# Patient Record
Sex: MALE | Race: WHITE | NOT HISPANIC OR LATINO
[De-identification: names, ages, dates, MRNs, and addresses within clinical notes are randomized per-mention and may not be internally consistent; named-entity substitution may affect disease eponyms.]

---

## 2017-03-15 ENCOUNTER — APPOINTMENT (OUTPATIENT)
Dept: OTOLARYNGOLOGY | Facility: CLINIC | Age: 52
End: 2017-03-15

## 2017-03-15 ENCOUNTER — EMERGENCY (EMERGENCY)
Facility: HOSPITAL | Age: 52
LOS: 1 days | Discharge: HOME | End: 2017-03-15
Admitting: EMERGENCY MEDICINE
Payer: COMMERCIAL

## 2017-03-15 VITALS
HEART RATE: 72 BPM | HEIGHT: 70 IN | DIASTOLIC BLOOD PRESSURE: 89 MMHG | WEIGHT: 188 LBS | RESPIRATION RATE: 18 BRPM | SYSTOLIC BLOOD PRESSURE: 138 MMHG | BODY MASS INDEX: 26.92 KG/M2

## 2017-03-15 VITALS
OXYGEN SATURATION: 97 % | SYSTOLIC BLOOD PRESSURE: 162 MMHG | DIASTOLIC BLOOD PRESSURE: 91 MMHG | HEART RATE: 79 BPM | TEMPERATURE: 98 F | RESPIRATION RATE: 20 BRPM

## 2017-03-15 DIAGNOSIS — H91.21 SUDDEN IDIOPATHIC HEARING LOSS, RIGHT EAR: ICD-10-CM

## 2017-03-15 DIAGNOSIS — H93.8X1 OTHER SPECIFIED DISORDERS OF RIGHT EAR: ICD-10-CM

## 2017-03-15 DIAGNOSIS — Z78.9 OTHER SPECIFIED HEALTH STATUS: ICD-10-CM

## 2017-03-15 DIAGNOSIS — H60.501 UNSPECIFIED ACUTE NONINFECTIVE OTITIS EXTERNA, RIGHT EAR: ICD-10-CM

## 2017-03-15 DIAGNOSIS — H91.91 UNSPECIFIED HEARING LOSS, RIGHT EAR: ICD-10-CM

## 2017-03-15 PROCEDURE — 99283 EMERGENCY DEPT VISIT LOW MDM: CPT | Mod: 25

## 2017-03-15 NOTE — ED PROVIDER NOTE - MEDICAL DECISION MAKING DETAILS
Pt is a 50 y/o M smoker no PMHx p/w muffled hearing (R > L) x 2 days. -- muffled hearing, possibly presbycusis, no e/o ottis media, no e/o otitis externa, no e/o TM perforation, no e/o cerumen impaction -- ENT follow up

## 2017-03-15 NOTE — ED PROVIDER NOTE - CHPI ED SYMPTOMS NEG
no chills/no nausea/no loss of consciousness/no weakness/no fever/no blurred vision/no change in level of consciousness/no syncope/no numbness/no vomiting

## 2017-03-15 NOTE — ED PROVIDER NOTE - PLAN OF CARE
Rest, drink plenty of fluids.  Advance activity as tolerated.  Continue all previously prescribed medications as directed.  Follow up with your primary care physician and ENT (referral list provided)  in 48-72 hours- bring copies of your results.  Return to the ER for worsening or persistent symptoms, including but not limited to hearing loss, ear pain, fevers, numbness, weakness, and/or ANY NEW OR CONCERNING SYMPTOMS. If you have issues obtaining follow up, please call: [phone] to obtain a doctor or specialist who takes your insurance in your area.

## 2017-03-22 ENCOUNTER — APPOINTMENT (OUTPATIENT)
Dept: OTOLARYNGOLOGY | Facility: CLINIC | Age: 52
End: 2017-03-22

## 2017-04-01 ENCOUNTER — APPOINTMENT (OUTPATIENT)
Dept: MRI IMAGING | Facility: IMAGING CENTER | Age: 52
End: 2017-04-01

## 2017-04-20 NOTE — ED ADULT TRIAGE NOTE - ESI TRIAGE ACUITY LEVEL, MLM
MRN:[id number]                      After Visit Summary   4/20/2017    Geovani Bella    MRN: [id number]           Thank you!     Thank you for choosing [facility] for your care. Our goal is always to provide you with excellent care. Hearing back from our patients is one way we can continue to improve our services. Please take a few minutes to complete the written survey that you may receive in the mail after you visit with us. Thank you!        Patient Information     Date Of Birth          1977        About your hospital stay     You were admitted on:  April 20, 2017 You last received care in the:  [facility]    You were discharged on:  April 20, 2017       Who to Call     For medical emergencies, please call 911.  For non-urgent questions about your medical care, please call your primary care provider or clinic, [phone]  For questions related to your surgery, please call your surgery clinic        Attending Provider     Provider Reinaldo Vila MD Orthopedics       Primary Care Provider Office Phone # Fax #    Keo Phillips PA-C [phone]       [place] [address]        After Care Instructions      Diet as Tolerated       Return to diet before surgery, unless instructed otherwise.            Discharge Instructions       Review outpatient procedure discharge instructions with patient as directed by Provider            Dressing Change       Change dressing on third day after surgery.            Ice to affected area       Ice pack to surgical site every 15 minutes per hour for 24 hours            No Alcohol       For 24 hours post procedure            No driving or operating machinery        until the day after procedure            Notify Provider       For signs and symptoms of infection: Fever greater than 101, redness, swelling, heat at site, drainage, pus.             4 Return to clinic       Return to clinic in 2 weeks            Shower        Ok to remove dressing and shower on postoperative day #3. Leave steri strips in place. Dab dry. Cover with gauze. No soaking baths, hot tubs, whirlpools, swimming pools.            Weight bearing - As tolerated           Wound care       Do not immerse wound in water until sutures removed                  Your next 10 appointments already scheduled     May 04, 2017  1:15 PM CDT   Return Visit with Reinaldo Mclaughlin MD   [facility] [place] ([facility] [place])    [address] [facility] [address]  [place] [address]   [phone]            May 24, 2017 10:30 AM CDT   New Visit with Fco Jackson [facility]    [address] [place] [address]   [phone]            May 31, 2017  9:30 AM CDT   Return Visit with BANDAR Thapa   [facility] ([address] [address]   [phone]              Further instructions from your care team       [facility]  Same-Day Surgery   Adult Discharge Orders & Instructions   For 24 hours after surgery  1. Get plenty of rest.  A responsible adult must stay with you for at least 24 hours after you leave the hospital.   2. Do not drive or use heavy equipment.  If you have weakness or tingling, don't drive or use heavy equipment until this feeling goes away.  3. Do not drink alcohol.  4. Avoid strenuous or risky activities.  Ask for help when climbing stairs.   5. You may feel lightheaded.  IF so, sit for a few minutes before standing.  Have someone help you get up.   6. If you have nausea (feel sick to your stomach): Drink only clear liquids such as apple juice, ginger ale, broth or 7-Up.  Rest may also help.  Be sure to drink enough fluids.  Move to a regular diet as you feel  able.  7. You may have a slight fever. Call the doctor if your fever is over 100 F (37.7 C) (taken under the tongue) or lasts longer than 24 hours.  8. You may have a dry mouth, a sore throat, muscle aches or trouble sleeping.  These should go away after 24 hours.  9. Do not make important or legal decisions.   Call your doctor for any of the followin.  Signs of infection (fever, growing tenderness at the surgery site, a large amount of drainage or bleeding, severe pain, foul-smelling drainage, redness, swelling).    2. It has been over 8 to 10 hours since surgery and you are still not able to urinate (pass water).    3.  Headache for over 24 hours.    4.  Numbness, tingling or weakness the day after surgery (if you had spinal anesthesia).  To contact a doctor call:  [phone]  Name: Geovani Bella MRN #: [id number]  1. Date: 2017  Procedure: left knee arthroscopy, medial meniscus and chondral debridement.  2. Discharge to home when stable, tolerating clear liquids, and patient has urinated  3. Call for follow-up appointment, [phone], with Dr. Mclaughlin in:  2 weeks.   WOUND CARE    The bandage may be slightly bloody. This is normal.  4. Ice:  Keep an ice bag on your knee for 20 minutes at a time.  5. Keep incisions clean and dry following surgery for:  72 hours   6. Change all bandages in:  72 hours       7. If bandages are changed before follow-up, cover all incisions with fresh bandages or bandaids.  8. O.K. to shower (may get incision wet) in:  72 hours  9. No tub baths, swimming pools, hot tubs, etc. for a minimum of 2 weeks following surgery  ACTIVITY  10. Keep leg elevated on a pillow placed under ankle. Do not keep pillow under your knee.  11. Weight-bearing ([place]):  Weight-bear as tolerated       May discontinue crutches in 2-3 days if able to walk without a limp.  12. Bracing: no brace needed.  13. Range of motion limits: no limit. Work on regaining full range of  motion.  14. Exercises:  Perform exercises 3 times a day for a minimum of 25 reps each time (start today or tomorrow):             Quadriceps sets  Calf Pumps Straight leg raises  Heels Slides   15. Start Physical Therapy: upon return to clinic.  16. OK to drive:  Not for 48 hours    When going back to driving, be sure to test braking/acceleration maneuvers in an empty parking lot before entry into any traffic areas.      ABSOLUTELY NO DRIVING WHILE TAKING NARCOTICS!    DISCHARGE MEDICATIONS:   Aspirin 325 mg, 1 tablet, take twice a day for 14 days then stop (to prevent blood clots) (over the counter)  Norco (5/325), 1 to 2 tablets, take every 6 hours as needed for pain, do not exceed 12 tabs/day  Other: stool softeners.    Strong pain medication has been prescribed. Use as directed. Do not combine with alcohol. Be careful as you walk or climb stairs.   DIET:  If no nausea, clear liquids should be taken initially.  Then progress to solid foods when clear liquids are tolerated.   RESPONSE TO SURGERY: It is normal to have pain and swelling in your knee after surgery. It may take 4 weeks or longer for the swelling to go away. It is also common to notice some bruising around the knee, thigh, and calf as the swelling resolves.  EMERGENCY: Call or return for any fevers (temperature greater than 101.5   or sustained fevers greater than 100.5   that haven t resolved within 3 to 4 days following surgery) or chills, increasing pain, swelling, redness, calf pain, drainage (especially if yellow, green, or foul smelling), excessive bleeding), chest pain, shortness of breath:  Phone #: [phone]; If emergency, go to local ER or dial 911.    Reinaldo Mclaughlin M.D., M.S.  Dept. of Orthopaedic Surgery  [facility]    4/20/2017        KNEE SURGERY - HOME EXERCISE PROGRAM    All exercises to be performed at least 3 times per day.     Quad Sets    Sit with leg extended    Tighten quad muscles in front of leg, trying  to  push back of knee downward    Hold exercise for 10 seconds    Rest 10 seconds between reps    Perform 1 set of 20 reps, 3 times a day     Heel Slides     Lie on back with legs straight    Slide heel to buttocks     Return to start position    Repeat with other leg    Perform 1 rep every 4 seconds    Perform 3 sets of 20 reps, 3 times a day    Rest 1 minute between sets     Ankle Pumps     Lie on back with foot elevated on pillow    Move foot up and down, pumping ankle    Perform 3 sets of 20 reps, 3 times a day    Perform 1 rep every 4 seconds    Rest 1 minute between sets     Straight Leg Raise    Lie on back with uninvolved knee bent    Raise straight leg to thigh level of bend leg    Return to starting position    Perform 3 sets of 20 reps, 3 times a day    Perform 1 rep every 4 seconds    Rest 1 minute between sets                  [facility]  Same-Day Surgery   Adult Discharge Orders & Instructions   For 24 hours after surgery  10. Get plenty of rest.  A responsible adult must stay with you for at least 24 hours after you leave the hospital.   11. Do not drive or use heavy equipment.  If you have weakness or tingling, don't drive or use heavy equipment until this feeling goes away.  12. Do not drink alcohol.  13. Avoid strenuous or risky activities.  Ask for help when climbing stairs.   14. You may feel lightheaded.  IF so, sit for a few minutes before standing.  Have someone help you get up.   15. If you have nausea (feel sick to your stomach): Drink only clear liquids such as apple juice, ginger ale, broth or 7-Up.  Rest may also help.  Be sure to drink enough fluids.  Move to a regular diet as you feel able.  16. You may have a slight fever. Call the doctor if your fever is over 100 F (37.7 C) (taken under the tongue) or lasts longer than 24 hours.  17. You may have a dry mouth, a sore throat, muscle aches or trouble sleeping.  These should go away after 24 hours.  18. Do not  make important or legal decisions.   Call your doctor for any of the followin.  Signs of infection (fever, growing tenderness at the surgery site, a large amount of drainage or bleeding, severe pain, foul-smelling drainage, redness, swelling).    2. It has been over 8 to 10 hours since surgery and you are still not able to urinate (pass water).    3.  Headache for over 24 hours.      To contact a doctor call:  [phone]      No Ibuprofen before 4:00 pm.          Pending Results     No orders found from 2017 to 2017.            Admission Information     Date & Time Provider Department Dept. Phone    2017 Reinaldo Mclaughlin MD [facility] [phone]      Your Vitals Were     Blood Pressure Temperature Respirations Pulse Oximetry          139/66 96.8  F (36  C) (Temporal) 20 96%        MyChart Information     [employer] gives you secure access to your electronic health record. If you see a primary care provider, you can also send messages to your care team and make appointments. If you have questions, please call your primary care clinic.  If you do not have a primary care provider, please call [phone] and they will assist you.        Care EveryWhere ID     This is your Care EveryWhere ID. This could be used by other organizations to access your [facility] medical records  [id number]           Review of your medicines      START taking        Dose / Directions    aspirin  MG EC tablet   Used for:  Complex tear of medial meniscus of left knee as current injury, subsequent encounter        Dose:  325 mg   Take 1 tablet (325 mg) by mouth 2 times daily (with meals) for 14 days   Quantity:  28 tablet   Refills:  0       HYDROcodone-acetaminophen 5-325 MG per tablet   Commonly known as:  NORCO   Used for:  Complex tear of medial meniscus of left knee as current injury, subsequent encounter        Dose:  1-2 tablet   Take 1-2 tablets by mouth every 6 hours as needed for other  (Moderate to Severe Pain)   Quantity:  30 tablet   Refills:  0       senna-docusate 8.6-50 MG per tablet   Commonly known as:  SENOKOT-S;PERICOLACE   Used for:  Complex tear of medial meniscus of left knee as current injury, subsequent encounter        Dose:  1-2 tablet   Take 1-2 tablets by mouth 2 times daily Take while on oral narcotics to prevent or treat constipation.   Quantity:  30 tablet   Refills:  0         CONTINUE these medicines which have NOT CHANGED        Dose / Directions    albuterol 108 (90 BASE) MCG/ACT Inhaler   Commonly known as:  PROAIR HFA/PROVENTIL HFA/VENTOLIN HFA   Used for:  Bronchitis with bronchospasm        Dose:  2 puff   Inhale 2 puffs into the lungs every 6 hours as needed for shortness of breath / dyspnea   Quantity:  1 Inhaler   Refills:  0       busPIRone 15 MG tablet   Commonly known as:  BUSPAR        Dose:  15 mg   Take 1 tablet (15 mg) by mouth 2 times daily   Quantity:  180 tablet   Refills:  3       CLONAZEPAM PO        Take  by mouth 2 times daily.   Refills:  0       methylphenidate ER 27 MG CR tablet   Commonly known as:  CONCERTA        Dose:  27 mg   27 mg   Refills:  0       VIAGRA 100 MG cap/tab   Generic drug:  sildenafil        Dose:  100 mg   Take 100 mg by mouth daily as needed.   Refills:  0            Where to get your medicines      These medications were sent to [facility] [place], [place] - [address]  [address], [facility] [address]     Phone:  [phone]     aspirin  MG EC tablet    senna-docusate 8.6-50 MG per tablet         Some of these will need a paper prescription and others can be bought over the counter. Ask your nurse if you have questions.     Bring a paper prescription for each of these medications     HYDROcodone-acetaminophen 5-325 MG per tablet                Protect others around you: Learn how to safely use, store and throw away your medicines at www.disposemymeds.org.              Medication List: This is a list of all your medications and when to take them. Check marks below indicate your daily home schedule. Keep this list as a reference.      Medications           Morning Afternoon Evening Bedtime As Needed    albuterol 108 (90 BASE) MCG/ACT Inhaler   Commonly known as:  PROAIR HFA/PROVENTIL HFA/VENTOLIN HFA   Inhale 2 puffs into the lungs every 6 hours as needed for shortness of breath / dyspnea                                aspirin  MG EC tablet   Take 1 tablet (325 mg) by mouth 2 times daily (with meals) for 14 days                                busPIRone 15 MG tablet   Commonly known as:  BUSPAR   Take 1 tablet (15 mg) by mouth 2 times daily                                CLONAZEPAM PO   Take  by mouth 2 times daily.                                HYDROcodone-acetaminophen 5-325 MG per tablet   Commonly known as:  NORCO   Take 1-2 tablets by mouth every 6 hours as needed for other (Moderate to Severe Pain)                                methylphenidate ER 27 MG CR tablet   Commonly known as:  CONCERTA   27 mg                                senna-docusate 8.6-50 MG per tablet   Commonly known as:  SENOKOT-S;PERICOLACE   Take 1-2 tablets by mouth 2 times daily Take while on oral narcotics to prevent or treat constipation.                                VIAGRA 100 MG cap/tab   Take 100 mg by mouth daily as needed.   Generic drug:  sildenafil                                          More Information        Discharge Instructions: Using Crutches (Weight-Bearing)  Your doctor has prescribed crutches for you. A healthy leg can support your body weight, but when you have an injured leg or foot, you need to keep weight off it. Once you are told that you can put some weight on your leg, use a  weight-bearing  method of walking as the leg heals. Depending on your arm strength and balance, you can either  step to  or  step through.  Practice will help you learn to step through so  that you can cover more ground with each step.      Before You Use Crutches    Remove throw rugs, electrical cords, and anything else that may cause you to fall.    Arrange your household to keep the items you need handy. Keep everything else out of the way.    Find a backpack, angel pack, or apron, or use pockets to carry things. This will help you keep your hands free.  Standing with Crutches  Use the balanced standing (tripod) position when you start or end a movement. Also use it whenever you're standing for a length of time.    Move your crutches in front of you about 12 inches.    Find your balance.    Be sure not to rest your armpits on the pads.  Walking with Crutches    Start in a balanced standing (tripod) position.    Step forward with your affected foot.    Land lightly between your crutches.    Squeeze the pads against the sides of your chest.    Support your weight with your hands and your affected leg.    Press down on the handgrips.  Step to    Lift your unaffected foot and step to the crutches.    Land on your unaffected foot, between your crutches.    Keep the knee slightly bent.    Reach forward and out with the crutches to begin the next step.  Step through    Lift the unaffected foot.    Step forward through the crutches.    Land on the unaffected foot, with the heel slightly in front of the toe of the other foot.    Keep the knee slightly bent.    Reach forward and out with the crutches to begin the next step.  Follow-Up  Make a follow-up appointment as directed by our staff.     When to Call Your Health Care Provider  Call your health care provider right away if you have any of the following:    Sudden or increased shortness of breath    Sudden chest pain or localized chest pain with coughing    Fever above 100.4 F (38.0 C)    Increasing redness, tenderness, or swelling at theincision site or in the injured limb    Drainage from the incision or injured limb    Opening of the incision or  injury    Increasing pain, with or without activity     6341-0891 [employer]. [address] [place] [address], [place] [address]. All rights reserved. This information is not intended as a substitute for professional medical care. Always follow your healthcare professional's instructions.

## 2020-04-25 ENCOUNTER — MESSAGE (OUTPATIENT)
Age: 55
End: 2020-04-25

## 2021-03-02 ENCOUNTER — EMERGENCY (EMERGENCY)
Facility: HOSPITAL | Age: 56
LOS: 1 days | Discharge: HOME | End: 2021-03-02
Admitting: EMERGENCY MEDICINE
Payer: COMMERCIAL

## 2021-03-02 VITALS
SYSTOLIC BLOOD PRESSURE: 150 MMHG | RESPIRATION RATE: 18 BRPM | OXYGEN SATURATION: 98 % | TEMPERATURE: 98 F | HEART RATE: 85 BPM | DIASTOLIC BLOOD PRESSURE: 97 MMHG

## 2021-03-02 PROCEDURE — 99284 EMERGENCY DEPT VISIT MOD MDM: CPT | Mod: 25

## 2021-03-02 PROCEDURE — 29125 APPL SHORT ARM SPLINT STATIC: CPT

## 2021-03-02 PROCEDURE — 73130 X-RAY EXAM OF HAND: CPT | Mod: 26,RT

## 2021-03-02 RX ORDER — IBUPROFEN 200 MG
600 TABLET ORAL ONCE
Refills: 0 | Status: COMPLETED | OUTPATIENT
Start: 2021-03-02 | End: 2021-03-02

## 2021-03-02 RX ADMIN — Medication 600 MILLIGRAM(S): at 10:53

## 2021-03-02 NOTE — ED PROVIDER NOTE - OBJECTIVE STATEMENT
56 y/o M no PMHx here c/o R hand pain x 3 days sp punching a wall. States he got upset and wanted to take his anger out. Initially felt minimal pain, however pain has been worsening over the last few days with associated swelling. +tingling to 4th and 5th digits in the R hand. Has been able to use his R hand with pain. Took Tylenol w/o relief. R hand dominant. Works in engineering.

## 2021-03-02 NOTE — ED PROVIDER NOTE - CLINICAL SUMMARY MEDICAL DECISION MAKING FREE TEXT BOX
no
56 y/o M here w/ hand pain sp punching a wall. Concern for boxers fx. Plan xray, nsaids for pain control, splint.

## 2021-03-02 NOTE — ED PROVIDER NOTE - CARE PROVIDER_API CALL
Catrachito Vicente  PLASTIC SURGERY  [address] [place] [address]  Phone: [phone]  Fax: [phone]  Follow Up Time: 1-3 Days

## 2021-03-02 NOTE — ED PROVIDER NOTE - PROGRESS NOTE DETAILS
RYAN Murry: Xray consistent w/ 5th metacarpal fx, minimally displaced. FX is already 3 days old. Dr. Vicente on call for hand today. Spoke w/ domenico Dickerson with us applying an ulnar gutter splint and following up in the office in 2 days.

## 2021-03-02 NOTE — ED PROVIDER NOTE - PHYSICAL EXAMINATION
R hand: swelling noted to ulnar aspect of hand, +tenderness on palpation of distal 5th phalanx, full ROM of all digits, muscle strength of digits 5/5, radial pulse 2+, sensation intact, NVI

## 2021-03-02 NOTE — ED PROVIDER NOTE - NSFOLLOWUPINSTRUCTIONS_ED_ALL_ED_FT
See your primary care doctor within 24-48 hours. Follow up with Dr. Vicente on Thursday for Friday of this week for further evaluation, bring copies of all reports with you. Keep the splint on at all times, cover with a plastic bag when showering. Rest and elevate the right hand to decrease swelling. Take Motrin 600mg every 8hrs with food for pain. Return to the ER for worsening symptoms, numbness/tingling, or any other concerns.    You can call [phone] to get a copy of your xray read, faxed to your doctor.

## 2021-03-02 NOTE — ED PROVIDER NOTE - PATIENT PORTAL LINK FT
You can access the FollowMyHealth Patient Portal offered by [facility] by registering at the following website: [email or web address]. By joining [facility]’s FollowMyHealth portal, you will also be able to view your health information using other applications (apps) compatible with our system.

## 2021-03-03 ENCOUNTER — APPOINTMENT (OUTPATIENT)
Dept: PLASTIC SURGERY | Facility: CLINIC | Age: 56
End: 2021-03-03
Payer: COMMERCIAL

## 2021-03-03 ENCOUNTER — RESULT REVIEW (OUTPATIENT)
Age: 56
End: 2021-03-03

## 2021-03-03 PROCEDURE — 99072 ADDL SUPL MATRL&STAF TM PHE: CPT

## 2021-03-03 PROCEDURE — 99203 OFFICE O/P NEW LOW 30 MIN: CPT | Mod: 25

## 2021-03-03 PROCEDURE — 26605 TREAT METACARPAL FRACTURE: CPT

## 2021-03-03 NOTE — ED POST DISCHARGE NOTE - REASON FOR FOLLOW-UP
Other Dr Cm Henriquez's office called asking that we fax over copy of Xray. Faxed to  phone # [phone]

## 2021-03-04 ENCOUNTER — OUTPATIENT (OUTPATIENT)
Dept: OUTPATIENT SERVICES | Facility: HOSPITAL | Age: 56
LOS: 1 days | End: 2021-03-04
Payer: COMMERCIAL

## 2021-03-04 ENCOUNTER — APPOINTMENT (OUTPATIENT)
Dept: RADIOLOGY | Facility: HOSPITAL | Age: 56
End: 2021-03-04

## 2021-03-04 DIAGNOSIS — S62.326A DISPLACED FRACTURE OF SHAFT OF FIFTH METACARPAL BONE, RIGHT HAND, INITIAL ENCOUNTER FOR CLOSED FRACTURE: ICD-10-CM

## 2021-03-04 DIAGNOSIS — M79.641 PAIN IN RIGHT HAND: ICD-10-CM

## 2021-03-04 PROCEDURE — 73120 X-RAY EXAM OF HAND: CPT | Mod: 26,RT

## 2021-03-07 NOTE — HISTORY OF PRESENT ILLNESS
[FreeTextEntry1] : 54 y/o RHD man presents with right small finger metacarpal fracture after punching a wall 3 days prior to presentation. He went to the [facility] ER and was splinted. He c/o pain at the site. He has left the splint in place. He denies numbness in the digits or any open wound.\par \par I reviewed the X-ray from 3/2, which demonstrates and apex dorsal small finger metacarpal neck fracture with angulation of 25-30 degrees.\par \par He works as an  at [employer]. He lives with his wife, who is here with him today. He is a 1 ppd smoker. He drinks ~2-3 drinks/day and denies recreational drug use.\par

## 2021-03-07 NOTE — PROCEDURE
[Nl] : None [FreeTextEntry1] : right SF MC fx [FreeTextEntry2] : closed reduction and splinting of right SF MC fx [FreeTextEntry3] : lidocaine 1% [FreeTextEntry6] : R/B/A of closed reduction were discussed with the patient. Specific risks of bleeding, pain, nonunion, malunion, and potential need for additional procedures, among other risks, were discussed and all questions answered.\par \par An ulnar nerve block and hematoma block were performed on the right hand after cleansing the areas with alcohol.. After anesthesia was confirmed, A Jahss maneuver was performed to reduce the fracture, and a ulnar gutter splint was placed. The patient tolerated the procedure well.

## 2021-03-07 NOTE — PHYSICAL EXAM
[de-identified] : right hand with swelling and tenderness over SF metacarpal neck. No gross deformity. Moderate ecchymoses.

## 2021-03-07 NOTE — ASSESSMENT
[FreeTextEntry1] : Reduction and splinting performed. Obtain post-reduction x-ray and f/u in 1 week.

## 2021-03-07 NOTE — REVIEW OF SYSTEMS
[Heartburn] : heartburn [Negative] : Heme/Lymph [Abdominal Pain] : no abdominal pain [Vomiting] : no vomiting [Constipation] : no constipation [Diarrhea] : no diarrhea [Melena] : no melena [As Noted in HPI] : as noted in HPI

## 2021-03-07 NOTE — ADDENDUM
[FreeTextEntry1] : 3/7/2021 - review of post reduction film demonstrates improved angulation with slightly greater impaction. Will need to replace splint with one that provides better MCPJ flexion at next visit.

## 2021-03-07 NOTE — REASON FOR VISIT
[Consultation] : a consultation visit [Spouse] : spouse [FreeTextEntry1] : Patient presents to the office from the ER with a Boxer Fracture, closed, Pinky knuckle on right hand, Patient broke his hand when punching a wall.

## 2021-03-10 ENCOUNTER — APPOINTMENT (OUTPATIENT)
Dept: PLASTIC SURGERY | Facility: CLINIC | Age: 56
End: 2021-03-10
Payer: COMMERCIAL

## 2021-03-10 PROCEDURE — 99072 ADDL SUPL MATRL&STAF TM PHE: CPT

## 2021-03-10 PROCEDURE — 29075 APPL CST ELBW FNGR SHORT ARM: CPT | Mod: 78

## 2021-03-13 NOTE — REASON FOR VISIT
[Post Op: _________] : a [unfilled] post op visit [FreeTextEntry1] : DOP : 3/3/21 s/p closed reduction and splinting of right SF MC FX

## 2021-03-13 NOTE — ASSESSMENT
[FreeTextEntry1] : Pt expresses preference for cast placement. Ulnar gutter cast placed over webril padding. f/u in 2 weeks w/ pre-visit X-rays.

## 2021-03-13 NOTE — HISTORY OF PRESENT ILLNESS
[FreeTextEntry1] : Pt c/o discomfort from splint, which he has left in place\par \par Xray 3/4 shows improvement of apex dorsal angulation with insufficient flexion of MCPJs.

## 2021-03-25 ENCOUNTER — APPOINTMENT (OUTPATIENT)
Dept: RADIOLOGY | Facility: HOSPITAL | Age: 56
End: 2021-03-25

## 2021-03-25 ENCOUNTER — OUTPATIENT (OUTPATIENT)
Dept: OUTPATIENT SERVICES | Facility: HOSPITAL | Age: 56
LOS: 1 days | End: 2021-03-25
Payer: COMMERCIAL

## 2021-03-25 DIAGNOSIS — S62.326A DISPLACED FRACTURE OF SHAFT OF FIFTH METACARPAL BONE, RIGHT HAND, INITIAL ENCOUNTER FOR CLOSED FRACTURE: ICD-10-CM

## 2021-03-25 DIAGNOSIS — M79.641 PAIN IN RIGHT HAND: ICD-10-CM

## 2021-03-25 PROCEDURE — 73130 X-RAY EXAM OF HAND: CPT | Mod: 26,RT

## 2021-03-31 ENCOUNTER — APPOINTMENT (OUTPATIENT)
Dept: PLASTIC SURGERY | Facility: CLINIC | Age: 56
End: 2021-03-31
Payer: COMMERCIAL

## 2021-03-31 PROCEDURE — 99024 POSTOP FOLLOW-UP VISIT: CPT

## 2021-04-03 NOTE — HISTORY OF PRESENT ILLNESS
[FreeTextEntry1] : The patient endorses greater comfort in this cast compared to the splint.  The patient wishes to remain in the cast for 2 additional weeks of treatment compared to getting converted to a splint with joan taping.  He expresses understanding that this may result in greater stiffness of his metacarpophalangeal joints.\par \par X-ray imaging from 3/25/2021 reviewed.  No change in fracture alignment.

## 2021-04-03 NOTE — REASON FOR VISIT
[Post Op: _________] : a [unfilled] post op visit [FreeTextEntry1] : DOP : 3/3/21 s/p closed reduction and splinting of right SF MC FX.

## 2021-04-03 NOTE — ASSESSMENT
[FreeTextEntry1] : Stable fracture confirmation in cast.  Continue cast treatment for 2 more weeks.  Obtain x-ray imaging prior to cast removal.  Will refer to hand therapy following cast removal.

## 2021-04-03 NOTE — PHYSICAL EXAM
[de-identified] : Normal sensation in R/M/U distribution on the right.  No skin ulceration at cast contact points.  Cast in good repair.

## 2021-04-12 ENCOUNTER — OUTPATIENT (OUTPATIENT)
Dept: OUTPATIENT SERVICES | Facility: HOSPITAL | Age: 56
LOS: 1 days | End: 2021-04-12
Payer: COMMERCIAL

## 2021-04-12 ENCOUNTER — APPOINTMENT (OUTPATIENT)
Dept: RADIOLOGY | Facility: HOSPITAL | Age: 56
End: 2021-04-12

## 2021-04-12 DIAGNOSIS — M79.641 PAIN IN RIGHT HAND: ICD-10-CM

## 2021-04-12 DIAGNOSIS — S62.326A DISPLACED FRACTURE OF SHAFT OF FIFTH METACARPAL BONE, RIGHT HAND, INITIAL ENCOUNTER FOR CLOSED FRACTURE: ICD-10-CM

## 2021-04-12 PROCEDURE — 73130 X-RAY EXAM OF HAND: CPT | Mod: 26,RT

## 2021-04-14 ENCOUNTER — APPOINTMENT (OUTPATIENT)
Dept: PLASTIC SURGERY | Facility: CLINIC | Age: 56
End: 2021-04-14
Payer: COMMERCIAL

## 2021-04-14 DIAGNOSIS — S62.326A DISPLACED FRACTURE OF SHAFT OF FIFTH METACARPAL BONE, RIGHT HAND, INITIAL ENCOUNTER FOR CLOSED FRACTURE: ICD-10-CM

## 2021-04-14 PROCEDURE — 99024 POSTOP FOLLOW-UP VISIT: CPT

## 2021-04-15 PROBLEM — S62.326A CLOSED DISPLACED FRACTURE OF SHAFT OF FIFTH METACARPAL BONE OF RIGHT HAND, INITIAL ENCOUNTER: Status: ACTIVE | Noted: 2021-03-03

## 2021-04-15 NOTE — PHYSICAL EXAM
[de-identified] : Right ulnar gutter cast removed.  No tenderness at fracture site.  No visible skin lesions.  Some stiffness at MCP joints of the small and ring fingers.

## 2021-04-15 NOTE — HISTORY OF PRESENT ILLNESS
[FreeTextEntry1] : Patient is now 6 weeks following closed reduction and casting of a right small finger metacarpal neck fracture.  He denies pain at the fracture site.  He is eager to go back to work.\par \par X-ray images from 4/12/2021 reviewed.  Stable fracture reduction with evidence of callus formation.

## 2021-04-15 NOTE — ASSESSMENT
[FreeTextEntry1] : Fracture appears to be healing normally.  We will refer to hand therapy for fabrication of a Orthoplast splint and initiation of range of motion exercises.  Would avoid strengthening for 2 more weeks.  Return to office in 2 weeks.

## 2021-12-13 ENCOUNTER — INPATIENT (INPATIENT)
Facility: HOSPITAL | Age: 56
LOS: 2 days | Discharge: HOME | End: 2021-12-16
Attending: INTERNAL MEDICINE | Admitting: INTERNAL MEDICINE
Payer: COMMERCIAL

## 2021-12-13 VITALS
DIASTOLIC BLOOD PRESSURE: 86 MMHG | OXYGEN SATURATION: 100 % | TEMPERATURE: 97 F | RESPIRATION RATE: 18 BRPM | SYSTOLIC BLOOD PRESSURE: 167 MMHG | HEART RATE: 81 BPM

## 2021-12-13 DIAGNOSIS — E11.10 TYPE 2 DIABETES MELLITUS WITH KETOACIDOSIS WITHOUT COMA: ICD-10-CM

## 2021-12-13 LAB
ALBUMIN SERPL ELPH-MCNC: 4.4 G/DL — SIGNIFICANT CHANGE UP (ref 3.3–5)
ALP SERPL-CCNC: 99 U/L — SIGNIFICANT CHANGE UP (ref 40–120)
ALT FLD-CCNC: 29 U/L — SIGNIFICANT CHANGE UP (ref 4–41)
ANION GAP SERPL CALC-SCNC: 24 MMOL/L — HIGH (ref 7–14)
ANION GAP SERPL CALC-SCNC: 26 MMOL/L — HIGH (ref 7–14)
AST SERPL-CCNC: 28 U/L — SIGNIFICANT CHANGE UP (ref 4–40)
B PERT DNA SPEC QL NAA+PROBE: SIGNIFICANT CHANGE UP
B PERT+PARAPERT DNA PNL SPEC NAA+PROBE: SIGNIFICANT CHANGE UP
B-OH-BUTYR SERPL-SCNC: 8.8 MMOL/L — HIGH (ref 0–0.4)
BASOPHILS # BLD AUTO: 0.05 K/UL — SIGNIFICANT CHANGE UP (ref 0–0.2)
BASOPHILS NFR BLD AUTO: 0.7 % — SIGNIFICANT CHANGE UP (ref 0–2)
BILIRUB SERPL-MCNC: 0.5 MG/DL — SIGNIFICANT CHANGE UP (ref 0.2–1.2)
BLOOD GAS VENOUS COMPREHENSIVE RESULT: SIGNIFICANT CHANGE UP
BORDETELLA PARAPERTUSSIS (RAPRVP): SIGNIFICANT CHANGE UP
BUN SERPL-MCNC: 10 MG/DL — SIGNIFICANT CHANGE UP (ref 7–23)
BUN SERPL-MCNC: 9 MG/DL — SIGNIFICANT CHANGE UP (ref 7–23)
C PNEUM DNA SPEC QL NAA+PROBE: SIGNIFICANT CHANGE UP
CALCIUM SERPL-MCNC: 9.3 MG/DL — SIGNIFICANT CHANGE UP (ref 8.4–10.5)
CALCIUM SERPL-MCNC: 9.6 MG/DL — SIGNIFICANT CHANGE UP (ref 8.4–10.5)
CHLORIDE SERPL-SCNC: 89 MMOL/L — LOW (ref 98–107)
CHLORIDE SERPL-SCNC: 94 MMOL/L — LOW (ref 98–107)
CO2 SERPL-SCNC: 13 MMOL/L — LOW (ref 22–31)
CO2 SERPL-SCNC: 14 MMOL/L — LOW (ref 22–31)
CREAT SERPL-MCNC: 0.72 MG/DL — SIGNIFICANT CHANGE UP (ref 0.5–1.3)
CREAT SERPL-MCNC: 0.77 MG/DL — SIGNIFICANT CHANGE UP (ref 0.5–1.3)
EOSINOPHIL # BLD AUTO: 0.12 K/UL — SIGNIFICANT CHANGE UP (ref 0–0.5)
EOSINOPHIL NFR BLD AUTO: 1.7 % — SIGNIFICANT CHANGE UP (ref 0–6)
FLUAV SUBTYP SPEC NAA+PROBE: SIGNIFICANT CHANGE UP
FLUBV RNA SPEC QL NAA+PROBE: SIGNIFICANT CHANGE UP
GLUCOSE BLDC GLUCOMTR-MCNC: 258 MG/DL — HIGH (ref 70–99)
GLUCOSE BLDC GLUCOMTR-MCNC: 336 MG/DL — HIGH (ref 70–99)
GLUCOSE SERPL-MCNC: 488 MG/DL — CRITICAL HIGH (ref 70–99)
GLUCOSE SERPL-MCNC: 656 MG/DL — CRITICAL HIGH (ref 70–99)
HADV DNA SPEC QL NAA+PROBE: SIGNIFICANT CHANGE UP
HCOV 229E RNA SPEC QL NAA+PROBE: SIGNIFICANT CHANGE UP
HCOV HKU1 RNA SPEC QL NAA+PROBE: SIGNIFICANT CHANGE UP
HCOV NL63 RNA SPEC QL NAA+PROBE: SIGNIFICANT CHANGE UP
HCOV OC43 RNA SPEC QL NAA+PROBE: SIGNIFICANT CHANGE UP
HCT VFR BLD CALC: 41 % — SIGNIFICANT CHANGE UP (ref 39–50)
HGB BLD-MCNC: 14.4 G/DL — SIGNIFICANT CHANGE UP (ref 13–17)
HMPV RNA SPEC QL NAA+PROBE: SIGNIFICANT CHANGE UP
HPIV1 RNA SPEC QL NAA+PROBE: SIGNIFICANT CHANGE UP
HPIV2 RNA SPEC QL NAA+PROBE: SIGNIFICANT CHANGE UP
HPIV3 RNA SPEC QL NAA+PROBE: SIGNIFICANT CHANGE UP
HPIV4 RNA SPEC QL NAA+PROBE: SIGNIFICANT CHANGE UP
IANC: 4.28 K/UL — SIGNIFICANT CHANGE UP (ref 1.5–8.5)
IMM GRANULOCYTES NFR BLD AUTO: 0.6 % — SIGNIFICANT CHANGE UP (ref 0–1.5)
LYMPHOCYTES # BLD AUTO: 1.81 K/UL — SIGNIFICANT CHANGE UP (ref 1–3.3)
LYMPHOCYTES # BLD AUTO: 26.3 % — SIGNIFICANT CHANGE UP (ref 13–44)
M PNEUMO DNA SPEC QL NAA+PROBE: SIGNIFICANT CHANGE UP
MAGNESIUM SERPL-MCNC: 1.8 MG/DL — SIGNIFICANT CHANGE UP (ref 1.6–2.6)
MAGNESIUM SERPL-MCNC: 2.1 MG/DL — SIGNIFICANT CHANGE UP (ref 1.6–2.6)
MCHC RBC-ENTMCNC: 32.4 PG — SIGNIFICANT CHANGE UP (ref 27–34)
MCHC RBC-ENTMCNC: 35.1 GM/DL — SIGNIFICANT CHANGE UP (ref 32–36)
MCV RBC AUTO: 92.3 FL — SIGNIFICANT CHANGE UP (ref 80–100)
MONOCYTES # BLD AUTO: 0.59 K/UL — SIGNIFICANT CHANGE UP (ref 0–0.9)
MONOCYTES NFR BLD AUTO: 8.6 % — SIGNIFICANT CHANGE UP (ref 2–14)
NEUTROPHILS # BLD AUTO: 4.28 K/UL — SIGNIFICANT CHANGE UP (ref 1.8–7.4)
NEUTROPHILS NFR BLD AUTO: 62.1 % — SIGNIFICANT CHANGE UP (ref 43–77)
NRBC # BLD: 0 /100 WBCS — SIGNIFICANT CHANGE UP
NRBC # FLD: 0 K/UL — SIGNIFICANT CHANGE UP
NT-PROBNP SERPL-SCNC: 74 PG/ML — SIGNIFICANT CHANGE UP
PHOSPHATE SERPL-MCNC: 3.5 MG/DL — SIGNIFICANT CHANGE UP (ref 2.5–4.5)
PHOSPHATE SERPL-MCNC: 4.4 MG/DL — SIGNIFICANT CHANGE UP (ref 2.5–4.5)
PLATELET # BLD AUTO: 157 K/UL — SIGNIFICANT CHANGE UP (ref 150–400)
POTASSIUM SERPL-MCNC: 4.6 MMOL/L — SIGNIFICANT CHANGE UP (ref 3.5–5.3)
POTASSIUM SERPL-MCNC: 5.3 MMOL/L — SIGNIFICANT CHANGE UP (ref 3.5–5.3)
POTASSIUM SERPL-SCNC: 4.6 MMOL/L — SIGNIFICANT CHANGE UP (ref 3.5–5.3)
POTASSIUM SERPL-SCNC: 5.3 MMOL/L — SIGNIFICANT CHANGE UP (ref 3.5–5.3)
PROT SERPL-MCNC: 7.4 G/DL — SIGNIFICANT CHANGE UP (ref 6–8.3)
RAPID RVP RESULT: SIGNIFICANT CHANGE UP
RBC # BLD: 4.44 M/UL — SIGNIFICANT CHANGE UP (ref 4.2–5.8)
RBC # FLD: 12.3 % — SIGNIFICANT CHANGE UP (ref 10.3–14.5)
RSV RNA SPEC QL NAA+PROBE: SIGNIFICANT CHANGE UP
RV+EV RNA SPEC QL NAA+PROBE: SIGNIFICANT CHANGE UP
SARS-COV-2 RNA SPEC QL NAA+PROBE: SIGNIFICANT CHANGE UP
SODIUM SERPL-SCNC: 128 MMOL/L — LOW (ref 135–145)
SODIUM SERPL-SCNC: 132 MMOL/L — LOW (ref 135–145)
TROPONIN T, HIGH SENSITIVITY RESULT: <6 NG/L — SIGNIFICANT CHANGE UP
WBC # BLD: 6.89 K/UL — SIGNIFICANT CHANGE UP (ref 3.8–10.5)
WBC # FLD AUTO: 6.89 K/UL — SIGNIFICANT CHANGE UP (ref 3.8–10.5)

## 2021-12-13 PROCEDURE — 99291 CRITICAL CARE FIRST HOUR: CPT

## 2021-12-13 RX ORDER — CHLORHEXIDINE GLUCONATE 213 G/1000ML
1 SOLUTION TOPICAL
Refills: 0 | Status: DISCONTINUED | OUTPATIENT
Start: 2021-12-13 | End: 2021-12-14

## 2021-12-13 RX ORDER — SODIUM CHLORIDE 9 MG/ML
1000 INJECTION, SOLUTION INTRAVENOUS ONCE
Refills: 0 | Status: COMPLETED | OUTPATIENT
Start: 2021-12-13 | End: 2021-12-13

## 2021-12-13 RX ORDER — SODIUM CHLORIDE 9 MG/ML
1000 INJECTION, SOLUTION INTRAVENOUS
Refills: 0 | Status: DISCONTINUED | OUTPATIENT
Start: 2021-12-13 | End: 2021-12-14

## 2021-12-13 RX ORDER — ENOXAPARIN SODIUM 100 MG/ML
40 INJECTION SUBCUTANEOUS EVERY 24 HOURS
Refills: 0 | Status: DISCONTINUED | OUTPATIENT
Start: 2021-12-13 | End: 2021-12-16

## 2021-12-13 RX ORDER — PANTOPRAZOLE SODIUM 20 MG/1
40 TABLET, DELAYED RELEASE ORAL
Refills: 0 | Status: DISCONTINUED | OUTPATIENT
Start: 2021-12-13 | End: 2021-12-16

## 2021-12-13 RX ORDER — INSULIN HUMAN 100 [IU]/ML
6 INJECTION, SOLUTION SUBCUTANEOUS
Qty: 100 | Refills: 0 | Status: DISCONTINUED | OUTPATIENT
Start: 2021-12-13 | End: 2021-12-14

## 2021-12-13 RX ADMIN — SODIUM CHLORIDE 150 MILLILITER(S): 9 INJECTION, SOLUTION INTRAVENOUS at 23:43

## 2021-12-13 RX ADMIN — SODIUM CHLORIDE 1000 MILLILITER(S): 9 INJECTION, SOLUTION INTRAVENOUS at 18:10

## 2021-12-13 RX ADMIN — Medication 100 MILLIGRAM(S): at 19:52

## 2021-12-13 RX ADMIN — SODIUM CHLORIDE 100 MILLILITER(S): 9 INJECTION, SOLUTION INTRAVENOUS at 22:22

## 2021-12-13 RX ADMIN — INSULIN HUMAN 6 UNIT(S)/HR: 100 INJECTION, SOLUTION SUBCUTANEOUS at 20:50

## 2021-12-13 RX ADMIN — SODIUM CHLORIDE 1000 MILLILITER(S): 9 INJECTION, SOLUTION INTRAVENOUS at 17:46

## 2021-12-13 NOTE — ED PROVIDER NOTE - PROGRESS NOTE DETAILS
Ric PGY3 - Repeat BMP w/ K of 4.6 non-hemolyzed.  Will initiate insulin gtt, fluids w/ potassium.  Pending MICU eval. Ric PGY3 - Pt. evaluated and accepted by ICU.

## 2021-12-13 NOTE — RAPID RESPONSE TEAM SUMMARY - NSSITUATIONBACKGROUNDRRT_GEN_ALL_CORE
56M no PMH, is an employee here, presents for near syncope. Per wife, pt was feeling lightheaded since earlier today and so brought to ED however saw the line to get in so decided to enter via basement as pt is an employee here. RRT called for pt feeling faint. On encounter pt is alert and oriented, being wheeled to ED triage.

## 2021-12-13 NOTE — H&P ADULT - HISTORY OF PRESENT ILLNESS
56M with GERD, alcohol use disorder presents with presyncopal/syncopal event in the hospital.  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 Per wife, patient stopped walking, leaned against the wall and slowly slid down.  and herself caught him and called RRT. Patient thinks he may have lost consciousness for 5 seconds. No head trauma or other injury. States he hasn't eaten much for the past few weeks, endorses decreased appetite, denies nausea/vomiting, had episode of abdominal pain yesterday that has since resolved. Endorses increased thirst and urinary frequency.  He has lost 20 lbs in the last 2 months.  States he does not have DM, but has also not seen MD in 30 years.  Denies f/c, chest pain pressure palpitations, SOB, AMS. 56M with GERD, alcohol use disorder presents with presyncopal/syncopal event in the hospital.  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 Per wife, patient stopped walking, leaned against the wall and slowly slid down.  and herself caught him and called RRT. Patient thinks he may have lost consciousness for 5 seconds. No head trauma or other injury. States he hasn't eaten much for the past few weeks, endorses decreased appetite, denies nausea/vomiting, had episode of abdominal pain yesterday that has since resolved. Endorses increased thirst and urinary frequency.  He has lost 20 lbs in the last 2 months.  States he does not have DM, but has also not seen MD in 30 years.  Denies f/c, chest pain pressure palpitations, SOB, AMS.    In ED:  56M with GERD, alcohol use disorder presents with presyncopal/syncopal event in the hospital.  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 Per wife, patient stopped walking, leaned against the wall and slowly slid down.  and herself caught him and called RRT. Patient thinks he may have lost consciousness for 5 seconds. No head trauma or other injury. States he hasn't eaten much for the past few weeks, endorses decreased appetite, denies nausea/vomiting, had episode of abdominal pain yesterday that has since resolved. Endorses increased thirst and urinary frequency.  He has lost 20 lbs in the last 2 months.  States he does not have DM, but has also not seen MD in 30 years.  Denies f/c, chest pain pressure palpitations, SOB, AMS.    In ED: VBG pH 7.27, AG 26, BMP HCO3 13, BHB 8.8, BMP glc 656. S/p 2L IVF, on  56M with GERD, alcohol use disorder presents with presyncopal/syncopal event in the hospital.  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 Per wife, patient stopped walking, leaned against the wall and slowly slid down.  and herself caught him and called RRT. Patient thinks he may have lost consciousness for 5 seconds. No head trauma or other injury. States he hasn't eaten much for the past few weeks, endorses decreased appetite, denies nausea/vomiting, had episode of abdominal pain yesterday that has since resolved. Endorses increased thirst and urinary frequency.  He has lost 20 lbs in the last 2 months.  States he does not have DM, but has also not seen MD in 30 years.  Denies f/c, chest pain pressure palpitations, SOB, AMS.  Reports no hx of alcohol withdrawal. Last drink 12/10, drinks 12 beers/day everyday.    In ED: VBG pH 7.27, AG 26, BMP HCO3 13, BHB 8.8, BMP glc 656. S/p 2L IVF. VSS.

## 2021-12-13 NOTE — ED ADULT NURSE NOTE - OBJECTIVE STATEMENT
pt received spot 13. pt A+Ox3 c/o feeling dizzy today, had a near syncopal episode and rapid response team called. FS read HI. no known PMH of DM, but has not been to a doctor for many years. denies pain and discomfort. respirations even and unlabored. pt endorses generalized weakness and polyuria. labs sent. IVSL in place. LR infusing as ordered. will monitor.

## 2021-12-13 NOTE — ED ADULT NURSE REASSESSMENT NOTE - NS ED NURSE REASSESS COMMENT FT1
report given to KINA Spring. pt FS is 258. pt denies any acute complaints. in NAD. Insulin running at 6ml/hr, LR running at 150ml/hr.

## 2021-12-13 NOTE — H&P ADULT - NSHPLABSRESULTS_GEN_ALL_CORE
LABS: Personally reviewed labs, imaging, and ECG                          14.4   6.89  )-----------( 157      ( 13 Dec 2021 17:54 )             41.0       12-13    132<L>  |  94<L>  |  9   ----------------------------<  488<HH>  4.6   |  14<L>  |  0.72    Ca    9.3      13 Dec 2021 19:30  Phos  3.5     12-13  Mg     1.80     12-13    TPro  7.4  /  Alb  4.4  /  TBili  0.5  /  DBili  x   /  AST  28  /  ALT  29  /  AlkPhos  99  12-13       LIVER FUNCTIONS - ( 13 Dec 2021 17:54 )  Alb: 4.4 g/dL / Pro: 7.4 g/dL / ALK PHOS: 99 U/L / ALT: 29 U/L / AST: 28 U/L / GGT: x                            Lactate Trend            CAPILLARY BLOOD GLUCOSE      POCT Blood Glucose.: 442 mg/dL (13 Dec 2021 20:48)            RADIOLOGY & ADDITIONAL TESTS: LABS: Personally reviewed labs, imaging, and ECG                          14.4   6.89  )-----------( 157      ( 13 Dec 2021 17:54 )             41.0       12-13    132<L>  |  94<L>  |  9   ----------------------------<  488<HH>  4.6   |  14<L>  |  0.72    Ca    9.3      13 Dec 2021 19:30  Phos  3.5     12-13  Mg     1.80     12-13    TPro  7.4  /  Alb  4.4  /  TBili  0.5  /  DBili  x   /  AST  28  /  ALT  29  /  AlkPhos  99  12-13       LIVER FUNCTIONS - ( 13 Dec 2021 17:54 )  Alb: 4.4 g/dL / Pro: 7.4 g/dL / ALK PHOS: 99 U/L / ALT: 29 U/L / AST: 28 U/L / GGT: x                            Lactate Trend            CAPILLARY BLOOD GLUCOSE      POCT Blood Glucose.: 442 mg/dL (13 Dec 2021 20:48)            RADIOLOGY & ADDITIONAL TESTS:    EKG: NSR with peaked T waves

## 2021-12-13 NOTE — H&P ADULT - NSHPREVIEWOFSYSTEMS_GEN_ALL_CORE
General: No fevers, chills, fatigue, weight loss  HEENT: No headaches, acute visual changes, rhinorrhea, sore throat, dysphagia  CVS: No chest pain, palpitations  Resp: No cough, dyspnea, wheezing  GI: No abdominal pain, nausea, vomiting, constipation, diarrhea, hematochezia, melena  : No dysuria, frequency, hematuria, or discharge  MSK: No joint pain or swelling  Ext: No edema, no claudication  Skin: No rashes or itching  Heme: No easy bruising or petechiae  Neuro: No confusion, numbness, focal weakness, or loss of consciousness  Endocrine: No excessive heat or cold symptoms, polyuria, polydipsia General: + weight loss.  No fevers, chills, fatigue.   HEENT: No headaches, acute visual changes, rhinorrhea, sore throat, dysphagia  CVS: No chest pain, palpitations  Resp: No cough, dyspnea, wheezing  GI: No abdominal pain, nausea, vomiting, constipation, diarrhea, hematochezia, melena  : No dysuria, frequency, hematuria, or discharge  MSK: No joint pain or swelling  Ext: No edema, no claudication  Skin: No rashes or itching  Heme: No easy bruising or petechiae  Neuro: No confusion, numbness, focal weakness, or loss of consciousness  Endocrine: + polyuria, polydipsia.  No excessive heat or cold symptoms

## 2021-12-13 NOTE — ED PROVIDER NOTE - ATTENDING CONTRIBUTION TO CARE
DR. NJ, ATTENDING MD-  I performed a face to face bedside interview with the patient regarding history of present illness, review of symptoms and past medical history. I completed an independent physical exam.  I have discussed the patient's plan of care with the resident.   Documentation as above in the note.    57 y/o male no pmh or pcp eval for decades, everyday smoker and drinker (approx 12 cans of beer daily), here with near syncope today, fatigue, thirst, and inc urinary freq x1 month.  FS >600  here.  Concern for new dm with lyte abn, dka.  Obtain cbc cmp bhb vbg ua ucx covid swab give ivf bolus benzo to prevent etoh w/d, will need admission for further care and evaluation.

## 2021-12-13 NOTE — ED PROVIDER NOTE - OBJECTIVE STATEMENT
57 y/o M w/ no known pmh (has not seen doctor in 30 years) presents with near syncopal episode earlier this evening while walking, grabbed on wall and slid down, no head trauma or other msk injury. States he hasn't eaten for the past few weeks, endorses dec po intake/appetite, denies nausea/vomiting, had episode of abdominal pain yesterday that has since resolved, denies chest pain pressure palpitations, sob, AMS. Endorses increased thirst and urinary frequency. States he does not have DM, but has also not seen MD in 30 years (FSG > 600).    social: smokes 3/4th of pack of cigarettes every day, drinks 12 beers per day

## 2021-12-13 NOTE — H&P ADULT - ASSESSMENT
NEURO    RESPIRATORY    CARDIOVASCULAR    GI/NUTRITION    RENAL/    HEMATOLOGIC    INFECTIOUS DISEASE    ENDOCRINE   56M with GERD, alcohol use disorder presents with presyncopal/syncopal event, likely secondary to hypovolemia from DKA with new diagnosis of DM.    NEURO  #Presyncope/Syncope  Likely secondary to hypovolemia iso DKA.   - Neuro checks per ICU protocol  - Monitor on telemetry while in ICU  - Can consider TTE to rule out structural causes but less likely  #Alcohol misuse  12 beers/day, last drink on Friday 12/10. No hx of withdrawal  - s/p librium 100mg PO  - Will monitor CIWA  - will give phenobarbitol PRN for withdrawal symptoms.    RESPIRATORY  No acute issues    CARDIOVASCULAR  No acute issues    GI/NUTRITION  #Diet  - NPO for now until off insulin gtt    RENAL/  #Polyuria secondary to DM  - Monitor UOP    HEMATOLOGIC  #DVT ppx  - Lovenox daily    INFECTIOUS DISEASE  - No concern for infectious etiologies for DKA  - Afebrile, no leukocytosis    ENDOCRINE  #Metabolic acidosis 2/2 DKA, new dx of DM  VBG pH 7.27, AG 26, BMP HCO3 13, BHB 8.8, BMP glc 656  - s/p 2L LR. On maintenance LR + KCL 100cc/hr. Will give additional IVF as per DKA protocol   - On insulin gtt  - POC blood glc q1h  - BMP q4h, replete electrolytes PRN  - Check Ab's: LEONARD, Islet cell  - Check TG and amylase   56M with GERD, alcohol use disorder presents with presyncopal/syncopal event, likely secondary to hypovolemia from DKA with new diagnosis of DM.    NEURO  #Presyncope/Syncope  Likely secondary to hypovolemia iso DKA.   - Neuro checks per ICU protocol  - Monitor on telemetry while in ICU  - Can consider TTE to rule out structural causes but less likely  #Alcohol misuse  12 beers/day, last drink on Friday 12/10. No hx of withdrawal  - s/p librium 100mg PO  - Will monitor CIWA  - will give phenobarbitol PRN for withdrawal symptoms.    RESPIRATORY  No acute issues    CARDIOVASCULAR  No acute issues    GI/NUTRITION  #Diet  - NPO for now until off insulin gtt    RENAL/  #Polyuria secondary to DM  - Monitor UOP    HEMATOLOGIC  #DVT ppx  - Lovenox daily    INFECTIOUS DISEASE  - No concern for infectious etiologies for DKA  - Afebrile, no leukocytosis    ENDOCRINE  #Metabolic acidosis 2/2 DKA, new dx of DM  VBG pH 7.27, AG 26, BMP HCO3 13, BHB 8.8, BMP glc 656  - s/p 2L LR. On maintenance LR + KCL 150cc/hr   - On insulin gtt  - POC blood glc q1h  - BMP q4h, replete electrolytes PRN  - Check Ab's: LEONARD, Islet cell  - Check TG and amylase   56M with GERD, alcohol use disorder presents with presyncopal/syncopal event, likely secondary to hypovolemia from DKA with new diagnosis of DM.    NEURO  #Presyncope/Syncope  Likely secondary to hypovolemia iso DKA.   - Neuro checks per ICU protocol  - Monitor on telemetry while in ICU  - Can consider TTE to rule out structural causes but less likely  #Alcohol misuse  12 beers/day, last drink on Friday 12/10. No hx of withdrawal  - s/p librium 100mg PO  - Will monitor CIWA  - will give phenobarbitol PRN for withdrawal symptoms.    RESPIRATORY  No acute issues    CARDIOVASCULAR  No acute issues    GI/NUTRITION  #Diet  - NPO for now until off insulin gtt  #GERD  - continue pantoprazole    RENAL/  #Polyuria secondary to DM  - Monitor UOP    HEMATOLOGIC  #DVT ppx  - Lovenox daily    INFECTIOUS DISEASE  - No concern for infectious etiologies for DKA  - Afebrile, no leukocytosis    ENDOCRINE  #Metabolic acidosis 2/2 DKA, new dx of DM  VBG pH 7.27, AG 26, BMP HCO3 13, BHB 8.8, BMP glc 656  - s/p 2L LR. On maintenance LR + KCL 150cc/hr   - On insulin gtt  - POC blood glc q1h  - BMP q4h, replete electrolytes PRN  - Check Ab's: LEONARD, Islet cell  - Check TG and amylase   56M with GERD, alcohol use disorder presents with presyncopal/syncopal event, likely secondary to hypovolemia from DKA with new diagnosis of DM.    NEURO  #Presyncope/Syncope  Likely secondary to hypovolemia iso DKA.   - Neuro checks per ICU protocol  - Monitor on telemetry while in ICU  - Can consider TTE to rule out structural causes but less likely  #Alcohol misuse  12 beers/day, last drink on Friday 12/10. No hx of withdrawal  - s/p librium 100mg PO  - Will monitor CIWA  - will give phenobarbitol PRN for withdrawal symptoms.    RESPIRATORY  No acute issues    CARDIOVASCULAR  No acute issues    GI/NUTRITION  #Diet  - NPO for now until off insulin gtt  #GERD  - continue pantoprazole    RENAL/  #Polyuria secondary to DM  - Monitor UOP    HEMATOLOGIC  #DVT ppx  - Lovenox daily    INFECTIOUS DISEASE  - No concern for infectious etiologies for DKA  - Afebrile, no leukocytosis    ENDOCRINE  #Metabolic acidosis 2/2 DKA, new dx of DM  VBG pH 7.27, AG 26, BMP HCO3 13, BHB 8.8, BMP glc 656  - s/p 2L LR. On maintenance LR + KCL 150cc/hr   - On insulin gtt  - POC blood glc q1h  - BMP q4h, replete electrolytes PRN  - Check Ab's: LEONARD, Islet cell ab, IA-2, ZnT8  given new DM  - Check TG and amylase   56M with GERD, alcohol use disorder presents with presyncopal/syncopal event, likely secondary to hypovolemia from DKA with new diagnosis of DM.    NEURO  #Presyncope/Syncope  Likely secondary to hypovolemia iso DKA.   - Neuro checks per ICU protocol  - Monitor on telemetry while in ICU  - Can consider TTE to rule out structural causes but less likely  #Alcohol misuse  12 beers/day, last drink on Friday 12/10. No hx of withdrawal  - s/p librium 100mg PO  - Will monitor CIWA  - will give phenobarbitol PRN for withdrawal symptoms.    RESPIRATORY  No acute issues    CARDIOVASCULAR  No acute issues    GI/NUTRITION  #Diet  - NPO for now until off insulin gtt  #GERD  - continue pantoprazole    RENAL/  #Polyuria secondary to DM  - Monitor UOP    HEMATOLOGIC  #DVT ppx  - Lovenox daily    INFECTIOUS DISEASE  - No concern for infectious etiologies for DKA  - Afebrile, no leukocytosis    ENDOCRINE  #High anion gap metabolic acidosis 2/2 DKA, new dx of DM  VBG pH 7.27, AG 26, BMP HCO3 13, BHB 8.8, BMP glc 656  - s/p 2L LR. On maintenance LR + KCL 150cc/hr   - On insulin gtt  - POC blood glc q1h  - BMP q4h, replete electrolytes PRN  - Check Ab's: LEONARD, Islet cell ab, IA-2, ZnT8  given new DM  - Check TG and amylase

## 2021-12-13 NOTE — ED PROVIDER NOTE - CLINICAL SUMMARY MEDICAL DECISION MAKING FREE TEXT BOX
57 y/o M w/ no known pmh (has not seen MD in 30 years) presents with weakness, pre-syncopal episode, dec po intake, FSG > 600. R/o cardiac etiology for syncope, r/o dehydration, electrolyte abnormality, r/o DKA

## 2021-12-13 NOTE — ED ADULT NURSE NOTE - NSIMPLEMENTINTERV_GEN_ALL_ED
Assessment and Plan





Pt somnolent but arousable  (sleep apnea,  hypoxemia?  recent sedation?).  

Pulled out ng and had refused re-insertion.  states "feeling better"





Abd - slightly decreased distention.  non tender





Abd series - slight improvement





stressed to pt the importance of ng tube reinsertion.  Pt morbidly obese and 

very high risks of morbidity if re exploration required.





needs ng suction Rx to try and prevent surgical exploration.





Pt now states will accept reinsertion of ng.  R.N. present during our discussion





begin mineral oil thru ng





repeat abd series in am








                                Selected Entries











  07/26/19





  05:01


 


Temperature 98.0 F


 


Pulse Rate 79


 


Respiratory 20





Rate 


 


Blood Pressure 148/91








                                Laboratory Tests











  07/26/19 07/26/19





  07:23 07:23


 


WBC  9.8 


 


Hgb  13.5 


 


Hct  40.6 


 


Potassium   3.7























Objective


                               Vital Signs - 12hr











  07/25/19 07/26/19 07/26/19





  23:30 00:02 05:01


 


Temperature 98.9 F  98.0 F


 


Pulse Rate 85 85 79


 


Respiratory 20  20





Rate   


 


Blood Pressure 180/106 180/106 148/91


 


O2 Sat by Pulse 94  94





Oximetry   














- Labs





                                 07/26/19 07:23





                                 07/26/19 07:23


                                 Diabetes panel











  07/26/19 Range/Units





  07:23 


 


Sodium  143  (137-145)  mmol/L


 


Potassium  3.7  (3.6-5.0)  mmol/L


 


Chloride  103.3  ()  mmol/L


 


Carbon Dioxide  30  (22-30)  mmol/L


 


BUN  11  (9-20)  mg/dL


 


Creatinine  1.0  (0.8-1.5)  mg/dL


 


Glucose  106 H  ()  mg/dL


 


Calcium  9.1  (8.4-10.2)  mg/dL


 


AST  15  (5-40)  units/L


 


ALT  19  (7-56)  units/L


 


Alkaline Phosphatase  74  ()  units/L


 


Total Protein  7.8  (6.3-8.2)  g/dL


 


Albumin  4.0  (3.9-5)  g/dL








                                  Calcium panel











  07/26/19 Range/Units





  07:23 


 


Calcium  9.1  (8.4-10.2)  mg/dL


 


Albumin  4.0  (3.9-5)  g/dL








                                 Pituitary panel











  07/26/19 Range/Units





  07:23 


 


Sodium  143  (137-145)  mmol/L


 


Potassium  3.7  (3.6-5.0)  mmol/L


 


Chloride  103.3  ()  mmol/L


 


Carbon Dioxide  30  (22-30)  mmol/L


 


BUN  11  (9-20)  mg/dL


 


Creatinine  1.0  (0.8-1.5)  mg/dL


 


Glucose  106 H  ()  mg/dL


 


Calcium  9.1  (8.4-10.2)  mg/dL








                                  Adrenal panel











  07/26/19 Range/Units





  07:23 


 


Sodium  143  (137-145)  mmol/L


 


Potassium  3.7  (3.6-5.0)  mmol/L


 


Chloride  103.3  ()  mmol/L


 


Carbon Dioxide  30  (22-30)  mmol/L


 


BUN  11  (9-20)  mg/dL


 


Creatinine  1.0  (0.8-1.5)  mg/dL


 


Glucose  106 H  ()  mg/dL


 


Calcium  9.1  (8.4-10.2)  mg/dL


 


Total Bilirubin  0.60  (0.1-1.2)  mg/dL


 


AST  15  (5-40)  units/L


 


ALT  19  (7-56)  units/L


 


Alkaline Phosphatase  74  ()  units/L


 


Total Protein  7.8  (6.3-8.2)  g/dL


 


Albumin  4.0  (3.9-5)  g/dL Implemented All Universal Safety Interventions:  [place] to call system. Call bell, personal items and telephone within reach. Instruct patient to call for assistance. Room bathroom lighting operational. Non-slip footwear when patient is off stretcher. Physically safe environment: no spills, clutter or unnecessary equipment. Stretcher in lowest position, wheels locked, appropriate side rails in place.

## 2021-12-13 NOTE — ED PROVIDER NOTE - PHYSICAL EXAMINATION
[Const] pt appears, frail, tired, weak  [HEENT] PERRL, EOMI, dry mucus membranes  [Neck] Supple, trachea midline  [CV] +S1/S2, no m/r/g appreciated  [Lungs] Clear to auscultations bilaterally, no adventitious lung sounds  [Abd] suprapubic ttp without rebound/guarding, no rlq or RUQ ttp  [MSK] moving all extreimties  [Skin] warm, dry, well-perfused  [Neuro] A&Ox3

## 2021-12-13 NOTE — H&P ADULT - NSICDXPASTMEDICALHX_GEN_ALL_CORE_FT
PAST MEDICAL HISTORY:  GERD (gastroesophageal reflux disease)     No pertinent past medical history

## 2021-12-13 NOTE — H&P ADULT - NSHPSOCIALHISTORY_GEN_ALL_CORE
Alcohol: 12 beers/day  Smoker    Works as  [employer] Alcohol: 12 beers/day everyday  Smoker    Works as  [employer]

## 2021-12-13 NOTE — PATIENT PROFILE ADULT - FALL HARM RISK - RISK INTERVENTIONS

## 2021-12-13 NOTE — ED ADULT NURSE NOTE - NS ED NOTE ABUSE RESPONSE YN
She has been followed by multiple other providers in our clinic for presumed IBS-D.  She has been doing generaly well on zenpep and her diarrhea was well controlled.  She recently has been having a flare with increasing diarrhea and crampy pain.  She has noted intermittent bleeding.  She has been compliant with her pancreatic enzymes.  She denies excess NSAID use.  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   Patient seen today via telehealth by agreement and consent of patient in light of current COVID-19 pandemic. I used video conferencing during the visit. The patient encounter is appropriate and reasonable under the circumstances given the patient's particular presentation at this time. The patient has been advised of the followin) the potential risks and limitations of this mode of treatment (including but not limited to the absence of in-person examination); 2) the right to refuse telehealth services at any point without affecting the right to future care; 3) the right to receive in-person services, included immediately after this consultation if an urgent need arises; 4) information, including identifiable images or information from this telehealth consult, will only be shared in accordance with HIPAA regulations. Any and all of the patient's and/or patient's family member's questions on this issue have been answered. The patient has verbally consented to be treated via telehealth services. The patient has also been advised to contact this office for worsening conditions or problems, and seek emergency medical treatment and/or call 911 if the patient deems either necessary. Yes

## 2021-12-13 NOTE — H&P ADULT - NSHPPHYSICALEXAM_GEN_ALL_CORE
ICU Vital Signs Last 24 Hrs  T(C): 36.9 (13 Dec 2021 21:18), Max: 36.9 (13 Dec 2021 21:18)  T(F): 98.4 (13 Dec 2021 21:18), Max: 98.4 (13 Dec 2021 21:18)  HR: 74 (13 Dec 2021 21:18) (74 - 81)  BP: 151/82 (13 Dec 2021 21:18) (148/83 - 167/86)  BP(mean): --  ABP: --  ABP(mean): --  RR: 18 (13 Dec 2021 21:18) (18 - 20)  SpO2: 100% (13 Dec 2021 21:18) (100% - 100%)    Physical Exam:  Gen: Alert, well-developed, NAD  HEENT: NCAT, PERRL, EOMI, clear conjunctiva, no scleral icterus, no erythema or exudates in the oropharynx, mmm  Neck: Supple, no JVD, no LAD  CV: RRR, S1S2, no m/r/g  Resp: CTAB, normal respiratory effort  Abd: Soft, NT, ND, normal bowel sounds  Ext: no edema, no clubbing or cyanosis  Neuro: AOx3, CN2-12 grossly intact, ONTIVEROS  Skin: warm, perfused ICU Vital Signs Last 24 Hrs  T(C): 36.9 (13 Dec 2021 21:18), Max: 36.9 (13 Dec 2021 21:18)  T(F): 98.4 (13 Dec 2021 21:18), Max: 98.4 (13 Dec 2021 21:18)  HR: 74 (13 Dec 2021 21:18) (74 - 81)  BP: 151/82 (13 Dec 2021 21:18) (148/83 - 167/86)  BP(mean): --  ABP: --  ABP(mean): --  RR: 18 (13 Dec 2021 21:18) (18 - 20)  SpO2: 100% (13 Dec 2021 21:18) (100% - 100%)    Physical Exam:  Gen: Alert, thin male, NAD  HEENT: NCAT, PERRL, EOMI, clear conjunctiva, no scleral icterus, no erythema or exudates in the oropharynx, dry mm  Neck: Supple, no JVD, no LAD  CV: RRR, S1S2, no m/r/g  Resp: CTAB, normal respiratory effort  Abd: Soft, NT, ND, normal bowel sounds  Ext: no edema, no clubbing or cyanosis  Neuro: AOx3, CN2-12 grossly intact, ONTIVEROS  Skin: warm, perfused

## 2021-12-14 DIAGNOSIS — F10.10 ALCOHOL ABUSE, UNCOMPLICATED: ICD-10-CM

## 2021-12-14 DIAGNOSIS — E11.10 TYPE 2 DIABETES MELLITUS WITH KETOACIDOSIS WITHOUT COMA: ICD-10-CM

## 2021-12-14 DIAGNOSIS — E11.9 TYPE 2 DIABETES MELLITUS WITHOUT COMPLICATIONS: ICD-10-CM

## 2021-12-14 DIAGNOSIS — E78.49 OTHER HYPERLIPIDEMIA: ICD-10-CM

## 2021-12-14 DIAGNOSIS — I10 ESSENTIAL (PRIMARY) HYPERTENSION: ICD-10-CM

## 2021-12-14 LAB
A1C WITH ESTIMATED AVERAGE GLUCOSE RESULT: 15.3 % — HIGH (ref 4–5.6)
AMYLASE P1 CFR SERPL: 94 U/L — SIGNIFICANT CHANGE UP (ref 25–125)
ANION GAP SERPL CALC-SCNC: 13 MMOL/L — SIGNIFICANT CHANGE UP (ref 7–14)
ANION GAP SERPL CALC-SCNC: 8 MMOL/L — SIGNIFICANT CHANGE UP (ref 7–14)
APPEARANCE UR: CLEAR — SIGNIFICANT CHANGE UP
BACTERIA # UR AUTO: NEGATIVE — SIGNIFICANT CHANGE UP
BASE EXCESS BLDV CALC-SCNC: 0.8 MMOL/L — SIGNIFICANT CHANGE UP (ref -2–3)
BILIRUB UR-MCNC: NEGATIVE — SIGNIFICANT CHANGE UP
BLOOD GAS VENOUS COMPREHENSIVE RESULT: SIGNIFICANT CHANGE UP
BUN SERPL-MCNC: 8 MG/DL — SIGNIFICANT CHANGE UP (ref 7–23)
BUN SERPL-MCNC: 9 MG/DL — SIGNIFICANT CHANGE UP (ref 7–23)
CALCIUM SERPL-MCNC: 9.7 MG/DL — SIGNIFICANT CHANGE UP (ref 8.4–10.5)
CALCIUM SERPL-MCNC: 9.8 MG/DL — SIGNIFICANT CHANGE UP (ref 8.4–10.5)
CHLORIDE BLDV-SCNC: 104 MMOL/L — SIGNIFICANT CHANGE UP (ref 96–108)
CHLORIDE SERPL-SCNC: 102 MMOL/L — SIGNIFICANT CHANGE UP (ref 98–107)
CHLORIDE SERPL-SCNC: 103 MMOL/L — SIGNIFICANT CHANGE UP (ref 98–107)
CO2 BLDV-SCNC: 28 MMOL/L — HIGH (ref 22–26)
CO2 SERPL-SCNC: 23 MMOL/L — SIGNIFICANT CHANGE UP (ref 22–31)
CO2 SERPL-SCNC: 25 MMOL/L — SIGNIFICANT CHANGE UP (ref 22–31)
COLOR SPEC: SIGNIFICANT CHANGE UP
CREAT SERPL-MCNC: 0.53 MG/DL — SIGNIFICANT CHANGE UP (ref 0.5–1.3)
CREAT SERPL-MCNC: 0.54 MG/DL — SIGNIFICANT CHANGE UP (ref 0.5–1.3)
DIFF PNL FLD: NEGATIVE — SIGNIFICANT CHANGE UP
EPI CELLS # UR: 0 /HPF — SIGNIFICANT CHANGE UP (ref 0–5)
ESTIMATED AVERAGE GLUCOSE: 392 — SIGNIFICANT CHANGE UP
GAS PNL BLDV: 136 MMOL/L — SIGNIFICANT CHANGE UP (ref 136–145)
GLUCOSE BLDC GLUCOMTR-MCNC: 149 MG/DL — HIGH (ref 70–99)
GLUCOSE BLDC GLUCOMTR-MCNC: 149 MG/DL — HIGH (ref 70–99)
GLUCOSE BLDC GLUCOMTR-MCNC: 154 MG/DL — HIGH (ref 70–99)
GLUCOSE BLDC GLUCOMTR-MCNC: 165 MG/DL — HIGH (ref 70–99)
GLUCOSE BLDC GLUCOMTR-MCNC: 171 MG/DL — HIGH (ref 70–99)
GLUCOSE BLDC GLUCOMTR-MCNC: 176 MG/DL — HIGH (ref 70–99)
GLUCOSE BLDC GLUCOMTR-MCNC: 204 MG/DL — HIGH (ref 70–99)
GLUCOSE BLDC GLUCOMTR-MCNC: 209 MG/DL — HIGH (ref 70–99)
GLUCOSE BLDC GLUCOMTR-MCNC: 209 MG/DL — HIGH (ref 70–99)
GLUCOSE BLDC GLUCOMTR-MCNC: 240 MG/DL — HIGH (ref 70–99)
GLUCOSE BLDV-MCNC: 210 MG/DL — HIGH (ref 70–99)
GLUCOSE SERPL-MCNC: 156 MG/DL — HIGH (ref 70–99)
GLUCOSE SERPL-MCNC: 211 MG/DL — HIGH (ref 70–99)
GLUCOSE UR QL: ABNORMAL
HCO3 BLDV-SCNC: 27 MMOL/L — SIGNIFICANT CHANGE UP (ref 22–29)
HCT VFR BLD CALC: 38.1 % — LOW (ref 39–50)
HCT VFR BLD CALC: 38.2 % — LOW (ref 39–50)
HCT VFR BLDA CALC: 39 % — SIGNIFICANT CHANGE UP (ref 39–51)
HGB BLD CALC-MCNC: 13 G/DL — SIGNIFICANT CHANGE UP (ref 13–17)
HGB BLD-MCNC: 12.9 G/DL — LOW (ref 13–17)
HGB BLD-MCNC: 13 G/DL — SIGNIFICANT CHANGE UP (ref 13–17)
HYALINE CASTS # UR AUTO: 0 /LPF — SIGNIFICANT CHANGE UP (ref 0–7)
KETONES UR-MCNC: ABNORMAL
LACTATE BLDV-MCNC: 1 MMOL/L — SIGNIFICANT CHANGE UP (ref 0.5–2)
LEUKOCYTE ESTERASE UR-ACNC: NEGATIVE — SIGNIFICANT CHANGE UP
MAGNESIUM SERPL-MCNC: 1.7 MG/DL — SIGNIFICANT CHANGE UP (ref 1.6–2.6)
MAGNESIUM SERPL-MCNC: 1.8 MG/DL — SIGNIFICANT CHANGE UP (ref 1.6–2.6)
MCHC RBC-ENTMCNC: 31.2 PG — SIGNIFICANT CHANGE UP (ref 27–34)
MCHC RBC-ENTMCNC: 31.7 PG — SIGNIFICANT CHANGE UP (ref 27–34)
MCHC RBC-ENTMCNC: 33.8 GM/DL — SIGNIFICANT CHANGE UP (ref 32–36)
MCHC RBC-ENTMCNC: 34.1 GM/DL — SIGNIFICANT CHANGE UP (ref 32–36)
MCV RBC AUTO: 92.5 FL — SIGNIFICANT CHANGE UP (ref 80–100)
MCV RBC AUTO: 92.9 FL — SIGNIFICANT CHANGE UP (ref 80–100)
NITRITE UR-MCNC: NEGATIVE — SIGNIFICANT CHANGE UP
NRBC # BLD: 0 /100 WBCS — SIGNIFICANT CHANGE UP
NRBC # BLD: 0 /100 WBCS — SIGNIFICANT CHANGE UP
NRBC # FLD: 0 K/UL — SIGNIFICANT CHANGE UP
NRBC # FLD: 0 K/UL — SIGNIFICANT CHANGE UP
PCO2 BLDV: 46 MMHG — SIGNIFICANT CHANGE UP (ref 42–55)
PH BLDV: 7.37 — SIGNIFICANT CHANGE UP (ref 7.32–7.43)
PH UR: 5.5 — SIGNIFICANT CHANGE UP (ref 5–8)
PHOSPHATE SERPL-MCNC: 2 MG/DL — LOW (ref 2.5–4.5)
PHOSPHATE SERPL-MCNC: 2.6 MG/DL — SIGNIFICANT CHANGE UP (ref 2.5–4.5)
PLATELET # BLD AUTO: 142 K/UL — LOW (ref 150–400)
PLATELET # BLD AUTO: 157 K/UL — SIGNIFICANT CHANGE UP (ref 150–400)
PO2 BLDV: 79 MMHG — SIGNIFICANT CHANGE UP
POTASSIUM BLDV-SCNC: 3.6 MMOL/L — SIGNIFICANT CHANGE UP (ref 3.5–5.1)
POTASSIUM SERPL-MCNC: 3.4 MMOL/L — LOW (ref 3.5–5.3)
POTASSIUM SERPL-MCNC: 3.7 MMOL/L — SIGNIFICANT CHANGE UP (ref 3.5–5.3)
POTASSIUM SERPL-SCNC: 3.4 MMOL/L — LOW (ref 3.5–5.3)
POTASSIUM SERPL-SCNC: 3.7 MMOL/L — SIGNIFICANT CHANGE UP (ref 3.5–5.3)
PROT UR-MCNC: ABNORMAL
RBC # BLD: 4.1 M/UL — LOW (ref 4.2–5.8)
RBC # BLD: 4.13 M/UL — LOW (ref 4.2–5.8)
RBC # FLD: 12.4 % — SIGNIFICANT CHANGE UP (ref 10.3–14.5)
RBC # FLD: 12.4 % — SIGNIFICANT CHANGE UP (ref 10.3–14.5)
RBC CASTS # UR COMP ASSIST: 0 /HPF — SIGNIFICANT CHANGE UP (ref 0–4)
SAO2 % BLDV: 98.2 % — SIGNIFICANT CHANGE UP
SODIUM SERPL-SCNC: 136 MMOL/L — SIGNIFICANT CHANGE UP (ref 135–145)
SODIUM SERPL-SCNC: 138 MMOL/L — SIGNIFICANT CHANGE UP (ref 135–145)
SP GR SPEC: 1.03 — SIGNIFICANT CHANGE UP (ref 1–1.05)
TRIGL SERPL-MCNC: 191 MG/DL — HIGH
UROBILINOGEN FLD QL: SIGNIFICANT CHANGE UP
WBC # BLD: 6.33 K/UL — SIGNIFICANT CHANGE UP (ref 3.8–10.5)
WBC # BLD: 6.95 K/UL — SIGNIFICANT CHANGE UP (ref 3.8–10.5)
WBC # FLD AUTO: 6.33 K/UL — SIGNIFICANT CHANGE UP (ref 3.8–10.5)
WBC # FLD AUTO: 6.95 K/UL — SIGNIFICANT CHANGE UP (ref 3.8–10.5)
WBC UR QL: 0 /HPF — SIGNIFICANT CHANGE UP (ref 0–5)

## 2021-12-14 PROCEDURE — 99255 IP/OBS CONSLTJ NEW/EST HI 80: CPT

## 2021-12-14 RX ORDER — DEXTROSE 50 % IN WATER 50 %
25 SYRINGE (ML) INTRAVENOUS ONCE
Refills: 0 | Status: DISCONTINUED | OUTPATIENT
Start: 2021-12-14 | End: 2021-12-16

## 2021-12-14 RX ORDER — DEXTROSE 50 % IN WATER 50 %
15 SYRINGE (ML) INTRAVENOUS ONCE
Refills: 0 | Status: DISCONTINUED | OUTPATIENT
Start: 2021-12-14 | End: 2021-12-16

## 2021-12-14 RX ORDER — INSULIN LISPRO 100/ML
VIAL (ML) SUBCUTANEOUS AT BEDTIME
Refills: 0 | Status: DISCONTINUED | OUTPATIENT
Start: 2021-12-14 | End: 2021-12-16

## 2021-12-14 RX ORDER — POTASSIUM CHLORIDE 20 MEQ
10 PACKET (EA) ORAL
Refills: 0 | Status: COMPLETED | OUTPATIENT
Start: 2021-12-14 | End: 2021-12-14

## 2021-12-14 RX ORDER — INSULIN GLARGINE 100 [IU]/ML
30 INJECTION, SOLUTION SUBCUTANEOUS ONCE
Refills: 0 | Status: COMPLETED | OUTPATIENT
Start: 2021-12-14 | End: 2021-12-14

## 2021-12-14 RX ORDER — INSULIN GLARGINE 100 [IU]/ML
30 INJECTION, SOLUTION SUBCUTANEOUS ONCE
Refills: 0 | Status: DISCONTINUED | OUTPATIENT
Start: 2021-12-14 | End: 2021-12-14

## 2021-12-14 RX ORDER — DEXTROSE 50 % IN WATER 50 %
12.5 SYRINGE (ML) INTRAVENOUS ONCE
Refills: 0 | Status: DISCONTINUED | OUTPATIENT
Start: 2021-12-14 | End: 2021-12-16

## 2021-12-14 RX ORDER — ONDANSETRON 8 MG/1
4 TABLET, FILM COATED ORAL EVERY 8 HOURS
Refills: 0 | Status: DISCONTINUED | OUTPATIENT
Start: 2021-12-14 | End: 2021-12-16

## 2021-12-14 RX ORDER — INSULIN LISPRO 100/ML
VIAL (ML) SUBCUTANEOUS
Refills: 0 | Status: DISCONTINUED | OUTPATIENT
Start: 2021-12-14 | End: 2021-12-16

## 2021-12-14 RX ORDER — INSULIN GLARGINE 100 [IU]/ML
30 INJECTION, SOLUTION SUBCUTANEOUS ONCE
Refills: 0 | Status: COMPLETED | OUTPATIENT
Start: 2021-12-15 | End: 2021-12-15

## 2021-12-14 RX ORDER — INSULIN GLARGINE 100 [IU]/ML
30 INJECTION, SOLUTION SUBCUTANEOUS ONCE
Refills: 0 | Status: DISCONTINUED | OUTPATIENT
Start: 2021-12-15 | End: 2021-12-14

## 2021-12-14 RX ORDER — LANOLIN ALCOHOL/MO/W.PET/CERES
3 CREAM (GRAM) TOPICAL AT BEDTIME
Refills: 0 | Status: DISCONTINUED | OUTPATIENT
Start: 2021-12-14 | End: 2021-12-16

## 2021-12-14 RX ORDER — GLUCAGON INJECTION, SOLUTION 0.5 MG/.1ML
1 INJECTION, SOLUTION SUBCUTANEOUS ONCE
Refills: 0 | Status: DISCONTINUED | OUTPATIENT
Start: 2021-12-14 | End: 2021-12-16

## 2021-12-14 RX ORDER — INFLUENZA VIRUS VACCINE 15; 15; 15; 15 UG/.5ML; UG/.5ML; UG/.5ML; UG/.5ML
0.5 SUSPENSION INTRAMUSCULAR ONCE
Refills: 0 | Status: DISCONTINUED | OUTPATIENT
Start: 2021-12-14 | End: 2021-12-16

## 2021-12-14 RX ORDER — SODIUM CHLORIDE 9 MG/ML
1000 INJECTION, SOLUTION INTRAVENOUS
Refills: 0 | Status: DISCONTINUED | OUTPATIENT
Start: 2021-12-14 | End: 2021-12-16

## 2021-12-14 RX ORDER — POTASSIUM CHLORIDE 20 MEQ
40 PACKET (EA) ORAL ONCE
Refills: 0 | Status: COMPLETED | OUTPATIENT
Start: 2021-12-14 | End: 2021-12-14

## 2021-12-14 RX ORDER — INSULIN LISPRO 100/ML
10 VIAL (ML) SUBCUTANEOUS
Refills: 0 | Status: DISCONTINUED | OUTPATIENT
Start: 2021-12-14 | End: 2021-12-16

## 2021-12-14 RX ORDER — ACETAMINOPHEN 500 MG
650 TABLET ORAL EVERY 6 HOURS
Refills: 0 | Status: DISCONTINUED | OUTPATIENT
Start: 2021-12-14 | End: 2021-12-16

## 2021-12-14 RX ORDER — DEXTROSE MONOHYDRATE, SODIUM CHLORIDE, AND POTASSIUM CHLORIDE 50; .745; 4.5 G/1000ML; G/1000ML; G/1000ML
1000 INJECTION, SOLUTION INTRAVENOUS
Refills: 0 | Status: DISCONTINUED | OUTPATIENT
Start: 2021-12-14 | End: 2021-12-14

## 2021-12-14 RX ORDER — INSULIN HUMAN 100 [IU]/ML
3 INJECTION, SOLUTION SUBCUTANEOUS
Qty: 100 | Refills: 0 | Status: DISCONTINUED | OUTPATIENT
Start: 2021-12-14 | End: 2021-12-14

## 2021-12-14 RX ORDER — MAGNESIUM SULFATE 500 MG/ML
2 VIAL (ML) INJECTION ONCE
Refills: 0 | Status: COMPLETED | OUTPATIENT
Start: 2021-12-14 | End: 2021-12-14

## 2021-12-14 RX ADMIN — INSULIN GLARGINE 30 UNIT(S): 100 INJECTION, SOLUTION SUBCUTANEOUS at 04:40

## 2021-12-14 RX ADMIN — Medication 1: at 08:22

## 2021-12-14 RX ADMIN — Medication 25 GRAM(S): at 09:07

## 2021-12-14 RX ADMIN — Medication 40 MILLIEQUIVALENT(S): at 05:34

## 2021-12-14 RX ADMIN — Medication 10 UNIT(S): at 08:22

## 2021-12-14 RX ADMIN — CHLORHEXIDINE GLUCONATE 1 APPLICATION(S): 213 SOLUTION TOPICAL at 06:21

## 2021-12-14 RX ADMIN — ENOXAPARIN SODIUM 40 MILLIGRAM(S): 100 INJECTION SUBCUTANEOUS at 05:35

## 2021-12-14 RX ADMIN — Medication 10 UNIT(S): at 17:36

## 2021-12-14 RX ADMIN — Medication 10 UNIT(S): at 11:34

## 2021-12-14 RX ADMIN — Medication 100 MILLIEQUIVALENT(S): at 03:30

## 2021-12-14 RX ADMIN — PANTOPRAZOLE SODIUM 40 MILLIGRAM(S): 20 TABLET, DELAYED RELEASE ORAL at 05:34

## 2021-12-14 RX ADMIN — Medication 100 MILLIEQUIVALENT(S): at 04:42

## 2021-12-14 RX ADMIN — INSULIN HUMAN 3 UNIT(S)/HR: 100 INJECTION, SOLUTION SUBCUTANEOUS at 03:33

## 2021-12-14 RX ADMIN — Medication 100 MILLIEQUIVALENT(S): at 06:44

## 2021-12-14 RX ADMIN — Medication 1: at 17:36

## 2021-12-14 NOTE — CHART NOTE - NSCHARTNOTEFT_GEN_A_CORE
MICU Transfer Note    Transfer from: MICU    Transfer to: (  ) Medicine    (  ) Telemetry     (   ) RCU        (    ) Palliative         (   ) Stroke Unit          (   ) __________________    Accepting Physician:    HPI:  56M with GERD, alcohol use disorder presents with presyncopal/syncopal event in the hospital.  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 Per wife, patient stopped walking, leaned against the wall and slowly slid down.  and herself caught him and called RRT. Patient thinks he may have lost consciousness for 5 seconds. No head trauma or other injury. States he hasn't eaten much for the past few weeks, endorses decreased appetite, denies nausea/vomiting, had episode of abdominal pain yesterday that has since resolved. Endorses increased thirst and urinary frequency.  He has lost 20 lbs in the last 2 months.  States he does not have DM, but has also not seen MD in 30 years.  Denies f/c, chest pain pressure palpitations, SOB, AMS.  Reports no hx of alcohol withdrawal. Last drink 12/10, drinks 12 beers/day everyday.    In ED: VBG pH 7.27, AG 26, BMP HCO3 13, BHB 8.8, BMP glc 656. S/p 2L IVF. VSS.     MICU COURSE:  AG closed to 13 on BMP 01:53.  Based on insulin gtt rate, TDD of insulin 116 units, lantus rounded down to 30 units. Lantus 30 units given at 04:40.  Insulin gtt d/c'd at ***.        ASSESSMENT & PLAN:   56M with GERD, alcohol use disorder presents with presyncopal/syncopal event, likely secondary to hypovolemia from DKA with new diagnosis of DM.    NEURO  #Presyncope/Syncope  Likely secondary to hypovolemia iso DKA.   - Neuro checks per ICU protocol  - Monitor on telemetry while in ICU  - Can consider TTE to rule out structural causes but less likely  #Alcohol misuse  12 beers/day, last drink on Friday 12/10. No hx of withdrawal  - s/p librium 100mg PO  - Will monitor CIWA  - will give phenobarbitol PRN for withdrawal symptoms.    RESPIRATORY  No acute issues    CARDIOVASCULAR  No acute issues    GI/NUTRITION  #Diet  - NPO for now until off insulin gtt  #GERD  - continue pantoprazole    RENAL/  #Polyuria secondary to DM  - Monitor UOP    HEMATOLOGIC  #DVT ppx  - Lovenox daily    INFECTIOUS DISEASE  - No concern for infectious etiologies for DKA  - Afebrile, no leukocytosis    ENDOCRINE  #High anion gap metabolic acidosis 2/2 DKA, new dx of DM  VBG pH 7.27, AG 26, BMP HCO3 13, BHB 8.8, BMP glc 656  - s/p 2L LR. On maintenance LR + KCL 150cc/hr   - On insulin gtt  - POC blood glc q1h  - BMP q4h, replete electrolytes PRN  - Check Ab's: LEONARD, Islet cell ab, IA-2, ZnT8  given new DM  - Check TG and amylase      FOR FOLLOW UP:  [ ] Endocrine consult  [ ] DM teaching MICU Transfer Note    Transfer from: MICU    Transfer to: (  ) Medicine    (  ) Telemetry     (   ) RCU        (    ) Palliative         (   ) Stroke Unit          (   ) __________________    Accepting Physician:    HPI:  56M with GERD, alcohol use disorder presents with presyncopal/syncopal event in the hospital.  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 Per wife, patient stopped walking, leaned against the wall and slowly slid down.  and herself caught him and called RRT. Patient thinks he may have lost consciousness for 5 seconds. No head trauma or other injury. States he hasn't eaten much for the past few weeks, endorses decreased appetite, denies nausea/vomiting, had episode of abdominal pain yesterday that has since resolved. Endorses increased thirst and urinary frequency.  He has lost 20 lbs in the last 2 months.  States he does not have DM, but has also not seen MD in 30 years.  Denies f/c, chest pain pressure palpitations, SOB, AMS.  Reports no hx of alcohol withdrawal. Last drink 12/10, drinks 12 beers/day everyday.    In ED: VBG pH 7.27, AG 26, BMP HCO3 13, BHB 8.8, BMP glc 656. S/p 2L IVF. VSS.     MICU COURSE:  AG closed to 13 on BMP 01:53.  Based on insulin gtt rate, TDD of insulin 116 units, lantus rounded down to 30 units. Lantus 30 units given at 04:40.  Insulin gtt d/c'd at ***.        ASSESSMENT & PLAN:   56M with GERD, alcohol use disorder presents with presyncopal/syncopal event, likely secondary to hypovolemia from DKA with new diagnosis of DM.    NEURO  #Presyncope/Syncope  Likely secondary to hypovolemia iso DKA.   - Neuro checks per ICU protocol  - Monitor on telemetry while in ICU  - Can consider TTE to rule out structural causes but less likely  #Alcohol misuse  12 beers/day, last drink on Friday 12/10. No hx of withdrawal  - s/p librium 100mg PO  - Will monitor CIWA  - will give phenobarbitol PRN for withdrawal symptoms.    RESPIRATORY  No acute issues    CARDIOVASCULAR  No acute issues    GI/NUTRITION  #Diet  - NPO for now until off insulin gtt  #GERD  - continue pantoprazole    RENAL/  #Polyuria secondary to DM  - Monitor UOP    HEMATOLOGIC  #DVT ppx  - Lovenox daily    INFECTIOUS DISEASE  - No concern for infectious etiologies for DKA  - Afebrile, no leukocytosis    ENDOCRINE  #High anion gap metabolic acidosis 2/2 DKA, new dx of DM  VBG pH 7.27, AG 26, BMP HCO3 13, BHB 8.8, BMP glc 656  - s/p 2L LR. On maintenance LR + KCL 150cc/hr   - On insulin gtt  - POC blood glc q1h  - BMP q4h, replete electrolytes PRN  - Check Ab's: LEONARD, Islet cell ab, IA-2, ZnT8  given new DM  - Check TG and amylase      FOR FOLLOW UP:  [ ] Endocrine consult  [ ] F/u labs: DM antibodies, TG, amylase  [ ] Monitor FS  [ ] DM teaching  [ ] Monitor CIWA MICU Transfer Note    Transfer from: MICU    Transfer to: (  ) Medicine    (  ) Telemetry     (   ) RCU        (    ) Palliative         (   ) Stroke Unit          (   ) __________________    Accepting Physician:    HPI:  56M with GERD, alcohol use disorder presents with presyncopal/syncopal event in the hospital.  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 Per wife, patient stopped walking, leaned against the wall and slowly slid down.  and herself caught him and called RRT. Patient thinks he may have lost consciousness for 5 seconds. No head trauma or other injury. States he hasn't eaten much for the past few weeks, endorses decreased appetite, denies nausea/vomiting, had episode of abdominal pain yesterday that has since resolved. Endorses increased thirst and urinary frequency.  He has lost 20 lbs in the last 2 months.  States he does not have DM, but has also not seen MD in 30 years.  Denies f/c, chest pain pressure palpitations, SOB, AMS.  Reports no hx of alcohol withdrawal. Last drink 12/10, drinks 12 beers/day everyday.    In ED: VBG pH 7.27, AG 26, BMP HCO3 13, BHB 8.8, BMP glc 656. S/p 2L IVF. VSS.     MICU COURSE:  AG closed to 13 on BMP 01:53.  Based on insulin gtt rate, TDD of insulin 116 units, lantus rounded down to 30 units. Lantus 30 units given at 04:40.  Insulin gtt d/c'd.  VSS.  Ready for transfer to medicine floors.       ASSESSMENT & PLAN:   56M with GERD, alcohol use disorder presents with presyncopal/syncopal event, likely secondary to hypovolemia from DKA with new diagnosis of DM.    NEURO  #Presyncope/Syncope  Likely secondary to hypovolemia iso DKA.   - No arrhythmia so far on telemetry  - Can consider TTE to rule out structural causes but less likely  #Alcohol misuse  12 beers/day, last drink on Friday 12/10. No hx of withdrawal  - s/p librium 100mg PO  - now >72 hours from last drink, no signs symptoms of withdrawal    RESPIRATORY  No acute issues    CARDIOVASCULAR  No acute issues    GI/NUTRITION  #Diet  - Carb controlled  #GERD  - continue pantoprazole    RENAL/  #Polyuria secondary to DM  - Monitor UOP    HEMATOLOGIC  #DVT ppx  - Lovenox daily    INFECTIOUS DISEASE  - No concern for infectious etiologies for DKA  - Afebrile, no leukocytosis    ENDOCRINE  #High anion gap metabolic acidosis 2/2 DKA, new dx of DM  VBG pH 7.27, AG 26, BMP HCO3 13, BHB 8.8, BMP glc 656  - Anion gap closed, off insulin gtt  - s/p lantus 30 units at 4:40AM  - c/w lispro 10 units TID AC if able to eat >50% of tray  - f/u LEONARD, Islet cell ab, IA-2, ZnT8  given new DM      FOR FOLLOW UP:  [ ] Endocrine consult  [ ] F/u labs: DM antibodies, TG, amylase  [ ] Monitor FS  [ ] DM teaching MICU Transfer Note    Transfer from: MICU    Transfer to: (  ) Medicine    (  ) Telemetry     (   ) RCU        (    ) Palliative         (   ) Stroke Unit          (   ) __________________    Accepting Physician:    HPI:  56M with GERD, alcohol use disorder presents with presyncopal/syncopal event in the hospital.  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 Per wife, patient stopped walking, leaned against the wall and slowly slid down.  and herself caught him and called RRT. Patient thinks he may have lost consciousness for 5 seconds. No head trauma or other injury. States he hasn't eaten much for the past few weeks, endorses decreased appetite, denies nausea/vomiting, had episode of abdominal pain yesterday that has since resolved. Endorses increased thirst and urinary frequency.  He has lost 20 lbs in the last 2 months.  States he does not have DM, but has also not seen MD in 30 years.  Denies f/c, chest pain pressure palpitations, SOB, AMS.  Reports no hx of alcohol withdrawal. Last drink 12/10, drinks 12 beers/day everyday.    In ED: VBG pH 7.27, AG 26, BMP HCO3 13, BHB 8.8, BMP glc 656. S/p 2L IVF. VSS.     MICU COURSE:  A1c 15.3%.  AG closed to 13 on BMP 01:53.  Based on insulin gtt rate, TDD of insulin 116 units, lantus rounded down to 30 units. Lantus 30 units given at 04:40.  Insulin gtt d/c'd.  VSS.  Ready for transfer to medicine floors.       ASSESSMENT & PLAN:   56M with GERD, alcohol use disorder presents with presyncopal/syncopal event, likely secondary to hypovolemia from DKA with new diagnosis of DM.    NEURO  #Presyncope/Syncope  Likely secondary to hypovolemia iso DKA.   - No arrhythmia so far on telemetry  - Can consider TTE to rule out structural causes but less likely  #Alcohol misuse  12 beers/day, last drink on Friday 12/10. No hx of withdrawal  - s/p librium 100mg PO  - now >72 hours from last drink, no signs symptoms of withdrawal    RESPIRATORY  No acute issues    CARDIOVASCULAR  No acute issues    GI/NUTRITION  #Diet  - Carb controlled  #GERD  - continue pantoprazole    RENAL/  #Polyuria secondary to DM  - Monitor UOP    HEMATOLOGIC  #DVT ppx  - Lovenox daily    INFECTIOUS DISEASE  - No concern for infectious etiologies for DKA  - Afebrile, no leukocytosis    ENDOCRINE  #High anion gap metabolic acidosis 2/2 DKA, new dx of DM  VBG pH 7.27, AG 26, BMP HCO3 13, BHB 8.8, BMP glc 656.  A1c 15.3%  - Anion gap closed, off insulin gtt  - s/p lantus 30 units at 4:40AM  - c/w lispro 10 units TID AC if able to eat >50% of tray  - f/u LEONARD, Islet cell ab, IA-2, ZnT8  given new DM      FOR FOLLOW UP:  [ ] Endocrine consult  [ ] F/u labs: DM antibodies  [ ] Monitor FS  [ ] DM teaching MICU Transfer Note    Transfer from: MICU    Transfer to: (  ) Medicine    (  ) Telemetry     (   ) RCU        (    ) Palliative         (   ) Stroke Unit          (   ) __________________    Accepting Physician:    HPI:  56M with GERD, alcohol use disorder presents with presyncopal/syncopal event in the hospital.  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 Per wife, patient stopped walking, leaned against the wall and slowly slid down.  and herself caught him and called RRT. Patient thinks he may have lost consciousness for 5 seconds. No head trauma or other injury. States he hasn't eaten much for the past few weeks, endorses decreased appetite, denies nausea/vomiting, had episode of abdominal pain yesterday that has since resolved. Endorses increased thirst and urinary frequency.  He has lost 20 lbs in the last 2 months.  States he does not have DM, but has also not seen MD in 30 years.  Denies f/c, chest pain pressure palpitations, SOB, AMS.  Reports no hx of alcohol withdrawal. Last drink 12/10, drinks 12 beers/day everyday.    In ED: VBG pH 7.27, AG 26, BMP HCO3 13, BHB 8.8, BMP glc 656. S/p 2L IVF. VSS.     MICU COURSE:  A1c 15.3%.  AG closed to 13 on BMP 01:53.  Based on insulin gtt rate, TDD of insulin 116 units, lantus rounded down to 30 units. Lantus 30 units given at 04:40.  Insulin gtt d/c'd.  VSS.  Ready for transfer to medicine floors.       ASSESSMENT & PLAN:   56M with GERD, alcohol use disorder presents with presyncopal/syncopal event, likely secondary to hypovolemia from DKA with new diagnosis of DM.    NEURO  #Presyncope/Syncope  Likely secondary to hypovolemia iso DKA.   - No arrhythmia so far on telemetry  - Can consider TTE to rule out structural causes but less likely  #Alcohol misuse  12 beers/day, last drink on Friday 12/10. No hx of withdrawal  - s/p librium 100mg PO  - now >72 hours from last drink, no signs symptoms of withdrawal    RESPIRATORY  No acute issues    CARDIOVASCULAR  No acute issues    GI/NUTRITION  #Diet  - Carb controlled  #GERD  - continue pantoprazole    RENAL/  #Polyuria secondary to DM  - Monitor UOP    HEMATOLOGIC  #DVT ppx  - Lovenox daily    INFECTIOUS DISEASE  - No concern for infectious etiologies for DKA  - Afebrile, no leukocytosis  -F/u chest x ray     ENDOCRINE  #High anion gap metabolic acidosis 2/2 DKA, new dx of DM  VBG pH 7.27, AG 26, BMP HCO3 13, BHB 8.8, BMP glc 656.  A1c 15.3%  - Anion gap closed, off insulin gtt  - s/p lantus 30 units at 4:40AM  - c/w lispro 10 units TID AC if able to eat >50% of tray  - f/u LEONARD, Islet cell ab, IA-2, ZnT8  given new DM  -F/u endocrine consult    FOR FOLLOW UP:  [ ] F/u Endocrine consult  [ ] F/u labs: DM antibodies  [ ] F/u chest x ray  [ ] Monitor FS  [ ] DM teaching MICU Transfer Note    Transfer from: MICU    Transfer to: ( x  ) Medicine    (  ) Telemetry     (   ) RCU        (    ) Palliative         (   ) Stroke Unit          (   ) __________________    Accepting Physician: Dr. Lebron    HPI:  56M with GERD, alcohol use disorder presents with presyncopal/syncopal event in the hospital.  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 Per wife, patient stopped walking, leaned against the wall and slowly slid down.  and herself caught him and called RRT. Patient thinks he may have lost consciousness for 5 seconds. No head trauma or other injury. States he hasn't eaten much for the past few weeks, endorses decreased appetite, denies nausea/vomiting, had episode of abdominal pain yesterday that has since resolved. Endorses increased thirst and urinary frequency.  He has lost 20 lbs in the last 2 months.  States he does not have DM, but has also not seen MD in 30 years.  Denies f/c, chest pain pressure palpitations, SOB, AMS.  Reports no hx of alcohol withdrawal. Last drink 12/10, drinks 12 beers/day everyday.    In ED: VBG pH 7.27, AG 26, BMP HCO3 13, BHB 8.8, BMP glc 656. S/p 2L IVF. VSS.     MICU COURSE:  A1c 15.3%.  AG closed to 13 on BMP 01:53.  Based on insulin gtt rate, TDD of insulin 116 units, lantus rounded down to 30 units. Lantus 30 units given at 04:40.  Insulin gtt d/c'd.  VSS.  Ready for transfer to medicine floors.       ASSESSMENT & PLAN:   56M with GERD, alcohol use disorder presents with presyncopal/syncopal event, likely secondary to hypovolemia from DKA with new diagnosis of DM.    NEURO  #Presyncope/Syncope  Likely secondary to hypovolemia iso DKA.   - No arrhythmia so far on telemetry  - Can consider TTE to rule out structural causes but less likely  #Alcohol misuse  12 beers/day, last drink on Friday 12/10. No hx of withdrawal  - s/p librium 100mg PO  - now >72 hours from last drink, no signs symptoms of withdrawal    RESPIRATORY  No acute issues    CARDIOVASCULAR  No acute issues    GI/NUTRITION  #Diet  - Carb controlled  #GERD  - continue pantoprazole    RENAL/  #Polyuria secondary to DM  - Monitor UOP    HEMATOLOGIC  #DVT ppx  - Lovenox daily    INFECTIOUS DISEASE  - No concern for infectious etiologies for DKA  - Afebrile, no leukocytosis  -F/u chest x ray     ENDOCRINE  #High anion gap metabolic acidosis 2/2 DKA, new dx of DM  VBG pH 7.27, AG 26, BMP HCO3 13, BHB 8.8, BMP glc 656.  A1c 15.3%  - Anion gap closed, off insulin gtt  - s/p lantus 30 units at 4:40AM  - c/w lispro 10 units TID AC if able to eat >50% of tray  - f/u LEONARD, Islet cell ab, IA-2, ZnT8  given new DM  -F/u endocrine consult    FOR FOLLOW UP:  [ ] F/u Endocrine consult  [ ] F/u labs: DM antibodies  [ ] F/u chest x ray  [ ] Monitor FS  [ ] DM teaching

## 2021-12-14 NOTE — CONSULT NOTE ADULT - SUBJECTIVE AND OBJECTIVE BOX
Reason For Consult:     HPI:  56M with GERD, alcohol use disorder presents with presyncopal/syncopal event in the hospital.  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 Per wife, patient stopped walking, leaned against the wall and slowly slid down.  and herself caught him and called RRT. Patient thinks he may have lost consciousness for 5 seconds. No head trauma or other injury. States he hasn't eaten much for the past few weeks, endorses decreased appetite, denies nausea/vomiting, had episode of abdominal pain yesterday that has since resolved. Endorses increased thirst and urinary frequency.  He has lost 20 lbs in the last 2 months.  States he does not have DM, but has also not seen MD in 30 years.  Denies f/c, chest pain pressure palpitations, SOB, AMS.  Reports no hx of alcohol withdrawal. Last drink 12/10, drinks 12 beers/day everyday.    In ED: VBG pH 7.27, AG 26, BMP HCO3 13, BHB 8.8, BMP glc 656. S/p 2L IVF. VSS.  (13 Dec 2021 21:09)    Endocrine Hx:  55 yo male with reported GERD and alcohol use presents with presyncope event in hospital, brought to ED, and found to have DKA with a1c 15.3%.  Has never been diagnosed with diabetes or pre diabetes. Patient reports not seeing a doctor in 30 years.  Reports having reflux and self treats with "the purple pill" over the counter. Patient endorses approximately 50 pound weight loss in past 4-5 months.  Reports having "cotton mouth" and quenching thirst with gatorade, sunkist, water or beer. Reports polyuria, denies polyphagia. Patient wears glasses, however has not seen an optometrist, denies worsening vision/blurry vision.  Patient drinks 6-12 cans of beer per day.  Eats donuts, sandwiches, and fast food.  Does not usually snack in between.  Denies numbness and tingling in extremities. Admitted to MICU for DKA treatment/insulin infusion.       PAST MEDICAL & SURGICAL HISTORY:  No pertinent past medical history    GERD (gastroesophageal reflux disease)    No significant past surgical history        FAMILY HISTORY:  FH: type 2 diabetes (Mother)  Father dies at age 45- asbestos        Social History:  Etoh abuse 6-12 cans of beer daily    Outpatient Medications: None    MEDICATIONS  (STANDING):  dextrose 40% Gel 15 Gram(s) Oral once  dextrose 5%. 1000 milliLiter(s) (50 mL/Hr) IV Continuous <Continuous>  dextrose 5%. 1000 milliLiter(s) (100 mL/Hr) IV Continuous <Continuous>  dextrose 50% Injectable 25 Gram(s) IV Push once  dextrose 50% Injectable 12.5 Gram(s) IV Push once  dextrose 50% Injectable 25 Gram(s) IV Push once  enoxaparin Injectable 40 milliGRAM(s) SubCutaneous every 24 hours  glucagon  Injectable 1 milliGRAM(s) IntraMuscular once  influenza   Vaccine 0.5 milliLiter(s) IntraMuscular once  insulin glargine Injectable (LANTUS) 30 Unit(s) SubCutaneous once  insulin lispro (ADMELOG) corrective regimen sliding scale   SubCutaneous three times a day before meals  insulin lispro (ADMELOG) corrective regimen sliding scale   SubCutaneous at bedtime  insulin lispro Injectable (ADMELOG) 10 Unit(s) SubCutaneous three times a day before meals  pantoprazole    Tablet 40 milliGRAM(s) Oral before breakfast    MEDICATIONS  (PRN):  acetaminophen     Tablet .. 650 milliGRAM(s) Oral every 6 hours PRN Temp greater or equal to 38C (100.4F), Mild Pain (1 - 3)  aluminum hydroxide/magnesium hydroxide/simethicone Suspension 30 milliLiter(s) Oral every 4 hours PRN Dyspepsia  melatonin 3 milliGRAM(s) Oral at bedtime PRN Insomnia  ondansetron Injectable 4 milliGRAM(s) IV Push every 8 hours PRN Nausea and/or Vomiting      Allergies    No Known Allergies    Intolerances      Review of Systems:  Constitutional: No fever. lost 50 pounds in 4-5 months  Eyes: No blurry vision, wears glasses  Neuro: No tremors  HEENT: No pain  Cardiovascular: No chest pain, palpitations  Respiratory: No SOB, no cough  GI: No nausea, vomiting, abdominal pain  : No dysuria  Skin: no rash  Psych: no depression  Endocrine: + polyuria, polydipsia  Hem/lymph: no swelling  Osteoporosis: no fractures- reprots had a right hand Fx in february    ALL OTHER SYSTEMS REVIEWED AND NEGATIVE        PHYSICAL EXAM:  VITALS: T(C): 36.7 (12-14-21 @ 16:16)  T(F): 98 (12-14-21 @ 16:16), Max: 98.4 (12-13-21 @ 21:18)  HR: 68 (12-14-21 @ 16:16) (68 - 78)  BP: 131/76 (12-14-21 @ 16:16) (112/86 - 151/82)  RR:  (14 - 20)  SpO2:  (98% - 100%)  Wt(kg): --  GENERAL: NAD, well-developed  EYES: No proptosis, no lid lag, anicteric  HEENT:  Atraumatic, Normocephalic, moist mucous membranes  THYROID: Normal size, no palpable nodules  RESPIRATORY: Clear to auscultation bilaterally; No rales, rhonchi, wheezing, or rubs  CARDIOVASCULAR: Regular rate and rhythm; No murmurs; no peripheral edema  GI: Soft, nontender, non distended, normal bowel sounds  SKIN: Dry, intact, No rashes or lesions- LE severely dry, cracked  MUSCULOSKELETAL: Full range of motion, normal strength  NEURO: sensation intact, extraocular movements intact, no tremor, normal reflexes  PSYCH: Alert and oriented x 3, normal affect, normal mood    CAPILLARY BLOOD GLUCOSE  POCT Blood Glucose.: 149 mg/dL (14 Dec 2021 11:30)  POCT Blood Glucose.: 154 mg/dL (14 Dec 2021 08:04)  POCT Blood Glucose.: 209 mg/dL (14 Dec 2021 05:10)  POCT Blood Glucose.: 204 mg/dL (14 Dec 2021 04:15)  POCT Blood Glucose.: 176 mg/dL (14 Dec 2021 03:06)  POCT Blood Glucose.: 149 mg/dL (14 Dec 2021 02:11)  POCT Blood Glucose.: 171 mg/dL (14 Dec 2021 01:27)  POCT Blood Glucose.: 209 mg/dL (13 Dec 2021 23:59)  POCT Blood Glucose.: 258 mg/dL (13 Dec 2021 23:28)  POCT Blood Glucose.: 336 mg/dL (13 Dec 2021 22:06)  POCT Blood Glucose.: 442 mg/dL (13 Dec 2021 20:48)  POCT Blood Glucose.: 415 mg/dL (13 Dec 2021 19:43)  POCT Blood Glucose.: 149 mg/dL (12-14-21 @ 11:30)  POCT Blood Glucose.: 154 mg/dL (12-14-21 @ 08:04)  POCT Blood Glucose.: 209 mg/dL (12-14-21 @ 05:10)  POCT Blood Glucose.: 204 mg/dL (12-14-21 @ 04:15)  POCT Blood Glucose.: 176 mg/dL (12-14-21 @ 03:06)  POCT Blood Glucose.: 149 mg/dL (12-14-21 @ 02:11)  POCT Blood Glucose.: 171 mg/dL (12-14-21 @ 01:27)  POCT Blood Glucose.: 209 mg/dL (12-13-21 @ 23:59)  POCT Blood Glucose.: 258 mg/dL (12-13-21 @ 23:28)  POCT Blood Glucose.: 336 mg/dL (12-13-21 @ 22:06)  POCT Blood Glucose.: 442 mg/dL (12-13-21 @ 20:48)  POCT Blood Glucose.: 415 mg/dL (12-13-21 @ 19:43)  POCT Blood Glucose.: >600 mg/dL (12-13-21 @ 15:56)                            13.0   6.33  )-----------( 142      ( 14 Dec 2021 05:27 )             38.1       12-14    136  |  103  |  9   ----------------------------<  211<H>  3.7   |  25  |  0.53    EGFR if : 139  EGFR if non : 120    Ca    9.7      12-14  Mg     1.70     12-14  Phos  2.6     12-14    TPro  7.4  /  Alb  4.4  /  TBili  0.5  /  DBili  x   /  AST  28  /  ALT  29  /  AlkPhos  99  12-13 12-14 Chol -- Direct LDL -- LDL calculated -- HDL -- Trig 191<H>      A1C with Estimated Average Glucose (12.14.21 @ 01:53)    A1C with Estimated Average Glucose Result: 15.3 %    Estimated Average Glucose: 392

## 2021-12-14 NOTE — PROGRESS NOTE ADULT - SUBJECTIVE AND OBJECTIVE BOX
INTERVAL HPI/OVERNIGHT EVENTS: AG closed, insulin 30 units given 4:40am, insulin gtt stopped 2 hours after    SUBJECTIVE: Patient seen and examined at bedside. Feeling better, denied needing nicotine patches. No SOB/CP      VITAL SIGNS:  ICU Vital Signs Last 24 Hrs  T(C): 36.1 (14 Dec 2021 08:00), Max: 36.9 (13 Dec 2021 21:18)  T(F): 97 (14 Dec 2021 08:00), Max: 98.4 (13 Dec 2021 21:18)  HR: 74 (14 Dec 2021 10:00) (71 - 81)  BP: 134/82 (14 Dec 2021 10:00) (125/78 - 167/86)  BP(mean): 98 (14 Dec 2021 10:00) (81 - 100)  ABP: --  ABP(mean): --  RR: 14 (14 Dec 2021 10:00) (14 - 20)  SpO2: 99% (14 Dec 2021 10:00) (98% - 100%)      Plateau pressure:   P/F ratio:      @ 07:01  -  14 @ 07:00  --------------------------------------------------------  IN: 923 mL / OUT: 0 mL / NET: 923 mL     @ 07:01  -  14 @ 10:28  --------------------------------------------------------  IN: 390 mL / OUT: 0 mL / NET: 390 mL      CAPILLARY BLOOD GLUCOSE      POCT Blood Glucose.: 154 mg/dL (14 Dec 2021 08:04)    ECG:    PHYSICAL EXAM:    Gen: Alert, thin male, NAD  HEENT: NCAT, PERRL, EOMI, clear conjunctiva, no scleral icterus, no erythema or exudates in the oropharynx  Neck: Supple, no JVD, no LAD  CV: RRR, S1S2, no m/r/g  Resp: CTAB, normal respiratory effort  Abd: Soft, NT, ND, normal bowel sounds  Ext: no edema, no clubbing or cyanosis  Neuro: AOx3, CN2-12 grossly intact, ONTIVEROS  Skin: warm, perfused    MEDICATIONS:  MEDICATIONS  (STANDING):  chlorhexidine 4% Liquid 1 Application(s) Topical <User Schedule>  dextrose 40% Gel 15 Gram(s) Oral once  dextrose 5%. 1000 milliLiter(s) (50 mL/Hr) IV Continuous <Continuous>  dextrose 5%. 1000 milliLiter(s) (100 mL/Hr) IV Continuous <Continuous>  dextrose 50% Injectable 25 Gram(s) IV Push once  dextrose 50% Injectable 12.5 Gram(s) IV Push once  dextrose 50% Injectable 25 Gram(s) IV Push once  enoxaparin Injectable 40 milliGRAM(s) SubCutaneous every 24 hours  glucagon  Injectable 1 milliGRAM(s) IntraMuscular once  influenza   Vaccine 0.5 milliLiter(s) IntraMuscular once  insulin glargine Injectable (LANTUS) 30 Unit(s) SubCutaneous once  insulin lispro (ADMELOG) corrective regimen sliding scale   SubCutaneous three times a day before meals  insulin lispro (ADMELOG) corrective regimen sliding scale   SubCutaneous at bedtime  insulin lispro Injectable (ADMELOG) 10 Unit(s) SubCutaneous three times a day before meals  pantoprazole    Tablet 40 milliGRAM(s) Oral before breakfast    MEDICATIONS  (PRN):      ALLERGIES:  Allergies    No Known Allergies    Intolerances        LABS:                        13.0   6.33  )-----------( 142      ( 14 Dec 2021 05:27 )             38.1     12-14    136  |  103  |  9   ----------------------------<  211<H>  3.7   |  25  |  0.53    Ca    9.7      14 Dec 2021 05:27  Phos  2.6     12-14  Mg     1.70     12-14    TPro  7.4  /  Alb  4.4  /  TBili  0.5  /  DBili  x   /  AST  28  /  ALT  29  /  AlkPhos  99  12-13      Urinalysis Basic - ( 14 Dec 2021 00:28 )    Color: Light Yellow / Appearance: Clear / S.034 / pH: x  Gluc: x / Ketone: Large  / Bili: Negative / Urobili: <2 mg/dL   Blood: x / Protein: Trace / Nitrite: Negative   Leuk Esterase: Negative / RBC: 0 /HPF / WBC 0 /HPF   Sq Epi: x / Non Sq Epi: 0 /HPF / Bacteria: Negative        RADIOLOGY & ADDITIONAL TESTS: Reviewed.

## 2021-12-14 NOTE — CONSULT NOTE ADULT - ATTENDING COMMENTS
New onset DM presenting in DKA, clarify СЕРГЕЙ vs ketosis prone DM2, send antibodies. HbA1c 15.3%  Will need extensive DM education, dc on basal bolus pens, RD consult, glucometer and supplies and endocrine follow up outpatient.  Endocrine team consulted for uncontrolled diabetes. Patient is high risk with high level decision making due to uncontrolled diabetes which places patient at high risk for cardiovascular and cerebrovascular events. Patient with lability of glucose requiring close monitoring and insulin adjustments.    Ashish Lagos MD  Division of Endocrinology  Pager: [phone]    If after 6PM or before 9AM, or on weekends/holidays, please call endocrine answering service for assistance ([phone]).  For nonurgent matters email [email or web address] for assistance.

## 2021-12-14 NOTE — CONSULT NOTE ADULT - ASSESSMENT
55 yo male with no significant past medical history, arrives with DKA, found to have a1c 15.3%, new onset DM.  Managed in MICU with insulin infusion.  DKA now resolved    Endocrine team consulted for uncontrolled diabetes. Patient is high risk with high level decision making due to uncontrolled diabetes which places patient at high risk for cardiovascular and cerebrovascular events. Patient with lability of glucose requiring close monitoring and insulin adjustments.      1) DKA 2/2 new onset DM  Likely type 2 DM given 50 pound weight loss, will send antibodies to r/o СЕРГЕЙ/DM1  Send LEONARD, zinc transporter, islet cell antibody  Target a1c 6-7 %  Target BG inpatient: 100-180 mg/dl  DKA now resolved, patient transferred out of MICU today  Patient transitioned off of insulin infusion with Lantus 30 units sq - given at 0400 hours today  Lantus 30 units ordered tonight for 12/15 at 0100 hours  Tomorrow will change Lantus to bedtime dosing.  Dosing TBD with more data  Continue Admelog 10 units sq tid ac- hold for NPO/skips meals  Continue Admelog low dose correction scales for ac and for hs  Ordered RD consult  Counseled patient on risk of micro/macrovascular complications related to uncontrolled dm2  Discussed starting insulin which patient is amenable to  BEDSIDE RN DM EDU:  Ensure RN teaches patient how to use insulin pens, check fingersticks with glucometer, hypoglycemia s/s and management, lifestyle changes    DC planning: Patient will need complete DM education prior to discharge  D/c on basal bolus insulin  Please send Lantus solostar pen and humalog kwikpen as test script to check insurance coverage.  Ok to send with current doses and update prior to d/c  If Lantus not covered- can try alternating with one of following   tresiba/basaglar/toujeo/Levemir/Semglee  If Humalog not covered- can try alternating with one of following  novolog/apidra/admelog/fiasp  Send glucometer per insurance, test strips, lancets, alcohol pads, and BD oren pen needles 4 x daily.   Please also prescribe glucose tabs, Baqsimi nasal spray or glucagon emergency kit for hypoglycemia risk   Follow up: Patient can follow up with [facility] endocrinology for further DM education and establish care with an endocrinologist.        2) HTN  target BP < 130/80  Close to goal  Encourage lifestyle changes  Care per primary team    3) HLD  target LDl < 70  Please obtain lipid panel  Patient may benefit from statin    4) ETOH abuse  Encourage alcohol cessation  Will discuss risk of hypoglycemia with patient as patient will be new to insulin        Alison Newsome  Nurse Practitioner  Division of Endocrinology & Diabetes  Pager # [phone]      If after 6PM or before 9AM, or on weekends/holidays, please call endocrine answering service for assistance ([phone]).  For nonurgent matters email [email or web address] for assistance.      55 yo male with no significant past medical history, arrives with DKA, found to have a1c 15.3%, new onset DM.  Managed in MICU with insulin infusion.  DKA now resolved    Endocrine team consulted for uncontrolled diabetes. Patient is high risk with high level decision making due to uncontrolled diabetes which places patient at high risk for cardiovascular and cerebrovascular events. Patient with lability of glucose requiring close monitoring and insulin adjustments.      1) DKA 2/2 new onset DM  Likely type 2 DM given 50 pound weight loss, will send antibodies to r/o СЕРГЕЙ/DM1  Send LEONARD, zinc transporter, islet cell antibody  Target a1c 6-7 %  Target BG inpatient: 100-180 mg/dl  DKA now resolved, patient transferred out of MICU today  Patient transitioned off of insulin infusion with Lantus 30 units sq - given at 0400 hours today  Lantus 30 units ordered tonight for 12/15 at 0100 hours  Tomorrow will change Lantus to bedtime dosing.  Dosing TBD with more data  Continue Admelog 10 units sq tid ac- hold for NPO/skips meals  Continue Admelog low dose correction scales for ac and for hs  Ordered RD consult  Counseled patient on risk of micro/macrovascular complications related to uncontrolled dm2  Discussed starting insulin which patient is amenable to  BEDSIDE RN DM EDU:  Ensure RN teaches patient how to use insulin pens, check fingersticks with glucometer, hypoglycemia s/s and management, lifestyle changes    DC planning: Patient will need complete DM education prior to discharge  D/c on basal bolus insulin  Please send Lantus solostar pen and humalog kwikpen as test script to check insurance coverage.  Ok to send with current doses and update prior to d/c  If Lantus not covered- can try alternating with one of following   tresiba/basaglar/toujeo/Levemir/Semglee  If Humalog not covered- can try alternating with one of following  novolog/apidra/admelog/fiasp  Send glucometer per insurance, test strips, lancets, alcohol pads, and BD oren pen needles 4 x daily.   Please also prescribe glucose tabs, Baqsimi nasal spray or glucagon emergency kit for hypoglycemia risk   Follow up: Patient can follow up with [facility] endocrinology for further DM education and establish care with an endocrinologist.        2) HTN  target BP < 130/80  Close to goal  Encourage lifestyle changes  Care per primary team    3) HLD  target LDl < 70  Please obtain lipid panel  Patient may benefit from statin    4) ETOH abuse  Encourage alcohol cessation  Will discuss risk of hypoglycemia with patient as patient will be new to insulin        Alison Newsome  Nurse Practitioner  Division of Endocrinology & Diabetes  Pager # [phone]      If after 6PM or before 9AM, or on weekends/holidays, please call endocrine answering service for assistance ([phone]).  For nonurgent matters email [email or web address] for assistance.

## 2021-12-14 NOTE — CHART NOTE - NSCHARTNOTEFT_GEN_A_CORE
MAR Accept Note  Transfer to: Medicine   Accepting Attending Physician:  Dr. Lebron   Assigned Room:      Patient seen and examined.   Labs and data reviewed.   No findings precluding transfer of service.       HPI/MICU COURSE:   Please refer to MICU transfer note for full details. Briefly, this is a 56M with GERD, alcohol use disorder presents with presyncopal/syncopal event in the hospital.  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 Per wife, patient stopped walking, leaned against the wall and slowly slid down.  and herself caught him and called RRT. Patient thinks he may have lost consciousness for 5 seconds. No head trauma or other injury. States he hasn't eaten much for the past few weeks, endorses decreased appetite, denies nausea/vomiting, had episode of abdominal pain yesterday that has since resolved. Endorses increased thirst and urinary frequency.  He has lost 20 lbs in the last 2 months.  States he does not have DM, but has also not seen MD in 30 years.  Denies f/c, chest pain pressure palpitations, SOB, AMS.  Reports no hx of alcohol withdrawal. Last drink 12/10, drinks 12 beers/day everyday.    In ED: VBG pH 7.27, AG 26, BMP HCO3 13, BHB 8.8, BMP glc 656. S/p 2L IVF. VSS.     MICU COURSE:  A1c 15.3%.  AG closed to 13 on BMP 01:53.  Based on insulin gtt rate, TDD of insulin 116 units, lantus rounded down to 30 units. Lantus 30 units given at 04:40.  Insulin gtt d/c'd.  VSS.  Ready for transfer to medicine floors.       FOR FOLLOW-UP:  FOR FOLLOW UP:  [ ] F/u Endocrine consult  [ ] F/u labs: DM antibodies  [ ] F/u chest x ray  [ ] Monitor FS  [ ] DM teaching.    MAR [id number]

## 2021-12-15 DIAGNOSIS — Z29.9 ENCOUNTER FOR PROPHYLACTIC MEASURES, UNSPECIFIED: ICD-10-CM

## 2021-12-15 LAB
ANION GAP SERPL CALC-SCNC: 9 MMOL/L — SIGNIFICANT CHANGE UP (ref 7–14)
BUN SERPL-MCNC: 8 MG/DL — SIGNIFICANT CHANGE UP (ref 7–23)
CALCIUM SERPL-MCNC: 9.3 MG/DL — SIGNIFICANT CHANGE UP (ref 8.4–10.5)
CHLORIDE SERPL-SCNC: 101 MMOL/L — SIGNIFICANT CHANGE UP (ref 98–107)
CO2 SERPL-SCNC: 25 MMOL/L — SIGNIFICANT CHANGE UP (ref 22–31)
CREAT SERPL-MCNC: 0.55 MG/DL — SIGNIFICANT CHANGE UP (ref 0.5–1.3)
GAS PNL BLDV: SIGNIFICANT CHANGE UP
GLUCOSE BLDC GLUCOMTR-MCNC: 132 MG/DL — HIGH (ref 70–99)
GLUCOSE BLDC GLUCOMTR-MCNC: 219 MG/DL — HIGH (ref 70–99)
GLUCOSE BLDC GLUCOMTR-MCNC: 256 MG/DL — HIGH (ref 70–99)
GLUCOSE BLDC GLUCOMTR-MCNC: 81 MG/DL — SIGNIFICANT CHANGE UP (ref 70–99)
GLUCOSE BLDC GLUCOMTR-MCNC: 91 MG/DL — SIGNIFICANT CHANGE UP (ref 70–99)
GLUCOSE SERPL-MCNC: 221 MG/DL — HIGH (ref 70–99)
HCT VFR BLD CALC: 39.6 % — SIGNIFICANT CHANGE UP (ref 39–50)
HGB BLD-MCNC: 13.5 G/DL — SIGNIFICANT CHANGE UP (ref 13–17)
MAGNESIUM SERPL-MCNC: 1.8 MG/DL — SIGNIFICANT CHANGE UP (ref 1.6–2.6)
MCHC RBC-ENTMCNC: 31.2 PG — SIGNIFICANT CHANGE UP (ref 27–34)
MCHC RBC-ENTMCNC: 34.1 GM/DL — SIGNIFICANT CHANGE UP (ref 32–36)
MCV RBC AUTO: 91.5 FL — SIGNIFICANT CHANGE UP (ref 80–100)
NRBC # BLD: 0 /100 WBCS — SIGNIFICANT CHANGE UP
NRBC # FLD: 0 K/UL — SIGNIFICANT CHANGE UP
PHOSPHATE SERPL-MCNC: 2.9 MG/DL — SIGNIFICANT CHANGE UP (ref 2.5–4.5)
PLATELET # BLD AUTO: 145 K/UL — LOW (ref 150–400)
POTASSIUM SERPL-MCNC: 3.4 MMOL/L — LOW (ref 3.5–5.3)
POTASSIUM SERPL-SCNC: 3.4 MMOL/L — LOW (ref 3.5–5.3)
RBC # BLD: 4.33 M/UL — SIGNIFICANT CHANGE UP (ref 4.2–5.8)
RBC # FLD: 12.2 % — SIGNIFICANT CHANGE UP (ref 10.3–14.5)
SODIUM SERPL-SCNC: 135 MMOL/L — SIGNIFICANT CHANGE UP (ref 135–145)
WBC # BLD: 5.22 K/UL — SIGNIFICANT CHANGE UP (ref 3.8–10.5)
WBC # FLD AUTO: 5.22 K/UL — SIGNIFICANT CHANGE UP (ref 3.8–10.5)

## 2021-12-15 PROCEDURE — 99233 SBSQ HOSP IP/OBS HIGH 50: CPT

## 2021-12-15 PROCEDURE — 93010 ELECTROCARDIOGRAM REPORT: CPT

## 2021-12-15 PROCEDURE — 71045 X-RAY EXAM CHEST 1 VIEW: CPT | Mod: 26

## 2021-12-15 PROCEDURE — 99232 SBSQ HOSP IP/OBS MODERATE 35: CPT | Mod: GC

## 2021-12-15 RX ORDER — POLYETHYLENE GLYCOL 3350 17 G/17G
17 POWDER, FOR SOLUTION ORAL ONCE
Refills: 0 | Status: COMPLETED | OUTPATIENT
Start: 2021-12-15 | End: 2021-12-15

## 2021-12-15 RX ORDER — POTASSIUM CHLORIDE 20 MEQ
40 PACKET (EA) ORAL ONCE
Refills: 0 | Status: COMPLETED | OUTPATIENT
Start: 2021-12-15 | End: 2021-12-15

## 2021-12-15 RX ORDER — INSULIN LISPRO 100/ML
10 VIAL (ML) SUBCUTANEOUS
Qty: 5 | Refills: 0
Start: 2021-12-15 | End: 2022-01-13

## 2021-12-15 RX ORDER — INSULIN GLARGINE 100 [IU]/ML
30 INJECTION, SOLUTION SUBCUTANEOUS AT BEDTIME
Refills: 0 | Status: DISCONTINUED | OUTPATIENT
Start: 2021-12-15 | End: 2021-12-16

## 2021-12-15 RX ORDER — ISOPROPYL ALCOHOL, BENZOCAINE .7; .06 ML/ML; ML/ML
1 SWAB TOPICAL
Qty: 100 | Refills: 1
Start: 2021-12-15 | End: 2022-02-02

## 2021-12-15 RX ORDER — INSULIN GLARGINE 100 [IU]/ML
30 INJECTION, SOLUTION SUBCUTANEOUS
Qty: 5 | Refills: 0
Start: 2021-12-15 | End: 2022-01-13

## 2021-12-15 RX ORDER — SENNA PLUS 8.6 MG/1
1 TABLET ORAL ONCE
Refills: 0 | Status: COMPLETED | OUTPATIENT
Start: 2021-12-15 | End: 2021-12-15

## 2021-12-15 RX ADMIN — ENOXAPARIN SODIUM 40 MILLIGRAM(S): 100 INJECTION SUBCUTANEOUS at 05:09

## 2021-12-15 RX ADMIN — SENNA PLUS 1 TABLET(S): 8.6 TABLET ORAL at 15:44

## 2021-12-15 RX ADMIN — Medication 10 UNIT(S): at 07:45

## 2021-12-15 RX ADMIN — Medication 5 MILLIGRAM(S): at 12:53

## 2021-12-15 RX ADMIN — POLYETHYLENE GLYCOL 3350 17 GRAM(S): 17 POWDER, FOR SOLUTION ORAL at 12:53

## 2021-12-15 RX ADMIN — Medication 40 MILLIEQUIVALENT(S): at 11:19

## 2021-12-15 RX ADMIN — INSULIN GLARGINE 30 UNIT(S): 100 INJECTION, SOLUTION SUBCUTANEOUS at 00:49

## 2021-12-15 RX ADMIN — PANTOPRAZOLE SODIUM 40 MILLIGRAM(S): 20 TABLET, DELAYED RELEASE ORAL at 05:09

## 2021-12-15 RX ADMIN — INSULIN GLARGINE 30 UNIT(S): 100 INJECTION, SOLUTION SUBCUTANEOUS at 21:53

## 2021-12-15 NOTE — PROGRESS NOTE ADULT - SUBJECTIVE AND OBJECTIVE BOX
Chief Complaint: Newly diagnosed DM, arrives with DKA    History:  patient reports he did not eat breakfast, ate about 40 % of lunch today.  Does not like hospital food.  Denies n/v, denies s/s of hypoglycemia.  Wife will  glucometer and all supplies to bring to hospital for RN to teach to patient and wife    MEDICATIONS  (STANDING):  dextrose 40% Gel 15 Gram(s) Oral once  dextrose 5%. 1000 milliLiter(s) (50 mL/Hr) IV Continuous <Continuous>  dextrose 5%. 1000 milliLiter(s) (100 mL/Hr) IV Continuous <Continuous>  dextrose 50% Injectable 25 Gram(s) IV Push once  dextrose 50% Injectable 12.5 Gram(s) IV Push once  dextrose 50% Injectable 25 Gram(s) IV Push once  enoxaparin Injectable 40 milliGRAM(s) SubCutaneous every 24 hours  glucagon  Injectable 1 milliGRAM(s) IntraMuscular once  influenza   Vaccine 0.5 milliLiter(s) IntraMuscular once  insulin glargine Injectable (LANTUS) 30 Unit(s) SubCutaneous at bedtime  insulin lispro (ADMELOG) corrective regimen sliding scale   SubCutaneous three times a day before meals  insulin lispro (ADMELOG) corrective regimen sliding scale   SubCutaneous at bedtime  insulin lispro Injectable (ADMELOG) 10 Unit(s) SubCutaneous three times a day before meals  pantoprazole    Tablet 40 milliGRAM(s) Oral before breakfast    MEDICATIONS  (PRN):  acetaminophen     Tablet .. 650 milliGRAM(s) Oral every 6 hours PRN Temp greater or equal to 38C (100.4F), Mild Pain (1 - 3)  aluminum hydroxide/magnesium hydroxide/simethicone Suspension 30 milliLiter(s) Oral every 4 hours PRN Dyspepsia  melatonin 3 milliGRAM(s) Oral at bedtime PRN Insomnia  ondansetron Injectable 4 milliGRAM(s) IV Push every 8 hours PRN Nausea and/or Vomiting      Allergies    No Known Allergies    Intolerances      Review of Systems:  Constitutional: No fever  ALL OTHER SYSTEMS REVIEWED AND NEGATIVE        PHYSICAL EXAM:  VITALS: T(C): 36.4 (12-15-21 @ 12:21)  T(F): 97.5 (12-15-21 @ 12:21), Max: 97.7 (12-14-21 @ 21:53)  HR: 80 (12-15-21 @ 12:21) (67 - 80)  BP: 142/83 (12-15-21 @ 12:21) (139/89 - 157/80)  RR:  (17 - 18)  SpO2:  (99% - 100%)  Wt(kg): --  GENERAL: NAD, well-developed  GI: Soft, nontender, non distended  SKIN: Dry, intact, No rashes or lesions  PSYCH: Alert and oriented x 3, normal affect, normal mood      CAPILLARY BLOOD GLUCOSE    POCT Blood Glucose.: 81 mg/dL (15 Dec 2021 12:07)  POCT Blood Glucose.: 132 mg/dL (15 Dec 2021 07:40)  POCT Blood Glucose.: 256 mg/dL (15 Dec 2021 00:46)  POCT Blood Glucose.: 240 mg/dL (14 Dec 2021 21:32)  POCT Blood Glucose.: 165 mg/dL (14 Dec 2021 17:29)      12-15    135  |  101  |  8   ----------------------------<  221<H>  3.4<L>   |  25  |  0.55    EGFR if : 137  EGFR if non : 118    Ca    9.3      12-15  Mg     1.80     12-15  Phos  2.9     12-15    TPro  7.4  /  Alb  4.4  /  TBili  0.5  /  DBili  x   /  AST  28  /  ALT  29  /  AlkPhos  99  12-13      A1C with Estimated Average Glucose (12.14.21 @ 01:53)    A1C with Estimated Average Glucose Result: 15.3 %    Estimated Average Glucose: 392

## 2021-12-15 NOTE — PROGRESS NOTE ADULT - PROBLEM SELECTOR PLAN 2
Has not seen doctor in 30 years     -Now on lispo 10/10/10 w sliding scale   -last dose lantus 30 units at 1 AM, f/u endo recs on next dose of long acting insulin     -labs LEONARD, zinc transporter, islet cell antibody  -dietician consult Has not seen doctor in 30 years. HbA1c 15.     -Now on lispo 10/10/10 w sliding scale   -last dose lantus 30 units at 1 AM, f/u endo recs on next dose of long acting insulin   -labs LEONARD, zinc transporter, islet cell antibody  -dietician consult    Concerned as extensive discussion about insulin and medication by endocrine team, but afterwards pt appeared to have forgotten that he will need insulin when he goes home.   Confusion possibly related to ETOH and DKA   Will monitor mental status and re-educate.   Will also discuss with wife if she is willing to learn and administer insulin for pt.

## 2021-12-15 NOTE — PROGRESS NOTE ADULT - PROBLEM SELECTOR PLAN 4
-social work consult  -last drink 12/10 (12 beers a day) -social work consult  -last drink 12/10 (12 beers a day)   -BESS.

## 2021-12-15 NOTE — PROGRESS NOTE ADULT - ATTENDING COMMENTS
Patient was seen at bedside with resident team.  F/w with DKA, DM  now stable , f/u endocrinology recommendations  CIWA X1  DC planning

## 2021-12-15 NOTE — PROGRESS NOTE ADULT - PROBLEM SELECTOR PLAN 1
AG closed to 13 on BMP 01:53.  Based on insulin gtt rate, TDD of insulin 116 units, lantus rounded down to 30 units. Lantus 30 units given at 04:40.  Insulin gtt d/c'd.     -Now on lispo 10/10/10 w sliding scale   -last dose lantus 30 units at 1 AM, f/u endo recs on next dose of long acting insulin, AG closed to 13 on BMP 01:53.  Based on insulin gtt rate, TDD of insulin 116 units, lantus rounded down to 30 units. Lantus 30 units given at 04:40.  Insulin gtt d/c'd.     -Now on lispo 10/10/10 w sliding scale   -last dose lantus 30 units at 1 AM, f/u endo recs on next dose of long acting insulin.

## 2021-12-15 NOTE — PROGRESS NOTE ADULT - SUBJECTIVE AND OBJECTIVE BOX
56M with GERD, alcohol use disorder, has not seen MD in 30 years, presents with presyncopal/syncopal event in the hospital.     States he hasn't eaten much for the past few weeks, endorses decreased appetite, denies nausea/vomiting, had episode of abdominal pain yesterday that has since resolved. Endorses increased thirst and urinary frequency. He has lost 20 lbs in the last 2 months.     Denies f/c, chest pain pressure palpitations, SOB, AMS.  Reports no hx of alcohol withdrawal. Last drink 12/10, drinks 12 beers/day everyday.    INTERVAL HPI/OVERNIGHT EVENTS:      VITALS:   T(C): 36.4 (12-15-21 @ 04:47), Max: 36.7 (21 @ 16:16)  HR: 67 (12-15-21 @ 04:47) (67 - 74)  BP: 157/80 (12-15-21 @ 04:47) (112/86 - 157/80)  RR: 17 (12-15-21 @ 04:47) (14 - 18)  SpO2: 100% (12-15-21 @ 04:47) (99% - 100%)    Physical Exam:  Gen: Alert, thin male, NAD  HEENT: NCAT, PERRL, EOMI, clear conjunctiva, no scleral icterus, no erythema or exudates in the oropharynx, dry mm  Neck: Supple, no JVD, no LAD  CV: RRR, S1S2, no m/r/g  Resp: CTAB, normal respiratory effort  Abd: Soft, NT, ND, normal bowel sounds  Ext: no edema, no clubbing or cyanosis  Neuro: AOx3, CN2-12 grossly intact, ONTIVEROS  Skin: warm, perfused    Consultant(s) Notes Reviewed:  [x ] YES  [ ] NO  Care Discussed with Consultants/Other Providers [ x] YES  [ ] NO    LABS:                        13.5   5.22  )-----------( 145      ( 15 Dec 2021 03:18 )             39.6     12-15    135  |  101  |  8   ----------------------------<  221<H>  3.4<L>   |  25  |  0.55    Ca    9.3      15 Dec 2021 03:18  Phos  2.9     12-15  Mg     1.80     12-15    TPro  7.4  /  Alb  4.4  /  TBili  0.5  /  DBili  x   /  AST  28  /  ALT  29  /  AlkPhos  99  12-13    LIVER FUNCTIONS - ( 13 Dec 2021 17:54 )  Alb: 4.4 g/dL / Pro: 7.4 g/dL / ALK PHOS: 99 U/L / ALT: 29 U/L / AST: 28 U/L / GGT: x           Urinalysis Basic - ( 14 Dec 2021 00:28 )    Color: Light Yellow / Appearance: Clear / S.034 / pH: x  Gluc: x / Ketone: Large  / Bili: Negative / Urobili: <2 mg/dL   Blood: x / Protein: Trace / Nitrite: Negative   Leuk Esterase: Negative / RBC: 0 /HPF / WBC 0 /HPF   Sq Epi: x / Non Sq Epi: 0 /HPF / Bacteria: Negative      acetaminophen     Tablet .. 650 milliGRAM(s) Oral every 6 hours PRN  aluminum hydroxide/magnesium hydroxide/simethicone Suspension 30 milliLiter(s) Oral every 4 hours PRN  dextrose 40% Gel 15 Gram(s) Oral once  dextrose 5%. 1000 milliLiter(s) IV Continuous <Continuous>  dextrose 5%. 1000 milliLiter(s) IV Continuous <Continuous>  dextrose 50% Injectable 25 Gram(s) IV Push once  dextrose 50% Injectable 12.5 Gram(s) IV Push once  dextrose 50% Injectable 25 Gram(s) IV Push once  enoxaparin Injectable 40 milliGRAM(s) SubCutaneous every 24 hours  glucagon  Injectable 1 milliGRAM(s) IntraMuscular once  influenza   Vaccine 0.5 milliLiter(s) IntraMuscular once  insulin lispro (ADMELOG) corrective regimen sliding scale   SubCutaneous three times a day before meals  insulin lispro (ADMELOG) corrective regimen sliding scale   SubCutaneous at bedtime  insulin lispro Injectable (ADMELOG) 10 Unit(s) SubCutaneous three times a day before meals  melatonin 3 milliGRAM(s) Oral at bedtime PRN  ondansetron Injectable 4 milliGRAM(s) IV Push every 8 hours PRN  pantoprazole    Tablet 40 milliGRAM(s) Oral before breakfast      HEALTH ISSUES - PROBLEM Dx:  DKA (diabetic ketoacidosis)  Newly diagnosed diabetes  Hypertension  Other hyperlipidemia  ETOH abuse 56M with GERD, alcohol use disorder, has not seen MD in 30 years, presents with presyncopal/syncopal event in the hospital.     States he hasn't eaten much for the past few weeks, endorses decreased appetite, denies nausea/vomiting, had episode of abdominal pain yesterday that has since resolved. Endorses increased thirst and urinary frequency. He has lost 20 lbs in the last 2 months.     Denies f/c, chest pain pressure palpitations, SOB, AMS.  Reports no hx of alcohol withdrawal. Last drink 12/10, drinks 12 beers/day everyday.    INTERVAL HPI/OVERNIGHT EVENTS:  No acute events. Feels very constipated. No other new complaints. No chest pain, nausea, vomiting, no sensitivity to light or sound, no itchiness.     VITALS:   T(C): 36.4 (12-15-21 @ 04:47), Max: 36.7 (21 @ 16:16)  HR: 67 (12-15-21 @ 04:47) (67 - 74)  BP: 157/80 (12-15-21 @ 04:47) (112/86 - 157/80)  RR: 17 (12-15-21 @ 04:47) (14 - 18)  SpO2: 100% (12-15-21 @ 04:47) (99% - 100%)    Physical Exam:  Gen: Alert, thin male, NAD  HEENT: NCAT, PERRL, EOMI, clear conjunctiva, no scleral icterus, no erythema or exudates in the oropharynx, dry mm  Neck: Supple, no JVD, no LAD  CV: RRR, S1S2, no m/r/g  Resp: CTAB, normal respiratory effort  Abd: Soft, NT, ND, normal bowel sounds  Ext: no edema, no clubbing or cyanosis  Neuro: AOx3, CN2-12 grossly intact, ONTIVEROS  Skin: warm, perfused    Consultant(s) Notes Reviewed:  [x ] YES  [ ] NO  Care Discussed with Consultants/Other Providers [ x] YES  [ ] NO    LABS:                        13.5   5.22  )-----------( 145      ( 15 Dec 2021 03:18 )             39.6     -15    135  |  101  |  8   ----------------------------<  221<H>  3.4<L>   |  25  |  0.55    Ca    9.3      15 Dec 2021 03:18  Phos  2.9     12-15  Mg     1.80     12-15    TPro  7.4  /  Alb  4.4  /  TBili  0.5  /  DBili  x   /  AST  28  /  ALT  29  /  AlkPhos  99  12-13    LIVER FUNCTIONS - ( 13 Dec 2021 17:54 )  Alb: 4.4 g/dL / Pro: 7.4 g/dL / ALK PHOS: 99 U/L / ALT: 29 U/L / AST: 28 U/L / GGT: x           Urinalysis Basic - ( 14 Dec 2021 00:28 )    Color: Light Yellow / Appearance: Clear / S.034 / pH: x  Gluc: x / Ketone: Large  / Bili: Negative / Urobili: <2 mg/dL   Blood: x / Protein: Trace / Nitrite: Negative   Leuk Esterase: Negative / RBC: 0 /HPF / WBC 0 /HPF   Sq Epi: x / Non Sq Epi: 0 /HPF / Bacteria: Negative      acetaminophen     Tablet .. 650 milliGRAM(s) Oral every 6 hours PRN  aluminum hydroxide/magnesium hydroxide/simethicone Suspension 30 milliLiter(s) Oral every 4 hours PRN  dextrose 40% Gel 15 Gram(s) Oral once  dextrose 5%. 1000 milliLiter(s) IV Continuous <Continuous>  dextrose 5%. 1000 milliLiter(s) IV Continuous <Continuous>  dextrose 50% Injectable 25 Gram(s) IV Push once  dextrose 50% Injectable 12.5 Gram(s) IV Push once  dextrose 50% Injectable 25 Gram(s) IV Push once  enoxaparin Injectable 40 milliGRAM(s) SubCutaneous every 24 hours  glucagon  Injectable 1 milliGRAM(s) IntraMuscular once  influenza   Vaccine 0.5 milliLiter(s) IntraMuscular once  insulin lispro (ADMELOG) corrective regimen sliding scale   SubCutaneous three times a day before meals  insulin lispro (ADMELOG) corrective regimen sliding scale   SubCutaneous at bedtime  insulin lispro Injectable (ADMELOG) 10 Unit(s) SubCutaneous three times a day before meals  melatonin 3 milliGRAM(s) Oral at bedtime PRN  ondansetron Injectable 4 milliGRAM(s) IV Push every 8 hours PRN  pantoprazole    Tablet 40 milliGRAM(s) Oral before breakfast      HEALTH ISSUES - PROBLEM Dx:  DKA (diabetic ketoacidosis)  Newly diagnosed diabetes  Hypertension  Other hyperlipidemia  ETOH abuse

## 2021-12-16 ENCOUNTER — TRANSCRIPTION ENCOUNTER (OUTPATIENT)
Age: 56
End: 2021-12-16

## 2021-12-16 VITALS — WEIGHT: 132.06 LBS

## 2021-12-16 PROBLEM — K21.9 GASTRO-ESOPHAGEAL REFLUX DISEASE WITHOUT ESOPHAGITIS: Chronic | Status: ACTIVE | Noted: 2021-12-13

## 2021-12-16 LAB
ANION GAP SERPL CALC-SCNC: 14 MMOL/L — SIGNIFICANT CHANGE UP (ref 7–14)
BUN SERPL-MCNC: 9 MG/DL — SIGNIFICANT CHANGE UP (ref 7–23)
CALCIUM SERPL-MCNC: 9.7 MG/DL — SIGNIFICANT CHANGE UP (ref 8.4–10.5)
CHLORIDE SERPL-SCNC: 106 MMOL/L — SIGNIFICANT CHANGE UP (ref 98–107)
CO2 SERPL-SCNC: 21 MMOL/L — LOW (ref 22–31)
CREAT SERPL-MCNC: 0.6 MG/DL — SIGNIFICANT CHANGE UP (ref 0.5–1.3)
GLUCOSE BLDC GLUCOMTR-MCNC: 130 MG/DL — HIGH (ref 70–99)
GLUCOSE BLDC GLUCOMTR-MCNC: 79 MG/DL — SIGNIFICANT CHANGE UP (ref 70–99)
GLUCOSE SERPL-MCNC: 53 MG/DL — CRITICAL LOW (ref 70–99)
HCT VFR BLD CALC: 42.1 % — SIGNIFICANT CHANGE UP (ref 39–50)
HGB BLD-MCNC: 14 G/DL — SIGNIFICANT CHANGE UP (ref 13–17)
ISLET CELL512 AB SER-ACNC: SIGNIFICANT CHANGE UP
MAGNESIUM SERPL-MCNC: 1.8 MG/DL — SIGNIFICANT CHANGE UP (ref 1.6–2.6)
MCHC RBC-ENTMCNC: 31 PG — SIGNIFICANT CHANGE UP (ref 27–34)
MCHC RBC-ENTMCNC: 33.3 GM/DL — SIGNIFICANT CHANGE UP (ref 32–36)
MCV RBC AUTO: 93.1 FL — SIGNIFICANT CHANGE UP (ref 80–100)
NRBC # BLD: 0 /100 WBCS — SIGNIFICANT CHANGE UP
NRBC # FLD: 0 K/UL — SIGNIFICANT CHANGE UP
PHOSPHATE SERPL-MCNC: 3.9 MG/DL — SIGNIFICANT CHANGE UP (ref 2.5–4.5)
PLATELET # BLD AUTO: 166 K/UL — SIGNIFICANT CHANGE UP (ref 150–400)
POTASSIUM SERPL-MCNC: 3.5 MMOL/L — SIGNIFICANT CHANGE UP (ref 3.5–5.3)
POTASSIUM SERPL-SCNC: 3.5 MMOL/L — SIGNIFICANT CHANGE UP (ref 3.5–5.3)
RBC # BLD: 4.52 M/UL — SIGNIFICANT CHANGE UP (ref 4.2–5.8)
RBC # FLD: 12.6 % — SIGNIFICANT CHANGE UP (ref 10.3–14.5)
SODIUM SERPL-SCNC: 141 MMOL/L — SIGNIFICANT CHANGE UP (ref 135–145)
WBC # BLD: 5.2 K/UL — SIGNIFICANT CHANGE UP (ref 3.8–10.5)
WBC # FLD AUTO: 5.2 K/UL — SIGNIFICANT CHANGE UP (ref 3.8–10.5)

## 2021-12-16 PROCEDURE — 99233 SBSQ HOSP IP/OBS HIGH 50: CPT

## 2021-12-16 PROCEDURE — 99497 ADVNCD CARE PLAN 30 MIN: CPT | Mod: GC

## 2021-12-16 RX ORDER — INSULIN LISPRO 100/ML
8 VIAL (ML) SUBCUTANEOUS
Refills: 0 | Status: DISCONTINUED | OUTPATIENT
Start: 2021-12-16 | End: 2021-12-16

## 2021-12-16 RX ORDER — INSULIN GLARGINE 100 [IU]/ML
30 INJECTION, SOLUTION SUBCUTANEOUS
Qty: 5 | Refills: 2
Start: 2021-12-16 | End: 2022-03-15

## 2021-12-16 RX ORDER — ISOPROPYL ALCOHOL, BENZOCAINE .7; .06 ML/ML; ML/ML
1 SWAB TOPICAL
Qty: 100 | Refills: 1
Start: 2021-12-16 | End: 2022-02-03

## 2021-12-16 RX ORDER — INSULIN LISPRO 100/ML
10 VIAL (ML) SUBCUTANEOUS
Qty: 5 | Refills: 2
Start: 2021-12-16 | End: 2022-03-15

## 2021-12-16 RX ORDER — INSULIN GLARGINE 100 [IU]/ML
24 INJECTION, SOLUTION SUBCUTANEOUS
Qty: 5 | Refills: 2
Start: 2021-12-16 | End: 2022-03-15

## 2021-12-16 RX ORDER — INSULIN GLARGINE 100 [IU]/ML
24 INJECTION, SOLUTION SUBCUTANEOUS AT BEDTIME
Refills: 0 | Status: DISCONTINUED | OUTPATIENT
Start: 2021-12-16 | End: 2021-12-16

## 2021-12-16 RX ORDER — INSULIN LISPRO 100/ML
8 VIAL (ML) SUBCUTANEOUS
Qty: 5 | Refills: 2
Start: 2021-12-16 | End: 2022-03-15

## 2021-12-16 RX ADMIN — ENOXAPARIN SODIUM 40 MILLIGRAM(S): 100 INJECTION SUBCUTANEOUS at 05:08

## 2021-12-16 RX ADMIN — PANTOPRAZOLE SODIUM 40 MILLIGRAM(S): 20 TABLET, DELAYED RELEASE ORAL at 06:13

## 2021-12-16 RX ADMIN — Medication 8 UNIT(S): at 12:38

## 2021-12-16 NOTE — DIETITIAN INITIAL EVALUATION ADULT. - PERTINENT MEDS FT
LANTUS 24 Unit(s) qHS, ADMELOG corrective regimen sliding scale, ADMELOG 8 Unit(s) TID, pantoprazole Tablet, aluminum hydroxide/magnesium hydroxide/simethicone Suspension PRN, ondansetron Injectable PRN

## 2021-12-16 NOTE — DISCHARGE NOTE PROVIDER - NSFOLLOWUPCLINICS_GEN_ALL_ED_FT
[facility] at [place]  Internal Medicine  [address] [place] [address]  Phone: [phone]  Fax: [phone]     [facility] Specialties at [place]  Internal Medicine  [address] [place] [address]  Phone: [phone]  Fax: [phone]    [facility] Dept of Substance Abuse  Psychiatry Substance Abuse  [address] [place] [address]  Phone: [phone]  Fax:

## 2021-12-16 NOTE — PROVIDER CONTACT NOTE (OTHER) - RECOMMENDATIONS
Notified MD to hold pre meal insulin dose due to low blood sugar level and not eating breakfast. educated patient about the importance of eating balanced meal to maintain normal sugar levels.

## 2021-12-16 NOTE — DISCHARGE NOTE PROVIDER - NSDCFUADDAPPT_GEN_ALL_CORE_FT
Please make an appointment within 2 weeks with a new primary care physician by [phone]   Internal Medicine   [address] [place] [address]     Your endocrine appointments:   Diabetes educator: 2022 at 3 pm at [address] [place], [place]   Endocrinologist: Dr. Nettles 3/22/2022 at 3 pm at [address] [place], [place]   Phone: [phone]      Please follow up with substance abuse clinic in 2 to 3 weeks:   [address] [place] [address] [phone]

## 2021-12-16 NOTE — DIETITIAN NUTRITION RISK NOTIFICATION - ADDITIONAL COMMENTS/DIETITIAN RECOMMENDATIONS
Please see Dietitian Initial Assessment for complete recommendations.  Siobhan Casey, DESMONDN, CDN #[id number]

## 2021-12-16 NOTE — DIETITIAN INITIAL EVALUATION ADULT. - ADD RECOMMEND
2) Provided pt with type 2 DM nutrition education, RDN remains available to review PRN.                                                                                     3) Encouraged PO intake as tolerated. Discussed methods of increased protein/calorie intake.                                                                                   4) Monitor PO intake, weight trends, nutrition related lab values, BMs/GI distress, hydration status.

## 2021-12-16 NOTE — PROGRESS NOTE ADULT - SUBJECTIVE AND OBJECTIVE BOX
MARIA R UMANA  56y  Male    Patient is a 56y old  Male who presents with a chief complaint of Syncope/Presyncope (15 Dec 2021 16:20)    INTERVAL HPI/OVERNIGHT EVENTS:  No acute events. Pending insulin teaching (with wife)     VITALS:   T(C): 36.4 (12-16-21 @ 05:07), Max: 36.6 (12-15-21 @ 21:35)  HR: 74 (12-16-21 @ 05:07) (68 - 80)  BP: 132/89 (12-16-21 @ 05:07) (132/89 - 142/83)  RR: 19 (12-16-21 @ 05:07) (17 - 19)  SpO2: 99% (12-16-21 @ 05:07) (99% - 100%)    Gen: Alert, thin male, NAD  HEENT: NCAT, PERRL, EOMI, clear conjunctiva, no scleral icterus, no erythema or exudates in the oropharynx, dry mm  Neck: Supple, no JVD, no LAD  CV: RRR, S1S2, no m/r/g  Resp: CTAB, normal respiratory effort  Abd: Soft, NT, ND, normal bowel sounds  Ext: no edema, no clubbing or cyanosis  Neuro: AOx3, CN2-12 grossly intact, ONTIVEROS  Skin: warm, perfused    Consultant(s) Notes Reviewed:  [x ] YES  [ ] NO  Care Discussed with Consultants/Other Providers [ x] YES  [ ] NO    LABS:        acetaminophen     Tablet .. 650 milliGRAM(s) Oral every 6 hours PRN  aluminum hydroxide/magnesium hydroxide/simethicone Suspension 30 milliLiter(s) Oral every 4 hours PRN  dextrose 40% Gel 15 Gram(s) Oral once  dextrose 5%. 1000 milliLiter(s) IV Continuous <Continuous>  dextrose 5%. 1000 milliLiter(s) IV Continuous <Continuous>  dextrose 50% Injectable 25 Gram(s) IV Push once  dextrose 50% Injectable 12.5 Gram(s) IV Push once  dextrose 50% Injectable 25 Gram(s) IV Push once  enoxaparin Injectable 40 milliGRAM(s) SubCutaneous every 24 hours  glucagon  Injectable 1 milliGRAM(s) IntraMuscular once  influenza   Vaccine 0.5 milliLiter(s) IntraMuscular once  insulin glargine Injectable (LANTUS) 30 Unit(s) SubCutaneous at bedtime  insulin lispro (ADMELOG) corrective regimen sliding scale   SubCutaneous three times a day before meals  insulin lispro (ADMELOG) corrective regimen sliding scale   SubCutaneous at bedtime  insulin lispro Injectable (ADMELOG) 10 Unit(s) SubCutaneous three times a day before meals  melatonin 3 milliGRAM(s) Oral at bedtime PRN  ondansetron Injectable 4 milliGRAM(s) IV Push every 8 hours PRN  pantoprazole    Tablet 40 milliGRAM(s) Oral before breakfast      HEALTH ISSUES - PROBLEM Dx:  DKA (diabetic ketoacidosis)    Newly diagnosed diabetes    Hypertension    Other hyperlipidemia    ETOH abuse    Prophylactic measure

## 2021-12-16 NOTE — DISCHARGE NOTE PROVIDER - DETAILS OF MALNUTRITION DIAGNOSIS/DIAGNOSES
This patient has been assessed with a concern for Malnutrition and was treated during this hospitalization for the following Nutrition diagnosis/diagnoses:     -  12/16/2021: Severe protein-calorie malnutrition   -  12/16/2021: Underweight (BMI < 19)

## 2021-12-16 NOTE — SBIRT NOTE ADULT - NSSBIRTBRIEFINTDET_GEN_A_CORE
RONALDO completed SBIRT with pt. Pt shared he was drinking 12 beers per day and has decreased to 4 per day. Pt denied having blackouts or ever hurting himself while drinking. Pt declined resources and information offered by RONALDO.

## 2021-12-16 NOTE — PROGRESS NOTE ADULT - ASSESSMENT
56M with GERD, alcohol use disorder presents with presyncopal/syncopal event, likely secondary to hypovolemia from DKA with new diagnosis of DM.    NEURO  #Presyncope/Syncope  Likely secondary to hypovolemia iso DKA.   - No arrhythmia so far on telemetry  - Can consider TTE to rule out structural causes but less likely  #Alcohol misuse  12 beers/day, last drink on Friday 12/10. No hx of withdrawal  - s/p librium 100mg PO  - now >72 hours from last drink, no signs symptoms of withdrawal    RESPIRATORY  No acute issues    CARDIOVASCULAR  No acute issues    GI/NUTRITION  #Diet  - Carb controlled  #GERD  - continue pantoprazole    RENAL/  #Polyuria secondary to DM  - Monitor UOP    HEMATOLOGIC  #DVT ppx  - Lovenox daily    INFECTIOUS DISEASE  - No concern for infectious etiologies for DKA  - Afebrile, no leukocytosis  -F/u chest x ray    ENDOCRINE  #High anion gap metabolic acidosis 2/2 DKA, new dx of DM  VBG pH 7.27, AG 26, BMP HCO3 13, BHB 8.8, BMP glc 656.  A1c 15.3%  - Anion gap closed, off insulin gtt  - s/p lantus 30 units at 4:40AM  - c/w lispro 10 units TID AC if able to eat >50% of tray  - f/u LEONARD, Islet cell ab, IA-2, ZnT8  given new DM  - Endocrine consulted    
56M with GERD, alcohol use disorder presents with presyncopal/syncopal event, likely secondary to hypovolemia from DKA with new diagnosis of DM.
55 yo male with no significant past medical history, arrives with DKA, found to have a1c 15.3%, new onset DM.  Managed in MICU with insulin infusion.  DKA now resolved    Endocrine team consulted for uncontrolled diabetes. Patient is high risk with high level decision making due to uncontrolled diabetes which places patient at high risk for cardiovascular and cerebrovascular events. Patient with lability of glucose requiring close monitoring and insulin adjustments.      1) DKA 2/2 new onset DM  Likely type 2 DM given 50 pound weight loss, will send antibodies to r/o СЕРГЕЙ/DM1  Send LEONARD, zinc transporter, islet cell antibody  Target a1c 6-7 %  Target BG inpatient: 100-180 mg/dl  Patient with FS 81 at lunch time, likely related to patient receiving admelog premeal at breakfast and patient did not eat breakfast  DKA resolved  Lantus 30 units given at 1245 AM this morning.  Continue lantus 30 untis sq, change to HS dosing  Continue Admelog 10 units sq tid ac- hold for NPO/skips meals  Continue Admelog low dose correction scales for ac and for hs  Ordered RD consult - follow up recs  Counseled patient on risk of micro/macrovascular complications related to uncontrolled dm2  Discussed starting insulin which patient is amenable to  BEDSIDE RN DM EDU:  Ensure RN teaches patient/WIFE how to use insulin pens, check fingersticks with glucometer, hypoglycemia s/s and management, lifestyle changes  Called patient's wife: discussed Basal bolus insulin principle, A1c goal 6-7%, BG goal  mg/dl, Checking FS Am fasting and before meals or if symptoms of hypoglycemia.  Discussed/educated on hypoglycemia s/s and management    DC planning: Patient will need complete DM education prior to discharge  D/c on basal bolus insulin  Please send Lantus solostar pen and humalog kwikpen as test script to check insurance coverage.  Ok to send with current doses and update prior to d/c  If Lantus not covered- can try alternating with one of following   tresiba/basaglar/toujeo/Levemir/Semglee  If Humalog not covered- can try alternating with one of following  novolog/apidra/admelog/fiasp  Send glucometer per insurance, test strips, lancets, alcohol pads, and BD oren pen needles 4 x daily.  Prescriptions sent to [facility] in [place] on [address]   Please also prescribe glucose tabs, Baqsimi nasal spray or glucagon emergency kit for hypoglycemia risk   Follow up: Patient can follow up with [facility] endocrinology for further DM education and establish care with an endocrinologist.  Office will call patient to schedule follow up      2) HTN  target BP < 130/80  Close to goal  Encourage lifestyle changes  Care per primary team    3) HLD  target LDl < 70  Please obtain lipid panel  Patient may benefit from statin    4) ETOH abuse  Encourage alcohol cessation  Will discuss risk of hypoglycemia with patient as patient will be new to insulin        Alison Newsome  Nurse Practitioner  Division of Endocrinology & Diabetes  Pager # [phone]      If after 6PM or before 9AM, or on weekends/holidays, please call endocrine answering service for assistance ([phone]).  For nonurgent matters email [email or web address] for assistance.         TIME SPENT 35 minutes: > 50% of time spent educating patient and wife and coordinating care  
55 yo male with no significant past medical history, arrives with DKA, found to have a1c 15.3%, new onset DM.  Managed in MICU with insulin infusion.  DKA now resolved    Endocrine team consulted for uncontrolled diabetes. Patient is high risk with high level decision making due to uncontrolled diabetes which places patient at high risk for cardiovascular and cerebrovascular events. Patient with lability of glucose requiring close monitoring and insulin adjustments.      1) DKA 2/2 new onset DM  Likely type 2 DM given 50 pound weight loss, will send antibodies to r/o СЕРГЕЙ/DM1  Send LEONARD, zinc transporter, islet cell antibody  Target a1c 6-7 %  Target BG inpatient: 100-180 mg/dl  Decrease Lantus to 24 units SQ QHS  Decrease Admelog to 8 units sq tid ac- hold for NPO/skips meals  Continue Admelog low dose correction scales for ac and for hs  Ordered RD consult - At bedside at time of visit  Endo team previously counseled patient on risk of micro/macrovascular complications related to uncontrolled dm2    BEDSIDE RN DM EDU:  Ensure RN teaches patient/WIFE how to use insulin pens, check fingersticks with glucometer, hypoglycemia s/s and management, lifestyle changes  Called patient's wife: discussed Basal bolus insulin principle, A1c goal 6-7%, BG goal  mg/dl, Checking FS Am fasting and before meals or if symptoms of hypoglycemia.  Discussed/educated on hypoglycemia s/s and management    DC planning: Patient will need complete DM education prior to discharge  D/c on basal bolus insulin  Basaglar 24 units sq qhs  Admelog 8 units sq tid ac  Send glucometer per insurance, test strips, lancets, alcohol pads, and BD oren pen needles 4 x daily.  [facility] as cost was significantly lower than [facility] preferred pharmacy.  Patient and wife aware and approved.  Please also prescribe glucose tabs, Baqsimi nasal spray or glucagon emergency kit for hypoglycemia risk   APPOINTMENTS SCHEDULED:   Diabetes Educator: 1/19/2022 at 3pm at [address] [place], [place]  Dr. Nettles: 3/22/2022 at 3pm at [address], [place]  [phone]    2) HTN  target BP < 130/80  Close to goal  Encourage lifestyle changes  Care per primary team    3) HLD  target LDl < 70  Please obtain lipid panel  Patient may benefit from statin    4) ETOH abuse  Encourage alcohol cessation  Will discuss risk of hypoglycemia with patient as patient will be new to insulin        Alison Newsome  Nurse Practitioner  Division of Endocrinology & Diabetes  Pager # [phone]      If after 6PM or before 9AM, or on weekends/holidays, please call endocrine answering service for assistance ([phone]).  For nonurgent matters email [email or web address] for assistance.         TIME SPENT 35 minutes: > 50% of time spent educating patient and wife and coordinating care  
56M with GERD, alcohol use disorder presents with presyncopal/syncopal event, likely secondary to hypovolemia from DKA with new diagnosis of DM.

## 2021-12-16 NOTE — DISCHARGE NOTE NURSING/CASE MANAGEMENT/SOCIAL WORK - NSDCPEEMAIL_GEN_ALL_CORE
[facility] for Tobacco Control email [email or web address]

## 2021-12-16 NOTE — DISCHARGE NOTE NURSING/CASE MANAGEMENT/SOCIAL WORK - PATIENT PORTAL LINK FT
You can access the FollowMyHealth Patient Portal offered by [facility] by registering at the following website: [email or web address]. By joining [facility]’s FollowMyHealth portal, you will also be able to view your health information using other applications (apps) compatible with our system.

## 2021-12-16 NOTE — DISCHARGE NOTE PROVIDER - CARE PROVIDER_API CALL
Tho,   [address], [place]  Phone: (   )    -  Fax: (   )    -  Scheduled Appointment: 03/22/2022 03:00 PM

## 2021-12-16 NOTE — DIETITIAN INITIAL EVALUATION ADULT. - PERTINENT LABORATORY DATA
(12/16) Na 141, BUN 9, Cr 0.60, BG 53<LL>, K+ 3.5, Phos 3.9, Mg 1.80.  (12/14) HbA1c 15.3%<H>.  POCT: (12/16) , (12/15)

## 2021-12-16 NOTE — PROGRESS NOTE ADULT - PROBLEM SELECTOR PLAN 1
AG closed to 13 on BMP 01:53.  Based on insulin gtt rate, TDD of insulin 116 units, lantus rounded down to 30 units. Lantus 30 units given at 04:40.  Insulin gtt d/c'd.     -Now on lispo 10/10/10 w sliding scale, lantus 30, per endo recs, appreciated

## 2021-12-16 NOTE — DIETITIAN INITIAL EVALUATION ADULT. - REASON FOR ADMISSION
Per chart, pt is 56 year old male PMHx GERD, alcohol use disorder presenting with presyncopal/syncopal event, likely secondary to hypovolemia from DKA with new diagnosis of DM.

## 2021-12-16 NOTE — DISCHARGE NOTE PROVIDER - NSDCMRMEDTOKEN_GEN_ALL_CORE_FT
Admelog SoloStar 100 units/mL injectable solution: 8 unit(s) subcutaneous 3 times a day (with meals)   alcohol swabs : Apply topically to affected area 4 times a day   Basaglar KwikPen 100 units/mL subcutaneous solution: 24 unit(s) subcutaneous once a day   glucometer (per patient&#x27;s insurance): Test blood sugars four times a day. Dispense #1 glucometer.  glucose tablets: Follow instructions on bottle when sugar is low.  Insulin Pen Needles, 4mm: 1 application subcutaneously 4 times a day. ** Use with insulin pen **   lancets: 1 application subcutaneously 4 times a day   NexIUM 24HR 20 mg oral delayed release capsule: 1 cap(s) orally once a day, As Needed  test strips (per patient&#x27;s insurance): 1 application subcutaneously 4 times a day. ** Compatible with patient&#x27;s glucometer **

## 2021-12-16 NOTE — PROGRESS NOTE ADULT - PROBLEM SELECTOR PLAN 2
Has not seen doctor in 30 years. HbA1c 15.     -Now on lispo 10/10/10 w sliding scale, lantus 30, per endo recs, appreciated  -labs LEONARD, zinc transporter, islet cell antibody  -dietician consult    Concerned as extensive discussion about insulin and medication by endocrine team, but afterwards pt appeared to have forgotten that he will need insulin when he goes home.   Confusion possibly related to ETOH and DKA   Will monitor mental status and re-educate.   Wife will come and learn about insulin administration, get diabetes education.

## 2021-12-16 NOTE — PROVIDER CONTACT NOTE (CRITICAL VALUE NOTIFICATION) - SITUATION
patient is alert and oriented X 4 . Patient s blood sugar results came back to be 59 mg/dl from the morning blood draws.

## 2021-12-16 NOTE — PROVIDER CONTACT NOTE (OTHER) - ASSESSMENT
patient alert and oriented. Patient remains in no acute distress. Patient states" he is not eating his breakfast completely.

## 2021-12-16 NOTE — DIETITIAN INITIAL EVALUATION ADULT. - OTHER INFO
Spoke with pt and pt's wife at bedside.     Pt confirms NKFA, denies swallowing difficulties, endorses chewing difficulty given poor dentition.    Pt with EtOH use, 12 beers/day per chart.     Pt lives at home with wife, who assists with meal preparation. Pt reports decreased appetite/PO intake x3 weeks PTA; was consuming 1 meal daily inclusive of fast food, donuts, Gatorade. Pt reports usual body weight 185-190 lbs with subjective 50 lb weight loss x5 months. Dosing weight (12/13) 132 lbs. Apparent clinically significant 53 lb (29%) weight loss, based on report.    +New DM. Endocrinology on board, plan to discharge on basal/bolus insulin regimen.   Pt and pt's wife provided with in-depth written and verbal nutrition education on type 2 DM diet. Topics include: MyPlate healthy eating guidelines, avoidance of sugar sweetened beverages and recommended alternatives, label reading, sources of carbohydrates, carbohydrate counting, inclusion of whole grains/ fiber, mixed meals (pairing protein with carbohydrate for optimal blood glucose response), hypoglycemia prevention/management and timing of meals/snacks. Discussed importance of self monitoring blood glucose, current/goal HbA1c and encouraged outpatient endocrinology follow up. Pt verbalized understanding of nutrition education. Addressed all concerns as able. RDN contact information left at bedside.    No noted GI distress, last BM 12/15 per flowsheets, no bowel regimen ordered at this time.    Labs notable for low K+, repleted.

## 2021-12-16 NOTE — DISCHARGE NOTE PROVIDER - ATTENDING DISCHARGE PHYSICAL EXAMINATION:
.  VITAL SIGNS:  T(C): 36.3 (12-16-21 @ 12:54), Max: 36.6 (12-15-21 @ 21:35)  T(F): 97.3 (12-16-21 @ 12:54), Max: 97.9 (12-15-21 @ 21:35)  HR: 70 (12-16-21 @ 12:54) (68 - 74)  BP: 133/88 (12-16-21 @ 12:54) (132/89 - 135/76)  BP(mean): --  RR: 18 (12-16-21 @ 12:54) (17 - 19)  SpO2: 100% (12-16-21 @ 12:54) (99% - 100%)  Wt(kg): --    PHYSICAL EXAM:    Constitutional: WDWN resting comfortably in bed; NAD  Head: NC/AT  Eyes: PERRL, EOMI, clear conjunctiva  Neck: supple; no JVD or thyromegaly  Respiratory: CTA B/L; no W/R/R, no retractions  Cardiac: +S1/S2; RRR; no M/R/G; PMI non-displaced  Gastrointestinal: soft, NT/ND; no rebound or guarding; +BSx4  Genitourinary: normal external genitalia  Back: spine midline, no bony tenderness or step-offs; no CVAT B/L  Extremities: WWP, no clubbing or cyanosis; no peripheral edema  Musculoskeletal: NROM x4; no joint swelling, tenderness or erythema  Dermatologic: skin warm, dry and intact; no rashes, wounds.  Neurologic: AAOx3; CNII-XII grossly intact; no focal deficits  Psychiatric: affect and characteristics of appearance, verbalizations, behaviors are appropriate

## 2021-12-16 NOTE — DISCHARGE NOTE NURSING/CASE MANAGEMENT/SOCIAL WORK - NSDCPEFALRISK_GEN_ALL_CORE
For information on Fall & Injury Prevention, visit: [email or web address] OR  [email or web address] OR  https://www.cdc.gov/steadi/patient.html

## 2021-12-16 NOTE — DIETITIAN INITIAL EVALUATION ADULT. - ETIOLOGY
inadequate protein-energy intake, inability to meet estimated nutrition needs, altered metabolism with EtOH misuse no prior exposure to nutrition related recommendations

## 2021-12-16 NOTE — DISCHARGE NOTE PROVIDER - PROVIDER TOKENS
FREE:[LAST:[Nettles],PHONE:[(   )    -],FAX:[(   )    -],ADDRESS:[[address] [place] [address]],SCHEDULEDAPPT:[03/22/2022],SCHEDULEDAPPTTIME:[03:00 PM]]

## 2021-12-16 NOTE — DISCHARGE NOTE PROVIDER - HOSPITAL COURSE
56M with GERD, alcohol use disorder presents with presyncopal/syncopal event in the hospital.  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 Per wife, patient stopped walking, leaned against the wall and slowly slid down.  and herself caught him and called RRT. Patient thinks he may have lost consciousness for 5 seconds. No head trauma or other injury. States he hasn't eaten much for the past few weeks, endorses decreased appetite, denies nausea/vomiting, had episode of abdominal pain yesterday that has since resolved. Endorses increased thirst and urinary frequency.  He has lost 20 lbs in the last 2 months.  States he does not have DM, but has also not seen MD in 30 years.  Denies f/c, chest pain pressure palpitations, SOB, AMS.  Reports no hx of alcohol withdrawal. Last drink 12/10, drinks 12 beers/day everyday.    In ED: VBG pH 7.27, AG 26, BMP HCO3 13, BHB 8.8, BMP glc 656. S/p 2L IVF. VSS.      Hospital course:   Pt admitted for DKA and alcohol withdrawal. Initially transferred to MICU for insulin drip. Electrolytes repleted as needed. CIWA to monitor for alcohol withdrawal. DKA resolved, and pt transferred to medicine floors. On medicine floors, pt's blood sugars further stabilized on long acting nightly insulin as well as pre-meal lispro, with endocrine following and adjusting as needed.    56M with GERD, alcohol use disorder presents with presyncopal/syncopal event in the hospital.  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 Per wife, patient stopped walking, leaned against the wall and slowly slid down.  and herself caught him and called RRT. Patient thinks he may have lost consciousness for 5 seconds. No head trauma or other injury. States he hasn't eaten much for the past few weeks, endorses decreased appetite, denies nausea/vomiting, had episode of abdominal pain yesterday that has since resolved. Endorses increased thirst and urinary frequency.  He has lost 20 lbs in the last 2 months.  States he does not have DM, but has also not seen MD in 30 years.  Denies f/c, chest pain pressure palpitations, SOB, AMS.  Reports no hx of alcohol withdrawal. Last drink 12/10, drinks 12 beers/day everyday.    In ED: VBG pH 7.27, AG 26, BMP HCO3 13, BHB 8.8, BMP glc 656. S/p 2L IVF. VSS.      Hospital course:   Pt admitted for DKA and alcohol withdrawal. Initially transferred to MICU for insulin drip. Electrolytes repleted as needed. CIWA to monitor for alcohol withdrawal. DKA resolved, and pt transferred to medicine floors. On medicine floors, pt's blood sugars further stabilized on long acting nightly insulin as well as pre-meal lispro, with endocrine following and adjusting as needed.    56M with GERD, alcohol use disorder presents with presyncopal/syncopal event in the hospital.  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 Per wife, patient stopped walking, leaned against the wall and slowly slid down.  and herself caught him and called RRT. Patient thinks he may have lost consciousness for 5 seconds. No head trauma or other injury. States he hasn't eaten much for the past few weeks, endorses decreased appetite, denies nausea/vomiting, had episode of abdominal pain yesterday that has since resolved. Endorses increased thirst and urinary frequency.  He has lost 20 lbs in the last 2 months.  States he does not have DM, but has also not seen MD in 30 years.  Denies f/c, chest pain pressure palpitations, SOB, AMS.  Reports no hx of alcohol withdrawal. Last drink 12/10, drinks 12 beers/day everyday.    In ED: VBG pH 7.27, AG 26, BMP HCO3 13, BHB 8.8, BMP glc 656. S/p 2L IVF. VSS.      Hospital course:   Pt admitted for DKA and alcohol withdrawal. Initially transferred to MICU for insulin drip. Electrolytes repleted as needed. CIWA to monitor for alcohol withdrawal. DKA resolved, and pt transferred to medicine floors. On medicine floors, pt's blood sugars further stabilized on long acting nightly insulin as well as pre-meal lispro, with endocrine following and adjusting as needed.    CIWA discontinued once pt no longer in withdrawal, with counselling provided for alcohol abuse and resources offered.   Diabetes teaching provided for both pt and wife.  Pt to be discharged on Basaglar 24 units QHS, Admelog 8 units TID.   At time of discharge, blood sugars stable, no longer in alcohol abuse, pt medically optimized for discharge home.     Follow up PCP. Appointments made for follow up with jenise (with diabetes educator and with Dr. Nettles). 56M with GERD, alcohol use disorder presents with presyncopal/syncopal event in the hospital.  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 Per wife, patient stopped walking, leaned against the wall and slowly slid down.  and herself caught him and called RRT. Patient thinks he may have lost consciousness for 5 seconds. No head trauma or other injury. States he hasn't eaten much for the past few weeks, endorses decreased appetite, denies nausea/vomiting, had episode of abdominal pain yesterday that has since resolved. Endorses increased thirst and urinary frequency.  He has lost 20 lbs in the last 2 months.  States he does not have DM, but has also not seen MD in 30 years.  Denies f/c, chest pain pressure palpitations, SOB, AMS.  Reports no hx of alcohol withdrawal. Last drink 12/10, drinks 12 beers/day everyday.    In ED: VBG pH 7.27, AG 26, BMP HCO3 13, BHB 8.8, BMP glc 656. S/p 2L IVF. VSS.      Hospital course:   Pt admitted for DKA and alcohol withdrawal. Initially transferred to MICU for insulin drip. Electrolytes repleted as needed. CIWA to monitor for alcohol withdrawal. DKA resolved, and pt transferred to medicine floors. On medicine floors, pt's blood sugars further stabilized on long acting nightly insulin as well as pre-meal lispro, with endocrine following and adjusting as needed.    CIWA discontinued once pt no longer in withdrawal, with counselling provided for alcohol abuse and resources offered.   Diabetes teaching provided for both pt and wife.  Pt to be discharged on Basaglar 24 units QHS, Admelog 8 units TID.   At time of discharge, blood sugars stable, no longer in alcohol abuse, pt medically optimized for discharge home.   Blood pressure noted to be mildly elevated, counselled on lifestyle changes - pt to follow up with PCP for monitoring, and BP meds if no improvement after lifestyle changes.   Follow up PCP. Appointments made for follow up with jenise (with diabetes educator and with Dr. Nettles). 56M with GERD, alcohol use disorder presents with presyncopal/syncopal event in the hospital.  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 Per wife, patient stopped walking, leaned against the wall and slowly slid down.  and herself caught him and called RRT. Patient thinks he may have lost consciousness for 5 seconds. No head trauma or other injury. States he hasn't eaten much for the past few weeks, endorses decreased appetite, denies nausea/vomiting, had episode of abdominal pain yesterday that has since resolved. Endorses increased thirst and urinary frequency.  He has lost 20 lbs in the last 2 months.  States he does not have DM, but has also not seen MD in 30 years.  Denies f/c, chest pain pressure palpitations, SOB, AMS.  Reports no hx of alcohol withdrawal. Last drink 12/10, drinks 12 beers/day everyday.    In ED: VBG pH 7.27, AG 26, BMP HCO3 13, BHB 8.8, BMP glc 656. S/p 2L IVF. VSS.      Hospital course:   Pt admitted for DKA and alcohol withdrawal. Initially transferred to MICU for insulin drip. Electrolytes repleted as needed. CIWA to monitor for alcohol withdrawal. DKA resolved, and pt transferred to medicine floors. On medicine floors, pt's blood sugars further stabilized on long acting nightly insulin as well as pre-meal lispro, with endocrine following and adjusting as needed.    CIWA discontinued once pt no longer in withdrawal, with counselling provided for alcohol withdrawal and resources offered.   Diabetes teaching provided for both pt and wife.  Pt to be discharged on Basaglar 24 units QHS, Admelog 8 units TID.   At time of discharge, blood sugars stable, no longer in alcohol abuse, pt medically optimized for discharge home.   Blood pressure noted to be mildly elevated, counselled on lifestyle changes - pt to follow up with PCP for monitoring, and BP meds if no improvement after lifestyle changes.   Follow up PCP. Appointments made for follow up with jenise (with diabetes educator and with Dr. Nettles). 56M with GERD, alcohol use disorder presents with presyncopal/syncopal event in the hospital.  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 Per wife, patient stopped walking, leaned against the wall and slowly slid down.  and herself caught him and called RRT. Patient thinks he may have lost consciousness for 5 seconds. No head trauma or other injury. States he hasn't eaten much for the past few weeks, endorses decreased appetite, denies nausea/vomiting, had episode of abdominal pain yesterday that has since resolved. Endorses increased thirst and urinary frequency.  He has lost 20 lbs in the last 2 months.  States he does not have DM, but has also not seen MD in 30 years.  Denies f/c, chest pain pressure palpitations, SOB, AMS.  Reports no hx of alcohol withdrawal. Last drink 12/10, drinks 12 beers/day everyday.    In ED: VBG pH 7.27, AG 26, BMP HCO3 13, BHB 8.8, BMP glc 656. S/p 2L IVF. VSS.      Hospital course:   Pt admitted for DKA and alcohol withdrawal. Initially transferred to MICU for insulin drip. Electrolytes repleted as needed. CIWA to monitor for alcohol withdrawal. DKA resolved, and pt transferred to medicine floors. On medicine floors, pt's blood sugars further stabilized on long acting nightly insulin as well as pre-meal lispro, with endocrine following and adjusting as needed.    CIWA discontinued once pt no longer in withdrawal, with counselling provided for alcohol withdrawal and resources offered.   Diabetes teaching provided for both pt and wife.  Pt to be discharged on Basaglar 24 units QHS, Admelog 8 units TID.   At time of discharge, blood sugars stable, no longer in alcohol abuse, pt medically optimized for discharge home.   Blood pressure noted to be mildly elevated, counselled on lifestyle changes - pt to follow up with PCP for monitoring, and BP meds if no improvement after lifestyle changes.   Follow up PCP. Appointments made for follow up with endocrine (with diabetes educator and with Dr. Nettles).

## 2021-12-16 NOTE — PROGRESS NOTE ADULT - PROBLEM SELECTOR PLAN 3
Encourage life style change and follow up PCP, will monitor and add BP meds if needed
Encourage life style change and follow up PCP, will monitor and add BP meds if needed

## 2021-12-16 NOTE — DISCHARGE NOTE NURSING/CASE MANAGEMENT/SOCIAL WORK - NSDCPEWEB_GEN_ALL_CORE
[facility] for Tobacco Control website --- [email or web address] website --- [email or web address]

## 2021-12-16 NOTE — DIETITIAN INITIAL EVALUATION ADULT. - PHYSCIAL ASSESSMENT
No pressure injuries noted at this time.  Appearance consistent with BMI.   No height available at this time, 5'10" per pt report.

## 2021-12-16 NOTE — DISCHARGE NOTE PROVIDER - NSDCCPCAREPLAN_GEN_ALL_CORE_FT
PRINCIPAL DISCHARGE DIAGNOSIS  Diagnosis: DKA (diabetic ketoacidosis)  Assessment and Plan of Treatment: You were admitted to the hospital with diabetic ketoacidosis. This happened because your blood sugars were extremely uncontrolled. During this admission we gave you insulin and repleted your electrolytes. Your DKA resolved during this admission.   You were seen by endocrinology, and a diabetic medication regimen was prescribed. Please take your Basaglar nightly, and your meal time Ademelog three times a day. You and your wife were educated on how to administer your diabetes medication. Please be sure to follow up with your primary care physician and endocrinologist.      SECONDARY DISCHARGE DIAGNOSES  Diagnosis: Type 2 diabetes mellitus  Assessment and Plan of Treatment: You were diagnosed with type 2 diabetes mellitus. Your sugars were extremely elevated. Insulin was started during this admission. Endocrinology prescribed an insulin regimen for you. Diabetes education was given for you and your wife. Please be sure to follow up with your primary care physician and with endocrinology.    Diagnosis: Alcohol abuse  Assessment and Plan of Treatment: We were concerned for alcohol withdrawal    Diagnosis: Hypertension  Assessment and Plan of Treatment:      PRINCIPAL DISCHARGE DIAGNOSIS  Diagnosis: DKA (diabetic ketoacidosis)  Assessment and Plan of Treatment: You were admitted to the hospital with diabetic ketoacidosis. This happened because your blood sugars were extremely uncontrolled. During this admission we gave you insulin and repleted your electrolytes. Your DKA resolved during this admission.   You were seen by endocrinology, and a diabetic medication regimen was prescribed. Please take your Basaglar nightly, and your meal time Ademelog three times a day. You and your wife were educated on how to administer your diabetes medication. Please be sure to follow up with your primary care physician and endocrinologist.      SECONDARY DISCHARGE DIAGNOSES  Diagnosis: Type 2 diabetes mellitus  Assessment and Plan of Treatment: You were diagnosed with type 2 diabetes mellitus. Your sugars were extremely elevated. Insulin was started during this admission. Endocrinology prescribed an insulin regimen for you. Diabetes education was given for you and your wife. Please be sure to follow up with your primary care physician and with endocrinology.    Diagnosis: Alcohol abuse  Assessment and Plan of Treatment: We were concerned for alcohol withdrawal when you presented to the hospital. You were counselled on alcohol abuse. The contact information has been provided in the follow up section of this discharge paper for you to follow up at the substance abuse clinic.    Diagnosis: Hypertension  Assessment and Plan of Treatment: You were noted to have elevated blood pressure, and was counselled on lifestyle changes. Please be sure to follow up with a primary care physician to monitor your blood pressure, with medications to be added if needed.     PRINCIPAL DISCHARGE DIAGNOSIS  Diagnosis: DKA (diabetic ketoacidosis)  Assessment and Plan of Treatment: You were admitted to the hospital with diabetic ketoacidosis. This happened because your blood sugars were extremely uncontrolled, thus your body produced acidic substances called ketones. During this admission we gave you insulin and repleted your electrolytes. Your DKA resolved during this admission.   You were seen by endocrinology, and a diabetic medication regimen was prescribed. Please take your Basaglar nightly, and your meal time Ademelog three times a day. You and your wife were educated on how to administer your diabetes medication. Please be sure to follow up with your primary care physician and endocrinologist.   Please return to the hospital for shortness of breath, dizziness, frequent urination, excessive thirst.      SECONDARY DISCHARGE DIAGNOSES  Diagnosis: Type 2 diabetes mellitus  Assessment and Plan of Treatment: You were diagnosed with type 2 diabetes mellitus. Your sugars were extremely elevated. Insulin was started during this admission. Endocrinology prescribed an insulin regimen for you. Diabetes education was given for you and your wife. Please be sure to follow up with your primary care physician and with endocrinology.    Diagnosis: Alcohol abuse  Assessment and Plan of Treatment: We were concerned for alcohol withdrawal when you presented to the hospital. You were counselled on alcohol abuse. We recommend that you completely abstain from alcohol moving forward. The contact information has been provided in the follow up section of this discharge paper for you to follow up at the substance abuse clinic.    Diagnosis: Hypertension  Assessment and Plan of Treatment: You were noted to have elevated blood pressure, and was counselled on lifestyle changes. Please be sure to follow up with a primary care physician to monitor your blood pressure, with medications to be added if needed.     PRINCIPAL DISCHARGE DIAGNOSIS  Diagnosis: DKA (diabetic ketoacidosis)  Assessment and Plan of Treatment: You were admitted to the hospital with diabetic ketoacidosis. This happened because your blood sugars were extremely uncontrolled, thus your body produced acidic substances called ketones. During this admission we gave you insulin and repleted your electrolytes. Your DKA resolved during this admission.   You were seen by endocrinology, and a diabetic medication regimen was prescribed. Please take your Basaglar nightly, and your meal time Ademelog three times a day. You and your wife were educated on how to administer your diabetes medication. Please be sure to follow up with your primary care physician and endocrinologist.   Please return to the hospital for shortness of breath, dizziness, frequent urination, excessive thirst, abdominal pain, confusion, or any other alarming symptoms.      SECONDARY DISCHARGE DIAGNOSES  Diagnosis: Hypertension  Assessment and Plan of Treatment: You were noted to have elevated blood pressure, and was counselled on lifestyle changes. Please be sure to follow up with a primary care physician to monitor your blood pressure, with medications to be added if needed.    Diagnosis: Type 2 diabetes mellitus  Assessment and Plan of Treatment: You were diagnosed with type 2 diabetes mellitus. Your sugars were extremely elevated. Insulin was started during this admission. Endocrinology prescribed an insulin regimen for you. Diabetes education was given for you and your wife. Please be sure to follow up with your primary care physician and with endocrinology.    Diagnosis: Alcohol abuse  Assessment and Plan of Treatment: We were concerned for alcohol withdrawal when you presented to the hospital. You were counselled on alcohol abuse. We recommend that you completely abstain from alcohol moving forward. The contact information has been provided in the follow up section of this discharge paper for you to follow up at the substance abuse clinic.  Please return to the hospital if you experience confusion, dizziness, numbness, tingling, weakness, seizure-like activity or any other alarming symptoms.

## 2021-12-17 LAB
GAD65 AB SER-MCNC: 0 NMOL/L — SIGNIFICANT CHANGE UP
ISLET CELL512 AB SER-SCNC: 0 NMOL/L — SIGNIFICANT CHANGE UP

## 2021-12-18 LAB
GAD65 AB SER-MCNC: 0 NMOL/L — SIGNIFICANT CHANGE UP
ISLET CELL512 AB SER-ACNC: SIGNIFICANT CHANGE UP

## 2021-12-21 ENCOUNTER — NON-APPOINTMENT (OUTPATIENT)
Age: 56
End: 2021-12-21

## 2021-12-21 LAB — ZINC TRANSPORTER 8 AB, RESULT: 23 U/ML — HIGH

## 2021-12-23 ENCOUNTER — APPOINTMENT (OUTPATIENT)
Dept: INTERNAL MEDICINE | Facility: CLINIC | Age: 56
End: 2021-12-23
Payer: COMMERCIAL

## 2021-12-23 ENCOUNTER — NON-APPOINTMENT (OUTPATIENT)
Age: 56
End: 2021-12-23

## 2021-12-23 VITALS
SYSTOLIC BLOOD PRESSURE: 150 MMHG | DIASTOLIC BLOOD PRESSURE: 78 MMHG | BODY MASS INDEX: 22.62 KG/M2 | WEIGHT: 158 LBS | OXYGEN SATURATION: 100 % | HEIGHT: 70 IN | HEART RATE: 71 BPM

## 2021-12-23 DIAGNOSIS — F17.210 NICOTINE DEPENDENCE, CIGARETTES, UNCOMPLICATED: ICD-10-CM

## 2021-12-23 DIAGNOSIS — Z78.9 OTHER SPECIFIED HEALTH STATUS: ICD-10-CM

## 2021-12-23 LAB
GLUCOSE BLDC GLUCOMTR-MCNC: 156
ZINC TRANSPORTER 8 AB, RESULT: 25 U/ML — HIGH

## 2021-12-23 PROCEDURE — 93000 ELECTROCARDIOGRAM COMPLETE: CPT | Mod: 59

## 2021-12-23 PROCEDURE — 36415 COLL VENOUS BLD VENIPUNCTURE: CPT

## 2021-12-23 PROCEDURE — 36416 COLLJ CAPILLARY BLOOD SPEC: CPT

## 2021-12-23 PROCEDURE — 82962 GLUCOSE BLOOD TEST: CPT

## 2021-12-23 PROCEDURE — 99213 OFFICE O/P EST LOW 20 MIN: CPT | Mod: 25

## 2021-12-23 RX ORDER — CIPROFLOXACIN AND DEXAMETHASONE 3; 1 MG/ML; MG/ML
0.3-0.1 SUSPENSION/ DROPS AURICULAR (OTIC) TWICE DAILY
Qty: 1 | Refills: 4 | Status: DISCONTINUED | COMMUNITY
Start: 2017-03-15 | End: 2021-12-23

## 2021-12-23 RX ORDER — ACYCLOVIR 400 MG/1
400 TABLET ORAL TWICE DAILY
Qty: 20 | Refills: 1 | Status: DISCONTINUED | COMMUNITY
Start: 2017-03-15 | End: 2021-12-23

## 2021-12-23 RX ORDER — PREDNISONE 20 MG/1
20 TABLET ORAL DAILY
Qty: 60 | Refills: 1 | Status: DISCONTINUED | COMMUNITY
Start: 2017-03-15 | End: 2021-12-23

## 2021-12-23 RX ORDER — ACETYLCYSTEINE 600 MG
600 CAPSULE ORAL
Qty: 42 | Refills: 0 | Status: DISCONTINUED | COMMUNITY
Start: 2017-03-15 | End: 2021-12-23

## 2021-12-23 NOTE — HEALTH RISK ASSESSMENT
[Good] : ~his/her~  mood as  good [Current] : Current [20 or more] : 20 or more [Yes] : Yes [No falls in past year] : Patient reported no falls in the past year [0] : 2) Feeling down, depressed, or hopeless: Not at all (0) [HIF1Vyyyp] : 0

## 2021-12-23 NOTE — HISTORY OF PRESENT ILLNESS
[de-identified] : 56 year old male who was in the hospital last week here today to establish care. \par He had passed out. He felt weak. \par he was found to be in DKA. \par He is a current everyday smoker. \par He has no SOB. \par \par He has had a colonoscopy. \par \par He currently drinks about 50-60 beers a week. \par He recognizes this as an issue. \par \par He works as an . \par \par \par

## 2021-12-27 DIAGNOSIS — R60.0 LOCALIZED EDEMA: ICD-10-CM

## 2021-12-27 LAB
25(OH)D3 SERPL-MCNC: 12.9 NG/ML
ALBUMIN SERPL ELPH-MCNC: 3.7 G/DL
ALP BLD-CCNC: 90 U/L
ALT SERPL-CCNC: 56 U/L
ANION GAP SERPL CALC-SCNC: 10 MMOL/L
APPEARANCE: CLEAR
AST SERPL-CCNC: 71 U/L
BACTERIA: NEGATIVE
BASOPHILS # BLD AUTO: 0.04 K/UL
BASOPHILS NFR BLD AUTO: 0.7 %
BILIRUB SERPL-MCNC: 0.2 MG/DL
BILIRUBIN URINE: NEGATIVE
BLOOD URINE: NEGATIVE
BUN SERPL-MCNC: 5 MG/DL
CALCIUM SERPL-MCNC: 8.8 MG/DL
CHLORIDE SERPL-SCNC: 104 MMOL/L
CHOLEST SERPL-MCNC: 140 MG/DL
CO2 SERPL-SCNC: 25 MMOL/L
COLOR: NORMAL
CREAT SERPL-MCNC: 0.74 MG/DL
EOSINOPHIL # BLD AUTO: 0.17 K/UL
EOSINOPHIL NFR BLD AUTO: 2.8 %
ESTIMATED AVERAGE GLUCOSE: >398 MG/DL
GLUCOSE QUALITATIVE U: ABNORMAL
GLUCOSE SERPL-MCNC: 172 MG/DL
HBA1C MFR BLD HPLC: >15.5 %
HCT VFR BLD CALC: 36.7 %
HDLC SERPL-MCNC: 57 MG/DL
HGB BLD-MCNC: 11.3 G/DL
HYALINE CASTS: 1 /LPF
IMM GRANULOCYTES NFR BLD AUTO: 0.3 %
KETONES URINE: NEGATIVE
LDLC SERPL CALC-MCNC: 67 MG/DL
LEUKOCYTE ESTERASE URINE: NEGATIVE
LYMPHOCYTES # BLD AUTO: 2.32 K/UL
LYMPHOCYTES NFR BLD AUTO: 38.2 %
MAN DIFF?: NORMAL
MCHC RBC-ENTMCNC: 30.8 GM/DL
MCHC RBC-ENTMCNC: 31.1 PG
MCV RBC AUTO: 101.1 FL
MICROSCOPIC-UA: NORMAL
MONOCYTES # BLD AUTO: 0.68 K/UL
MONOCYTES NFR BLD AUTO: 11.2 %
NEUTROPHILS # BLD AUTO: 2.85 K/UL
NEUTROPHILS NFR BLD AUTO: 46.8 %
NITRITE URINE: NEGATIVE
NONHDLC SERPL-MCNC: 83 MG/DL
PH URINE: 6
PLATELET # BLD AUTO: 181 K/UL
POTASSIUM SERPL-SCNC: 4.1 MMOL/L
PROT SERPL-MCNC: 5.6 G/DL
PROTEIN URINE: NEGATIVE
RBC # BLD: 3.63 M/UL
RBC # FLD: 13.3 %
RED BLOOD CELLS URINE: 2 /HPF
SODIUM SERPL-SCNC: 139 MMOL/L
SPECIFIC GRAVITY URINE: 1.01
SQUAMOUS EPITHELIAL CELLS: 0 /HPF
T4 SERPL-MCNC: 6.4 UG/DL
TRIGL SERPL-MCNC: 79 MG/DL
TSH SERPL-ACNC: 1.58 UIU/ML
URATE SERPL-MCNC: 3.4 MG/DL
UROBILINOGEN URINE: NORMAL
WBC # FLD AUTO: 6.08 K/UL
WHITE BLOOD CELLS URINE: 1 /HPF

## 2021-12-29 ENCOUNTER — NON-APPOINTMENT (OUTPATIENT)
Age: 56
End: 2021-12-29

## 2021-12-30 ENCOUNTER — APPOINTMENT (OUTPATIENT)
Dept: ENDOCRINOLOGY | Facility: CLINIC | Age: 56
End: 2021-12-30
Payer: COMMERCIAL

## 2021-12-30 VITALS
OXYGEN SATURATION: 98 % | BODY MASS INDEX: 23.51 KG/M2 | SYSTOLIC BLOOD PRESSURE: 130 MMHG | DIASTOLIC BLOOD PRESSURE: 78 MMHG | HEART RATE: 81 BPM | WEIGHT: 164.25 LBS | HEIGHT: 70 IN

## 2021-12-30 DIAGNOSIS — M25.50 PAIN IN UNSPECIFIED JOINT: ICD-10-CM

## 2021-12-30 DIAGNOSIS — Z83.3 FAMILY HISTORY OF DIABETES MELLITUS: ICD-10-CM

## 2021-12-30 LAB — GLUCOSE BLDC GLUCOMTR-MCNC: 210

## 2021-12-30 PROCEDURE — 82962 GLUCOSE BLOOD TEST: CPT

## 2021-12-30 PROCEDURE — 99204 OFFICE O/P NEW MOD 45 MIN: CPT | Mod: 25

## 2021-12-30 RX ORDER — FLASH GLUCOSE SCANNING READER
EACH MISCELLANEOUS
Qty: 1 | Refills: 3 | Status: ACTIVE | COMMUNITY
Start: 2021-12-30 | End: 1900-01-01

## 2022-01-01 PROBLEM — M25.50 JOINT PAIN: Status: ACTIVE | Noted: 2021-12-30

## 2022-01-01 PROBLEM — Z83.3 FAMILY HISTORY OF DIABETES MELLITUS: Status: ACTIVE | Noted: 2021-12-30

## 2022-01-03 NOTE — ASSESSMENT
[Diabetes Foot Care] : diabetes foot care [Long Term Vascular Complications] : long term vascular complications of diabetes [Carbohydrate Consistent Diet] : carbohydrate consistent diet [Importance of Diet and Exercise] : importance of diet and exercise to improve glycemic control, achieve weight loss and improve cardiovascular health [Exercise/Effect on Glucose] : exercise/effect on glucose [Action and use of Insulin] : action and use of short and long-acting insulin [Self Monitoring of Blood Glucose] : self monitoring of blood glucose [Insulin Self-Administration] : insulin self-administration [Injection Technique, Storage, Sharps Disposal] : injection technique, storage, and sharps disposal [Retinopathy Screening] : Patient was referred to ophthalmology for retinopathy screening [Diabetic Medications] : Risks and benefits of diabetic medications were discussed [FreeTextEntry1] : 1. Type 2 DM, uncontrolled\par HgbA1c 15.3%, not at goal. Target A1c for this patient is <7%\par No known complications from diabetes thus far, recently diagnosed this month s/p hospitalization for DKA from\par Decreased Basaglar to 20U at bedtime in order to avoid his occasional morning hypoglycemic episodes \par Continue lispro 8U before breakfast, 10U before lunch and 10U before dinner\par Extensive discussion with patient and wife today regarding proper insulin administration techniques, discussed target blood glucose goals for the day (fasting, premeal/postprandial and bedtime goals), to check regularly fingerstick glucose at least 3-4x/day, discussed importance of increased physical activity and moderate-intensity 30min/day, as well as importance of diabetic diet (explained healthy plating/portion control, to reduce sugary beverages such as soda, juice, cut down on carbs and to increase vegetable intake)\par \par Answered all questions today; patient and his wife verbalized understanding of all the above.\par RTC in 1-2 months

## 2022-01-03 NOTE — HISTORY OF PRESENT ILLNESS
[FreeTextEntry1] : MARIA R UMANA is a 55 yo male with no past medical history who presents to clinic today as referred by for management of diabetes\par \par Patient was recently hospitalized for DKA from 12/13-12/16/2021; he initially presented to ED with pre-syncopal episode; he notes he previously did not see primary physician/doctor office in about 30 years, no prior known glucose issues. He used to drink sunkist and gaterade because he was so thirsty over past 6 months, but since stopped drinking sugary beverages after hospital discharge. he also notes and wife confirms he stopped drinking alcohol altogether (previously drank 60 beers/week). He has lost about 40lbs since past 6 months unintentionally. During hospitalization, Hgb A1c was noted 15.3%, VBG noted pH of 7.27, HCO3 of 13, BHB+8.8, and glucose on BMP of 656mg/dl. He was discharged on Basaglar 24U qhs, and admelog 8U TID ac. \par It was also noted LEONARD, islet cell Ab were negative. Typical diet includes breakfast: 3 stack of pancakes, eggs with cheese\par lunch(12pm): turkey sandwich with wilks, water \par dinner (5-6pm): eggs/ chicken, pancakes\par Admits to snacking on sugar free cookies and sugar free jello.\par \par Review of glucose monitoring log noted AM fasting glucose in , noted occasional symptomatic hypoglycemic episodes when taking 24U basaglar at bedtime. Noted erratic glucose readings throughout day, however trend of postprandial hyperglycemia, particularly post dinner glucose in 200s-300s.\par \par PMH: as noted above \par PSH: none\par Family Hx: h/o DM - mom no  or thyroid disease\par Social Hx: drinks 50-60 beers/week, smokes, works as  in [employer]\par ALL: NKDA\par Home Meds: Basaglar 24U qhs, admelog 8U TID ac , exomeprazole 20mg maxi

## 2022-01-03 NOTE — PHYSICAL EXAM
[Alert] : alert [Well Nourished] : well nourished [No Acute Distress] : no acute distress [Normal Sclera/Conjunctiva] : normal sclera/conjunctiva [Well Developed] : well developed [EOMI] : extra ocular movement intact [No Proptosis] : no proptosis [No Lid Lag] : no lid lag [Normal Hearing] : hearing was normal [No LAD] : no lymphadenopathy [Supple] : the neck was supple [Thyroid Not Enlarged] : the thyroid was not enlarged [No Thyroid Nodules] : no palpable thyroid nodules [No Respiratory Distress] : no respiratory distress [No Accessory Muscle Use] : no accessory muscle use [Normal Rate and Effort] : normal respiratory rate and effort [Clear to Auscultation] : lungs were clear to auscultation bilaterally [Normal S1, S2] : normal S1 and S2 [No Murmurs] : no murmurs [Normal Rate] : heart rate was normal [Regular Rhythm] : with a regular rhythm [Normal Bowel Sounds] : normal bowel sounds [Not Tender] : non-tender [Not Distended] : not distended [Soft] : abdomen soft [No Stigmata of Cushings Syndrome] : no stigmata of Cushings Syndrome [Normal Gait] : normal gait [No Clubbing, Cyanosis] : no clubbing  or cyanosis of the fingernails [No Involuntary Movements] : no involuntary movements were seen [No Rash] : no rash [Right foot was examined, including] : right foot ~C was examined, including visual inspection with sensory and pulse exams [Left foot was examined, including] : left foot ~C was examined, including visual inspection with sensory and pulse exams [Normal] : normal [2+] : 2+ in the dorsalis pedis [Normal Reflexes] : deep tendon reflexes were 2+ and symmetric [No Tremors] : no tremors [Oriented x3] : oriented to person, place, and time [Abdominal Striae] : no abdominal striae [Acanthosis Nigricans] : no acanthosis nigricans [Foot Ulcers] : no foot ulcers [Acne] : no acne [Vibration Dec.] : normal vibratory sensation at the level of the toes [Position Sense Dec.] : normal position sense at the level of the toes [Diminished Throughout Both Feet] : normal tactile sensation with monofilament testing throughout both feet

## 2022-01-03 NOTE — REVIEW OF SYSTEMS
[Recent Weight Loss (___ Lbs)] : recent weight loss: [unfilled] lbs [As Noted in HPI] : as noted in HPI [Fatigue] : no fatigue [Decreased Appetite] : appetite not decreased [Recent Weight Gain (___ Lbs)] : no recent weight gain [Fever] : no fever [Chills] : no chills [Visual Field Defect] : no visual field defect [Blurred Vision] : no blurred vision [Dysphagia] : no dysphagia [Neck Pain] : no neck pain [Hearing Loss] : no hearing loss  [Dysphonia] : no dysphonia [Nasal Congestion] : no nasal congestion [Chest Pain] : no chest pain [Slow Heart Rate] : heart rate is not slow [Leg Claudication] : no leg claudication [Palpitations] : no palpitations [Fast Heart Rate] : heart rate is not fast [Lower Ext Edema] : no lower extremity edema [Shortness Of Breath] : no shortness of breath [Cough] : no cough [Nausea] : no nausea [Constipation] : no constipation [Abdominal Pain] : no abdominal pain [Vomiting] : no vomiting [Diarrhea] : no diarrhea [Polyuria] : no polyuria [Dysuria] : no dysuria [Nocturia] : no nocturia [Hesistancy] : no hesitancy [Incontinence] : no incontinence [Decreased Libido] : no decreased libido [Joint Pain] : no joint pain [Muscle Weakness] : no muscle weakness [Headaches] : no headaches [Dizziness] : no dizziness [Confusion] : no confusion [Tremors] : no tremors [Pain/Numbness of Digits] : no pain/numbness of digits

## 2022-01-04 ENCOUNTER — NON-APPOINTMENT (OUTPATIENT)
Age: 57
End: 2022-01-04

## 2022-01-04 ENCOUNTER — APPOINTMENT (OUTPATIENT)
Dept: CARDIOLOGY | Facility: CLINIC | Age: 57
End: 2022-01-04
Payer: COMMERCIAL

## 2022-01-04 VITALS
BODY MASS INDEX: 22.96 KG/M2 | HEART RATE: 80 BPM | OXYGEN SATURATION: 100 % | SYSTOLIC BLOOD PRESSURE: 122 MMHG | WEIGHT: 160 LBS | DIASTOLIC BLOOD PRESSURE: 76 MMHG

## 2022-01-04 VITALS — DIASTOLIC BLOOD PRESSURE: 70 MMHG | SYSTOLIC BLOOD PRESSURE: 120 MMHG

## 2022-01-04 PROCEDURE — 93000 ELECTROCARDIOGRAM COMPLETE: CPT

## 2022-01-04 PROCEDURE — 99244 OFF/OP CNSLTJ NEW/EST MOD 40: CPT

## 2022-01-04 NOTE — HISTORY OF PRESENT ILLNESS
[FreeTextEntry1] : Dear Nelda,\par Thank you for referring him for cardiovascular evaluation.  He is a 56-year-old longtime smoker who was recently diagnosed with uncontrolled diabetes mellitus and was admitted to the hospital for diabetic ketoacidosis.  He was recently started on insulin and reports improvement in his sugars.  Over the past few days he noted some bilateral ankle swelling that seems to resolve when he puts his legs up.\par Prior to hospitalization he reports polydipsia and polyuria.  He denies any exertional chest pains or shortness of breath and notes that he walks 13 miles a day at his job as an  at [employer].\par He has no history of coronary artery disease, hypertension, hypercholesterolemia or renal failure.\par No cardiac abnormalities found in the hospital during evaluation.\par He is accompanied by his wife.\par

## 2022-01-04 NOTE — DISCUSSION/SUMMARY
[FreeTextEntry1] : Is a 56-year-old with a history of diabetes mellitus and smoking who presents for cardiovascular valuation of his ankle edema.  His legs are not currently swollen and are likely related to increased stasis from his recent hospitalization.  I encouraged him to restart his routine walking.\par An echocardiogram will exclude any structural heart disease, though this is unlikely.\par We discussed the need for aggressive medical therapies and interventions to protect his endovascular system.  He will control his diabetes and stop smoking.  I referred him to the tobacco cessation clinic.\par He is scheduled for a CT scan of his chest and I will screen this for coronary calcium which would make me more aggressive with his lipid profile, though at the time his LDL is in a perfect range.\par Further recommendations if any new symptoms arise.

## 2022-01-10 ENCOUNTER — APPOINTMENT (OUTPATIENT)
Dept: INTERNAL MEDICINE | Facility: CLINIC | Age: 57
End: 2022-01-10
Payer: COMMERCIAL

## 2022-01-10 VITALS
HEART RATE: 94 BPM | BODY MASS INDEX: 24.89 KG/M2 | SYSTOLIC BLOOD PRESSURE: 148 MMHG | OXYGEN SATURATION: 98 % | HEIGHT: 68.11 IN | WEIGHT: 164.2 LBS | DIASTOLIC BLOOD PRESSURE: 76 MMHG

## 2022-01-10 DIAGNOSIS — I10 ESSENTIAL (PRIMARY) HYPERTENSION: ICD-10-CM

## 2022-01-10 LAB — GLUCOSE BLDC GLUCOMTR-MCNC: 66

## 2022-01-10 PROCEDURE — 36415 COLL VENOUS BLD VENIPUNCTURE: CPT

## 2022-01-10 PROCEDURE — 82962 GLUCOSE BLOOD TEST: CPT

## 2022-01-10 PROCEDURE — 99214 OFFICE O/P EST MOD 30 MIN: CPT | Mod: 25

## 2022-01-10 NOTE — ASSESSMENT
[FreeTextEntry1] : DM: rechecked labs even though its been a few weeks \par pt to start freestyle chay\par will fu with endo \par \par fu in 3-4 months with me

## 2022-01-10 NOTE — HISTORY OF PRESENT ILLNESS
[de-identified] : Mr. MARIA R UMANA is a 56 year old male here today for a follow up of their chronic medical conditions.\par \par He is checking sugars. \par He saw Endo.  \par He is meeting with the dietitian next week. \par \par He has not drank since being home from the hospital. \par \par he has cut back smoking too. \par

## 2022-01-11 LAB
ALBUMIN SERPL ELPH-MCNC: 4.5 G/DL
ALP BLD-CCNC: 88 U/L
ALT SERPL-CCNC: 21 U/L
ANION GAP SERPL CALC-SCNC: 13 MMOL/L
AST SERPL-CCNC: 25 U/L
BASOPHILS # BLD AUTO: 0.03 K/UL
BASOPHILS NFR BLD AUTO: 0.4 %
BILIRUB SERPL-MCNC: 0.3 MG/DL
BUN SERPL-MCNC: 13 MG/DL
CALCIUM SERPL-MCNC: 9.7 MG/DL
CHLORIDE SERPL-SCNC: 104 MMOL/L
CO2 SERPL-SCNC: 28 MMOL/L
CREAT SERPL-MCNC: 0.92 MG/DL
EOSINOPHIL # BLD AUTO: 0.19 K/UL
EOSINOPHIL NFR BLD AUTO: 2.8 %
ESTIMATED AVERAGE GLUCOSE: 280 MG/DL
GLUCOSE SERPL-MCNC: 59 MG/DL
HBA1C MFR BLD HPLC: 11.4 %
HCT VFR BLD CALC: 39.3 %
HGB BLD-MCNC: 12.4 G/DL
IMM GRANULOCYTES NFR BLD AUTO: 0.4 %
LYMPHOCYTES # BLD AUTO: 2.8 K/UL
LYMPHOCYTES NFR BLD AUTO: 41.4 %
MAN DIFF?: NORMAL
MCHC RBC-ENTMCNC: 31.6 GM/DL
MCHC RBC-ENTMCNC: 31.7 PG
MCV RBC AUTO: 100.5 FL
MONOCYTES # BLD AUTO: 0.7 K/UL
MONOCYTES NFR BLD AUTO: 10.3 %
NEUTROPHILS # BLD AUTO: 3.02 K/UL
NEUTROPHILS NFR BLD AUTO: 44.7 %
PLATELET # BLD AUTO: 286 K/UL
POTASSIUM SERPL-SCNC: 4 MMOL/L
PROT SERPL-MCNC: 6.7 G/DL
RBC # BLD: 3.91 M/UL
RBC # FLD: 13.5 %
SODIUM SERPL-SCNC: 144 MMOL/L
WBC # FLD AUTO: 6.77 K/UL

## 2022-01-19 ENCOUNTER — APPOINTMENT (OUTPATIENT)
Dept: ENDOCRINOLOGY | Facility: CLINIC | Age: 57
End: 2022-01-19

## 2022-01-26 ENCOUNTER — APPOINTMENT (OUTPATIENT)
Dept: CARDIOLOGY | Facility: CLINIC | Age: 57
End: 2022-01-26
Payer: COMMERCIAL

## 2022-01-26 PROCEDURE — 93306 TTE W/DOPPLER COMPLETE: CPT

## 2022-01-28 ENCOUNTER — NON-APPOINTMENT (OUTPATIENT)
Age: 57
End: 2022-01-28

## 2022-02-24 ENCOUNTER — APPOINTMENT (OUTPATIENT)
Dept: ENDOCRINOLOGY | Facility: CLINIC | Age: 57
End: 2022-02-24

## 2022-03-02 ENCOUNTER — APPOINTMENT (OUTPATIENT)
Dept: ENDOCRINOLOGY | Facility: CLINIC | Age: 57
End: 2022-03-02
Payer: COMMERCIAL

## 2022-03-02 VITALS
HEIGHT: 68 IN | SYSTOLIC BLOOD PRESSURE: 161 MMHG | DIASTOLIC BLOOD PRESSURE: 84 MMHG | BODY MASS INDEX: 25.46 KG/M2 | WEIGHT: 168 LBS | OXYGEN SATURATION: 99 % | HEART RATE: 77 BPM

## 2022-03-02 LAB — GLUCOSE BLDC GLUCOMTR-MCNC: 135

## 2022-03-02 PROCEDURE — 99213 OFFICE O/P EST LOW 20 MIN: CPT | Mod: 25

## 2022-03-02 PROCEDURE — 82962 GLUCOSE BLOOD TEST: CPT

## 2022-03-06 NOTE — HISTORY OF PRESENT ILLNESS
[Geronimo] : Geronimo [FreeTextEntry1] : This is a 55 yo male with past medical history of type 2 DM who presents for diabetes follow up\par \par He was diagnosed with diabetes recently in Dec 2021 at which time A1c was 15% and he was hospitalized for DKA. At last endocrine visit, decreased Basaglar to 20U qhs to avoid morning hypoglycemia. He was advised to continue lispro/humalog premeal insulin 8-10-10. Since that visit, patient reported further hypoglycemic episodes after meals and he was advised to cut down further on lispro insulin. \par Today he notes marked improvement in glucose readings, using freestyle chay and is interested in seeing CDE/RN.He notes he was "doing and experiment and sometimes skipped lispro insulin at mealtime and noted his sugars were "normal". Patient self discontinued humalog mealtime insulin due to hypoglycemic episodes in afternoon in past; his glucose at mealtimes are well controlled without mealtime insulin as per freestyle download. He is still taking Basaglar 20U qhs last night. He also admits he has stopped drinking alcohol.\par \par AM fasting glucose ranges 87, 80-low 100s. Noted postprandial spike around 12pm, which he notes is his lunch time. He says he is very active at work, running around and sometimes glucose becomes low after such activity. [Finger Stick] : Finger Stick: No [FreeTextEntry2] : 66 [Hypoglycemia] : Patient is not hypoglycemic. [FreeTextEntry3] : 28+5 [FreeTextEntry4] : 1+0 [de-identified] : 7.2 [FreeTextEntry5] : 162 [FreeTextEntry6] : 30.8%

## 2022-03-06 NOTE — PHYSICAL EXAM
[Alert] : alert [Well Nourished] : well nourished [No Acute Distress] : no acute distress [Well Developed] : well developed [Normal Sclera/Conjunctiva] : normal sclera/conjunctiva [EOMI] : extra ocular movement intact [No Proptosis] : no proptosis [No Lid Lag] : no lid lag [Normal Hearing] : hearing was normal [Supple] : the neck was supple [No LAD] : no lymphadenopathy [Thyroid Not Enlarged] : the thyroid was not enlarged [No Thyroid Nodules] : no palpable thyroid nodules [No Respiratory Distress] : no respiratory distress [No Accessory Muscle Use] : no accessory muscle use [Normal Rate and Effort] : normal respiratory rate and effort [Clear to Auscultation] : lungs were clear to auscultation bilaterally [Normal S1, S2] : normal S1 and S2 [No Murmurs] : no murmurs [Normal Rate] : heart rate was normal [Regular Rhythm] : with a regular rhythm [Normal Bowel Sounds] : normal bowel sounds [Not Tender] : non-tender [Not Distended] : not distended [Soft] : abdomen soft [No Stigmata of Cushings Syndrome] : no stigmata of Cushings Syndrome [Normal Gait] : normal gait [No Clubbing, Cyanosis] : no clubbing  or cyanosis of the fingernails [No Involuntary Movements] : no involuntary movements were seen [No Rash] : no rash [Left foot was examined, including] : left foot ~C was examined, including visual inspection with sensory and pulse exams [Right foot was examined, including] : right foot ~C was examined, including visual inspection with sensory and pulse exams [Normal] : normal [2+] : 2+ in the dorsalis pedis [Normal Reflexes] : deep tendon reflexes were 2+ and symmetric [No Tremors] : no tremors [Oriented x3] : oriented to person, place, and time [Abdominal Striae] : no abdominal striae [Acanthosis Nigricans] : no acanthosis nigricans [Foot Ulcers] : no foot ulcers [Acne] : no acne [Position Sense Dec.] : normal position sense at the level of the toes [Vibration Dec.] : normal vibratory sensation at the level of the toes [Diminished Throughout Both Feet] : normal tactile sensation with monofilament testing throughout both feet

## 2022-03-06 NOTE — ASSESSMENT
[Diabetes Foot Care] : diabetes foot care [Long Term Vascular Complications] : long term vascular complications of diabetes [Carbohydrate Consistent Diet] : carbohydrate consistent diet [Importance of Diet and Exercise] : importance of diet and exercise to improve glycemic control, achieve weight loss and improve cardiovascular health [Hypoglycemia Management] : hypoglycemia management [Self Monitoring of Blood Glucose] : self monitoring of blood glucose [Action and use of Insulin] : action and use of short and long-acting insulin [Injection Technique, Storage, Sharps Disposal] : injection technique, storage, and sharps disposal [Retinopathy Screening] : Patient was referred to ophthalmology for retinopathy screening [Weight Loss] : weight loss [FreeTextEntry1] : 1. Type 2 DM\par HgbA1c from Jan 2022 is 11.4%, uncontrolled\par No known complications from diabetes thus far.\par POC glucose today is 134mg/dl\par Reviewed 14 day CGM report from [facility]: noted TIR is 66%, with 1% lows and 28% highs and 5% very high. Avg daily glucose is 162mg/dl and GMI is notably improved at 7.2%. \par Noted slight postprandial spike at lunch/noontime which is his biggest meal of day, however glucose is otherwise in target range. \par Continue on Basaglar 18U qhs; advised patient and his spouse if morning fasting glucose is persistently low, he may decrease Basaglar further by 2U.\par Patient self discontinued humalog mealtime insulin due to hypoglycemic episodes in afternoon in past; his glucose at mealtimes are well controlled without mealtime insulin, in view of this will discontinue humalog insulin for now \par Patient requests to see CDE/RN, placed referral.\par \par Answered all questions today; patient verbalized understanding of all the above.\par RTC in 3 months.

## 2022-03-21 ENCOUNTER — APPOINTMENT (OUTPATIENT)
Dept: ENDOCRINOLOGY | Facility: CLINIC | Age: 57
End: 2022-03-21
Payer: COMMERCIAL

## 2022-03-21 VITALS
HEIGHT: 68 IN | WEIGHT: 159.38 LBS | BODY MASS INDEX: 24.15 KG/M2 | HEART RATE: 71 BPM | SYSTOLIC BLOOD PRESSURE: 177 MMHG | OXYGEN SATURATION: 96 % | DIASTOLIC BLOOD PRESSURE: 88 MMHG

## 2022-03-21 LAB — GLUCOSE BLDC GLUCOMTR-MCNC: 119

## 2022-03-21 PROCEDURE — 82962 GLUCOSE BLOOD TEST: CPT

## 2022-03-21 PROCEDURE — G0108 DIAB MANAGE TRN  PER INDIV: CPT

## 2022-03-22 ENCOUNTER — APPOINTMENT (OUTPATIENT)
Dept: ENDOCRINOLOGY | Facility: CLINIC | Age: 57
End: 2022-03-22

## 2022-04-04 ENCOUNTER — APPOINTMENT (OUTPATIENT)
Dept: ENDOCRINOLOGY | Facility: CLINIC | Age: 57
End: 2022-04-04
Payer: COMMERCIAL

## 2022-04-04 VITALS
BODY MASS INDEX: 24.87 KG/M2 | SYSTOLIC BLOOD PRESSURE: 157 MMHG | WEIGHT: 166 LBS | OXYGEN SATURATION: 96 % | DIASTOLIC BLOOD PRESSURE: 88 MMHG | HEIGHT: 68.5 IN | HEART RATE: 86 BPM

## 2022-04-04 LAB
GLUCOSE BLDC GLUCOMTR-MCNC: 132
HBA1C MFR BLD HPLC: 6

## 2022-04-04 PROCEDURE — 83036 HEMOGLOBIN GLYCOSYLATED A1C: CPT | Mod: QW

## 2022-04-04 PROCEDURE — G0108 DIAB MANAGE TRN  PER INDIV: CPT

## 2022-04-04 PROCEDURE — 95251 CONT GLUC MNTR ANALYSIS I&R: CPT

## 2022-04-04 PROCEDURE — 82962 GLUCOSE BLOOD TEST: CPT

## 2022-05-03 ENCOUNTER — APPOINTMENT (OUTPATIENT)
Dept: ENDOCRINOLOGY | Facility: CLINIC | Age: 57
End: 2022-05-03
Payer: COMMERCIAL

## 2022-05-03 VITALS
HEIGHT: 68.5 IN | SYSTOLIC BLOOD PRESSURE: 160 MMHG | BODY MASS INDEX: 24.72 KG/M2 | OXYGEN SATURATION: 98 % | WEIGHT: 165 LBS | HEART RATE: 79 BPM | DIASTOLIC BLOOD PRESSURE: 89 MMHG

## 2022-05-03 LAB — GLUCOSE BLDC GLUCOMTR-MCNC: 167

## 2022-05-03 PROCEDURE — 82962 GLUCOSE BLOOD TEST: CPT

## 2022-05-03 PROCEDURE — 95251 CONT GLUC MNTR ANALYSIS I&R: CPT

## 2022-05-03 PROCEDURE — G0108 DIAB MANAGE TRN  PER INDIV: CPT

## 2022-06-13 ENCOUNTER — APPOINTMENT (OUTPATIENT)
Dept: ENDOCRINOLOGY | Facility: CLINIC | Age: 57
End: 2022-06-13
Payer: COMMERCIAL

## 2022-06-13 VITALS
HEIGHT: 68 IN | OXYGEN SATURATION: 98 % | HEART RATE: 82 BPM | BODY MASS INDEX: 24.71 KG/M2 | DIASTOLIC BLOOD PRESSURE: 82 MMHG | SYSTOLIC BLOOD PRESSURE: 142 MMHG | WEIGHT: 163 LBS

## 2022-06-13 LAB
GLUCOSE BLDC GLUCOMTR-MCNC: 137
HBA1C MFR BLD HPLC: 6.4

## 2022-06-13 PROCEDURE — 99213 OFFICE O/P EST LOW 20 MIN: CPT | Mod: 25

## 2022-06-13 PROCEDURE — 83036 HEMOGLOBIN GLYCOSYLATED A1C: CPT | Mod: QW

## 2022-06-13 PROCEDURE — 82962 GLUCOSE BLOOD TEST: CPT

## 2022-06-14 NOTE — PHYSICAL EXAM
[Alert] : alert [Well Nourished] : well nourished [No Acute Distress] : no acute distress [Well Developed] : well developed [Normal Sclera/Conjunctiva] : normal sclera/conjunctiva [EOMI] : extra ocular movement intact [No Proptosis] : no proptosis [No Lid Lag] : no lid lag [Normal Hearing] : hearing was normal [No LAD] : no lymphadenopathy [Supple] : the neck was supple [Thyroid Not Enlarged] : the thyroid was not enlarged [No Thyroid Nodules] : no palpable thyroid nodules [No Respiratory Distress] : no respiratory distress [No Accessory Muscle Use] : no accessory muscle use [Normal Rate and Effort] : normal respiratory rate and effort [Clear to Auscultation] : lungs were clear to auscultation bilaterally [Normal S1, S2] : normal S1 and S2 [No Murmurs] : no murmurs [Normal Rate] : heart rate was normal [Regular Rhythm] : with a regular rhythm [Normal Bowel Sounds] : normal bowel sounds [Not Tender] : non-tender [Not Distended] : not distended [Soft] : abdomen soft [No Stigmata of Cushings Syndrome] : no stigmata of Cushings Syndrome [Normal Gait] : normal gait [No Clubbing, Cyanosis] : no clubbing  or cyanosis of the fingernails [No Involuntary Movements] : no involuntary movements were seen [No Rash] : no rash [Abdominal Striae] : no abdominal striae [Acanthosis Nigricans] : no acanthosis nigricans [Foot Ulcers] : no foot ulcers [Acne] : no acne [Normal] : normal [2+] : 2+ in the dorsalis pedis [Vibration Dec.] : normal vibratory sensation at the level of the toes [Position Sense Dec.] : normal position sense at the level of the toes [Diminished Throughout Both Feet] : normal tactile sensation with monofilament testing throughout both feet [Normal Reflexes] : deep tendon reflexes were 2+ and symmetric [No Tremors] : no tremors [Oriented x3] : oriented to person, place, and time

## 2022-06-14 NOTE — HISTORY OF PRESENT ILLNESS
[Continuous Glucose Monitoring] : Continuous Glucose Monitoring: Yes [Geronimo] : Geronimo [FreeTextEntry1] : This is a 57 yo male who presents for diabetes follow up\par \par He was diagnosed with DM in Dec 2021 at which time he was in DKA. Since then, he has had regular follow up in endocrine offices and met with CDE RN as welll. At last CDE visit in May 2022, it was noted that patient has been off all insulin and oral antihyperglycemic medication. He is off Basaglar, Ademlog, and Repaglinice 0.5mg pO TID, (he was advised that he may need to cover with Repaglinide for high carb meals.)\par Patient notes he has not since taken Repaglinide. Denies hypoglycemia. He notes st lunchtime when at work he has a midday spike in sugars but otherwise He is watching his diet and very active at work. [Finger Stick] : Finger Stick: No [Hypoglycemia] : Patient is not hypoglycemic. [FreeTextEntry2] : 90 [FreeTextEntry3] : 8+2 [FreeTextEntry4] : 0 [de-identified] : 6.3 [FreeTextEntry5] : 126

## 2022-06-14 NOTE — ASSESSMENT
[Diabetes Foot Care] : diabetes foot care [Long Term Vascular Complications] : long term vascular complications of diabetes [Carbohydrate Consistent Diet] : carbohydrate consistent diet [Importance of Diet and Exercise] : importance of diet and exercise to improve glycemic control, achieve weight loss and improve cardiovascular health [Retinopathy Screening] : Patient was referred to ophthalmology for retinopathy screening [FreeTextEntry1] : Patient with h/o Type 2 DM, and previous DKA in Dec 2021 who presents for \par POC HgbA1c today is 6.4%, now in prediabetic range\par POC glucose 137mg/dl.\par Diet controlled, patient has self discontinued use of insulin and oral antihyperglycemic (Prandin) in past 2 months\par Doing well, denies hypoglycemia\par Reviewed chay download, TIR noted 90%, with 8% high and 2% very high. Noted lunchtime postprandial spikes. 0% hypoglycemia. \par Will continue off diabetic medication at this time\par Patient wishes to stop using freestyle chay sensors which is acceptable. Advised patient to do fs if symptomatic for either high or low glucose. He understood. \par \par Answered all questions today; patient and his wife verbalized understanding of the above\par RTC in 3 months\par \par

## 2022-10-06 NOTE — DIETITIAN INITIAL EVALUATION ADULT. - DIET TYPE
1) Recommend consistent carbohydrate diet with evening snack. PAST SURGICAL HISTORY:  Arthroplasty, Knee h/o left    H/O coronary angioplasty 1 stent 2009    History of foot surgery right

## 2022-10-11 ENCOUNTER — INPATIENT (INPATIENT)
Facility: HOSPITAL | Age: 57
LOS: 2 days | Discharge: LEFT BEFORE DISCHARGE | End: 2022-10-14
Attending: INTERNAL MEDICINE | Admitting: INTERNAL MEDICINE

## 2022-10-11 VITALS
OXYGEN SATURATION: 100 % | SYSTOLIC BLOOD PRESSURE: 162 MMHG | HEART RATE: 70 BPM | RESPIRATION RATE: 18 BRPM | DIASTOLIC BLOOD PRESSURE: 70 MMHG | TEMPERATURE: 98 F

## 2022-10-11 DIAGNOSIS — K85.90 ACUTE PANCREATITIS WITHOUT NECROSIS OR INFECTION, UNSPECIFIED: ICD-10-CM

## 2022-10-11 LAB
ALBUMIN SERPL ELPH-MCNC: 4.5 G/DL — SIGNIFICANT CHANGE UP (ref 3.3–5)
ALP SERPL-CCNC: 80 U/L — SIGNIFICANT CHANGE UP (ref 40–120)
ALT FLD-CCNC: 54 U/L — HIGH (ref 4–41)
ANION GAP SERPL CALC-SCNC: 17 MMOL/L — HIGH (ref 7–14)
ANION GAP SERPL CALC-SCNC: 22 MMOL/L — HIGH (ref 7–14)
APPEARANCE UR: CLEAR — SIGNIFICANT CHANGE UP
AST SERPL-CCNC: 70 U/L — HIGH (ref 4–40)
B-OH-BUTYR SERPL-SCNC: 1.5 MMOL/L — HIGH (ref 0–0.4)
BASE EXCESS BLDV CALC-SCNC: -0.5 MMOL/L — SIGNIFICANT CHANGE UP (ref -2–3)
BASE EXCESS BLDV CALC-SCNC: -2.1 MMOL/L — LOW (ref -2–3)
BASOPHILS # BLD AUTO: 0.02 K/UL — SIGNIFICANT CHANGE UP (ref 0–0.2)
BASOPHILS NFR BLD AUTO: 0.3 % — SIGNIFICANT CHANGE UP (ref 0–2)
BILIRUB SERPL-MCNC: 0.7 MG/DL — SIGNIFICANT CHANGE UP (ref 0.2–1.2)
BILIRUB UR-MCNC: NEGATIVE — SIGNIFICANT CHANGE UP
BLOOD GAS VENOUS COMPREHENSIVE RESULT: SIGNIFICANT CHANGE UP
BLOOD GAS VENOUS COMPREHENSIVE RESULT: SIGNIFICANT CHANGE UP
BUN SERPL-MCNC: 6 MG/DL — LOW (ref 7–23)
BUN SERPL-MCNC: 6 MG/DL — LOW (ref 7–23)
CALCIUM SERPL-MCNC: 8.7 MG/DL — SIGNIFICANT CHANGE UP (ref 8.4–10.5)
CALCIUM SERPL-MCNC: 9.4 MG/DL — SIGNIFICANT CHANGE UP (ref 8.4–10.5)
CHLORIDE BLDV-SCNC: 98 MMOL/L — SIGNIFICANT CHANGE UP (ref 96–108)
CHLORIDE BLDV-SCNC: 99 MMOL/L — SIGNIFICANT CHANGE UP (ref 96–108)
CHLORIDE SERPL-SCNC: 97 MMOL/L — LOW (ref 98–107)
CHLORIDE SERPL-SCNC: 99 MMOL/L — SIGNIFICANT CHANGE UP (ref 98–107)
CO2 BLDV-SCNC: 22.2 MMOL/L — SIGNIFICANT CHANGE UP (ref 22–26)
CO2 BLDV-SCNC: 24.1 MMOL/L — SIGNIFICANT CHANGE UP (ref 22–26)
CO2 SERPL-SCNC: 18 MMOL/L — LOW (ref 22–31)
CO2 SERPL-SCNC: 21 MMOL/L — LOW (ref 22–31)
COLOR SPEC: SIGNIFICANT CHANGE UP
CREAT SERPL-MCNC: 0.63 MG/DL — SIGNIFICANT CHANGE UP (ref 0.5–1.3)
CREAT SERPL-MCNC: 0.65 MG/DL — SIGNIFICANT CHANGE UP (ref 0.5–1.3)
DIFF PNL FLD: NEGATIVE — SIGNIFICANT CHANGE UP
EGFR: 112 ML/MIN/1.73M2 — SIGNIFICANT CHANGE UP
EGFR: 113 ML/MIN/1.73M2 — SIGNIFICANT CHANGE UP
EOSINOPHIL # BLD AUTO: 0 K/UL — SIGNIFICANT CHANGE UP (ref 0–0.5)
EOSINOPHIL NFR BLD AUTO: 0 % — SIGNIFICANT CHANGE UP (ref 0–6)
FLUAV AG NPH QL: SIGNIFICANT CHANGE UP
FLUBV AG NPH QL: SIGNIFICANT CHANGE UP
GAS PNL BLDV: 130 MMOL/L — LOW (ref 136–145)
GAS PNL BLDV: 132 MMOL/L — LOW (ref 136–145)
GAS PNL BLDV: SIGNIFICANT CHANGE UP
GLUCOSE BLDV-MCNC: 147 MG/DL — HIGH (ref 70–99)
GLUCOSE BLDV-MCNC: 165 MG/DL — HIGH (ref 70–99)
GLUCOSE SERPL-MCNC: 163 MG/DL — HIGH (ref 70–99)
GLUCOSE SERPL-MCNC: 183 MG/DL — HIGH (ref 70–99)
GLUCOSE UR QL: ABNORMAL
HCO3 BLDV-SCNC: 21 MMOL/L — LOW (ref 22–29)
HCO3 BLDV-SCNC: 23 MMOL/L — SIGNIFICANT CHANGE UP (ref 22–29)
HCT VFR BLD CALC: 44.6 % — SIGNIFICANT CHANGE UP (ref 39–50)
HCT VFR BLDA CALC: 44 % — SIGNIFICANT CHANGE UP (ref 39–51)
HCT VFR BLDA CALC: 47 % — SIGNIFICANT CHANGE UP (ref 39–51)
HGB BLD CALC-MCNC: 14.8 G/DL — SIGNIFICANT CHANGE UP (ref 13–17)
HGB BLD CALC-MCNC: 15.5 G/DL — SIGNIFICANT CHANGE UP (ref 13–17)
HGB BLD-MCNC: 15.4 G/DL — SIGNIFICANT CHANGE UP (ref 13–17)
IANC: 6.32 K/UL — SIGNIFICANT CHANGE UP (ref 1.8–7.4)
IMM GRANULOCYTES NFR BLD AUTO: 0.4 % — SIGNIFICANT CHANGE UP (ref 0–0.9)
KETONES UR-MCNC: ABNORMAL
LACTATE BLDV-MCNC: 2.8 MMOL/L — HIGH (ref 0.5–2)
LACTATE BLDV-MCNC: 5.2 MMOL/L — CRITICAL HIGH (ref 0.5–2)
LEUKOCYTE ESTERASE UR-ACNC: NEGATIVE — SIGNIFICANT CHANGE UP
LIDOCAIN IGE QN: 1292 U/L — HIGH (ref 7–60)
LYMPHOCYTES # BLD AUTO: 1.06 K/UL — SIGNIFICANT CHANGE UP (ref 1–3.3)
LYMPHOCYTES # BLD AUTO: 13.3 % — SIGNIFICANT CHANGE UP (ref 13–44)
MCHC RBC-ENTMCNC: 31.2 PG — SIGNIFICANT CHANGE UP (ref 27–34)
MCHC RBC-ENTMCNC: 34.5 GM/DL — SIGNIFICANT CHANGE UP (ref 32–36)
MCV RBC AUTO: 90.5 FL — SIGNIFICANT CHANGE UP (ref 80–100)
MONOCYTES # BLD AUTO: 0.56 K/UL — SIGNIFICANT CHANGE UP (ref 0–0.9)
MONOCYTES NFR BLD AUTO: 7 % — SIGNIFICANT CHANGE UP (ref 2–14)
NEUTROPHILS # BLD AUTO: 6.32 K/UL — SIGNIFICANT CHANGE UP (ref 1.8–7.4)
NEUTROPHILS NFR BLD AUTO: 79 % — HIGH (ref 43–77)
NITRITE UR-MCNC: NEGATIVE — SIGNIFICANT CHANGE UP
NRBC # BLD: 0 /100 WBCS — SIGNIFICANT CHANGE UP (ref 0–0)
NRBC # FLD: 0 K/UL — SIGNIFICANT CHANGE UP (ref 0–0)
PCO2 BLDV: 32 MMHG — LOW (ref 42–55)
PCO2 BLDV: 34 MMHG — LOW (ref 42–55)
PH BLDV: 7.43 — SIGNIFICANT CHANGE UP (ref 7.32–7.43)
PH BLDV: 7.44 — HIGH (ref 7.32–7.43)
PH UR: 6 — SIGNIFICANT CHANGE UP (ref 5–8)
PLATELET # BLD AUTO: 136 K/UL — LOW (ref 150–400)
PO2 BLDV: 72 MMHG — SIGNIFICANT CHANGE UP
PO2 BLDV: 78 MMHG — SIGNIFICANT CHANGE UP
POTASSIUM BLDV-SCNC: 4.4 MMOL/L — SIGNIFICANT CHANGE UP (ref 3.5–5.1)
POTASSIUM BLDV-SCNC: 4.4 MMOL/L — SIGNIFICANT CHANGE UP (ref 3.5–5.1)
POTASSIUM SERPL-MCNC: 4.3 MMOL/L — SIGNIFICANT CHANGE UP (ref 3.5–5.3)
POTASSIUM SERPL-MCNC: 4.5 MMOL/L — SIGNIFICANT CHANGE UP (ref 3.5–5.3)
POTASSIUM SERPL-SCNC: 4.3 MMOL/L — SIGNIFICANT CHANGE UP (ref 3.5–5.3)
POTASSIUM SERPL-SCNC: 4.5 MMOL/L — SIGNIFICANT CHANGE UP (ref 3.5–5.3)
PROT SERPL-MCNC: 7.1 G/DL — SIGNIFICANT CHANGE UP (ref 6–8.3)
PROT UR-MCNC: ABNORMAL
RBC # BLD: 4.93 M/UL — SIGNIFICANT CHANGE UP (ref 4.2–5.8)
RBC # FLD: 12.5 % — SIGNIFICANT CHANGE UP (ref 10.3–14.5)
RSV RNA NPH QL NAA+NON-PROBE: SIGNIFICANT CHANGE UP
SAO2 % BLDV: 97.3 % — SIGNIFICANT CHANGE UP
SAO2 % BLDV: 98.1 % — SIGNIFICANT CHANGE UP
SARS-COV-2 RNA SPEC QL NAA+PROBE: SIGNIFICANT CHANGE UP
SODIUM SERPL-SCNC: 137 MMOL/L — SIGNIFICANT CHANGE UP (ref 135–145)
SODIUM SERPL-SCNC: 137 MMOL/L — SIGNIFICANT CHANGE UP (ref 135–145)
SP GR SPEC: 1.03 — SIGNIFICANT CHANGE UP (ref 1.01–1.05)
TRIGL SERPL-MCNC: 53 MG/DL — SIGNIFICANT CHANGE UP
UROBILINOGEN FLD QL: SIGNIFICANT CHANGE UP
WBC # BLD: 7.99 K/UL — SIGNIFICANT CHANGE UP (ref 3.8–10.5)
WBC # FLD AUTO: 7.99 K/UL — SIGNIFICANT CHANGE UP (ref 3.8–10.5)

## 2022-10-11 PROCEDURE — 74177 CT ABD & PELVIS W/CONTRAST: CPT | Mod: 26,MA

## 2022-10-11 PROCEDURE — 99285 EMERGENCY DEPT VISIT HI MDM: CPT

## 2022-10-11 PROCEDURE — 99223 1ST HOSP IP/OBS HIGH 75: CPT

## 2022-10-11 RX ORDER — SODIUM CHLORIDE 9 MG/ML
1000 INJECTION, SOLUTION INTRAVENOUS ONCE
Refills: 0 | Status: COMPLETED | OUTPATIENT
Start: 2022-10-11 | End: 2022-10-11

## 2022-10-11 RX ORDER — INSULIN LISPRO 100/ML
VIAL (ML) SUBCUTANEOUS
Refills: 0 | Status: DISCONTINUED | OUTPATIENT
Start: 2022-10-11 | End: 2022-10-13

## 2022-10-11 RX ORDER — INSULIN LISPRO 100/ML
VIAL (ML) SUBCUTANEOUS AT BEDTIME
Refills: 0 | Status: DISCONTINUED | OUTPATIENT
Start: 2022-10-11 | End: 2022-10-13

## 2022-10-11 RX ORDER — ONDANSETRON 8 MG/1
4 TABLET, FILM COATED ORAL EVERY 8 HOURS
Refills: 0 | Status: DISCONTINUED | OUTPATIENT
Start: 2022-10-11 | End: 2022-10-14

## 2022-10-11 RX ORDER — ONDANSETRON 8 MG/1
4 TABLET, FILM COATED ORAL ONCE
Refills: 0 | Status: COMPLETED | OUTPATIENT
Start: 2022-10-11 | End: 2022-10-11

## 2022-10-11 RX ORDER — ACETAMINOPHEN 500 MG
650 TABLET ORAL EVERY 6 HOURS
Refills: 0 | Status: DISCONTINUED | OUTPATIENT
Start: 2022-10-11 | End: 2022-10-14

## 2022-10-11 RX ORDER — FOLIC ACID 0.8 MG
1 TABLET ORAL DAILY
Refills: 0 | Status: DISCONTINUED | OUTPATIENT
Start: 2022-10-11 | End: 2022-10-14

## 2022-10-11 RX ORDER — DIAZEPAM 5 MG
5 TABLET ORAL ONCE
Refills: 0 | Status: DISCONTINUED | OUTPATIENT
Start: 2022-10-11 | End: 2022-10-11

## 2022-10-11 RX ORDER — MORPHINE SULFATE 50 MG/1
2 CAPSULE, EXTENDED RELEASE ORAL EVERY 4 HOURS
Refills: 0 | Status: DISCONTINUED | OUTPATIENT
Start: 2022-10-11 | End: 2022-10-14

## 2022-10-11 RX ORDER — MORPHINE SULFATE 50 MG/1
4 CAPSULE, EXTENDED RELEASE ORAL EVERY 4 HOURS
Refills: 0 | Status: DISCONTINUED | OUTPATIENT
Start: 2022-10-11 | End: 2022-10-14

## 2022-10-11 RX ORDER — THIAMINE MONONITRATE (VIT B1) 100 MG
500 TABLET ORAL DAILY
Refills: 0 | Status: DISCONTINUED | OUTPATIENT
Start: 2022-10-11 | End: 2022-10-14

## 2022-10-11 RX ORDER — SODIUM CHLORIDE 9 MG/ML
1000 INJECTION, SOLUTION INTRAVENOUS
Refills: 0 | Status: DISCONTINUED | OUTPATIENT
Start: 2022-10-11 | End: 2022-10-11

## 2022-10-11 RX ORDER — SODIUM CHLORIDE 9 MG/ML
1000 INJECTION, SOLUTION INTRAVENOUS
Refills: 0 | Status: DISCONTINUED | OUTPATIENT
Start: 2022-10-11 | End: 2022-10-14

## 2022-10-11 RX ORDER — THIAMINE MONONITRATE (VIT B1) 100 MG
100 TABLET ORAL ONCE
Refills: 0 | Status: COMPLETED | OUTPATIENT
Start: 2022-10-11 | End: 2022-10-11

## 2022-10-11 RX ORDER — DEXTROSE 50 % IN WATER 50 %
25 SYRINGE (ML) INTRAVENOUS ONCE
Refills: 0 | Status: DISCONTINUED | OUTPATIENT
Start: 2022-10-11 | End: 2022-10-14

## 2022-10-11 RX ORDER — DIAZEPAM 5 MG
10 TABLET ORAL ONCE
Refills: 0 | Status: DISCONTINUED | OUTPATIENT
Start: 2022-10-11 | End: 2022-10-11

## 2022-10-11 RX ORDER — SODIUM CHLORIDE 9 MG/ML
1000 INJECTION, SOLUTION INTRAVENOUS
Refills: 0 | Status: DISCONTINUED | OUTPATIENT
Start: 2022-10-11 | End: 2022-10-13

## 2022-10-11 RX ORDER — HYDROMORPHONE HYDROCHLORIDE 2 MG/ML
0.5 INJECTION INTRAMUSCULAR; INTRAVENOUS; SUBCUTANEOUS ONCE
Refills: 0 | Status: DISCONTINUED | OUTPATIENT
Start: 2022-10-11 | End: 2022-10-11

## 2022-10-11 RX ORDER — GLUCAGON INJECTION, SOLUTION 0.5 MG/.1ML
1 INJECTION, SOLUTION SUBCUTANEOUS ONCE
Refills: 0 | Status: DISCONTINUED | OUTPATIENT
Start: 2022-10-11 | End: 2022-10-14

## 2022-10-11 RX ORDER — ACETAMINOPHEN 500 MG
1000 TABLET ORAL ONCE
Refills: 0 | Status: COMPLETED | OUTPATIENT
Start: 2022-10-11 | End: 2022-10-11

## 2022-10-11 RX ORDER — MORPHINE SULFATE 50 MG/1
4 CAPSULE, EXTENDED RELEASE ORAL ONCE
Refills: 0 | Status: DISCONTINUED | OUTPATIENT
Start: 2022-10-11 | End: 2022-10-11

## 2022-10-11 RX ORDER — ENOXAPARIN SODIUM 100 MG/ML
40 INJECTION SUBCUTANEOUS EVERY 24 HOURS
Refills: 0 | Status: DISCONTINUED | OUTPATIENT
Start: 2022-10-11 | End: 2022-10-14

## 2022-10-11 RX ORDER — DEXTROSE 50 % IN WATER 50 %
15 SYRINGE (ML) INTRAVENOUS ONCE
Refills: 0 | Status: DISCONTINUED | OUTPATIENT
Start: 2022-10-11 | End: 2022-10-14

## 2022-10-11 RX ORDER — DEXTROSE 50 % IN WATER 50 %
12.5 SYRINGE (ML) INTRAVENOUS ONCE
Refills: 0 | Status: DISCONTINUED | OUTPATIENT
Start: 2022-10-11 | End: 2022-10-14

## 2022-10-11 RX ORDER — LANOLIN ALCOHOL/MO/W.PET/CERES
3 CREAM (GRAM) TOPICAL AT BEDTIME
Refills: 0 | Status: DISCONTINUED | OUTPATIENT
Start: 2022-10-11 | End: 2022-10-14

## 2022-10-11 RX ADMIN — ONDANSETRON 4 MILLIGRAM(S): 8 TABLET, FILM COATED ORAL at 18:11

## 2022-10-11 RX ADMIN — Medication 5 MILLIGRAM(S): at 18:07

## 2022-10-11 RX ADMIN — SODIUM CHLORIDE 110 MILLILITER(S): 9 INJECTION, SOLUTION INTRAVENOUS at 20:46

## 2022-10-11 RX ADMIN — Medication 1 MILLIGRAM(S): at 19:39

## 2022-10-11 RX ADMIN — SODIUM CHLORIDE 1000 MILLILITER(S): 9 INJECTION, SOLUTION INTRAVENOUS at 18:23

## 2022-10-11 RX ADMIN — Medication 1 TABLET(S): at 19:39

## 2022-10-11 RX ADMIN — Medication 400 MILLIGRAM(S): at 18:11

## 2022-10-11 RX ADMIN — SODIUM CHLORIDE 1000 MILLILITER(S): 9 INJECTION, SOLUTION INTRAVENOUS at 19:31

## 2022-10-11 RX ADMIN — Medication 100 MILLIGRAM(S): at 19:39

## 2022-10-11 RX ADMIN — HYDROMORPHONE HYDROCHLORIDE 0.5 MILLIGRAM(S): 2 INJECTION INTRAMUSCULAR; INTRAVENOUS; SUBCUTANEOUS at 22:22

## 2022-10-11 RX ADMIN — HYDROMORPHONE HYDROCHLORIDE 0.5 MILLIGRAM(S): 2 INJECTION INTRAMUSCULAR; INTRAVENOUS; SUBCUTANEOUS at 22:07

## 2022-10-11 NOTE — H&P ADULT - HISTORY OF PRESENT ILLNESS
56-year-old male with past medical history of alcohol use disorder (without ETOH withdrawals per patient), Hx of IDDM with past admission for DKA but now off of insulin as his glucose is well controlled per patient now presents to the ED complaining acute onset constant sharp lower abdominal pain at 11am. Denies nausea, vomiting, chest pain , dyspnea, fever, or shills.  Patient denies current medications.  Patient states he drinks a sixpack a day, last drink was last night, for the past 36 years. Patient has not passing gas or having bowel movements since this morning. Never had pancreatitis in the past.  In the ED, Hypertensive 162/70, anion gap acidosis 22 gap, Lipase 1292. Elevated lactic acid downtrending with IVF. CT shows pancreatitis. Give Dilaudid and IVF.

## 2022-10-11 NOTE — H&P ADULT - PROBLEM SELECTOR PLAN 1
Hx of ETOH with mild transaminase likely the cause of acute pancreatitis. No signs of gallstone on CT  -Pain control with morphine PRN  -LR 100cc/hr  -If pain does not improve, will consider US for cholecystitis

## 2022-10-11 NOTE — H&P ADULT - ASSESSMENT
56-year-old male with past medical history of alcohol use disorder (without ETOH withdrawals per patient), Hx of IDDM with past admission for DKA but now off of insulin as his glucose is well controlled per patient now presents to the ED complaining acute onset constant sharp lower abdominal pain admitted for acute pancreatitis management.

## 2022-10-11 NOTE — H&P ADULT - PROBLEM/PLAN-5
[FreeTextEntry1] : ADD\par - on adderall\par - doing well at current dose\par - refill given today DISPLAY PLAN FREE TEXT

## 2022-10-11 NOTE — H&P ADULT - NSHPREVIEWOFSYSTEMS_GEN_ALL_CORE
REVIEW OF SYSTEMS:    CONSTITUTIONAL: No weakness, fevers or chills  EYES/ENT: No visual changes;  No dysphagia  NECK: No pain or stiffness  RESPIRATORY: No cough, wheezing, hemoptysis; No shortness of breath  CARDIOVASCULAR: No chest pain or palpitations; No lower extremity edema  GASTROINTESTINAL: +++abdominal/epigastric pain. No nausea, vomiting, or hematemesis; No diarrhea or constipation. No melena or hematochezia.  GENITOURINARY: No dysuria, frequency or hematuria  NEUROLOGICAL: No numbness or weakness  SKIN: No itching, burning, rashes, or lesions   PSYCH: No auditory or visual hallucinations  All other review of systems is negative unless indicated above.

## 2022-10-11 NOTE — ED ADULT TRIAGE NOTE - CHIEF COMPLAINT QUOTE
Pt c/o lower abdominal pain since this morning. Also endorsing nausea and vomiting, unable to tolerate PO. Denies urinary complaints, fevers/chills. Last BM yesterday. PMHx GERD

## 2022-10-11 NOTE — ED ADULT NURSE NOTE - OBJECTIVE STATEMENT
Pt awake and alert x 4 with hand tremor co abdominal pain with mild nausea no vomiting. Pt  medicated for symptoms. Pt last drank 6 beers yesterday is a daily drinker. pt denies chest pain. iv place medicated. Pts wife at  bedside.  awaiting ct scan. fluids infusing .

## 2022-10-11 NOTE — ED PROVIDER NOTE - PHYSICAL EXAMINATION
GEN: Patient awake alert, uncomfortable, tremulus    HEENT: normocephalic, atraumatic, no scleral icterus, moist MM  CARDIAC: RRR, S1, S2, no murmur.   PULM: CTA B/L no wheeze, rhonchi, rales.   ABD: lower ttp with voluntary guarding, no CVA tenderness.   MSK: Moving all extremities, no edema.   NEURO: A&Ox3, no focal neurological deficits,   SKIN: warm, dry, no rash.  : normal circumcised, non tender penis, no hernias, non tender testicles without edema, masses, skin changes.

## 2022-10-11 NOTE — H&P ADULT - NSHPLABSRESULTS_GEN_ALL_CORE
10-11    137  |  99  |  6<L>  ----------------------------<  183<H>  4.5   |  21<L>  |  0.63    Ca    8.7      11 Oct 2022 21:50    TPro  7.1  /  Alb  4.5  /  TBili  0.7  /  DBili  x   /  AST  70<H>  /  ALT  54<H>  /  AlkPhos  80  10-11                        15.4   7.99  )-----------( 136      ( 11 Oct 2022 18:49 )             44.6     LIVER FUNCTIONS - ( 11 Oct 2022 18:49 )  Alb: 4.5 g/dL / Pro: 7.1 g/dL / ALK PHOS: 80 U/L / ALT: 54 U/L / AST: 70 U/L / GGT: x           Urinalysis Basic - ( 11 Oct 2022 20:32 )    Color: Light Yellow / Appearance: Clear / S.034 / pH: x  Gluc: x / Ketone: Moderate  / Bili: Negative / Urobili: <2 mg/dL   Blood: x / Protein: Trace / Nitrite: Negative   Leuk Esterase: Negative / RBC: x / WBC x   Sq Epi: x / Non Sq Epi: x / Bacteria: x    EKG: N/A    Image: IMPRESSION:  1.  Acute interstitial pancreatitis. No peripancreatic fluid collections.  2.   Hepatic steatosis and hepatomegaly

## 2022-10-11 NOTE — ED ADULT NURSE NOTE - SUICIDE SCREENING DEPRESSION
CHIEF COMPLAINT  Chief Complaint   Patient presents with   • Shortness of Breath     this morning. Retractions and tacypnea noted in triage.    • Cough     x2 days.       HPI  Gregorio Gregorio is a 5 y.o. male who presents with cough and shortness of breath.  The patient on Monday started having a significant cough which seemed to improve yesterday but then today the patient has been short of breath.  No definite fever.  No history of asthma in either the patient or family members.  No definite Covid contacts.  No vomiting or diarrhea.  No sore throat.  No definite abdominal pain.  Patient has no medical history.  No definite exposure to any allergen.  No rash.    REVIEW OF SYSTEMS  All other systems are negative.     PAST MEDICAL HISTORY  History reviewed. No pertinent past medical history.    FAMILY HISTORY  No family history on file.    SOCIAL HISTORY  Social History     Other Topics Concern   • Not on file   Social History Narrative   • Not on file     Social Determinants of Health     Physical Activity:    • Days of Exercise per Week:    • Minutes of Exercise per Session:    Stress:    • Feeling of Stress :    Social Connections:    • Frequency of Communication with Friends and Family:    • Frequency of Social Gatherings with Friends and Family:    • Attends [employer] Services:    • Active Member of Clubs or Organizations:    • Attends Club or Organization Meetings:    • Marital Status:    Intimate Partner Violence:    • Fear of Current or Ex-Partner:    • Emotionally Abused:    • Physically Abused:    • Sexually Abused:        SURGICAL HISTORY  History reviewed. No pertinent surgical history.    CURRENT MEDICATIONS  Home Medications     Reviewed by Shruti Villegas R.N. (Registered Nurse) on 09/29/21 at 0745  Med List Status: Complete   Medication Last Dose Status        Patient Tien Taking any Medications                       ALLERGIES  No Known Allergies    PHYSICAL EXAM  VITAL SIGNS: BP (!) 116/67   " Pulse (!) 143   Temp 36.7 °C (98 °F) (Temporal)   Resp (!) 54   Ht 1.041 m (3' 5\")   Wt 16.2 kg (35 lb 11.4 oz)   SpO2 93%   BMI 14.94 kg/m²      Constitutional: Well developed, Well nourished, No acute distress, Non-toxic appearance.   HENT: Normocephalic, Atraumatic, TMs normal, mucous membranes moist, no erythema, exudates, swelling, or masses, nares patent  Eyes: nonicteric  Neck: Supple, no meningismus  Lymphatic: No lymphadenopathy noted.   Cardiovascular: Tachycardic with regular rhythm, no gallops rubs or murmurs  Lungs: The patient is tachypneic, there is expiratory wheezing noted bilaterally with diminished air movement, intercostal retractions as well as abdominal breathing is noted  Abdomen: Soft and nontender throughout  Skin: Warm, Dry, no rash  Genitalia: Deferred  Rectal: Deferred  Extremities: No edema  Neurologic: Alert, appropriate, follows commands, moving all extremities, normal speech   Psychiatric: Affect normal    RADIOLOGY/PROCEDURES  9:38 AM-repeat scoring places the patient's bronchiolitis score at 5 down from about a 6-7.  Patient is still hypoxic at 86% room air.  He is currently on 1 L.    10 AM-Covid and RSV came back negative    COURSE & MEDICAL DECISION MAKING  Pertinent Labs & Imaging studies reviewed. (See chart for details)  This is a 5-year-old male who appears to have bronchiolitis.  He has wheezing and retractions with an increased respiratory rate and low oxygen at 86% room air.  Covid and RSV are pending.  Chest x-ray is clear.  Patient is otherwise afebrile with no other focus of infection.  Albuterol is considered given his slightly older age although there is no family history of asthma.  I will give albuterol to see if there is any response but I feel the patient likely has bronchiolitis that will need to be treated supportively with oxygen and hydration.  Note that the patient was orally hydrated here.    FINAL IMPRESSION  1.  Bronchiolitis  2.   3.   "       Electronically signed by: Marcio Frye M.D., 9/29/2021 8:07 AM       Negative

## 2022-10-11 NOTE — H&P ADULT - PROBLEM SELECTOR PLAN 4
FENP: No IVF, Lytes PRN, DM diet, Lovenox ppx Currently not on insulin at home  -Will get A1C, will observe for signs of DKA

## 2022-10-11 NOTE — ED PROVIDER NOTE - NS ED ROS FT
GENERAL: No fever, chills  EYES: no vision changes, no discharge.   ENT: no difficulty swallowing or speaking   CARDIAC: no chest pain/pressure, SOB, lower extremity swelling  PULMONARY: no cough, SOB  GI: + abdominal pain, n/v, no diarrhea  : no dysuria, no hematuria  SKIN: no rashes, no ecchymosis  NEURO: no headache, lightheadedness  MSK: No joint pain, myalgia, weakness.

## 2022-10-11 NOTE — H&P ADULT - NSICDXPASTMEDICALHX_GEN_ALL_CORE_FT
PAST MEDICAL HISTORY:  GERD (gastroesophageal reflux disease)     Insulin dependent type 2 diabetes mellitus Does not currently take insulin

## 2022-10-11 NOTE — ED PROVIDER NOTE - OBJECTIVE STATEMENT
56-year-old male with past medical history of alcohol use disorder or past admission to the MICU for DKA presents to the ED complaining of 6 hours of constant sharp lower abdominal pain associated with fever, nausea and vomiting, nonbloody nonbilious. Patient denies current medications.  Patient states he drinks a sixpack a day, last drink was last night. Denies chest pain, shortness of breath, diarrhea, dysuria, hematuria, abdominal surgeries.  Patient is not passing gas or having bowel movements.

## 2022-10-11 NOTE — ED PROVIDER NOTE - ATTENDING CONTRIBUTION TO CARE
57 yo M hx ETOH use disorder, DKA (pt states he is no longer on meds for diabetes), pw c/o sudden onset lower abdominal pain with associated nb/nb emesis that started this morning. Last drink yesterday. Typically drinks a 6 pack per day. Denies history of ETOH withdrawal/seizures. On exam, pt with hand tremors and tongue fasciculations- CIWA 4. Abdomen with guarding, ttp over lower abdomen worse in LLQ. Normal testicular exam (chaperoned Dr. Everett exam). Plan for CIWA protocol tx Valium, CT abd with labs r/o diverticulitis vs other intra abdominal pathology. Called charged to transfer patient from intake to main for cardiac monitoring for ETOH withdrawal.

## 2022-10-11 NOTE — ED PROVIDER NOTE - PROGRESS NOTE DETAILS
Marcello Everett, DO PGY-2: Distended bladder seen on CT imaging, patient endorsing inability to urinate since this morning.  Will place Ashby for urinary retention.

## 2022-10-11 NOTE — H&P ADULT - NSHPPHYSICALEXAM_GEN_ALL_CORE
PHYSICAL EXAM:  GENERAL: NAD, abdominal pain, uncomfortable.  HEAD:  Atraumatic, Normocephalic  EYES: EOMI, PERRL, conjunctiva and sclera clear  NECK: Supple, No JVD  CHEST/LUNG: Clear to auscultation bilaterally; No wheezes, rales or rhonchi  HEART: Regular rate and rhythm; No murmurs, rubs, or gallops, (+)S1, S2  ABDOMEN: Soft, tender to light palpation, Nondistended; decreased Bowel sounds   EXTREMITIES:  2+ Peripheral Pulses, No clubbing, cyanosis, or edema  PSYCH: normal mood and affect  NEUROLOGY: AAOx3, non-focal  SKIN: No rashes or lesions

## 2022-10-11 NOTE — H&P ADULT - PROBLEM SELECTOR PLAN 2
Currently without withdrawals or hx of withdrawals.  S/p 1 dose of diazepam  CIWA monitor for now  Thiamine IV for now

## 2022-10-11 NOTE — H&P ADULT - PROBLEM SELECTOR PLAN 3
Currently not on insulin at home  -Will get A1C, will observe for signs of DKA Likely from ETOH  -Outpatient follow up

## 2022-10-11 NOTE — ED PROVIDER NOTE - CLINICAL SUMMARY MEDICAL DECISION MAKING FREE TEXT BOX
Marcello Everett DO PGY-2: 56-year-old male with past medical history of alcohol use disorder presenting with sudden onset lower abdominal pain with nausea vomiting, fever.  Patient with significant lower abdominal tenderness with voluntary guarding.  Patient is tremulous with tongue fasciculations.  Normal  exam.  Differential includes but not limited to diverticulitis, UTI, appendicitis, perforated viscus, Colitis, alcohol withdrawal. Will obtain labs, CT.  Will treat for alcohol withdrawal, give fluids, pain medication, antiemetics.  Reassess

## 2022-10-11 NOTE — ED ADULT NURSE REASSESSMENT NOTE - NS ED NURSE REASSESS COMMENT FT1
Ashby 18f  placed, urine 550 cc noted after insertion, urine collected. pt intermittently sleeping. no nausea or vomiting noted.

## 2022-10-12 DIAGNOSIS — F10.239 ALCOHOL DEPENDENCE WITH WITHDRAWAL, UNSPECIFIED: ICD-10-CM

## 2022-10-12 DIAGNOSIS — K76.0 FATTY (CHANGE OF) LIVER, NOT ELSEWHERE CLASSIFIED: ICD-10-CM

## 2022-10-12 DIAGNOSIS — E11.9 TYPE 2 DIABETES MELLITUS WITHOUT COMPLICATIONS: ICD-10-CM

## 2022-10-12 DIAGNOSIS — K85.90 ACUTE PANCREATITIS WITHOUT NECROSIS OR INFECTION, UNSPECIFIED: ICD-10-CM

## 2022-10-12 DIAGNOSIS — E78.5 HYPERLIPIDEMIA, UNSPECIFIED: ICD-10-CM

## 2022-10-12 DIAGNOSIS — Z29.9 ENCOUNTER FOR PROPHYLACTIC MEASURES, UNSPECIFIED: ICD-10-CM

## 2022-10-12 DIAGNOSIS — I10 ESSENTIAL (PRIMARY) HYPERTENSION: ICD-10-CM

## 2022-10-12 DIAGNOSIS — E87.29 OTHER ACIDOSIS: ICD-10-CM

## 2022-10-12 LAB
ALBUMIN SERPL ELPH-MCNC: 4.5 G/DL — SIGNIFICANT CHANGE UP (ref 3.3–5)
ALP SERPL-CCNC: 85 U/L — SIGNIFICANT CHANGE UP (ref 40–120)
ALT FLD-CCNC: 49 U/L — HIGH (ref 4–41)
ANION GAP SERPL CALC-SCNC: 19 MMOL/L — HIGH (ref 7–14)
AST SERPL-CCNC: 57 U/L — HIGH (ref 4–40)
BILIRUB SERPL-MCNC: 0.9 MG/DL — SIGNIFICANT CHANGE UP (ref 0.2–1.2)
BLOOD GAS VENOUS COMPREHENSIVE RESULT: SIGNIFICANT CHANGE UP
BUN SERPL-MCNC: 6 MG/DL — LOW (ref 7–23)
CALCIUM SERPL-MCNC: 9.3 MG/DL — SIGNIFICANT CHANGE UP (ref 8.4–10.5)
CHLORIDE SERPL-SCNC: 94 MMOL/L — LOW (ref 98–107)
CO2 SERPL-SCNC: 23 MMOL/L — SIGNIFICANT CHANGE UP (ref 22–31)
CREAT SERPL-MCNC: 0.6 MG/DL — SIGNIFICANT CHANGE UP (ref 0.5–1.3)
CULTURE RESULTS: NO GROWTH — SIGNIFICANT CHANGE UP
EGFR: 115 ML/MIN/1.73M2 — SIGNIFICANT CHANGE UP
GLUCOSE BLDC GLUCOMTR-MCNC: 172 MG/DL — HIGH (ref 70–99)
GLUCOSE BLDC GLUCOMTR-MCNC: 174 MG/DL — HIGH (ref 70–99)
GLUCOSE BLDC GLUCOMTR-MCNC: 187 MG/DL — HIGH (ref 70–99)
GLUCOSE SERPL-MCNC: 176 MG/DL — HIGH (ref 70–99)
HCT VFR BLD CALC: 48 % — SIGNIFICANT CHANGE UP (ref 39–50)
HGB BLD-MCNC: 16.2 G/DL — SIGNIFICANT CHANGE UP (ref 13–17)
INR BLD: 1.07 RATIO — SIGNIFICANT CHANGE UP (ref 0.88–1.16)
MAGNESIUM SERPL-MCNC: 1.5 MG/DL — LOW (ref 1.6–2.6)
MCHC RBC-ENTMCNC: 31 PG — SIGNIFICANT CHANGE UP (ref 27–34)
MCHC RBC-ENTMCNC: 33.8 GM/DL — SIGNIFICANT CHANGE UP (ref 32–36)
MCV RBC AUTO: 91.8 FL — SIGNIFICANT CHANGE UP (ref 80–100)
NRBC # BLD: 0 /100 WBCS — SIGNIFICANT CHANGE UP (ref 0–0)
NRBC # FLD: 0 K/UL — SIGNIFICANT CHANGE UP (ref 0–0)
PHOSPHATE SERPL-MCNC: 3.2 MG/DL — SIGNIFICANT CHANGE UP (ref 2.5–4.5)
PLATELET # BLD AUTO: 133 K/UL — LOW (ref 150–400)
POTASSIUM SERPL-MCNC: 3.9 MMOL/L — SIGNIFICANT CHANGE UP (ref 3.5–5.3)
POTASSIUM SERPL-SCNC: 3.9 MMOL/L — SIGNIFICANT CHANGE UP (ref 3.5–5.3)
PROT SERPL-MCNC: 7.3 G/DL — SIGNIFICANT CHANGE UP (ref 6–8.3)
PROTHROM AB SERPL-ACNC: 12.4 SEC — SIGNIFICANT CHANGE UP (ref 10.5–13.4)
RBC # BLD: 5.23 M/UL — SIGNIFICANT CHANGE UP (ref 4.2–5.8)
RBC # FLD: 12.5 % — SIGNIFICANT CHANGE UP (ref 10.3–14.5)
SODIUM SERPL-SCNC: 136 MMOL/L — SIGNIFICANT CHANGE UP (ref 135–145)
SPECIMEN SOURCE: SIGNIFICANT CHANGE UP
WBC # BLD: 9.01 K/UL — SIGNIFICANT CHANGE UP (ref 3.8–10.5)
WBC # FLD AUTO: 9.01 K/UL — SIGNIFICANT CHANGE UP (ref 3.8–10.5)

## 2022-10-12 PROCEDURE — 99233 SBSQ HOSP IP/OBS HIGH 50: CPT

## 2022-10-12 PROCEDURE — 99284 EMERGENCY DEPT VISIT MOD MDM: CPT

## 2022-10-12 RX ORDER — HYDRALAZINE HCL 50 MG
5 TABLET ORAL ONCE
Refills: 0 | Status: COMPLETED | OUTPATIENT
Start: 2022-10-12 | End: 2022-10-12

## 2022-10-12 RX ORDER — METOPROLOL TARTRATE 50 MG
5 TABLET ORAL ONCE
Refills: 0 | Status: COMPLETED | OUTPATIENT
Start: 2022-10-12 | End: 2022-10-12

## 2022-10-12 RX ORDER — MAGNESIUM OXIDE 400 MG ORAL TABLET 241.3 MG
400 TABLET ORAL
Refills: 0 | Status: COMPLETED | OUTPATIENT
Start: 2022-10-12 | End: 2022-10-13

## 2022-10-12 RX ORDER — INSULIN GLARGINE 100 [IU]/ML
8 INJECTION, SOLUTION SUBCUTANEOUS AT BEDTIME
Refills: 0 | Status: DISCONTINUED | OUTPATIENT
Start: 2022-10-12 | End: 2022-10-13

## 2022-10-12 RX ORDER — ESOMEPRAZOLE MAGNESIUM 40 MG/1
1 CAPSULE, DELAYED RELEASE ORAL
Qty: 0 | Refills: 0 | DISCHARGE

## 2022-10-12 RX ADMIN — Medication 1: at 17:26

## 2022-10-12 RX ADMIN — MORPHINE SULFATE 4 MILLIGRAM(S): 50 CAPSULE, EXTENDED RELEASE ORAL at 04:21

## 2022-10-12 RX ADMIN — MORPHINE SULFATE 2 MILLIGRAM(S): 50 CAPSULE, EXTENDED RELEASE ORAL at 06:43

## 2022-10-12 RX ADMIN — SODIUM CHLORIDE 100 MILLILITER(S): 9 INJECTION, SOLUTION INTRAVENOUS at 00:12

## 2022-10-12 RX ADMIN — Medication 105 MILLIGRAM(S): at 09:50

## 2022-10-12 RX ADMIN — Medication 5 MILLIGRAM(S): at 06:43

## 2022-10-12 RX ADMIN — INSULIN GLARGINE 8 UNIT(S): 100 INJECTION, SOLUTION SUBCUTANEOUS at 22:30

## 2022-10-12 RX ADMIN — Medication 1 TABLET(S): at 09:18

## 2022-10-12 RX ADMIN — Medication 650 MILLIGRAM(S): at 09:50

## 2022-10-12 RX ADMIN — MORPHINE SULFATE 2 MILLIGRAM(S): 50 CAPSULE, EXTENDED RELEASE ORAL at 11:09

## 2022-10-12 RX ADMIN — Medication 5 MILLIGRAM(S): at 01:31

## 2022-10-12 RX ADMIN — Medication 1 MILLIGRAM(S): at 09:18

## 2022-10-12 RX ADMIN — Medication 650 MILLIGRAM(S): at 11:00

## 2022-10-12 RX ADMIN — Medication 1: at 13:18

## 2022-10-12 RX ADMIN — Medication 1: at 09:18

## 2022-10-12 RX ADMIN — MORPHINE SULFATE 4 MILLIGRAM(S): 50 CAPSULE, EXTENDED RELEASE ORAL at 00:12

## 2022-10-12 RX ADMIN — Medication 5 MILLIGRAM(S): at 03:59

## 2022-10-12 NOTE — PROVIDER CONTACT NOTE (OTHER) - BACKGROUND
Pt admitted for pancreatitis
Pt admitted for pancreatitis and alcohol withdrawal

## 2022-10-12 NOTE — PROVIDER CONTACT NOTE (OTHER) - SITUATION
Pt BP is 215/96
Pt BP initially 205/86, manually 189/99
Pt /111
Pt /99, and HR is 73, complaining of abdominal pain

## 2022-10-12 NOTE — CONSULT NOTE ADULT - SUBJECTIVE AND OBJECTIVE BOX
HPI:  56-year-old male with past medical history of alcohol use disorder (without ETOH withdrawals per patient), Hx of IDDM with past admission for DKA but now off of insulin as his glucose is well controlled per patient now presents to the ED complaining acute onset constant sharp lower abdominal pain at 11am. Denies nausea, vomiting, chest pain , dyspnea, fever, or shills.  Patient denies current medications.  Patient states he drinks a sixpack a day, last drink was last night, for the past 36 years. Patient has not passing gas or having bowel movements since this morning. Never had pancreatitis in the past.  In the ED, Hypertensive 162/70, anion gap acidosis 22 gap, Lipase 1292. Elevated lactic acid downtrending with IVF. CT shows pancreatitis. Give Dilaudid and IVF. (11 Oct 2022 23:33)      Reason for consult: DM management   HPI:    Patient presented to the hospital in December 2021 and at that time patient was found to be in DKA with a new diagnosis of diabetes. At the time, patient was discharged on basal bolus regimen. Patient has been following outpatient closely with endocrinology since discharge. His last HgbA1c was 6.4% in 6/2022 on no medications at which point patient was told to hold off on any anti-diabetic agents. He has not been monitoring his blood sugars since then.   Patient now presenting to the hospital for abdominal pain and found to have pancreatitis. Patient states he hasn't eat anything for the past 48 hours.     Diabetes Mellitus (СЕРГЕЙ)  +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  Diagnosis: 12/2021  Symptoms: Abdominal pain,   Outpatient endocrinologist: Dr. Toscano  Last HgbA1c: 9.4% in 10/2022. Previously 6/2022  Outpatient regimen: None  Blood sugars at home: Not checking   Inpatient regimen: none   FH: T2DM in mother   Tobacco, etoh, drug use: Yes, patient drinks 10-12 cans of beer daily  Diet: Poor. Patient states that he hasn't been following a diabetic diet. Was recently on a cruise two weeks ago eating more than usual    Review of Systems:  Constitutional: No fever, good appetite/po intake  Eyes: No blurry vision, diplopia  Neuro: No tremors  HEENT: No pain  Cardiovascular: No chest pain, palpitations  Respiratory: No SOB, no cough  GI: +abdominal pain   : No dysuria, hematuria  Skin: no rash  Psych: no depression  Endocrine: no polyuria, polydipsia  Hem/lymph: no swelling  Osteoporosis: no fractures    ALL OTHER SYSTEMS REVIEWED AND NEGATIVE    PHYSICAL EXAM:  VITALS: T(C): 36.7 (10-12-22 @ 10:40)  T(F): 98.1 (10-12-22 @ 10:40), Max: 98.9 (10-12-22 @ 04:02)  HR: 81 (10-12-22 @ 14:50) (74 - 89)  BP: 183/97 (10-12-22 @ 14:50) (157/67 - 215/96)  RR:  (16 - 19)  SpO2:  (96% - 100%)  Wt(kg): --  GENERAL: NAD, well-groomed, well-developed  EYES: No proptosis, extraocular movements intact,  no lid lag, anicteric  HEENT:  Atraumatic, Normocephalic, moist mucous membranes  THYROID: Normal size, no palpable nodules, no thyromegaly  RESPIRATORY: Clear to auscultation bilaterally; No rales, rhonchi, wheezing, or rubs  CARDIOVASCULAR: Regular rate and rhythm; No murmurs; no peripheral edema  GI: Soft, nontender, non distended, normal bowel sounds  SKIN: Dry, intact, No rashes or lesions  EXTREMITIES: No foot ulcers, distal pedal pulses intact bilaterally  NEURO: sensation intact, no tremors  PSYCH: reactive affect, euthymic mood  CUSHING'S SIGNS: no striae or visible bruising                              16.2   9.01  )-----------( 133      ( 12 Oct 2022 03:00 )             48.0       10-12    136  |  94<L>  |  6<L>  ----------------------------<  176<H>  3.9   |  23  |  0.60    eGFR: 115    Ca    9.3      10-12  Mg     1.50     10-12  Phos  3.2     10-12    TPro  7.3  /  Alb  4.5  /  TBili  0.9  /  DBili  x   /  AST  57<H>  /  ALT  49<H>  /  AlkPhos  85  10-12      Thyroid Function Tests:      10-11 Chol -- Direct LDL -- LDL calculated -- HDL -- Trig 53    Radiology:

## 2022-10-12 NOTE — CONSULT NOTE ADULT - ASSESSMENT
A/P: 56 year old man with history of СЕРГЕЙ presenting to the hospital with pancreatitis.     #СЕРГЕЙ   -patient diagnosed last visit in December with new onset diabetes when he presented in the setting of DKA. he was discharged on basal bolus regimen. He saw outpatient endo and his A1c improved to 6.4% most recently in 6/2022 and was told to stop all medications. His ZnT8 antibody had resulted positive in December 2021 with negative LEONARD and islet cell antibody.   -Suspect patient still have pancreatic reserve as he did not go into overt DKA without any insulin last 6 months.  -egfr: 172  - Now presenting in the setting of pancreatitis.   -BG mostly at goal 160-190 at this time while he remains NPO    Plan:   - Recommend to start patient on Lantus 8 units daily   - Recommend low Admelog correction scale TIDQAC and QHS while patient is NPO  - Please obtain a c-peptide   - Please check FSG before meals and QHS, or q6h while NPO  - Inpatient glucose goal 140-180   - Please keep patient on a diabetic, carb controlled diet once he start eating     - RD consult  - hypoglycemia orderset prn  - extensively discussed importance of glycemic control to prevent micro- and macrovascular complications  - discussed importance of weight loss, exercise, and changing diet   - reviewed symptoms and management of hypoglycemia  - reviewed basics of insulin administration and pharmacokinetics, glucometer monitoring, as well as glycemic goals for outpatient setting  - Discharge planning: Patient likely get discharged on insulin- final recs closer to discharge.     #elevated BHB   -Likely secondary to starvation ketosis (patient has not had food in atleast 48 hours) however may have element of mild DKA since patient was without insulin last few months (bicarb 18, AG 22 and BHB 4.0, moderate ketones in urine). Lactic acid also noted to be elevated on admission to 5.2 (now improved)  -Continue with aggressive IVF for pancreatitis.   - Will start patient on low dose basal insulin  - Patient presenting to the hospital with bicarb 18 (now improved to 23), AG 22 (now improved to 19), BHB 4.0, moderate ketones in urine      #Hypertension  - BP goal <130/80  - Management as per primary team    #Hyperlipidemia  - check fasting lipid panel  Plan:   - May benefit from statin     Sophie Lowe DO  Attending Physician   Department of Endocrinology, Diabetes and Metabolism     If before 9AM or after 5PM, or on weekends/holidays, please call the Endocrine answering service for assistance ([phone]).  For nonurgent matters, please email [email or web address] for assistance.   Please note that a different provider may be following this patient each day.

## 2022-10-12 NOTE — PROGRESS NOTE ADULT - SUBJECTIVE AND OBJECTIVE BOX
[facility] Division of Hospital Medicine  Mya Martin MD  Pager (M-F, 8A-5P): [phone]  Other Times:      Patient is a 56y old  Male who presents with a chief complaint of Abdominal pain (11 Oct 2022 23:33)      SUBJECTIVE / OVERNIGHT EVENTS:      MEDICATIONS  (STANDING):  dextrose 5%. 1000 milliLiter(s) (50 mL/Hr) IV Continuous <Continuous>  dextrose 5%. 1000 milliLiter(s) (100 mL/Hr) IV Continuous <Continuous>  dextrose 50% Injectable 25 Gram(s) IV Push once  dextrose 50% Injectable 12.5 Gram(s) IV Push once  dextrose 50% Injectable 25 Gram(s) IV Push once  enoxaparin Injectable 40 milliGRAM(s) SubCutaneous every 24 hours  folic acid 1 milliGRAM(s) Oral daily  glucagon  Injectable 1 milliGRAM(s) IntraMuscular once  insulin lispro (ADMELOG) corrective regimen sliding scale   SubCutaneous three times a day before meals  insulin lispro (ADMELOG) corrective regimen sliding scale   SubCutaneous at bedtime  lactated ringers. 1000 milliLiter(s) (100 mL/Hr) IV Continuous <Continuous>  magnesium oxide 400 milliGRAM(s) Oral three times a day with meals  multivitamin 1 Tablet(s) Oral daily  thiamine IVPB 500 milliGRAM(s) IV Intermittent daily    MEDICATIONS  (PRN):  acetaminophen     Tablet .. 650 milliGRAM(s) Oral every 6 hours PRN Temp greater or equal to 38C (100.4F), Mild Pain (1 - 3)  aluminum hydroxide/magnesium hydroxide/simethicone Suspension 30 milliLiter(s) Oral every 4 hours PRN Dyspepsia  dextrose Oral Gel 15 Gram(s) Oral once PRN Blood Glucose LESS THAN 70 milliGRAM(s)/deciliter  melatonin 3 milliGRAM(s) Oral at bedtime PRN Insomnia  morphine  - Injectable 2 milliGRAM(s) IV Push every 4 hours PRN Moderate Pain (4 - 6)  morphine  - Injectable 4 milliGRAM(s) IV Push every 4 hours PRN Severe Pain (7 - 10)  ondansetron Injectable 4 milliGRAM(s) IV Push every 8 hours PRN Nausea and/or Vomiting      CAPILLARY BLOOD GLUCOSE      POCT Blood Glucose.: 159 mg/dL (12 Oct 2022 09:10)  POCT Blood Glucose.: 199 mg/dL (11 Oct 2022 23:53)  POCT Blood Glucose.: 169 mg/dL (11 Oct 2022 17:18)    I&O's Summary    11 Oct 2022 07:01  -  12 Oct 2022 07:00  --------------------------------------------------------  IN: 0 mL / OUT: 1250 mL / NET: -1250 mL        PHYSICAL EXAM:  Vital Signs Last 24 Hrs  T(C): 37 (12 Oct 2022 09:15), Max: 37.2 (12 Oct 2022 04:02)  T(F): 98.6 (12 Oct 2022 09:15), Max: 98.9 (12 Oct 2022 04:02)  HR: 76 (12 Oct 2022 09:15) (70 - 81)  BP: 202/93 (12 Oct 2022 09:15) (157/67 - 215/96)  BP(mean): --  RR: 18 (12 Oct 2022 09:15) (16 - 19)  SpO2: 98% (12 Oct 2022 09:15) (96% - 100%)    Parameters below as of 12 Oct 2022 09:15  Patient On (Oxygen Delivery Method): room air        CONSTITUTIONAL: NAD, well-developed, well-groomed  EYES: EOMI; conjunctiva and sclera clear  ENMT: Moist oral mucosa  RESPIRATORY: Normal respiratory effort; lungs are clear to auscultation bilaterally  CARDIOVASCULAR: Regular rate and rhythm, normal S1 and S2, no murmur; No lower extremity edema  ABDOMEN: Nontender to palpation, normoactive bowel sounds, no rebound/guarding  MUSCULOSKELETAL:   no clubbing or cyanosis of digits; no joint swelling or tenderness to palpation  PSYCH: A+O to person, place, and time; affect appropriate  NEUROLOGY: CN 2-12 are intact and symmetric; no gross sensory deficits   SKIN: No rashes; no palpable lesions    LABS:                        16.2   9.01  )-----------( 133      ( 12 Oct 2022 03:00 )             48.0     10-12    136  |  94<L>  |  6<L>  ----------------------------<  176<H>  3.9   |  23  |  0.60    Ca    9.3      12 Oct 2022 03:00  Phos  3.2     10-12  Mg     1.50     10-12    TPro  7.3  /  Alb  4.5  /  TBili  0.9  /  DBili  x   /  AST  57<H>  /  ALT  49<H>  /  AlkPhos  85  10-12    PT/INR - ( 12 Oct 2022 03:00 )   PT: 12.4 sec;   INR: 1.07 ratio               Urinalysis Basic - ( 11 Oct 2022 20:32 )    Color: Light Yellow / Appearance: Clear / S.034 / pH: x  Gluc: x / Ketone: Moderate  / Bili: Negative / Urobili: <2 mg/dL   Blood: x / Protein: Trace / Nitrite: Negative   Leuk Esterase: Negative / RBC: x / WBC x   Sq Epi: x / Non Sq Epi: x / Bacteria: x        RADIOLOGY & ADDITIONAL TESTS:  Results Reviewed:   Imaging Personally Reviewed:  Electrocardiogram Personally Reviewed:    COORDINATION OF CARE:  Care Discussed with Consultants/Other Providers [Y/N]: Y  Prior or Outpatient Records Reviewed [Y/N]: Y   [facility] of [facility] Medicine  Mya Martin MD  Pager (M-F, 8A-5P): [phone]  Other Times:      Patient is a 56y old  Male who presents with a chief complaint of Abdominal pain (11 Oct 2022 23:33)    SUBJECTIVE / OVERNIGHT EVENTS:  pt complaining of abd pain - denies n/v at this time.      MEDICATIONS  (STANDING):  dextrose 5%. 1000 milliLiter(s) (50 mL/Hr) IV Continuous <Continuous>  dextrose 5%. 1000 milliLiter(s) (100 mL/Hr) IV Continuous <Continuous>  dextrose 50% Injectable 25 Gram(s) IV Push once  dextrose 50% Injectable 12.5 Gram(s) IV Push once  dextrose 50% Injectable 25 Gram(s) IV Push once  enoxaparin Injectable 40 milliGRAM(s) SubCutaneous every 24 hours  folic acid 1 milliGRAM(s) Oral daily  glucagon  Injectable 1 milliGRAM(s) IntraMuscular once  insulin lispro (ADMELOG) corrective regimen sliding scale   SubCutaneous three times a day before meals  insulin lispro (ADMELOG) corrective regimen sliding scale   SubCutaneous at bedtime  lactated ringers. 1000 milliLiter(s) (100 mL/Hr) IV Continuous <Continuous>  magnesium oxide 400 milliGRAM(s) Oral three times a day with meals  multivitamin 1 Tablet(s) Oral daily  thiamine IVPB 500 milliGRAM(s) IV Intermittent daily    MEDICATIONS  (PRN):  acetaminophen     Tablet .. 650 milliGRAM(s) Oral every 6 hours PRN Temp greater or equal to 38C (100.4F), Mild Pain (1 - 3)  aluminum hydroxide/magnesium hydroxide/simethicone Suspension 30 milliLiter(s) Oral every 4 hours PRN Dyspepsia  dextrose Oral Gel 15 Gram(s) Oral once PRN Blood Glucose LESS THAN 70 milliGRAM(s)/deciliter  melatonin 3 milliGRAM(s) Oral at bedtime PRN Insomnia  morphine  - Injectable 2 milliGRAM(s) IV Push every 4 hours PRN Moderate Pain (4 - 6)  morphine  - Injectable 4 milliGRAM(s) IV Push every 4 hours PRN Severe Pain (7 - 10)  ondansetron Injectable 4 milliGRAM(s) IV Push every 8 hours PRN Nausea and/or Vomiting      CAPILLARY BLOOD GLUCOSE      POCT Blood Glucose.: 159 mg/dL (12 Oct 2022 09:10)  POCT Blood Glucose.: 199 mg/dL (11 Oct 2022 23:53)  POCT Blood Glucose.: 169 mg/dL (11 Oct 2022 17:18)    I&O's Summary    11 Oct 2022 07:01  -  12 Oct 2022 07:00  --------------------------------------------------------  IN: 0 mL / OUT: 1250 mL / NET: -1250 mL        PHYSICAL EXAM:  Vital Signs Last 24 Hrs  T(C): 37 (12 Oct 2022 09:15), Max: 37.2 (12 Oct 2022 04:02)  T(F): 98.6 (12 Oct 2022 09:15), Max: 98.9 (12 Oct 2022 04:02)  HR: 76 (12 Oct 2022 09:15) (70 - 81)  BP: 202/93 (12 Oct 2022 09:15) (157/67 - 215/96)  BP(mean): --  RR: 18 (12 Oct 2022 09:15) (16 - 19)  SpO2: 98% (12 Oct 2022 09:15) (96% - 100%)    Parameters below as of 12 Oct 2022 09:15  Patient On (Oxygen Delivery Method): room air        CONSTITUTIONAL: NAD, well-developed, well-groomed  EYES: EOMI; conjunctiva and sclera clear  ENMT: Moist oral mucosa  RESPIRATORY: Normal respiratory effort; lungs are clear to auscultation bilaterally  CARDIOVASCULAR: Regular rate and rhythm, normal S1 and S2, no murmur; No lower extremity edema  ABDOMEN: tenderness to palpation in midepigastric region, normoactive bowel sounds, no rebound/guarding  MUSCULOSKELETAL:   no clubbing or cyanosis of digits; no joint swelling or tenderness to palpation  PSYCH: A+O to person, place, and time; affect appropriate  NEUROLOGY: CN 2-12 are intact and symmetric; no gross sensory deficits   SKIN: No rashes; no palpable lesions    LABS:                        16.2   9.01  )-----------( 133      ( 12 Oct 2022 03:00 )             48.0     10-12    136  |  94<L>  |  6<L>  ----------------------------<  176<H>  3.9   |  23  |  0.60    Ca    9.3      12 Oct 2022 03:00  Phos  3.2     1012  Mg     1.50     10-12    TPro  7.3  /  Alb  4.5  /  TBili  0.9  /  DBili  x   /  AST  57<H>  /  ALT  49<H>  /  AlkPhos  85  1012    PT/INR - ( 12 Oct 2022 03:00 )   PT: 12.4 sec;   INR: 1.07 ratio               Urinalysis Basic - ( 11 Oct 2022 20:32 )    Color: Light Yellow / Appearance: Clear / S.034 / pH: x  Gluc: x / Ketone: Moderate  / Bili: Negative / Urobili: <2 mg/dL   Blood: x / Protein: Trace / Nitrite: Negative   Leuk Esterase: Negative / RBC: x / WBC x   Sq Epi: x / Non Sq Epi: x / Bacteria: x        RADIOLOGY & ADDITIONAL TESTS:  Results Reviewed:   Imaging Personally Reviewed:  Electrocardiogram Personally Reviewed:    COORDINATION OF CARE:  Care Discussed with Consultants/Other Providers [Y/N]: Y  Prior or Outpatient Records Reviewed [Y/N]: Y

## 2022-10-13 DIAGNOSIS — Z86.39 PERSONAL HISTORY OF OTHER ENDOCRINE, NUTRITIONAL AND METABOLIC DISEASE: ICD-10-CM

## 2022-10-13 DIAGNOSIS — E13.9 OTHER SPECIFIED DIABETES MELLITUS WITHOUT COMPLICATIONS: ICD-10-CM

## 2022-10-13 LAB
ANION GAP SERPL CALC-SCNC: 14 MMOL/L — SIGNIFICANT CHANGE UP (ref 7–14)
ANION GAP SERPL CALC-SCNC: 18 MMOL/L — HIGH (ref 7–14)
B-OH-BUTYR SERPL-SCNC: 2.6 MMOL/L — HIGH (ref 0–0.4)
B-OH-BUTYR SERPL-SCNC: 3.2 MMOL/L — HIGH (ref 0–0.4)
BILIRUB SERPL-MCNC: 0.9 MG/DL — SIGNIFICANT CHANGE UP (ref 0.2–1.2)
BUN SERPL-MCNC: 10 MG/DL — SIGNIFICANT CHANGE UP (ref 7–23)
BUN SERPL-MCNC: 14 MG/DL — SIGNIFICANT CHANGE UP (ref 7–23)
C PEPTIDE SERPL-MCNC: 0.7 NG/ML — LOW (ref 1.1–4.4)
CALCIUM SERPL-MCNC: 9.3 MG/DL — SIGNIFICANT CHANGE UP (ref 8.4–10.5)
CALCIUM SERPL-MCNC: 9.4 MG/DL — SIGNIFICANT CHANGE UP (ref 8.4–10.5)
CHLORIDE SERPL-SCNC: 95 MMOL/L — LOW (ref 98–107)
CHLORIDE SERPL-SCNC: 97 MMOL/L — LOW (ref 98–107)
CO2 SERPL-SCNC: 20 MMOL/L — LOW (ref 22–31)
CO2 SERPL-SCNC: 25 MMOL/L — SIGNIFICANT CHANGE UP (ref 22–31)
CREAT SERPL-MCNC: 0.69 MG/DL — SIGNIFICANT CHANGE UP (ref 0.5–1.3)
CREAT SERPL-MCNC: 0.8 MG/DL — SIGNIFICANT CHANGE UP (ref 0.5–1.3)
EGFR: 105 ML/MIN/1.73M2 — SIGNIFICANT CHANGE UP
EGFR: 110 ML/MIN/1.73M2 — SIGNIFICANT CHANGE UP
GLUCOSE BLDC GLUCOMTR-MCNC: 111 MG/DL — HIGH (ref 70–99)
GLUCOSE BLDC GLUCOMTR-MCNC: 137 MG/DL — HIGH (ref 70–99)
GLUCOSE BLDC GLUCOMTR-MCNC: 141 MG/DL — HIGH (ref 70–99)
GLUCOSE BLDC GLUCOMTR-MCNC: 146 MG/DL — HIGH (ref 70–99)
GLUCOSE BLDC GLUCOMTR-MCNC: 158 MG/DL — HIGH (ref 70–99)
GLUCOSE BLDC GLUCOMTR-MCNC: 199 MG/DL — HIGH (ref 70–99)
GLUCOSE SERPL-MCNC: 117 MG/DL — HIGH (ref 70–99)
GLUCOSE SERPL-MCNC: 155 MG/DL — HIGH (ref 70–99)
HCT VFR BLD CALC: 48.8 % — SIGNIFICANT CHANGE UP (ref 39–50)
HGB BLD-MCNC: 16.9 G/DL — SIGNIFICANT CHANGE UP (ref 13–17)
INR BLD: 1.15 RATIO — SIGNIFICANT CHANGE UP (ref 0.88–1.16)
MACROCYTES BLD QL: SIGNIFICANT CHANGE UP
MAGNESIUM SERPL-MCNC: 1.6 MG/DL — SIGNIFICANT CHANGE UP (ref 1.6–2.6)
MAGNESIUM SERPL-MCNC: 1.6 MG/DL — SIGNIFICANT CHANGE UP (ref 1.6–2.6)
MANUAL SMEAR VERIFICATION: SIGNIFICANT CHANGE UP
MCHC RBC-ENTMCNC: 31.4 PG — SIGNIFICANT CHANGE UP (ref 27–34)
MCHC RBC-ENTMCNC: 34.6 GM/DL — SIGNIFICANT CHANGE UP (ref 32–36)
MCV RBC AUTO: 90.5 FL — SIGNIFICANT CHANGE UP (ref 80–100)
MELD SCORE WITH DIALYSIS: 22 POINTS — SIGNIFICANT CHANGE UP
MELD SCORE WITHOUT DIALYSIS: 8 POINTS — SIGNIFICANT CHANGE UP
MICROCYTES BLD QL: SLIGHT — SIGNIFICANT CHANGE UP
NEUTS BAND # BLD: 2.6 % — SIGNIFICANT CHANGE UP (ref 0–6)
OVALOCYTES BLD QL SMEAR: SLIGHT — SIGNIFICANT CHANGE UP
PHOSPHATE SERPL-MCNC: 1.6 MG/DL — LOW (ref 2.5–4.5)
PHOSPHATE SERPL-MCNC: 1.7 MG/DL — LOW (ref 2.5–4.5)
PLAT MORPH BLD: NORMAL — SIGNIFICANT CHANGE UP
PLATELET # BLD AUTO: 94 K/UL — LOW (ref 150–400)
PLATELET COUNT - ESTIMATE: ABNORMAL
POTASSIUM SERPL-MCNC: 3.5 MMOL/L — SIGNIFICANT CHANGE UP (ref 3.5–5.3)
POTASSIUM SERPL-MCNC: 3.9 MMOL/L — SIGNIFICANT CHANGE UP (ref 3.5–5.3)
POTASSIUM SERPL-SCNC: 3.5 MMOL/L — SIGNIFICANT CHANGE UP (ref 3.5–5.3)
POTASSIUM SERPL-SCNC: 3.9 MMOL/L — SIGNIFICANT CHANGE UP (ref 3.5–5.3)
PROTHROM AB SERPL-ACNC: 13.4 SEC — SIGNIFICANT CHANGE UP (ref 10.5–13.4)
RBC # BLD: 5.39 M/UL — SIGNIFICANT CHANGE UP (ref 4.2–5.8)
RBC # FLD: 12.9 % — SIGNIFICANT CHANGE UP (ref 10.3–14.5)
RBC BLD AUTO: NORMAL — SIGNIFICANT CHANGE UP
SMUDGE CELLS # BLD: PRESENT — SIGNIFICANT CHANGE UP
SODIUM SERPL-SCNC: 134 MMOL/L — LOW (ref 135–145)
SODIUM SERPL-SCNC: 135 MMOL/L — SIGNIFICANT CHANGE UP (ref 135–145)
VARIANT LYMPHS # BLD: 8 % — HIGH (ref 0–6)
WBC # BLD: 8.7 K/UL — SIGNIFICANT CHANGE UP (ref 3.8–10.5)
WBC # FLD AUTO: 8.7 K/UL — SIGNIFICANT CHANGE UP (ref 3.8–10.5)

## 2022-10-13 PROCEDURE — 99233 SBSQ HOSP IP/OBS HIGH 50: CPT

## 2022-10-13 PROCEDURE — 99232 SBSQ HOSP IP/OBS MODERATE 35: CPT

## 2022-10-13 RX ORDER — INSULIN LISPRO 100/ML
VIAL (ML) SUBCUTANEOUS EVERY 4 HOURS
Refills: 0 | Status: DISCONTINUED | OUTPATIENT
Start: 2022-10-13 | End: 2022-10-14

## 2022-10-13 RX ORDER — SODIUM CHLORIDE 9 MG/ML
1000 INJECTION, SOLUTION INTRAVENOUS
Refills: 0 | Status: DISCONTINUED | OUTPATIENT
Start: 2022-10-13 | End: 2022-10-14

## 2022-10-13 RX ORDER — SODIUM CHLORIDE 9 MG/ML
1000 INJECTION, SOLUTION INTRAVENOUS
Refills: 0 | Status: DISCONTINUED | OUTPATIENT
Start: 2022-10-13 | End: 2022-10-13

## 2022-10-13 RX ORDER — INSULIN GLARGINE 100 [IU]/ML
5 INJECTION, SOLUTION SUBCUTANEOUS ONCE
Refills: 0 | Status: COMPLETED | OUTPATIENT
Start: 2022-10-13 | End: 2022-10-13

## 2022-10-13 RX ORDER — SODIUM,POTASSIUM PHOSPHATES 278-250MG
1 POWDER IN PACKET (EA) ORAL ONCE
Refills: 0 | Status: COMPLETED | OUTPATIENT
Start: 2022-10-13 | End: 2022-10-13

## 2022-10-13 RX ORDER — INSULIN GLARGINE 100 [IU]/ML
10 INJECTION, SOLUTION SUBCUTANEOUS AT BEDTIME
Refills: 0 | Status: DISCONTINUED | OUTPATIENT
Start: 2022-10-13 | End: 2022-10-14

## 2022-10-13 RX ADMIN — Medication 1 MILLIGRAM(S): at 10:32

## 2022-10-13 RX ADMIN — Medication 1: at 10:32

## 2022-10-13 RX ADMIN — Medication 3 MILLIGRAM(S): at 02:17

## 2022-10-13 RX ADMIN — Medication 1: at 19:42

## 2022-10-13 RX ADMIN — SODIUM CHLORIDE 100 MILLILITER(S): 9 INJECTION, SOLUTION INTRAVENOUS at 14:55

## 2022-10-13 RX ADMIN — ENOXAPARIN SODIUM 40 MILLIGRAM(S): 100 INJECTION SUBCUTANEOUS at 11:13

## 2022-10-13 RX ADMIN — Medication 1 TABLET(S): at 10:31

## 2022-10-13 RX ADMIN — MAGNESIUM OXIDE 400 MG ORAL TABLET 400 MILLIGRAM(S): 241.3 TABLET ORAL at 10:32

## 2022-10-13 RX ADMIN — INSULIN GLARGINE 5 UNIT(S): 100 INJECTION, SOLUTION SUBCUTANEOUS at 10:33

## 2022-10-13 RX ADMIN — Medication 1 TABLET(S): at 10:56

## 2022-10-13 RX ADMIN — SODIUM CHLORIDE 100 MILLILITER(S): 9 INJECTION, SOLUTION INTRAVENOUS at 18:42

## 2022-10-13 RX ADMIN — INSULIN GLARGINE 10 UNIT(S): 100 INJECTION, SOLUTION SUBCUTANEOUS at 21:21

## 2022-10-13 RX ADMIN — Medication 105 MILLIGRAM(S): at 10:36

## 2022-10-13 NOTE — PROGRESS NOTE ADULT - SUBJECTIVE AND OBJECTIVE BOX
[facility] Division of Hospital Medicine  Mya Martin MD  Pager (M-F, 8A-5P): [phone]  Other Times:      Patient is a 56y old  Male who presents with a chief complaint of Abdominal pain (12 Oct 2022 16:04)      SUBJECTIVE / OVERNIGHT EVENTS:      MEDICATIONS  (STANDING):  dextrose 5% + lactated ringers. 1000 milliLiter(s) (100 mL/Hr) IV Continuous <Continuous>  dextrose 5%. 1000 milliLiter(s) (100 mL/Hr) IV Continuous <Continuous>  dextrose 5%. 1000 milliLiter(s) (50 mL/Hr) IV Continuous <Continuous>  dextrose 50% Injectable 25 Gram(s) IV Push once  dextrose 50% Injectable 12.5 Gram(s) IV Push once  dextrose 50% Injectable 25 Gram(s) IV Push once  enoxaparin Injectable 40 milliGRAM(s) SubCutaneous every 24 hours  folic acid 1 milliGRAM(s) Oral daily  glucagon  Injectable 1 milliGRAM(s) IntraMuscular once  insulin glargine Injectable (LANTUS) 5 Unit(s) SubCutaneous once  insulin glargine Injectable (LANTUS) 8 Unit(s) SubCutaneous at bedtime  insulin lispro (ADMELOG) corrective regimen sliding scale   SubCutaneous three times a day before meals  insulin lispro (ADMELOG) corrective regimen sliding scale   SubCutaneous at bedtime  magnesium oxide 400 milliGRAM(s) Oral three times a day with meals  multivitamin 1 Tablet(s) Oral daily  potassium phosphate / sodium phosphate Tablet (K-PHOS No. 2) 1 Tablet(s) Oral once  thiamine IVPB 500 milliGRAM(s) IV Intermittent daily    MEDICATIONS  (PRN):  acetaminophen     Tablet .. 650 milliGRAM(s) Oral every 6 hours PRN Temp greater or equal to 38C (100.4F), Mild Pain (1 - 3)  aluminum hydroxide/magnesium hydroxide/simethicone Suspension 30 milliLiter(s) Oral every 4 hours PRN Dyspepsia  dextrose Oral Gel 15 Gram(s) Oral once PRN Blood Glucose LESS THAN 70 milliGRAM(s)/deciliter  melatonin 3 milliGRAM(s) Oral at bedtime PRN Insomnia  morphine  - Injectable 2 milliGRAM(s) IV Push every 4 hours PRN Moderate Pain (4 - 6)  morphine  - Injectable 4 milliGRAM(s) IV Push every 4 hours PRN Severe Pain (7 - 10)  ondansetron Injectable 4 milliGRAM(s) IV Push every 8 hours PRN Nausea and/or Vomiting      CAPILLARY BLOOD GLUCOSE      POCT Blood Glucose.: 158 mg/dL (13 Oct 2022 09:42)  POCT Blood Glucose.: 146 mg/dL (13 Oct 2022 04:51)  POCT Blood Glucose.: 187 mg/dL (12 Oct 2022 21:45)  POCT Blood Glucose.: 174 mg/dL (12 Oct 2022 17:15)  POCT Blood Glucose.: 172 mg/dL (12 Oct 2022 13:11)    I&O's Summary    12 Oct 2022 07:01  -  13 Oct 2022 07:00  --------------------------------------------------------  IN: 0 mL / OUT: 400 mL / NET: -400 mL        PHYSICAL EXAM:  Vital Signs Last 24 Hrs  T(C): 36.8 (13 Oct 2022 06:50), Max: 36.8 (12 Oct 2022 18:24)  T(F): 98.3 (13 Oct 2022 06:50), Max: 98.3 (12 Oct 2022 18:24)  HR: 76 (13 Oct 2022 06:50) (76 - 89)  BP: 151/83 (13 Oct 2022 06:50) (151/83 - 184/98)  BP(mean): --  RR: 16 (13 Oct 2022 06:50) (16 - 19)  SpO2: 98% (13 Oct 2022 06:50) (98% - 98%)    Parameters below as of 13 Oct 2022 06:50  Patient On (Oxygen Delivery Method): room air        CONSTITUTIONAL: NAD, well-developed, well-groomed  EYES: EOMI; conjunctiva and sclera clear  ENMT: Moist oral mucosa  RESPIRATORY: Normal respiratory effort; lungs are clear to auscultation bilaterally  CARDIOVASCULAR: Regular rate and rhythm, normal S1 and S2, no murmur; No lower extremity edema  ABDOMEN: Nontender to palpation, normoactive bowel sounds, no rebound/guarding  MUSCULOSKELETAL:   no clubbing or cyanosis of digits; no joint swelling or tenderness to palpation  PSYCH: A+O to person, place, and time; affect appropriate  NEUROLOGY: CN 2-12 are intact and symmetric; no gross sensory deficits   SKIN: No rashes; no palpable lesions    LABS:                        16.9   8.70  )-----------( 94       ( 13 Oct 2022 05:45 )             48.8     10-13    135  |  97<L>  |  10  ----------------------------<  155<H>  3.9   |  20<L>  |  0.69    Ca    9.4      13 Oct 2022 05:45  Phos  1.7     10-13  Mg     1.60     10-13    TPro  x   /  Alb  x   /  TBili  0.9  /  DBili  x   /  AST  x   /  ALT  x   /  AlkPhos  x   10-13    PT/INR - ( 13 Oct 2022 05:45 )   PT: 13.4 sec;   INR: 1.15 ratio               Urinalysis Basic - ( 11 Oct 2022 20:32 )    Color: Light Yellow / Appearance: Clear / S.034 / pH: x  Gluc: x / Ketone: Moderate  / Bili: Negative / Urobili: <2 mg/dL   Blood: x / Protein: Trace / Nitrite: Negative   Leuk Esterase: Negative / RBC: x / WBC x   Sq Epi: x / Non Sq Epi: x / Bacteria: x        Culture - Urine (collected 11 Oct 2022 20:30)  Source: Catheterized Catheterized  Final Report (12 Oct 2022 23:30):    No growth      RADIOLOGY & ADDITIONAL TESTS:  Results Reviewed:   Imaging Personally Reviewed:  Electrocardiogram Personally Reviewed:    COORDINATION OF CARE:  Care Discussed with Consultants/Other Providers [Y/N]: Y  Prior or Outpatient Records Reviewed [Y/N]: Y   [facility] Medicine  Mya Martin MD  Pager (M-F, 8A-5P): [phone]  Other Times:      Patient is a 56y old  Male who presents with a chief complaint of Abdominal pain (12 Oct 2022 16:04)    SUBJECTIVE / OVERNIGHT EVENTS:  Pt's abd pain improved - denies n/v.      MEDICATIONS  (STANDING):  dextrose 5% + lactated ringers. 1000 milliLiter(s) (100 mL/Hr) IV Continuous <Continuous>  dextrose 5%. 1000 milliLiter(s) (100 mL/Hr) IV Continuous <Continuous>  dextrose 5%. 1000 milliLiter(s) (50 mL/Hr) IV Continuous <Continuous>  dextrose 50% Injectable 25 Gram(s) IV Push once  dextrose 50% Injectable 12.5 Gram(s) IV Push once  dextrose 50% Injectable 25 Gram(s) IV Push once  enoxaparin Injectable 40 milliGRAM(s) SubCutaneous every 24 hours  folic acid 1 milliGRAM(s) Oral daily  glucagon  Injectable 1 milliGRAM(s) IntraMuscular once  insulin glargine Injectable (LANTUS) 5 Unit(s) SubCutaneous once  insulin glargine Injectable (LANTUS) 8 Unit(s) SubCutaneous at bedtime  insulin lispro (ADMELOG) corrective regimen sliding scale   SubCutaneous three times a day before meals  insulin lispro (ADMELOG) corrective regimen sliding scale   SubCutaneous at bedtime  magnesium oxide 400 milliGRAM(s) Oral three times a day with meals  multivitamin 1 Tablet(s) Oral daily  potassium phosphate / sodium phosphate Tablet (K-PHOS No. 2) 1 Tablet(s) Oral once  thiamine IVPB 500 milliGRAM(s) IV Intermittent daily    MEDICATIONS  (PRN):  acetaminophen     Tablet .. 650 milliGRAM(s) Oral every 6 hours PRN Temp greater or equal to 38C (100.4F), Mild Pain (1 - 3)  aluminum hydroxide/magnesium hydroxide/simethicone Suspension 30 milliLiter(s) Oral every 4 hours PRN Dyspepsia  dextrose Oral Gel 15 Gram(s) Oral once PRN Blood Glucose LESS THAN 70 milliGRAM(s)/deciliter  melatonin 3 milliGRAM(s) Oral at bedtime PRN Insomnia  morphine  - Injectable 2 milliGRAM(s) IV Push every 4 hours PRN Moderate Pain (4 - 6)  morphine  - Injectable 4 milliGRAM(s) IV Push every 4 hours PRN Severe Pain (7 - 10)  ondansetron Injectable 4 milliGRAM(s) IV Push every 8 hours PRN Nausea and/or Vomiting      CAPILLARY BLOOD GLUCOSE      POCT Blood Glucose.: 158 mg/dL (13 Oct 2022 09:42)  POCT Blood Glucose.: 146 mg/dL (13 Oct 2022 04:51)  POCT Blood Glucose.: 187 mg/dL (12 Oct 2022 21:45)  POCT Blood Glucose.: 174 mg/dL (12 Oct 2022 17:15)  POCT Blood Glucose.: 172 mg/dL (12 Oct 2022 13:11)    I&O's Summary    12 Oct 2022 07:01  -  13 Oct 2022 07:00  --------------------------------------------------------  IN: 0 mL / OUT: 400 mL / NET: -400 mL        PHYSICAL EXAM:  Vital Signs Last 24 Hrs  T(C): 36.8 (13 Oct 2022 06:50), Max: 36.8 (12 Oct 2022 18:24)  T(F): 98.3 (13 Oct 2022 06:50), Max: 98.3 (12 Oct 2022 18:24)  HR: 76 (13 Oct 2022 06:50) (76 - 89)  BP: 151/83 (13 Oct 2022 06:50) (151/83 - 184/98)  BP(mean): --  RR: 16 (13 Oct 2022 06:50) (16 - 19)  SpO2: 98% (13 Oct 2022 06:50) (98% - 98%)    Parameters below as of 13 Oct 2022 06:50  Patient On (Oxygen Delivery Method): room air    CONSTITUTIONAL: NAD, well-developed, well-groomed  EYES: EOMI; conjunctiva and sclera clear  ENMT: Moist oral mucosa  RESPIRATORY: Normal respiratory effort; lungs are clear to auscultation bilaterally  CARDIOVASCULAR: Regular rate and rhythm, normal S1 and S2, no murmur; No lower extremity edema  ABDOMEN: Nontender to palpation, normoactive bowel sounds, no rebound/guarding  MUSCULOSKELETAL:   no clubbing or cyanosis of digits; no joint swelling or tenderness to palpation  PSYCH: A+O to person, place, and time; affect appropriate  NEUROLOGY: CN 2-12 are intact and symmetric; no gross sensory deficits   SKIN: No rashes; no palpable lesions    LABS:                        16.9   8.70  )-----------( 94       ( 13 Oct 2022 05:45 )             48.8     10-13    135  |  97<L>  |  10  ----------------------------<  155<H>  3.9   |  20<L>  |  0.69    Ca    9.4      13 Oct 2022 05:45  Phos  1.7     10-13  Mg     1.60     10-13    TPro  x   /  Alb  x   /  TBili  0.9  /  DBili  x   /  AST  x   /  ALT  x   /  AlkPhos  x   10-13    PT/INR - ( 13 Oct 2022 05:45 )   PT: 13.4 sec;   INR: 1.15 ratio               Urinalysis Basic - ( 11 Oct 2022 20:32 )    Color: Light Yellow / Appearance: Clear / S.034 / pH: x  Gluc: x / Ketone: Moderate  / Bili: Negative / Urobili: <2 mg/dL   Blood: x / Protein: Trace / Nitrite: Negative   Leuk Esterase: Negative / RBC: x / WBC x   Sq Epi: x / Non Sq Epi: x / Bacteria: x        Culture - Urine (collected 11 Oct 2022 20:30)  Source: Catheterized Catheterized  Final Report (12 Oct 2022 23:30):    No growth      RADIOLOGY & ADDITIONAL TESTS:  Results Reviewed:   Imaging Personally Reviewed:  Electrocardiogram Personally Reviewed:    COORDINATION OF CARE:  Care Discussed with Consultants/Other Providers [Y/N]: Y  Prior or Outpatient Records Reviewed [Y/N]: Y

## 2022-10-13 NOTE — PROGRESS NOTE ADULT - PROBLEM SELECTOR PLAN 2
Currently without withdrawals or hx of withdrawals.  Pt drinks 10 cans of beers daily as per pt and wife - for the last several years.    S/p 1 dose of diazepam in the ER   CIWA monitor for now  Thiamine IV
Currently without withdrawals or hx of withdrawals.  Pt drinks 10 cans of beers daily as per pt and wife - for the last several years.    S/p 1 dose of diazepam in the ER   CIWA monitor for now  Thiamine IV

## 2022-10-13 NOTE — PROGRESS NOTE ADULT - PROBLEM SELECTOR PLAN 1
Hx of ETOH with mild transaminase likely the cause of acute pancreatitis. No signs of gallstone on CT  -Pain control with morphine PRN  -If pain does not improve, will consider US for cholecystitis  -- advance diet to clears
Hx of ETOH with mild transaminase likely the cause of acute pancreatitis. No signs of gallstone on CT  -Pain control with morphine PRN  -LR 100cc/hr  -If pain does not improve, will consider US for cholecystitis  - would obtain GI eval

## 2022-10-13 NOTE — PROGRESS NOTE ADULT - PROBLEM SELECTOR PLAN 4
Currently not on meds at home  - A1c of 9.4  - appreciate endo recs - will need basal bolus regimen   - follow [facility]OB for mild DKA or ketosis - will give ivf and trend
Currently not on meds at home  - A1c of 9.4  - would obtain endo eval

## 2022-10-13 NOTE — PATIENT PROFILE ADULT - FUNCTIONAL ASSESSMENT - BASIC MOBILITY SCORE.
Problem: Pain  Goal: #Acceptable pain level achieved/maintained at rest using NRS/Faces  Description: This goal is used for patients who can self-report.  Acceptable means the level is at or below the identified comfort/function goal.  Outcome: Outcome Met, Continue evaluating goal progress toward completion     
24

## 2022-10-13 NOTE — PROGRESS NOTE ADULT - ASSESSMENT
56 year old man with history of СЕРГЕЙ presenting to the hospital with pancreatitis.     1. СЕРГЕЙ   12/2021 A1c 15.3% (newly diagnosed DM, discharged on basal/bolus insulin), 6/2022: A1c 6.4% (taken off DM medications by his outpatient provider), 10/2022 A1c 9.4% (mild DKA, +ZnT8)  Patient diagnosed last visit in December with new onset diabetes when he presented in the setting of DKA. He was discharged on basal bolus regimen. He saw outpatient endo and his A1c improved to 6.4% most recently in 6/2022 and was told to stop all medications. His ZnT8 antibody had resulted positive in December 2021 with negative LEONARD and islet cell antibody.   Cpeptide resulted LOW 0.7 ()    While inpatient:  BG target 100-180 mg/dl  Discussed with primary team - recommended to obtain patient's weight. Give additional Lantus 5 units this morning. Start on low dose dextrose fluids. Repeat labs at 1 PM today  For tonight - recommend Lantus 10 units at bedtime (although suspect patient needs more - he was requiring about 20-24 units last admission- will only be able to increase dose more aggressively if on D5 or restarted on diet, his FS have been relatively well controlled while NPO)  Admelog LOW dose correctional scale before meals and bedtime  For today while, can do Admelog low dose scale q4h   Check BG q4h while NPO, before meals and bedtime when on diet  Consistent carbohydrate diet consideration  Hypoglycemia protocol     Discharge Plan:  Basal/bolus insulin PENS with dose TBD  Can resume use of Freestyle Geronimo CGM  Ensure he has glucometer, glucose test strips, lancets, alcohol swabs and insulin pen needles   Followup with [address] [place] [address], [address], [facility] [address], [phone]    2. Ketosis/Mild DKA/Elevated BHB   Likely combination: Secondary to starvation ketosis (patient has not had food in at least 48 hours) and element of mild DKA since patient was without insulin last few months (bicarb 18, AG 22 and BHB 4.0, moderate ketones in urine on admit). Lactic acid also noted to be elevated on admission to 5.2 (now improved)  For today, recommended to start low dose dextrose fluids and give additional dose Lantus (see above)  Continue with aggressive IVF for pancreatitis after D5 infusion complete  Do not hold basal insulin, low endogenous pancreatic function puts patient at risk for DKA    3. Hypertension  BP goal <130/80, above goal currently  Not on antihypertensives, consider starting ACE/ARB, defer to primary team     4. Hyperlipidemia  LDL target less than 70  Check fasting lipid panel  May benefit from statin, although would monitor LFTs (currently slightly elevated)     Reviewed with primary team  Mere Levine  Nurse Practitioner  Division of Endocrinology & Diabetes  In house pager #[phone]/long range pager #[phone]    If before 9AM or after 6PM, or on weekends/holidays, please call endocrine answering service for assistance ([phone]).  For nonurgent matters email [email or web address] for assistance.   
56-year-old male with past medical history of alcohol use disorder (without ETOH withdrawals per patient), Hx of IDDM with past admission for DKA but now off of insulin as his glucose is well controlled per patient now presents to the ED complaining acute onset constant sharp lower abdominal pain admitted for acute pancreatitis management.
56-year-old male with past medical history of alcohol use disorder (without ETOH withdrawals per patient), Hx of IDDM with past admission for DKA but now off of insulin as his glucose is well controlled per patient now presents to the ED complaining acute onset constant sharp lower abdominal pain admitted for acute pancreatitis management.

## 2022-10-13 NOTE — PATIENT PROFILE ADULT - FALL HARM RISK - HARM RISK INTERVENTIONS
Assistance with ambulation/Assistance OOB with selected safe patient handling equipment/Communicate Risk of Fall with Harm to all staff/Monitor for mental status changes/Monitor gait and stability/Reinforce activity limits and safety measures with patient and family/Tailored Fall Risk Interventions/Toileting schedule using arm’s reach rule for commode and bathroom/Use of alarms - bed, chair and/or voice tab/Visual Cue: Yellow wristband and red socks/Bed in lowest position, wheels locked, appropriate side rails in place/Call bell, personal items and telephone in reach/Instruct patient to call for assistance before getting out of bed or chair/Non-slip footwear when patient is out of bed/[place] to call system/Physically safe environment - no spills, clutter or unnecessary equipment/Purposeful Proactive Rounding/Room/bathroom lighting operational, light cord in reach

## 2022-10-13 NOTE — PROGRESS NOTE ADULT - SUBJECTIVE AND OBJECTIVE BOX
Chief Complaint: DM / pancreatitis     History: Patient seen at bedside in [facility]. Patient remains NPO, denies nausea/vomiting or abdominal pain  Noted this AM that AG and BHB remains elevated although FS stable, patient given additional 5 units of Lantus and started on dextrose IVF  Pending repeat labs at 1300 today  Cpeptide resulted LOW at 0.7  Reviewed with patient that he will need to remain on insulin as outpatient, given his low cpeptide level and positive Zinc transporter 8 antibody. He is ok with this. Reports he has remaining basal and bolus insulin pens refrigerated at home (although unsure of when they are expiring). Also reports he has been using Freestyle G[employer] CGM at home    MEDICATIONS  (STANDING):  dextrose 5% + lactated ringers. 1000 milliLiter(s) (100 mL/Hr) IV Continuous <Continuous>  dextrose 5%. 1000 milliLiter(s) (50 mL/Hr) IV Continuous <Continuous>  dextrose 5%. 1000 milliLiter(s) (100 mL/Hr) IV Continuous <Continuous>  dextrose 50% Injectable 25 Gram(s) IV Push once  dextrose 50% Injectable 12.5 Gram(s) IV Push once  dextrose 50% Injectable 25 Gram(s) IV Push once  enoxaparin Injectable 40 milliGRAM(s) SubCutaneous every 24 hours  folic acid 1 milliGRAM(s) Oral daily  glucagon  Injectable 1 milliGRAM(s) IntraMuscular once  insulin glargine Injectable (LANTUS) 8 Unit(s) SubCutaneous at bedtime  insulin lispro (ADMELOG) corrective regimen sliding scale   SubCutaneous three times a day before meals  insulin lispro (ADMELOG) corrective regimen sliding scale   SubCutaneous at bedtime  multivitamin 1 Tablet(s) Oral daily  thiamine IVPB 500 milliGRAM(s) IV Intermittent daily    MEDICATIONS  (PRN):  acetaminophen     Tablet .. 650 milliGRAM(s) Oral every 6 hours PRN Temp greater or equal to 38C (100.4F), Mild Pain (1 - 3)  aluminum hydroxide/magnesium hydroxide/simethicone Suspension 30 milliLiter(s) Oral every 4 hours PRN Dyspepsia  dextrose Oral Gel 15 Gram(s) Oral once PRN Blood Glucose LESS THAN 70 milliGRAM(s)/deciliter  melatonin 3 milliGRAM(s) Oral at bedtime PRN Insomnia  morphine  - Injectable 2 milliGRAM(s) IV Push every 4 hours PRN Moderate Pain (4 - 6)  morphine  - Injectable 4 milliGRAM(s) IV Push every 4 hours PRN Severe Pain (7 - 10)  ondansetron Injectable 4 milliGRAM(s) IV Push every 8 hours PRN Nausea and/or Vomiting    No Known Allergies    Review of Systems:  Cardiovascular: No chest pain  Respiratory: No SOB  GI: No nausea, vomiting  Endocrine: no hypoglycemia     PHYSICAL EXAM:  VITALS: T(C): 36.8 (10-13-22 @ 10:30)  T(F): 98.3 (10-13-22 @ 10:30), Max: 98.3 (10-12-22 @ 18:24)  HR: 76 (10-13-22 @ 10:30) (76 - 86)  BP: 147/87 (10-13-22 @ 10:30) (147/87 - 184/98)  RR:  (16 - 19)  SpO2:  (97% - 98%)  Wt(kg): --  GENERAL: NAD  EYES: No proptosis, no lid lag, anicteric  HEENT:  Atraumatic, Normocephalic, moist mucous membranes  RESPIRATORY: unlabored respirations     CAPILLARY BLOOD GLUCOSE    POCT Blood Glucose.: 137 mg/dL (13 Oct 2022 12:30)  POCT Blood Glucose.: 158 mg/dL (13 Oct 2022 09:42)  POCT Blood Glucose.: 146 mg/dL (13 Oct 2022 04:51)  POCT Blood Glucose.: 187 mg/dL (12 Oct 2022 21:45)  POCT Blood Glucose.: 174 mg/dL (12 Oct 2022 17:15)  POCT Blood Glucose.: 172 mg/dL (12 Oct 2022 13:11)      10-13    135  |  97<L>  |  10  ----------------------------<  155<H>  3.9   |  20<L>  |  0.69    eGFR: 110    Ca    9.4      10-13  Mg     1.60     10-13  Phos  1.7     10-13    TPro  x   /  Alb  x   /  TBili  0.9  /  DBili  x   /  AST  x   /  ALT  x   /  AlkPhos  x   10-13      A1C with Estimated Average Glucose Result: 9.4 % (10-11-22 @ 18:49)  A1C with Estimated Average Glucose Result: 15.3 % (12-14-21 @ 01:53)    Diet, NPO (10-11-22 @ 23:42) [Active]

## 2022-10-14 VITALS
RESPIRATION RATE: 18 BRPM | DIASTOLIC BLOOD PRESSURE: 73 MMHG | HEART RATE: 87 BPM | OXYGEN SATURATION: 98 % | SYSTOLIC BLOOD PRESSURE: 116 MMHG | TEMPERATURE: 99 F

## 2022-10-14 PROBLEM — E11.9 TYPE 2 DIABETES MELLITUS WITHOUT COMPLICATIONS: Chronic | Status: ACTIVE | Noted: 2022-10-12

## 2022-10-14 PROCEDURE — 99239 HOSP IP/OBS DSCHRG MGMT >30: CPT

## 2022-10-14 NOTE — DISCHARGE NOTE PROVIDER - HOSPITAL COURSE
56-year-old male with past medical history of alcohol use disorder (without ETOH withdrawals per patient), Hx of IDDM with past admission for DKA but now off of insulin as his glucose is well controlled per patient now presents to the ED complaining acute onset constant sharp lower abdominal pain admitted for acute pancreatitis management.     Acute pancreatitis.   -Hx of ETOH with mild transaminase likely the cause of acute pancreatitis. No signs of gallstone on CT  -Pain control with morphine PRN  -If pain does not improve, will consider US for cholecystitis  - advance diet to clears.     Alcohol dependence with withdrawal.   - Currently without withdrawals or hx of withdrawals.  Pt drinks 10 cans of beers daily as per pt and wife - for the last several years.    S/p 1 dose of diazepam in the ER   CIWA monitor for now. Currently scoring 0 on CIWA   Thiamine IV    Hepatic steatosis.   - Likely 2/2 from ETOH  -Outpatient follow up    Diabetes mellitus.   - Currently not on meds at home  - A1c of 9.4  - House endocrinology following and recommending basal bolus regimen.   - Trending BHB, BMP and VBG. Monitoring Anion gap which is now closed.   - Patient decided to leave against medical advice this evening. When discussed with patient regarding diabetic medications, states " I have all my diabetic supplies at home, my wife is making up a follow up appointment for me with my old endocrinologist." Informed patient to wait for provider to call "on call" endocrinology regarding discharge dose of insulin and other diabetic medications. Patient expressed that he does not want to wait and would like to leave now. Educated patient on risks and benefits of leaving against medical advise including worsening of condition and death. Shows understanding via teach back method. Informed patient that if he has worsening s/s at home to come back to the hospital and seek medical attention.     RYAN Solorzano  Department of Medicine   In House # [id number]         56-year-old male with past medical history of alcohol use disorder (without ETOH withdrawals per patient), Hx of IDDM with past admission for DKA but now off of insulin as his glucose is well controlled per patient now presents to the ED complaining acute onset constant sharp lower abdominal pain admitted for acute pancreatitis management.     Acute pancreatitis.   -Hx of ETOH with mild transaminase likely the cause of acute pancreatitis. No signs of gallstone on CT  -Pain control with morphine PRN  -If pain does not improve, will consider US for cholecystitis  - advance diet to clears.     Alcohol dependence with withdrawal.   - Currently without withdrawals or hx of withdrawals.  Pt drinks 10 cans of beers daily as per pt and wife - for the last several years.    S/p 1 dose of diazepam in the ER   CIWA monitor for now. Currently scoring 0 on CIWA   Thiamine IV    Hepatic steatosis.   - Likely 2/2 from ETOH  -Outpatient follow up    Diabetes mellitus.   - Currently not on meds at home  - A1c of 9.4  - House endocrinology following and recommending basal bolus regimen.   - Trending BHB, BMP and VBG. Monitoring Anion gap which is now closed.   - Patient decided to leave against medical advice this evening. When discussed with patient regarding diabetic medications, states " I have all my diabetic supplies at home, my wife is making up a follow up appointment for me with my old endocrinologist." Informed patient to wait for provider to call "on call" endocrinology regarding discharge dose of insulin and other diabetic medications. Patient expressed that he does not want to wait and would like to leave now. Educated patient on risks and benefits of leaving against medical advise including worsening of condition and death. Verbalized understanding via teach back method. Informed patient that if he has worsening s/s at home to come back to the hospital and seek medical attention.     RYAN Solorzano  Department of Medicine   In House # [phone]         Patient decided to leave against medical advice overnight. Patient states that he can't get any rest here and no longer wants to remain admitted. 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 Edcuated patient that once he leaves unit it is considered leaving AMA. Offered to find alternate room/unit for patient however adamant on leaving. When discussed with patient regarding diabetic medications, states " I have all my diabetic supplies at home, my wife is making up a follow up appointment for me with my old endocrinologist." Informed patient to wait for provider to at least call "on call" endocrinology regarding discharge dose of insulin and other diabetic medications. Patient expressed that he does not want to wait and would like to leave now. Educated patient on risks and benefits of leaving against medical advise including worsening of condition, risk of going into DKA again, worsening pancreatis and death. Verbalized understanding of risks via teach back method. Pt is A&Ox4 at this time. Most recent , AG closed, BHB trending down. Informed patient that if he has worsening s/s at home to come back to the hospital and seek medical attention. Overnight [facility] [id number] Dr. Aparicio made aware of above.     56-year-old male with past medical history of alcohol use disorder (without ETOH withdrawals per patient), Hx of IDDM with past admission for DKA but now off of insulin as his glucose is well controlled per patient now presents to the ED complaining acute onset constant sharp lower abdominal pain admitted for acute pancreatitis management.     Acute pancreatitis.   -Hx of ETOH with mild transaminase likely the cause of acute pancreatitis. No signs of gallstone on CT  -Pain control with morphine PRN  -If pain does not improve, will consider US for cholecystitis  - advance diet to clears.     Alcohol dependence with withdrawal.   - Currently without withdrawals or hx of withdrawals.  Pt drinks 10 cans of beers daily as per pt and wife - for the last several years.    S/p 1 dose of diazepam in the ER   CIWA monitor for now. Currently scoring 0 on CIWA   Thiamine IV    Hepatic steatosis.   - Likely 2/2 from ETOH  -Outpatient follow up    Diabetes mellitus.   - Currently not on meds at home  - A1c of 9.4  - House endocrinology following and recommending basal bolus regimen, discharge plan TBD   - Trending BHB, BMP and VBG. Monitoring Anion gap which is now closed.       RYAN Solorzano  Department of Medicine   In House # [phone]         Patient decided to leave against medical advice overnight. Patient states that he can't get any rest here and no longer wants to remain admitted. 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 Educated patient that once he leaves unit it is considered leaving AMA. Offered to find alternate room/unit for patient however adamant on leaving. When discussed with patient regarding diabetic medications, states " I have all my diabetic supplies at home, my wife is making up a follow up appointment for me with my old endocrinologist." Informed patient to wait for provider to at least call "on call" endocrinology regarding discharge dose of insulin and other diabetic medications to send to pharmacy, however patient expressed that he is not waiting and would like to leave now. Educated patient on risks and benefits of leaving against medical advise including worsening of condition, risk of going into DKA again, worsening pancreatis and death. Verbalized understanding of risks via teach back method. Pt is A&Ox4 at this time. Most recent , AG closed, BHB trending down. Also with hx of ETOH abuse, last CIWA score 0, does not appear in acute withdrawal at this time. Informed patient that if he has worsening s/s at home to come back to the hospital and seek medical attention. Pt advised to follow up with endo outpatient. PIV removed by RN. Overnight [facility] [id number] Dr. Aparicio made aware of above.    56-year-old male with past medical history of alcohol use disorder (without ETOH withdrawals per patient), Hx of IDDM with past admission for DKA but now off of insulin as his glucose is well controlled per patient now presents to the ED complaining acute onset constant sharp lower abdominal pain admitted for acute pancreatitis management.     Acute pancreatitis.   -Hx of ETOH with mild transaminase likely the cause of acute pancreatitis. No signs of gallstone on CT  -Pain control with morphine PRN  -If pain does not improve, will consider US for cholecystitis  - advance diet to clears.     Alcohol dependence with withdrawal.   - Currently without withdrawals or hx of withdrawals.  Pt drinks 10 cans of beers daily as per pt and wife - for the last several years.    S/p 1 dose of diazepam in the ER   CIWA monitor for now. Currently scoring 0 on CIWA   Thiamine IV    Hepatic steatosis.   - Likely 2/2 from ETOH  -Outpatient follow up    Diabetes mellitus.   - Currently not on meds at home  - A1c of 9.4  - House endocrinology following and recommending basal bolus regimen, discharge plan TBD   - Trending BHB, BMP and VBG. Monitoring Anion gap which is now closed.       RYAN Solorzano  Department of Medicine   In House # [id number]

## 2022-10-14 NOTE — CHART NOTE - NSCHARTNOTEFT_GEN_A_CORE
Patient decided to leave against medical advice overnight. Patient states that he can't get any rest here and no longer wants to remain admitted. 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 Educated patient that once he leaves unit it is considered leaving AMA. Offered to find alternate room/unit for patient however adamant on leaving. When discussed with patient regarding diabetic medications, states " I have all my diabetic supplies at home, my wife is making up a follow up appointment for me with my old endocrinologist." Informed patient to wait for provider to at least call "on call" endocrinology regarding discharge dose of insulin and other diabetic medications to send to pharmacy, however patient expressed that he is not waiting and would like to leave now. Educated patient on risks and benefits of leaving against medical advise including worsening of condition, risk of going into DKA again, worsening pancreatis and death. Verbalized understanding of risks via teach back method. Pt is A&Ox4 at this time. Most recent , AG closed, BHB trending down. Informed patient that if he has worsening s/s at home to come back to the hospital and seek medical attention. Pt advised to follow up with endo outpatient. Overnight [facility] [id number] Dr. Aparicio made aware of above. Patient decided to leave against medical advice overnight. Patient states that he can't get any rest here and no longer wants to remain admitted. 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 Educated patient that once he leaves unit it is considered leaving AMA. Offered to find alternate room/unit for patient however adamant on leaving. When discussed with patient regarding diabetic medications, states " I have all my diabetic supplies at home, my wife is making up a follow up appointment for me with my old endocrinologist." Informed patient to wait for provider to at least call "on call" endocrinology regarding discharge dose of insulin and other diabetic medications to send to pharmacy, however patient expressed that he is not waiting and would like to leave now. Educated patient on risks and benefits of leaving against medical advise including worsening of condition, risk of going into DKA again, worsening pancreatis and death. Verbalized understanding of risks via teach back method. Pt is A&Ox4 at this time. Most recent , AG closed, BHB trending down. Also with hx of ETOH abuse, last CIWA score 0, does not appear in acute withdrawal at this time. Informed patient that if he has worsening s/s at home to come back to the hospital and seek medical attention. Pt advised to follow up with endo outpatient. PIV removed by RN. Overnight [facility] [id number] Dr. Aparicio made aware of above.

## 2022-10-17 ENCOUNTER — APPOINTMENT (OUTPATIENT)
Dept: INTERNAL MEDICINE | Facility: CLINIC | Age: 57
End: 2022-10-17

## 2022-10-17 VITALS
SYSTOLIC BLOOD PRESSURE: 153 MMHG | WEIGHT: 164.38 LBS | DIASTOLIC BLOOD PRESSURE: 84 MMHG | BODY MASS INDEX: 24.91 KG/M2 | HEIGHT: 68 IN

## 2022-10-17 DIAGNOSIS — Z78.9 OTHER SPECIFIED HEALTH STATUS: ICD-10-CM

## 2022-10-17 PROCEDURE — 99496 TRANSJ CARE MGMT HIGH F2F 7D: CPT | Mod: 25

## 2022-10-17 PROCEDURE — 36415 COLL VENOUS BLD VENIPUNCTURE: CPT

## 2022-10-17 RX ORDER — REPAGLINIDE 0.5 MG/1
0.5 TABLET ORAL 3 TIMES DAILY
Qty: 90 | Refills: 2 | Status: DISCONTINUED | COMMUNITY
Start: 2022-04-04 | End: 2022-10-17

## 2022-10-18 NOTE — HISTORY OF PRESENT ILLNESS
[Admitted on: ___] : The patient was admitted on [unfilled] [Discharged on ___] : discharged on [unfilled] [Discharge Summary] : discharge summary [Pertinent Labs] : pertinent labs [Patient Contacted By: ____] : and contacted by [unfilled] [FreeTextEntry2] : 56 year old male who was recently Admitted to [facility] with pancreatitis. He has no more symptoms, he left [facility] on friday. His sugars are high. \par He hasn’t drank since. \par \par His a1c was 9.5 in the hospital. \par He is back on insulin. \par He is taking the basaglar and one more he is unsure of?

## 2022-10-18 NOTE — PHYSICAL EXAM
[Normal] : affect was normal and insight and judgment were intact [[id number] - High Complexity requires an extensive number of possible diagnoses and/or the management options, extensive complexity of the medical data (tests, etc.) to be reviewed, and a high risk of significant complications, morbidity, and/or mortality as w] : High Complexity

## 2022-10-18 NOTE — ASSESSMENT
[FreeTextEntry1] : Pancreatitis: fu if needed, pt doing well \par \par Abstain from drinking\par \par will recheck platelets\par \par fu 1 month and will recheck other labs \par \par \par \par

## 2022-10-20 LAB
BASOPHILS # BLD AUTO: 0.03 K/UL
BASOPHILS NFR BLD AUTO: 0.8 %
EOSINOPHIL # BLD AUTO: 0.04 K/UL
EOSINOPHIL NFR BLD AUTO: 1.1 %
HCT VFR BLD CALC: 36.6 %
HGB BLD-MCNC: 12.4 G/DL
IMM GRANULOCYTES NFR BLD AUTO: 0.3 %
LYMPHOCYTES # BLD AUTO: 1.41 K/UL
LYMPHOCYTES NFR BLD AUTO: 37.5 %
MAN DIFF?: NORMAL
MCHC RBC-ENTMCNC: 31.2 PG
MCHC RBC-ENTMCNC: 33.9 GM/DL
MCV RBC AUTO: 92.2 FL
MONOCYTES # BLD AUTO: 0.57 K/UL
MONOCYTES NFR BLD AUTO: 15.2 %
NEUTROPHILS # BLD AUTO: 1.7 K/UL
NEUTROPHILS NFR BLD AUTO: 45.1 %
PLATELET # BLD AUTO: 131 K/UL
RBC # BLD: 3.97 M/UL
RBC # FLD: 12.5 %
WBC # FLD AUTO: 3.76 K/UL

## 2022-12-23 ENCOUNTER — APPOINTMENT (OUTPATIENT)
Dept: ENDOCRINOLOGY | Facility: CLINIC | Age: 57
End: 2022-12-23

## 2022-12-25 ENCOUNTER — EMERGENCY (EMERGENCY)
Facility: HOSPITAL | Age: 57
LOS: 1 days | Discharge: HOME | End: 2022-12-25
Attending: EMERGENCY MEDICINE | Admitting: EMERGENCY MEDICINE
Payer: COMMERCIAL

## 2022-12-25 VITALS
HEART RATE: 70 BPM | TEMPERATURE: 98 F | DIASTOLIC BLOOD PRESSURE: 86 MMHG | RESPIRATION RATE: 17 BRPM | SYSTOLIC BLOOD PRESSURE: 161 MMHG | OXYGEN SATURATION: 100 %

## 2022-12-25 VITALS
SYSTOLIC BLOOD PRESSURE: 161 MMHG | HEART RATE: 88 BPM | TEMPERATURE: 98 F | RESPIRATION RATE: 16 BRPM | DIASTOLIC BLOOD PRESSURE: 84 MMHG | OXYGEN SATURATION: 98 %

## 2022-12-25 LAB
ALBUMIN SERPL ELPH-MCNC: 4.2 G/DL — SIGNIFICANT CHANGE UP (ref 3.3–5)
ALP SERPL-CCNC: 71 U/L — SIGNIFICANT CHANGE UP (ref 40–120)
ALT FLD-CCNC: 29 U/L — SIGNIFICANT CHANGE UP (ref 4–41)
ANION GAP SERPL CALC-SCNC: 13 MMOL/L — SIGNIFICANT CHANGE UP (ref 7–14)
APTT BLD: 32.4 SEC — SIGNIFICANT CHANGE UP (ref 27–36.3)
AST SERPL-CCNC: 31 U/L — SIGNIFICANT CHANGE UP (ref 4–40)
B-OH-BUTYR SERPL-SCNC: 0.9 MMOL/L — HIGH (ref 0–0.4)
BASE EXCESS BLDV CALC-SCNC: -0.2 MMOL/L — SIGNIFICANT CHANGE UP (ref -2–3)
BASOPHILS # BLD AUTO: 0.03 K/UL — SIGNIFICANT CHANGE UP (ref 0–0.2)
BASOPHILS NFR BLD AUTO: 0.5 % — SIGNIFICANT CHANGE UP (ref 0–2)
BILIRUB SERPL-MCNC: 0.8 MG/DL — SIGNIFICANT CHANGE UP (ref 0.2–1.2)
BLD GP AB SCN SERPL QL: NEGATIVE — SIGNIFICANT CHANGE UP
BLOOD GAS VENOUS COMPREHENSIVE RESULT: SIGNIFICANT CHANGE UP
BUN SERPL-MCNC: 13 MG/DL — SIGNIFICANT CHANGE UP (ref 7–23)
CALCIUM SERPL-MCNC: 9.3 MG/DL — SIGNIFICANT CHANGE UP (ref 8.4–10.5)
CHLORIDE BLDV-SCNC: 97 MMOL/L — SIGNIFICANT CHANGE UP (ref 96–108)
CHLORIDE SERPL-SCNC: 96 MMOL/L — LOW (ref 98–107)
CK SERPL-CCNC: 134 U/L — SIGNIFICANT CHANGE UP (ref 30–200)
CO2 BLDV-SCNC: 26.1 MMOL/L — HIGH (ref 22–26)
CO2 SERPL-SCNC: 22 MMOL/L — SIGNIFICANT CHANGE UP (ref 22–31)
CREAT SERPL-MCNC: 0.79 MG/DL — SIGNIFICANT CHANGE UP (ref 0.5–1.3)
EGFR: 105 ML/MIN/1.73M2 — SIGNIFICANT CHANGE UP
EOSINOPHIL # BLD AUTO: 0.04 K/UL — SIGNIFICANT CHANGE UP (ref 0–0.5)
EOSINOPHIL NFR BLD AUTO: 0.7 % — SIGNIFICANT CHANGE UP (ref 0–6)
GAS PNL BLDV: 128 MMOL/L — LOW (ref 136–145)
GAS PNL BLDV: SIGNIFICANT CHANGE UP
GLUCOSE BLDV-MCNC: 369 MG/DL — HIGH (ref 70–99)
GLUCOSE SERPL-MCNC: 364 MG/DL — HIGH (ref 70–99)
HCO3 BLDV-SCNC: 25 MMOL/L — SIGNIFICANT CHANGE UP (ref 22–29)
HCT VFR BLD CALC: 40 % — SIGNIFICANT CHANGE UP (ref 39–50)
HCT VFR BLDA CALC: 41 % — SIGNIFICANT CHANGE UP (ref 39–51)
HGB BLD CALC-MCNC: 13.6 G/DL — SIGNIFICANT CHANGE UP (ref 13–17)
HGB BLD-MCNC: 13.8 G/DL — SIGNIFICANT CHANGE UP (ref 13–17)
IANC: 3.81 K/UL — SIGNIFICANT CHANGE UP (ref 1.8–7.4)
IMM GRANULOCYTES NFR BLD AUTO: 0.2 % — SIGNIFICANT CHANGE UP (ref 0–0.9)
INR BLD: 1.07 RATIO — SIGNIFICANT CHANGE UP (ref 0.88–1.16)
LACTATE BLDV-MCNC: 1.3 MMOL/L — SIGNIFICANT CHANGE UP (ref 0.5–2)
LYMPHOCYTES # BLD AUTO: 1.71 K/UL — SIGNIFICANT CHANGE UP (ref 1–3.3)
LYMPHOCYTES # BLD AUTO: 28.1 % — SIGNIFICANT CHANGE UP (ref 13–44)
MCHC RBC-ENTMCNC: 31.2 PG — SIGNIFICANT CHANGE UP (ref 27–34)
MCHC RBC-ENTMCNC: 34.5 GM/DL — SIGNIFICANT CHANGE UP (ref 32–36)
MCV RBC AUTO: 90.3 FL — SIGNIFICANT CHANGE UP (ref 80–100)
MONOCYTES # BLD AUTO: 0.48 K/UL — SIGNIFICANT CHANGE UP (ref 0–0.9)
MONOCYTES NFR BLD AUTO: 7.9 % — SIGNIFICANT CHANGE UP (ref 2–14)
NEUTROPHILS # BLD AUTO: 3.81 K/UL — SIGNIFICANT CHANGE UP (ref 1.8–7.4)
NEUTROPHILS NFR BLD AUTO: 62.6 % — SIGNIFICANT CHANGE UP (ref 43–77)
NRBC # BLD: 0 /100 WBCS — SIGNIFICANT CHANGE UP (ref 0–0)
NRBC # FLD: 0 K/UL — SIGNIFICANT CHANGE UP (ref 0–0)
PCO2 BLDV: 41 MMHG — LOW (ref 42–55)
PH BLDV: 7.39 — SIGNIFICANT CHANGE UP (ref 7.32–7.43)
PLATELET # BLD AUTO: 126 K/UL — LOW (ref 150–400)
PO2 BLDV: 49 MMHG — SIGNIFICANT CHANGE UP
POTASSIUM BLDV-SCNC: 3.9 MMOL/L — SIGNIFICANT CHANGE UP (ref 3.5–5.1)
POTASSIUM SERPL-MCNC: 4.2 MMOL/L — SIGNIFICANT CHANGE UP (ref 3.5–5.3)
POTASSIUM SERPL-SCNC: 4.2 MMOL/L — SIGNIFICANT CHANGE UP (ref 3.5–5.3)
PROT SERPL-MCNC: 7 G/DL — SIGNIFICANT CHANGE UP (ref 6–8.3)
PROTHROM AB SERPL-ACNC: 12.4 SEC — SIGNIFICANT CHANGE UP (ref 10.5–13.4)
RBC # BLD: 4.43 M/UL — SIGNIFICANT CHANGE UP (ref 4.2–5.8)
RBC # FLD: 12.3 % — SIGNIFICANT CHANGE UP (ref 10.3–14.5)
RH IG SCN BLD-IMP: POSITIVE — SIGNIFICANT CHANGE UP
SAO2 % BLDV: 83.9 % — SIGNIFICANT CHANGE UP
SODIUM SERPL-SCNC: 131 MMOL/L — LOW (ref 135–145)
WBC # BLD: 6.08 K/UL — SIGNIFICANT CHANGE UP (ref 3.8–10.5)
WBC # FLD AUTO: 6.08 K/UL — SIGNIFICANT CHANGE UP (ref 3.8–10.5)

## 2022-12-25 PROCEDURE — 99285 EMERGENCY DEPT VISIT HI MDM: CPT

## 2022-12-25 PROCEDURE — 73080 X-RAY EXAM OF ELBOW: CPT | Mod: 26,LT

## 2022-12-25 PROCEDURE — 73090 X-RAY EXAM OF FOREARM: CPT | Mod: 26,LT

## 2022-12-25 PROCEDURE — 73060 X-RAY EXAM OF HUMERUS: CPT | Mod: 26,LT

## 2022-12-25 PROCEDURE — 73070 X-RAY EXAM OF ELBOW: CPT | Mod: 26,LT,59

## 2022-12-25 RX ORDER — ACETAMINOPHEN 500 MG
975 TABLET ORAL ONCE
Refills: 0 | Status: DISCONTINUED | OUTPATIENT
Start: 2022-12-25 | End: 2022-12-28

## 2022-12-25 RX ORDER — SODIUM CHLORIDE 9 MG/ML
1000 INJECTION INTRAMUSCULAR; INTRAVENOUS; SUBCUTANEOUS ONCE
Refills: 0 | Status: COMPLETED | OUTPATIENT
Start: 2022-12-25 | End: 2022-12-25

## 2022-12-25 RX ORDER — ACETAMINOPHEN 500 MG
975 TABLET ORAL ONCE
Refills: 0 | Status: COMPLETED | OUTPATIENT
Start: 2022-12-25 | End: 2022-12-25

## 2022-12-25 RX ADMIN — SODIUM CHLORIDE 1000 MILLILITER(S): 9 INJECTION INTRAMUSCULAR; INTRAVENOUS; SUBCUTANEOUS at 13:35

## 2022-12-25 RX ADMIN — Medication 975 MILLIGRAM(S): at 12:06

## 2022-12-25 NOTE — ED ADULT NURSE NOTE - OBJECTIVE STATEMENT
pt received spot 15. pt A+Ox3, s/p trip and fall yesterday, c/o left elbow pain and swelling. pt denies head injury. sensation and pulses intact. pt denies anticoagulants.  in triage. pt states has hx of DM, but was taken off meds a few months ago. respirations even and unlabored. vss. IVSL in place. labs sent. medicated as ordered. will monitor.

## 2022-12-25 NOTE — ED PROVIDER NOTE - OBJECTIVE STATEMENT
57yoM w/Hx of alcohol use disorder (without ETOH withdrawals per patient), IDDM with past admission for DKA now off of insulin as his glucose is well controlled per patient, now presents to the ED w/LUE swelling and bruising s/p fall. Pt was walking yesterday when he slipped and fell outside landing on his L arm, no head trauma or LOC, needed assistance getting up but able to ambulate without issue after fall. Able to move and range the arm with minimal pain, pain well controlled with Tylenol last taken at 5 AM.  Pain and swelling persisted, accompanied by bruising this morning, so patient came to ED for further evaluation.  Also endorses chronic EtOH use, 6 beers per day.  Chronic smoker, half pack per day since age 20.  No ACEs or aspirin, no medications, no allergies.  Denies fevers,chills, dizziness, lightheadedness, chest pain, shortness of breath, abdominal pain, easy bleeding, numbness, tingling, focal weakness. 57yoM w/Hx of alcohol use disorder (without ETOH withdrawals per patient), IDDM with past admission for DKA now off of insulin as his glucose is well controlled per patient, now presents to the ED w/LUE swelling and bruising s/p fall. Pt was walking yesterday when he slipped and fell outside landing on his L arm, no head trauma or LOC, needed assistance getting up but able to ambulate without issue after fall. Able to move and range the arm with minimal pain, pain well controlled with Tylenol last taken at 5 AM.  Pain and swelling persisted, accompanied by bruising this morning, so patient came to ED for further evaluation.  Also endorses chronic EtOH use, 6 beers per day.  Chronic smoker, half pack per day since age 20.  No ACs or aspirin, no medications, no allergies.  Denies fevers,chills, dizziness, lightheadedness, chest pain, shortness of breath, abdominal pain, easy bleeding, numbness, tingling, focal weakness.

## 2022-12-25 NOTE — ED PROVIDER NOTE - PATIENT PORTAL LINK FT
You can access the FollowMyHealth Patient Portal offered by [facility] by registering at the following website: [email or web address]. By joining [facility]’s FollowMyHealth portal, you will also be able to view your health information using other applications (apps) compatible with our system.

## 2022-12-25 NOTE — CONSULT NOTE ADULT - SUBJECTIVE AND OBJECTIVE BOX
HPI  57yMale [employer] employee R-hand dominant w/ IDDM and possible EtOH use disorder c/o L elbow pain s/p mechanical fall yesterday. Came to work today and accidentally struck his L elbow prompting him to come to the ED. Denies headstrike or LOC. Denies numbness/tingling in the LUE. Denies any other trauma/injuries at this time. Denies any current pain at rest, only received Tylenol x1 at ~11am for pain control.    ROS  Negative unless otherwise specified in HPI.    PAST MEDICAL & SURGICAL Hx  PAST MEDICAL & SURGICAL HISTORY:  GERD (gastroesophageal reflux disease)  Insulin dependent type 2 diabetes mellitus  Does not currently take insulin  No significant past surgical history    ALLERGIES  No Known Allergies    FAMILY Hx  FAMILY HISTORY:  FH: type 2 diabetes (Mother)    VITALS  Vital Signs Last 24 Hrs  T(C): 36.6 (25 Dec 2022 11:21), Max: 36.6 (25 Dec 2022 11:21)  T(F): 97.9 (25 Dec 2022 11:21), Max: 97.9 (25 Dec 2022 11:21)  HR: 88 (25 Dec 2022 11:21) (88 - 88)  BP: 161/84 (25 Dec 2022 11:21) (161/84 - 161/84)  RR: 16 (25 Dec 2022 11:21) (16 - 16)  SpO2: 98% (25 Dec 2022 11:21) (98% - 98%)  Parameters below as of 25 Dec 2022 11:21  Patient On (Oxygen Delivery Method): room air    PHYSICAL EXAM  Gen: Lying in bed, NAD  Resp: No increased WOB  LUE:  Skin intact, +edema and +ecchymosis over elbow/proximal forearm/distal upper arm  +TTP over elbow, no palpable triceps defect, no TTP along remainder of extremity; compartments soft  Able to actively extend elbow against gravity  No pain with passive flexion/extension of the fingers  Motor: Musc/Med/Rad/AIN/PIN/U intact  Sensory: Musc/Med/Rad/U SILT  +Rad pulse, WWP    Secondary survey:  No TTP along spine or other extremities, pelvis grossly stable, SILT and compartments soft throughout    LABS                        13.8   6.08  )-----------( 126      ( 25 Dec 2022 12:25 )             40.0     12-25    131<L>  |  96<L>  |  13  ----------------------------<  364<H>  4.2   |  22  |  0.79    Ca    9.3      25 Dec 2022 12:25    TPro  7.0  /  Alb  4.2  /  TBili  0.8  /  DBili  x   /  AST  31  /  ALT  29  /  AlkPhos  71  12-25  PT/INR - ( 25 Dec 2022 12:25 )   PT: 12.4 sec;   INR: 1.07 ratio    PTT - ( 25 Dec 2022 12:25 )  PTT:32.4 sec    IMAGING  XRs: L olecranon fx (personal read)    PROCEDURE  A well-padded, well-molded plaster splint was applied, and the patient was neurovascularly intact afterward. The patient tolerated the procedure well without evidence of complications. Post-reduction XRs demonstrated acceptable alignment. The patient was informed about splint precautions (keep dry, do not stick anything inside, monitor for signs/symptoms of increased compartmental pressure: uncontrolled pain, worsening numbness/tingling, severe pain with movement of the fingers/toes) and verbalized understanding.    ASSESSMENT & PLAN  57yMale w/ L olecranon fx s/p immobilization. One member of the ED team expressed concern for possible compartment syndrome. However, this author's clinical suspicion for compartment syndrome is extremely low given the following: extremely rare in olecranon fxs, no pain with passive flexion/extension of the fingers, compartments appropriately swollen given fx but NOT tense, pt not currently in any pain at rest despite having only gotten minor pain control ~5 hours ago, radial pulse palpable/WWP, denies paresthesias. ED attending agreed with all of the above regarding low suspicion for compartment syndrome. Pt educated about this condition, instructed to aggressively elevate LUE at all times, and to return to the ED if signs/sxs of compartment develop. Pt expressed verbal understanding of all of the above.  -NWB LUE in a posterior slab splint  -splint precautions  -pain control  -ice/cold compress, elevation  -no acute ortho surgery at this time  -f/u outpt with Dr. Guerrero within 1 week, call office for appt

## 2022-12-25 NOTE — ED PROVIDER NOTE - PHYSICAL EXAMINATION
General: WN/WD NAD  Head: Atraumatic  Eyes: EOM grossly in tact, no scleral icterus  ENT: dry mucous membranes  Neurology: A&Ox3, sensation and strength intact throughout, gait WNL  Respiratory: normal respiratory effort  CV: Extremities warm and well perfused  Abdominal: Soft, non-distended  Extremities: +edematous and ecchymotic LUE worst in forearm nonpitting, +ttp L elbow, FROM w/pain  Skin: No rashes, 2+ symmetric radial pulses

## 2022-12-25 NOTE — ED ADULT NURSE NOTE - NS ED NURSE RECORD ANOTHER VITAL SIGN
SLP Contact Note    SLP consult received. Chart reviewed and discussed with RN. Pt is currently NPO for a procedure. Therefore will defer and follow up in PM/tomorrow for evaluation as able and as pt appropriate to participate.      Thank you,   Marianna Trujillo M.S. CF-SLP   Speech Language Pathologist Yes

## 2022-12-25 NOTE — ED ADULT TRIAGE NOTE - CHIEF COMPLAINT QUOTE
states" I trip and fell on ice yesterday and my left arm is swollen and painful. ecchymotic left upper arm with swelling to elbow." h/o DM.

## 2022-12-25 NOTE — ED ADULT TRIAGE NOTE - PRO INTERPRETER NEED 2
Patient was seen in the clinic today with Dr. Bowens regarding gallbladder problems and recent visits to the ED.  Due to the increase in symptoms, her surgery was moved up to 5/3/18.  Dr. Bowens would like the patient to be evaluated in a Pain Clinic due to chronic pain prior to surgery.  Spoke with patients Care Coordinator and she is unable to get the patient in sooner than 5/15 at the [facility] and the patients PCP will not take over pain management.      Due to the move up of surgery and inability to get a sooner pain consult than 5/15, Dr. Bowens will need to treat the patients post surgical pain in the immediate post op period.  He would only offer, possibly, one refill of pain medication.  If this is not agreeable, her surgery will need to be postponed.  
English

## 2022-12-25 NOTE — ED PROVIDER NOTE - CLINICAL SUMMARY MEDICAL DECISION MAKING FREE TEXT BOX
57yoM w/Hx of IDDM, AUD p/w LUE swelling and bruising s/p fall yesterday. VS significant for hyperglycemia to 364, phys exam w/LUE edema and ecchymosis, FROM w/pain, good radial pulses, sensation intact. Likely 2/2 effusion vs. compartment syndrome less likely given no paresthesia/pulselessness/pallor/etc., vs. fracture, no bony deformities, will eval w/labs, beat-hydroxy, XRs, pain ctrl w/tylenol, reassess. Dispo pending w/u. - Stella Sanabria, PGY-2

## 2022-12-25 NOTE — ED PROVIDER NOTE - CARE PROVIDER_API CALL
Aryan Guerrero)  Orthopedics  [address] [place] [address]  Phone: [phone]  Fax: [phone]  Follow Up Time:

## 2022-12-25 NOTE — ED PROVIDER NOTE - PROGRESS NOTE DETAILS
Pt w/worsening swelling in LUE, now reports paresthesias, ortho consulted for compartment syndrome. - Stella Sanabria, PGY-2 Compartment syndrome ruled out, splinted by ortho, medically cleared for DC, will direct to f/u with Dr. Aryan Guerrero. Reviewed w/pt, results given in DC paperwork. - Stella Sanabria, PGY-2

## 2022-12-25 NOTE — ED PROVIDER NOTE - ATTENDING CONTRIBUTION TO CARE
Patient is an 82-year-old male with a history of HTN, DM2, HLD, CAD s/p stents who presents with abdominal pain for 4 days.  Patient feels that his abdomen is more distended over the last few days.  He had a couple of episodes of nonbloody diarrhea, last episode yesterday.  He also notes need to urinate but being ability to urinate since yesterday at 4 PM.  He endorses mild nausea but no emesis.  Denies fevers, chills, chest pain, shortness of breath.  No history of abdominal surgery.

## 2022-12-25 NOTE — ED PROVIDER NOTE - NSFOLLOWUPINSTRUCTIONS_ED_ALL_ED_FT
WHAT YOU NEED TO KNOW:    An elbow fracture is a break in one or more of the 3 bones that form your elbow joint. Osteoporosis (brittle bones) can increase your risk for an elbow fracture.  Adult Arm Bones         DISCHARGE INSTRUCTIONS:    Return to the emergency department if:   •Your skin becomes swollen, cold, or pale.      •Your elbow, hand, or fingers are numb.      Call your doctor if:   •You have a fever.      •The pain gets worse, even after you rest and take your medicine.      •You have new or more trouble moving your arm.      •You have new sores around the area of your brace, splint, or cast.      •Your brace, splint, or cast becomes damaged.      •You have questions or concerns about your condition or care.      Medicines: You may need any of the following:   •Prescription pain medicine may be given. Ask your healthcare provider how to take this medicine safely. Some prescription pain medicines contain acetaminophen. Do not take other medicines that contain acetaminophen without talking to your healthcare provider. Too much acetaminophen may cause liver damage. Prescription pain medicine may cause constipation. Ask your healthcare provider how to prevent or treat constipation.       •NSAIDs, such as ibuprofen, help decrease swelling, pain, and fever. This medicine is available with or without a doctor's order. NSAIDs can cause stomach bleeding or kidney problems in certain people. If you take blood thinner medicine, always ask your healthcare provider if NSAIDs are safe for you. Always read the medicine label and follow directions.      •Take your medicine as directed. Contact your healthcare provider if you think your medicine is not helping or if you have side effects. Tell your provider if you are allergic to any medicine. Keep a list of the medicines, vitamins, and herbs you take. Include the amounts, and when and why you take them. Bring the list or the pill bottles to follow-up visits. Carry your medicine list with you in case of an emergency.      Self-care:   •Elevate your elbow above the level of your heart as often as you can. This will help decrease swelling and pain. Prop your elbow on pillows or blankets to keep it elevated comfortably. While your elbow is elevated, wiggle your fingers and open and close them to prevent hand stiffness.             •Apply ice on your elbow on your elbow for 15 to 20 minutes every hour or as directed. Use an ice pack, or put crushed ice in a plastic bag. Cover it with a towel. Ice helps prevent tissue damage and decreases swelling and pain.      •Go to physical therapy as directed. A physical therapist can teach you exercises to help improve movement and strength and to decrease pain.      Care for your brace, cast, or splint: Follow instructions about when you may take a bath or shower. It is important not to get your brace, cast, or splint wet. Cover your device with a plastic bag before you bathe. Tape the bag to your skin above the device to help keep out water. Hold your elbow away from the water in case the bag breaks.  •Check the skin around your cast, brace, or splint daily for any redness or open skin.      •Do not use a sharp or pointed object to scratch your skin under the cast, brace, or splint.      •Do not remove your brace or splint unless directed.      Follow up with your doctor as directed: You may need to see a specialist. You may need more x-rays and treatment. Write down your questions so you remember to ask them during your visits. Please follow up with your orthopedic surgeon, Dr. Aryan Guerrero, within 1 week. Bring copies of your results with you (provided in your discharge paperwork).     You may take 500-1000 mg acetaminophen (tylenol) every 6 hours, as needed for pain.  You may take 600 mg ibuprofen every 8 hours, with food, as needed for pain.  You can take tylenol and ibuprofen at the same time.     WHAT YOU NEED TO KNOW:  An elbow fracture is a break in one or more of the 3 bones that form your elbow joint. Osteoporosis (brittle bones) can increase your risk for an elbow fracture.    DISCHARGE INSTRUCTIONS:    Return to the emergency department if:   •Your skin becomes swollen, cold, or pale.  •Your elbow, hand, or fingers are numb.    Call your doctor if:   •You have a fever.  •The pain gets worse, even after you rest and take your medicine.  •You have new or more trouble moving your arm.  •You have new sores around the area of your brace, splint, or cast.  •Your brace, splint, or cast becomes damaged.  •You have questions or concerns about your condition or care.    Medicines: You may need any of the following:   •Prescription pain medicine may be given. Ask your healthcare provider how to take this medicine safely. Some prescription pain medicines contain acetaminophen. Do not take other medicines that contain acetaminophen without talking to your healthcare provider. Too much acetaminophen may cause liver damage. Prescription pain medicine may cause constipation. Ask your healthcare provider how to prevent or treat constipation.   •NSAIDs, such as ibuprofen, help decrease swelling, pain, and fever. This medicine is available with or without a doctor's order. NSAIDs can cause stomach bleeding or kidney problems in certain people. If you take blood thinner medicine, always ask your healthcare provider if NSAIDs are safe for you. Always read the medicine label and follow directions.  •Take your medicine as directed. Contact your healthcare provider if you think your medicine is not helping or if you have side effects. Tell your provider if you are allergic to any medicine. Keep a list of the medicines, vitamins, and herbs you take. Include the amounts, and when and why you take them. Bring the list or the pill bottles to follow-up visits. Carry your medicine list with you in case of an emergency.    Self-care:   •Elevate your elbow above the level of your heart as often as you can. This will help decrease swelling and pain. Prop your elbow on pillows or blankets to keep it elevated comfortably. While your elbow is elevated, wiggle your fingers and open and close them to prevent hand stiffness.  •Apply ice on your elbow on your elbow for 15 to 20 minutes every hour or as directed. Use an ice pack, or put crushed ice in a plastic bag. Cover it with a towel. Ice helps prevent tissue damage and decreases swelling and pain.  •Go to physical therapy as directed. A physical therapist can teach you exercises to help improve movement and strength and to decrease pain.    Care for your brace, cast, or splint: Follow instructions about when you may take a bath or shower. It is important not to get your brace, cast, or splint wet. Cover your device with a plastic bag before you bathe. Tape the bag to your skin above the device to help keep out water. Hold your elbow away from the water in case the bag breaks.  •Check the skin around your cast, brace, or splint daily for any redness or open skin.  •Do not use a sharp or pointed object to scratch your skin under the cast, brace, or splint.  •Do not remove your brace or splint unless directed.    Follow up with your doctor as directed: You may need to see a specialist. You may need more x-rays and treatment. Write down your questions so you remember to ask them during your visits.

## 2022-12-25 NOTE — ED PROVIDER NOTE - NS ED ROS FT
GENERAL: No fever or chills, EYES: no change in vision, HEENT: no trouble swallowing or speaking, CARDIAC: no chest pain, PULMONARY: no cough or SOB, GI: no abdominal pain, no nausea, no vomiting, no diarrhea or constipation, : No changes in urination, SKIN: no rashes, NEURO: no headache,  MSK: +LUE swelling / L elbow pain    All other ROS negative unless otherwise specified in HPI.     ~Stella Sanabria M.D. Resident

## 2022-12-26 ENCOUNTER — TRANSCRIPTION ENCOUNTER (OUTPATIENT)
Age: 57
End: 2022-12-26

## 2022-12-27 ENCOUNTER — APPOINTMENT (OUTPATIENT)
Dept: ORTHOPEDIC SURGERY | Facility: CLINIC | Age: 57
End: 2022-12-27
Payer: COMMERCIAL

## 2022-12-27 ENCOUNTER — NON-APPOINTMENT (OUTPATIENT)
Age: 57
End: 2022-12-27

## 2022-12-27 VITALS — HEIGHT: 70 IN | WEIGHT: 163 LBS | BODY MASS INDEX: 23.34 KG/M2

## 2022-12-27 PROCEDURE — 99204 OFFICE O/P NEW MOD 45 MIN: CPT

## 2022-12-28 ENCOUNTER — OUTPATIENT (OUTPATIENT)
Dept: OUTPATIENT SERVICES | Facility: HOSPITAL | Age: 57
LOS: 1 days | End: 2022-12-28
Payer: COMMERCIAL

## 2022-12-28 VITALS
WEIGHT: 160.06 LBS | TEMPERATURE: 98 F | DIASTOLIC BLOOD PRESSURE: 80 MMHG | HEART RATE: 80 BPM | SYSTOLIC BLOOD PRESSURE: 140 MMHG | HEIGHT: 69.29 IN | RESPIRATION RATE: 16 BRPM | OXYGEN SATURATION: 99 %

## 2022-12-28 DIAGNOSIS — S52.032A DISPLACED FRACTURE OF OLECRANON PROCESS WITH INTRAARTICULAR EXTENSION OF LEFT ULNA, INITIAL ENCOUNTER FOR CLOSED FRACTURE: ICD-10-CM

## 2022-12-28 DIAGNOSIS — E11.9 TYPE 2 DIABETES MELLITUS WITHOUT COMPLICATIONS: ICD-10-CM

## 2022-12-28 LAB
ALBUMIN SERPL ELPH-MCNC: 4.4 G/DL — SIGNIFICANT CHANGE UP (ref 3.3–5)
ALP SERPL-CCNC: 68 U/L — SIGNIFICANT CHANGE UP (ref 40–120)
ALT FLD-CCNC: 26 U/L — SIGNIFICANT CHANGE UP (ref 4–41)
ANION GAP SERPL CALC-SCNC: 11 MMOL/L — SIGNIFICANT CHANGE UP (ref 7–14)
AST SERPL-CCNC: 35 U/L — SIGNIFICANT CHANGE UP (ref 4–40)
BILIRUB SERPL-MCNC: 0.6 MG/DL — SIGNIFICANT CHANGE UP (ref 0.2–1.2)
BUN SERPL-MCNC: 10 MG/DL — SIGNIFICANT CHANGE UP (ref 7–23)
CALCIUM SERPL-MCNC: 9.8 MG/DL — SIGNIFICANT CHANGE UP (ref 8.4–10.5)
CHLORIDE SERPL-SCNC: 100 MMOL/L — SIGNIFICANT CHANGE UP (ref 98–107)
CO2 SERPL-SCNC: 25 MMOL/L — SIGNIFICANT CHANGE UP (ref 22–31)
CREAT SERPL-MCNC: 0.76 MG/DL — SIGNIFICANT CHANGE UP (ref 0.5–1.3)
EGFR: 106 ML/MIN/1.73M2 — SIGNIFICANT CHANGE UP
GLUCOSE SERPL-MCNC: 221 MG/DL — HIGH (ref 70–99)
POTASSIUM SERPL-MCNC: 4.1 MMOL/L — SIGNIFICANT CHANGE UP (ref 3.5–5.3)
POTASSIUM SERPL-SCNC: 4.1 MMOL/L — SIGNIFICANT CHANGE UP (ref 3.5–5.3)
PROT SERPL-MCNC: 7 G/DL — SIGNIFICANT CHANGE UP (ref 6–8.3)
SODIUM SERPL-SCNC: 136 MMOL/L — SIGNIFICANT CHANGE UP (ref 135–145)

## 2022-12-28 PROCEDURE — 93010 ELECTROCARDIOGRAM REPORT: CPT

## 2022-12-28 NOTE — H&P PST ADULT - PROBLEM SELECTOR PLAN 1
Patient tentatively scheduled for open reduction internal fixation left olecranon on 12/31/22.  Pre-op instructions provided. Pt given verbal and written instructions with teach back on chlorhexidine wash and pepcid. Pt verbalized understanding with return demonstration.   Preop Covid PCR test ordered .Instructions regarding covid PCR test to be obtained 3- 5 days prior to surgery and locations for covid testing site provided. Pt verbalized understanding

## 2022-12-28 NOTE — H&P PST ADULT - HISTORY OF PRESENT ILLNESS
57 year old male with PMH of  Type 2 DM (insulin recommended but does not take)  , presents to Presurgical testing with diagnosis of Dsplc FX olecrn PRC w/IA ext left Ulna intial clos  scheduled for open reduction internal fixation left olecranon.

## 2022-12-28 NOTE — H&P PST ADULT - PROBLEM SELECTOR PLAN 2
Patient not complaint with insulin.   Last A1C 9.9 on 12/25/22.  Patient instructed to obtain  endocrinology  evaluation prior to surgery. Case to be discussed with anesthesiology tomorrow morning.    Patient instructed to hold insulin Admelog the morning of procedure. Pt stated understanding.   Patient instructed to take only 19 units  of Basaglar the night before surgery. Patient verbalized understanding.

## 2022-12-29 ENCOUNTER — NON-APPOINTMENT (OUTPATIENT)
Age: 57
End: 2022-12-29

## 2022-12-30 ENCOUNTER — APPOINTMENT (OUTPATIENT)
Dept: ENDOCRINOLOGY | Facility: CLINIC | Age: 57
End: 2022-12-30
Payer: COMMERCIAL

## 2022-12-30 VITALS
OXYGEN SATURATION: 97 % | WEIGHT: 164.38 LBS | BODY MASS INDEX: 23.53 KG/M2 | HEIGHT: 70 IN | SYSTOLIC BLOOD PRESSURE: 157 MMHG | DIASTOLIC BLOOD PRESSURE: 94 MMHG | HEART RATE: 90 BPM

## 2022-12-30 LAB — GLUCOSE BLDC GLUCOMTR-MCNC: 239

## 2022-12-30 PROCEDURE — 99212 OFFICE O/P EST SF 10 MIN: CPT | Mod: 25

## 2022-12-30 PROCEDURE — 82962 GLUCOSE BLOOD TEST: CPT

## 2022-12-30 NOTE — ASU PATIENT PROFILE, ADULT - FALL HARM RISK - RISK INTERVENTIONS

## 2022-12-30 NOTE — ASSESSMENT
[Diabetes Foot Care] : diabetes foot care [Long Term Vascular Complications] : long term vascular complications of diabetes [Carbohydrate Consistent Diet] : carbohydrate consistent diet [Importance of Diet and Exercise] : importance of diet and exercise to improve glycemic control, achieve weight loss and improve cardiovascular health [Retinopathy Screening] : Patient was referred to ophthalmology for retinopathy screening [FreeTextEntry1] : Patient with h/o Type 2 DM, and previous DKA in Dec 2021 who presents for diabetes follow up\par Previously was off insulin, now back on Basaglar as glucose is uncontrolled.\par Recent HgbA1c is 9.5%, not at goal.\par Recommended to continue checking fingerstick glucose at least 3x/day.\par Patient is scheduled for urgent left olecranon ORIF tomorrow 12/31/22 and requesting urgent endocrine evaluation today.\par Continue Basaglar 5U qhs \par Start lyumjev/admelog 5U TID ac\par For tonight prior to surgery take Basaglar 5U qhs and the morning of surgical procedure, please hold lyumjev/ademlog insulins as patient is NPO. \par Once he is discharged, he may resume short-acting insulin doses at that time.\par Patient may proceed for left olecranon ORIF however he is aware consequences of uncontrolled diabetes such as delayed wound healing, risk of sepsis/infection,etc.\par \par Answered all questions today; patient and his wife verbalized understanding of the above\par RTC in 1 month with CDE and 3 months with endocrine.\par \par

## 2022-12-30 NOTE — HISTORY OF PRESENT ILLNESS
[Continuous Glucose Monitoring] : Continuous Glucose Monitoring: Yes [Geronimo] : Geronimo [FreeTextEntry1] : This is a 58 yo male who presents for diabetes follow up\par \par He was diagnosed with diabetes in Dec 2021 at which time he was in DKA. Since then, he has had regular follow up in endocrine offices and met with MARINA RN as well.  At last endocrinology visit in June 2022, he was able to wean off all insulin as HgbA1c was 6.4%. Since the last visit, patient was admitted in October 2022 at [facility] for pancreatitis and recommended to start on insulin again however was noncompliant. Since this week, he fell and broke his left elbow, planned for left olecranon ORIF with Dr Aryan Guerrero on 12/31/22, planned to be same day procedure and plans to be discharged home same day. \par He notes he was advsied this week to start taking basaglar 19U qhs however he notes this was too much insulin. Last night he took Basaglar 5U and morning fasting glucose was 130-143, afternoon glucose ranges from 280-205, evening/bedtime gklucose ranges 125-205. He denies hypoglycemia. \par  [Finger Stick] : Finger Stick: No [Hypoglycemia] : Patient is not hypoglycemic. [FreeTextEntry2] : 90 [FreeTextEntry3] : 8+2 [FreeTextEntry4] : 0 [de-identified] : 6.3 [FreeTextEntry5] : 126

## 2022-12-30 NOTE — PHYSICAL EXAM
[Alert] : alert [Well Nourished] : well nourished [No Acute Distress] : no acute distress [Well Developed] : well developed [Normal Sclera/Conjunctiva] : normal sclera/conjunctiva [EOMI] : extra ocular movement intact [No Proptosis] : no proptosis [No Lid Lag] : no lid lag [Normal Hearing] : hearing was normal [No LAD] : no lymphadenopathy [Supple] : the neck was supple [Thyroid Not Enlarged] : the thyroid was not enlarged [No Thyroid Nodules] : no palpable thyroid nodules [No Respiratory Distress] : no respiratory distress [No Accessory Muscle Use] : no accessory muscle use [Normal Rate and Effort] : normal respiratory rate and effort [Clear to Auscultation] : lungs were clear to auscultation bilaterally [Normal S1, S2] : normal S1 and S2 [No Murmurs] : no murmurs [Normal Rate] : heart rate was normal [Regular Rhythm] : with a regular rhythm [Normal Bowel Sounds] : normal bowel sounds [Not Tender] : non-tender [Not Distended] : not distended [Soft] : abdomen soft [No Stigmata of Cushings Syndrome] : no stigmata of Cushings Syndrome [Normal Gait] : normal gait [No Clubbing, Cyanosis] : no clubbing  or cyanosis of the fingernails [No Rash] : no rash [Normal] : normal [2+] : 2+ in the dorsalis pedis [Normal Reflexes] : deep tendon reflexes were 2+ and symmetric [No Tremors] : no tremors [Oriented x3] : oriented to person, place, and time [Abdominal Striae] : no abdominal striae [Acanthosis Nigricans] : no acanthosis nigricans [Foot Ulcers] : no foot ulcers [Acne] : no acne [Vibration Dec.] : normal vibratory sensation at the level of the toes [Position Sense Dec.] : normal position sense at the level of the toes [Diminished Throughout Both Feet] : normal tactile sensation with monofilament testing throughout both feet [de-identified] : left upper extremity in sling

## 2022-12-30 NOTE — ASU PATIENT PROFILE, ADULT - CENTRAL VENOUS CATHETER
Tatyana Maldonado is a 22 y.o. female.     Subjective   History of Present Illness   Here today with concern of 5 days of sore throat, fatigue and headache along with 2 days of left ear pain. No known fever. She tried gargling salt-water, antihistamines and Mucinex which have not helped. Tylenol helped her ear ach a little but not the sore throat.  No body aches, cough or nasal congestion.  No abdominal pain.         The following portions of the patient's history were reviewed and updated as appropriate: allergies, current medications, past family history, past medical history, past social history, past surgical history and problem list.    Review of Systems    Constitutional: fatigue.  Negative for appetite change, chills, fever and unexpected weight change.   HENT: sore throat, ear pain.   Negative for congestion, hearing loss, nosebleeds, postnasal drip, rhinorrhea, tinnitus and trouble swallowing.    Eyes: Negative for photophobia, discharge and visual disturbance.   Respiratory: Negative for cough, chest tightness, shortness of breath and wheezing.    Cardiovascular: Negative for chest pain, palpitations and leg swelling.   Gastrointestinal: Negative for abdominal distention, abdominal pain, blood in stool, constipation, diarrhea, nausea and vomiting.   Endocrine: Negative for cold intolerance, heat intolerance, polydipsia, polyphagia and polyuria.   Musculoskeletal: Negative for arthralgias, back pain, joint swelling, myalgias, neck pain and neck stiffness.   Skin: Negative for color change, pallor, rash and wound.   Allergic/Immunologic: Negative for environmental allergies, food allergies and immunocompromised state.   Neurological: headache. Negative for dizziness, tremors, seizures, weakness, numbness.  Hematological: Negative for adenopathy. Does not bruise/bleed easily.   Psychiatric/Behavioral: Negative for sleep disturbances, agitation, behavioral problems, confusion, hallucinations, self-injury and  "suicidal ideas. The patient is not nervous/anxious.      Objective    Physical Exam  Constitutional: Appears well-developed and well-nourished.   HENT: mild OP erythema with petechiae. Absent tonsils. Mild Left TM effusion. Normal right TM.   Head: No sinus tenderness. Normocephalic and atraumatic.   Eyes: EOM are normal. Pupils are equal, round, and reactive to light.   Neck: Normal range of motion. Neck supple.   Cardiovascular: Normal rate, regular rhythm and normal heart sounds.    Pulmonary/Chest: Effort normal and breath sounds normal. No respiratory distress.  Has no wheezes or rales. Exhibits no chest wall tenderness.   Abdominal: minor LUQ tenderness. Soft. Bowel sounds are normal. Exhibits no distension and no mass.   Musculoskeletal: Normal range of motion. Exhibits no tenderness.   Neurological: Alert and oriented to person, place, and time.   Skin: Skin is warm and dry.   Psychiatric: Has a normal mood and affect. Behavior is normal. Judgment and thought content normal.       /72   Pulse 101   Temp 98.4 °F (36.9 °C)   Ht 160 cm (62.99\")   Wt 57.2 kg (126 lb)   SpO2 99%   BMI 22.33 kg/m²     Nursing note and vitals reviewed.        Assessment/Plan   Tatyana was seen today for sore throat and earache.    Diagnoses and all orders for this visit:    Acute pharyngitis, unspecified etiology  -     POC Rapid Strep A - negative  -     POC Infectious Mononucleosis Antibody - negative  -     amoxicillin (AMOXIL) 875 MG tablet; Take 1 tablet by mouth 2 (Two) Times a Day.  Treating presumptively for strep.     Increase PO fluid intake. Ibuprofen and/or Tylenol prn for pain and fever control.   Begin using new toothbrush after 2 days of antibiotic use.     Call or RTC if symptoms worsen or persist.              " no

## 2023-01-04 ENCOUNTER — OUTPATIENT (OUTPATIENT)
Dept: OUTPATIENT SERVICES | Facility: HOSPITAL | Age: 58
LOS: 1 days | End: 2023-01-04
Payer: COMMERCIAL

## 2023-01-04 ENCOUNTER — TRANSCRIPTION ENCOUNTER (OUTPATIENT)
Age: 58
End: 2023-01-04

## 2023-01-04 DIAGNOSIS — Z11.52 ENCOUNTER FOR SCREENING FOR COVID-19: ICD-10-CM

## 2023-01-04 LAB — SARS-COV-2 RNA SPEC QL NAA+PROBE: SIGNIFICANT CHANGE UP

## 2023-01-04 NOTE — ASSESSMENT
[FreeTextEntry1] : 57 year-old male with comminuted and displaced L olecranon fracture\par \par Plan:\par OR for ORIF L olecranon\par Medical clearance and PSTs as able

## 2023-01-04 NOTE — PHYSICAL EXAM
[de-identified] : Gen: NAD\par RSP: Nonlabored\par MSK: LUE was seen and examined\par Splint left in place\par Able to flex and extend at all digits\par SILT throughout\par <2s capillary refill [de-identified] : XR L Elbow:  Comminuted, intra-articular olecranon fracture with proximal displacement of the proximal fragment in splint

## 2023-01-04 NOTE — HISTORY OF PRESENT ILLNESS
[FreeTextEntry1] : 57 year-old male presents with a left olecranon fracture s/p fall on ice on 12/24.  He then went to work at [employer] and worked with a broken elbow.  On the 25th he had XR performed which confirmed an intra-articular, comminuted olecranon fracture.  HE was splinted and referred for follow-up.  Denies numbness and tingling.  Denies open lacerations or abrasions.

## 2023-01-05 ENCOUNTER — APPOINTMENT (OUTPATIENT)
Dept: ORTHOPEDIC SURGERY | Facility: HOSPITAL | Age: 58
End: 2023-01-05

## 2023-01-05 ENCOUNTER — OUTPATIENT (OUTPATIENT)
Dept: OUTPATIENT SERVICES | Facility: HOSPITAL | Age: 58
LOS: 1 days | Discharge: HOME | End: 2023-01-05
Payer: COMMERCIAL

## 2023-01-05 ENCOUNTER — TRANSCRIPTION ENCOUNTER (OUTPATIENT)
Age: 58
End: 2023-01-05

## 2023-01-05 VITALS
TEMPERATURE: 99 F | HEIGHT: 69.29 IN | DIASTOLIC BLOOD PRESSURE: 96 MMHG | RESPIRATION RATE: 18 BRPM | HEART RATE: 80 BPM | WEIGHT: 160.06 LBS | SYSTOLIC BLOOD PRESSURE: 161 MMHG | OXYGEN SATURATION: 98 %

## 2023-01-05 VITALS
OXYGEN SATURATION: 100 % | SYSTOLIC BLOOD PRESSURE: 164 MMHG | TEMPERATURE: 98 F | HEART RATE: 68 BPM | RESPIRATION RATE: 16 BRPM | DIASTOLIC BLOOD PRESSURE: 81 MMHG

## 2023-01-05 DIAGNOSIS — S52.032A DISPLACED FRACTURE OF OLECRANON PROCESS WITH INTRAARTICULAR EXTENSION OF LEFT ULNA, INITIAL ENCOUNTER FOR CLOSED FRACTURE: ICD-10-CM

## 2023-01-05 LAB
GLUCOSE BLDC GLUCOMTR-MCNC: 197 MG/DL — HIGH (ref 70–99)
GLUCOSE BLDC GLUCOMTR-MCNC: 215 MG/DL — HIGH (ref 70–99)
GLUCOSE BLDC GLUCOMTR-MCNC: 216 MG/DL — HIGH (ref 70–99)
GLUCOSE BLDC GLUCOMTR-MCNC: 241 MG/DL — HIGH (ref 70–99)

## 2023-01-05 PROCEDURE — U0003: CPT

## 2023-01-05 PROCEDURE — 76000 FLUOROSCOPY <1 HR PHYS/QHP: CPT

## 2023-01-05 PROCEDURE — C9399: CPT

## 2023-01-05 PROCEDURE — U0005: CPT

## 2023-01-05 PROCEDURE — C9803: CPT

## 2023-01-05 PROCEDURE — 24685 OPTX ULNAR FX PROX END W/FIX: CPT | Mod: LT

## 2023-01-05 PROCEDURE — C1713: CPT

## 2023-01-05 PROCEDURE — 82962 GLUCOSE BLOOD TEST: CPT

## 2023-01-05 RX ORDER — DEXTROSE 50 % IN WATER 50 %
25 SYRINGE (ML) INTRAVENOUS ONCE
Refills: 0 | Status: DISCONTINUED | OUTPATIENT
Start: 2023-01-05 | End: 2023-01-19

## 2023-01-05 RX ORDER — HYDRALAZINE HCL 50 MG
5 TABLET ORAL ONCE
Refills: 0 | Status: COMPLETED | OUTPATIENT
Start: 2023-01-05 | End: 2023-01-05

## 2023-01-05 RX ORDER — INSULIN LISPRO 100/ML
VIAL (ML) SUBCUTANEOUS
Refills: 0 | Status: DISCONTINUED | OUTPATIENT
Start: 2023-01-05 | End: 2023-01-19

## 2023-01-05 RX ORDER — INFLUENZA VIRUS VACCINE 15; 15; 15; 15 UG/.5ML; UG/.5ML; UG/.5ML; UG/.5ML
0.5 SUSPENSION INTRAMUSCULAR ONCE
Refills: 0 | Status: DISCONTINUED | OUTPATIENT
Start: 2023-01-05 | End: 2023-01-19

## 2023-01-05 RX ORDER — DEXTROSE 50 % IN WATER 50 %
12.5 SYRINGE (ML) INTRAVENOUS ONCE
Refills: 0 | Status: DISCONTINUED | OUTPATIENT
Start: 2023-01-05 | End: 2023-01-19

## 2023-01-05 RX ORDER — OXYCODONE HYDROCHLORIDE 5 MG/1
5 TABLET ORAL EVERY 4 HOURS
Refills: 0 | Status: DISCONTINUED | OUTPATIENT
Start: 2023-01-05 | End: 2023-01-05

## 2023-01-05 RX ORDER — DEXTROSE 50 % IN WATER 50 %
15 SYRINGE (ML) INTRAVENOUS ONCE
Refills: 0 | Status: DISCONTINUED | OUTPATIENT
Start: 2023-01-05 | End: 2023-01-19

## 2023-01-05 RX ORDER — SODIUM CHLORIDE 9 MG/ML
1000 INJECTION, SOLUTION INTRAVENOUS
Refills: 0 | Status: DISCONTINUED | OUTPATIENT
Start: 2023-01-05 | End: 2023-01-19

## 2023-01-05 RX ORDER — CEFAZOLIN SODIUM 1 G
2000 VIAL (EA) INJECTION ONCE
Refills: 0 | Status: COMPLETED | OUTPATIENT
Start: 2023-01-05 | End: 2023-01-05

## 2023-01-05 RX ORDER — ONDANSETRON 8 MG/1
4 TABLET, FILM COATED ORAL ONCE
Refills: 0 | Status: DISCONTINUED | OUTPATIENT
Start: 2023-01-05 | End: 2023-01-05

## 2023-01-05 RX ORDER — HYDROMORPHONE HYDROCHLORIDE 2 MG/ML
1 INJECTION INTRAMUSCULAR; INTRAVENOUS; SUBCUTANEOUS
Refills: 0 | Status: DISCONTINUED | OUTPATIENT
Start: 2023-01-05 | End: 2023-01-05

## 2023-01-05 RX ORDER — GLUCAGON INJECTION, SOLUTION 0.5 MG/.1ML
1 INJECTION, SOLUTION SUBCUTANEOUS ONCE
Refills: 0 | Status: DISCONTINUED | OUTPATIENT
Start: 2023-01-05 | End: 2023-01-19

## 2023-01-05 RX ORDER — ACETAMINOPHEN 500 MG
650 TABLET ORAL EVERY 6 HOURS
Refills: 0 | Status: DISCONTINUED | OUTPATIENT
Start: 2023-01-05 | End: 2023-01-19

## 2023-01-05 RX ORDER — HYDROMORPHONE HYDROCHLORIDE 2 MG/ML
0.5 INJECTION INTRAMUSCULAR; INTRAVENOUS; SUBCUTANEOUS
Refills: 0 | Status: DISCONTINUED | OUTPATIENT
Start: 2023-01-05 | End: 2023-01-05

## 2023-01-05 RX ADMIN — HYDROMORPHONE HYDROCHLORIDE 1 MILLIGRAM(S): 2 INJECTION INTRAMUSCULAR; INTRAVENOUS; SUBCUTANEOUS at 14:13

## 2023-01-05 RX ADMIN — HYDROMORPHONE HYDROCHLORIDE 1 MILLIGRAM(S): 2 INJECTION INTRAMUSCULAR; INTRAVENOUS; SUBCUTANEOUS at 15:14

## 2023-01-05 RX ADMIN — Medication 5 MILLIGRAM(S): at 15:50

## 2023-01-05 RX ADMIN — HYDROMORPHONE HYDROCHLORIDE 1 MILLIGRAM(S): 2 INJECTION INTRAMUSCULAR; INTRAVENOUS; SUBCUTANEOUS at 14:29

## 2023-01-05 RX ADMIN — Medication 2: at 15:30

## 2023-01-05 RX ADMIN — HYDROMORPHONE HYDROCHLORIDE 1 MILLIGRAM(S): 2 INJECTION INTRAMUSCULAR; INTRAVENOUS; SUBCUTANEOUS at 14:44

## 2023-01-05 RX ADMIN — HYDROMORPHONE HYDROCHLORIDE 1 MILLIGRAM(S): 2 INJECTION INTRAMUSCULAR; INTRAVENOUS; SUBCUTANEOUS at 13:58

## 2023-01-05 RX ADMIN — Medication 1: at 13:55

## 2023-01-05 RX ADMIN — Medication 100 MILLIGRAM(S): at 16:38

## 2023-01-05 RX ADMIN — HYDROMORPHONE HYDROCHLORIDE 1 MILLIGRAM(S): 2 INJECTION INTRAMUSCULAR; INTRAVENOUS; SUBCUTANEOUS at 14:59

## 2023-01-05 NOTE — ASU DISCHARGE PLAN (ADULT/PEDIATRIC) - ASU DC SPECIAL INSTRUCTIONSFT
Please do not bear weight with your left arm    Please take prescriptions as prescribed    Please follow up with Dr. Guerrero in 2 weeks    Keep dressing clean and dry at all times

## 2023-01-05 NOTE — H&P ADULT - PROBLEM SELECTOR PLAN 1
Patient scheduled for open reduction internal fixation left olecranon on 1/5/23.  Pre-op instructions provided. Pt given verbal and written instructions with teach back on chlorhexidine wash and pepcid. Pt verbalized understanding with return demonstration.   Preop Covid PCR test ordered .Instructions regarding covid PCR test to be obtained 3- 5 days prior to surgery and locations for covid testing site provided. Pt verbalized understanding

## 2023-01-05 NOTE — ASU DISCHARGE PLAN (ADULT/PEDIATRIC) - CARE PROVIDER_API CALL
Aryan Guerrero)  Orthopedics  [phone] [place] [address]  Phone: [phone]  Fax: [phone]  Follow Up Time: 2 weeks

## 2023-01-05 NOTE — ASU DISCHARGE PLAN (ADULT/PEDIATRIC) - NS MD DC FALL RISK RISK
For information on Fall & Injury Prevention, visit: [email or web address] OR  [email or web address] OR  https://www.cdc.gov/steadi/patient.html

## 2023-01-05 NOTE — CHART NOTE - NSCHARTNOTEFT_GEN_A_CORE
POC    No complaints; Denies SOB/CP/N/V;   Pain controlled;   block anesthesia still in place    T(C): 36.3 (01-05-23 @ 13:16)  T(F): 97.3 (01-05-23 @ 13:16)  HR: 77 (01-05-23 @ 15:00)  BP: 148/80 (01-05-23 @ 15:00)  RR: 18 (01-05-23 @ 15:00)  SpO2: 100% (01-05-23 @ 15:00)  Wt(kg): --    Physical Exam  Gen: NAD    LUE:   Dressing CDI; Sling on  Decreased motor and sensory 2/2 anesthesia / block  digits: warm / well-perfused  cap refill brisk @ 2-3 secs     2+ Radial pulse       A/P: 57y Male s/p Open Reduction Internal Fixation / Surgical Repair L elbow    - serial n/v checks  - * Patient reassured that a full motor and sensory return to baseline is expected with patience, time, and supportive care   -Pain control; Ice prn; Elevate  -NWB in sling/immobilizer  -Dispo planning: d/c home  Follow up Dr Guerrero in office      ***See Above  Cam DAVIS  Orthopedics  B: [phone]  S: [phone]

## 2023-01-05 NOTE — PATIENT PROFILE ADULT - FALL HARM RISK - RISK INTERVENTIONS

## 2023-01-11 NOTE — PATIENT PROFILE ADULT - WAS YOUR LAST COVID-19 VACCINE GREATER THAN OR EQUAL TO TWO MONTHS AGO?
Nutrition Education    Nutrition Problem: Knowledge and BeliefsFood and nutrition related knowledge deficit    Related to:  (diet progression following bariatric surgery)   As evidenced by:  (patient is s/p sleeve gastrectomy and MD consult for diet education.)   Primary Nutrition Diagnosis status: New nutrition diagnosis  Nutrition Education: Purpose of the nutrition education: Bariatric Nutrition Discharge Instructions  Patient was reminded of post-operative nutrition appointment on 1/24/23.  Patient was provided RD contact information and encouraged to call with questions or concerns.  Goal: Express comprehension of nutrition intervention   Intervention goal status: Goal achieved   Time Frame to Achieve Goal: Met                   
Yes

## 2023-01-18 ENCOUNTER — APPOINTMENT (OUTPATIENT)
Dept: ORTHOPEDIC SURGERY | Facility: CLINIC | Age: 58
End: 2023-01-18
Payer: COMMERCIAL

## 2023-01-18 PROCEDURE — 73080 X-RAY EXAM OF ELBOW: CPT | Mod: LT

## 2023-01-18 PROCEDURE — 99024 POSTOP FOLLOW-UP VISIT: CPT

## 2023-01-29 NOTE — HISTORY OF PRESENT ILLNESS
[de-identified] : Date of Surgery:1/5/23\par Procedure: L olecranon ORIF [de-identified] : Overall, patient is doing well. The incision is free from drainage and healing appropriately. Patient's pain is well controlled. Denies any fever, chills or night sweats.  [de-identified] : Incision c/d/i, staples removed and steri strips applied\par SILT throughout\par Fires m/r/u/a distributions\par 45-75 degree elbow flex-ex [de-identified] : XR L Elbow: 3 views demonstrate maintained articular reduction and stable hardware placement [de-identified] : s/p ORIF L olecranon fracture [de-identified] : Hinged elbow brace\par OT to begin progressive ROM\par F/u in 2 weeks

## 2023-02-01 ENCOUNTER — APPOINTMENT (OUTPATIENT)
Dept: ORTHOPEDIC SURGERY | Facility: CLINIC | Age: 58
End: 2023-02-01
Payer: COMMERCIAL

## 2023-02-01 PROCEDURE — 99024 POSTOP FOLLOW-UP VISIT: CPT

## 2023-02-01 PROCEDURE — 73080 X-RAY EXAM OF ELBOW: CPT | Mod: LT

## 2023-02-22 ENCOUNTER — APPOINTMENT (OUTPATIENT)
Dept: ORTHOPEDIC SURGERY | Facility: CLINIC | Age: 58
End: 2023-02-22
Payer: COMMERCIAL

## 2023-02-22 PROCEDURE — 99024 POSTOP FOLLOW-UP VISIT: CPT

## 2023-02-22 PROCEDURE — 73080 X-RAY EXAM OF ELBOW: CPT | Mod: LT

## 2023-03-22 ENCOUNTER — APPOINTMENT (OUTPATIENT)
Dept: ORTHOPEDIC SURGERY | Facility: CLINIC | Age: 58
End: 2023-03-22
Payer: COMMERCIAL

## 2023-03-22 DIAGNOSIS — S52.022D DISPLACED FRACTURE OF OLECRANON PROCESS W/OUT INTRAARTICULAR EXTENSION OF LEFT ULNA, SUBSEQUENT ENCOUNTER FOR CLOSED FRACTURE WITH ROUTINE HEALING: ICD-10-CM

## 2023-03-22 DIAGNOSIS — S42.402D UNSPECIFIED FRACTURE OF LOWER END OF LEFT HUMERUS, SUBSEQUENT ENCOUNTER FOR FRACTURE WITH ROUTINE HEALING: ICD-10-CM

## 2023-03-22 PROCEDURE — 99024 POSTOP FOLLOW-UP VISIT: CPT

## 2023-04-26 ENCOUNTER — APPOINTMENT (OUTPATIENT)
Dept: ORTHOPEDIC SURGERY | Facility: CLINIC | Age: 58
End: 2023-04-26

## 2023-06-22 RX ORDER — FLASH GLUCOSE SCANNING READER
EACH MISCELLANEOUS
Qty: 1 | Refills: 0 | Status: ACTIVE | COMMUNITY
Start: 2022-01-31 | End: 1900-01-01

## 2023-09-12 ENCOUNTER — EMERGENCY (EMERGENCY)
Facility: HOSPITAL | Age: 58
LOS: 1 days | Discharge: LEFT BEFORE DISCHARGE | End: 2023-09-12
Attending: EMERGENCY MEDICINE | Admitting: HOSPITALIST

## 2023-09-12 ENCOUNTER — TRANSCRIPTION ENCOUNTER (OUTPATIENT)
Age: 58
End: 2023-09-12

## 2023-09-12 VITALS
OXYGEN SATURATION: 100 % | DIASTOLIC BLOOD PRESSURE: 90 MMHG | SYSTOLIC BLOOD PRESSURE: 170 MMHG | HEART RATE: 74 BPM | TEMPERATURE: 98 F | RESPIRATION RATE: 18 BRPM

## 2023-09-12 VITALS
DIASTOLIC BLOOD PRESSURE: 81 MMHG | HEART RATE: 65 BPM | OXYGEN SATURATION: 100 % | RESPIRATION RATE: 18 BRPM | SYSTOLIC BLOOD PRESSURE: 155 MMHG | TEMPERATURE: 98 F

## 2023-09-12 DIAGNOSIS — E87.1 HYPO-OSMOLALITY AND HYPONATREMIA: ICD-10-CM

## 2023-09-12 DIAGNOSIS — E13.10 OTHER SPECIFIED DIABETES MELLITUS WITH KETOACIDOSIS WITHOUT COMA: ICD-10-CM

## 2023-09-12 DIAGNOSIS — Z29.9 ENCOUNTER FOR PROPHYLACTIC MEASURES, UNSPECIFIED: ICD-10-CM

## 2023-09-12 DIAGNOSIS — F10.10 ALCOHOL ABUSE, UNCOMPLICATED: ICD-10-CM

## 2023-09-12 DIAGNOSIS — R10.9 UNSPECIFIED ABDOMINAL PAIN: ICD-10-CM

## 2023-09-12 DIAGNOSIS — K85.90 ACUTE PANCREATITIS WITHOUT NECROSIS OR INFECTION, UNSPECIFIED: ICD-10-CM

## 2023-09-12 LAB
A1C WITH ESTIMATED AVERAGE GLUCOSE RESULT: 11.2 % — HIGH (ref 4–5.6)
ALBUMIN SERPL ELPH-MCNC: 4.2 G/DL — SIGNIFICANT CHANGE UP (ref 3.3–5)
ALP SERPL-CCNC: 87 U/L — SIGNIFICANT CHANGE UP (ref 40–120)
ALT FLD-CCNC: 27 U/L — SIGNIFICANT CHANGE UP (ref 4–41)
ANION GAP SERPL CALC-SCNC: 10 MMOL/L — SIGNIFICANT CHANGE UP (ref 7–14)
ANION GAP SERPL CALC-SCNC: 13 MMOL/L — SIGNIFICANT CHANGE UP (ref 7–14)
AST SERPL-CCNC: 28 U/L — SIGNIFICANT CHANGE UP (ref 4–40)
B-OH-BUTYR SERPL-SCNC: 1.3 MMOL/L — HIGH (ref 0–0.4)
B-OH-BUTYR SERPL-SCNC: 1.5 MMOL/L — HIGH (ref 0–0.4)
BASE EXCESS BLDV CALC-SCNC: -0.6 MMOL/L — SIGNIFICANT CHANGE UP (ref -2–3)
BASE EXCESS BLDV CALC-SCNC: -1.5 MMOL/L — SIGNIFICANT CHANGE UP (ref -2–3)
BILIRUB SERPL-MCNC: 0.5 MG/DL — SIGNIFICANT CHANGE UP (ref 0.2–1.2)
BLOOD GAS VENOUS COMPREHENSIVE RESULT: SIGNIFICANT CHANGE UP
BLOOD GAS VENOUS COMPREHENSIVE RESULT: SIGNIFICANT CHANGE UP
BUN SERPL-MCNC: 12 MG/DL — SIGNIFICANT CHANGE UP (ref 7–23)
BUN SERPL-MCNC: 8 MG/DL — SIGNIFICANT CHANGE UP (ref 7–23)
CALCIUM SERPL-MCNC: 8.8 MG/DL — SIGNIFICANT CHANGE UP (ref 8.4–10.5)
CALCIUM SERPL-MCNC: 8.9 MG/DL — SIGNIFICANT CHANGE UP (ref 8.4–10.5)
CHLORIDE BLDV-SCNC: 95 MMOL/L — LOW (ref 96–108)
CHLORIDE BLDV-SCNC: 99 MMOL/L — SIGNIFICANT CHANGE UP (ref 96–108)
CHLORIDE SERPL-SCNC: 101 MMOL/L — SIGNIFICANT CHANGE UP (ref 98–107)
CHLORIDE SERPL-SCNC: 95 MMOL/L — LOW (ref 98–107)
CO2 BLDV-SCNC: 25.7 MMOL/L — SIGNIFICANT CHANGE UP (ref 22–26)
CO2 BLDV-SCNC: 26.2 MMOL/L — HIGH (ref 22–26)
CO2 SERPL-SCNC: 23 MMOL/L — SIGNIFICANT CHANGE UP (ref 22–31)
CO2 SERPL-SCNC: 23 MMOL/L — SIGNIFICANT CHANGE UP (ref 22–31)
CREAT SERPL-MCNC: 0.63 MG/DL — SIGNIFICANT CHANGE UP (ref 0.5–1.3)
CREAT SERPL-MCNC: 0.75 MG/DL — SIGNIFICANT CHANGE UP (ref 0.5–1.3)
EGFR: 107 ML/MIN/1.73M2 — SIGNIFICANT CHANGE UP
EGFR: 112 ML/MIN/1.73M2 — SIGNIFICANT CHANGE UP
ESTIMATED AVERAGE GLUCOSE: 275 — SIGNIFICANT CHANGE UP
GAS PNL BLDV: 128 MMOL/L — LOW (ref 136–145)
GAS PNL BLDV: 130 MMOL/L — LOW (ref 136–145)
GAS PNL BLDV: SIGNIFICANT CHANGE UP
GLUCOSE BLDV-MCNC: 165 MG/DL — HIGH (ref 70–99)
GLUCOSE BLDV-MCNC: 433 MG/DL — HIGH (ref 70–99)
GLUCOSE SERPL-MCNC: 177 MG/DL — HIGH (ref 70–99)
GLUCOSE SERPL-MCNC: 429 MG/DL — HIGH (ref 70–99)
HCO3 BLDV-SCNC: 24 MMOL/L — SIGNIFICANT CHANGE UP (ref 22–29)
HCO3 BLDV-SCNC: 25 MMOL/L — SIGNIFICANT CHANGE UP (ref 22–29)
HCT VFR BLD CALC: 37.3 % — LOW (ref 39–50)
HCT VFR BLDA CALC: 40 % — SIGNIFICANT CHANGE UP (ref 39–51)
HCT VFR BLDA CALC: 43 % — SIGNIFICANT CHANGE UP (ref 39–51)
HGB BLD CALC-MCNC: 13.2 G/DL — SIGNIFICANT CHANGE UP (ref 12.6–17.4)
HGB BLD CALC-MCNC: 14.3 G/DL — SIGNIFICANT CHANGE UP (ref 12.6–17.4)
HGB BLD-MCNC: 13.2 G/DL — SIGNIFICANT CHANGE UP (ref 13–17)
LACTATE BLDV-MCNC: 0.9 MMOL/L — SIGNIFICANT CHANGE UP (ref 0.5–2)
LACTATE BLDV-MCNC: 0.9 MMOL/L — SIGNIFICANT CHANGE UP (ref 0.5–2)
LIDOCAIN IGE QN: 84 U/L — HIGH (ref 7–60)
MAGNESIUM SERPL-MCNC: 1.7 MG/DL — SIGNIFICANT CHANGE UP (ref 1.6–2.6)
MCHC RBC-ENTMCNC: 31.2 PG — SIGNIFICANT CHANGE UP (ref 27–34)
MCHC RBC-ENTMCNC: 35.4 GM/DL — SIGNIFICANT CHANGE UP (ref 32–36)
MCV RBC AUTO: 88.2 FL — SIGNIFICANT CHANGE UP (ref 80–100)
NRBC # BLD: 0 /100 WBCS — SIGNIFICANT CHANGE UP (ref 0–0)
NRBC # FLD: 0 K/UL — SIGNIFICANT CHANGE UP (ref 0–0)
PCO2 BLDV: 43 MMHG — SIGNIFICANT CHANGE UP (ref 42–55)
PCO2 BLDV: 44 MMHG — SIGNIFICANT CHANGE UP (ref 42–55)
PH BLDV: 7.35 — SIGNIFICANT CHANGE UP (ref 7.32–7.43)
PH BLDV: 7.37 — SIGNIFICANT CHANGE UP (ref 7.32–7.43)
PHOSPHATE SERPL-MCNC: 2.7 MG/DL — SIGNIFICANT CHANGE UP (ref 2.5–4.5)
PLATELET # BLD AUTO: 121 K/UL — LOW (ref 150–400)
PO2 BLDV: 36 MMHG — SIGNIFICANT CHANGE UP (ref 25–45)
PO2 BLDV: 66 MMHG — HIGH (ref 25–45)
POTASSIUM BLDV-SCNC: 3.6 MMOL/L — SIGNIFICANT CHANGE UP (ref 3.5–5.1)
POTASSIUM BLDV-SCNC: 3.7 MMOL/L — SIGNIFICANT CHANGE UP (ref 3.5–5.1)
POTASSIUM SERPL-MCNC: 3.8 MMOL/L — SIGNIFICANT CHANGE UP (ref 3.5–5.3)
POTASSIUM SERPL-MCNC: 3.9 MMOL/L — SIGNIFICANT CHANGE UP (ref 3.5–5.3)
POTASSIUM SERPL-SCNC: 3.8 MMOL/L — SIGNIFICANT CHANGE UP (ref 3.5–5.3)
POTASSIUM SERPL-SCNC: 3.9 MMOL/L — SIGNIFICANT CHANGE UP (ref 3.5–5.3)
PROT SERPL-MCNC: 6.7 G/DL — SIGNIFICANT CHANGE UP (ref 6–8.3)
RBC # BLD: 4.23 M/UL — SIGNIFICANT CHANGE UP (ref 4.2–5.8)
RBC # FLD: 12.2 % — SIGNIFICANT CHANGE UP (ref 10.3–14.5)
SAO2 % BLDV: 72.3 % — SIGNIFICANT CHANGE UP (ref 67–88)
SAO2 % BLDV: 94.3 % — HIGH (ref 67–88)
SODIUM SERPL-SCNC: 131 MMOL/L — LOW (ref 135–145)
SODIUM SERPL-SCNC: 134 MMOL/L — LOW (ref 135–145)
TRIGL SERPL-MCNC: 122 MG/DL — SIGNIFICANT CHANGE UP
WBC # BLD: 3.83 K/UL — SIGNIFICANT CHANGE UP (ref 3.8–10.5)
WBC # FLD AUTO: 3.83 K/UL — SIGNIFICANT CHANGE UP (ref 3.8–10.5)

## 2023-09-12 RX ORDER — INSULIN LISPRO 100/ML
VIAL (ML) SUBCUTANEOUS
Refills: 0 | Status: DISCONTINUED | OUTPATIENT
Start: 2023-09-12 | End: 2023-09-13

## 2023-09-12 RX ORDER — INSULIN LISPRO 100/ML
3 VIAL (ML) SUBCUTANEOUS ONCE
Refills: 0 | Status: COMPLETED | OUTPATIENT
Start: 2023-09-12 | End: 2023-09-12

## 2023-09-12 RX ORDER — INSULIN GLARGINE 100 [IU]/ML
20 INJECTION, SOLUTION SUBCUTANEOUS AT BEDTIME
Refills: 0 | Status: DISCONTINUED | OUTPATIENT
Start: 2023-09-12 | End: 2023-09-12

## 2023-09-12 RX ORDER — INSULIN GLARGINE 100 [IU]/ML
20 INJECTION, SOLUTION SUBCUTANEOUS AT BEDTIME
Refills: 0 | Status: DISCONTINUED | OUTPATIENT
Start: 2023-09-12 | End: 2023-09-13

## 2023-09-12 RX ORDER — DEXTROSE 50 % IN WATER 50 %
25 SYRINGE (ML) INTRAVENOUS ONCE
Refills: 0 | Status: DISCONTINUED | OUTPATIENT
Start: 2023-09-12 | End: 2023-09-13

## 2023-09-12 RX ORDER — SODIUM CHLORIDE 9 MG/ML
1000 INJECTION, SOLUTION INTRAVENOUS ONCE
Refills: 0 | Status: COMPLETED | OUTPATIENT
Start: 2023-09-12 | End: 2023-09-12

## 2023-09-12 RX ORDER — DEXTROSE 50 % IN WATER 50 %
12.5 SYRINGE (ML) INTRAVENOUS ONCE
Refills: 0 | Status: DISCONTINUED | OUTPATIENT
Start: 2023-09-12 | End: 2023-09-13

## 2023-09-12 RX ORDER — SODIUM CHLORIDE 9 MG/ML
1000 INJECTION, SOLUTION INTRAVENOUS
Refills: 0 | Status: DISCONTINUED | OUTPATIENT
Start: 2023-09-12 | End: 2023-09-13

## 2023-09-12 RX ORDER — PANTOPRAZOLE SODIUM 20 MG/1
40 TABLET, DELAYED RELEASE ORAL
Refills: 0 | Status: DISCONTINUED | OUTPATIENT
Start: 2023-09-12 | End: 2023-09-13

## 2023-09-12 RX ORDER — LANOLIN ALCOHOL/MO/W.PET/CERES
3 CREAM (GRAM) TOPICAL AT BEDTIME
Refills: 0 | Status: DISCONTINUED | OUTPATIENT
Start: 2023-09-12 | End: 2023-09-13

## 2023-09-12 RX ORDER — ONDANSETRON 8 MG/1
4 TABLET, FILM COATED ORAL EVERY 8 HOURS
Refills: 0 | Status: DISCONTINUED | OUTPATIENT
Start: 2023-09-12 | End: 2023-09-13

## 2023-09-12 RX ORDER — GLUCAGON INJECTION, SOLUTION 0.5 MG/.1ML
1 INJECTION, SOLUTION SUBCUTANEOUS ONCE
Refills: 0 | Status: DISCONTINUED | OUTPATIENT
Start: 2023-09-12 | End: 2023-09-13

## 2023-09-12 RX ORDER — DEXTROSE 50 % IN WATER 50 %
15 SYRINGE (ML) INTRAVENOUS ONCE
Refills: 0 | Status: DISCONTINUED | OUTPATIENT
Start: 2023-09-12 | End: 2023-09-13

## 2023-09-12 RX ORDER — INSULIN LISPRO 100/ML
5 VIAL (ML) SUBCUTANEOUS ONCE
Refills: 0 | Status: DISCONTINUED | OUTPATIENT
Start: 2023-09-12 | End: 2023-09-12

## 2023-09-12 RX ORDER — ACETAMINOPHEN 500 MG
650 TABLET ORAL EVERY 6 HOURS
Refills: 0 | Status: DISCONTINUED | OUTPATIENT
Start: 2023-09-12 | End: 2023-09-13

## 2023-09-12 RX ORDER — INSULIN LISPRO 100/ML
VIAL (ML) SUBCUTANEOUS AT BEDTIME
Refills: 0 | Status: DISCONTINUED | OUTPATIENT
Start: 2023-09-12 | End: 2023-09-13

## 2023-09-12 RX ORDER — INSULIN LISPRO 100/ML
6 VIAL (ML) SUBCUTANEOUS
Refills: 0 | Status: DISCONTINUED | OUTPATIENT
Start: 2023-09-12 | End: 2023-09-13

## 2023-09-12 RX ADMIN — Medication 3 UNIT(S): at 10:22

## 2023-09-12 RX ADMIN — Medication 2: at 18:24

## 2023-09-12 RX ADMIN — Medication 6 UNIT(S): at 18:25

## 2023-09-12 RX ADMIN — SODIUM CHLORIDE 1000 MILLILITER(S): 9 INJECTION, SOLUTION INTRAVENOUS at 06:51

## 2023-09-12 RX ADMIN — SODIUM CHLORIDE 125 MILLILITER(S): 9 INJECTION, SOLUTION INTRAVENOUS at 15:43

## 2023-09-12 NOTE — H&P ADULT - PROBLEM SELECTOR PLAN 2
Patient is elevated BHB and hyperglycemia 400+ however he is not acidemic  -Repeat BMP and BHB  -We will reach out to his endocrinologist or PCP for further information on his insulin regiment as he does not know his current regimen

## 2023-09-12 NOTE — H&P ADULT - NSICDXPASTMEDICALHX_GEN_ALL_CORE_FT
PAST MEDICAL HISTORY:  Alcohol abuse     GERD (gastroesophageal reflux disease)     Insulin dependent type 2 diabetes mellitus Does not currently take insulin

## 2023-09-12 NOTE — ED ADULT NURSE REASSESSMENT NOTE - NS ED NURSE REASSESS COMMENT FT1
pt received A&Ox4, ambulatory offering no complaints.  report received from KINA Ospina. pt retesting to leave. pt advised he has a bed assignment at this time, pt wants to follow up outpatient. ACP RYAN Azul made aware of situation, advised RN it "will take a while" for her to come down to have a conversation with patient to sign AMA. advised RN if patient does not want to wait, to remove IV and pt can elope. IV D/Cd at this time as per ACP RYAN Azul recommendation, pt does not want to wait. charge KINA Tran made aware. respirations even and unlabored. ambulates with steady gait. pt works in engineering downstairs pt received A&Ox4, ambulatory offering no complaints.  report received from KINA Ospina. pt retesting to leave. pt advised he has a bed assignment at this time, pt wants to follow up outpatient. ACP RYAN Azul made aware of situation, advised RN it "will take a while" for her to come down to have a conversation with patient to sign AMA. advised RN if patient does not want to wait, to remove IV. IV D/Cd at this time as per ACP RYAN Azul recommendation, pt does not want to wait. charge KINA Tran made aware. respirations even and unlabored. ambulates with steady gait. pt works in [employer] downstairs

## 2023-09-12 NOTE — H&P ADULT - NSHPREVIEWOFSYSTEMS_GEN_ALL_CORE
REVIEW OF SYSTEMS:    CONSTITUTIONAL: No weakness, fevers or chills  EYES/ENT: No visual changes;  No dysphagia  NECK: No pain or stiffness  RESPIRATORY: No cough, wheezing, hemoptysis; No shortness of breath  CARDIOVASCULAR: No chest pain or palpitations; No lower extremity edema  GASTROINTESTINAL: No nausea, vomiting, or hematemesis; No diarrhea or constipation. No melena or hematochezia.  +Abdominal or epigastric pain  GENITOURINARY: No dysuria, frequency or hematuria  NEUROLOGICAL: No numbness or weakness  SKIN: No itching, burning, rashes, or lesions   PSYCH: No auditory or visual hallucinations  All other review of systems is negative unless indicated above.

## 2023-09-12 NOTE — ED ADULT NURSE REASSESSMENT NOTE - AS PAIN REST
Pt needs refill of her Atorvastatin please  She saw you last in July and has a follow up this week with you  0 (no pain/absence of nonverbal indicators of pain)

## 2023-09-12 NOTE — ED ADULT TRIAGE NOTE - CHIEF COMPLAINT QUOTE
Pt c/o abd pain and hyperglycemia x1 day. Denies n/v/d, chest pain. Hx Pancreatitis, T2DM.  in triage

## 2023-09-12 NOTE — ED PROVIDER NOTE - NS ED ROS FT
REVIEW OF SYSTEMS:  CONSTITUTIONAL: No weakness, fevers or chills  EYES/ENT: No visual changes;  No vertigo or throat pain   NECK: No pain or stiffness  RESPIRATORY: No cough, wheezing, hemoptysis; No shortness of breath  CARDIOVASCULAR: No chest pain or palpitations  GASTROINTESTINAL: +epigastric pain, feels like GERD. No nausea, vomiting, or hematemesis; No diarrhea or constipation. No melena or hematochezia.  GENITOURINARY: No dysuria, frequency or hematuria  NEUROLOGICAL: No numbness or weakness  SKIN: No itching, rashes

## 2023-09-12 NOTE — ED PROVIDER NOTE - OBJECTIVE STATEMENT
Pt is a 56yo man with DM2 and EtOH abuse who presents for high blood glucose readings and abdominal pain. He has been taking 2-4 units of premeal insulin and the same of bedtime insulin "depending on my number." His readings have been as high as in the 500s. He developed some abdominal pain which concerned him because of hx of pancreatitis. Denies fever, n/v/d, sob, cp.

## 2023-09-12 NOTE — H&P ADULT - ASSESSMENT
57 M with IDDM, EtOH abuse, chronic pancreatitis, GERD presents with 1 week of improving but persistent lower abdominal pain.  He wears a CGM and has been reporting persistently high glucose for the past.  No admitted for concern for mild DKA and acute on chronic pancreatitis.

## 2023-09-12 NOTE — ED PROVIDER NOTE - ATTENDING CONTRIBUTION TO CARE
HPI:  58yo man with DM2 on insulin and EtOH abuse with previous history of pancreatitis but no history of alcohol withdrawal per pt report that presents for high blood glucose readings and abdominal pain. He has been taking 2-4 units of premeal insulin and the same of bedtime insulin "depending on my number." His readings have been as high as in the 500s. He developed some abdominal pain which concerned him because of hx of pancreatitis. Denies fever, chills, n/v/d, sob, cp. Pain periumbilical, non radiating, 10/10 at times, sharp and dull.     EXAM: Nonacute distress speaking full sentences.  Heart is regular rate and rhythm lungs are clear to auscultation bilaterally abdomen is soft and nontender without any rebound or guarding and no CVA tenderness.  No signs of cirrhosis on skin exam.  No asterixis and no tremors.  No tongue fasciculations.    MDM: 57-year-old male with history of insulin-dependent diabetes and alcohol abuse with previous pancreatitis but no history of alcohol withdrawal per patient that presents with epigastric and umbilical pain that he is concerned for his pancreatitis.  Patient also found to have a glucose in triage of 426.  Patient states that this has been high before and his numbers are usually elevated.  The concern at this time is most likely pancreatitis given his condition and his symptoms and his relatively benign exam but also consider DKA although patient is not having any nausea vomiting, chills, fevers or any other issues that are related to DKA.  Will obtain labs including labs to rule out DKA provide IV fluids and pain medications and reassess.  Patient has not had any nausea so does not require any Zofran at this time.

## 2023-09-12 NOTE — H&P ADULT - NSHPPHYSICALEXAM_GEN_ALL_CORE
PHYSICAL EXAM:  GENERAL: NAD, comfortable appearing  HEAD:  Atraumatic, Normocephalic  EYES: EOMI, PERRL, conjunctiva and sclera clear  NECK: Supple, No JVD  CHEST/LUNG: Clear to auscultation bilaterally; No wheezes, rales or rhonchi  HEART: Regular rate and rhythm; No murmurs, rubs, or gallops, (+)S1, S2  ABDOMEN: Mild abdominal pain on palpation  EXTREMITIES:  2+ Peripheral Pulses, No clubbing, cyanosis, or edema  PSYCH: normal mood and affect  NEUROLOGY: AAOx3, non-focal  SKIN: No rashes or lesions

## 2023-09-12 NOTE — ED PROVIDER NOTE - NS_BEDUNITTYPES_ED_ALL_ED
LIZ ALVARADO 39yo  Male came to the ER for c/o cont cough with fever of 102.1, productive cough and leukocytosis of 19.  The pt has Type I DM, and has had cough and fever vahe dn off x 3 weeks.  The pt was seen in urgent care and was given Levaquin.  The Sx initially improved but then resumed and pt had 2nd visit to the urgent care and was told to go to the ER for IV Abx and the working dx of PNA.  In the ER the pt had temp of 102.1, WBC of 19 and HR of 137, thus, Sepsis on admission. The pt was cultured and placed on IV Ceftriaxone and azithromycin. The initial CXR showed retrocardiac linear opacity.   The pt is being evaluated by ID and Pul.  The PMHx includes Type I DM, + insulin pump, trigger finger.    INTERVAL HPI/OVERNIGHT EVENTS:    MEDICATIONS  (STANDING):  ALBUTerol    90 MICROgram(s) HFA Inhaler 1 Puff(s) Inhalation every 4 hours  albuterol/ipratropium for Nebulization 3 milliLiter(s) Nebulizer every 6 hours  atorvastatin 10 milliGRAM(s) Oral at bedtime  benzocaine 15 mG/menthol 3.6 mG (Sugar-Free) Lozenge 1 Lozenge Oral two times a day  chlorhexidine 4% Liquid 1 Application(s) Topical <User Schedule>  enoxaparin Injectable 40 milliGRAM(s) SubCutaneous daily  meropenem  IVPB 1000 milliGRAM(s) IV Intermittent every 8 hours  tiotropium 18 MICROgram(s) Capsule 1 Capsule(s) Inhalation daily  vancomycin  IVPB        MEDICATIONS  (PRN):  acetaminophen   Tablet .. 650 milliGRAM(s) Oral every 6 hours PRN Temp greater or equal to 38C (100.4F), Mild Pain (1 - 3)  guaiFENesin   Syrup  (Sugar-Free) 100 milliGRAM(s) Oral every 6 hours PRN Cough      Allergies    No Known Allergies    Vital Signs Last 24 Hrs  T(C): 37.8 (09 Dec 2019 21:26), Max: 38.6 (09 Dec 2019 12:27)  T(F): 100.1 (09 Dec 2019 21:26), Max: 101.4 (09 Dec 2019 12:27)  HR: 102 (09 Dec 2019 21:26) (101 - 111)  BP: 118/55 (09 Dec 2019 21:26) (118/55 - 130/76)  BP(mean): --  RR: 20 (09 Dec 2019 21:26) (20 - 20)  SpO2: 96% (09 Dec 2019 04:20) (92% - 96%)    PHYSICAL EXAM:      Constitutional:  pt is alert and oriented x 3, uncomfortable but NAD    Eyes:  normal    ENMT:  dry oral mucosa    Neck:  supple, no JVD, no bruits    Respiratory: shallow resp, scattered rhonchi, no wheezing    Cardiovascular:  S1S2, tachy, no murmur    Gastrointestinal:  soft and benign + insulin pump    Genitourinary:  no garzon    Extremities:  moves all ext    Vascular:  + pedal pulses    Neurological:  nonfocal    Skin:  nor rash, [facility]    Lymph Nodes:  not enlarged    Musculoskeletal: normal mm mass and tone    LABS:                        12.8   10.85 )-----------( 245      WBC on ad:  19, 13.9             37.4     12-09    141  |  100  |  13  ----------------------------<  171<H>  4.2   |  24  |  0.9    Ca    8.5      09 Dec 2019 06:40  Mg     1.8     12-08  La 1.8, 1.3  TPro  5.7<L>  /  Alb  3.3<L>  /  TBili  0.8  /  DBili  x   /  AST  15  /  ALT  19  /  AlkPhos  59  12-08    blood C&S x2 neg  Urine neg  strept pneumoniae AG neg  Legionella  u Ag neg    urine + ketones    RADIOLOGY & ADDITIONAL TESTS:    EKG:  sinus tach, no acute changes  CXR:  retrocardiac linear opacity, R lung clear LIZ ALVARADO 39yo  Male came to the ER for c/o cont cough with fever of 102.1, productive cough and leukocytosis of 19.  The pt has Type I DM, and has had cough and fever vahe dn off x 3 weeks.  The pt was seen in urgent care and was given Levaquin.  The Sx initially improved but then resumed and pt had 2nd visit to the urgent care and was told to go to the ER for IV Abx and the working dx of PNA.  In the ER the pt had temp of 102.1, WBC of 19 and HR of 137, thus, Sepsis on admission. The pt was cultured and placed on IV Ceftriaxone and azithromycin. The initial CXR showed retrocardiac linear opacity.   The pt is being evaluated by ID and Pul.  The PMHx includes Type I DM, + insulin pump, trigger finger.    INTERVAL HPI/OVERNIGHT EVENTS: cont temps to 102, cont cough with brownish phlegm, + coudh and pleuritic CH, on IVLevaquin and Cefepime, ff by Pul and ID    MEDICATIONS  (STANDING):  ALBUTerol    90 MICROgram(s) HFA Inhaler 1 Puff(s) Inhalation every 4 hours  albuterol/ipratropium for Nebulization 3 milliLiter(s) Nebulizer every 6 hours  atorvastatin 10 milliGRAM(s) Oral at bedtime  benzocaine 15 mG/menthol 3.6 mG (Sugar-Free) Lozenge 1 Lozenge Oral two times a day  chlorhexidine 4% Liquid 1 Application(s) Topical <User Schedule>  enoxaparin Injectable 40 milliGRAM(s) SubCutaneous daily    tiotropium 18 MICROgram(s) Capsule 1 Capsule(s) Inhalation daily  Levaquin 750mg q 24  Cefepime 2gms q 8      MEDICATIONS  (PRN):  acetaminophen   Tablet .. 650 milliGRAM(s) Oral every 6 hours PRN Temp greater or equal to 38C (100.4F), Mild Pain (1 - 3)  guaiFENesin   Syrup  (Sugar-Free) 100 milliGRAM(s) Oral every 6 hours PRN Cough      Allergies    No Known Allergies    Vital Signs Last 24 Hrs    T(F): Tmax 102  HR: 110  BP: 132/64  RR: 18  SpO2: 93%    PHYSICAL EXAM:      Constitutional:  pt is alert and oriented x 3, uncomfortable but NAD, + O2    Eyes:  non icteric    ENMT:  dry oral mucosa    Neck:  supple, no JVD, no bruits    Respiratory: shallow resp, scattered rhonchi, no wheezing    Cardiovascular:  S1S2, tachy, no murmur    Gastrointestinal:  soft and benign + insulin pump    Genitourinary:  no garzon    Extremities:  moves all ext    Vascular:  + pedal pulses    Neurological:  nonfocal    Skin:  nor rash, in warmth    Lymph Nodes:  not enlarged    Musculoskeletal: normal mm mass and tone    LABS:                        11.8   6.9 )-----------( 235     WBC on ad:  19, 13.9             33    138  |  98  |  14  ----------------------------< 225  4.0   |  26  |  0.9    Ca    8.5      09 Dec 2019 06:40  Mg     1.8     12-08  La 1.8, 1.3  TPro  5.7<L>  /  Alb  3.3<L>  /  TBili  0.8  /  DBili  x   /  AST  15  /  ALT  19  /  AlkPhos  59  12-08    blood C&S x2 neg  Urine neg  strept pneumoniae AG neg  Legionella  u Ag neg  MRSA of nares neg    urine + ketones    RADIOLOGY & ADDITIONAL TESTS:    EKG:  sinus tach, no acute changes  CXR:  retrocardiac linear opacity, R lung clear  CT of lungs:  BL opacities/prelim MEDICINE

## 2023-09-12 NOTE — ED ADULT NURSE NOTE - HOW OFTEN DO YOU HAVE A DRINK CONTAINING ALCOHOL?
Never Adjacent Tissue Transfer Text: The defect edges were debeveled with a #15 scalpel blade.  Given the location of the defect and the proximity to free margins an adjacent tissue transfer was deemed most appropriate.  Using a sterile surgical marker, an appropriate flap was drawn incorporating the defect and placing the expected incisions within the relaxed skin tension lines where possible.    The area thus outlined was incised deep to adipose tissue with a #15 scalpel blade.  The skin margins were undermined to an appropriate distance in all directions utilizing iris scissors.

## 2023-09-12 NOTE — ED PROVIDER NOTE - PROGRESS NOTE DETAILS
SHEREEN: Patient's A1c at this time is 11.2 although his previous hemoglobin A1c in December 2022 was 9.9.  We will continue to monitor pending labs. Attending MD Loco.  Pt signed out to me in stable condition pending labR, Etoh abuise, panc, DM, no fever/n/v/d, isolated pain.

## 2023-09-12 NOTE — ED ADULT NURSE NOTE - BP NONINVASIVE SYSTOLIC (MM HG)
[facility]  [address]  [place] [address]  Phone: [phone]  Fax: [phone]    October 14, 2020        Mallory Bunn  [address]  [place] [address]          To whom it may concern:    RE: Mallory Bunn    Patient was seen and treated today at our clinic and missed work.  Please excuse her from work from 10/14/2020-10/19/2020 as needed for this illness.   COVID-19 PCR test was completed and is currently pending.     Please contact me for questions or concerns.      Sincerely,        Meron Cavazos PA-C  
[phone]

## 2023-09-12 NOTE — ED PROVIDER NOTE - PHYSICAL EXAMINATION
VITALS:   T(C): 36.8 (09-12-23 @ 06:51), Max: 36.9 (09-12-23 @ 06:05)  HR: 70 (09-12-23 @ 06:51) (70 - 74)  BP: 171/80 (09-12-23 @ 06:51) (170/90 - 171/80)  RR: 18 (09-12-23 @ 06:51) (18 - 18)  SpO2: 100% (09-12-23 @ 06:51) (100% - 100%)    GENERAL: NAD, lying in bed comfortably  HEAD:  Atraumatic, normocephalic  EYES: EOMI, PERRLA, conjunctiva and sclera clear  ENT: Moist mucous membranes  NECK: Supple, no JVD  HEART: Regular rate and rhythm, no murmurs, rubs, or gallops  LUNGS: Unlabored respirations.  Clear to auscultation bilaterally, no crackles, wheezing, or rhonchi  ABDOMEN: Soft, nontender, nondistended, +BS  EXTREMITIES: 2+ peripheral pulses bilaterally. No clubbing, cyanosis, or edema  NERVOUS SYSTEM:  A&Ox3, no focal deficits   SKIN: No rashes or lesions

## 2023-09-12 NOTE — CHART NOTE - NSCHARTNOTEFT_GEN_A_CORE
[facility]    Notified by RN that patient is adamant about going home and wants to leave Against Medical Advice. Patient A&Ox4 and VSs. Advised RN to recheck vital signs and to remove IVL in preparation of possible AMA/elopement as provider is occupied with critical patient in ED.  Hospitalist Dr. Nettles made aware of situation . Paperwork signed and plaed in chart     Ramona Azul PA-C  Medicine [id number] [facility]    Notified by RN that patient is adamant about going home and wants to leave Against Medical Advice. Patient A&Ox4 and VSS. Advised RN to recheck vital signs and to remove IVL in preparation of possible AMA/elopement as provider is occupied with critical patient in ED at the moment.   Hospitalist Dr. Nettles made aware of situation . Paperwork signed and placed in chart     Ramona Azul PA-C  Medicine [id number] [facility]    Notified by RN that patient is adamant about going home and wants to leave Against Medical Advice. Patient A&Ox4 and VSS. Advised RN to recheck vital signs and to remove IVL in preparation of possible AMA/elopement as provider is occupied with critical patient in ED at the moment. Upon evaluation by provider, patient has eloped.   Hospitalist Dr. Nettles made aware of situation . Paperwork signed and placed in chart     Ramona Azul PA-C  Medicine [id number]

## 2023-09-12 NOTE — H&P ADULT - PROBLEM SELECTOR PLAN 1
Mildly elevated lipase.  His symptoms and risk factors were concerning for acute pancreatitis.  Patient is however currently able to tolerate diet with minimal pain  -Continue diet with gentle IVF  -Abdominal pain exam benign without rebound or guarding.  Defer CT A/P for now unless symptoms change  -Start protonix 40mg qd

## 2023-09-12 NOTE — ED PROVIDER NOTE - CLINICAL SUMMARY MEDICAL DECISION MAKING FREE TEXT BOX
Pt is a 56yo man with DM2 and EtOH abuse who presents for high blood glucose readings and abdominal pain. Likely taking too little insulin. Ordering DKA and pancreatitis labs and giving isotonic fluids. Pending K, will administer insulin. Abdominal exam benign, pt not in pain or nauseated. No vomiting.

## 2023-09-12 NOTE — H&P ADULT - NSHPLABSRESULTS_GEN_ALL_CORE
09-12    131<L>  |  95<L>  |  12  ----------------------------<  429<H>  3.8   |  23  |  0.75    Ca    8.9      12 Sep 2023 06:48    TPro  6.7  /  Alb  4.2  /  TBili  0.5  /  DBili  x   /  AST  28  /  ALT  27  /  AlkPhos  87  09-12                        13.2   3.83  )-----------( 121      ( 12 Sep 2023 06:48 )             37.3     LIVER FUNCTIONS - ( 12 Sep 2023 06:48 )  Alb: 4.2 g/dL / Pro: 6.7 g/dL / ALK PHOS: 87 U/L / ALT: 27 U/L / AST: 28 U/L / GGT: x           Urinalysis Basic - ( 12 Sep 2023 06:48 )    Color: x / Appearance: x / SG: x / pH: x  Gluc: 429 mg/dL / Ketone: x  / Bili: x / Urobili: x   Blood: x / Protein: x / Nitrite: x   Leuk Esterase: x / RBC: x / WBC x   Sq Epi: x / Non Sq Epi: x / Bacteria: x    EKG: None    Image: None

## 2023-09-13 RX ORDER — INSULIN GLARGINE 100 [IU]/ML
24 INJECTION, SOLUTION SUBCUTANEOUS
Qty: 0 | Refills: 0 | DISCHARGE

## 2023-09-13 RX ORDER — INSULIN LISPRO 100/ML
8 VIAL (ML) SUBCUTANEOUS
Qty: 0 | Refills: 0 | DISCHARGE

## 2023-09-13 NOTE — DISCHARGE NOTE PROVIDER - HOSPITAL COURSE
Patient is a 58 y/o M with IDDM, EtOH abuse, chronic pancreatitis, GERD who presents with 1 week of improving but persistent lower abdominal pain.  He wears a CGM and has been reporting persistently high glucose for the past.  Now admitted for concern for mild DKA and acute on chronic pancreatitis.    Acute on chronic pancreatitis.   · Mildly elevated lipase.  His symptoms and risk factors were concerning for acute pancreatitis.  Patient is however currently able to tolerate diet with minimal pain  -Continue diet with gentle IVF  -Abdominal pain exam benign without rebound or guarding.  Defer CT A/P for now unless symptoms change  -Start protonix 40mg qd.    Diabetic ketosis.   ·Patient with elevated BHB and hyperglycemia 400+ however he is not acidemic  -Repeat BMP and BHB  -We will reach out to his endocrinologist or PCP for further information on his insulin regiment as he does not know his current regimen.     Problem/Plan - 3:  ·  Problem: Hyponatremia.   ·  Plan: Hyponatremia likely secondary to hyperglycemia  -We will correct glucose and repeat BMP.     Problem/Plan - 4:  ·  Problem: Alcohol abuse.   ·  Plan: Last alcohol drink on Wednesday and no signs of withdrawals.  -Stop CIWA protocol.     Problem/Plan - 5:  ·  Problem: Need for prophylactic measure.   ·  Plan: FENP: No IVF, Lytes PRN, Regular diet, Lovenox ppx.     Patient is a 58 y/o M with IDDM, EtOH abuse, chronic pancreatitis, GERD who presents with 1 week of improving but persistent lower abdominal pain.  He wears a CGM and has been reporting persistently high glucose for the past.  Now admitted for concern for mild DKA and acute on chronic pancreatitis. Patient was started on Protonix daily and IVF.   Patient with elevated BHB and hyperglycemia.     [facility] MEDICINE NIGHT COVERAGE    Notified by RN that patient is adamant about going home. Patient A&Ox3, VSS. Patient eloped without further education  Hospitalist Dr. Nettles made aware. Instructed RN to remove IVL. Paperwork signed and put in chart.

## 2023-09-13 NOTE — DISCHARGE NOTE PROVIDER - CARE PROVIDER_API CALL
PCP,   Please follow up with your Primary Care within 1-2 days.  Phone: (   )    -  Fax: (   )    -  Follow Up Time:

## 2023-09-13 NOTE — DISCHARGE NOTE PROVIDER - NSDCFUSCHEDAPPT_GEN_ALL_CORE_FT
[facility]  [id number]  Scheduled Appointment: 11/30/2023

## 2023-09-13 NOTE — DISCHARGE NOTE PROVIDER - PROVIDER TOKENS
FREE:[LAST:[PCP],PHONE:[(   )    -],FAX:[(   )    -],ADDRESS:[Please follow up with your Primary Care within 1-2 days.]]

## 2023-09-13 NOTE — DISCHARGE NOTE PROVIDER - NSDCCPCAREPLAN_GEN_ALL_CORE_FT
PRINCIPAL DISCHARGE DIAGNOSIS  Diagnosis: Abdominal pain  Assessment and Plan of Treatment: You were seen at the hospital for abdominal pain. You were admitted for acute on chronic pancreatits. It was recommended for you to stay for further evaluation. You were started on IVF. Please follow up with your gastroenterologist and come to the ED if symptoms persist or worsen     PRINCIPAL DISCHARGE DIAGNOSIS  Diagnosis: Abdominal pain  Assessment and Plan of Treatment: You were seen at the hospital for abdominal pain. You were admitted for acute on chronic pancreatitis. It was recommended for you to stay for further evaluation. You were started on IVF. Please follow up with your gastroenterologist and come to the ED if symptoms persist or worsen

## 2023-09-15 ENCOUNTER — NON-APPOINTMENT (OUTPATIENT)
Age: 58
End: 2023-09-15

## 2023-09-15 ENCOUNTER — APPOINTMENT (OUTPATIENT)
Dept: INTERNAL MEDICINE | Facility: CLINIC | Age: 58
End: 2023-09-15
Payer: COMMERCIAL

## 2023-09-15 VITALS
HEART RATE: 75 BPM | WEIGHT: 158 LBS | OXYGEN SATURATION: 100 % | DIASTOLIC BLOOD PRESSURE: 91 MMHG | BODY MASS INDEX: 22.62 KG/M2 | HEIGHT: 70 IN | SYSTOLIC BLOOD PRESSURE: 175 MMHG

## 2023-09-15 DIAGNOSIS — E11.10 TYPE 2 DIABETES MELLITUS WITH KETOACIDOSIS WITHOUT COMA: ICD-10-CM

## 2023-09-15 DIAGNOSIS — K85.20 ALCOHOL INDUCED ACUTE PANCREATITIS WITHOUT NECROSIS OR INFECTION: ICD-10-CM

## 2023-09-15 PROCEDURE — 93000 ELECTROCARDIOGRAM COMPLETE: CPT

## 2023-09-15 PROCEDURE — 99396 PREV VISIT EST AGE 40-64: CPT | Mod: 25

## 2023-09-15 PROCEDURE — 36415 COLL VENOUS BLD VENIPUNCTURE: CPT

## 2023-09-28 ENCOUNTER — NON-APPOINTMENT (OUTPATIENT)
Age: 58
End: 2023-09-28

## 2023-09-28 LAB
25(OH)D3 SERPL-MCNC: 13.8 NG/ML
ALBUMIN SERPL ELPH-MCNC: 4.6 G/DL
ALP BLD-CCNC: 68 U/L
ALT SERPL-CCNC: 33 U/L
ANION GAP SERPL CALC-SCNC: 11 MMOL/L
APPEARANCE: CLEAR
AST SERPL-CCNC: 31 U/L
BACTERIA: NEGATIVE /HPF
BASOPHILS # BLD AUTO: 0.04 K/UL
BASOPHILS NFR BLD AUTO: 0.8 %
BILIRUB SERPL-MCNC: 0.5 MG/DL
BILIRUBIN URINE: NEGATIVE
BLOOD URINE: NEGATIVE
BUN SERPL-MCNC: 9 MG/DL
CALCIUM SERPL-MCNC: 9.5 MG/DL
CAST: 0 /LPF
CHLORIDE SERPL-SCNC: 102 MMOL/L
CHOLEST SERPL-MCNC: 119 MG/DL
CO2 SERPL-SCNC: 26 MMOL/L
COLOR: YELLOW
CREAT SERPL-MCNC: 0.82 MG/DL
EGFR: 104 ML/MIN/1.73M2
EOSINOPHIL # BLD AUTO: 0.06 K/UL
EOSINOPHIL NFR BLD AUTO: 1.2 %
EPITHELIAL CELLS: 0 /HPF
ESTIMATED AVERAGE GLUCOSE: 275 MG/DL
GLUCOSE QUALITATIVE U: >=1000 MG/DL
GLUCOSE SERPL-MCNC: 350 MG/DL
HBA1C MFR BLD HPLC: 11.2 %
HCT VFR BLD CALC: 42.3 %
HDLC SERPL-MCNC: 56 MG/DL
HGB BLD-MCNC: 14.3 G/DL
IMM GRANULOCYTES NFR BLD AUTO: 0.2 %
KETONES URINE: 15 MG/DL
LDLC SERPL CALC-MCNC: 41 MG/DL
LEUKOCYTE ESTERASE URINE: NEGATIVE
LYMPHOCYTES # BLD AUTO: 2.01 K/UL
LYMPHOCYTES NFR BLD AUTO: 39.8 %
MAN DIFF?: NORMAL
MCHC RBC-ENTMCNC: 31.4 PG
MCHC RBC-ENTMCNC: 33.8 GM/DL
MCV RBC AUTO: 92.8 FL
MICROSCOPIC-UA: NORMAL
MONOCYTES # BLD AUTO: 0.48 K/UL
MONOCYTES NFR BLD AUTO: 9.5 %
NEUTROPHILS # BLD AUTO: 2.45 K/UL
NEUTROPHILS NFR BLD AUTO: 48.5 %
NITRITE URINE: NEGATIVE
NONHDLC SERPL-MCNC: 63 MG/DL
PH URINE: 5.5
PLATELET # BLD AUTO: 178 K/UL
POTASSIUM SERPL-SCNC: 4 MMOL/L
PROT SERPL-MCNC: 6.7 G/DL
PROTEIN URINE: NEGATIVE MG/DL
PSA SERPL-MCNC: 0.59 NG/ML
RBC # BLD: 4.56 M/UL
RBC # FLD: 13 %
RED BLOOD CELLS URINE: 0 /HPF
SODIUM SERPL-SCNC: 139 MMOL/L
SPECIFIC GRAVITY URINE: >1.03
T4 SERPL-MCNC: 6.9 UG/DL
TRIGL SERPL-MCNC: 129 MG/DL
TSH SERPL-ACNC: 1.48 UIU/ML
URATE SERPL-MCNC: 3.2 MG/DL
UROBILINOGEN URINE: 0.2 MG/DL
WBC # FLD AUTO: 5.05 K/UL
WHITE BLOOD CELLS URINE: 0 /HPF

## 2023-10-05 RX ORDER — ELECTROLYTES/DEXTROSE
31G X 5 MM SOLUTION, ORAL ORAL
Qty: 4 | Refills: 3 | Status: ACTIVE | COMMUNITY
Start: 2022-01-27 | End: 1900-01-01

## 2023-11-30 ENCOUNTER — APPOINTMENT (OUTPATIENT)
Dept: ENDOCRINOLOGY | Facility: CLINIC | Age: 58
End: 2023-11-30
Payer: COMMERCIAL

## 2023-11-30 VITALS
HEIGHT: 70 IN | DIASTOLIC BLOOD PRESSURE: 91 MMHG | OXYGEN SATURATION: 99 % | SYSTOLIC BLOOD PRESSURE: 175 MMHG | HEART RATE: 80 BPM | WEIGHT: 161.44 LBS | BODY MASS INDEX: 23.11 KG/M2

## 2023-11-30 DIAGNOSIS — E11.9 TYPE 2 DIABETES MELLITUS W/OUT COMPLICATIONS: ICD-10-CM

## 2023-11-30 LAB — HBA1C MFR BLD HPLC: 10.2

## 2023-11-30 PROCEDURE — 99213 OFFICE O/P EST LOW 20 MIN: CPT | Mod: 25

## 2023-11-30 PROCEDURE — 95251 CONT GLUC MNTR ANALYSIS I&R: CPT

## 2023-11-30 PROCEDURE — 83036 HEMOGLOBIN GLYCOSYLATED A1C: CPT | Mod: QW

## 2023-11-30 RX ORDER — INSULIN LISPRO 100 U/ML
100 INJECTION, SOLUTION SUBCUTANEOUS
Qty: 3 | Refills: 1 | Status: ACTIVE | COMMUNITY
Start: 2021-12-23 | End: 1900-01-01

## 2023-12-13 NOTE — PATIENT PROFILE ADULT - NSPROPASSIVESMOKEEXPOSURE_GEN_A_NUR
[No Acute Distress] : no acute distress [Well Nourished] : well nourished [EOMI] : extraocular movements intact [No Respiratory Distress] : no respiratory distress  [Normal Affect] : the affect was normal [Normal Insight/Judgement] : insight and judgment were intact No

## 2024-01-17 ENCOUNTER — APPOINTMENT (OUTPATIENT)
Dept: INTERNAL MEDICINE | Facility: CLINIC | Age: 59
End: 2024-01-17
Payer: COMMERCIAL

## 2024-01-17 ENCOUNTER — EMERGENCY (EMERGENCY)
Facility: HOSPITAL | Age: 59
LOS: 1 days | Discharge: HOME | End: 2024-01-17
Attending: STUDENT IN AN ORGANIZED HEALTH CARE EDUCATION/TRAINING PROGRAM | Admitting: STUDENT IN AN ORGANIZED HEALTH CARE EDUCATION/TRAINING PROGRAM
Payer: COMMERCIAL

## 2024-01-17 VITALS
OXYGEN SATURATION: 96 % | HEIGHT: 70 IN | TEMPERATURE: 97.4 F | SYSTOLIC BLOOD PRESSURE: 132 MMHG | BODY MASS INDEX: 21.19 KG/M2 | DIASTOLIC BLOOD PRESSURE: 79 MMHG | HEART RATE: 85 BPM | WEIGHT: 148.01 LBS

## 2024-01-17 VITALS
SYSTOLIC BLOOD PRESSURE: 167 MMHG | RESPIRATION RATE: 16 BRPM | DIASTOLIC BLOOD PRESSURE: 89 MMHG | OXYGEN SATURATION: 98 % | HEART RATE: 87 BPM

## 2024-01-17 VITALS
OXYGEN SATURATION: 97 % | HEART RATE: 97 BPM | TEMPERATURE: 97 F | RESPIRATION RATE: 16 BRPM | SYSTOLIC BLOOD PRESSURE: 138 MMHG | DIASTOLIC BLOOD PRESSURE: 84 MMHG

## 2024-01-17 DIAGNOSIS — F17.200 NICOTINE DEPENDENCE, UNSPECIFIED, UNCOMPLICATED: ICD-10-CM

## 2024-01-17 DIAGNOSIS — R63.4 ABNORMAL WEIGHT LOSS: ICD-10-CM

## 2024-01-17 DIAGNOSIS — J06.9 ACUTE UPPER RESPIRATORY INFECTION, UNSPECIFIED: ICD-10-CM

## 2024-01-17 DIAGNOSIS — R06.02 SHORTNESS OF BREATH: ICD-10-CM

## 2024-01-17 PROBLEM — F10.10 ALCOHOL ABUSE, UNCOMPLICATED: Chronic | Status: ACTIVE | Noted: 2023-09-12

## 2024-01-17 LAB
A1C WITH ESTIMATED AVERAGE GLUCOSE RESULT: 10.3 % — HIGH (ref 4–5.6)
ALBUMIN SERPL ELPH-MCNC: 3.7 G/DL — SIGNIFICANT CHANGE UP (ref 3.3–5)
ALP SERPL-CCNC: 77 U/L — SIGNIFICANT CHANGE UP (ref 40–120)
ALT FLD-CCNC: 36 U/L — SIGNIFICANT CHANGE UP (ref 4–41)
ANION GAP SERPL CALC-SCNC: 15 MMOL/L — HIGH (ref 7–14)
APPEARANCE UR: CLEAR — SIGNIFICANT CHANGE UP
AST SERPL-CCNC: 29 U/L — SIGNIFICANT CHANGE UP (ref 4–40)
B PERT DNA SPEC QL NAA+PROBE: SIGNIFICANT CHANGE UP
B PERT+PARAPERT DNA PNL SPEC NAA+PROBE: SIGNIFICANT CHANGE UP
B-OH-BUTYR SERPL-SCNC: 4.2 MMOL/L — HIGH (ref 0–0.4)
BACTERIA # UR AUTO: NEGATIVE /HPF — SIGNIFICANT CHANGE UP
BASE EXCESS BLDV CALC-SCNC: 1.9 MMOL/L — SIGNIFICANT CHANGE UP (ref -2–3)
BASOPHILS # BLD AUTO: 0.02 K/UL — SIGNIFICANT CHANGE UP (ref 0–0.2)
BASOPHILS NFR BLD AUTO: 0.4 % — SIGNIFICANT CHANGE UP (ref 0–2)
BILIRUB SERPL-MCNC: 0.7 MG/DL — SIGNIFICANT CHANGE UP (ref 0.2–1.2)
BILIRUB UR-MCNC: ABNORMAL
BLOOD GAS VENOUS COMPREHENSIVE RESULT: SIGNIFICANT CHANGE UP
BORDETELLA PARAPERTUSSIS (RAPRVP): SIGNIFICANT CHANGE UP
BUN SERPL-MCNC: 12 MG/DL — SIGNIFICANT CHANGE UP (ref 7–23)
C PNEUM DNA SPEC QL NAA+PROBE: SIGNIFICANT CHANGE UP
CALCIUM SERPL-MCNC: 9.3 MG/DL — SIGNIFICANT CHANGE UP (ref 8.4–10.5)
CAST: 17 /LPF — HIGH (ref 0–4)
CHLORIDE BLDV-SCNC: 97 MMOL/L — SIGNIFICANT CHANGE UP (ref 96–108)
CHLORIDE SERPL-SCNC: 95 MMOL/L — LOW (ref 98–107)
CO2 BLDV-SCNC: 28.7 MMOL/L — HIGH (ref 22–26)
CO2 SERPL-SCNC: 25 MMOL/L — SIGNIFICANT CHANGE UP (ref 22–31)
COLOR SPEC: SIGNIFICANT CHANGE UP
CREAT SERPL-MCNC: 0.78 MG/DL — SIGNIFICANT CHANGE UP (ref 0.5–1.3)
DIFF PNL FLD: NEGATIVE — SIGNIFICANT CHANGE UP
EGFR: 105 ML/MIN/1.73M2 — SIGNIFICANT CHANGE UP
EOSINOPHIL # BLD AUTO: 0.03 K/UL — SIGNIFICANT CHANGE UP (ref 0–0.5)
EOSINOPHIL NFR BLD AUTO: 0.6 % — SIGNIFICANT CHANGE UP (ref 0–6)
ESTIMATED AVERAGE GLUCOSE: 249 — SIGNIFICANT CHANGE UP
FLUAV SUBTYP SPEC NAA+PROBE: SIGNIFICANT CHANGE UP
FLUBV RNA SPEC QL NAA+PROBE: SIGNIFICANT CHANGE UP
GAS PNL BLDV: 130 MMOL/L — LOW (ref 136–145)
GAS PNL BLDV: SIGNIFICANT CHANGE UP
GLUCOSE BLDV-MCNC: 337 MG/DL — HIGH (ref 70–99)
GLUCOSE SERPL-MCNC: 317 MG/DL — HIGH (ref 70–99)
GLUCOSE UR QL: >=1000 MG/DL
HADV DNA SPEC QL NAA+PROBE: SIGNIFICANT CHANGE UP
HCO3 BLDV-SCNC: 27 MMOL/L — SIGNIFICANT CHANGE UP (ref 22–29)
HCOV 229E RNA SPEC QL NAA+PROBE: SIGNIFICANT CHANGE UP
HCOV HKU1 RNA SPEC QL NAA+PROBE: SIGNIFICANT CHANGE UP
HCOV NL63 RNA SPEC QL NAA+PROBE: SIGNIFICANT CHANGE UP
HCOV OC43 RNA SPEC QL NAA+PROBE: SIGNIFICANT CHANGE UP
HCT VFR BLD CALC: 46.5 % — SIGNIFICANT CHANGE UP (ref 39–50)
HCT VFR BLDA CALC: 51 % — SIGNIFICANT CHANGE UP (ref 39–51)
HGB BLD CALC-MCNC: 17.1 G/DL — SIGNIFICANT CHANGE UP (ref 12.6–17.4)
HGB BLD-MCNC: 16.6 G/DL — SIGNIFICANT CHANGE UP (ref 13–17)
HMPV RNA SPEC QL NAA+PROBE: SIGNIFICANT CHANGE UP
HPIV1 RNA SPEC QL NAA+PROBE: SIGNIFICANT CHANGE UP
HPIV2 RNA SPEC QL NAA+PROBE: SIGNIFICANT CHANGE UP
HPIV3 RNA SPEC QL NAA+PROBE: SIGNIFICANT CHANGE UP
HPIV4 RNA SPEC QL NAA+PROBE: SIGNIFICANT CHANGE UP
HYALINE CASTS # UR AUTO: PRESENT
IANC: 2.34 K/UL — SIGNIFICANT CHANGE UP (ref 1.8–7.4)
IMM GRANULOCYTES NFR BLD AUTO: 0.8 % — SIGNIFICANT CHANGE UP (ref 0–0.9)
KETONES UR-MCNC: 15 MG/DL
LACTATE BLDV-MCNC: 1.6 MMOL/L — SIGNIFICANT CHANGE UP (ref 0.5–2)
LEUKOCYTE ESTERASE UR-ACNC: NEGATIVE — SIGNIFICANT CHANGE UP
LIDOCAIN IGE QN: 24 U/L — SIGNIFICANT CHANGE UP (ref 7–60)
LYMPHOCYTES # BLD AUTO: 1.9 K/UL — SIGNIFICANT CHANGE UP (ref 1–3.3)
LYMPHOCYTES # BLD AUTO: 38 % — SIGNIFICANT CHANGE UP (ref 13–44)
M PNEUMO DNA SPEC QL NAA+PROBE: SIGNIFICANT CHANGE UP
MCHC RBC-ENTMCNC: 30.7 PG — SIGNIFICANT CHANGE UP (ref 27–34)
MCHC RBC-ENTMCNC: 35.7 GM/DL — SIGNIFICANT CHANGE UP (ref 32–36)
MCV RBC AUTO: 86.1 FL — SIGNIFICANT CHANGE UP (ref 80–100)
MONOCYTES # BLD AUTO: 0.67 K/UL — SIGNIFICANT CHANGE UP (ref 0–0.9)
MONOCYTES NFR BLD AUTO: 13.4 % — SIGNIFICANT CHANGE UP (ref 2–14)
NEUTROPHILS # BLD AUTO: 2.34 K/UL — SIGNIFICANT CHANGE UP (ref 1.8–7.4)
NEUTROPHILS NFR BLD AUTO: 46.8 % — SIGNIFICANT CHANGE UP (ref 43–77)
NITRITE UR-MCNC: NEGATIVE — SIGNIFICANT CHANGE UP
NRBC # BLD: 0 /100 WBCS — SIGNIFICANT CHANGE UP (ref 0–0)
NRBC # FLD: 0 K/UL — SIGNIFICANT CHANGE UP (ref 0–0)
PCO2 BLDV: 44 MMHG — SIGNIFICANT CHANGE UP (ref 42–55)
PH BLDV: 7.4 — SIGNIFICANT CHANGE UP (ref 7.32–7.43)
PH UR: 5.5 — SIGNIFICANT CHANGE UP (ref 5–8)
PLATELET # BLD AUTO: 331 K/UL — SIGNIFICANT CHANGE UP (ref 150–400)
PO2 BLDV: 35 MMHG — SIGNIFICANT CHANGE UP (ref 25–45)
POTASSIUM BLDV-SCNC: 4.5 MMOL/L — SIGNIFICANT CHANGE UP (ref 3.5–5.1)
POTASSIUM SERPL-MCNC: 4.3 MMOL/L — SIGNIFICANT CHANGE UP (ref 3.5–5.3)
POTASSIUM SERPL-SCNC: 4.3 MMOL/L — SIGNIFICANT CHANGE UP (ref 3.5–5.3)
PROT SERPL-MCNC: 7.7 G/DL — SIGNIFICANT CHANGE UP (ref 6–8.3)
PROT UR-MCNC: 300 MG/DL
RAPID RVP RESULT: SIGNIFICANT CHANGE UP
RBC # BLD: 5.4 M/UL — SIGNIFICANT CHANGE UP (ref 4.2–5.8)
RBC # FLD: 12 % — SIGNIFICANT CHANGE UP (ref 10.3–14.5)
RBC CASTS # UR COMP ASSIST: 3 /HPF — SIGNIFICANT CHANGE UP (ref 0–4)
REVIEW: SIGNIFICANT CHANGE UP
RSV RNA SPEC QL NAA+PROBE: SIGNIFICANT CHANGE UP
RV+EV RNA SPEC QL NAA+PROBE: SIGNIFICANT CHANGE UP
SAO2 % BLDV: 58.6 % — LOW (ref 67–88)
SARS-COV-2 RNA SPEC QL NAA+PROBE: SIGNIFICANT CHANGE UP
SODIUM SERPL-SCNC: 135 MMOL/L — SIGNIFICANT CHANGE UP (ref 135–145)
SP GR SPEC: 1.05 — HIGH (ref 1–1.03)
SQUAMOUS # UR AUTO: 10 /HPF — HIGH (ref 0–5)
TROPONIN T, HIGH SENSITIVITY RESULT: 8 NG/L — SIGNIFICANT CHANGE UP
UROBILINOGEN FLD QL: 1 MG/DL — SIGNIFICANT CHANGE UP (ref 0.2–1)
WBC # BLD: 5 K/UL — SIGNIFICANT CHANGE UP (ref 3.8–10.5)
WBC # FLD AUTO: 5 K/UL — SIGNIFICANT CHANGE UP (ref 3.8–10.5)
WBC UR QL: 5 /HPF — SIGNIFICANT CHANGE UP (ref 0–5)

## 2024-01-17 PROCEDURE — 71045 X-RAY EXAM CHEST 1 VIEW: CPT | Mod: 26

## 2024-01-17 PROCEDURE — 71275 CT ANGIOGRAPHY CHEST: CPT | Mod: 26,MA

## 2024-01-17 PROCEDURE — 93010 ELECTROCARDIOGRAM REPORT: CPT

## 2024-01-17 PROCEDURE — 99214 OFFICE O/P EST MOD 30 MIN: CPT

## 2024-01-17 PROCEDURE — 99285 EMERGENCY DEPT VISIT HI MDM: CPT

## 2024-01-17 PROCEDURE — 74177 CT ABD & PELVIS W/CONTRAST: CPT | Mod: 26,MA

## 2024-01-17 RX ORDER — THIAMINE MONONITRATE (VIT B1) 100 MG
100 TABLET ORAL ONCE
Refills: 0 | Status: COMPLETED | OUTPATIENT
Start: 2024-01-17 | End: 2024-01-17

## 2024-01-17 RX ORDER — SODIUM CHLORIDE 9 MG/ML
1000 INJECTION, SOLUTION INTRAVENOUS ONCE
Refills: 0 | Status: COMPLETED | OUTPATIENT
Start: 2024-01-17 | End: 2024-01-17

## 2024-01-17 RX ADMIN — SODIUM CHLORIDE 1000 MILLILITER(S): 9 INJECTION, SOLUTION INTRAVENOUS at 16:42

## 2024-01-17 RX ADMIN — SODIUM CHLORIDE 1000 MILLILITER(S): 9 INJECTION, SOLUTION INTRAVENOUS at 13:33

## 2024-01-17 RX ADMIN — SODIUM CHLORIDE 1000 MILLILITER(S): 9 INJECTION, SOLUTION INTRAVENOUS at 14:22

## 2024-01-17 RX ADMIN — Medication 100 MILLIGRAM(S): at 14:18

## 2024-01-17 NOTE — ED PROVIDER NOTE - NSFOLLOWUPINSTRUCTIONS_ED_ALL_ED_FT
YOU WERE SEEN IN THE ED FOR: Shortness of breath and fatigue     PLEASE HYDRATE WITH PLENTY OF FLUIDS.     PLEASE REFRAIN FROM USING ALCOHOL AND SMOKING CIGARETTES.     FOR PAIN/FEVER, YOU MAY TAKE TYLENOL (acetaminophen) AND/OR IBUPROFEN (advil or motrin). FOLLOW THE INSTRUCTIONS ON THE LABEL/CONTAINER.    PLEASE FOLLOW UP WITH YOUR PRIVATE PHYSICIAN WITHIN THE NEXT 48 HOURS. BRING COPIES OF YOUR RESULTS.    RETURN TO THE EMERGENCY DEPARTMENT IF YOU EXPERIENCE ANY NEW/CONCERNING/WORSENING SYMPTOMS SUCH AS BUT NOT LIMITED TO: persistent shortness of breath, fever, chest pain, persistent weight loss, or any other concerns.

## 2024-01-17 NOTE — SBIRT NOTE ADULT - NSSBIRTALCPASSREFTXDET_GEN_A_CORE
Provided SBIRT services: Full Screen Positive. Brief Intervention Performed and Referral to Treatment Attempted.  Screening results were reviewed with the patient and patient was provided information about healthy guidelines and   potential negative consequences associated with level of risk. Motivation and readiness to reduce or stop use was   discussed and goals and activities to make changes were suggested/offered.   Options discussed for further evaluation and treatment, but referral to treatment was not completed because Patient refused

## 2024-01-17 NOTE — ED PROVIDER NOTE - PHYSICAL EXAMINATION
Afebrile, fatigued appearing, Dry mucous membranes no rash, rrr, minimally tachypneic, ctabl, abdomen soft and ndnt, no le edema, stable gait.

## 2024-01-17 NOTE — ED PROVIDER NOTE - OBJECTIVE STATEMENT
58M, IDDM, etoh use, who presents feeling unwell. Last week, both wife and patient felt unwell - fevers/chills/myalgias. Wife tested positive for influenza and patient did not. Started on zpack. Since then, patient has had progressive PO intolerance, nausea. Reports ~10-15 lb weight loss as well. Wife notes that he has been drinking more water as well during this time. Intermittent compliant on his insulin. Went to PMD this morning who sent him in to the ED because of how he looked. On ROS, reports malaise. No chest pain, belly pain, dysuria/hematuria, black/bloody stools, recent travel, trauma, syncope. 58M, IDDM, etoh use, who presents feeling unwell. Last week, both wife and patient felt unwell - fevers/chills/myalgias. Wife tested positive for influenza and patient did not. Started on zpack. Since then, patient has had progressive PO intolerance, nausea. Reports ~10-15 lb weight loss as well. Wife notes that he has been drinking more water as well during this time. Intermittent compliant on his insulin. Went to PMD this morning who sent him in to the ED because of how he looked. On ROS, reports malaise. No chest pain, belly pain, dysuria/hematuria, black/bloody stools, recent travel, trauma, syncope..

## 2024-01-17 NOTE — ED ADULT TRIAGE NOTE - CHIEF COMPLAINT QUOTE
Pt c/o RUBIO x 1 week, reports had flu-like symptoms x 2 weeks. States he lost about 20 pounds in 2 weeks due to decreased appetite/PO intake. Respirations even and unlabored. Denies fever, chest pain, cough. Hx: DM2 blood glucose: 337

## 2024-01-17 NOTE — REVIEW OF SYSTEMS
[FreeTextEntry1] : Medical assistant present for duration of physical examination\par \par General no acute distress, alert and oriented\par Psych calm, pleasant demeanor, responding appropriately to questions\par Nonlabored breathing\par Ambulating without assistance\par Skin normal color and pigment, no visible lesions or rashes\par \par \par Anorectal Exam:\par Inspection no erythema, induration or fluctuance, no skin excoriation, no fissure, no external hemorrhoids \par JOHN nontender, no masses palpated, no blood on gloved finger\par \par Procedure: Anoscopy\par \par Pre procedure Diagnosis: hemorrhoids\par Post procedure Diagnosis: hemorrhoids\par Anesthesia: none\par Estimated blood loss: none\par Specimen: none\par Complications: none\par \par Consent obtained. Anoscopy was performed by passing a lighted anoscope with lubricant jelly into the anal canal and the entire anal mucosal surface was inspected. Findings included no fissure, mild internal hemorrhoids, mild inflammation left lateral cushion, no visible masses or lesions in anal canal\par \par Patient tolerated examination and procedure well.\par \par \par  [Negative] : Gastrointestinal

## 2024-01-17 NOTE — ED ADULT NURSE REASSESSMENT NOTE - NS ED NURSE REASSESS COMMENT FT1
Pt tolerated PO; although food was not to his taste he tolerated it well and is looking forward to eating at home. IV d/c'd, pt ambulating without assistance, family at bedside.

## 2024-01-17 NOTE — ED ADULT NURSE REASSESSMENT NOTE - NS ED NURSE REASSESS COMMENT FT1
Pt resting in INT Rm 9; reports feeling better; bgl 227; BP noted to be elevated 207/93; repeated at interval BP continued elevated 193/96; Pt denies ever having HTN in past; No headaches or blurry vision reported; MD Vasquez notified, awaiting further MD orders.

## 2024-01-17 NOTE — ED PROVIDER NOTE - CLINICAL SUMMARY MEDICAL DECISION MAKING FREE TEXT BOX
58M, IDDM, etoh use, who presents feeling unwell. Last week, both wife and patient felt unwell - fevers/chills/myalgias. Wife tested positive for influenza and patient did not. Started on zpack. Since then, patient has had progressive PO intolerance, nausea. Reports ~10-15 lb weight loss as well. Wife notes that he has been drinking more water as well during this time. Intermittent compliant on his insulin. Went to PMD this morning who sent him in to the ED because of how he looked. On ROS, reports malaise. No chest pain, belly pain, dysuria/hematuria, black/bloody stools, recent travel, trauma, syncope. Afebrile but appears fatigued. Also with dry mucous membrane and small degree of tachypnea. CTABL, abdomen soft. ?DKA in light of recent viral illness. Will obtain labs, VBG, hydroxybutryate. IVF. Dispo pending.

## 2024-01-17 NOTE — ED ADULT NURSE NOTE - OBJECTIVE STATEMENT
Pt AOX4 c/o weeks of malaise, weakness, anorexia, cough; pt's glucose was well-controlled for the first two weeks but his readings have been high in past 2 days, saw his PMD today who sent him directly to ER; bgl 377 in triage; pt denies polyuria, states he has only been able to tolerate H2O to drink and has been barely eating; breathing regular and unlabored at this time, Pt has Hx of pancreatitis, was hospitalized 2022, pt also states he drinks excessively (>10 beers at times) almost every day and smokes daily.  Pt has not smoked or had any ETOH in 3 weeks since beginning of this illness; pt encouraged to consider quitting both substances and offered resources; no tremor, denies any withdrawal symptoms over past 3 weeks; 20G IV placed in Right AC, medications and fluids given as per MD. Wife at bedside, labs drawn and sent.

## 2024-01-17 NOTE — ED PROVIDER NOTE - PATIENT PORTAL LINK FT
You can access the FollowMyHealth Patient Portal offered by [facility] by registering at the following website: [email or web address]. By joining [employer]’s FollowMyHealth portal, you will also be able to view your health information using other applications (apps) compatible with our system.

## 2024-01-17 NOTE — ED PROVIDER NOTE - PROGRESS NOTE DETAILS
Mirian Mercado DO (PGY-1): Reviewed labs and CT findings with patient and wife. CT with nonspecific ground glass opacities and evidence of prior pancreatitis. No pulmonary embolism. Labs and CT not consistent with infectious etiology. Patient feels better after fluids. Tolerated PO. Stable for d/c home. Instructed him to restart his insulin regimen as he starts to resume a diet. States he has a PCP appointment on Monday.

## 2024-01-18 LAB
CULTURE RESULTS: SIGNIFICANT CHANGE UP
SPECIMEN SOURCE: SIGNIFICANT CHANGE UP

## 2024-01-18 NOTE — HISTORY OF PRESENT ILLNESS
[de-identified] : 58 year old male who was in the hospital in September with DKA has a hx of chronic pancreatitis  went to urgent care twice  now on doxycycline  has had a 20 pound weight loss but hasn't eaten or drank  He is coughing but it is better   has not drank or smoked in 2 weeks   He is wearing a new chay  He was 271 this AM

## 2024-01-18 NOTE — ASSESSMENT
[FreeTextEntry1] : would recommend going to the er given the way the patient is breathing, his complaints and comorbidities   Recommend supportive care including antipyretics, fluids, OTC cough/cold medications if age-appropriate, and nasal saline followed by nasal suction. Return if symptoms worsen or persist.

## 2024-01-22 ENCOUNTER — APPOINTMENT (OUTPATIENT)
Dept: INTERNAL MEDICINE | Facility: CLINIC | Age: 59
End: 2024-01-22
Payer: COMMERCIAL

## 2024-01-22 VITALS
BODY MASS INDEX: 21.9 KG/M2 | HEIGHT: 70 IN | WEIGHT: 153 LBS | SYSTOLIC BLOOD PRESSURE: 156 MMHG | OXYGEN SATURATION: 99 % | DIASTOLIC BLOOD PRESSURE: 97 MMHG | HEART RATE: 69 BPM

## 2024-01-22 LAB
CULTURE RESULTS: SIGNIFICANT CHANGE UP
CULTURE RESULTS: SIGNIFICANT CHANGE UP
SPECIMEN SOURCE: SIGNIFICANT CHANGE UP
SPECIMEN SOURCE: SIGNIFICANT CHANGE UP

## 2024-01-22 PROCEDURE — 99213 OFFICE O/P EST LOW 20 MIN: CPT

## 2024-01-22 RX ORDER — ESOMEPRAZOLE MAGNESIUM 20 MG/1
20 CAPSULE, DELAYED RELEASE ORAL
Refills: 0 | Status: DISCONTINUED | COMMUNITY
Start: 2021-12-23 | End: 2024-01-22

## 2024-01-22 RX ORDER — OXYCODONE 5 MG/1
5 TABLET ORAL
Qty: 20 | Refills: 0 | Status: DISCONTINUED | COMMUNITY
Start: 2023-01-13 | End: 2024-01-22

## 2024-01-22 RX ORDER — OXYCODONE 5 MG/1
5 TABLET ORAL
Qty: 20 | Refills: 0 | Status: DISCONTINUED | COMMUNITY
Start: 2023-01-05 | End: 2024-01-22

## 2024-01-22 RX ORDER — PREDNISONE 10 MG/1
10 TABLET ORAL
Qty: 3 | Refills: 1 | Status: ACTIVE | COMMUNITY
Start: 2024-01-22 | End: 1900-01-01

## 2024-01-22 RX ORDER — ONDANSETRON 8 MG/1
8 TABLET ORAL EVERY 8 HOURS
Qty: 2 | Refills: 0 | Status: DISCONTINUED | COMMUNITY
Start: 2023-01-04 | End: 2024-01-22

## 2024-01-23 DIAGNOSIS — Z00.00 ENCOUNTER FOR GENERAL ADULT MEDICAL EXAMINATION W/OUT ABNORMAL FINDINGS: ICD-10-CM

## 2024-01-23 NOTE — ASSESSMENT
[FreeTextEntry1] : post nasal drip: flonase and allegra or zyrtec  fu when needed  continue all other meds  humidifier, steam  reviewed ER visit/labs/notes with patient

## 2024-01-23 NOTE — HISTORY OF PRESENT ILLNESS
[de-identified] : Mr. MARIA R UMANA is a 58 year old male here today for a follow up of their chronic medical conditions.  doing better but with a postnasal drip  concerned with this  went to the ER   no longer with SOB  denies drinking currently no desire

## 2024-02-12 ENCOUNTER — RX RENEWAL (OUTPATIENT)
Age: 59
End: 2024-02-12

## 2024-02-12 RX ORDER — PEN NEEDLE, DIABETIC 29 G X1/2"
31G X 5 MM NEEDLE, DISPOSABLE MISCELLANEOUS
Qty: 400 | Refills: 0 | Status: ACTIVE | COMMUNITY
Start: 2024-02-12 | End: 1900-01-01

## 2024-04-10 RX ORDER — FLASH GLUCOSE SENSOR
KIT MISCELLANEOUS
Qty: 6 | Refills: 1 | Status: ACTIVE | COMMUNITY
Start: 2021-12-30 | End: 1900-01-01

## 2024-05-09 ENCOUNTER — RX RENEWAL (OUTPATIENT)
Age: 59
End: 2024-05-09

## 2024-05-09 RX ORDER — INSULIN GLARGINE 100 [IU]/ML
100 INJECTION, SOLUTION SUBCUTANEOUS
Qty: 15 | Refills: 1 | Status: ACTIVE | COMMUNITY
Start: 1900-01-01 | End: 1900-01-01

## 2024-06-05 RX ORDER — BLOOD-GLUCOSE SENSOR
EACH MISCELLANEOUS
Qty: 6 | Refills: 3 | Status: ACTIVE | COMMUNITY
Start: 2024-06-05 | End: 1900-01-01

## 2024-06-20 NOTE — CONSULT NOTE ADULT - PROBLEM SELECTOR PROBLEM 3
Render Risk Assessment In Note?: no Detail Level: Simple Additional Notes: Pt declines bothersome at this time. Hypertension

## 2024-10-03 ENCOUNTER — APPOINTMENT (OUTPATIENT)
Dept: ENDOCRINOLOGY | Facility: CLINIC | Age: 59
End: 2024-10-03
Payer: COMMERCIAL

## 2024-10-03 VITALS
BODY MASS INDEX: 22.62 KG/M2 | HEART RATE: 99 BPM | OXYGEN SATURATION: 98 % | SYSTOLIC BLOOD PRESSURE: 132 MMHG | DIASTOLIC BLOOD PRESSURE: 74 MMHG | WEIGHT: 158 LBS | HEIGHT: 70 IN

## 2024-10-03 DIAGNOSIS — E11.9 TYPE 2 DIABETES MELLITUS W/OUT COMPLICATIONS: ICD-10-CM

## 2024-10-03 DIAGNOSIS — E78.5 HYPERLIPIDEMIA, UNSPECIFIED: ICD-10-CM

## 2024-10-03 PROCEDURE — 36415 COLL VENOUS BLD VENIPUNCTURE: CPT

## 2024-10-03 PROCEDURE — 95251 CONT GLUC MNTR ANALYSIS I&R: CPT

## 2024-10-03 PROCEDURE — 99214 OFFICE O/P EST MOD 30 MIN: CPT

## 2024-10-04 ENCOUNTER — RESULT CHARGE (OUTPATIENT)
Age: 59
End: 2024-10-04

## 2024-10-04 LAB
ALBUMIN SERPL ELPH-MCNC: 4.6 G/DL
ALP BLD-CCNC: 84 U/L
ALT SERPL-CCNC: 50 U/L
ANION GAP SERPL CALC-SCNC: 12 MMOL/L
AST SERPL-CCNC: 48 U/L
BILIRUB SERPL-MCNC: 0.6 MG/DL
BUN SERPL-MCNC: 9 MG/DL
CALCIUM SERPL-MCNC: 9.9 MG/DL
CHLORIDE SERPL-SCNC: 99 MMOL/L
CHOLEST SERPL-MCNC: 129 MG/DL
CO2 SERPL-SCNC: 28 MMOL/L
CREAT SERPL-MCNC: 0.93 MG/DL
CREAT SPEC-SCNC: 67 MG/DL
EGFR: 96 ML/MIN/1.73M2
GLUCOSE SERPL-MCNC: 253 MG/DL
HBA1C MFR BLD HPLC: 11.2
HDLC SERPL-MCNC: 91 MG/DL
LDLC SERPL CALC-MCNC: 24 MG/DL
MICROALBUMIN 24H UR DL<=1MG/L-MCNC: <1.2 MG/DL
MICROALBUMIN/CREAT 24H UR-RTO: NORMAL MG/G
NONHDLC SERPL-MCNC: 38 MG/DL
POTASSIUM SERPL-SCNC: 4.5 MMOL/L
PROT SERPL-MCNC: 6.9 G/DL
SODIUM SERPL-SCNC: 139 MMOL/L
T4 FREE SERPL-MCNC: 1.2 NG/DL
TRIGL SERPL-MCNC: 73 MG/DL
TSH SERPL-ACNC: 1.33 UIU/ML

## 2024-10-04 NOTE — ASSESSMENT
[Diabetes Foot Care] : diabetes foot care [Long Term Vascular Complications] : long term vascular complications of diabetes [Carbohydrate Consistent Diet] : carbohydrate consistent diet [Importance of Diet and Exercise] : importance of diet and exercise to improve glycemic control, achieve weight loss and improve cardiovascular health [Hypoglycemia Management] : hypoglycemia management [Action and use of Insulin] : action and use of short and long-acting insulin [Self Monitoring of Blood Glucose] : self monitoring of blood glucose [Injection Technique, Storage, Sharps Disposal] : injection technique, storage, and sharps disposal [Retinopathy Screening] : Patient was referred to ophthalmology for retinopathy screening [Diabetic Medications] : Risks and benefits of diabetic medications were discussed [FreeTextEntry1] :  Type 2 DM, and h/o DKA in Dec 2021 and 2023 POC HgbA1c today s 11.2%, uncontrolled Patient stated today that he understood that he needs to take both accountability and responsibility for his follow-ups and not depend on anyone else.  He understands that he has no excuse to follow-up regularly with both his primary care doctor and endocrinologist to help ensure frequent monitoring of his diabetes.  He needs to also take his insulin and not skip any doses which she has been doing over the past year. Reviewed chay sensor tracing, noted time in ranges 3% and 89% very high which is not at goal at all.  Discussed that the amount of insulin he has been taking is nowhere close to what his body is requiring currently. Discussed consequences of uncontrolled diabetes with patient including increased risk of hospitalizations for DKA/HHS, sepsis/infection, CKD, dialysis, retinopathy, neuropathy, vascular complications, risk for CVA or MI, and death.   Discussed normal glucose targets for AM fasting and 2hr postprandial with patient today.  Has been off Basaglar due to fear of low sugars even though they have not occurred as per chay download today Reviewed sensor tracing, noted TIR is 5%, 12% high, 83% very high and 0% lows. CGM active 96% of the time Restart Basaglar 15U qhs and discussed if AM fasting sugar is persistently above 130 he should increase to 20 units nightly after 3 days. Increase admelog 10U TID ac He declined to meet with a mental health provider to help motivate his overall diabetes management today. He agreed to meet with CDE in 1 month Discussed if glucose persistently high >300mg/dl needs to go to ER for evaluation of DKA/HHS.  Answered all questions today; patient and his wife verbalized understanding of the above RTO in 1 /w/ CDERN and 2-3 months w/ covering endocrine provider myriam I am out on maternity leave

## 2024-10-04 NOTE — HISTORY OF PRESENT ILLNESS
[Continuous Glucose Monitoring] : Continuous Glucose Monitoring: Yes [Geronimo] : Geronimo [FreeTextEntry1] : This is a 59 yo male who presents for diabetes follow up  Patient has not been seen in endocrinology office since November 2023.  He has made string of excuses in the past and notes today that he has been so busy to make doctors appointments and has not seen his PCP in over a year as well.  He noted he was not feeling well and had 20 pound weight loss and shortness of breath and was sent to ER in January however was not in DKA but noted sugar was 377.  He has history of noncompliance with insulin and regular follow-ups.  He is here with his wife today and admits that he should do better and take accountability for his actions and that his noncompliance is clearly affecting his overall diabetic control.  He notes he was told to use dentures since losing his teeth and this has impacted his diet however knows that this should not stop him from having good control of his diabetes.  He is still drinking about 6-8 beers a day and does not want to seek mental health services.  He acknowledges he is now taking Basaglar 6 qhs, admelog 3-U which is under the discretion of his wife apparently.  He knows despite taking this amount of insulin that his sugars have been ranging in the 300s to 400s which she does not find bothersome.  He was diagnosed with diabetes in Dec 2021 at which time he was in DKA.  He was again in DKA in 2023 due to noncompliance He uses chay CGM He needs to see ophthalmology for diabetic screening  [Finger Stick] : Finger Stick: No [Hypoglycemia] : Patient is not hypoglycemic. [FreeTextEntry2] : 3 [FreeTextEntry3] : 8+89 [FreeTextEntry4] : 0+0 [de-identified] : 11.4 [FreeTextEntry5] : 340 [FreeTextEntry6] : 20.6

## 2024-10-04 NOTE — HISTORY OF PRESENT ILLNESS
[Continuous Glucose Monitoring] : Continuous Glucose Monitoring: Yes [Geronimo] : Geronimo [FreeTextEntry1] : This is a 59 yo male who presents for diabetes follow up  Patient has not been seen in endocrinology office since November 2023.  He has made string of excuses in the past and notes today that he has been so busy to make doctors appointments and has not seen his PCP in over a year as well.  He noted he was not feeling well and had 20 pound weight loss and shortness of breath and was sent to ER in January however was not in DKA but noted sugar was 377.  He has history of noncompliance with insulin and regular follow-ups.  He is here with his wife today and admits that he should do better and take accountability for his actions and that his noncompliance is clearly affecting his overall diabetic control.  He notes he was told to use dentures since losing his teeth and this has impacted his diet however knows that this should not stop him from having good control of his diabetes.  He is still drinking about 6-8 beers a day and does not want to seek mental health services.  He acknowledges he is now taking Basaglar 6 qhs, admelog 3-U which is under the discretion of his wife apparently.  He knows despite taking this amount of insulin that his sugars have been ranging in the 300s to 400s which she does not find bothersome.  He was diagnosed with diabetes in Dec 2021 at which time he was in DKA.  He was again in DKA in 2023 due to noncompliance He uses chay CGM He needs to see ophthalmology for diabetic screening  [Finger Stick] : Finger Stick: No [Hypoglycemia] : Patient is not hypoglycemic. [FreeTextEntry2] : 3 [FreeTextEntry3] : 8+89 [FreeTextEntry4] : 0+0 [de-identified] : 11.4 [FreeTextEntry5] : 340 [FreeTextEntry6] : 20.6

## 2024-10-04 NOTE — PHYSICAL EXAM
[Well Nourished] : well nourished [Well Developed] : well developed [Normal Sclera/Conjunctiva] : normal sclera/conjunctiva [EOMI] : extra ocular movement intact [No Proptosis] : no proptosis [No Lid Lag] : no lid lag [Normal Hearing] : hearing was normal [No LAD] : no lymphadenopathy [Supple] : the neck was supple [Thyroid Not Enlarged] : the thyroid was not enlarged [No Thyroid Nodules] : no palpable thyroid nodules [No Respiratory Distress] : no respiratory distress [No Accessory Muscle Use] : no accessory muscle use [Normal Rate and Effort] : normal respiratory rate and effort [Clear to Auscultation] : lungs were clear to auscultation bilaterally [Normal S1, S2] : normal S1 and S2 [No Murmurs] : no murmurs [Normal Rate] : heart rate was normal [Regular Rhythm] : with a regular rhythm [Normal Bowel Sounds] : normal bowel sounds [Not Tender] : non-tender [Not Distended] : not distended [Soft] : abdomen soft [No Stigmata of Cushings Syndrome] : no stigmata of Cushings Syndrome [Normal Gait] : normal gait [No Clubbing, Cyanosis] : no clubbing  or cyanosis of the fingernails [No Rash] : no rash [2+] : 2+ in the dorsalis pedis [Normal Reflexes] : deep tendon reflexes were 2+ and symmetric [No Tremors] : no tremors [Oriented x3] : oriented to person, place, and time [Abdominal Striae] : no abdominal striae [Acanthosis Nigricans] : no acanthosis nigricans [Foot Ulcers] : no foot ulcers [Acne] : no acne [de-identified] : left upper extremity in sling

## 2024-10-04 NOTE — PHYSICAL EXAM
[Well Nourished] : well nourished [Well Developed] : well developed [Normal Sclera/Conjunctiva] : normal sclera/conjunctiva [EOMI] : extra ocular movement intact [No Proptosis] : no proptosis [No Lid Lag] : no lid lag [Normal Hearing] : hearing was normal [No LAD] : no lymphadenopathy [Supple] : the neck was supple [Thyroid Not Enlarged] : the thyroid was not enlarged [No Thyroid Nodules] : no palpable thyroid nodules [No Respiratory Distress] : no respiratory distress [No Accessory Muscle Use] : no accessory muscle use [Normal Rate and Effort] : normal respiratory rate and effort [Clear to Auscultation] : lungs were clear to auscultation bilaterally [Normal S1, S2] : normal S1 and S2 [No Murmurs] : no murmurs [Normal Rate] : heart rate was normal [Regular Rhythm] : with a regular rhythm [Normal Bowel Sounds] : normal bowel sounds [Not Tender] : non-tender [Not Distended] : not distended [Soft] : abdomen soft [No Stigmata of Cushings Syndrome] : no stigmata of Cushings Syndrome [Normal Gait] : normal gait [No Clubbing, Cyanosis] : no clubbing  or cyanosis of the fingernails [No Rash] : no rash [2+] : 2+ in the dorsalis pedis [Normal Reflexes] : deep tendon reflexes were 2+ and symmetric [No Tremors] : no tremors [Oriented x3] : oriented to person, place, and time [Abdominal Striae] : no abdominal striae [Acanthosis Nigricans] : no acanthosis nigricans [Foot Ulcers] : no foot ulcers [Acne] : no acne [de-identified] : left upper extremity in sling

## 2024-11-11 ENCOUNTER — EMERGENCY (EMERGENCY)
Facility: HOSPITAL | Age: 59
LOS: 1 days | Discharge: HOME | End: 2024-11-11
Attending: EMERGENCY MEDICINE | Admitting: EMERGENCY MEDICINE
Payer: COMMERCIAL

## 2024-11-11 VITALS
SYSTOLIC BLOOD PRESSURE: 148 MMHG | DIASTOLIC BLOOD PRESSURE: 80 MMHG | RESPIRATION RATE: 18 BRPM | HEART RATE: 80 BPM | OXYGEN SATURATION: 100 % | TEMPERATURE: 98 F

## 2024-11-11 VITALS
RESPIRATION RATE: 17 BRPM | WEIGHT: 164.91 LBS | HEART RATE: 91 BPM | SYSTOLIC BLOOD PRESSURE: 161 MMHG | OXYGEN SATURATION: 98 % | TEMPERATURE: 98 F | DIASTOLIC BLOOD PRESSURE: 90 MMHG

## 2024-11-11 LAB
ADD ON TEST-SPECIMEN IN LAB: SIGNIFICANT CHANGE UP
ALBUMIN SERPL ELPH-MCNC: 4.1 G/DL — SIGNIFICANT CHANGE UP (ref 3.3–5)
ALP SERPL-CCNC: 58 U/L — SIGNIFICANT CHANGE UP (ref 40–120)
ALT FLD-CCNC: 30 U/L — SIGNIFICANT CHANGE UP (ref 4–41)
ANION GAP SERPL CALC-SCNC: 15 MMOL/L — HIGH (ref 7–14)
APPEARANCE UR: CLEAR — SIGNIFICANT CHANGE UP
AST SERPL-CCNC: 34 U/L — SIGNIFICANT CHANGE UP (ref 4–40)
BASOPHILS # BLD AUTO: 0.04 K/UL — SIGNIFICANT CHANGE UP (ref 0–0.2)
BASOPHILS NFR BLD AUTO: 0.9 % — SIGNIFICANT CHANGE UP (ref 0–2)
BILIRUB SERPL-MCNC: 0.3 MG/DL — SIGNIFICANT CHANGE UP (ref 0.2–1.2)
BILIRUB UR-MCNC: NEGATIVE — SIGNIFICANT CHANGE UP
BLOOD GAS VENOUS COMPREHENSIVE RESULT: SIGNIFICANT CHANGE UP
BUN SERPL-MCNC: 5 MG/DL — LOW (ref 7–23)
CALCIUM SERPL-MCNC: 9 MG/DL — SIGNIFICANT CHANGE UP (ref 8.4–10.5)
CHLORIDE SERPL-SCNC: 100 MMOL/L — SIGNIFICANT CHANGE UP (ref 98–107)
CO2 SERPL-SCNC: 22 MMOL/L — SIGNIFICANT CHANGE UP (ref 22–31)
COLOR SPEC: YELLOW — SIGNIFICANT CHANGE UP
CREAT SERPL-MCNC: 0.68 MG/DL — SIGNIFICANT CHANGE UP (ref 0.5–1.3)
DIFF PNL FLD: NEGATIVE — SIGNIFICANT CHANGE UP
EGFR: 108 ML/MIN/1.73M2 — SIGNIFICANT CHANGE UP
EOSINOPHIL # BLD AUTO: 0.09 K/UL — SIGNIFICANT CHANGE UP (ref 0–0.5)
EOSINOPHIL NFR BLD AUTO: 1.9 % — SIGNIFICANT CHANGE UP (ref 0–6)
GLUCOSE SERPL-MCNC: 400 MG/DL — HIGH (ref 70–99)
GLUCOSE UR QL: 500 MG/DL
HCT VFR BLD CALC: 40.4 % — SIGNIFICANT CHANGE UP (ref 39–50)
HGB BLD-MCNC: 14.2 G/DL — SIGNIFICANT CHANGE UP (ref 13–17)
IANC: 2.25 K/UL — SIGNIFICANT CHANGE UP (ref 1.8–7.4)
IMM GRANULOCYTES NFR BLD AUTO: 0.2 % — SIGNIFICANT CHANGE UP (ref 0–0.9)
KETONES UR-MCNC: NEGATIVE MG/DL — SIGNIFICANT CHANGE UP
LEUKOCYTE ESTERASE UR-ACNC: NEGATIVE — SIGNIFICANT CHANGE UP
LIDOCAIN IGE QN: 21 U/L — SIGNIFICANT CHANGE UP (ref 7–60)
LYMPHOCYTES # BLD AUTO: 1.94 K/UL — SIGNIFICANT CHANGE UP (ref 1–3.3)
LYMPHOCYTES # BLD AUTO: 41.9 % — SIGNIFICANT CHANGE UP (ref 13–44)
MCHC RBC-ENTMCNC: 31.2 PG — SIGNIFICANT CHANGE UP (ref 27–34)
MCHC RBC-ENTMCNC: 35.1 G/DL — SIGNIFICANT CHANGE UP (ref 32–36)
MCV RBC AUTO: 88.8 FL — SIGNIFICANT CHANGE UP (ref 80–100)
MONOCYTES # BLD AUTO: 0.3 K/UL — SIGNIFICANT CHANGE UP (ref 0–0.9)
MONOCYTES NFR BLD AUTO: 6.5 % — SIGNIFICANT CHANGE UP (ref 2–14)
NEUTROPHILS # BLD AUTO: 2.25 K/UL — SIGNIFICANT CHANGE UP (ref 1.8–7.4)
NEUTROPHILS NFR BLD AUTO: 48.6 % — SIGNIFICANT CHANGE UP (ref 43–77)
NITRITE UR-MCNC: NEGATIVE — SIGNIFICANT CHANGE UP
NRBC # BLD: 0 /100 WBCS — SIGNIFICANT CHANGE UP (ref 0–0)
NRBC # FLD: 0 K/UL — SIGNIFICANT CHANGE UP (ref 0–0)
PH UR: 6 — SIGNIFICANT CHANGE UP (ref 5–8)
PLATELET # BLD AUTO: 190 K/UL — SIGNIFICANT CHANGE UP (ref 150–400)
POTASSIUM SERPL-MCNC: 4.7 MMOL/L — SIGNIFICANT CHANGE UP (ref 3.5–5.3)
POTASSIUM SERPL-SCNC: 4.7 MMOL/L — SIGNIFICANT CHANGE UP (ref 3.5–5.3)
PROT SERPL-MCNC: 6.6 G/DL — SIGNIFICANT CHANGE UP (ref 6–8.3)
PROT UR-MCNC: NEGATIVE MG/DL — SIGNIFICANT CHANGE UP
RBC # BLD: 4.55 M/UL — SIGNIFICANT CHANGE UP (ref 4.2–5.8)
RBC # FLD: 11.9 % — SIGNIFICANT CHANGE UP (ref 10.3–14.5)
SODIUM SERPL-SCNC: 137 MMOL/L — SIGNIFICANT CHANGE UP (ref 135–145)
SP GR SPEC: 1.06 — HIGH (ref 1–1.03)
UROBILINOGEN FLD QL: 0.2 MG/DL — SIGNIFICANT CHANGE UP (ref 0.2–1)
WBC # BLD: 4.63 K/UL — SIGNIFICANT CHANGE UP (ref 3.8–10.5)
WBC # FLD AUTO: 4.63 K/UL — SIGNIFICANT CHANGE UP (ref 3.8–10.5)

## 2024-11-11 PROCEDURE — 74177 CT ABD & PELVIS W/CONTRAST: CPT | Mod: 26,MC

## 2024-11-11 PROCEDURE — 99285 EMERGENCY DEPT VISIT HI MDM: CPT

## 2024-11-11 PROCEDURE — 93010 ELECTROCARDIOGRAM REPORT: CPT

## 2024-11-11 RX ORDER — INSULIN LISPRO 100/ML
5 VIAL (ML) SUBCUTANEOUS ONCE
Refills: 0 | Status: COMPLETED | OUTPATIENT
Start: 2024-11-11 | End: 2024-11-11

## 2024-11-11 RX ORDER — SODIUM CHLORIDE 9 MG/ML
1000 INJECTION, SOLUTION INTRAMUSCULAR; INTRAVENOUS; SUBCUTANEOUS ONCE
Refills: 0 | Status: COMPLETED | OUTPATIENT
Start: 2024-11-11 | End: 2024-11-11

## 2024-11-11 RX ADMIN — SODIUM CHLORIDE 1000 MILLILITER(S): 9 INJECTION, SOLUTION INTRAMUSCULAR; INTRAVENOUS; SUBCUTANEOUS at 11:05

## 2024-11-11 RX ADMIN — Medication 5 UNIT(S): at 12:07

## 2024-11-11 NOTE — ED PROVIDER NOTE - PHYSICAL EXAMINATION
Constitutional: Well-appearing, well-nourished, comfortable appearing.   Head: Normocephalic, atraumatic.   Eyes: PERRL. EOMI. No conjunctival pallor.   ENT: Moist mucous membranes. Uvula midline. No pharyngeal erythema or exudates.  Neck: No LAD. Supple.  CVS: Regular rate, regular rhythm. Normal S1, S2. No murmurs, rubs, or gallops. No peripheral edema noted. Radial and DP are 2+ and symmetric bilaterally.   Respiratory: No respiratory distress. Clear to auscultation bilaterally. No wheezing, rales, or rhonchi.   Abdomen: Abd is soft and non-distended. LLQ and suprapubic tenderness. No rebound, guarding, or rigidity.   MSK: Left CVA tenderness bilaterally.   Neuro: Alert and oriented to person, place, and time. Follows commands. Moves all extremities.   Skin: Warm and dry. No rashes.   Psych: Normal affect, cooperative. Constitutional: Well-appearing, well-nourished, comfortable appearing.   Head: Normocephalic, atraumatic.   Eyes: PERRL. EOMI. No conjunctival pallor.   ENT: Moist mucous membranes. Uvula midline. No pharyngeal erythema or exudates.  Neck: No LAD. Supple.  CVS: Regular rate, regular rhythm. Normal S1, S2. No murmurs, rubs, or gallops. No peripheral edema noted. Radial and DP are 2+ and symmetric bilaterally.   Respiratory: No respiratory distress. Clear to auscultation bilaterally. No wheezing, rales, or rhonchi.   Abdomen: Abd is soft and non-distended. LLQ and suprapubic tenderness. No rebound, guarding, or rigidity.   MSK: Left CVA tenderness bilaterally.   Neuro: Alert and oriented to person, place, and time. Follows commands. Moves all extremities.   Skin: Warm and dry. No rashes.   Psych: Normal affect, cooperative.    Attending/Jayshree: NAD; PERRL/EOMI, non-icterus, supple, no CONCEPCIÓN, no JVD, RRR, CTAB; Abd-soft, +LLQ PT w/ vol guarding, no rebound, no CVAT; , no HSM; no LE edema, A&Ox3, nonfocal; Skin-warm/dry

## 2024-11-11 NOTE — ED ADULT NURSE REASSESSMENT NOTE - NS ED NURSE REASSESS COMMENT FT1
Pt FS was 361, MD made aware, Pt given 5 units of Admelog to R arm. Pt awake and alert, A&OX4, resp even and unlabored. Denies CP, SOB, HA, dizziness, palpitations, blurry vision. Resting comfortably.
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
## 2024-11-11 NOTE — ED PROVIDER NOTE - PATIENT PORTAL LINK FT
You can access the FollowMyHealth Patient Portal offered by [facility] by registering at the following website: [email or web address]. By joining [employer]’s FollowMyHealth portal, you will also be able to view your health information using other applications (apps) compatible with our system.

## 2024-11-11 NOTE — ED ADULT NURSE NOTE - OBJECTIVE STATEMENT
Pt received to rm 19, A&OX4, ambulatory. Hx DM. C/o. of constant mid to LLQ pain that has worsened since yesterday. Pt endorsing new onset of sharp left flank pain. Pt endorses he drinks 6-8 beers per day after work, as well as smoking half a pack of cigarettes per day. Pt denies being hospitalized for withdrawal in the past. Pt denies any SI or Hi ideations. Pt denies any visual or auditory hallucinations.  Pt denies any fever and chills or any burning upon urination. Abdomen is soft, nondistended, however tender to touch in  the mid to LLQ. Normoactive bowel sounds in all quadrants. + rebound tenderness to left flank. Pulses equal b/l in all extremities. Cap refill < 3 secs. Respirations clear, even and unlabored. Denies CP, SOB, N/V, HA, dizziness, palpitations, blurry vision. 20G IV placed to L forearm. Labs drawn and sent.   Bed in lowest position, call bell within reach. Safety maintained. Pt received to rm 19, A&OX4, ambulatory. Hx DM. C/o. of constant mid to LLQ pain that has worsened since yesterday. Pt endorsing new onset of sharp left flank pain. Pt endorses he drinks 6-8 beers per day after work, as well as smoking half a pack of cigarettes per day. Pt denies being hospitalized for withdrawal in the past. Pt denies any SI/ HI ideations, or any visual or auditory hallucinations.  Pt denies any fever and chills or any burning upon urination. Abdomen is soft, nondistended, however tender to touch in  the mid to LLQ. Normoactive bowel sounds in all quadrants. + rebound tenderness to left flank. Pulses equal b/l in all extremities. Cap refill < 3 secs. Respirations clear, even and unlabored. Denies CP, SOB, N/V, HA, dizziness, palpitations, blurry vision. 20G IV placed to L forearm. Labs drawn and sent.   Bed in lowest position, call bell within reach. Safety maintained.

## 2024-11-11 NOTE — ED ADULT TRIAGE NOTE - CHIEF COMPLAINT QUOTE
pt c/o LLQ pain starting last night with no vomiting/ diarrhea. pt DM2, hyperglycemic. pt noted to have resting tremors, admits to drinking approx 6 drinks/day.

## 2024-11-11 NOTE — ED PROVIDER NOTE - PROGRESS NOTE DETAILS
Fellow MD Sahil Fry: Patient reevaluate bedside in no acute distress at this time.  Patient is feeling better and reports no longer feeling pain.  Repeat abdominal exam is soft and nontender.  Vitals are within normal limits at this time.  Advised the patient to follow-up with his primary care physician and return to the ED if symptoms worsen.  Take ibuprofen/Tylenol as needed for pain.  Symptoms likely secondary to in-transit kidney stone.  Patient is stable for discharge.

## 2024-11-11 NOTE — ED PROVIDER NOTE - CLINICAL SUMMARY MEDICAL DECISION MAKING FREE TEXT BOX
Fellow MD Sahil Fry: 59-year-old male smoker with past medical history of IDDM, presenting to the ED for evaluation of suprapubic and left lower quadrant abdominal pain since yesterday morning.  As per patient, he states that the pain is sharp, radiates to his left flank, with no alleviating or exacerbating factors.  Patient has never experienced similar symptoms in the past.  He denies any dysuria, hematuria, blood in the stool, nausea, vomiting, fever, chills, chest pain, shortness of breath, cough, headache, numbness, tingling, weakness, and any additional complaints at this time.  No recent travel or sick contacts.  Patient is a daily half pack smoker for over 30 years.  Additionally, patient drinks approximately 6 beers every single day, last drink last night.  Denies prior admissions for alcohol withdrawal, alcohol withdrawal seizures, ICU stays, DTs.    On arrival to the ED, the patient is well-appearing and in no acute distress.  Patient is hypertensive, nontachycardic, afebrile, and satting 98% on room air.  Abdomen is soft with left lower quadrant and suprapubic tenderness.  Left CVA tenderness.  Radial and DP pulses are 2+ and symmetric bilaterally.  Plan for CT abdomen pelvis, labs, urinalysis, urine culture, and reevaluate.  Patient refusing pain medications at this time.

## 2024-11-11 NOTE — ED ADULT NURSE NOTE - NSFALLUNIVINTERV_ED_ALL_ED
Bed/Stretcher in lowest position, wheels locked, appropriate side rails in place/Call bell, personal items and telephone in reach/Instruct patient to call for assistance before getting out of bed/chair/stretcher/Non-slip footwear applied when patient is off stretcher/[place] to call system/Physically safe environment - no spills, clutter or unnecessary equipment/Purposeful proactive rounding/Room/bathroom lighting operational, light cord in reach

## 2024-11-11 NOTE — ED PROVIDER NOTE - NSFOLLOWUPINSTRUCTIONS_ED_ALL_ED_FT
Please follow up with your PMD within 1-2 weeks time.    Please focus on oral rehydration with water and electrolyte containing solutions (pedialyte, liquid IV, gatorade).     Take Ibuprofen 400 mg every 4-6 hours as needed for pain.     Take Acetaminophen 1000 mg every 8 hours as needed for pain.     Return if your symptoms worsen.

## 2024-11-11 NOTE — ED PROVIDER NOTE - OBJECTIVE STATEMENT
see mdm see mdm    Attending/Jayshree: 59-year-old male history of DM2 (on insulin), EtOH abuse, who presents with acute left lower quadrant pain.  Patient reports pain onset yesterday with radiation to the left flank and not associated with fever/chills, chest pain, shortness of breath, nausea/vomiting, or change in urinary/bowel habits.  Patient denies prior history of similar symptoms which are exacerbated with movement.

## 2024-11-14 ENCOUNTER — NON-APPOINTMENT (OUTPATIENT)
Age: 59
End: 2024-11-14

## 2024-11-15 ENCOUNTER — APPOINTMENT (OUTPATIENT)
Dept: UROLOGY | Facility: CLINIC | Age: 59
End: 2024-11-15
Payer: COMMERCIAL

## 2024-11-15 VITALS
OXYGEN SATURATION: 98 % | RESPIRATION RATE: 16 BRPM | WEIGHT: 158 LBS | BODY MASS INDEX: 22.62 KG/M2 | DIASTOLIC BLOOD PRESSURE: 92 MMHG | SYSTOLIC BLOOD PRESSURE: 175 MMHG | HEIGHT: 70 IN | HEART RATE: 75 BPM

## 2024-11-15 DIAGNOSIS — N20.0 CALCULUS OF KIDNEY: ICD-10-CM

## 2024-11-15 DIAGNOSIS — N32.89 OTHER SPECIFIED DISORDERS OF BLADDER: ICD-10-CM

## 2024-11-15 PROCEDURE — 99204 OFFICE O/P NEW MOD 45 MIN: CPT

## 2024-11-18 ENCOUNTER — APPOINTMENT (OUTPATIENT)
Dept: INTERNAL MEDICINE | Facility: CLINIC | Age: 59
End: 2024-11-18
Payer: COMMERCIAL

## 2024-11-18 VITALS — HEIGHT: 70 IN | BODY MASS INDEX: 22.05 KG/M2 | WEIGHT: 154 LBS | HEART RATE: 93 BPM

## 2024-11-18 DIAGNOSIS — E11.10 TYPE 2 DIABETES MELLITUS WITH KETOACIDOSIS WITHOUT COMA: ICD-10-CM

## 2024-11-18 DIAGNOSIS — K85.20 ALCOHOL INDUCED ACUTE PANCREATITIS WITHOUT NECROSIS OR INFECTION: ICD-10-CM

## 2024-11-18 DIAGNOSIS — R05.9 COUGH, UNSPECIFIED: ICD-10-CM

## 2024-11-18 DIAGNOSIS — Z78.9 OTHER SPECIFIED HEALTH STATUS: ICD-10-CM

## 2024-11-18 PROCEDURE — 99214 OFFICE O/P EST MOD 30 MIN: CPT

## 2024-11-18 PROCEDURE — 36415 COLL VENOUS BLD VENIPUNCTURE: CPT

## 2024-11-18 PROCEDURE — G2211 COMPLEX E/M VISIT ADD ON: CPT

## 2024-11-18 RX ORDER — ATORVASTATIN CALCIUM 10 MG/1
10 TABLET, FILM COATED ORAL
Qty: 30 | Refills: 1 | Status: ACTIVE | COMMUNITY
Start: 2024-11-18 | End: 1900-01-01

## 2024-11-18 RX ORDER — TELMISARTAN 40 MG/1
40 TABLET ORAL
Qty: 30 | Refills: 1 | Status: ACTIVE | COMMUNITY
Start: 2024-11-18 | End: 1900-01-01

## 2024-11-19 ENCOUNTER — OUTPATIENT (OUTPATIENT)
Dept: OUTPATIENT SERVICES | Facility: HOSPITAL | Age: 59
LOS: 1 days | End: 2024-11-19
Payer: COMMERCIAL

## 2024-11-19 ENCOUNTER — APPOINTMENT (OUTPATIENT)
Dept: RADIOLOGY | Facility: IMAGING CENTER | Age: 59
End: 2024-11-19
Payer: COMMERCIAL

## 2024-11-19 DIAGNOSIS — R05.9 COUGH, UNSPECIFIED: ICD-10-CM

## 2024-11-19 LAB
ALBUMIN SERPL ELPH-MCNC: 4.3 G/DL
ALP BLD-CCNC: 74 U/L
ALT SERPL-CCNC: 22 U/L
AMYLASE/CREAT SERPL: 57 U/L
ANION GAP SERPL CALC-SCNC: 12 MMOL/L
AST SERPL-CCNC: 27 U/L
BASOPHILS # BLD AUTO: 0.02 K/UL
BASOPHILS NFR BLD AUTO: 0.4 %
BILIRUB SERPL-MCNC: 0.5 MG/DL
BUN SERPL-MCNC: 10 MG/DL
CALCIUM SERPL-MCNC: 10.3 MG/DL
CHLORIDE SERPL-SCNC: 97 MMOL/L
CHOLEST SERPL-MCNC: 129 MG/DL
CO2 SERPL-SCNC: 25 MMOL/L
CREAT SERPL-MCNC: 0.86 MG/DL
EGFR: 101 ML/MIN/1.73M2
EOSINOPHIL # BLD AUTO: 0.07 K/UL
EOSINOPHIL NFR BLD AUTO: 1.5 %
ESTIMATED AVERAGE GLUCOSE: 258 MG/DL
GLUCOSE SERPL-MCNC: 496 MG/DL
HBA1C MFR BLD HPLC: 10.6 %
HCT VFR BLD CALC: 41.6 %
HDLC SERPL-MCNC: 82 MG/DL
HGB BLD-MCNC: 13.8 G/DL
IMM GRANULOCYTES NFR BLD AUTO: 0.2 %
LDLC SERPL CALC-MCNC: 34 MG/DL
LYMPHOCYTES # BLD AUTO: 1.96 K/UL
LYMPHOCYTES NFR BLD AUTO: 43.2 %
MAN DIFF?: NORMAL
MCHC RBC-ENTMCNC: 30.5 PG
MCHC RBC-ENTMCNC: 33.2 G/DL
MCV RBC AUTO: 91.8 FL
MONOCYTES # BLD AUTO: 0.39 K/UL
MONOCYTES NFR BLD AUTO: 8.6 %
NEUTROPHILS # BLD AUTO: 2.09 K/UL
NEUTROPHILS NFR BLD AUTO: 46.1 %
NONHDLC SERPL-MCNC: 47 MG/DL
PLATELET # BLD AUTO: 150 K/UL
POTASSIUM SERPL-SCNC: 4.5 MMOL/L
PROT SERPL-MCNC: 6.7 G/DL
RBC # BLD: 4.53 M/UL
RBC # FLD: 12.5 %
SODIUM SERPL-SCNC: 134 MMOL/L
TRIGL SERPL-MCNC: 60 MG/DL
WBC # FLD AUTO: 4.54 K/UL

## 2024-11-19 PROCEDURE — 71046 X-RAY EXAM CHEST 2 VIEWS: CPT | Mod: 26

## 2024-11-19 PROCEDURE — 71046 X-RAY EXAM CHEST 2 VIEWS: CPT

## 2024-11-25 ENCOUNTER — APPOINTMENT (OUTPATIENT)
Dept: UROLOGY | Facility: CLINIC | Age: 59
End: 2024-11-25
Payer: COMMERCIAL

## 2024-11-25 DIAGNOSIS — N20.0 CALCULUS OF KIDNEY: ICD-10-CM

## 2024-11-25 DIAGNOSIS — N32.89 OTHER SPECIFIED DISORDERS OF BLADDER: ICD-10-CM

## 2024-11-25 PROCEDURE — 52000 CYSTOURETHROSCOPY: CPT

## 2024-12-09 ENCOUNTER — APPOINTMENT (OUTPATIENT)
Dept: INTERNAL MEDICINE | Facility: CLINIC | Age: 59
End: 2024-12-09
Payer: COMMERCIAL

## 2024-12-09 VITALS
HEIGHT: 70 IN | HEART RATE: 80 BPM | DIASTOLIC BLOOD PRESSURE: 97 MMHG | WEIGHT: 152 LBS | BODY MASS INDEX: 21.76 KG/M2 | SYSTOLIC BLOOD PRESSURE: 186 MMHG

## 2024-12-09 DIAGNOSIS — E11.10 TYPE 2 DIABETES MELLITUS WITH KETOACIDOSIS WITHOUT COMA: ICD-10-CM

## 2024-12-09 PROCEDURE — 99214 OFFICE O/P EST MOD 30 MIN: CPT

## 2024-12-09 PROCEDURE — G2211 COMPLEX E/M VISIT ADD ON: CPT

## 2024-12-09 PROCEDURE — 36415 COLL VENOUS BLD VENIPUNCTURE: CPT

## 2024-12-11 ENCOUNTER — APPOINTMENT (OUTPATIENT)
Dept: GASTROENTEROLOGY | Facility: CLINIC | Age: 59
End: 2024-12-11
Payer: COMMERCIAL

## 2024-12-11 VITALS
WEIGHT: 158 LBS | DIASTOLIC BLOOD PRESSURE: 83 MMHG | OXYGEN SATURATION: 98 % | TEMPERATURE: 98 F | HEART RATE: 80 BPM | HEIGHT: 70 IN | BODY MASS INDEX: 22.62 KG/M2 | RESPIRATION RATE: 16 BRPM | SYSTOLIC BLOOD PRESSURE: 175 MMHG

## 2024-12-11 DIAGNOSIS — R10.9 UNSPECIFIED ABDOMINAL PAIN: ICD-10-CM

## 2024-12-11 DIAGNOSIS — R97.20 ELEVATED PROSTATE, SPECIFIC ANTIGEN [PSA]: ICD-10-CM

## 2024-12-11 DIAGNOSIS — Z87.442 PERSONAL HISTORY OF URINARY CALCULI: ICD-10-CM

## 2024-12-11 DIAGNOSIS — Z86.39 PERSONAL HISTORY OF OTHER ENDOCRINE, NUTRITIONAL AND METABOLIC DISEASE: ICD-10-CM

## 2024-12-11 DIAGNOSIS — R10.32 LEFT LOWER QUADRANT PAIN: ICD-10-CM

## 2024-12-11 DIAGNOSIS — K86.1 OTHER CHRONIC PANCREATITIS: ICD-10-CM

## 2024-12-11 DIAGNOSIS — K80.20 CALCULUS OF GALLBLADDER W/OUT CHOLECYSTITIS W/OUT OBSTRUCTION: ICD-10-CM

## 2024-12-11 PROCEDURE — 99204 OFFICE O/P NEW MOD 45 MIN: CPT

## 2024-12-11 RX ORDER — OMEPRAZOLE 20 MG/1
20 CAPSULE, DELAYED RELEASE ORAL DAILY
Qty: 30 | Refills: 3 | Status: ACTIVE | COMMUNITY
Start: 2024-12-11 | End: 1900-01-01

## 2024-12-11 RX ORDER — PANCRELIPASE 36000; 180000; 114000 [USP'U]/1; [USP'U]/1; [USP'U]/1
36000-114000 CAPSULE, DELAYED RELEASE PELLETS ORAL
Qty: 90 | Refills: 3 | Status: ACTIVE | COMMUNITY
Start: 2024-12-11 | End: 1900-01-01

## 2024-12-11 RX ORDER — CIPROFLOXACIN HYDROCHLORIDE 500 MG/1
500 TABLET, FILM COATED ORAL TWICE DAILY
Qty: 14 | Refills: 0 | Status: ACTIVE | COMMUNITY
Start: 2024-12-11 | End: 1900-01-01

## 2024-12-11 RX ORDER — PANTOPRAZOLE 40 MG/1
40 TABLET, DELAYED RELEASE ORAL
Qty: 30 | Refills: 0 | Status: ACTIVE | COMMUNITY
Start: 2024-12-05

## 2024-12-12 LAB
ALBUMIN SERPL ELPH-MCNC: 4.3 G/DL
ALP BLD-CCNC: 83 U/L
ALT SERPL-CCNC: 24 U/L
ANION GAP SERPL CALC-SCNC: 14 MMOL/L
AST SERPL-CCNC: 36 U/L
BASOPHILS # BLD AUTO: 0.04 K/UL
BASOPHILS NFR BLD AUTO: 0.8 %
BILIRUB SERPL-MCNC: 0.4 MG/DL
BUN SERPL-MCNC: 7 MG/DL
CALCIUM SERPL-MCNC: 10 MG/DL
CHLORIDE SERPL-SCNC: 101 MMOL/L
CO2 SERPL-SCNC: 25 MMOL/L
CREAT SERPL-MCNC: 0.89 MG/DL
EGFR: 100 ML/MIN/1.73M2
EOSINOPHIL # BLD AUTO: 0.06 K/UL
EOSINOPHIL NFR BLD AUTO: 1.3 %
ESTIMATED AVERAGE GLUCOSE: 246 MG/DL
GLUCOSE SERPL-MCNC: 328 MG/DL
HBA1C MFR BLD HPLC: 10.2 %
HCT VFR BLD CALC: 41.6 %
HGB BLD-MCNC: 13.6 G/DL
IMM GRANULOCYTES NFR BLD AUTO: 0.4 %
LYMPHOCYTES # BLD AUTO: 2.17 K/UL
LYMPHOCYTES NFR BLD AUTO: 45.2 %
MAN DIFF?: NORMAL
MCHC RBC-ENTMCNC: 30.2 PG
MCHC RBC-ENTMCNC: 32.7 G/DL
MCV RBC AUTO: 92.4 FL
MONOCYTES # BLD AUTO: 0.52 K/UL
MONOCYTES NFR BLD AUTO: 10.8 %
NEUTROPHILS # BLD AUTO: 1.99 K/UL
NEUTROPHILS NFR BLD AUTO: 41.5 %
PLATELET # BLD AUTO: 217 K/UL
POTASSIUM SERPL-SCNC: 4.7 MMOL/L
PROT SERPL-MCNC: 6.9 G/DL
PSA FREE FLD-MCNC: 12 %
PSA FREE SERPL-MCNC: 1.89 NG/ML
PSA SERPL-MCNC: 15.8 NG/ML
RBC # BLD: 4.5 M/UL
RBC # FLD: 12.3 %
SODIUM SERPL-SCNC: 140 MMOL/L
WBC # FLD AUTO: 4.8 K/UL

## 2024-12-13 ENCOUNTER — APPOINTMENT (OUTPATIENT)
Dept: UROLOGY | Facility: CLINIC | Age: 59
End: 2024-12-13

## 2024-12-19 ENCOUNTER — APPOINTMENT (OUTPATIENT)
Dept: ENDOCRINOLOGY | Facility: CLINIC | Age: 59
End: 2024-12-19
Payer: COMMERCIAL

## 2024-12-19 VITALS
DIASTOLIC BLOOD PRESSURE: 82 MMHG | HEART RATE: 77 BPM | SYSTOLIC BLOOD PRESSURE: 169 MMHG | BODY MASS INDEX: 22.62 KG/M2 | HEIGHT: 70 IN | WEIGHT: 158 LBS | OXYGEN SATURATION: 99 %

## 2024-12-19 DIAGNOSIS — I10 ESSENTIAL (PRIMARY) HYPERTENSION: ICD-10-CM

## 2024-12-19 DIAGNOSIS — E11.9 TYPE 2 DIABETES MELLITUS W/OUT COMPLICATIONS: ICD-10-CM

## 2024-12-19 DIAGNOSIS — E78.5 HYPERLIPIDEMIA, UNSPECIFIED: ICD-10-CM

## 2024-12-19 PROCEDURE — 95251 CONT GLUC MNTR ANALYSIS I&R: CPT

## 2024-12-19 PROCEDURE — G0108 DIAB MANAGE TRN  PER INDIV: CPT

## 2024-12-19 PROCEDURE — 99214 OFFICE O/P EST MOD 30 MIN: CPT

## 2024-12-19 RX ORDER — BOLUS INSULIN PUMP, 200 UNIT 2 UNIT
EACH MISCELLANEOUS
Qty: 1 | Refills: 5 | Status: ACTIVE | COMMUNITY
Start: 2024-12-19 | End: 1900-01-01

## 2024-12-19 RX ORDER — INSULIN LISPRO 100 U/ML
100 INJECTION, SOLUTION INTRAVENOUS; SUBCUTANEOUS
Qty: 2 | Refills: 3 | Status: ACTIVE | COMMUNITY
Start: 2024-12-19 | End: 1900-01-01

## 2024-12-19 RX ORDER — DIABETIC SUPPLIES,MISCELL
MISCELLANEOUS MISCELLANEOUS
Qty: 1 | Refills: 0 | Status: ACTIVE | COMMUNITY
Start: 2024-12-19 | End: 1900-01-01

## 2024-12-22 ENCOUNTER — OUTPATIENT (OUTPATIENT)
Dept: OUTPATIENT SERVICES | Facility: HOSPITAL | Age: 59
LOS: 1 days | End: 2024-12-22
Payer: COMMERCIAL

## 2024-12-22 DIAGNOSIS — Z00.8 ENCOUNTER FOR OTHER GENERAL EXAMINATION: ICD-10-CM

## 2024-12-22 DIAGNOSIS — R10.32 LEFT LOWER QUADRANT PAIN: ICD-10-CM

## 2025-01-08 ENCOUNTER — APPOINTMENT (OUTPATIENT)
Dept: GASTROENTEROLOGY | Facility: CLINIC | Age: 60
End: 2025-01-08
Payer: COMMERCIAL

## 2025-01-08 ENCOUNTER — RX RENEWAL (OUTPATIENT)
Age: 60
End: 2025-01-08

## 2025-01-08 VITALS
BODY MASS INDEX: 22.05 KG/M2 | HEART RATE: 77 BPM | TEMPERATURE: 98.1 F | OXYGEN SATURATION: 99 % | HEIGHT: 70 IN | DIASTOLIC BLOOD PRESSURE: 94 MMHG | SYSTOLIC BLOOD PRESSURE: 175 MMHG | WEIGHT: 154 LBS

## 2025-01-08 DIAGNOSIS — R10.32 LEFT LOWER QUADRANT PAIN: ICD-10-CM

## 2025-01-08 DIAGNOSIS — N20.0 CALCULUS OF KIDNEY: ICD-10-CM

## 2025-01-08 DIAGNOSIS — E11.9 TYPE 2 DIABETES MELLITUS W/OUT COMPLICATIONS: ICD-10-CM

## 2025-01-08 DIAGNOSIS — K80.20 CALCULUS OF GALLBLADDER W/OUT CHOLECYSTITIS W/OUT OBSTRUCTION: ICD-10-CM

## 2025-01-08 DIAGNOSIS — K86.1 OTHER CHRONIC PANCREATITIS: ICD-10-CM

## 2025-01-08 PROCEDURE — 99214 OFFICE O/P EST MOD 30 MIN: CPT

## 2025-01-08 RX ORDER — TRAMADOL HYDROCHLORIDE AND ACETAMINOPHEN 37.5; 325 MG/1; MG/1
37.5-325 TABLET, FILM COATED ORAL
Qty: 50 | Refills: 1 | Status: ACTIVE | COMMUNITY
Start: 2025-01-08 | End: 1900-01-01

## 2025-01-10 ENCOUNTER — OUTPATIENT (OUTPATIENT)
Dept: OUTPATIENT SERVICES | Facility: HOSPITAL | Age: 60
LOS: 1 days | End: 2025-01-10
Payer: COMMERCIAL

## 2025-01-10 ENCOUNTER — APPOINTMENT (OUTPATIENT)
Dept: MRI IMAGING | Facility: IMAGING CENTER | Age: 60
End: 2025-01-10
Payer: COMMERCIAL

## 2025-01-10 ENCOUNTER — APPOINTMENT (OUTPATIENT)
Dept: ENDOCRINOLOGY | Facility: CLINIC | Age: 60
End: 2025-01-10

## 2025-01-10 DIAGNOSIS — Z00.8 ENCOUNTER FOR OTHER GENERAL EXAMINATION: ICD-10-CM

## 2025-01-10 DIAGNOSIS — R10.32 LEFT LOWER QUADRANT PAIN: ICD-10-CM

## 2025-01-10 DIAGNOSIS — K86.1 OTHER CHRONIC PANCREATITIS: ICD-10-CM

## 2025-01-10 PROCEDURE — A9585: CPT

## 2025-01-10 PROCEDURE — 74183 MRI ABD W/O CNTR FLWD CNTR: CPT

## 2025-01-10 PROCEDURE — 74183 MRI ABD W/O CNTR FLWD CNTR: CPT | Mod: 26

## 2025-01-13 DIAGNOSIS — R93.5 ABNORMAL FINDINGS ON DIAGNOSTIC IMAGING OF OTHER ABDOMINAL REGIONS, INCLUDING RETROPERITONEUM: ICD-10-CM

## 2025-01-22 ENCOUNTER — APPOINTMENT (OUTPATIENT)
Dept: UROLOGY | Facility: CLINIC | Age: 60
End: 2025-01-22

## 2025-01-22 ENCOUNTER — NON-APPOINTMENT (OUTPATIENT)
Age: 60
End: 2025-01-22

## 2025-01-22 ENCOUNTER — RX RENEWAL (OUTPATIENT)
Age: 60
End: 2025-01-22

## 2025-01-23 ENCOUNTER — NON-APPOINTMENT (OUTPATIENT)
Age: 60
End: 2025-01-23

## 2025-01-23 ENCOUNTER — APPOINTMENT (OUTPATIENT)
Dept: SURGICAL ONCOLOGY | Facility: CLINIC | Age: 60
End: 2025-01-23
Payer: COMMERCIAL

## 2025-01-23 VITALS
OXYGEN SATURATION: 99 % | HEART RATE: 83 BPM | BODY MASS INDEX: 20.76 KG/M2 | HEIGHT: 70 IN | WEIGHT: 145 LBS | DIASTOLIC BLOOD PRESSURE: 98 MMHG | RESPIRATION RATE: 17 BRPM | SYSTOLIC BLOOD PRESSURE: 173 MMHG

## 2025-01-23 DIAGNOSIS — K86.1 OTHER CHRONIC PANCREATITIS: ICD-10-CM

## 2025-01-23 PROCEDURE — 99205 OFFICE O/P NEW HI 60 MIN: CPT

## 2025-01-28 ENCOUNTER — NON-APPOINTMENT (OUTPATIENT)
Age: 60
End: 2025-01-28

## 2025-01-29 ENCOUNTER — APPOINTMENT (OUTPATIENT)
Dept: PAIN MANAGEMENT | Facility: AMBULATORY SURGERY CENTER | Age: 60
End: 2025-01-29

## 2025-01-29 ENCOUNTER — APPOINTMENT (OUTPATIENT)
Dept: PAIN MANAGEMENT | Facility: CLINIC | Age: 60
End: 2025-01-29
Payer: COMMERCIAL

## 2025-01-29 DIAGNOSIS — R10.9 UNSPECIFIED ABDOMINAL PAIN: ICD-10-CM

## 2025-01-29 PROCEDURE — 99204 OFFICE O/P NEW MOD 45 MIN: CPT

## 2025-01-29 RX ORDER — DULOXETINE HYDROCHLORIDE 30 MG/1
30 CAPSULE, DELAYED RELEASE PELLETS ORAL
Qty: 53 | Refills: 2 | Status: ACTIVE | COMMUNITY
Start: 2025-01-29 | End: 1900-01-01

## 2025-01-30 VITALS
DIASTOLIC BLOOD PRESSURE: 79 MMHG | BODY MASS INDEX: 20.76 KG/M2 | HEART RATE: 78 BPM | SYSTOLIC BLOOD PRESSURE: 130 MMHG | TEMPERATURE: 98.1 F | RESPIRATION RATE: 16 BRPM | WEIGHT: 145 LBS | HEIGHT: 70 IN | OXYGEN SATURATION: 98 %

## 2025-02-04 ENCOUNTER — APPOINTMENT (OUTPATIENT)
Dept: INTERNAL MEDICINE | Facility: CLINIC | Age: 60
End: 2025-02-04
Payer: COMMERCIAL

## 2025-02-04 VITALS
SYSTOLIC BLOOD PRESSURE: 165 MMHG | BODY MASS INDEX: 22.05 KG/M2 | HEART RATE: 89 BPM | WEIGHT: 154 LBS | DIASTOLIC BLOOD PRESSURE: 87 MMHG | HEIGHT: 70 IN

## 2025-02-04 DIAGNOSIS — E11.9 TYPE 2 DIABETES MELLITUS W/OUT COMPLICATIONS: ICD-10-CM

## 2025-02-04 PROCEDURE — 99214 OFFICE O/P EST MOD 30 MIN: CPT

## 2025-02-05 ENCOUNTER — APPOINTMENT (OUTPATIENT)
Dept: GASTROENTEROLOGY | Facility: CLINIC | Age: 60
End: 2025-02-05
Payer: COMMERCIAL

## 2025-02-05 VITALS
BODY MASS INDEX: 22.05 KG/M2 | SYSTOLIC BLOOD PRESSURE: 164 MMHG | HEIGHT: 70 IN | HEART RATE: 92 BPM | RESPIRATION RATE: 17 BRPM | WEIGHT: 154 LBS | DIASTOLIC BLOOD PRESSURE: 81 MMHG | OXYGEN SATURATION: 98 %

## 2025-02-05 DIAGNOSIS — R10.32 LEFT LOWER QUADRANT PAIN: ICD-10-CM

## 2025-02-05 DIAGNOSIS — K86.1 OTHER CHRONIC PANCREATITIS: ICD-10-CM

## 2025-02-05 PROCEDURE — G2211 COMPLEX E/M VISIT ADD ON: CPT

## 2025-02-05 PROCEDURE — 99214 OFFICE O/P EST MOD 30 MIN: CPT

## 2025-02-05 RX ORDER — SODIUM SULFATE, POTASSIUM SULFATE AND MAGNESIUM SULFATE 1.6; 3.13; 17.5 G/177ML; G/177ML; G/177ML
17.5-3.13-1.6 SOLUTION ORAL
Qty: 1 | Refills: 0 | Status: ACTIVE | COMMUNITY
Start: 2025-02-05 | End: 1900-01-01

## 2025-02-05 RX ORDER — DICYCLOMINE HYDROCHLORIDE 20 MG/1
20 TABLET ORAL
Qty: 30 | Refills: 3 | Status: ACTIVE | COMMUNITY
Start: 2025-02-05 | End: 1900-01-01

## 2025-02-17 NOTE — ED PROVIDER NOTE - OBJECTIVE STATEMENT
CALLED PATIENT 1X AND LEFT MESSAGE TO SCHEDULE ECHO    Electronically signed by Chela Gee on 2/17/2025 at 9:50 AM    
Pt is a 52 y/o M smoker no PMHx p/w muffled hearing (R > L) x 2 days.  Pt endorses insidious onset muffled hearing and decreased hearing gradually for past 2 days.  Pt states he feels as though hearing is more affected in right ear than the left ear.  Pt denies any fevers, chills, ear pain, ear discharge, hearing loss, recent illnesses, headache, numbness, weakness, tinnitus, dizziness, trauma.
present

## 2025-02-18 ENCOUNTER — EMERGENCY (EMERGENCY)
Facility: HOSPITAL | Age: 60
LOS: 1 days | Discharge: HOME | End: 2025-02-18
Attending: EMERGENCY MEDICINE | Admitting: EMERGENCY MEDICINE
Payer: COMMERCIAL

## 2025-02-18 VITALS
HEIGHT: 70 IN | OXYGEN SATURATION: 100 % | WEIGHT: 149.91 LBS | HEART RATE: 89 BPM | SYSTOLIC BLOOD PRESSURE: 161 MMHG | DIASTOLIC BLOOD PRESSURE: 89 MMHG | RESPIRATION RATE: 16 BRPM | TEMPERATURE: 98 F

## 2025-02-18 VITALS
DIASTOLIC BLOOD PRESSURE: 100 MMHG | HEART RATE: 76 BPM | RESPIRATION RATE: 18 BRPM | OXYGEN SATURATION: 99 % | SYSTOLIC BLOOD PRESSURE: 161 MMHG

## 2025-02-18 LAB
A1C WITH ESTIMATED AVERAGE GLUCOSE RESULT: 9.7 % — HIGH (ref 4–5.6)
ALBUMIN SERPL ELPH-MCNC: 4.3 G/DL — SIGNIFICANT CHANGE UP (ref 3.3–5)
ALP SERPL-CCNC: 59 U/L — SIGNIFICANT CHANGE UP (ref 40–120)
ALT FLD-CCNC: 31 U/L — SIGNIFICANT CHANGE UP (ref 4–41)
ANION GAP SERPL CALC-SCNC: 11 MMOL/L — SIGNIFICANT CHANGE UP (ref 7–14)
APPEARANCE UR: CLEAR — SIGNIFICANT CHANGE UP
AST SERPL-CCNC: 25 U/L — SIGNIFICANT CHANGE UP (ref 4–40)
B-OH-BUTYR SERPL-SCNC: 0.4 MMOL/L — SIGNIFICANT CHANGE UP (ref 0–0.4)
BASOPHILS # BLD AUTO: 0.04 K/UL — SIGNIFICANT CHANGE UP (ref 0–0.2)
BASOPHILS NFR BLD AUTO: 0.6 % — SIGNIFICANT CHANGE UP (ref 0–2)
BILIRUB SERPL-MCNC: 0.5 MG/DL — SIGNIFICANT CHANGE UP (ref 0.2–1.2)
BILIRUB UR-MCNC: NEGATIVE — SIGNIFICANT CHANGE UP
BLOOD GAS VENOUS COMPREHENSIVE RESULT: SIGNIFICANT CHANGE UP
BUN SERPL-MCNC: 5 MG/DL — LOW (ref 7–23)
CALCIUM SERPL-MCNC: 9.1 MG/DL — SIGNIFICANT CHANGE UP (ref 8.4–10.5)
CHLORIDE SERPL-SCNC: 97 MMOL/L — LOW (ref 98–107)
CO2 SERPL-SCNC: 24 MMOL/L — SIGNIFICANT CHANGE UP (ref 22–31)
COLOR SPEC: YELLOW — SIGNIFICANT CHANGE UP
CREAT SERPL-MCNC: 0.7 MG/DL — SIGNIFICANT CHANGE UP (ref 0.5–1.3)
DIFF PNL FLD: NEGATIVE — SIGNIFICANT CHANGE UP
EGFR: 107 ML/MIN/1.73M2 — SIGNIFICANT CHANGE UP
EOSINOPHIL # BLD AUTO: 0.09 K/UL — SIGNIFICANT CHANGE UP (ref 0–0.5)
EOSINOPHIL NFR BLD AUTO: 1.3 % — SIGNIFICANT CHANGE UP (ref 0–6)
ESTIMATED AVERAGE GLUCOSE: 232 — SIGNIFICANT CHANGE UP
GLUCOSE SERPL-MCNC: 216 MG/DL — HIGH (ref 70–99)
GLUCOSE UR QL: 500 MG/DL
HCT VFR BLD CALC: 42.4 % — SIGNIFICANT CHANGE UP (ref 39–50)
HGB BLD-MCNC: 14.7 G/DL — SIGNIFICANT CHANGE UP (ref 13–17)
IANC: 3.59 K/UL — SIGNIFICANT CHANGE UP (ref 1.8–7.4)
IMM GRANULOCYTES NFR BLD AUTO: 0.3 % — SIGNIFICANT CHANGE UP (ref 0–0.9)
KETONES UR-MCNC: NEGATIVE MG/DL — SIGNIFICANT CHANGE UP
LEUKOCYTE ESTERASE UR-ACNC: NEGATIVE — SIGNIFICANT CHANGE UP
LYMPHOCYTES # BLD AUTO: 2.5 K/UL — SIGNIFICANT CHANGE UP (ref 1–3.3)
LYMPHOCYTES # BLD AUTO: 37.4 % — SIGNIFICANT CHANGE UP (ref 13–44)
MAGNESIUM SERPL-MCNC: 1.9 MG/DL — SIGNIFICANT CHANGE UP (ref 1.6–2.6)
MCHC RBC-ENTMCNC: 30.4 PG — SIGNIFICANT CHANGE UP (ref 27–34)
MCHC RBC-ENTMCNC: 34.7 G/DL — SIGNIFICANT CHANGE UP (ref 32–36)
MCV RBC AUTO: 87.6 FL — SIGNIFICANT CHANGE UP (ref 80–100)
MONOCYTES # BLD AUTO: 0.45 K/UL — SIGNIFICANT CHANGE UP (ref 0–0.9)
MONOCYTES NFR BLD AUTO: 6.7 % — SIGNIFICANT CHANGE UP (ref 2–14)
NEUTROPHILS # BLD AUTO: 3.59 K/UL — SIGNIFICANT CHANGE UP (ref 1.8–7.4)
NEUTROPHILS NFR BLD AUTO: 53.7 % — SIGNIFICANT CHANGE UP (ref 43–77)
NITRITE UR-MCNC: NEGATIVE — SIGNIFICANT CHANGE UP
NRBC # BLD AUTO: 0 K/UL — SIGNIFICANT CHANGE UP (ref 0–0)
NRBC # FLD: 0 K/UL — SIGNIFICANT CHANGE UP (ref 0–0)
NRBC BLD AUTO-RTO: 0 /100 WBCS — SIGNIFICANT CHANGE UP (ref 0–0)
PH UR: 6.5 — SIGNIFICANT CHANGE UP (ref 5–8)
PHOSPHATE SERPL-MCNC: 2.8 MG/DL — SIGNIFICANT CHANGE UP (ref 2.5–4.5)
PLATELET # BLD AUTO: 217 K/UL — SIGNIFICANT CHANGE UP (ref 150–400)
POTASSIUM SERPL-MCNC: 4.2 MMOL/L — SIGNIFICANT CHANGE UP (ref 3.5–5.3)
POTASSIUM SERPL-SCNC: 4.2 MMOL/L — SIGNIFICANT CHANGE UP (ref 3.5–5.3)
PROT SERPL-MCNC: 6.5 G/DL — SIGNIFICANT CHANGE UP (ref 6–8.3)
PROT UR-MCNC: NEGATIVE MG/DL — SIGNIFICANT CHANGE UP
RBC # BLD: 4.84 M/UL — SIGNIFICANT CHANGE UP (ref 4.2–5.8)
RBC # FLD: 12 % — SIGNIFICANT CHANGE UP (ref 10.3–14.5)
SODIUM SERPL-SCNC: 132 MMOL/L — LOW (ref 135–145)
SP GR SPEC: 1.01 — SIGNIFICANT CHANGE UP (ref 1–1.03)
UROBILINOGEN FLD QL: 1 MG/DL — SIGNIFICANT CHANGE UP (ref 0.2–1)
WBC # BLD: 6.69 K/UL — SIGNIFICANT CHANGE UP (ref 3.8–10.5)
WBC # FLD AUTO: 6.69 K/UL — SIGNIFICANT CHANGE UP (ref 3.8–10.5)

## 2025-02-18 PROCEDURE — 93971 EXTREMITY STUDY: CPT | Mod: 26,LT

## 2025-02-18 PROCEDURE — 99284 EMERGENCY DEPT VISIT MOD MDM: CPT

## 2025-02-18 RX ORDER — BACTERIOSTATIC SODIUM CHLORIDE 0.9 %
1000 VIAL (ML) INJECTION ONCE
Refills: 0 | Status: COMPLETED | OUTPATIENT
Start: 2025-02-18 | End: 2025-02-18

## 2025-02-18 RX ADMIN — Medication 1000 MILLILITER(S): at 07:49

## 2025-02-18 NOTE — ED PROVIDER NOTE - ATTENDING APP SHARED VISIT CONTRIBUTION OF CARE
Attending note:   After face to face evaluation of this patient, I concur with above noted hx, pe, and care plan for this patient. Martin: 59-year-old female with type 2 diabetes, GERD and history of alcohol abuse.  Patient is currently 4 weeks sober.  Patient presents ED now with elevated blood sugar.  Patient states that occasionally his sugars in the 500s after eating but over the last day he has had sugars in 300s and feels dehydrated.  Patient also notes cramps in the top of the left foot with numbness.  Patient denies any abdominal pain or nausea vomiting.  However he does have some polyuria and polydipsia.  Sugar in triage was 228.  Patient vital signs show mildly elevated blood pressure.  Other vitals are unremarkable.  Patient is well-appearing and does not appear significantly dry.  Lungs are clear and heart is regular.  Abdomen is soft and nontender.  There is no significant calf tenderness or edema.  There is over 1 large varicosity on the left anterior shin.  There is decreased sensation of the left top of foot but elsewhere all sensation is normal.  Patient has good range of motion and normal strength and normal cap refill.  Pulses normal as well.  DVT has been ruled out with a POCUS ultrasound.  Will check electrolytes and rule out DKA.  Will give IV hydration.  It appears the patient does not have good control of blood sugars.  Can consider CDU but patient does have follow-up with endocrinology.

## 2025-02-18 NOTE — ED ADULT NURSE NOTE - OBJECTIVE STATEMENT
Pt arrives to ED spot 26 c/o high blood sugars on his Dexcom last night and reporting feeling "dehydrated". Reports his meter has been in the 300s. Denies dizziness, blurry vision, SOB, chest pain.  Pt takes fast acting and long acting insulin by self administration of shots.  Pt states he is scheduled for endoscopy and colonoscopy for months long LLQ abd pain.  Pt denies pain at this time.  MD assessing pt.

## 2025-02-18 NOTE — ED ADULT TRIAGE NOTE - CHIEF COMPLAINT QUOTE
Pt c/o high blood sugars on his dexcom last night with feeling "dehydrated". Reports highs of 300s. Denies dizziness, blurry vision, SOB, chest pain. Hx: DM2

## 2025-02-18 NOTE — ED PROVIDER NOTE - OBJECTIVE STATEMENT
58 y/o male PMHx insulin depedent DM2, chronic pancreatitis, HTN, chronic pain, presents with complaint of elevated blood sugars and weakness. Patient endorses left lower extremity cramping that started last night and a transient sensation of nausea. Patient states he feels dehydrated. Patient states his blood sugars have been ranging in the 300's-500's at times, which he states is normal for him. Patient states he is currently undergoing testing for pancreatitis/chronic LLQ pain with Dr. Hylton, is having endo and colonoscopy next week. Patient states his LLQ pain is unchanged from baseline. Patient denies headache, SOB, chest pain, vomiting, diarrhea, constipation, fevers, chills, recent illness. Denies AC/blood thinners. States he smokes half a pack of cigarettes daily, ETOH daily use in the past but has not had a drink in 26 days, denies history of withdrawal syndrome.

## 2025-02-18 NOTE — ED PROVIDER NOTE - PHYSICAL EXAMINATION
Vital signs reviewed.     Left lower extremity: one superficial varicosity noted to the anterior shin, calf non-tender, no warmth, redness, negative homans sign.   CONSTITUTIONAL: Well-appearing; well-nourished; in no apparent distress. Non-toxic appearing.   HEAD: Normocephalic, atraumatic.  EYES: PERRL, EOM intact, conjunctiva and sclera WNL.  NECK/LYMPH: Supple; non-tender  CARD: RRR, Normal S1, S2;   RESP: Normal chest excursion with respiration; breath sounds clear and equal bilaterally; no wheezes, rhonchi, or rales.  ABD/GI:, LLQ tender, soft, non-distended;  no palpable organomegaly, no pulsatile mass.  EXT/MS: moves all extremities; distal pulses are normal, no pedal edema.  SKIN: Normal for age and race; warm; dry; good turgor; no apparent lesions or exudate noted.  NEURO: Awake, alert, oriented x 3, Moving all extremities  PSYCH: Normal mood; appropriate affect.

## 2025-02-18 NOTE — ED PROVIDER NOTE - CLINICAL SUMMARY MEDICAL DECISION MAKING FREE TEXT BOX
60 y/o male PMHX insulin dependent DM2, HTN, chronic pain, pancreatitis presents with complaint of weakness, left leg cramping, and elevated home blood sugars x2 days. Denies headache, visual disturbances chest pain SOB abdominal pain changed from baseline, vomiting or diarrhea. Denies calf swelling.  On exam, patient non toxic appearing, mentating well, skin dry, abdomen soft, non-distended, mild tenderness to palpation LLQ abdomen, (patient states this is baseline), left calf sensation intact, one superficial varicosity to the anterior shin, DP pulses +2 B/L, no pedal edema, capillary refill <2s.   DDx inlcudes not limited to r/o DKA vs dehydration, low concern for DVT however plan to for POCUS LLE   Plan- labs, urine, fluids, bedside POCUS

## 2025-02-18 NOTE — ED PROVIDER NOTE - PROGRESS NOTE DETAILS
Labs negative for DKA, patient endorses symptom improvement after 1L IVF, patient states he is hungry. Repeat . Patient states he would like to go home, will follow up with endocrine for DM management. Ambulatory in ED, given strict return precautions.

## 2025-02-18 NOTE — ED PROVIDER NOTE - PATIENT PORTAL LINK FT
You can access the FollowMyHealth Patient Portal offered by [facility] by registering at the following website: [email or web address]. By joining [employer]’s FollowMyHealth portal, you will also be able to view your health information using other applications (apps) compatible with our system.

## 2025-02-18 NOTE — ED PROVIDER NOTE - NSFOLLOWUPINSTRUCTIONS_ED_ALL_ED_FT
RETURN TO ED IF YOU HAVE SYMPTOMS OF WEAKNESS, DIZZINESS, INABILITY TO LOWER BLOOD SUGARS, CHEST PAIN, SOB, OR WORSENED SYMPTOMS.   Follow up with your endocrinologist for management of your diabetes.   Continue to abstain from alcohol.   Rest, drink plenty of fluids, follow up with your primary care doctor in 2-3 days.     Diabetes Mellitus and Nutrition, Adult  When you have diabetes, or diabetes mellitus, it is very important to have healthy eating habits because your blood sugar (glucose) levels are greatly affected by what you eat and drink. Eating healthy foods in the right amounts, at about the same times every day, can help you:  Manage your blood glucose.  Lower your risk of heart disease.  Improve your blood pressure.  Reach or maintain a healthy weight.  What can affect my meal plan?  Every person with diabetes is different, and each person has different needs for a meal plan. Your health care provider may recommend that you work with a dietitian to make a meal plan that is best for you. Your meal plan may vary depending on factors such as:  The calories you need.  The medicines you take.  Your weight.  Your blood glucose, blood pressure, and cholesterol levels.  Your activity level.  Other health conditions you have, such as heart or kidney disease.  How do carbohydrates affect me?  Carbohydrates, also called carbs, affect your blood glucose level more than any other type of food. Eating carbs raises the amount of glucose in your blood.    It is important to know how many carbs you can safely have in each meal. This is different for every person. Your dietitian can help you calculate how many carbs you should have at each meal and for each snack.    How does alcohol affect me?  Alcohol can cause a decrease in blood glucose (hypoglycemia), especially if you use insulin or take certain diabetes medicines by mouth. Hypoglycemia can be a life-threatening condition. Symptoms of hypoglycemia, such as sleepiness, dizziness, and confusion, are similar to symptoms of having too much alcohol.  Do not drink alcohol if:  Your health care provider tells you not to drink.  You are pregnant, may be pregnant, or are planning to become pregnant.  If you drink alcohol:  Limit how much you have to:  0–1 drink a day for women.  0–2 drinks a day for men.  Know how much alcohol is in your drink. In the [place] one drink equals one 12 oz bottle of beer (355 mL), one 5 oz glass of wine (148 mL), or one 1½ oz glass of hard liquor (44 mL).  Keep yourself hydrated with water, diet soda, or unsweetened iced tea. Keep in mind that regular soda, juice, and other mixers may contain a lot of sugar and must be counted as carbs.  What are tips for following this plan?    Reading food labels    Start by checking the serving size on the Nutrition Facts label of packaged foods and drinks. The number of calories and the amount of carbs, fats, and other nutrients listed on the label are based on one serving of the item. Many items contain more than one serving per package.  Check the total grams (g) of carbs in one serving.  Check the number of grams of saturated fats and trans fats in one serving. Choose foods that have a low amount or none of these fats.  Check the number of milligrams (mg) of salt (sodium) in one serving. Most people should limit total sodium intake to less than 2,300 mg per day.  Always check the nutrition information of foods labeled as "low-fat" or "nonfat." These foods may be higher in added sugar or refined carbs and should be avoided.  Talk to your dietitian to identify your daily goals for nutrients listed on the label.  Shopping    Avoid buying canned, pre-made, or processed foods. These foods tend to be high in fat, sodium, and added sugar.  Shop around the outside edge of the grocery store. This is where you will most often find fresh fruits and vegetables, bulk grains, fresh meats, and fresh dairy products.  Cooking    Use low-heat cooking methods, such as baking, instead of high-heat cooking methods, such as deep frying.  Cook using healthy oils, such as olive, canola, or sunflower oil.  Avoid cooking with butter, cream, or high-fat meats.  Meal planning    Eat meals and snacks regularly, preferably at the same times every day. Avoid going long periods of time without eating.  Eat foods that are high in fiber, such as fresh fruits, vegetables, beans, and whole grains.  Eat 4–6 oz (112–168 g) of lean protein each day, such as lean meat, chicken, fish, eggs, or tofu. One ounce (oz) (28 g) of lean protein is equal to:  1 oz (28 g) of meat, chicken, or fish.  1 egg.  ¼ cup (62 g) of tofu.  Eat some foods each day that contain healthy fats, such as avocado, nuts, seeds, and fish.  What foods should I eat?  Fruits    Berries. Apples. Oranges. Peaches. Apricots. Plums. Grapes. Mangoes. Papayas. Pomegranates. Kiwi. Cherries.    Vegetables    Leafy greens, including lettuce, spinach, kale, chard, flaco greens, mustard greens, and cabbage. Beets. Cauliflower. Broccoli. Carrots. Green beans. Tomatoes. Peppers. Onions. Cucumbers. [place] sprouts.    Grains    Whole grains, such as whole-wheat or whole-grain bread, crackers, tortillas, cereal, and pasta. Unsweetened oatmeal. Quinoa. Brown or wild rice.    Meats and other proteins    Seafood. Poultry without skin. Lean cuts of poultry and beef. Tofu. Nuts. Seeds.    Dairy    Low-fat or fat-free dairy products such as milk, yogurt, and cheese.    The items listed above may not be a complete list of foods and beverages you can eat and drink. Contact a dietitian for more information.    What foods should I avoid?  Fruits    Fruits canned with syrup.    Vegetables    Canned vegetables. Frozen vegetables with butter or cream sauce.    Grains    Refined white flour and flour products such as bread, pasta, snack foods, and cereals. Avoid all processed foods.    Meats and other proteins    Fatty cuts of meat. Poultry with skin. Breaded or fried meats. Processed meat. Avoid saturated fats.    Dairy    Full-fat yogurt, cheese, or milk.    Beverages    Sweetened drinks, such as soda or iced tea.    The items listed above may not be a complete list of foods and beverages you should avoid. Contact a dietitian for more information.    Questions to ask a health care provider  Do I need to meet with a certified diabetes care and ?  Do I need to meet with a dietitian?  What number can I call if I have questions?  When are the best times to check my blood glucose?  Where to find more information:  American Diabetes Association: diabetes.org  Academy of Nutrition and Dietetics: eatright.org  [place] of Diabetes and Digestive and Kidney Diseases: niddk.nih.gov  Association of Diabetes Care & Education Specialists: diabeteseducator.org  Summary  It is important to have healthy eating habits because your blood sugar (glucose) levels are greatly affected by what you eat and drink. It is important to use alcohol carefully.  A healthy meal plan will help you manage your blood glucose and lower your risk of heart disease.  Your health care provider may recommend that you work with a dietitian to make a meal plan that is best for you.  This information is not intended to replace advice given to you by your health care provider. Make sure you discuss any questions you have with your health care provider.

## 2025-02-19 ENCOUNTER — APPOINTMENT (OUTPATIENT)
Dept: ORTHOPEDIC SURGERY | Facility: CLINIC | Age: 60
End: 2025-02-19
Payer: COMMERCIAL

## 2025-02-19 VITALS
HEIGHT: 70 IN | WEIGHT: 154 LBS | OXYGEN SATURATION: 98 % | BODY MASS INDEX: 22.05 KG/M2 | SYSTOLIC BLOOD PRESSURE: 165 MMHG | HEART RATE: 92 BPM | DIASTOLIC BLOOD PRESSURE: 91 MMHG

## 2025-02-19 DIAGNOSIS — M41.9 SCOLIOSIS, UNSPECIFIED: ICD-10-CM

## 2025-02-19 DIAGNOSIS — M94.0 CHONDROCOSTAL JUNCTION SYNDROME [TIETZE]: ICD-10-CM

## 2025-02-19 DIAGNOSIS — M54.16 RADICULOPATHY, LUMBAR REGION: ICD-10-CM

## 2025-02-19 DIAGNOSIS — R07.81 PLEURODYNIA: ICD-10-CM

## 2025-02-19 DIAGNOSIS — M54.50 LOW BACK PAIN, UNSPECIFIED: ICD-10-CM

## 2025-02-19 PROCEDURE — 99203 OFFICE O/P NEW LOW 30 MIN: CPT | Mod: 25

## 2025-02-19 PROCEDURE — 72070 X-RAY EXAM THORAC SPINE 2VWS: CPT

## 2025-02-19 PROCEDURE — 71100 X-RAY EXAM RIBS UNI 2 VIEWS: CPT

## 2025-02-19 RX ORDER — LIDOCAINE 40 MG/G
4 PATCH TOPICAL
Qty: 14 | Refills: 2 | Status: ACTIVE | COMMUNITY
Start: 2025-02-19 | End: 1900-01-01

## 2025-02-20 ENCOUNTER — OUTPATIENT (OUTPATIENT)
Dept: OUTPATIENT SERVICES | Facility: HOSPITAL | Age: 60
LOS: 1 days | End: 2025-02-20
Payer: COMMERCIAL

## 2025-02-20 ENCOUNTER — RX RENEWAL (OUTPATIENT)
Age: 60
End: 2025-02-20

## 2025-02-20 VITALS
SYSTOLIC BLOOD PRESSURE: 160 MMHG | RESPIRATION RATE: 17 BRPM | HEIGHT: 70 IN | HEART RATE: 90 BPM | WEIGHT: 160.06 LBS | OXYGEN SATURATION: 98 % | TEMPERATURE: 98 F | DIASTOLIC BLOOD PRESSURE: 88 MMHG

## 2025-02-20 DIAGNOSIS — R10.32 LEFT LOWER QUADRANT PAIN: ICD-10-CM

## 2025-02-20 DIAGNOSIS — E11.9 TYPE 2 DIABETES MELLITUS WITHOUT COMPLICATIONS: ICD-10-CM

## 2025-02-20 DIAGNOSIS — Z98.890 OTHER SPECIFIED POSTPROCEDURAL STATES: Chronic | ICD-10-CM

## 2025-02-20 PROCEDURE — 80053 COMPREHEN METABOLIC PANEL: CPT

## 2025-02-20 PROCEDURE — 82150 ASSAY OF AMYLASE: CPT

## 2025-02-20 PROCEDURE — 85027 COMPLETE CBC AUTOMATED: CPT

## 2025-02-20 PROCEDURE — G0463: CPT

## 2025-02-20 PROCEDURE — 83036 HEMOGLOBIN GLYCOSYLATED A1C: CPT

## 2025-02-20 RX ORDER — INSULIN LISPRO 100 U/ML
0 INJECTION, SOLUTION INTRAVENOUS; SUBCUTANEOUS
Refills: 0 | DISCHARGE

## 2025-02-20 RX ORDER — TRAMADOL HYDROCHLORIDE AND ACETAMINOPHEN 37.5; 325 MG/1; MG/1
1 TABLET ORAL
Refills: 0 | DISCHARGE

## 2025-02-20 RX ORDER — OMEPRAZOLE 20 MG/1
1 CAPSULE, DELAYED RELEASE ORAL
Refills: 0 | DISCHARGE

## 2025-02-20 RX ORDER — IBUPROFEN 200 MG
1 TABLET ORAL
Refills: 0 | DISCHARGE

## 2025-02-20 RX ORDER — INSULIN GLARGINE-YFGN 100 [IU]/ML
15 INJECTION, SOLUTION SUBCUTANEOUS
Refills: 0 | DISCHARGE

## 2025-02-20 RX ORDER — LIPASE/PROTEASE/AMYLASE 10K-37.5K
1 CAPSULE,DELAYED RELEASE (ENTERIC COATED) ORAL
Refills: 0 | DISCHARGE

## 2025-02-20 RX ORDER — ATORVASTATIN CALCIUM 80 MG/1
1 TABLET, FILM COATED ORAL
Refills: 0 | DISCHARGE

## 2025-02-20 RX ORDER — DULOXETINE 20 MG/1
1 CAPSULE, DELAYED RELEASE ORAL
Refills: 0 | DISCHARGE

## 2025-02-20 RX ORDER — TELMISARTAN 20 MG/1
1 TABLET ORAL
Refills: 0 | DISCHARGE

## 2025-02-20 RX ORDER — ACETAMINOPHEN 500 MG/5ML
2 LIQUID (ML) ORAL
Refills: 0 | DISCHARGE

## 2025-02-20 NOTE — H&P PST ADULT - NSICDXPASTMEDICALHX_GEN_ALL_CORE_FT
[facility] Hospitalist Progress Note      Subjective  Patient seen and examined  No particular complaint   No SOB/cough  Pain controlled    Physical Exam    Vitals with min/max:    Vital Last Value 24 Hour Range   Temperature 97.9 Â°F (36.6 Â°C) (11/23/23 1407) Temp  Min: 97.9 Â°F (36.6 Â°C)  Max: 98.1 Â°F (36.7 Â°C)   Pulse 63 (11/23/23 1407) Pulse  Min: 53  Max: 66   Respiratory 16 (11/23/23 1407) Resp  Min: 16  Max: 18   Non-Invasive  Blood Pressure 121/69 (11/23/23 1407) BP  Min: 115/64  Max: 121/69   Pulse Oximetry 99 % (11/23/23 1407) SpO2  Min: 96 %  Max: 99 %   Arterial   Blood Pressure   No data recorded      Body mass index is 31.28 kg/mÂ². I/O's: Intake/Output Summary (Last 24 hours) at 11/23/2023 1549  Last data filed at 11/23/2023 1415  Gross per 24 hour   Intake 168 ml   Output 2800 ml   Net -2632 ml       General: patient not in acute distress. HEENT: Normocephalic. Normal conjunctiva. Respiratory: chest expansion wnl. Lung clear to auscultation bilaterally. Cardiovascular: Regular rate and rhythm. No peripheral edema. Gastrointestinal: Abdomen soft, non tender, non distended. Bowel sound present in all 4 quadrants. Genitourinary: No suprapubic tenderness. Musculoskeletal: Moves all 4 extremities  Neurology: alert and oriented to place, time, person. No focal defect  Skin: Warm.    Psychiatry: Cooperative      Current Medications:  Current Facility-Administered Medications   Medication Dose Route Frequency Provider Last Rate Last Admin   â¢ hydroxychloroquine (PLAQUENIL) tablet 200 mg  200 mg Oral Once per day on Mon Tue Wed Thu Fri Sat Shae [place], DO   200 mg at 11/22/23 2223   â¢ hydroxychloroquine (PLAQUENIL) tablet 200 mg  200 mg Oral Daily with breakfast Shae [place], DO   200 mg at 11/23/23 0841   â¢ levETIRAcetam (KepPRA) tablet 500 mg  500 mg Oral BID Patrice Flor MD   500 mg at 11/23/23 0841   â¢ sodium chloride 0.9 % flush bag 25 mL  25 mL Intravenous PRN Hussein Alcantar MD       â¢ sodium chloride 0.9 % injection 2 mL  2 mL Intracatheter 2 times per day Patrice Carreon MD   2 mL at 11/23/23 0842   â¢ acetaminophen (TYLENOL) tablet 650 mg  650 mg Oral Q4H PRN Patrice Carreon MD        Or   â¢ acetaminophen (TYLENOL) suppository 650 mg  650 mg Rectal Q4H PRN Patrice Carreon MD       â¢ ondansetron (ZOFRAN ODT) disintegrating tablet 4 mg  4 mg Oral Q12H PRN Patrice Carreon MD        Or   â¢ ondansetron [facility]) injection 4 mg  4 mg Intravenous Q12H PRN Earlene Easton MD       â¢ hydrALAZINE (APRESOLINE) tablet 25 mg  25 mg Oral Q4H PRN Earlene Easton MD       â¢ calcium carbonate-vitamin D (CALTRATE+D) 600-10 MG-MCG tablet 1 tablet  1 tablet Oral TID WC Bret Huffman MD   1 tablet at 11/23/23 1121   â¢ dextrose 50 % injection 25 g  25 g Intravenous PRN Bret Huffman MD       â¢ dextrose 50 % injection 12.5 g  12.5 g Intravenous PRN Bret Huffman MD       â¢ glucagon (GLUCAGEN) injection 1 mg  1 mg Intramuscular PRN Bret Huffman MD       â¢ dextrose (GLUTOSE) 40 % gel 15 g  15 g Oral PRN Bret Huffman MD       â¢ dextrose (GLUTOSE) 40 % gel 30 g  30 g Oral PRN Bret Huffman MD       â¢ Phosphorus Standard Replacement Protocol   Does not apply See Admin Instructions Bret Huffman MD       â¢ polyethylene glycol (MIRALAX) packet 17 g  17 g Oral Daily PRN Bret Huffman MD       â¢ aluminum-magnesium hydroxide-simethicone (MAALOX) 613-592-07 MG/5ML suspension 30 mL  30 mL Oral Q4H PRN Bret Huffman MD       â¢ sodium chloride 0.9 % flush bag 25 mL  25 mL Intravenous PRN Bret Huffman MD       â¢ sodium chloride (NORMAL SALINE) 0.9 % bolus 500 mL  500 mL Intravenous PRN Bret Huffman MD       â¢ heparin (porcine) injection 5,000 Units  5,000 Units Subcutaneous 3 times per day Bret Huffman MD   5,000 Units at 11/23/23 1352   â¢ insulin lispro (ADMELOG,HumaLOG) - Correction Dose   Subcutaneous TID  Bret Huffman MD       â¢ insulin lispro (ADMELOG,HumaLOG) - Correction Dose   Subcutaneous Nightly Seth Rodriguez MD       â¢ docusate sodium-sennosides (SENOKOT S) 50-8.6 MG 2 tablet  2 tablet Oral BID PRN Seth Rodriguez MD       â¢ bisacodyl (DULCOLAX) suppository 10 mg  10 mg Rectal Daily PRN Seth Rodriguez MD       â¢ magnesium hydroxide (MILK OF MAGNESIA) 400 MG/5ML suspension 30 mL  30 mL Oral Daily PRN Seth Rodriguez MD       â¢ Potassium Standard Replacement Protocol (Levels 3.5 and lower)   Does not apply See Admin Instructions Seth Rodriguez MD       â¢ Magnesium Standard Replacement Protocol   Does not apply See Admin Instructions Seth Rodriguez MD       â¢ lactated ringers infusion   Intravenous Continuous Seth Rodriguez  mL/hr at 11/23/23 0536 New Bag at 11/23/23 0536   â¢ B complex-vitamin C-folic acid (NEPHRO-ROSSANA) tablet 0.8 mg  1 tablet Oral Daily Seth Rodriguez MD   0.8 mg at 11/23/23 0841   â¢ ascorbic acid (VITAMIN C) tablet 1,000 mg  1,000 mg Oral BID Seth Rodriguez MD   1,000 mg at 11/23/23 0841   â¢ thiamine (VITAMIN B1) tablet 100 mg  100 mg Oral Daily Seth Rodriguez MD   100 mg at 11/23/23 0841   â¢ vitamin D3 (CHOLECALCIFEROL) 1.25 MG (03710 UT) 1.25 mg(50,000 units) capsule 1.25 mg  1.25 mg Oral Once per day on Mon Chaudhry, Edi Mahmood MD   1.25 mg at 11/20/23 0815         Labs     Recent Results (from the past 67 hour(s))   GLUCOSE, BEDSIDE - POINT OF CARE    Collection Time: 11/20/23  8:53 PM   Result Value Ref Range    GLUCOSE, BEDSIDE - POINT OF CARE 87 70 - 99 mg/dL   GLUCOSE, BEDSIDE - POINT OF CARE    Collection Time: 11/21/23  5:49 AM   Result Value Ref Range    GLUCOSE, BEDSIDE - POINT OF CARE 91 70 - 99 mg/dL   GLUCOSE, BEDSIDE - POINT OF CARE    Collection Time: 11/21/23  9:57 AM   Result Value Ref Range    GLUCOSE, BEDSIDE - POINT OF CARE 131 (H) 70 - 99 mg/dL   Electrocardiogram 12-Lead    Collection Time: 11/21/23 10:03 AM   Result Value Ref Range    Ventricular Rate EKG/Min (BPM) 64     Atrial Rate (BPM) 64     NH-Interval (MSEC) 166 QRS-Interval (MSEC) 130     QT-Interval (MSEC) 458     QTc 473     P Axis (Degrees) 50     R Axis (Degrees) -29     T Axis (Degrees) 23     REPORT TEXT       Sinus rhythm  with occasional  atrial-paced complexes  and  premature supraventricular complexes  and  with occasional  premature ventricular complexes  Right bundle branch block  Abnormal ECG  When compared with ECG of  17-NOV-2023 11:14,  Electronic atrial pacemaker  has replaced  Sinus rhythm  T wave inversion no longer evident in  Anterior leads  Confirmed by [facility] ANNETTE WILDE ([id number]) on 11/21/2023 11:38:05 AM     CBC No Differential    Collection Time: 11/21/23 10:09 AM   Result Value Ref Range    WBC 4.2 4.2 - 11.0 K/mcL    RBC 3.91 (L) 4.00 - 5.20 mil/mcL    HGB 12.2 12.0 - 15.5 g/dL    HCT 36.9 36.0 - 46.5 %    MCV 94.4 78.0 - 100.0 fl    MCH 31.2 26.0 - 34.0 pg    MCHC 33.1 32.0 - 36.5 g/dL     (L) 140 - 450 K/mcL    RDW-CV 13.6 11.0 - 15.0 %    RDW-SD 47.6 39.0 - 50.0 fL    NRBC 0 <=0 /100 WBC   Creatine Kinase    Collection Time: 11/21/23 10:09 AM   Result Value Ref Range     (H) 26 - 192 Units/L   Comprehensive Metabolic Panel    Collection Time: 11/21/23 10:09 AM   Result Value Ref Range    Fasting Status      Sodium 139 135 - 145 mmol/L    Potassium 3.8 3.4 - 5.1 mmol/L    Chloride 108 97 - 110 mmol/L    Carbon Dioxide 26 21 - 32 mmol/L    Anion Gap 9 7 - 19 mmol/L    Glucose 136 (H) 70 - 99 mg/dL    BUN 10 6 - 20 mg/dL    Creatinine 0.80 0.51 - 0.95 mg/dL    Glomerular Filtration Rate 76 >=60    BUN/Cr 13 7 - 25    Calcium 9.2 8.4 - 10.2 mg/dL    Bilirubin, Total 0.4 0.2 - 1.0 mg/dL    GOT/AST 77 (H) <=37 Units/L    GPT/ALT 46 <64 Units/L    Alkaline Phosphatase 57 45 - 117 Units/L    Albumin 2.8 (L) 3.6 - 5.1 g/dL    Protein, Total 6.8 6.4 - 8.2 g/dL    Globulin 4.0 2.0 - 4.0 g/dL    A/G Ratio 0.7 (L) 1.0 - 2.4   Magnesium    Collection Time: 11/21/23 10:09 AM   Result Value Ref Range    Magnesium 2.0 1.7 - 2.4 mg/dL   BLOOD GAS, ARTERIAL - RESPIRATORY    Collection Time: 11/21/23 10:25 AM   Result Value Ref Range    BASE EXCESS / DEFICIT, ARTERIAL - RESPIRATORY 3 -2 - 3 mmol/L    HCO3, ARTERIAL - RESPIRATORY 28 22 - 28 mmol/L    PCO2, ARTERIAL - RESPIRATORY 42 32 - 45 mm Hg    PH, ARTERIAL - RESPIRATORY 7.43 7.35 - 7.45 Units    PO2, ARTERIAL - RESPIRATORY 70 (L) 83 - 108 mm Hg    O2 SATURATION, ARTERIAL - RESPIRATORY 94 (L) 95 - 99 %    CONDITION - RESPIRATORY ROOM AIR     SITE - RESPIRATORY Right Radial     TEMPERATURE - RESPIRATORY 37.0 degrees C   POTASSIUM - RESPIRATORY    Collection Time: 11/21/23 10:25 AM   Result Value Ref Range    POTASSIUM - RESPIRATORY 3.7 3.4 - 5.1 mmol/L   SODIUM - RESPIRATORY    Collection Time: 11/21/23 10:25 AM   Result Value Ref Range    SODIUM - RESPIRATORY 135 135 - 145 mmol/L   CALCIUM, IONIZED - RESPIRATORY    Collection Time: 11/21/23 10:25 AM   Result Value Ref Range    CALCIUM, IONIZED - RESPIRATORY 1.22 1.15 - 1.29 mmol/L   LACTIC ACID, ARTERIAL - RESPIRATORY    Collection Time: 11/21/23 10:25 AM   Result Value Ref Range    LACTIC ACID, ARTERIAL - RESPIRATORY 0.9 <1.6 mmol/L   GLUCOSE, BEDSIDE - POINT OF CARE    Collection Time: 11/21/23 12:06 PM   Result Value Ref Range    GLUCOSE, BEDSIDE - POINT OF CARE 102 (H) 70 - 99 mg/dL   GLUCOSE, BEDSIDE - POINT OF CARE    Collection Time: 11/21/23  4:08 PM   Result Value Ref Range    GLUCOSE, BEDSIDE - POINT OF CARE 95 70 - 99 mg/dL   GLUCOSE, BEDSIDE - POINT OF CARE    Collection Time: 11/21/23  6:04 PM   Result Value Ref Range    GLUCOSE, BEDSIDE - POINT OF CARE 101 (H) 70 - 99 mg/dL   GLUCOSE, BEDSIDE - POINT OF CARE    Collection Time: 11/21/23  8:57 PM   Result Value Ref Range    GLUCOSE, BEDSIDE - POINT OF CARE 111 (H) 70 - 99 mg/dL   Basic Metabolic Panel    Collection Time: 11/22/23  5:24 AM   Result Value Ref Range    Fasting Status      Sodium 141 135 - 145 mmol/L    Potassium 3.7 3.4 - 5.1 mmol/L    Chloride 109 97 - 110 mmol/L    Carbon Dioxide 29 21 - 32 mmol/L    Anion Gap 7 7 - 19 mmol/L    Glucose 92 70 - 99 mg/dL    BUN 8 6 - 20 mg/dL    Creatinine 0.60 0.51 - 0.95 mg/dL    Glomerular Filtration Rate >90 >=60    BUN/Cr 13 7 - 25    Calcium 8.5 8.4 - 10.2 mg/dL   CBC No Differential    Collection Time: 11/22/23  5:24 AM   Result Value Ref Range    WBC 2.5 (L) 4.2 - 11.0 K/mcL    RBC 3.51 (L) 4.00 - 5.20 mil/mcL    HGB 11.0 (L) 12.0 - 15.5 g/dL    HCT 33.2 (L) 36.0 - 46.5 %    MCV 94.6 78.0 - 100.0 fl    MCH 31.3 26.0 - 34.0 pg    MCHC 33.1 32.0 - 36.5 g/dL     (L) 140 - 450 K/mcL    RDW-CV 13.6 11.0 - 15.0 %    RDW-SD 46.8 39.0 - 50.0 fL    NRBC 0 <=0 /100 WBC   GLUCOSE, BEDSIDE - POINT OF CARE    Collection Time: 11/22/23  5:36 AM   Result Value Ref Range    GLUCOSE, BEDSIDE - POINT OF CARE 97 70 - 99 mg/dL   GLUCOSE, BEDSIDE - POINT OF CARE    Collection Time: 11/22/23 11:04 AM   Result Value Ref Range    GLUCOSE, BEDSIDE - POINT OF CARE 97 70 - 99 mg/dL   GLUCOSE, BEDSIDE - POINT OF CARE    Collection Time: 11/22/23  3:21 PM   Result Value Ref Range    GLUCOSE, BEDSIDE - POINT OF CARE 115 (H) 70 - 99 mg/dL   GLUCOSE, BEDSIDE - POINT OF CARE    Collection Time: 11/22/23  8:12 PM   Result Value Ref Range    GLUCOSE, BEDSIDE - POINT OF CARE 90 70 - 99 mg/dL   Basic Metabolic Panel    Collection Time: 11/23/23  6:16 AM   Result Value Ref Range    Fasting Status      Sodium 139 135 - 145 mmol/L    Potassium 3.8 3.4 - 5.1 mmol/L    Chloride 108 97 - 110 mmol/L    Carbon Dioxide 28 21 - 32 mmol/L    Anion Gap 7 7 - 19 mmol/L    Glucose 85 70 - 99 mg/dL    BUN 11 6 - 20 mg/dL    Creatinine 0.52 0.51 - 0.95 mg/dL    Glomerular Filtration Rate >90 >=60    BUN/Cr 21 7 - 25    Calcium 9.0 8.4 - 10.2 mg/dL   CBC No Differential    Collection Time: 11/23/23  6:16 AM   Result Value Ref Range    WBC 2.9 (L) 4.2 - 11.0 K/mcL    RBC 3.56 (L) 4.00 - 5.20 mil/mcL    HGB 11.4 (L) 12.0 - 15.5 g/dL    HCT 33.9 (L) 36.0 - 46.5 %    MCV 95.2 78.0 - 100.0 fl    MCH 32.0 26.0 - 34.0 pg    MCHC 33.6 32.0 - 36.5 g/dL     (L) 140 - 450 K/mcL    RDW-CV 13.5 11.0 - 15.0 %    RDW-SD 47.8 39.0 - 50.0 fL    NRBC 0 <=0 /100 WBC   GLUCOSE, BEDSIDE - POINT OF CARE    Collection Time: 11/23/23  6:25 AM   Result Value Ref Range    GLUCOSE, BEDSIDE - POINT OF CARE 86 70 - 99 mg/dL   GLUCOSE, BEDSIDE - POINT OF CARE    Collection Time: 11/23/23 10:56 AM   Result Value Ref Range    GLUCOSE, BEDSIDE - POINT OF CARE 77 70 - 99 mg/dL       Microbiology Results     None                 Imaging    Reviewed    XR CHEST AP OR PA   Final Result   Impression:    No acute cardiopulmonary abnormality. Electronically Signed by: Sukhi Saxena MD    Signed on: 11/21/2023 11:00 AM    Workstation ID: [id number]      XR CHEST AP OR PA   Final Result      1. No acute pulmonary process. Electronically Signed by: Enzo Leigh MD    Signed on: 11/17/2023 10:18 PM    Workstation ID: [id number]      CT HEAD WO CONTRAST   Final Result      No acute intracranial hemorrhage. Postsurgical changes related to left temporal craniotomy and debulking of a   left insular and temporal lobe mass. Postsurgical and posttreatment changes   appear similar to prior. Residual or recurrent tumor cannot be assessed on   the basis of a noncontrast CT. If there is clinical concern for disease   progression, consider MRI brain without and with contrast.      Electronically Signed by: Edilma Nunes M.D. Signed on: 11/17/2023 12:45 PM    Workstation ID: [id number]      XR TIBIA FIBULA 2 VIEWS BILATERAL   Final Result   FINDINGS/IMPRESSION:   No acute fracture or dislocation. Mild osteoporosis at the medial   tibiofemoral compartments bilaterally. Soft tissues are unremarkable.       Electronically Signed by: Zia Pineda MD    Signed on: 11/17/2023 12:36 PM    Workstation ID: [id number]            Cardiac studies:     Reviewed  Encounter Date: 11/17/23   Electrocardiogram 12-Lead   Result Value    Ventricular Rate EKG/Min (BPM) 64    Atrial Rate (BPM) 64    ID-Interval (MSEC) 166    QRS-Interval (MSEC) 130    QT-Interval (MSEC) 458    QTc 473    P Axis (Degrees) 50    R Axis (Degrees) -29    T Axis (Degrees) 23    REPORT TEXT      Sinus rhythm  with occasional  atrial-paced complexes  and  premature supraventricular complexes  and  with occasional  premature ventricular complexes  Right bundle branch block  Abnormal ECG  When compared with ECG of  17-NOV-2023 11:14,  Electronic atrial pacemaker  has replaced  Sinus rhythm  T wave inversion no longer evident in  Anterior leads  Confirmed by Chevy Sylvester MD, Ariel Avery ([id number]) on 11/21/2023 11:38:05 AM             LAST ECHO/ECHO STRESS:  No valid procedures specified.       Problems list    Patient Active Problem List   Diagnosis   â¢ Inflammatory polyarthropathy (CMD)   â¢ Limited systemic sclerosis (CMD)   â¢ Lumbar radiculopathy   â¢ Nontoxic multinodular goiter   â¢ Primary osteoarthritis of both knees   â¢ Proteinuria   â¢ Raynaud's disease   â¢ High risk medications (not anticoagulants) long-term use   â¢ Chronic left-sided low back pain   â¢ Increased urinary frequency   â¢ Bloating   â¢ Glioblastoma (CMD)   â¢ Osteoporosis screening   â¢ Weakness   â¢ Non-traumatic rhabdomyolysis   â¢ Abnormal liver function tests   â¢ Anemia   â¢ Antineoplastic chemotherapy induced pancytopenia (CMD)   â¢ Glioma of brain (CMD)   â¢ 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)   â¢ Chemotherapy-induced neutropenia (CMD)   â¢ Drug-induced constipation   â¢ Myositis   â¢ Psoriasis   â¢ Secondary Raynaud's phenomenon   â¢ Fatigue   â¢ COVID-19 virus infection   â¢ Fall   â¢ Type 2 diabetes mellitus with neurologic complication, without long-term current use of insulin (CMD)           Assessment and Plan      -year-old female with a past medical history significant for glioblastoma status post chemo and radiation, and surgery, Raynaud's phenomena, limited systemic sclerosis, inflammatory arthritis with myopathy/myositis on hydrochloric when was brought to the emergency room by family after a fall      Covid -19 Infection  - with some increase in transaminase and Â Severe body weakness and Â muscle pain  - no hypoxia, cxr wnl  - sp 3 day course of Remdesivir   - fu inflammatory markers, dimer, CRP  -Vitamin C,Â and D  -Â thiamine  -leukopenia likely from COIVD, monitor WBC    Â   Fall  - Maybe related to weakness from covid infection vs excerbation of myositis  OT/PT on consult, need meena  Has RRT with therapy  11/21 am  Pending MEENA  Â   Mild Trop elevation  bradycardia  -seems Â 2/2 demand ischemia, serial levels are more plateaued and downtrending  Bradycardia with hypotension this am  Seen by card dr Cherie Collier, will monitor  Â   Hypotension / presyncope 11/21 am  Responded to ivf  Seen by dr Cherie Collier and dr Nell Renee  No need to transfer to stepdown as bp ok now  Likely evdent due to hypovolemia and covid with auotnomic dysfunction  Improved nle BP now  Â   Â   Rabdomyolysis  - fu renal function and cpk  - IVF  -F/u CPK trend: 4K. .. >> 738  Â   Raynaud  Inflammatory arthritisand Myopathy  Positive IFEANYI/Scleroderma with limited cutaneous involvement  Osteoarthritis knees  -Â Hydrochloroquine has been resumed  Â   ChronicÂ Leukopenia/thrombocytopenia/ NCNC anemia >> chronic pancytopenia, POA  - Monitor WBC/plts  Â   Glioblastoma. s/p craniotomy and subtotal resection 2/21/2022Â   - s/pÂ chemoradiation  - on keppra> continued  - MRI brainÂ seems improved from last comparrative MRI   seen by neuro          DVT prophylaxis:. SCD. Current Active Medications for DVT Prophylaxis (From admission, onward)         Stop     heparin (porcine) injection 5,000 Units  5,000 Units,   Subcutaneous,   3 times per day         --               Diet:  Dietary Orders (From admission, onward)     Start     Ordered    11/18/23 1435  Consistent Carb Moderate (45-75 gm/meal);  Fluid Restrict 1800ml (1020 from Dietary) Diet  DIET EFFECTIVE NOW Question Answer Comment   Diet Modifiers Consistent Carb Moderate (45-75 gm/meal)    Fluid Restriction Modifier Fluid Restrict 1800ml (1020 from Dietary)        11/18/23 1441              Code status:  Code Status Information     Code Status    Full Resuscitation        Activity: ad marion  Disposition: pending clinical course. Pending DAMIAN  PCP: Luis Wright DO    Discussed plan of care with nurse, patient.         Dr Oswaldo Fuentes MD  [facility]  Contact by [employer] PAST MEDICAL HISTORY:  Alcohol abuse     Benign hypertension     Elbow fracture     GERD (gastroesophageal reflux disease)     Insulin dependent type 2 diabetes mellitus Does not currently take insulin    Left lower quadrant abdominal pain     Pancreatic duct stones     Scoliosis      PAST MEDICAL HISTORY:  Alcohol abuse     Benign hypertension     Diabetes mellitus, type 2     Elbow fracture     Gallstones     GERD (gastroesophageal reflux disease)     Left lower quadrant abdominal pain     Pancreatic duct stones     Pancreatitis, chronic     Scoliosis

## 2025-02-20 NOTE — H&P PST ADULT - HISTORY OF PRESENT ILLNESS
59 yr old male with hx of Alchohol use, Diabetes  Mellitus type 3 (per pt), Chronic Pancreatitis, HTN. Has been having pain LLQ better when he pushes on abdomen worse with large meals. Has had CT,MRI, X ray   referred for endoscopy and colonoscopy.    Diabetes   Basaglar  to take 12 units am of procedure   fs

## 2025-02-20 NOTE — H&P PST ADULT - ASSESSMENT
DASI: work as  climbs stairs walks [employer]  Symptoms : Denies SOB, RUBIO, palpitations  Airway : no airway abnormalities , denies prior anesthesia complications   Mallampati : 3  Denies loose teeth     Corneal abrasion risk : Denies

## 2025-02-21 PROBLEM — E11.9 TYPE 2 DIABETES MELLITUS WITHOUT COMPLICATIONS: Chronic | Status: INACTIVE | Noted: 2022-10-12 | Resolved: 2025-02-20

## 2025-02-25 ENCOUNTER — APPOINTMENT (OUTPATIENT)
Dept: GASTROENTEROLOGY | Facility: HOSPITAL | Age: 60
End: 2025-02-25

## 2025-02-25 ENCOUNTER — OUTPATIENT (OUTPATIENT)
Dept: OUTPATIENT SERVICES | Facility: HOSPITAL | Age: 60
LOS: 1 days | End: 2025-02-25
Payer: COMMERCIAL

## 2025-02-25 ENCOUNTER — RESULT REVIEW (OUTPATIENT)
Age: 60
End: 2025-02-25

## 2025-02-25 ENCOUNTER — TRANSCRIPTION ENCOUNTER (OUTPATIENT)
Age: 60
End: 2025-02-25

## 2025-02-25 VITALS
WEIGHT: 154.1 LBS | OXYGEN SATURATION: 100 % | RESPIRATION RATE: 16 BRPM | DIASTOLIC BLOOD PRESSURE: 88 MMHG | SYSTOLIC BLOOD PRESSURE: 186 MMHG | TEMPERATURE: 98 F | HEIGHT: 70 IN | HEART RATE: 77 BPM

## 2025-02-25 VITALS
SYSTOLIC BLOOD PRESSURE: 133 MMHG | RESPIRATION RATE: 14 BRPM | OXYGEN SATURATION: 100 % | HEART RATE: 86 BPM | DIASTOLIC BLOOD PRESSURE: 62 MMHG

## 2025-02-25 DIAGNOSIS — Z98.890 OTHER SPECIFIED POSTPROCEDURAL STATES: Chronic | ICD-10-CM

## 2025-02-25 DIAGNOSIS — R10.32 LEFT LOWER QUADRANT PAIN: ICD-10-CM

## 2025-02-25 LAB — GLUCOSE BLDC GLUCOMTR-MCNC: 191 MG/DL — HIGH (ref 70–99)

## 2025-02-25 PROCEDURE — 45385 COLONOSCOPY W/LESION REMOVAL: CPT

## 2025-02-25 PROCEDURE — 88305 TISSUE EXAM BY PATHOLOGIST: CPT | Mod: 26

## 2025-02-25 PROCEDURE — 88305 TISSUE EXAM BY PATHOLOGIST: CPT

## 2025-02-25 PROCEDURE — 43259 EGD US EXAM DUODENUM/JEJUNUM: CPT

## 2025-02-25 PROCEDURE — 43239 EGD BIOPSY SINGLE/MULTIPLE: CPT

## 2025-02-25 PROCEDURE — 82962 GLUCOSE BLOOD TEST: CPT

## 2025-02-25 PROCEDURE — 45390 COLONOSCOPY W/RESECTION: CPT | Mod: 59

## 2025-02-25 DEVICE — NET RETRV ROT ROTH 2.5MMX230CM: Type: IMPLANTABLE DEVICE | Status: FUNCTIONAL

## 2025-02-25 NOTE — PRE-ANESTHESIA EVALUATION ADULT - NSANTHPMHFT_GEN_ALL_CORE
59 yr old male with hx of Alchohol use, Diabetes  Mellitus type 3 (per pt), Chronic Pancreatitis, HTN. Has been having pain LLQ better when he pushes on abdomen worse with large meals. Has had CT,MRI, X ray   referred for endoscopy and colonoscopy.

## 2025-02-25 NOTE — ASU PATIENT PROFILE, ADULT - NSICDXPASTMEDICALHX_GEN_ALL_CORE_FT
PAST MEDICAL HISTORY:  Alcohol abuse     Benign hypertension     Diabetes mellitus, type 2     Elbow fracture     Gallstones     GERD (gastroesophageal reflux disease)     Left lower quadrant abdominal pain     Pancreatic duct stones     Pancreatitis, chronic     Scoliosis

## 2025-02-25 NOTE — ASU PATIENT PROFILE, ADULT - FALL HARM RISK - UNIVERSAL INTERVENTIONS
Bed in lowest position, wheels locked, appropriate side rails in place/Call bell, personal items and telephone in reach/Instruct patient to call for assistance before getting out of bed or chair/Non-slip footwear when patient is out of bed/[place] to call system/Physically safe environment - no spills, clutter or unnecessary equipment/Purposeful Proactive Rounding/Room/bathroom lighting operational, light cord in reach

## 2025-02-25 NOTE — ASU DISCHARGE PLAN (ADULT/PEDIATRIC) - FINANCIAL ASSISTANCE
[facility] provides services at a reduced cost to those who are determined to be eligible through [facility]’s financial assistance program. Information regarding [facility]’s financial assistance program can be found by going to [email or web address] or by calling [phone].

## 2025-02-25 NOTE — PRE PROCEDURE NOTE - PRE PROCEDURE EVALUATION
Attending Physician:  Warner                  Procedure: EGD EUS COLONOSCOPY    Indication for Procedure: abdominal pain  ________________________________________________________  PAST MEDICAL & SURGICAL HISTORY:  GERD (gastroesophageal reflux disease)      Alcohol abuse      Left lower quadrant abdominal pain      Elbow fracture      Benign hypertension      Scoliosis      Pancreatic duct stones      Diabetes mellitus, type 2      Pancreatitis, chronic      Gallstones      H/O elbow surgery        ALLERGIES:  No Known Allergies    HOME MEDICATIONS:  Admelog 100 units/mL injectable solution: injectable 3 times a day sliding scale between 10 -15 units  atorvastatin 10 mg oral tablet: 1 tab(s) orally once a day (at bedtime)  Basaglar KwikPen 100 units/mL subcutaneous solution: 15 unit(s) subcutaneous once a day  dicyclomine: 1 tab(s) orally once a day  DULoxetine 30 mg oral delayed release capsule: 1 cap(s) orally once a day  Motrin 400 mg oral tablet: 1 tab(s) orally prn  omeprazole 20 mg oral delayed release tablet: 1 tab(s) orally once a day  Pancrelipase: 1 tab(s) orally 3 times a day [id number]  telmisartan 40 mg oral tablet: 1 tab(s) orally once a day  tramadol-acetaminophen 37.5 mg-325 mg oral tablet: 1 tab(s) orally 3 times a day as needed for prn pain  Tylenol 500 mg oral tablet: 2 tab(s) orally prn    AICD/PPM: [ ] yes   [x ] no    PERTINENT LAB DATA: reviewed                      PHYSICAL EXAMINATION:    vitasl wnl    Constitutional: NAD  Neck:  supple  Respiratory: no respiratory stress, no audible wheezes  Cardiovascular: RR  Gastrointestinal: soft, NT/ND  Extremities: No peripheral edema  Neurological: Alert, no focal deficits  Psychiatric: Normal mood, normal affect  Skin: No rashes      COMMENTS:    The patient is a suitable candidate for the planned procedure unless box checked [ ]  No, explain:

## 2025-02-27 LAB — SURGICAL PATHOLOGY STUDY: SIGNIFICANT CHANGE UP

## 2025-03-03 ENCOUNTER — APPOINTMENT (OUTPATIENT)
Dept: GASTROENTEROLOGY | Facility: CLINIC | Age: 60
End: 2025-03-03

## 2025-03-04 PROBLEM — I10 ESSENTIAL (PRIMARY) HYPERTENSION: Chronic | Status: ACTIVE | Noted: 2025-02-20

## 2025-03-04 PROBLEM — E11.9 TYPE 2 DIABETES MELLITUS WITHOUT COMPLICATIONS: Chronic | Status: ACTIVE | Noted: 2025-02-20

## 2025-03-04 PROBLEM — K80.20 CALCULUS OF GALLBLADDER WITHOUT CHOLECYSTITIS WITHOUT OBSTRUCTION: Chronic | Status: ACTIVE | Noted: 2025-02-20

## 2025-03-04 PROBLEM — R10.32 LEFT LOWER QUADRANT PAIN: Chronic | Status: ACTIVE | Noted: 2025-02-20

## 2025-03-04 PROBLEM — K86.89 OTHER SPECIFIED DISEASES OF PANCREAS: Chronic | Status: ACTIVE | Noted: 2025-02-20

## 2025-03-05 PROBLEM — K86.1 OTHER CHRONIC PANCREATITIS: Chronic | Status: ACTIVE | Noted: 2025-02-20

## 2025-03-06 ENCOUNTER — APPOINTMENT (OUTPATIENT)
Dept: SURGICAL ONCOLOGY | Facility: CLINIC | Age: 60
End: 2025-03-06
Payer: COMMERCIAL

## 2025-03-06 VITALS
HEART RATE: 96 BPM | SYSTOLIC BLOOD PRESSURE: 164 MMHG | OXYGEN SATURATION: 96 % | BODY MASS INDEX: 22.05 KG/M2 | WEIGHT: 154 LBS | HEIGHT: 70 IN | DIASTOLIC BLOOD PRESSURE: 99 MMHG

## 2025-03-06 DIAGNOSIS — K86.1 OTHER CHRONIC PANCREATITIS: ICD-10-CM

## 2025-03-06 PROBLEM — S42.409A UNSPECIFIED FRACTURE OF LOWER END OF UNSPECIFIED HUMERUS, INITIAL ENCOUNTER FOR CLOSED FRACTURE: Chronic | Status: ACTIVE | Noted: 2025-02-20

## 2025-03-06 PROBLEM — M41.9 SCOLIOSIS, UNSPECIFIED: Chronic | Status: ACTIVE | Noted: 2025-02-20

## 2025-03-06 PROCEDURE — 99214 OFFICE O/P EST MOD 30 MIN: CPT

## 2025-03-18 ENCOUNTER — APPOINTMENT (OUTPATIENT)
Dept: ENDOCRINOLOGY | Facility: CLINIC | Age: 60
End: 2025-03-18
Payer: COMMERCIAL

## 2025-03-18 PROCEDURE — G0108 DIAB MANAGE TRN  PER INDIV: CPT

## 2025-03-21 ENCOUNTER — APPOINTMENT (OUTPATIENT)
Dept: ORTHOPEDIC SURGERY | Facility: CLINIC | Age: 60
End: 2025-03-21
Payer: COMMERCIAL

## 2025-03-21 VITALS — WEIGHT: 150 LBS | BODY MASS INDEX: 21.47 KG/M2 | HEIGHT: 70 IN

## 2025-03-21 DIAGNOSIS — M54.16 RADICULOPATHY, LUMBAR REGION: ICD-10-CM

## 2025-03-21 PROCEDURE — 99214 OFFICE O/P EST MOD 30 MIN: CPT

## 2025-03-21 RX ORDER — TIZANIDINE 2 MG/1
2 TABLET ORAL
Qty: 28 | Refills: 0 | Status: ACTIVE | COMMUNITY
Start: 2025-03-21 | End: 1900-01-01

## 2025-03-28 ENCOUNTER — APPOINTMENT (OUTPATIENT)
Dept: ENDOCRINOLOGY | Facility: CLINIC | Age: 60
End: 2025-03-28

## 2025-04-02 ENCOUNTER — APPOINTMENT (OUTPATIENT)
Dept: ORTHOPEDIC SURGERY | Facility: CLINIC | Age: 60
End: 2025-04-02

## 2025-04-02 ENCOUNTER — APPOINTMENT (OUTPATIENT)
Dept: ORTHOPEDIC SURGERY | Facility: CLINIC | Age: 60
End: 2025-04-02
Payer: COMMERCIAL

## 2025-04-02 DIAGNOSIS — M54.16 RADICULOPATHY, LUMBAR REGION: ICD-10-CM

## 2025-04-02 PROCEDURE — 99204 OFFICE O/P NEW MOD 45 MIN: CPT

## 2025-04-02 PROCEDURE — 99214 OFFICE O/P EST MOD 30 MIN: CPT

## 2025-04-02 RX ORDER — INSULIN ASPART 100 [IU]/ML
100 INJECTION, SOLUTION INTRAVENOUS; SUBCUTANEOUS
Qty: 3 | Refills: 6 | Status: ACTIVE | COMMUNITY
Start: 2025-04-02 | End: 1900-01-01

## 2025-04-02 RX ORDER — METHOCARBAMOL 750 MG/1
750 TABLET, FILM COATED ORAL 3 TIMES DAILY
Qty: 42 | Refills: 0 | Status: ACTIVE | COMMUNITY
Start: 2025-04-02 | End: 1900-01-01

## 2025-04-02 RX ORDER — DICLOFENAC SODIUM 75 MG/1
75 TABLET, DELAYED RELEASE ORAL
Qty: 28 | Refills: 1 | Status: ACTIVE | COMMUNITY
Start: 2025-04-02 | End: 1900-01-01

## 2025-04-03 ENCOUNTER — EMERGENCY (EMERGENCY)
Facility: HOSPITAL | Age: 60
LOS: 1 days | Discharge: HOME | End: 2025-04-03
Attending: STUDENT IN AN ORGANIZED HEALTH CARE EDUCATION/TRAINING PROGRAM
Payer: COMMERCIAL

## 2025-04-03 VITALS
DIASTOLIC BLOOD PRESSURE: 83 MMHG | OXYGEN SATURATION: 99 % | SYSTOLIC BLOOD PRESSURE: 163 MMHG | HEART RATE: 105 BPM | RESPIRATION RATE: 20 BRPM | TEMPERATURE: 98 F | WEIGHT: 145.06 LBS | HEIGHT: 70 IN

## 2025-04-03 VITALS
HEART RATE: 110 BPM | SYSTOLIC BLOOD PRESSURE: 166 MMHG | TEMPERATURE: 97 F | OXYGEN SATURATION: 99 % | RESPIRATION RATE: 20 BRPM | DIASTOLIC BLOOD PRESSURE: 103 MMHG

## 2025-04-03 DIAGNOSIS — Z98.890 OTHER SPECIFIED POSTPROCEDURAL STATES: Chronic | ICD-10-CM

## 2025-04-03 LAB
ALBUMIN SERPL ELPH-MCNC: 4.1 G/DL — SIGNIFICANT CHANGE UP (ref 3.3–5)
ALP SERPL-CCNC: 60 U/L — SIGNIFICANT CHANGE UP (ref 40–120)
ALT FLD-CCNC: 27 U/L — SIGNIFICANT CHANGE UP (ref 10–45)
ANION GAP SERPL CALC-SCNC: 14 MMOL/L — SIGNIFICANT CHANGE UP (ref 5–17)
APTT BLD: 28 SEC — SIGNIFICANT CHANGE UP (ref 24.5–35.6)
AST SERPL-CCNC: 20 U/L — SIGNIFICANT CHANGE UP (ref 10–40)
BASOPHILS # BLD AUTO: 0.03 K/UL — SIGNIFICANT CHANGE UP (ref 0–0.2)
BASOPHILS NFR BLD AUTO: 0.5 % — SIGNIFICANT CHANGE UP (ref 0–2)
BILIRUB SERPL-MCNC: 0.6 MG/DL — SIGNIFICANT CHANGE UP (ref 0.2–1.2)
BLD GP AB SCN SERPL QL: NEGATIVE — SIGNIFICANT CHANGE UP
BUN SERPL-MCNC: 7 MG/DL — SIGNIFICANT CHANGE UP (ref 7–23)
CALCIUM SERPL-MCNC: 9.6 MG/DL — SIGNIFICANT CHANGE UP (ref 8.4–10.5)
CHLORIDE SERPL-SCNC: 97 MMOL/L — SIGNIFICANT CHANGE UP (ref 96–108)
CO2 SERPL-SCNC: 25 MMOL/L — SIGNIFICANT CHANGE UP (ref 22–31)
CREAT SERPL-MCNC: 0.72 MG/DL — SIGNIFICANT CHANGE UP (ref 0.5–1.3)
EGFR: 107 ML/MIN/1.73M2 — SIGNIFICANT CHANGE UP
EGFR: 107 ML/MIN/1.73M2 — SIGNIFICANT CHANGE UP
EOSINOPHIL # BLD AUTO: 0.12 K/UL — SIGNIFICANT CHANGE UP (ref 0–0.5)
EOSINOPHIL NFR BLD AUTO: 1.9 % — SIGNIFICANT CHANGE UP (ref 0–6)
GLUCOSE SERPL-MCNC: 228 MG/DL — HIGH (ref 70–99)
HCT VFR BLD CALC: 39.9 % — SIGNIFICANT CHANGE UP (ref 39–50)
HGB BLD-MCNC: 13.6 G/DL — SIGNIFICANT CHANGE UP (ref 13–17)
IMM GRANULOCYTES NFR BLD AUTO: 0.2 % — SIGNIFICANT CHANGE UP (ref 0–0.9)
INR BLD: 0.9 RATIO — SIGNIFICANT CHANGE UP (ref 0.85–1.16)
LYMPHOCYTES # BLD AUTO: 2.88 K/UL — SIGNIFICANT CHANGE UP (ref 1–3.3)
LYMPHOCYTES # BLD AUTO: 46.2 % — HIGH (ref 13–44)
MCHC RBC-ENTMCNC: 30.4 PG — SIGNIFICANT CHANGE UP (ref 27–34)
MCHC RBC-ENTMCNC: 34.1 G/DL — SIGNIFICANT CHANGE UP (ref 32–36)
MCV RBC AUTO: 89.1 FL — SIGNIFICANT CHANGE UP (ref 80–100)
MONOCYTES # BLD AUTO: 0.45 K/UL — SIGNIFICANT CHANGE UP (ref 0–0.9)
MONOCYTES NFR BLD AUTO: 7.2 % — SIGNIFICANT CHANGE UP (ref 2–14)
NEUTROPHILS # BLD AUTO: 2.75 K/UL — SIGNIFICANT CHANGE UP (ref 1.8–7.4)
NEUTROPHILS NFR BLD AUTO: 44 % — SIGNIFICANT CHANGE UP (ref 43–77)
NRBC BLD AUTO-RTO: 0 /100 WBCS — SIGNIFICANT CHANGE UP (ref 0–0)
PLATELET # BLD AUTO: 244 K/UL — SIGNIFICANT CHANGE UP (ref 150–400)
POTASSIUM SERPL-MCNC: 3.8 MMOL/L — SIGNIFICANT CHANGE UP (ref 3.5–5.3)
POTASSIUM SERPL-SCNC: 3.8 MMOL/L — SIGNIFICANT CHANGE UP (ref 3.5–5.3)
PROT SERPL-MCNC: 6.3 G/DL — SIGNIFICANT CHANGE UP (ref 6–8.3)
PROTHROM AB SERPL-ACNC: 10.4 SEC — SIGNIFICANT CHANGE UP (ref 9.9–13.4)
RBC # BLD: 4.48 M/UL — SIGNIFICANT CHANGE UP (ref 4.2–5.8)
RBC # FLD: 12.6 % — SIGNIFICANT CHANGE UP (ref 10.3–14.5)
RH IG SCN BLD-IMP: POSITIVE — SIGNIFICANT CHANGE UP
SODIUM SERPL-SCNC: 136 MMOL/L — SIGNIFICANT CHANGE UP (ref 135–145)
WBC # BLD: 6.24 K/UL — SIGNIFICANT CHANGE UP (ref 3.8–10.5)
WBC # FLD AUTO: 6.24 K/UL — SIGNIFICANT CHANGE UP (ref 3.8–10.5)

## 2025-04-03 PROCEDURE — 72148 MRI LUMBAR SPINE W/O DYE: CPT | Mod: 26

## 2025-04-03 PROCEDURE — 80053 COMPREHEN METABOLIC PANEL: CPT

## 2025-04-03 PROCEDURE — 85025 COMPLETE CBC W/AUTO DIFF WBC: CPT

## 2025-04-03 PROCEDURE — 96374 THER/PROPH/DIAG INJ IV PUSH: CPT

## 2025-04-03 PROCEDURE — 72146 MRI CHEST SPINE W/O DYE: CPT | Mod: MC

## 2025-04-03 PROCEDURE — 72141 MRI NECK SPINE W/O DYE: CPT | Mod: MC

## 2025-04-03 PROCEDURE — 99284 EMERGENCY DEPT VISIT MOD MDM: CPT | Mod: 25

## 2025-04-03 PROCEDURE — 85730 THROMBOPLASTIN TIME PARTIAL: CPT

## 2025-04-03 PROCEDURE — 86900 BLOOD TYPING SEROLOGIC ABO: CPT

## 2025-04-03 PROCEDURE — 86850 RBC ANTIBODY SCREEN: CPT

## 2025-04-03 PROCEDURE — 86901 BLOOD TYPING SEROLOGIC RH(D): CPT

## 2025-04-03 PROCEDURE — 72148 MRI LUMBAR SPINE W/O DYE: CPT | Mod: MC

## 2025-04-03 PROCEDURE — 99284 EMERGENCY DEPT VISIT MOD MDM: CPT

## 2025-04-03 PROCEDURE — 82962 GLUCOSE BLOOD TEST: CPT

## 2025-04-03 PROCEDURE — 85610 PROTHROMBIN TIME: CPT

## 2025-04-03 PROCEDURE — 96372 THER/PROPH/DIAG INJ SC/IM: CPT | Mod: XU

## 2025-04-03 PROCEDURE — 36415 COLL VENOUS BLD VENIPUNCTURE: CPT

## 2025-04-03 PROCEDURE — 72146 MRI CHEST SPINE W/O DYE: CPT | Mod: 26

## 2025-04-03 PROCEDURE — 72141 MRI NECK SPINE W/O DYE: CPT | Mod: 26

## 2025-04-03 RX ORDER — IBUPROFEN 200 MG
600 TABLET ORAL ONCE
Refills: 0 | Status: COMPLETED | OUTPATIENT
Start: 2025-04-03 | End: 2025-04-03

## 2025-04-03 RX ORDER — METHOCARBAMOL 500 MG/1
2 TABLET, FILM COATED ORAL
Qty: 42 | Refills: 0
Start: 2025-04-03 | End: 2025-04-09

## 2025-04-03 RX ORDER — INSULIN LISPRO 100 U/ML
6 INJECTION, SOLUTION INTRAVENOUS; SUBCUTANEOUS ONCE
Refills: 0 | Status: COMPLETED | OUTPATIENT
Start: 2025-04-03 | End: 2025-04-03

## 2025-04-03 RX ORDER — DEXAMETHASONE 0.5 MG/1
6 TABLET ORAL ONCE
Refills: 0 | Status: COMPLETED | OUTPATIENT
Start: 2025-04-03 | End: 2025-04-03

## 2025-04-03 RX ORDER — ACETAMINOPHEN 500 MG/5ML
975 LIQUID (ML) ORAL ONCE
Refills: 0 | Status: COMPLETED | OUTPATIENT
Start: 2025-04-03 | End: 2025-04-03

## 2025-04-03 RX ORDER — METHOCARBAMOL 500 MG/1
1500 TABLET, FILM COATED ORAL ONCE
Refills: 0 | Status: COMPLETED | OUTPATIENT
Start: 2025-04-03 | End: 2025-04-03

## 2025-04-03 RX ADMIN — Medication 600 MILLIGRAM(S): at 07:08

## 2025-04-03 RX ADMIN — METHOCARBAMOL 1500 MILLIGRAM(S): 500 TABLET, FILM COATED ORAL at 06:58

## 2025-04-03 RX ADMIN — Medication 975 MILLIGRAM(S): at 07:07

## 2025-04-03 RX ADMIN — Medication 600 MILLIGRAM(S): at 06:57

## 2025-04-03 RX ADMIN — INSULIN LISPRO 6 UNIT(S): 100 INJECTION, SOLUTION INTRAVENOUS; SUBCUTANEOUS at 10:49

## 2025-04-03 RX ADMIN — DEXAMETHASONE 6 MILLIGRAM(S): 0.5 TABLET ORAL at 06:57

## 2025-04-03 RX ADMIN — Medication 975 MILLIGRAM(S): at 06:56

## 2025-04-07 ENCOUNTER — NON-APPOINTMENT (OUTPATIENT)
Age: 60
End: 2025-04-07

## 2025-04-07 ENCOUNTER — RX RENEWAL (OUTPATIENT)
Age: 60
End: 2025-04-07

## 2025-04-09 ENCOUNTER — APPOINTMENT (OUTPATIENT)
Dept: ENDOCRINOLOGY | Facility: CLINIC | Age: 60
End: 2025-04-09
Payer: COMMERCIAL

## 2025-04-09 ENCOUNTER — APPOINTMENT (OUTPATIENT)
Dept: ORTHOPEDIC SURGERY | Facility: CLINIC | Age: 60
End: 2025-04-09

## 2025-04-09 VITALS
WEIGHT: 150 LBS | HEIGHT: 70 IN | SYSTOLIC BLOOD PRESSURE: 179 MMHG | DIASTOLIC BLOOD PRESSURE: 101 MMHG | BODY MASS INDEX: 21.47 KG/M2 | HEART RATE: 98 BPM | OXYGEN SATURATION: 100 %

## 2025-04-09 DIAGNOSIS — I10 ESSENTIAL (PRIMARY) HYPERTENSION: ICD-10-CM

## 2025-04-09 DIAGNOSIS — E11.9 TYPE 2 DIABETES MELLITUS W/OUT COMPLICATIONS: ICD-10-CM

## 2025-04-09 DIAGNOSIS — E78.5 HYPERLIPIDEMIA, UNSPECIFIED: ICD-10-CM

## 2025-04-09 PROCEDURE — 95251 CONT GLUC MNTR ANALYSIS I&R: CPT

## 2025-04-09 PROCEDURE — 99214 OFFICE O/P EST MOD 30 MIN: CPT

## 2025-04-10 ENCOUNTER — APPOINTMENT (OUTPATIENT)
Dept: ORTHOPEDIC SURGERY | Facility: CLINIC | Age: 60
End: 2025-04-10

## 2025-04-14 ENCOUNTER — APPOINTMENT (OUTPATIENT)
Dept: ORTHOPEDIC SURGERY | Facility: CLINIC | Age: 60
End: 2025-04-14
Payer: COMMERCIAL

## 2025-04-14 DIAGNOSIS — M54.16 RADICULOPATHY, LUMBAR REGION: ICD-10-CM

## 2025-04-14 PROCEDURE — 99214 OFFICE O/P EST MOD 30 MIN: CPT

## 2025-04-15 ENCOUNTER — APPOINTMENT (OUTPATIENT)
Dept: INTERNAL MEDICINE | Facility: CLINIC | Age: 60
End: 2025-04-15
Payer: COMMERCIAL

## 2025-04-15 VITALS
TEMPERATURE: 97.6 F | HEART RATE: 103 BPM | HEIGHT: 70 IN | WEIGHT: 142 LBS | DIASTOLIC BLOOD PRESSURE: 81 MMHG | BODY MASS INDEX: 20.33 KG/M2 | SYSTOLIC BLOOD PRESSURE: 152 MMHG | OXYGEN SATURATION: 98 %

## 2025-04-15 DIAGNOSIS — R20.0 ANESTHESIA OF SKIN: ICD-10-CM

## 2025-04-15 PROCEDURE — 36415 COLL VENOUS BLD VENIPUNCTURE: CPT

## 2025-04-15 PROCEDURE — 99214 OFFICE O/P EST MOD 30 MIN: CPT

## 2025-04-16 LAB
25(OH)D3 SERPL-MCNC: 10.7 NG/ML
ALBUMIN SERPL ELPH-MCNC: 4.7 G/DL
ALP BLD-CCNC: 63 U/L
ALT SERPL-CCNC: 25 U/L
ANION GAP SERPL CALC-SCNC: 15 MMOL/L
AST SERPL-CCNC: 20 U/L
BASOPHILS # BLD AUTO: 0.03 K/UL
BASOPHILS NFR BLD AUTO: 0.5 %
BILIRUB SERPL-MCNC: 0.6 MG/DL
BUN SERPL-MCNC: 11 MG/DL
CALCIUM SERPL-MCNC: 10.5 MG/DL
CHLORIDE SERPL-SCNC: 89 MMOL/L
CO2 SERPL-SCNC: 23 MMOL/L
CREAT SERPL-MCNC: 0.71 MG/DL
CRP SERPL-MCNC: <3 MG/L
EGFRCR SERPLBLD CKD-EPI 2021: 107 ML/MIN/1.73M2
EOSINOPHIL # BLD AUTO: 0.08 K/UL
EOSINOPHIL NFR BLD AUTO: 1.2 %
ERYTHROCYTE [SEDIMENTATION RATE] IN BLOOD BY WESTERGREN METHOD: 10 MM/HR
ESTIMATED AVERAGE GLUCOSE: 229 MG/DL
FERRITIN SERPL-MCNC: 461 NG/ML
FOLATE SERPL-MCNC: 16.1 NG/ML
GLUCOSE SERPL-MCNC: 210 MG/DL
HBA1C MFR BLD HPLC: 9.6 %
HCT VFR BLD CALC: 43.5 %
HGB BLD-MCNC: 14.8 G/DL
IMM GRANULOCYTES NFR BLD AUTO: 0.3 %
IRON SATN MFR SERPL: 31 %
IRON SERPL-MCNC: 95 UG/DL
LYMPHOCYTES # BLD AUTO: 2.82 K/UL
LYMPHOCYTES NFR BLD AUTO: 43.7 %
MAN DIFF?: NORMAL
MCHC RBC-ENTMCNC: 30.5 PG
MCHC RBC-ENTMCNC: 34 G/DL
MCV RBC AUTO: 89.5 FL
MONOCYTES # BLD AUTO: 0.56 K/UL
MONOCYTES NFR BLD AUTO: 8.7 %
NEUTROPHILS # BLD AUTO: 2.95 K/UL
NEUTROPHILS NFR BLD AUTO: 45.6 %
PLATELET # BLD AUTO: 270 K/UL
POTASSIUM SERPL-SCNC: 4.5 MMOL/L
PROT SERPL-MCNC: 7 G/DL
RBC # BLD: 4.86 M/UL
RBC # FLD: 13.2 %
SODIUM SERPL-SCNC: 127 MMOL/L
TIBC SERPL-MCNC: 310 UG/DL
UIBC SERPL-MCNC: 216 UG/DL
VIT B12 SERPL-MCNC: 446 PG/ML
WBC # FLD AUTO: 6.46 K/UL

## 2025-04-21 ENCOUNTER — APPOINTMENT (OUTPATIENT)
Dept: INTERNAL MEDICINE | Facility: CLINIC | Age: 60
End: 2025-04-21
Payer: COMMERCIAL

## 2025-04-21 ENCOUNTER — OUTPATIENT (OUTPATIENT)
Dept: OUTPATIENT SERVICES | Facility: HOSPITAL | Age: 60
LOS: 1 days | End: 2025-04-21
Payer: COMMERCIAL

## 2025-04-21 ENCOUNTER — RX RENEWAL (OUTPATIENT)
Age: 60
End: 2025-04-21

## 2025-04-21 ENCOUNTER — APPOINTMENT (OUTPATIENT)
Dept: MRI IMAGING | Facility: IMAGING CENTER | Age: 60
End: 2025-04-21
Payer: COMMERCIAL

## 2025-04-21 VITALS
HEIGHT: 70 IN | SYSTOLIC BLOOD PRESSURE: 101 MMHG | HEART RATE: 86 BPM | DIASTOLIC BLOOD PRESSURE: 66 MMHG | WEIGHT: 145 LBS | BODY MASS INDEX: 20.76 KG/M2 | OXYGEN SATURATION: 100 %

## 2025-04-21 DIAGNOSIS — Z00.8 ENCOUNTER FOR OTHER GENERAL EXAMINATION: ICD-10-CM

## 2025-04-21 DIAGNOSIS — R53.1 WEAKNESS: ICD-10-CM

## 2025-04-21 PROCEDURE — 70551 MRI BRAIN STEM W/O DYE: CPT

## 2025-04-21 PROCEDURE — 70551 MRI BRAIN STEM W/O DYE: CPT | Mod: 26

## 2025-04-21 PROCEDURE — 99214 OFFICE O/P EST MOD 30 MIN: CPT

## 2025-04-21 PROCEDURE — 36415 COLL VENOUS BLD VENIPUNCTURE: CPT

## 2025-04-21 RX ORDER — BLOOD-GLUCOSE SENSOR
EACH MISCELLANEOUS
Qty: 6 | Refills: 2 | Status: ACTIVE | COMMUNITY
Start: 2025-04-21 | End: 1900-01-01

## 2025-04-22 ENCOUNTER — APPOINTMENT (OUTPATIENT)
Dept: NEUROLOGY | Facility: CLINIC | Age: 60
End: 2025-04-22
Payer: COMMERCIAL

## 2025-04-22 ENCOUNTER — INPATIENT (INPATIENT)
Facility: HOSPITAL | Age: 60
LOS: 1 days | Discharge: HOME | DRG: 93 | End: 2025-04-24
Attending: PSYCHIATRY & NEUROLOGY | Admitting: PSYCHIATRY & NEUROLOGY
Payer: COMMERCIAL

## 2025-04-22 VITALS
SYSTOLIC BLOOD PRESSURE: 167 MMHG | HEART RATE: 96 BPM | BODY MASS INDEX: 20.76 KG/M2 | DIASTOLIC BLOOD PRESSURE: 94 MMHG | HEIGHT: 70 IN | WEIGHT: 145 LBS

## 2025-04-22 VITALS
TEMPERATURE: 97 F | RESPIRATION RATE: 20 BRPM | HEART RATE: 86 BPM | DIASTOLIC BLOOD PRESSURE: 89 MMHG | OXYGEN SATURATION: 100 % | SYSTOLIC BLOOD PRESSURE: 163 MMHG | WEIGHT: 145.06 LBS | HEIGHT: 70 IN

## 2025-04-22 DIAGNOSIS — R20.0 ANESTHESIA OF SKIN: ICD-10-CM

## 2025-04-22 DIAGNOSIS — Z98.890 OTHER SPECIFIED POSTPROCEDURAL STATES: Chronic | ICD-10-CM

## 2025-04-22 PROBLEM — R53.1 WEAKNESS: Status: ACTIVE | Noted: 2025-04-21

## 2025-04-22 LAB
ALBUMIN SERPL ELPH-MCNC: 4.1 G/DL — SIGNIFICANT CHANGE UP (ref 3.3–5)
ALP SERPL-CCNC: 63 U/L — SIGNIFICANT CHANGE UP (ref 40–120)
ALT FLD-CCNC: 23 U/L — SIGNIFICANT CHANGE UP (ref 10–45)
AMMONIA BLD-MCNC: 13 UMOL/L — SIGNIFICANT CHANGE UP (ref 11–55)
ANION GAP SERPL CALC-SCNC: 12 MMOL/L — SIGNIFICANT CHANGE UP (ref 5–17)
ANION GAP SERPL CALC-SCNC: 15 MMOL/L
AST SERPL-CCNC: 22 U/L — SIGNIFICANT CHANGE UP (ref 10–40)
BASOPHILS # BLD AUTO: 0.03 K/UL — SIGNIFICANT CHANGE UP (ref 0–0.2)
BASOPHILS # BLD AUTO: 0.04 K/UL
BASOPHILS NFR BLD AUTO: 0.5 % — SIGNIFICANT CHANGE UP (ref 0–2)
BASOPHILS NFR BLD AUTO: 0.6 %
BILIRUB SERPL-MCNC: 0.4 MG/DL — SIGNIFICANT CHANGE UP (ref 0.2–1.2)
BUN SERPL-MCNC: 10 MG/DL — SIGNIFICANT CHANGE UP (ref 7–23)
BUN SERPL-MCNC: 12 MG/DL
CALCIUM SERPL-MCNC: 10.7 MG/DL
CALCIUM SERPL-MCNC: 9.4 MG/DL — SIGNIFICANT CHANGE UP (ref 8.4–10.5)
CHLORIDE SERPL-SCNC: 89 MMOL/L
CHLORIDE SERPL-SCNC: 89 MMOL/L — LOW (ref 96–108)
CK SERPL-CCNC: 60 U/L
CO2 SERPL-SCNC: 24 MMOL/L
CO2 SERPL-SCNC: 24 MMOL/L — SIGNIFICANT CHANGE UP (ref 22–31)
CREAT SERPL-MCNC: 0.68 MG/DL — SIGNIFICANT CHANGE UP (ref 0.5–1.3)
CREAT SERPL-MCNC: 0.8 MG/DL
CRP SERPL-MCNC: <3 MG/L — SIGNIFICANT CHANGE UP (ref 0–4)
EGFR: 108 ML/MIN/1.73M2 — SIGNIFICANT CHANGE UP
EGFR: 108 ML/MIN/1.73M2 — SIGNIFICANT CHANGE UP
EGFRCR SERPLBLD CKD-EPI 2021: 103 ML/MIN/1.73M2
EOSINOPHIL # BLD AUTO: 0.05 K/UL
EOSINOPHIL # BLD AUTO: 0.11 K/UL — SIGNIFICANT CHANGE UP (ref 0–0.5)
EOSINOPHIL NFR BLD AUTO: 0.7 %
EOSINOPHIL NFR BLD AUTO: 1.8 % — SIGNIFICANT CHANGE UP (ref 0–6)
ERYTHROCYTE [SEDIMENTATION RATE] IN BLOOD BY WESTERGREN METHOD: 3 MM/HR
GLUCOSE BLDC GLUCOMTR-MCNC: 181 MG/DL — HIGH (ref 70–99)
GLUCOSE BLDC GLUCOMTR-MCNC: 200 MG/DL — HIGH (ref 70–99)
GLUCOSE SERPL-MCNC: 238 MG/DL — HIGH (ref 70–99)
GLUCOSE SERPL-MCNC: 243 MG/DL
HBV SURFACE AG SER-ACNC: SIGNIFICANT CHANGE UP
HCT VFR BLD CALC: 37.3 % — LOW (ref 39–50)
HCT VFR BLD CALC: 43.2 %
HCV AB S/CO SERPL IA: 0.05 S/CO — SIGNIFICANT CHANGE UP
HCV AB SERPL-IMP: SIGNIFICANT CHANGE UP
HGB BLD-MCNC: 13.5 G/DL — SIGNIFICANT CHANGE UP (ref 13–17)
HGB BLD-MCNC: 14.6 G/DL
IMM GRANULOCYTES NFR BLD AUTO: 0.1 %
IMM GRANULOCYTES NFR BLD AUTO: 0.2 % — SIGNIFICANT CHANGE UP (ref 0–0.9)
LYMPHOCYTES # BLD AUTO: 2.54 K/UL — SIGNIFICANT CHANGE UP (ref 1–3.3)
LYMPHOCYTES # BLD AUTO: 2.7 K/UL
LYMPHOCYTES # BLD AUTO: 42.3 % — SIGNIFICANT CHANGE UP (ref 13–44)
LYMPHOCYTES NFR BLD AUTO: 37.8 %
MAGNESIUM SERPL-MCNC: 1.6 MG/DL — SIGNIFICANT CHANGE UP (ref 1.6–2.6)
MAN DIFF?: NORMAL
MCHC RBC-ENTMCNC: 30.4 PG
MCHC RBC-ENTMCNC: 31.3 PG — SIGNIFICANT CHANGE UP (ref 27–34)
MCHC RBC-ENTMCNC: 33.8 G/DL
MCHC RBC-ENTMCNC: 36.2 G/DL — HIGH (ref 32–36)
MCV RBC AUTO: 86.3 FL — SIGNIFICANT CHANGE UP (ref 80–100)
MCV RBC AUTO: 90 FL
MONOCYTES # BLD AUTO: 0.54 K/UL — SIGNIFICANT CHANGE UP (ref 0–0.9)
MONOCYTES # BLD AUTO: 0.72 K/UL
MONOCYTES NFR BLD AUTO: 10.1 %
MONOCYTES NFR BLD AUTO: 9 % — SIGNIFICANT CHANGE UP (ref 2–14)
NEUTROPHILS # BLD AUTO: 2.77 K/UL — SIGNIFICANT CHANGE UP (ref 1.8–7.4)
NEUTROPHILS # BLD AUTO: 3.62 K/UL
NEUTROPHILS NFR BLD AUTO: 46.2 % — SIGNIFICANT CHANGE UP (ref 43–77)
NEUTROPHILS NFR BLD AUTO: 50.7 %
NRBC BLD AUTO-RTO: 0 /100 WBCS — SIGNIFICANT CHANGE UP (ref 0–0)
PHOSPHATE SERPL-MCNC: 4.2 MG/DL — SIGNIFICANT CHANGE UP (ref 2.5–4.5)
PLATELET # BLD AUTO: 228 K/UL — SIGNIFICANT CHANGE UP (ref 150–400)
PLATELET # BLD AUTO: 293 K/UL
POTASSIUM SERPL-MCNC: 5 MMOL/L — SIGNIFICANT CHANGE UP (ref 3.5–5.3)
POTASSIUM SERPL-SCNC: 4.8 MMOL/L
POTASSIUM SERPL-SCNC: 5 MMOL/L — SIGNIFICANT CHANGE UP (ref 3.5–5.3)
PROT SERPL-MCNC: 6.4 G/DL — SIGNIFICANT CHANGE UP (ref 6–8.3)
RBC # BLD: 4.32 M/UL — SIGNIFICANT CHANGE UP (ref 4.2–5.8)
RBC # BLD: 4.8 M/UL
RBC # FLD: 12.3 % — SIGNIFICANT CHANGE UP (ref 10.3–14.5)
RBC # FLD: 13 %
SODIUM SERPL-SCNC: 125 MMOL/L — LOW (ref 135–145)
SODIUM SERPL-SCNC: 128 MMOL/L
WBC # BLD: 6 K/UL — SIGNIFICANT CHANGE UP (ref 3.8–10.5)
WBC # FLD AUTO: 6 K/UL — SIGNIFICANT CHANGE UP (ref 3.8–10.5)
WBC # FLD AUTO: 7.14 K/UL

## 2025-04-22 PROCEDURE — 73620 X-RAY EXAM OF FOOT: CPT | Mod: 26,LT

## 2025-04-22 PROCEDURE — 72158 MRI LUMBAR SPINE W/O & W/DYE: CPT | Mod: 26

## 2025-04-22 PROCEDURE — 99245 OFF/OP CONSLTJ NEW/EST HI 55: CPT

## 2025-04-22 PROCEDURE — 99285 EMERGENCY DEPT VISIT HI MDM: CPT

## 2025-04-22 PROCEDURE — 72157 MRI CHEST SPINE W/O & W/DYE: CPT | Mod: 26

## 2025-04-22 PROCEDURE — 72156 MRI NECK SPINE W/O & W/DYE: CPT | Mod: 26

## 2025-04-22 PROCEDURE — 70553 MRI BRAIN STEM W/O & W/DYE: CPT | Mod: 26

## 2025-04-22 RX ORDER — INSULIN LISPRO 100 U/ML
INJECTION, SOLUTION INTRAVENOUS; SUBCUTANEOUS
Refills: 0 | Status: DISCONTINUED | OUTPATIENT
Start: 2025-04-22 | End: 2025-04-22

## 2025-04-22 RX ORDER — INSULIN GLARGINE-YFGN 100 [IU]/ML
20 INJECTION, SOLUTION SUBCUTANEOUS AT BEDTIME
Refills: 0 | Status: DISCONTINUED | OUTPATIENT
Start: 2025-04-22 | End: 2025-04-22

## 2025-04-22 RX ORDER — LOSARTAN POTASSIUM 100 MG/1
50 TABLET, FILM COATED ORAL ONCE
Refills: 0 | Status: COMPLETED | OUTPATIENT
Start: 2025-04-22 | End: 2025-04-22

## 2025-04-22 RX ORDER — INSULIN LISPRO 100 U/ML
INJECTION, SOLUTION INTRAVENOUS; SUBCUTANEOUS AT BEDTIME
Refills: 0 | Status: DISCONTINUED | OUTPATIENT
Start: 2025-04-22 | End: 2025-04-22

## 2025-04-22 RX ORDER — METHOCARBAMOL 500 MG/1
2 TABLET, FILM COATED ORAL
Refills: 0 | DISCHARGE

## 2025-04-22 RX ORDER — ATORVASTATIN CALCIUM 80 MG/1
10 TABLET, FILM COATED ORAL DAILY
Refills: 0 | Status: DISCONTINUED | OUTPATIENT
Start: 2025-04-23 | End: 2025-04-24

## 2025-04-22 RX ORDER — IBUPROFEN 200 MG
200 TABLET ORAL EVERY 8 HOURS
Refills: 0 | Status: DISCONTINUED | OUTPATIENT
Start: 2025-04-22 | End: 2025-04-23

## 2025-04-22 RX ORDER — INSULIN GLARGINE-YFGN 100 [IU]/ML
12 INJECTION, SOLUTION SUBCUTANEOUS ONCE
Refills: 0 | Status: COMPLETED | OUTPATIENT
Start: 2025-04-22 | End: 2025-04-22

## 2025-04-22 RX ORDER — GLUCAGON 3 MG/1
1 POWDER NASAL ONCE
Refills: 0 | Status: DISCONTINUED | OUTPATIENT
Start: 2025-04-22 | End: 2025-04-24

## 2025-04-22 RX ORDER — DULOXETINE 20 MG/1
60 CAPSULE, DELAYED RELEASE ORAL AT BEDTIME
Refills: 0 | Status: DISCONTINUED | OUTPATIENT
Start: 2025-04-22 | End: 2025-04-24

## 2025-04-22 RX ORDER — DEXTROSE 50 % IN WATER 50 %
25 SYRINGE (ML) INTRAVENOUS ONCE
Refills: 0 | Status: DISCONTINUED | OUTPATIENT
Start: 2025-04-22 | End: 2025-04-24

## 2025-04-22 RX ORDER — SODIUM CHLORIDE 9 G/1000ML
1000 INJECTION, SOLUTION INTRAVENOUS
Refills: 0 | Status: DISCONTINUED | OUTPATIENT
Start: 2025-04-22 | End: 2025-04-24

## 2025-04-22 RX ORDER — INSULIN LISPRO 100 U/ML
INJECTION, SOLUTION INTRAVENOUS; SUBCUTANEOUS
Refills: 0 | Status: DISCONTINUED | OUTPATIENT
Start: 2025-04-22 | End: 2025-04-24

## 2025-04-22 RX ORDER — INSULIN LISPRO 100 U/ML
5 INJECTION, SOLUTION INTRAVENOUS; SUBCUTANEOUS
Refills: 0 | Status: DISCONTINUED | OUTPATIENT
Start: 2025-04-22 | End: 2025-04-23

## 2025-04-22 RX ORDER — LOSARTAN POTASSIUM 100 MG/1
50 TABLET, FILM COATED ORAL DAILY
Refills: 0 | Status: DISCONTINUED | OUTPATIENT
Start: 2025-04-23 | End: 2025-04-24

## 2025-04-22 RX ORDER — OMEPRAZOLE 20 MG/1
1 CAPSULE, DELAYED RELEASE ORAL
Refills: 0 | DISCHARGE

## 2025-04-22 RX ORDER — DEXTROSE 50 % IN WATER 50 %
15 SYRINGE (ML) INTRAVENOUS ONCE
Refills: 0 | Status: DISCONTINUED | OUTPATIENT
Start: 2025-04-22 | End: 2025-04-24

## 2025-04-22 RX ORDER — B1/B2/B3/B5/B6/B12/VIT C/FOLIC 500-0.5 MG
1 TABLET ORAL DAILY
Refills: 0 | Status: DISCONTINUED | OUTPATIENT
Start: 2025-04-22 | End: 2025-04-24

## 2025-04-22 RX ORDER — DULOXETINE 20 MG/1
2 CAPSULE, DELAYED RELEASE ORAL
Refills: 0 | DISCHARGE

## 2025-04-22 RX ORDER — DEXTROSE 50 % IN WATER 50 %
12.5 SYRINGE (ML) INTRAVENOUS ONCE
Refills: 0 | Status: DISCONTINUED | OUTPATIENT
Start: 2025-04-22 | End: 2025-04-24

## 2025-04-22 RX ORDER — ATORVASTATIN CALCIUM 80 MG/1
1 TABLET, FILM COATED ORAL
Refills: 0 | DISCHARGE

## 2025-04-22 RX ORDER — ACETAMINOPHEN 500 MG/5ML
650 LIQUID (ML) ORAL EVERY 6 HOURS
Refills: 0 | Status: DISCONTINUED | OUTPATIENT
Start: 2025-04-22 | End: 2025-04-24

## 2025-04-22 RX ORDER — INSULIN LISPRO 100 U/ML
INJECTION, SOLUTION INTRAVENOUS; SUBCUTANEOUS AT BEDTIME
Refills: 0 | Status: DISCONTINUED | OUTPATIENT
Start: 2025-04-22 | End: 2025-04-24

## 2025-04-22 RX ADMIN — Medication 1 GRAM(S): at 23:37

## 2025-04-22 RX ADMIN — DULOXETINE 60 MILLIGRAM(S): 20 CAPSULE, DELAYED RELEASE ORAL at 22:39

## 2025-04-22 RX ADMIN — INSULIN GLARGINE-YFGN 12 UNIT(S): 100 INJECTION, SOLUTION SUBCUTANEOUS at 22:50

## 2025-04-22 RX ADMIN — LOSARTAN POTASSIUM 50 MILLIGRAM(S): 100 TABLET, FILM COATED ORAL at 16:15

## 2025-04-22 RX ADMIN — INSULIN LISPRO 2: 100 INJECTION, SOLUTION INTRAVENOUS; SUBCUTANEOUS at 17:25

## 2025-04-22 NOTE — H&P ADULT - ASSESSMENT
59y RIGHT-handed man, with PMH significant for EtOH use disorder, chronic pancreatitis, poorly-controlled T2DM (A1c 10.3, 2/2025), HTN, who presents to [facility] ED on 4/22/2025, as the behest of his outpatient neurologist, for further emergent workup for numbness, subjective weakness.    ED vitals notable for: afebrile, HR 80s, BP to 168/87, RR 20, SpO2 % on RA. Labs notable for: Na 124, chloride 89, glucose 238.    XR foot (4/22/2025) - No fracture or dislocation. Alignment is anatomic. Joint spaces are preserved.    Impression: Anterior/posterior numbness in the thoracic region and UEs. Allodynia of the dorsum of the LEFT foot and cramping pain in the posterior RLE. Subjective c/o whole body weakness. No clear unifying diagnosis. Rule out AIDP, transverse myelitis. Symptoms may represent an atypical diabetic polyneuropathy. History of alcohol use disorder - may also have a component of alcoholic polyneuropathy.    Recommendations:  [] MRI brain, C/T/L w/wo IV contrast  [] Will plan for LP after imaging  [] Labs: HIV, Lyme, hepatitis panel, SPEP/UPEP, paraneoplastic panel, vitamin B1/B6/B12, folate, TSH, phosphorus, A1c, ACE, ANSELMO, ANCA, ganglioside panel, Sjogren antibodies, heavy metals, PETH, UTox, ESR, CRP, ammonia  [] Endocrinology consult for poorly-controlled T2DM, assistance with insulin management  [] Dietetics consult  [] Continue all other home medications  [] PT/OT    Diet: CC + DASH/TLC  DVT prophylaxis: SCDs only - pending LP  Dispo: Pending clinical course. F/u with Dr. Bárbara Hays upon discharge - [phone].      To be seen by attending in the AM with attestation to follow. The recommendations above should be considered incomplete until attending attestation has been signed. 59y RIGHT-handed man, with PMH significant for EtOH use disorder, chronic pancreatitis, poorly-controlled T2DM (A1c 10.3, 2/2025), HTN, who presents to [facility] ED on 4/22/2025, as the behest of his outpatient neurologist, for further emergent workup for numbness, subjective weakness.    ED vitals notable for: afebrile, HR 80s, BP to 168/87, RR 20, SpO2 % on RA. Labs notable for: Na 124, chloride 89, glucose 238.    XR foot (4/22/2025) - No fracture or dislocation. Alignment is anatomic. Joint spaces are preserved.    Impression: Anterior/posterior numbness in the thoracic region and UEs. Allodynia of the dorsum of the LEFT foot and cramping pain in the posterior RLE. Subjective c/o whole body weakness. No clear unifying diagnosis. Rule out AIDP, transverse myelitis. Symptoms may represent an atypical diabetic polyneuropathy. History of alcohol use disorder - may also have a component of alcoholic polyneuropathy.    Recommendations:  [] MRI brain, C/T/L w/wo IV contrast  [] Will plan for LP after imaging  [] Labs: HIV, Lyme, hepatitis panel, SPEP/UPEP, paraneoplastic panel, vitamin B1/B6/B12, folate, TSH, phosphorus, A1c, ACE, ANSELMO, ANCA, ganglioside panel, Sjogren antibodies, heavy metals, PETH, UTox, ESR, CRP, ammonia  [] Endocrinology consult for poorly-controlled СЕРГЕЙ/T1DM, assistance with insulin management; appreciate evaluation/recommendations  [] Dietetics consult  [] Continue all other home medications  [] PT/OT    Diet: CC + DASH/TLC  DVT prophylaxis: SCDs only - pending LP  Dispo: Pending clinical course. F/u with Dr. Bárbara Hays upon discharge - [phone].      To be seen by attending in the AM with attestation to follow. The recommendations above should be considered incomplete until attending attestation has been signed. 59y RIGHT-handed man, with PMH significant for EtOH use disorder, chronic pancreatitis, poorly-controlled СЕРГЕЙ/T1DM (A1c 10.3, 2/2025), HTN, who presents to [facility] ED on 4/22/2025, as the behest of his outpatient neurologist, for further emergent workup for numbness, subjective weakness.    ED vitals notable for: afebrile, HR 80s, BP to 168/87, RR 20, SpO2 % on RA. Labs notable for: Na 124, chloride 89, glucose 238.    XR foot (4/22/2025) - No fracture or dislocation. Alignment is anatomic. Joint spaces are preserved.    Impression: Anterior/posterior numbness in the thoracic region and UEs. Allodynia of the dorsum of the LEFT foot and cramping pain in the posterior RLE. Subjective c/o whole body weakness. No clear unifying diagnosis. Rule out AIDP, transverse myelitis. Symptoms may represent an atypical diabetic polyneuropathy. History of alcohol use disorder - may also have a component of alcoholic polyneuropathy.    Recommendations:  [] MRI brain, C/T/L w/wo IV contrast  [] Will plan for LP after imaging  [] Labs: HIV, Lyme, hepatitis panel, SPEP/UPEP, paraneoplastic panel, vitamin B1/B6/B12, folate, TSH, phosphorus, A1c, ACE, ANSELMO, ANCA, ganglioside panel, Sjogren antibodies, heavy metals, PETH, UTox, ESR, CRP, ammonia  [] Endocrinology consult for poorly-controlled СЕРГЕЙ/T1DM, assistance with insulin management; appreciate evaluation/recommendations  [] Dietetics consult  [] Continue all other home medications  [] PT/OT    Diet: CC + DASH/TLC  DVT prophylaxis: SCDs only - pending LP  Dispo: Pending clinical course. F/u with Dr. Bárbara Hays upon discharge - [phone].      To be seen by attending in the AM with attestation to follow. The recommendations above should be considered incomplete until attending attestation has been signed. 59y RIGHT-handed man, with PMH significant for EtOH use disorder, chronic pancreatitis, poorly-controlled СЕРГЕЙ/T1DM (A1c 10.3, 2/2025), HTN, who presents to [facility] ED on 4/22/2025, as the behest of his outpatient neurologist, for further emergent workup for numbness, subjective weakness.    ED vitals notable for: afebrile, HR 80s, BP to 168/87, RR 20, SpO2 % on RA. Labs notable for: Na 124, chloride 89, glucose 238.    XR foot (4/22/2025) - No fracture or dislocation. Alignment is anatomic. Joint spaces are preserved.    Impression: Anterior/posterior numbness in the thoracic region and UEs. Allodynia of the dorsum of the LEFT foot and cramping pain in the posterior RLE. Subjective c/o whole body weakness. No clear unifying diagnosis. Rule out AIDP, transverse myelitis. Symptoms may represent an atypical diabetic polyneuropathy. History of alcohol use disorder - may also have a component of alcoholic polyneuropathy.  MRI brain and CTL spine (-) for infarction or acute signal enhancement    Recommendations:  [] MRI brain, C/T/L w/wo IV contrast  [] Will plan for LP after imaging  [] Labs: HIV, Lyme, hepatitis panel, SPEP/UPEP, paraneoplastic panel, vitamin B1/B6/B12, folate, TSH, phosphorus, A1c, ACE, ANSELMO, ANCA, ganglioside panel, Sjogren antibodies, heavy metals, PETH, UTox, ESR, CRP, ammonia  [] Endocrinology consult for poorly-controlled СЕРГЕЙ/T1DM, assistance with insulin management; appreciate evaluation/recommendations  [] Dietetics consult  [] Continue all other home medications  [] PT/OT  [] keep patient normotensive (<140/90)    Diet: CC + DASH/TLC  DVT prophylaxis: SCDs only - pending LP  Dispo: Pending clinical course. F/u with Dr. Bárbara Hays upon discharge - [phone].      To be seen by attending in the AM with attestation to follow. The recommendations above should be considered incomplete until attending attestation has been signed. 59y RIGHT-handed man, with PMH significant for EtOH use disorder, chronic pancreatitis, poorly-controlled СЕРГЕЙ/T1DM (A1c 10.3, 2/2025), HTN, who presents to [facility] ED on 4/22/2025, as the behest of his outpatient neurologist, for further emergent workup for numbness, subjective weakness.    ED vitals notable for: afebrile, HR 80s, BP to 168/87, RR 20, SpO2 % on RA. Labs notable for: Na 124, chloride 89, glucose 238.    XR foot (4/22/2025) - No fracture or dislocation. Alignment is anatomic. Joint spaces are preserved.    Impression: Anterior/posterior numbness in the thoracic region and UEs. Allodynia of the dorsum of the LEFT foot and cramping pain in the posterior RLE. Subjective c/o whole body weakness. No clear unifying diagnosis. Rule out AIDP, transverse myelitis. Symptoms may represent an atypical diabetic polyneuropathy. History of alcohol use disorder - may also have a component of alcoholic polyneuropathy.  MRI brain and CTL spine (-) for infarction or acute signal enhancement    Recommendations:  [] MRI brain, C/T/L w/wo IV contrast  [] Starting Gabapentin 300 mg TID  [] Continue with Duloxetine 60 mg QHS  [] Labs: HIV, Lyme, hepatitis panel, SPEP/UPEP, paraneoplastic panel, vitamin B1/B6/B12, folate, TSH, phosphorus, A1c, ACE, ANSELMO, ANCA, ganglioside panel, Sjogren antibodies, heavy metals, PETH, UTox, ESR, CRP, ammonia  [] Endocrinology consult for poorly-controlled СЕРГЕЙ/T1DM, assistance with insulin management; appreciate evaluation/recommendations  [] Dietetics consult  [] Continue all other home medications  [] PT/OT  [] keep patient normotensive (<140/90)    Diet: CC + DASH/TLC  DVT prophylaxis: SCDs only - pending LP  Dispo: Pending clinical course. F/u with Dr. Bárbara Hays upon discharge - [phone].      To be seen by attending in the AM with attestation to follow. The recommendations above should be considered incomplete until attending attestation has been signed.

## 2025-04-22 NOTE — ED ADULT NURSE NOTE - NS PRO AD NO ADVANCE DIRECTIVE
Epidural Block    Patient location during procedure: OB  Reason for block: labor epidural  Staffing  Performed: resident/CRNA   Resident/CRNA: THERESA Lopez CRNA  Epidural  Patient position: sitting  Prep: ChloraPrep  Patient monitoring: continuous pulse ox and frequent blood pressure checks  Approach: midline  Location: L3-4  Injection technique: CURRY air  Provider prep: mask and sterile gloves  Needle  Needle type: Tuohy   Needle gauge: 18 G  Needle length: 3.5 in  Needle insertion depth: 5.5 cm  Catheter type: none  Catheter size: 20 G  Catheter at skin depth: 12 cm  Test dose: negativeCatheter Secured: tegaderm  Assessment  Hemodynamics: stable  Attempts: 1  Outcomes: uncomplicated  Additional Notes  Pt tolerated well. Will cont to monitor.   See nurses flow sheet for VS
Spinal Block    Patient location during procedure: OR  Reason for block: primary anesthetic  Staffing  Performed: resident/CRNA   Resident/CRNA: THERESA Hernandez CRNA  Spinal Block  Patient position: sitting  Prep: ChloraPrep  Patient monitoring: continuous pulse ox and frequent blood pressure checks  Approach: midline  Location: L3/L4  Provider prep: mask and sterile gloves  Needle  Needle type: Pencan   Needle gauge: 25 G  Needle length: 3.5 in  Assessment  Sensory level: T4  Swirl obtained: Yes  CSF: clear  Attempts: 1  Hemodynamics: stable  Preanesthetic Checklist  Completed: patient identified, IV checked, site marked, risks and benefits discussed, surgical/procedural consents, equipment checked, pre-op evaluation, timeout performed, anesthesia consent given, oxygen available, monitors applied/VS acknowledged, fire risk safety assessment completed and verbalized and blood product R/B/A discussed and consented
No

## 2025-04-22 NOTE — ED ADULT NURSE REASSESSMENT NOTE - NS ED NURSE REASSESS COMMENT FT1
1355 Pt in ER gold rm 14. Wife at stretcher side. Pt A&Ox4. Requests update. States was eval by neuro and told that LP and EMG would be done. c/o lower back and left foot pain. Fall risk precautions maintained. IVL intact RACF. Color pink. Skin W&D. Dr Obando called on zebra phone. Message left on phone. 1440 Pt states he received update from  Eating lunch. TBA. No bed yet. 1355 Pt in ER gold rm 14. Wife at stretcher side. Pt A&Ox4. Requests update. States was eval by neuro and told that LP and EMG would be done. c/o lower back and left foot pain. Fall risk precautions maintained. IVL intact RACF. Color pink. Skin W&D. Dr Lisbet Perez called on zebra phone. Message left on phone. 1440 Pt states he received update from  Eating lunch. TBA. No bed yet.

## 2025-04-22 NOTE — ED PROVIDER NOTE - CLINICAL SUMMARY MEDICAL DECISION MAKING FREE TEXT BOX
.dl 59-year-old male with a history of alcohol use, diabetes mellitus, chronic pancreatitis, hypertension who is seen by multiple specialists for left lower extremity numbness, back pain, left testicular numbness as well as diffuse body paresthesias.  However pain is primarily isolated to the left lower extremity.  He describes it as crushing like something sitting on his leg.  It is 10 out of 10.  He was seen here on April 3 for concern for cord compression.  Ultimately discharged.  Patient followed up with PCP and had blood work done.  Patient saw neurology this morning at 9 AM who reportedly sent patient in for a lumbar puncture.  Patient has had MRIs of the brain on April 21, 2024, MRIs of the cervical thoracic and lumbar spine on April 3 as well as labs that are nonactionable.    PE:  Gen: NAD  Head: NCAT  ENT: MMM, Normal conjunctiva,  Chest: RRR, normal perfusion  Lungs: Symmetrical chest rise, lungs CTAB  Abdomen: soft, NTND, No rebound/guarding  Ext: No gross deformities  Neuro: awake and alert, 4/5 strength of LLE otherwise 5/5 strength, pt with paresthesias to light touch   Skin: no rashes     MDM: 59-year-old male with subacute history of paresthesias and left lower extremity pain and weakness now with worsening symptoms sent in for LP.  Differential diagnosis includes not limited to: Less likely electrolyte dyscrasias, less likely stroke, no clinical evidence of cord compression at this time.,  Neuropathy is on the differential but would not explain the full body symptoms, neuromuscular etiology?  Will consult neurology for further guidance and consider LP.

## 2025-04-22 NOTE — ED PROVIDER NOTE - PROGRESS NOTE DETAILS
EM PGY-2/David DO: Spoke with neurology resident who states to admit to general neurology floors under Dr. Junior Cast. Pt informed of plan and agreeable.

## 2025-04-22 NOTE — H&P ADULT - HISTORY OF PRESENT ILLNESS
59y (1965) RIGHT-handed man who presents to [facility] ED on 4/22/2025, as the behest of his outpatient neurologist, for further emergent workup.    PMH significant for: EtOH use disorder, chronic pancreatitis, poorly-controlled T2DM (A1c 10.3, 2/2025), HTN    Patient has seen Dr. Boone (Orthopedic Spine Surgery) and PCP outpatient. Had been referred to Neurology. Saw Dr. Bárbara Hays today. Sent to ED for LP and spine imaging. I spoke with Dr. Hays via Teams. She is concerned for possible transverse myelitis at T4 vs. AIDP vs. diabetic neuropathy. Patient has had entire MRI of the neuro-axis in April 2025, but without contrast. Findings were non-actionable.    Patient is accompanied by his wife. Patient tells me that in October 2024 - he developed excruciating pain in the dorsum of his LEFT foot. Painful to even light touch. He denies any imaging was performed on his foot. No edema or overlying rash. Also developing pain in the back of his RLE since then. Cramping pain. Says in the last three weeks - he has been developing numbness at the level of his rib cage upward - front and back. Neck up is OK. No facial numbness. Arms/palms numb. Feels like his legs give out when he is standing for long periods of time. Had a recent fall.    Denies personal/family history of primary neurologic disorder. Denies history of stroke, MI. No AC/AP. Walking with cane/walker. Difficult to climb stairs. Does not drive. Smokes 1/2-3/4 PPD. Denies currently drinking alcohol. Denies recreational drug use. Lives with his wife. On disability.    +Chronic cough. 59y (1965) RIGHT-handed man who presents to [facility] ED on 4/22/2025, as the behest of his outpatient neurologist, for further emergent workup.    PMH significant for: EtOH use disorder, chronic pancreatitis, poorly-controlled СЕРГЕЙ/T1DM (A1c 10.3, 2/2025), HTN    Patient has seen Dr. Boone (Orthopedic Spine Surgery) and PCP outpatient. Had been referred to Neurology. Saw Dr. Bárbara Hays today. Sent to ED for LP and spine imaging. I spoke with Dr. Hays via Teams. She is concerned for possible transverse myelitis at T4 vs. AIDP vs. diabetic neuropathy. Patient has had entire MRI of the neuro-axis in April 2025, but without contrast. Findings were non-actionable.    Patient is accompanied by his wife. Patient tells me that in October 2024 - he developed excruciating pain in the dorsum of his LEFT foot. Painful to even light touch. He denies any imaging was performed on his foot. No edema or overlying rash. Also developing pain in the back of his RLE since then. Cramping pain. Says in the last three weeks - he has been developing numbness at the level of his rib cage upward - front and back. Neck up is OK. No facial numbness. Arms/palms numb. Feels like his legs give out when he is standing for long periods of time. Had a recent fall.    Denies personal/family history of primary neurologic disorder. Denies history of stroke, MI. No AC/AP. Walking with cane/walker. Difficult to climb stairs. Does not drive. Smokes 1/2-3/4 PPD. Denies currently drinking alcohol. Denies recreational drug use. Lives with his wife. On disability.    +Chronic cough.

## 2025-04-22 NOTE — ED PROVIDER NOTE - NS ED MD DISPO ADMITTING SERVICE
[id number] EXAM:  CT BRAIN                          PROCEDURE NEURO

## 2025-04-22 NOTE — H&P ADULT - NSHPLABSRESULTS_GEN_ALL_CORE
Labs:                        13.5   6.00  )-----------( 228      ( 22 Apr 2025 11:35 )             37.3     04-22    125[L]  |  89[L]  |  10  ----------------------------<  238[H]  5.0   |  24  |  0.68    Ca    9.4      22 Apr 2025 11:35    TPro  6.4  /  Alb  4.1  /  TBili  0.4  /  DBili  x   /  AST  22  /  ALT  23  /  AlkPhos  63  04-22    CAPILLARY BLOOD GLUCOSE        LIVER FUNCTIONS - ( 22 Apr 2025 11:35 )  Alb: 4.1 g/dL / Pro: 6.4 g/dL / ALK PHOS: 63 U/L / ALT: 23 U/L / AST: 22 U/L / GGT: x               CSF:    Radiology:  XR left foot (4/22/2025):  IMPRESSION: No fracture or dislocation. Alignment is anatomic. Joint spaces are preserved.

## 2025-04-22 NOTE — ED PROVIDER NOTE - ATTENDING CONTRIBUTION TO CARE
see mdm    edited by Mckenna Kurtz DO - attending physician.   Please see progress notes for status/labs/consult updates and ED course after initial presentation.

## 2025-04-22 NOTE — ED ADULT NURSE NOTE - OBJECTIVE STATEMENT
59y M pmhx etoh, DM, chronic pancreatitis, hypertension who is seen by multiple specialists for left lower extremity numbness, back pain, left testicular numbness as well as diffuse body paresthesias.  However pain is primarily isolated to the left lower extremity.  He describes it as crushing like something sitting on his leg.  It is 10 out of 10.  He was seen here on April 3 for concern for cord compression.  Ultimately discharged.  Patient followed up with PCP and had blood work done.  Patient saw neurology this morning at 9 AM who reportedly sent patient in for a lumbar puncture.  Patient has had MRIs of the brain on April 21, 2024, MRIs of the cervical thoracic and lumbar spine on April 3 as well as labs that are nonactionable.

## 2025-04-22 NOTE — ED ADULT TRIAGE NOTE - CHIEF COMPLAINT QUOTE
"pain all over", feels like his leg "is a 10 pound sledge hammer I've been dragging around" x 1 month and patient states it has gotten , patient endorses numbness in the chest and back x2 weeks  patient has seen PCP and neurologist  with no dx   MRI of brain done yesterday and patient states "it was fine"   Denies SOB, chest pain, fevers

## 2025-04-22 NOTE — ED CLERICAL - NS ED CLERK NOTE PRE-ARRIVAL INFORMATION; ADDITIONAL PRE-ARRIVAL INFORMATION
CC/Reason For referral: 2 WEEKS RAPID PROGRESSIVE WEAKNESS NEEDING TO USE A WHEELCHAIR. ALSO, HAVING SENSORY ISSUES; NUMBESS AT THE LEVEL OF THE NIPPLE. CONCERN FOR TRANSVERSE MYEELITIS VS GUILLIAN BARRE SYNDROME  Preferred Consultant(if applicable): neuro  Who admits for you (if needed):  Do you have documents you would like to fax over?  Would you still like to speak to an ED attending? PLEASE CALL AFTER PATIENT IS SEEN

## 2025-04-22 NOTE — H&P ADULT - NSHPPHYSICALEXAM_GEN_ALL_CORE
Vitals:  T(C): 36.7 (04-22-25 @ 13:55), Max: 36.7 (04-22-25 @ 13:55)  HR: 83 (04-22-25 @ 13:55) (83 - 86)  BP: 168/87 (04-22-25 @ 13:55) (157/88 - 168/87)  RR: 20 (04-22-25 @ 13:55) (20 - 20)  SpO2: 99% (04-22-25 @ 13:55) (99% - 100%)    Physical Examination:  General - Sitting up on ED cart, appears older than stated age, in NAD  Extremities - No LE edema, dorsum of left foot is TTP    Neurologic Exam:  Mental status:  Eyes open spontaneously, attends to examiner; Oriented to: person, place, and time; Speech: fluent; Repetition and naming: intact; Follows simple and cross commands; Attention/concentration: intact; Recent and remote memory (including registration and recall): registration intact; Fund of knowledge: intact    Cranial nerves - PERRL - no rAPD, VFF with finger counting, EOMI - no nystagmus, face sensation (V1-V3) intact b/l, facial strength intact without asymmetry b/l, [place], palate with symmetric elevation, shoulder shrug intact b/l, tongue midline on protrusion with full lateral movement  Dysarthria: not present    Motor - Muscle atrophy noted in both upper and lower extremities. No pronator drift.  Strength testing (R/L)  Deltoid:  5/5   Biceps:  5/5   Triceps:  5/5   Wrist Extension:  5/5   Wrist Flexion:  5/5   Interossei:  5/5   :  5/5  Hip Flexion:  5/5   Hip Extension:  5/5   Knee Flexion:  5/5   Knee Extension:  5/5   Dorsiflexion:  5/5   Plantar Flexion:  5/5    Sensation - Light touch decreased from base of neck to the base of the rib cage - anterior/posteriorly (chest/back), decreased b/l UEs    DTRs (R/L)  Biceps:  1+/1+   Patellar:  0/0   Toes/plantar response:  Down/Down    Coordination - FNF, HTS intact b/l.    Gait and station - Did not assess d/t fall risk/safety concerns.

## 2025-04-22 NOTE — H&P ADULT - TIME BILLING
I spent 80 minutes in preparation to see the patient, including reviewing the brain imaging in past one year, obtaining and/or reviewing the resident/ or PA note, separately obtained history, performing a medically appropriate examination and/or evaluation as documented in this encounter note, counseling and educating of the patient, ordering tests, documenting clinical information in patients electronic record , interpreting results (not separately reported) and communicating results to the patient.   Evaluation was performed on 4/23/25  Left foot pain/ Paresthesia   Poor DM control, Likely neuropathy vs radiculopathy   Will follow up MRI C/T spine

## 2025-04-22 NOTE — CHART NOTE - NSCHARTNOTEFT_GEN_A_CORE
59y RIGHT-handed man, with PMH significant for EtOH use disorder, chronic pancreatitis, poorly-controlled СЕРГЕЙ/T1Dm(A1c 10.3, 2/2025), HTN, who presents to [facility] ED on 4/22/2025, , for further emergent workup for numbness, subjective weakness. Endocrine consulted for СЕРГЕЙ mangement       Type of DM: СЕРГЕЙ +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 when pt presented for DKA (see prior sunrise records  A1c: pending. In 2/2025 was 10.3%  Steroids: none  Diet: Consistent carb  Home Regimen: per chart review 20 units of basal  eGFR: 108 mL/min/1.73m2 (04-22-25 @ 11:35)  Height (cm): 177.8 (22 Apr 2025 10:11)  Weight (kg): 65.8 (22 Apr 2025 10:11)  BMI (kg/m2): 20.8 (22 Apr 2025 10:11)  BSA (m2): 1.82 (22 Apr 2025 10:11)    Spoke to patient, . Last time he took it basal insulin was ~1 week ago - usually takes 15-20 units of basal. Uses patch but does not know name. usually uses 8-12 premeal    Age: 59y    Gender: Male    POCT Blood Glucose:  200 mg/dL (04-22-25 @ 17:22)      eMAR:  insulin lispro (ADMELOG) corrective regimen sliding scale   2 Unit(s) SubCutaneous (04-22-25 @ 17:25)      #T2DM  - give Lantus 15 units x1 stat  - start Admelog 5 units TID premeal (HOLD IF NPO)  - start low admelog correctional scale before meals  - start low admelog correctional scale at bedtime  - if patient eating, please have on consistent carbohydrate diet  - Hypoglycemia protocol  ***Pt has СЕРГЕЙ/T1DM and cannot be without basal insulin due to the risk of going into DKA (pt had DKA once in 2021)****    Full consult to follow in AM      Discussed recommendations with primary team.    Pierce Worthy MD  Endocrine Fellow  Can be reached via [employer] teams.    For follow up questions, discharge recommendations, or new consults, please email [email or web address] ([facility]) or [email or web address] ([facility]) or call answering service at [phone] (weekdays); [phone] (nights/weekends).  For emergencies please page fellow on call. 59y RIGHT-handed man, with PMH significant for EtOH use disorder, chronic pancreatitis, poorly-controlled СЕРГЕЙ/T1Dm(A1c 10.3, 2/2025), HTN, who presents to [facility] ED on 4/22/2025, , for further emergent workup for numbness, subjective weakness. Endocrine consulted for СЕРГЕЙ mangement       Type of DM: СЕРГЕЙ +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 when pt presented for DKA (see prior sunrise records  A1c: pending. In 2/2025 was 10.3%  Steroids: none  Diet: Consistent carb  Home Regimen: per chart review 20 units of basal  eGFR: 108 mL/min/1.73m2 (04-22-25 @ 11:35)  Height (cm): 177.8 (22 Apr 2025 10:11)  Weight (kg): 65.8 (22 Apr 2025 10:11)  BMI (kg/m2): 20.8 (22 Apr 2025 10:11)  BSA (m2): 1.82 (22 Apr 2025 10:11)    Spoke to patient, . Last time he took it basal insulin was ~1 week ago - usually takes 15-20 units of basal. Uses patch but does not know name. usually uses 8-12 premeal    Age: 59y    Gender: Male    POCT Blood Glucose:  200 mg/dL (04-22-25 @ 17:22)      eMAR:  insulin lispro (ADMELOG) corrective regimen sliding scale   2 Unit(s) SubCutaneous (04-22-25 @ 17:25)      #T2DM  - give Lantus 12 units x1 stat  - start Admelog 5 units TID premeal (HOLD IF NPO)  - start low admelog correctional scale before meals  - start low admelog correctional scale at bedtime  - if patient eating, please have on consistent carbohydrate diet  - Hypoglycemia protocol  ***Pt has СЕРГЕЙ/T1DM and cannot be without basal insulin due to the risk of going into DKA (pt had DKA once in 2021)****    Full consult to follow in AM      Discussed recommendations with primary team.    Pierce Worthy MD  Endocrine Fellow  Can be reached via [employer] teams.    For follow up questions, discharge recommendations, or new consults, please email [email or web address] ([facility]) or [email or web address] ([facility]) or call answering service at [phone] (weekdays); [phone] (nights/weekends).  For emergencies please page fellow on call. 59y RIGHT-handed man, with PMH significant for EtOH use disorder, chronic pancreatitis, poorly-controlled СЕРГЕЙ/T1Dm(A1c 10.3, 2/2025), HTN, who presents to [facility] ED on 4/22/2025, , for further emergent workup for numbness, subjective weakness. Endocrine consulted for СЕРГЕЙ mangement       Type of DM: СЕРГЕЙ +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 when pt presented for DKA (see prior sunrise records  A1c: pending. In 2/2025 was 10.3%  Steroids: none  Diet: Consistent carb  Home Regimen: per chart review 20 units of basal  eGFR: 108 mL/min/1.73m2 (04-22-25 @ 11:35)  Height (cm): 177.8 (22 Apr 2025 10:11)  Weight (kg): 65.8 (22 Apr 2025 10:11)  BMI (kg/m2): 20.8 (22 Apr 2025 10:11)  BSA (m2): 1.82 (22 Apr 2025 10:11)    Spoke to patient, . Last time he took it basal insulin was ~1 week ago - usually takes 15-20 units of basal. Uses patch but does not know name. usually uses 8-12 premeal - took the patch off now and does not have it on    Age: 59y    Gender: Male    POCT Blood Glucose:  200 mg/dL (04-22-25 @ 17:22)      eMAR:  insulin lispro (ADMELOG) corrective regimen sliding scale   2 Unit(s) SubCutaneous (04-22-25 @ 17:25)      #T2DM  - give Lantus 12 units x1 stat  - start Admelog 5 units TID premeal (HOLD IF NPO)  - start low admelog correctional scale before meals  - start low admelog correctional scale at bedtime  - if patient eating, please have on consistent carbohydrate diet  - Hypoglycemia protocol  ***Pt has СЕРГЕЙ/T1DM and cannot be without basal insulin due to the risk of going into DKA (pt had DKA once in 2021)****    Full consult to follow in AM      Discussed recommendations with primary team.    Pierce Worthy MD  Endocrine Fellow  Can be reached via [employer] teams.    For follow up questions, discharge recommendations, or new consults, please email [email or web address] ([email or web address]) or [email or web address] ([facility]) or call answering service at [phone] (weekdays); [phone] (nights/weekends).  For emergencies please page fellow on call. 59y RIGHT-handed man, with PMH significant for EtOH use disorder, chronic pancreatitis, poorly-controlled СЕРГЕЙ/T1Dm(A1c 10.3, 2/2025), HTN, who presents to [facility] ED on 4/22/2025, , for further emergent workup for numbness, subjective weakness. Endocrine consulted for СЕРГЕЙ mangement       Type of DM: СЕРГЕЙ +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 when pt presented for DKA (see prior sunrise records  A1c: pending. In 2/2025 was 10.3%  Steroids: none  Diet: Consistent carb  Home Regimen: per chart review 20 units of basal  eGFR: 108 mL/min/1.73m2 (04-22-25 @ 11:35)  Height (cm): 177.8 (22 Apr 2025 10:11)  Weight (kg): 65.8 (22 Apr 2025 10:11)  BMI (kg/m2): 20.8 (22 Apr 2025 10:11)  BSA (m2): 1.82 (22 Apr 2025 10:11)    Spoke to patient, . Last time he took it basal insulin was ~1 week ago - usually takes 15-20 units of basal. Uses patch but does not know name. usually uses 8-12 premeal - took the patch off now and does not have it on    Age: 59y    Gender: Male    POCT Blood Glucose:  200 mg/dL (04-22-25 @ 17:22)      eMAR:  insulin lispro (ADMELOG) corrective regimen sliding scale   2 Unit(s) SubCutaneous (04-22-25 @ 17:25)    Patient not in DKA despite not taking basal insulin x 1 week  #T2DM  - give Lantus 12 units x1 stat  - start Admelog 5 units TID premeal (HOLD IF NPO)  - start low admelog correctional scale before meals  - start low admelog correctional scale at bedtime  - if patient eating, please have on consistent carbohydrate diet  - Hypoglycemia protocol  ***Pt has СЕРГЕЙ/T1DM and cannot be without basal insulin due to the risk of going into DKA (pt had DKA once in 2021)****    Full consult to follow in AM      Discussed recommendations with primary team.    Pierce Worthy MD  Endocrine Fellow  Can be reached via [employer] teams.    For follow up questions, discharge recommendations, or new consults, please email [email or web address] ([email or web address]) or [email or web address] ([email or web address]) or call answering service at [phone] (weekdays); [phone] (nights/weekends).  For emergencies please page fellow on call.

## 2025-04-23 DIAGNOSIS — E13.9 OTHER SPECIFIED DIABETES MELLITUS WITHOUT COMPLICATIONS: ICD-10-CM

## 2025-04-23 DIAGNOSIS — I10 ESSENTIAL (PRIMARY) HYPERTENSION: ICD-10-CM

## 2025-04-23 DIAGNOSIS — E78.5 HYPERLIPIDEMIA, UNSPECIFIED: ICD-10-CM

## 2025-04-23 LAB
A1C WITH ESTIMATED AVERAGE GLUCOSE RESULT: 9.9 % — HIGH (ref 4–5.6)
ALBUMIN SERPL ELPH-MCNC: 4.6 G/DL — SIGNIFICANT CHANGE UP (ref 3.3–5)
ALP SERPL-CCNC: 69 U/L — SIGNIFICANT CHANGE UP (ref 40–120)
ALT FLD-CCNC: 27 U/L — SIGNIFICANT CHANGE UP (ref 10–45)
AMPHET UR-MCNC: NEGATIVE — SIGNIFICANT CHANGE UP
ANA SER IF-ACNC: NEGATIVE
ANION GAP SERPL CALC-SCNC: 16 MMOL/L — SIGNIFICANT CHANGE UP (ref 5–17)
APPEARANCE UR: CLEAR — SIGNIFICANT CHANGE UP
APTT BLD: 32.5 SEC — SIGNIFICANT CHANGE UP (ref 26.1–36.8)
AST SERPL-CCNC: 24 U/L — SIGNIFICANT CHANGE UP (ref 10–40)
B BURGDOR C6 AB SER-ACNC: NEGATIVE — SIGNIFICANT CHANGE UP
B BURGDOR IGG+IGM SER-ACNC: 0.09 INDEX — SIGNIFICANT CHANGE UP (ref 0.01–0.9)
BARBITURATES UR SCN-MCNC: NEGATIVE — SIGNIFICANT CHANGE UP
BENZODIAZ UR-MCNC: NEGATIVE — SIGNIFICANT CHANGE UP
BILIRUB SERPL-MCNC: 0.4 MG/DL — SIGNIFICANT CHANGE UP (ref 0.2–1.2)
BILIRUB UR-MCNC: NEGATIVE — SIGNIFICANT CHANGE UP
BUN SERPL-MCNC: 7 MG/DL — SIGNIFICANT CHANGE UP (ref 7–23)
CALCIUM SERPL-MCNC: 9.6 MG/DL — SIGNIFICANT CHANGE UP (ref 8.4–10.5)
CHLORIDE SERPL-SCNC: 88 MMOL/L — LOW (ref 96–108)
CK SERPL-CCNC: 77 U/L — SIGNIFICANT CHANGE UP (ref 30–200)
CO2 SERPL-SCNC: 22 MMOL/L — SIGNIFICANT CHANGE UP (ref 22–31)
COCAINE METAB.OTHER UR-MCNC: NEGATIVE — SIGNIFICANT CHANGE UP
COLOR SPEC: YELLOW — SIGNIFICANT CHANGE UP
CREAT SERPL-MCNC: 0.62 MG/DL — SIGNIFICANT CHANGE UP (ref 0.5–1.3)
DIFF PNL FLD: NEGATIVE — SIGNIFICANT CHANGE UP
EGFR: 111 ML/MIN/1.73M2 — SIGNIFICANT CHANGE UP
EGFR: 111 ML/MIN/1.73M2 — SIGNIFICANT CHANGE UP
ERYTHROCYTE [SEDIMENTATION RATE] IN BLOOD: 6 MM/HR — SIGNIFICANT CHANGE UP (ref 0–20)
ESTIMATED AVERAGE GLUCOSE: 237 MG/DL — HIGH (ref 68–114)
FOLATE SERPL-MCNC: >20 NG/ML — SIGNIFICANT CHANGE UP
GLUCOSE BLDC GLUCOMTR-MCNC: 184 MG/DL — HIGH (ref 70–99)
GLUCOSE BLDC GLUCOMTR-MCNC: 191 MG/DL — HIGH (ref 70–99)
GLUCOSE BLDC GLUCOMTR-MCNC: 223 MG/DL — HIGH (ref 70–99)
GLUCOSE BLDC GLUCOMTR-MCNC: 73 MG/DL — SIGNIFICANT CHANGE UP (ref 70–99)
GLUCOSE BLDC GLUCOMTR-MCNC: 98 MG/DL — SIGNIFICANT CHANGE UP (ref 70–99)
GLUCOSE SERPL-MCNC: 236 MG/DL — HIGH (ref 70–99)
GLUCOSE UR QL: 500 MG/DL
HBV CORE AB SER-ACNC: SIGNIFICANT CHANGE UP
HBV SURFACE AB SER-ACNC: ABNORMAL
HCT VFR BLD CALC: 41.8 % — SIGNIFICANT CHANGE UP (ref 39–50)
HGB BLD-MCNC: 14.6 G/DL — SIGNIFICANT CHANGE UP (ref 13–17)
HIV 1+2 AB+HIV1 P24 AG SERPL QL IA: SIGNIFICANT CHANGE UP
INR BLD: 0.93 RATIO — SIGNIFICANT CHANGE UP (ref 0.85–1.16)
KETONES UR-MCNC: NEGATIVE MG/DL — SIGNIFICANT CHANGE UP
LEUKOCYTE ESTERASE UR-ACNC: NEGATIVE — SIGNIFICANT CHANGE UP
MAGNESIUM SERPL-MCNC: 1.7 MG/DL — SIGNIFICANT CHANGE UP (ref 1.6–2.6)
MCHC RBC-ENTMCNC: 30.3 PG — SIGNIFICANT CHANGE UP (ref 27–34)
MCHC RBC-ENTMCNC: 34.9 G/DL — SIGNIFICANT CHANGE UP (ref 32–36)
MCV RBC AUTO: 86.7 FL — SIGNIFICANT CHANGE UP (ref 80–100)
METHADONE UR-MCNC: NEGATIVE — SIGNIFICANT CHANGE UP
NITRITE UR-MCNC: NEGATIVE — SIGNIFICANT CHANGE UP
NRBC BLD AUTO-RTO: 0 /100 WBCS — SIGNIFICANT CHANGE UP (ref 0–0)
OPIATES UR-MCNC: NEGATIVE — SIGNIFICANT CHANGE UP
OXYCODONE UR-MCNC: NEGATIVE — SIGNIFICANT CHANGE UP
PCP SPEC-MCNC: SIGNIFICANT CHANGE UP
PCP UR-MCNC: NEGATIVE — SIGNIFICANT CHANGE UP
PH UR: 6 — SIGNIFICANT CHANGE UP (ref 5–8)
PHOSPHATE SERPL-MCNC: 3.5 MG/DL — SIGNIFICANT CHANGE UP (ref 2.5–4.5)
PLATELET # BLD AUTO: 264 K/UL — SIGNIFICANT CHANGE UP (ref 150–400)
POTASSIUM SERPL-MCNC: 4.2 MMOL/L — SIGNIFICANT CHANGE UP (ref 3.5–5.3)
POTASSIUM SERPL-SCNC: 4.2 MMOL/L — SIGNIFICANT CHANGE UP (ref 3.5–5.3)
PROT SERPL-MCNC: 6.6 G/DL — SIGNIFICANT CHANGE UP (ref 6–8.3)
PROT SERPL-MCNC: 7.1 G/DL — SIGNIFICANT CHANGE UP (ref 6–8.3)
PROT UR-MCNC: NEGATIVE MG/DL — SIGNIFICANT CHANGE UP
PROTHROM AB SERPL-ACNC: 10.7 SEC — SIGNIFICANT CHANGE UP (ref 9.9–13.4)
RBC # BLD: 4.82 M/UL — SIGNIFICANT CHANGE UP (ref 4.2–5.8)
RBC # FLD: 12.3 % — SIGNIFICANT CHANGE UP (ref 10.3–14.5)
SODIUM SERPL-SCNC: 126 MMOL/L — LOW (ref 135–145)
SP GR SPEC: 1.01 — SIGNIFICANT CHANGE UP (ref 1–1.03)
THC UR QL: NEGATIVE — SIGNIFICANT CHANGE UP
TSH SERPL-MCNC: 1.06 UIU/ML — SIGNIFICANT CHANGE UP (ref 0.27–4.2)
UROBILINOGEN FLD QL: 1 MG/DL — SIGNIFICANT CHANGE UP (ref 0.2–1)
VIT B12 SERPL-MCNC: 441 PG/ML — SIGNIFICANT CHANGE UP (ref 232–1245)
WBC # BLD: 5.67 K/UL — SIGNIFICANT CHANGE UP (ref 3.8–10.5)
WBC # FLD AUTO: 5.67 K/UL — SIGNIFICANT CHANGE UP (ref 3.8–10.5)

## 2025-04-23 PROCEDURE — 99254 IP/OBS CNSLTJ NEW/EST MOD 60: CPT | Mod: GC

## 2025-04-23 RX ORDER — INSULIN LISPRO 100 U/ML
5 INJECTION, SOLUTION INTRAVENOUS; SUBCUTANEOUS
Refills: 0 | Status: DISCONTINUED | OUTPATIENT
Start: 2025-04-24 | End: 2025-04-24

## 2025-04-23 RX ORDER — LABETALOL HYDROCHLORIDE 200 MG/1
10 TABLET, FILM COATED ORAL ONCE
Refills: 0 | Status: COMPLETED | OUTPATIENT
Start: 2025-04-23 | End: 2025-04-23

## 2025-04-23 RX ORDER — INSULIN LISPRO 100 U/ML
3 INJECTION, SOLUTION INTRAVENOUS; SUBCUTANEOUS
Refills: 0 | Status: DISCONTINUED | OUTPATIENT
Start: 2025-04-23 | End: 2025-04-23

## 2025-04-23 RX ORDER — AMLODIPINE BESYLATE 10 MG/1
10 TABLET ORAL DAILY
Refills: 0 | Status: DISCONTINUED | OUTPATIENT
Start: 2025-04-24 | End: 2025-04-24

## 2025-04-23 RX ORDER — INSULIN GLARGINE-YFGN 100 [IU]/ML
15 INJECTION, SOLUTION SUBCUTANEOUS AT BEDTIME
Refills: 0 | Status: DISCONTINUED | OUTPATIENT
Start: 2025-04-23 | End: 2025-04-24

## 2025-04-23 RX ORDER — INSULIN LISPRO 100 U/ML
3 INJECTION, SOLUTION INTRAVENOUS; SUBCUTANEOUS
Refills: 0 | Status: DISCONTINUED | OUTPATIENT
Start: 2025-04-24 | End: 2025-04-24

## 2025-04-23 RX ORDER — GABAPENTIN 400 MG/1
300 CAPSULE ORAL THREE TIMES A DAY
Refills: 0 | Status: DISCONTINUED | OUTPATIENT
Start: 2025-04-23 | End: 2025-04-24

## 2025-04-23 RX ORDER — IBUPROFEN 200 MG
600 TABLET ORAL THREE TIMES A DAY
Refills: 0 | Status: DISCONTINUED | OUTPATIENT
Start: 2025-04-23 | End: 2025-04-24

## 2025-04-23 RX ORDER — AMLODIPINE BESYLATE 10 MG/1
5 TABLET ORAL DAILY
Refills: 0 | Status: DISCONTINUED | OUTPATIENT
Start: 2025-04-23 | End: 2025-04-23

## 2025-04-23 RX ORDER — AMLODIPINE BESYLATE 10 MG/1
5 TABLET ORAL ONCE
Refills: 0 | Status: COMPLETED | OUTPATIENT
Start: 2025-04-23 | End: 2025-04-23

## 2025-04-23 RX ADMIN — AMLODIPINE BESYLATE 5 MILLIGRAM(S): 10 TABLET ORAL at 15:48

## 2025-04-23 RX ADMIN — INSULIN LISPRO 3 UNIT(S): 100 INJECTION, SOLUTION INTRAVENOUS; SUBCUTANEOUS at 11:15

## 2025-04-23 RX ADMIN — INSULIN LISPRO 3 UNIT(S): 100 INJECTION, SOLUTION INTRAVENOUS; SUBCUTANEOUS at 16:53

## 2025-04-23 RX ADMIN — LABETALOL HYDROCHLORIDE 10 MILLIGRAM(S): 200 TABLET, FILM COATED ORAL at 07:42

## 2025-04-23 RX ADMIN — GABAPENTIN 300 MILLIGRAM(S): 400 CAPSULE ORAL at 21:33

## 2025-04-23 RX ADMIN — Medication 1 TABLET(S): at 11:12

## 2025-04-23 RX ADMIN — Medication 5 MILLIGRAM(S): at 14:52

## 2025-04-23 RX ADMIN — INSULIN GLARGINE-YFGN 15 UNIT(S): 100 INJECTION, SOLUTION SUBCUTANEOUS at 21:34

## 2025-04-23 RX ADMIN — GABAPENTIN 300 MILLIGRAM(S): 400 CAPSULE ORAL at 11:13

## 2025-04-23 RX ADMIN — Medication 650 MILLIGRAM(S): at 06:24

## 2025-04-23 RX ADMIN — LOSARTAN POTASSIUM 50 MILLIGRAM(S): 100 TABLET, FILM COATED ORAL at 05:13

## 2025-04-23 RX ADMIN — INSULIN LISPRO 1: 100 INJECTION, SOLUTION INTRAVENOUS; SUBCUTANEOUS at 16:53

## 2025-04-23 RX ADMIN — INSULIN LISPRO 5 UNIT(S): 100 INJECTION, SOLUTION INTRAVENOUS; SUBCUTANEOUS at 08:12

## 2025-04-23 RX ADMIN — DULOXETINE 60 MILLIGRAM(S): 20 CAPSULE, DELAYED RELEASE ORAL at 21:33

## 2025-04-23 RX ADMIN — Medication 1 GRAM(S): at 05:13

## 2025-04-23 RX ADMIN — INSULIN LISPRO 2: 100 INJECTION, SOLUTION INTRAVENOUS; SUBCUTANEOUS at 08:12

## 2025-04-23 RX ADMIN — AMLODIPINE BESYLATE 5 MILLIGRAM(S): 10 TABLET ORAL at 08:13

## 2025-04-23 RX ADMIN — ATORVASTATIN CALCIUM 10 MILLIGRAM(S): 80 TABLET, FILM COATED ORAL at 11:12

## 2025-04-23 RX ADMIN — Medication 600 MILLIGRAM(S): at 11:12

## 2025-04-23 RX ADMIN — Medication 40 MILLIGRAM(S): at 05:14

## 2025-04-23 RX ADMIN — Medication 600 MILLIGRAM(S): at 12:12

## 2025-04-23 RX ADMIN — Medication 650 MILLIGRAM(S): at 06:54

## 2025-04-23 RX ADMIN — Medication 200 MILLIGRAM(S): at 09:00

## 2025-04-23 RX ADMIN — Medication 1 GRAM(S): at 16:54

## 2025-04-23 RX ADMIN — Medication 200 MILLIGRAM(S): at 08:13

## 2025-04-23 RX ADMIN — Medication 5 MILLIGRAM(S): at 06:27

## 2025-04-23 NOTE — PHYSICAL THERAPY INITIAL EVALUATION ADULT - PLANNED THERAPY INTERVENTIONS, PT EVAL
stair negotiation: GOAL: Pt will be able to negotiate 10 steps +HR independently with step to  pattern in 2 weeks./gait training/strengthening/transfer training

## 2025-04-23 NOTE — CONSULT NOTE ADULT - ASSESSMENT
59y RIGHT-handed man, with PMH significant for EtOH use disorder, chronic pancreatitis, poorly-controlled СЕРГЕЙ/T1Dm(A1c 10.3, 2/2025), HTN, who presents to [facility] ED on 4/22/2025, , for further emergent workup for numbness, subjective weakness. 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 Toscano (currently on materity leave, so in the interim following with Dr. Collette Padilla – last saw in 4/9/25)  --Outpatient regimen: - Basaglar 15-20 units of basal (Last time he took it basal insulin was ~1 week ago – states he has not been sleeping d/t pain so has not taken it). Uses Cequr patch, usually uses 8-12 premeal - took the patch off on admission  --Adherence: Poor – does not always use cequr – forgets he has it on     INPATIENT RECOMMENDATIONS:  - start Lantus ______ units at bedtime  - start Admelog ______ units premeal TID (HOLD IF NPO)  - start ____ admelog correctional scale premeal   - start ____ admelog correctional scale at bedtime  ***Pt has СЕРГЕЙ/T1DM and cannot be without basal insulin due to the risk of going into DKA (pt had DKA once in 2021)****  - Registered Dietician consult placed   - Diabetes/insulin pen teaching ordered - please ensure patient receives Diabetes/insulin pen teaching prior to discharge    DISCHARGE RECOMMENDATIONS:  - Basal-bolus regimen, doses TBD.   - Pt will need perscription for basal insulin pen (ie. Lantus Solostar pen, if not covered then ask pharmacy which brand of basal insulin is covered by their insurance plan - other brands include: Basaglar, Lantus, Tresiba, Toujeo). Dispense 5 insulin pens with 3 refills.   - Pt will need perscription for bolus insulin pen (ie. Humalog Kwikpen , if not covered then ask pharmacy which brand of bolus insulin is covered by their insurance plan - other brands include: Novolog, Admelog). Dispense 5 insulin pens with 3 refills.   - Please send prescriptions for diabetes supplies (glucometer, test strips, lancets, alcohol swabs, insulin pen needles (BD oren 4mm)) - dispense #100, use as directed (3 refills).  - CGM:  - OUT-PATIENT FOLLOW UP: Patient should follow up with Endocrinology office ([phone]) at [address] [place] [address] [address], [place] [address].    #HLD  - patient on atorvastatin mg  - goal LDL in diabetes mellitus is <70    #HTN  - patient on   - goal BP in diabetes mellitus is <130/80  - can check microalbumin/Cr ratio outpatient      Discussed recommendations with primary team.    Pierce Worthy MD  Endocrine Fellow  Can be reached via [employer] teams.    For follow up questions, discharge recommendations, or new consults, please email [email or web address] ([email or web address]) or [email or web address] ([facility]) or call answering service at [phone] (weekdays); [phone] (nights/weekends).  For emergencies please page fellow on call.     59y RIGHT-handed man, with PMH significant for EtOH use disorder, chronic pancreatitis, poorly-controlled СЕРГЕЙ/T1Dm(A1c 10.3, 2/2025), HTN, who presents to [facility] ED on 4/22/2025, , for further emergent workup for numbness, subjective weakness. 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 Toscano (currently on materity leave, so in the interim following with Dr. Collette Padilla – last saw in 4/9/25)  --Outpatient regimen: - Basaglar 15-20 units of basal (Last time he took it basal insulin was ~1 week ago – states he has not been sleeping d/t pain so has not taken it). Uses Cequr patch, usually uses 8-12 premeal - took the patch off on admission  --Adherence: Poor – does not always use cequr – forgets he has it on     INPATIENT RECOMMENDATIONS:  - increase Lantus to 15 units at bedtime  - start Admelog 3 units before breakfast (HOLD IF NPO) - breakfast usually smallest meal  - start Admelog 5 units before lunch (HOLD IF NPO)  - start Admelog 5 units before dinner (HOLD IF NPO)  - start low admelog correctional scale premeal   - start low admelog correctional scale at bedtime  ***Pt has СЕРГЕЙ/T1DM and cannot be without basal insulin due to the risk of going into DKA (pt had DKA in 2021 and 2022)****  - Registered Dietician consult placed     DISCHARGE RECOMMENDATIONS:  - Basal-bolus regimen, doses TBD. Patient uses a Cequr patch outpatient for premeal insulin which he can continue  - CGM: uses a freestyle chay 3  - OUT-PATIENT FOLLOW UP: Patient should follow up with Endocrinologist Dr. Toscano (currently on materity leave, so in the interim following with Dr. Collette Padilla – last saw in 4/9/25)    #HLD  - goal LDL in diabetes mellitus is <70    #HTN  - goal BP in diabetes mellitus is <130/80  - can check microalbumin/Cr ratio outpatient      Discussed recommendations with primary team.    Pierce Worthy MD  Endocrine Fellow  Can be reached via [employer] teams.    For follow up questions, discharge recommendations, or new consults, please email [email or web address] ([email or web address]) or [email or web address] ([facility]) or call answering service at [phone] (weekdays); [phone] (nights/weekends).  For emergencies please page fellow on call.

## 2025-04-23 NOTE — OCCUPATIONAL THERAPY INITIAL EVALUATION ADULT - NSACTIVITYREC_GEN_A_OT
Pt will need a RW due to diagnosis of decreased balance for discharge in order to safely perform MRADLs (mobility related activities of daily living).

## 2025-04-23 NOTE — PHYSICAL THERAPY INITIAL EVALUATION ADULT - STRENGTHENING, PT EVAL
GOAL: Pt will improve LLE strength to 3/5, for increased limb stability, to improve gait and facilitate stair negotiation in 4 weeks.

## 2025-04-23 NOTE — CHART NOTE - NSCHARTNOTEFT_GEN_A_CORE
notified by nursing patient BP again elevated (160/80) HR 78  will give hydralazine 10mg now and increase amlodipine from 5mg qd to 10mg qd

## 2025-04-23 NOTE — OCCUPATIONAL THERAPY INITIAL EVALUATION ADULT - PERTINENT HX OF CURRENT PROBLEM, REHAB EVAL
59y (1965) RIGHT-handed man who presents to [facility] ED on 4/22/2025, as the behest of his outpatient neurologist, for further emergent workup.  PMH significant for: EtOH use disorder, chronic pancreatitis, poorly-controlled СЕРГЕЙ/T1DM (A1c 10.3, 2/2025), HTN. Patient has seen Dr. Boone (Orthopedic Spine Surgery) and PCP outpatient. Had been referred to Neurology. Saw Dr. Bárbara Hays today. Sent to ED for LP and spine imaging. I spoke with Dr. Hays via Teams. She is concerned for possible transverse myelitis at T4 vs. AIDP vs. diabetic neuropathy. Patient has had entire MRI of the neuro-axis in April 2025, but without contrast. Findings were non-actionable.  Patient is accompanied by his wife. Patient tells me that in October 2024 - he developed excruciating pain in the dorsum of his LEFT foot. Painful to even light touch. He denies any imaging was performed on his foot. No edema or overlying rash. Also developing pain in the back of his RLE since then. Cramping pain. Says in the last three weeks - he has been developing numbness at the level of his rib cage upward - front and back. Neck up is OK. No facial numbness. Arms/palms numb. Feels like his legs give out when he is standing for long periods of time. Had a recent fall.  Denies personal/family history of primary neurologic disorder. Denies history of stroke, MI. No AC/AP. Walking with cane/walker. Difficult to climb stairs. Does not drive. Smokes 1/2-3/4 PPD. Denies currently drinking alcohol. Denies recreational drug use. Lives with his wife. On disability.  MRI BRAIN: No acute infarction. No abnormal enhancement.  MRI CERVICAL SPINE: Unremarkable.  MRI THORACIC SPINE: Posterior epidural lipomatosis. No cord signal   abnormality. MRI LUMBAR SPINE: Mild spondylosis. No evidence of exiting nerve root   compression.

## 2025-04-23 NOTE — OCCUPATIONAL THERAPY INITIAL EVALUATION ADULT - ADDITIONAL COMMENTS
Pt lives with his wife in an apartment with 3+12 steps to enter and none inside; pt has a tub shower with shower chair; owns and uses a rolling walker and cane; PTA was independent with ADLs/mobility however noticed increasingly difficult to ambulate. Pt was receiving outpatient PT. Pt also owns a transport wheelchair.

## 2025-04-23 NOTE — PHYSICAL THERAPY INITIAL EVALUATION ADULT - ADDITIONAL COMMENTS
Pt lives with his wife in an apartment with 3+12 steps to enter and none inside. Pt was (I) with all ADLs and amb with RW ( borrowed from his mother in law)

## 2025-04-23 NOTE — OCCUPATIONAL THERAPY INITIAL EVALUATION ADULT - MOTOR COORDINATION TRAINING, PT EVAL
GOAL: Pt will accurately hit target with L foot 5/5 trials in 4 weeks in order to improve motor planning.

## 2025-04-23 NOTE — CONSULT NOTE ADULT - ATTENDING COMMENTS
Patient is a 59-year-old man with history of alcohol use disorder, chronic pancreatitis, poorly controlled СЕРГЕЙ/type 1 diabetes, low C-peptide level, positive Zinc Transporter antibodies, presenting with workup for numbness and subjective weakness.  Recommend continuing current regimen patient was not adherent with his basal insulin regimen at home.  Patient uses an insulin pump patch, about 8 to 12 units of short acting insulin before each meal.    While inpatient recommend Lantus 15 units at bedtime, Admelog 3 units with breakfast, 5 units with lunch and 5 units with dinner.  Continue with low-dose sliding scale before every meal and at bedtime.  Do not hold long-acting insulin.    For hypertension, recommend BP to be <1 30/80.  Check albumin/creatinine ratio outpatient.    LDL goal <70 mg/dL, management as per primary team and outpatient endocrinologist.

## 2025-04-23 NOTE — OCCUPATIONAL THERAPY INITIAL EVALUATION ADULT - DIAGNOSIS, OT EVAL
Patient demonstrates decreased LLE/BUE strength, ROM, balance and endurance limiting ADLs and functional transfers.

## 2025-04-23 NOTE — CONSULT NOTE ADULT - SUBJECTIVE AND OBJECTIVE BOX
INCOMPLETE - to be seen    Neurology - Consult Note    -  Spectra: [phone] ([facility]), [phone] ([facility]). For new consults, please page: [phone] ([facility]), [phone] ([facility]).  -    HPI: Patient MARIA R UMANA is a 59y (1965) ***-handed man who presents to [facility] ED on 4/22/2025, as the behest of his outpatient neurologist, for ***.    PMH significant for: EtOH use disorder, chronic pancreatitis, T2DM, HTN    Patient has seen Dr. Boone (Orthopedic Spine Surgery) and PCP outpatient. Has been referred to Neurology. Saw  *** today. Sent to ED for possible LP. ***    Review of Systems:  INCOMPLETE   All other review of systems is negative unless indicated above.    Allergies:  No Known Allergies      PMHx/PSHx/Family Hx: As above, otherwise see below   No pertinent past medical history    GERD (gastroesophageal reflux disease)    Insulin dependent type 2 diabetes mellitus    Alcohol abuse    Left lower quadrant abdominal pain    Elbow fracture    Benign hypertension    Scoliosis    Pancreatic duct stones    Diabetes mellitus, type 2    Pancreatitis, chronic    Gallstones        Social Hx:  Per HPI    Medications:  MEDICATIONS  (STANDING):    MEDICATIONS  (PRN):      Vitals:  T(C): 36.1 (04-22-25 @ 10:11), Max: 36.1 (04-22-25 @ 10:11)  HR: 86 (04-22-25 @ 10:11) (86 - 86)  BP: 157/88 (04-22-25 @ 10:17) (157/88 - 163/89)  RR: 20 (04-22-25 @ 10:11) (20 - 20)  SpO2: 100% (04-22-25 @ 10:11) (100% - 100%)    Physical Examination: INCOMPLETE  General - Sitting up on ED cart  Cardiovascular - No LE edema  Eyes - Non-injected conjunctivae, anicteric sclerae    Neurologic Exam:  Mental status:  - Eyes open spontaneously, attends to examiner  - Oriented to: person, place, and time  - Speech: fluent  - Repetition and naming: intact   - Follows simple and cross commands   - Attention/concentration: intact  - Recent and remote memory (including registration and recall): registration intact, 3/3 on 3-word recall  - Fund of knowledge: intact    Cranial nerves - PERRL - no rAPD, VFF with finger counting, EOMI - no nystagmus, face sensation (V1-V3) intact b/l, facial strength intact without asymmetry b/l, hearing grossly intact, palate with symmetric elevation, shoulder shrug intact b/l, tongue midline on protrusion with full lateral movement  Dysarthria: ***    Motor - Normal bulk for age throughout. No pronator drift.  Strength testing (R/L)  Deltoid:  5/5  Biceps:  5/5        Triceps:  5/5       Wrist Extension:  5/5      Wrist Flexion:  5/5       Interossei:  5/5        :  5/5    ***FFM intact   ***No orbiting with forearm rolling    Hip Flexion:  5/5  Hip Extension:  5/5      Knee Flexion:  5/5      Knee Extension:  5/5      Dorsiflexion:  5/5      Plantar Flexion:  5/5    Sensation - Light touch intact throughout    DTRs (R/L)  Biceps:  2+/2+        Triceps:  2+/2+       Brachioradialis:  2+/2+        Patellar:  2+/2+      Ankle:  2+/2+    no ankle clonus  Plantar response:  Down/Down  **Deferred d/t focused neurologic exam    Coordination - FNF, HTS intact b/l. No tremors appreciated.    Gait and station - Unable to assess d/t fall risk/safety concerns.    Labs:          CAPILLARY BLOOD GLUCOSE              CSF:                  Radiology:  MR BRAIN    PROCEDURE DATE:  04/21/2025       INTERPRETATION:  CLINICAL INDICATION: Severe weakness and tingling    TECHNIQUE: MRI of the brain was performed without contrast.    COMPARISON: None    FINDINGS:  There is no evidence of acute infarct, acute intracranial hemorrhage,   mass effect or hydrocephalus. No extra-axial collection. Basal cisterns   are patent.    Age-related involutional changes and chronic microvascular ischemic   changes.    Ventricles and sulci are age-appropriate in size.    Major intracranial flow-voids are preserved.    The orbits, sellar and suprasellar structures, and craniocervical   junction are unremarkable. Visualized paranasal sinuses and mastoid air   cells are clear.    IMPRESSION:  No acute infarct, acute intracranial hemorrhage, or mass effect.    MR SPINE CERVICAL  MR SPINE LUMBAR  MR SPINE THORACIC     PROCEDURE DATE:  04/03/2025      INTERPRETATION:  Noncontrast MRI of the cervical, thoracic, and lumbar   spine    CLINICAL INDICATION: Back pain, left lower extremity numbness and weakness    TECHNIQUE: Multiplanar, multisequence MR images of the cervical spine   were obtained without the administration of intravenous contrast.    COMPARISON: CT abdomen and pelvis 11/11/2024.    FINDINGS:    MRI CERVICAL SPINE:    Vertebral body height, marrow signal homogeneity, and alignment are   maintained throughout the visualized spinal segments.    No intervertebral disc space narrowing.    No prevertebral or paravertebral edema.    No spinal cord compression or abnormal intrinsic cord signal.    Mild multilevel degenerative changes without significant spinal canal   stenosis or neural foraminal narrowing.    MRI THORACIC SPINE:    Vertebral body height, marrow signal homogeneity, and facet alignment are   maintained throughout the visualized spinal segments.    Mild to moderate exaggeration of the thoracic kyphosis.    Disc space narrowing in the mid thoracic region.    No prevertebral or paravertebral edema.    No abnormal intrinsic cord signal.    Epidural lipomatosis from T4 through T9.    Thecal sac is fully collapsed around the cord at the T7-T8 level. No   tala spinal cord compression.    No significant degenerative changes that contribute to spinal canal   stenosis or neural foraminal narrowing    MRI LUMBAR SPINE:    Vertebral body height, marrow signal homogeneity, and alignment are   maintained throughout the visualized spinal segments.    No intervertebral disc space narrowing.    No prevertebral or paravertebral edema.    The conus is normal in size, position, and signal characteristics, ending   at L1.    Mild multilevel degenerative changes. Mild multilevel spinal canal   stenosis and neural foraminal narrowing.    IMPRESSION:    MRI CERVICAL SPINE:  No spinal cord compression or abnormal intrinsic cord signal.    Mild multilevel degenerative changes without significant spinal canal   stenosis or neural foraminal narrowing.    MRI THORACIC SPINE:  Epidural lipomatosis from T4 through T9.    Thecal sac is fully collapsed around the cord at the T7-T8 level.  No tala spinal cord compression.  No abnormal intrinsic cord signal.    No significant degenerative changes that contribute to spinal canal   stenosis or neural foraminal narrowing    MRI LUMBAR SPINE:  The conus is normal in size, position, and signal characteristics, ending   at L1.    Mild multilevel degenerative changes. Mild multilevel spinal canal   stenosis and neural foraminal narrowing.
Pierce Worthy MD   |   PGY-5  Endocrinology Fellow  Available on [employer] Teams    HPI:  59y (1965) RIGHT-handed man who presents to [facility] ED on 4/22/2025, as the behest of his outpatient neurologist, for further emergent workup.    PMH significant for: EtOH use disorder, chronic pancreatitis, poorly-controlled СЕРГЕЙ/T1DM (A1c 10.3, 2/2025), HTN    Patient has seen Dr. Boone (Orthopedic Spine Surgery) and PCP outpatient. Had been referred to Neurology. Saw Dr. Bárbara Hays today. Sent to ED for LP and spine imaging. I spoke with Dr. Hays via Teams. She is concerned for possible transverse myelitis at T4 vs. AIDP vs. diabetic neuropathy. Patient has had entire MRI of the neuro-axis in April 2025, but without contrast. Findings were non-actionable.    Patient is accompanied by his wife. Patient tells me that in October 2024 - he developed excruciating pain in the dorsum of his LEFT foot. Painful to even light touch. He denies any imaging was performed on his foot. No edema or overlying rash. Also developing pain in the back of his RLE since then. Cramping pain. Says in the last three weeks - he has been developing numbness at the level of his rib cage upward - front and back. Neck up is OK. No facial numbness. Arms/palms numb. Feels like his legs give out when he is standing for long periods of time. Had a recent fall.    Denies personal/family history of primary neurologic disorder. Denies history of stroke, MI. No AC/AP. Walking with cane/walker. Difficult to climb stairs. Does not drive. Smokes 1/2-3/4 PPD. Denies currently drinking alcohol. Denies recreational drug use. Lives with his wife. On disability.    +Chronic cough. (22 Apr 2025 15:37)      Endocrine History:  59y RIGHT-handed man, with PMH significant for EtOH use disorder, chronic pancreatitis, poorly-controlled СЕРГЕЙ/T1Dm(A1c 10.3, 2/2025), HTN, who presents to [facility] ED on 4/22/2025, , for further emergent workup for numbness, subjective weakness. Endocrine consulted for СЕРГЕЙ mangement    --Type of Diabetes СЕРГЕЙ +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 when pt presented for DKA  --A1c: pending   --Endocrinologist: Dr. Toscano (currently on materity leave, so in the interim following with Dr. Collette Padilla – last saw in 4/9/25)  --Outpatient regimen: - Basaglar 15-20 units of basal (Last time he took it basal insulin was ~1 week ago – states he has not been sleeping d/t pain so has not taken it). Uses Cequr patch, usually uses 8-12 premeal - took the patch off on admission  --Adherence: Poor – does not always use cequr – forgets he has it on   --Past Medications: None  --who administers medications? himself  --live with: wife   --Home sugars/log: uses freestyle chay 3   --Any hypoglycemic episodes? Denies  --Any hx of DKA? In 2021 when diagnosed wtih СЕРГЕЙ, and mild DKA in 2022 i/s/o pancreatitis and non compliance  --Complications: Denies MI, CVA, Retinopathy, Neuropathy,  Nephropathy  --Family history:  T2DM in mother  Tobacco, etoh, drug use: Use to drink 10-12 cans of beer daily, stopped 2 months agp  --Appetite:  Poor  --Inpatient Diet?  CC  Weight (kg): 65.8 (22 Apr 2025 10:11)  BMI (kg/m2): 20.8 (22 Apr 2025 10:11)  --GFR? >100      PAST MEDICAL & SURGICAL HISTORY:  GERD (gastroesophageal reflux disease)      Alcohol abuse      Left lower quadrant abdominal pain      Elbow fracture      Benign hypertension      Scoliosis      Pancreatic duct stones      Diabetes mellitus, type 2      Pancreatitis, chronic      Gallstones      H/O elbow surgery          MEDICATIONS  (STANDING):  amLODIPine   Tablet 5 milliGRAM(s) Oral daily  atorvastatin 10 milliGRAM(s) Oral daily  dextrose 5%. 1000 milliLiter(s) (50 mL/Hr) IV Continuous <Continuous>  dextrose 5%. 1000 milliLiter(s) (100 mL/Hr) IV Continuous <Continuous>  dextrose 50% Injectable 25 Gram(s) IV Push once  dextrose 50% Injectable 12.5 Gram(s) IV Push once  dextrose 50% Injectable 25 Gram(s) IV Push once  DULoxetine 60 milliGRAM(s) Oral at bedtime  gabapentin 300 milliGRAM(s) Oral three times a day  glucagon  Injectable 1 milliGRAM(s) IntraMuscular once  insulin lispro (ADMELOG) corrective regimen sliding scale   SubCutaneous three times a day before meals  insulin lispro (ADMELOG) corrective regimen sliding scale   SubCutaneous at bedtime  insulin lispro Injectable (ADMELOG) 3 Unit(s) SubCutaneous three times a day before meals  losartan 50 milliGRAM(s) Oral daily  multivitamin 1 Tablet(s) Oral daily  pantoprazole    Tablet 40 milliGRAM(s) Oral before breakfast  sodium chloride 1 Gram(s) Oral two times a day    MEDICATIONS  (PRN):  acetaminophen     Tablet .. 650 milliGRAM(s) Oral every 6 hours PRN Mild Pain (1 - 3), Moderate Pain (4 - 6)  dextrose Oral Gel 15 Gram(s) Oral once PRN Blood Glucose LESS THAN 70 milliGRAM(s)/deciliter  ibuprofen  Tablet. 600 milliGRAM(s) Oral three times a day PRN Severe Pain (7 - 10)      Allergies    No Known Allergies    Intolerances      Review of Systems:  Constitutional: No fever  Eyes: No blurry vision  Neuro: No tremors  HEENT: No pain  Cardiovascular: No chest pain, palpitations  Respiratory: No SOB, no cough  GI: No nausea, vomiting, abdominal pain  : No dysuria  Skin: no rash  Psych: no depression  Endocrine: no polyuria, polydipsia  Hem/lymph: no swelling  Osteoporosis: no fractures    ===================PHYSICAL EXAM=======================  VITALS: T(C): 36.7 (04-23-25 @ 05:13)  T(F): 98 (04-23-25 @ 05:13), Max: 98.2 (04-22-25 @ 17:13)  HR: 80 (04-23-25 @ 08:20) (72 - 89)  BP: 153/82 (04-23-25 @ 08:20) (151/77 - 182/94)  RR:  (18 - 20)  SpO2:  (97% - 100%)  Wt(kg): --  GENERAL: NAD  EYES: No proptosis, no lid lag, anicteric  THYROID: Normal size, no palpable nodules  RESPIRATORY: Clear to auscultation bilaterally  CARDIOVASCULAR: Regular rate and rhythm  GI: Soft, nontender, non distended  EXT: b/l feet without wounds; 2+ pulses  PSYCH: Alert and oriented x 3, reactive mood  ======================================================  POCT Blood Glucose.: 98 mg/dL (04-23-25 @ 11:04)  POCT Blood Glucose.: 73 mg/dL (04-23-25 @ 10:37)  POCT Blood Glucose.: 223 mg/dL (04-23-25 @ 07:47)  POCT Blood Glucose.: 181 mg/dL (04-22-25 @ 22:28)  POCT Blood Glucose.: 200 mg/dL (04-22-25 @ 17:22)                            14.6   5.67  )-----------( 264      ( 23 Apr 2025 06:57 )             41.8       04-23    126[L]  |  88[L]  |  7   ----------------------------<  236[H]  4.2   |  22  |  0.62    eGFR: 111    Ca    9.6      04-23  Mg     1.7     04-23  Phos  3.5     04-23    TPro  7.1  /  Alb  4.6  /  TBili  0.4  /  DBili  x   /  AST  24  /  ALT  27  /  AlkPhos  69  04-23      Thyroid Function Tests:  04-22 @ 15:40 TSH 1.06 FreeT4 -- T3 -- Anti TPO -- Anti Thyroglobulin Ab -- TSI --      A1C with Estimated Average Glucose Result: 10.3 % (02-20-25 @ 14:58)  A1C with Estimated Average Glucose Result: 9.7 % (02-18-25 @ 07:30)          Radiology:

## 2025-04-23 NOTE — PHYSICAL THERAPY INITIAL EVALUATION ADULT - PERTINENT HX OF CURRENT PROBLEM, REHAB EVAL
Pt is a 58 y/o male presents to [facility] ED on 4/22/2025, as the behest of his outpatient neurologist, for further emergent workup. Patient has seen Dr. Boone (Orthopedic Spine Surgery) and PCP outpatient. Had been referred to Neurology. Saw Dr. Bárbara Hays today. Sent to ED for LP and spine imaging.  Patient has had entire MRI of the neuro-axis in April 2025, but without contrast. Findings were non-actionable.  Patient tells me that in October 2024 - he developed excruciating pain in the dorsum of his LEFT foot. Painful to even light touch. He denies any imaging was performed on his foot. No edema or overlying rash. Also developing pain in the back of his RLE since then. Cramping pain. Says in the last three weeks - he has been developing numbness at the level of his rib cage upward - front and back. Neck up is OK. No facial numbness. Arms/palms numb. Feels like his legs give out when he is standing for long periods of time. Had a recent fall.  (-) XR L foot. (-) MRI brain,  MRI THORACIC SPINE: Posterior epidural lipomatosis. No cord signal abnormality. MRI LUMBAR SPINE: Mild spondylosis. No evidence of exiting nerve root   compression.

## 2025-04-23 NOTE — PHYSICAL THERAPY INITIAL EVALUATION ADULT - NSPTDMEREC_GEN_A_CORE
Pt will require a rolling walker due to dx of LLE weakness   to help complete MRADLS's./standard walker

## 2025-04-24 ENCOUNTER — TRANSCRIPTION ENCOUNTER (OUTPATIENT)
Age: 60
End: 2025-04-24

## 2025-04-24 VITALS
TEMPERATURE: 98 F | DIASTOLIC BLOOD PRESSURE: 67 MMHG | SYSTOLIC BLOOD PRESSURE: 111 MMHG | RESPIRATION RATE: 18 BRPM | OXYGEN SATURATION: 98 % | HEART RATE: 92 BPM

## 2025-04-24 LAB
ACE SERPL-CCNC: 71 U/L — SIGNIFICANT CHANGE UP (ref 14–82)
ALBUMIN SERPL ELPH-MCNC: 4.3 G/DL — SIGNIFICANT CHANGE UP (ref 3.3–5)
ALP SERPL-CCNC: 64 U/L — SIGNIFICANT CHANGE UP (ref 40–120)
ALT FLD-CCNC: 27 U/L — SIGNIFICANT CHANGE UP (ref 10–45)
ANION GAP SERPL CALC-SCNC: 11 MMOL/L — SIGNIFICANT CHANGE UP (ref 5–17)
AST SERPL-CCNC: 24 U/L — SIGNIFICANT CHANGE UP (ref 10–40)
AUTO DIFF PNL BLD: NEGATIVE — SIGNIFICANT CHANGE UP
BILIRUB SERPL-MCNC: 0.4 MG/DL — SIGNIFICANT CHANGE UP (ref 0.2–1.2)
BUN SERPL-MCNC: 9 MG/DL — SIGNIFICANT CHANGE UP (ref 7–23)
C-ANCA SER-ACNC: NEGATIVE — SIGNIFICANT CHANGE UP
CALCIUM SERPL-MCNC: 9.7 MG/DL — SIGNIFICANT CHANGE UP (ref 8.4–10.5)
CHLORIDE SERPL-SCNC: 93 MMOL/L — LOW (ref 96–108)
CHOLEST SERPL-MCNC: 129 MG/DL — SIGNIFICANT CHANGE UP
CO2 SERPL-SCNC: 25 MMOL/L — SIGNIFICANT CHANGE UP (ref 22–31)
CREAT SERPL-MCNC: 0.68 MG/DL — SIGNIFICANT CHANGE UP (ref 0.5–1.3)
CREATININE, URINE RESULT: 51 MG/DL — SIGNIFICANT CHANGE UP
EGFR: 108 ML/MIN/1.73M2 — SIGNIFICANT CHANGE UP
EGFR: 108 ML/MIN/1.73M2 — SIGNIFICANT CHANGE UP
GLUCOSE BLDC GLUCOMTR-MCNC: 210 MG/DL — HIGH (ref 70–99)
GLUCOSE BLDC GLUCOMTR-MCNC: 241 MG/DL — HIGH (ref 70–99)
GLUCOSE SERPL-MCNC: 213 MG/DL — HIGH (ref 70–99)
HCT VFR BLD CALC: 40.7 % — SIGNIFICANT CHANGE UP (ref 39–50)
HDLC SERPL-MCNC: 63 MG/DL — SIGNIFICANT CHANGE UP
HGB BLD-MCNC: 14.5 G/DL — SIGNIFICANT CHANGE UP (ref 13–17)
LDLC SERPL-MCNC: 53 MG/DL — SIGNIFICANT CHANGE UP
LIPID PNL WITH DIRECT LDL SERPL: 53 MG/DL — SIGNIFICANT CHANGE UP
MCHC RBC-ENTMCNC: 31.2 PG — SIGNIFICANT CHANGE UP (ref 27–34)
MCHC RBC-ENTMCNC: 35.6 G/DL — SIGNIFICANT CHANGE UP (ref 32–36)
MCV RBC AUTO: 87.5 FL — SIGNIFICANT CHANGE UP (ref 80–100)
NONHDLC SERPL-MCNC: 66 MG/DL — SIGNIFICANT CHANGE UP
NRBC BLD AUTO-RTO: 0 /100 WBCS — SIGNIFICANT CHANGE UP (ref 0–0)
P-ANCA SER-ACNC: NEGATIVE — SIGNIFICANT CHANGE UP
PLATELET # BLD AUTO: 236 K/UL — SIGNIFICANT CHANGE UP (ref 150–400)
POTASSIUM SERPL-MCNC: 4.7 MMOL/L — SIGNIFICANT CHANGE UP (ref 3.5–5.3)
POTASSIUM SERPL-SCNC: 4.7 MMOL/L — SIGNIFICANT CHANGE UP (ref 3.5–5.3)
PROT ?TM UR-MCNC: 5 MG/DL — SIGNIFICANT CHANGE UP (ref 0–12)
PROT SERPL-MCNC: 6.6 G/DL — SIGNIFICANT CHANGE UP (ref 6–8.3)
RBC # BLD: 4.65 M/UL — SIGNIFICANT CHANGE UP (ref 4.2–5.8)
RBC # FLD: 12.7 % — SIGNIFICANT CHANGE UP (ref 10.3–14.5)
SODIUM SERPL-SCNC: 129 MMOL/L — LOW (ref 135–145)
TRIGL SERPL-MCNC: 63 MG/DL — SIGNIFICANT CHANGE UP
WBC # BLD: 5.62 K/UL — SIGNIFICANT CHANGE UP (ref 3.8–10.5)
WBC # FLD AUTO: 5.62 K/UL — SIGNIFICANT CHANGE UP (ref 3.8–10.5)

## 2025-04-24 PROCEDURE — 82962 GLUCOSE BLOOD TEST: CPT

## 2025-04-24 PROCEDURE — 84207 ASSAY OF VITAMIN B-6: CPT

## 2025-04-24 PROCEDURE — 70553 MRI BRAIN STEM W/O & W/DYE: CPT | Mod: MC

## 2025-04-24 PROCEDURE — 86255 FLUORESCENT ANTIBODY SCREEN: CPT

## 2025-04-24 PROCEDURE — 83825 ASSAY OF MERCURY: CPT

## 2025-04-24 PROCEDURE — 86704 HEP B CORE ANTIBODY TOTAL: CPT

## 2025-04-24 PROCEDURE — 97161 PT EVAL LOW COMPLEX 20 MIN: CPT

## 2025-04-24 PROCEDURE — 73620 X-RAY EXAM OF FOOT: CPT

## 2025-04-24 PROCEDURE — 87389 HIV-1 AG W/HIV-1&-2 AB AG IA: CPT

## 2025-04-24 PROCEDURE — 85610 PROTHROMBIN TIME: CPT

## 2025-04-24 PROCEDURE — 86140 C-REACTIVE PROTEIN: CPT

## 2025-04-24 PROCEDURE — 72156 MRI NECK SPINE W/O & W/DYE: CPT | Mod: MC

## 2025-04-24 PROCEDURE — 85730 THROMBOPLASTIN TIME PARTIAL: CPT

## 2025-04-24 PROCEDURE — 86036 ANCA SCREEN EACH ANTIBODY: CPT

## 2025-04-24 PROCEDURE — 99285 EMERGENCY DEPT VISIT HI MDM: CPT | Mod: 25

## 2025-04-24 PROCEDURE — 86803 HEPATITIS C AB TEST: CPT

## 2025-04-24 PROCEDURE — 84166 PROTEIN E-PHORESIS/URINE/CSF: CPT

## 2025-04-24 PROCEDURE — 82550 ASSAY OF CK (CPK): CPT

## 2025-04-24 PROCEDURE — G0480: CPT

## 2025-04-24 PROCEDURE — 93005 ELECTROCARDIOGRAM TRACING: CPT

## 2025-04-24 PROCEDURE — 80307 DRUG TEST PRSMV CHEM ANLYZR: CPT

## 2025-04-24 PROCEDURE — 82140 ASSAY OF AMMONIA: CPT

## 2025-04-24 PROCEDURE — 86618 LYME DISEASE ANTIBODY: CPT

## 2025-04-24 PROCEDURE — 82164 ANGIOTENSIN I ENZYME TEST: CPT

## 2025-04-24 PROCEDURE — 86038 ANTINUCLEAR ANTIBODIES: CPT

## 2025-04-24 PROCEDURE — 86235 NUCLEAR ANTIGEN ANTIBODY: CPT

## 2025-04-24 PROCEDURE — A9585: CPT

## 2025-04-24 PROCEDURE — 85652 RBC SED RATE AUTOMATED: CPT

## 2025-04-24 PROCEDURE — 84100 ASSAY OF PHOSPHORUS: CPT

## 2025-04-24 PROCEDURE — 82607 VITAMIN B-12: CPT

## 2025-04-24 PROCEDURE — 84443 ASSAY THYROID STIM HORMONE: CPT

## 2025-04-24 PROCEDURE — 87340 HEPATITIS B SURFACE AG IA: CPT

## 2025-04-24 PROCEDURE — 99232 SBSQ HOSP IP/OBS MODERATE 35: CPT | Mod: GC

## 2025-04-24 PROCEDURE — 82300 ASSAY OF CADMIUM: CPT

## 2025-04-24 PROCEDURE — 72158 MRI LUMBAR SPINE W/O & W/DYE: CPT | Mod: MC

## 2025-04-24 PROCEDURE — 84156 ASSAY OF PROTEIN URINE: CPT

## 2025-04-24 PROCEDURE — 85027 COMPLETE CBC AUTOMATED: CPT

## 2025-04-24 PROCEDURE — 84165 PROTEIN E-PHORESIS SERUM: CPT

## 2025-04-24 PROCEDURE — 80061 LIPID PANEL: CPT

## 2025-04-24 PROCEDURE — 82746 ASSAY OF FOLIC ACID SERUM: CPT

## 2025-04-24 PROCEDURE — 86334 IMMUNOFIX E-PHORESIS SERUM: CPT

## 2025-04-24 PROCEDURE — 84425 ASSAY OF VITAMIN B-1: CPT

## 2025-04-24 PROCEDURE — 83735 ASSAY OF MAGNESIUM: CPT

## 2025-04-24 PROCEDURE — 97165 OT EVAL LOW COMPLEX 30 MIN: CPT

## 2025-04-24 PROCEDURE — 80053 COMPREHEN METABOLIC PANEL: CPT

## 2025-04-24 PROCEDURE — 81003 URINALYSIS AUTO W/O SCOPE: CPT

## 2025-04-24 PROCEDURE — 83655 ASSAY OF LEAD: CPT

## 2025-04-24 PROCEDURE — 83036 HEMOGLOBIN GLYCOSYLATED A1C: CPT

## 2025-04-24 PROCEDURE — 83516 IMMUNOASSAY NONANTIBODY: CPT

## 2025-04-24 PROCEDURE — 84155 ASSAY OF PROTEIN SERUM: CPT

## 2025-04-24 PROCEDURE — 86706 HEP B SURFACE ANTIBODY: CPT

## 2025-04-24 PROCEDURE — 72157 MRI CHEST SPINE W/O & W/DYE: CPT | Mod: MC

## 2025-04-24 PROCEDURE — 85025 COMPLETE CBC W/AUTO DIFF WBC: CPT

## 2025-04-24 PROCEDURE — 82175 ASSAY OF ARSENIC: CPT

## 2025-04-24 PROCEDURE — 86335 IMMUNFIX E-PHORSIS/URINE/CSF: CPT

## 2025-04-24 RX ORDER — INSULIN GLARGINE-YFGN 100 [IU]/ML
20 INJECTION, SOLUTION SUBCUTANEOUS
Refills: 0 | DISCHARGE

## 2025-04-24 RX ORDER — AMLODIPINE BESYLATE 10 MG/1
1 TABLET ORAL
Qty: 0 | Refills: 0 | DISCHARGE
Start: 2025-04-24

## 2025-04-24 RX ORDER — IBUPROFEN 200 MG
3 TABLET ORAL
Refills: 0 | DISCHARGE

## 2025-04-24 RX ORDER — TELMISARTAN 20 MG/1
1 TABLET ORAL
Refills: 0 | DISCHARGE

## 2025-04-24 RX ORDER — GABAPENTIN 400 MG/1
2 CAPSULE ORAL
Qty: 90 | Refills: 0 | DISCHARGE
Start: 2025-04-24 | End: 2025-05-23

## 2025-04-24 RX ORDER — INSULIN GLARGINE-YFGN 100 [IU]/ML
20 INJECTION, SOLUTION SUBCUTANEOUS
Qty: 1 | Refills: 0
Start: 2025-04-24

## 2025-04-24 RX ORDER — GABAPENTIN 400 MG/1
1 CAPSULE ORAL
Qty: 90 | Refills: 0
Start: 2025-04-24 | End: 2025-05-23

## 2025-04-24 RX ORDER — INSULIN ASPART 100 [IU]/ML
12 INJECTION, SOLUTION INTRAVENOUS; SUBCUTANEOUS
Qty: 600 | Refills: 0
Start: 2025-04-24 | End: 2025-05-23

## 2025-04-24 RX ORDER — IBUPROFEN 200 MG
1 TABLET ORAL
Qty: 0 | Refills: 0 | DISCHARGE
Start: 2025-04-24

## 2025-04-24 RX ORDER — ACETAMINOPHEN 500 MG/5ML
2 LIQUID (ML) ORAL
Qty: 0 | Refills: 0 | DISCHARGE
Start: 2025-04-24

## 2025-04-24 RX ORDER — INSULIN GLARGINE-YFGN 100 [IU]/ML
20 INJECTION, SOLUTION SUBCUTANEOUS
Qty: 600 | Refills: 0
Start: 2025-04-24 | End: 2025-05-23

## 2025-04-24 RX ORDER — ACETAMINOPHEN 500 MG/5ML
1 LIQUID (ML) ORAL
Refills: 0 | DISCHARGE

## 2025-04-24 RX ORDER — INSULIN ASPART 100 [IU]/ML
20 INJECTION, SOLUTION INTRAVENOUS; SUBCUTANEOUS
Refills: 0 | DISCHARGE

## 2025-04-24 RX ORDER — B1/B2/B3/B5/B6/B12/VIT C/FOLIC 500-0.5 MG
1 TABLET ORAL
Qty: 0 | Refills: 0 | DISCHARGE
Start: 2025-04-24

## 2025-04-24 RX ADMIN — LOSARTAN POTASSIUM 50 MILLIGRAM(S): 100 TABLET, FILM COATED ORAL at 05:14

## 2025-04-24 RX ADMIN — INSULIN LISPRO 5 UNIT(S): 100 INJECTION, SOLUTION INTRAVENOUS; SUBCUTANEOUS at 11:33

## 2025-04-24 RX ADMIN — INSULIN LISPRO 2: 100 INJECTION, SOLUTION INTRAVENOUS; SUBCUTANEOUS at 11:33

## 2025-04-24 RX ADMIN — INSULIN LISPRO 3 UNIT(S): 100 INJECTION, SOLUTION INTRAVENOUS; SUBCUTANEOUS at 07:52

## 2025-04-24 RX ADMIN — Medication 1 TABLET(S): at 11:39

## 2025-04-24 RX ADMIN — INSULIN LISPRO 2: 100 INJECTION, SOLUTION INTRAVENOUS; SUBCUTANEOUS at 07:52

## 2025-04-24 RX ADMIN — Medication 1 GRAM(S): at 05:32

## 2025-04-24 RX ADMIN — Medication 40 MILLIGRAM(S): at 05:17

## 2025-04-24 RX ADMIN — GABAPENTIN 300 MILLIGRAM(S): 400 CAPSULE ORAL at 05:14

## 2025-04-24 RX ADMIN — AMLODIPINE BESYLATE 10 MILLIGRAM(S): 10 TABLET ORAL at 05:15

## 2025-04-24 NOTE — DIETITIAN INITIAL EVALUATION ADULT - PERSON TAUGHT/METHOD
Provided education on Carbohydrate Consistent diet including sources of carbohydrates, portion sizes, pairing protein with carbohydrates, limiting sugar sweetened beverages in diet and the importance of consistent eating pattern to help optimize glycemic control.   provided: My Plate Diabetes Guideline   Pt made aware that RD remains available./verbal instruction/written material/patient instructed

## 2025-04-24 NOTE — DIETITIAN INITIAL EVALUATION ADULT - ORAL INTAKE PTA/DIET HISTORY
visited pt at bedside this morning. reports good PO intake PTA. confirms PMHx of DM. Knows what diet to follow but admits that he hasn't been following recently. enjoys chicken and spinach. no known allergies noted in flow sheets.

## 2025-04-24 NOTE — DISCHARGE NOTE PROVIDER - NS AS DC PROVIDER CONTACT Y/N MULTI
Alexandra Rodriguez rates her left leg pain 4/10 today,described as  Aching, tight, numbness, tingling Yes

## 2025-04-24 NOTE — DISCHARGE NOTE NURSING/CASE MANAGEMENT/SOCIAL WORK - PATIENT PORTAL LINK FT
You can access the FollowMyHealth Patient Portal offered by [facility] by registering at the following website: [email or web address]. By joining [employer]’s FollowMyHealth portal, you will also be able to view your health information using other applications (apps) compatible with our system.

## 2025-04-24 NOTE — DISCHARGE NOTE PROVIDER - CARE PROVIDER_API CALL
Jesus Lacy  Neurology  [address], [address]  [place] [address]  Phone: [phone]  Fax: [phone]  Follow Up Time:

## 2025-04-24 NOTE — PROGRESS NOTE ADULT - SUBJECTIVE AND OBJECTIVE BOX
Pierce Worthy MD, PGY-5  Endocrinology fellow  Available on [employer] Teams    Interval Events: No acute overnight events. Pt seen and examined. Tolerating PO, no nausea/vomiting. No hypoglycemia symptoms. Denies fevers, chills, CP, SOB, Abdominal pain, constipation, Diarrhea.      MEDICATIONS  (STANDING):  amLODIPine   Tablet 10 milliGRAM(s) Oral daily  atorvastatin 10 milliGRAM(s) Oral daily  dextrose 5%. 1000 milliLiter(s) (50 mL/Hr) IV Continuous <Continuous>  dextrose 5%. 1000 milliLiter(s) (100 mL/Hr) IV Continuous <Continuous>  dextrose 50% Injectable 25 Gram(s) IV Push once  dextrose 50% Injectable 12.5 Gram(s) IV Push once  dextrose 50% Injectable 25 Gram(s) IV Push once  DULoxetine 60 milliGRAM(s) Oral at bedtime  gabapentin 300 milliGRAM(s) Oral three times a day  glucagon  Injectable 1 milliGRAM(s) IntraMuscular once  insulin glargine Injectable (LANTUS) 15 Unit(s) SubCutaneous at bedtime  insulin lispro (ADMELOG) corrective regimen sliding scale   SubCutaneous three times a day before meals  insulin lispro (ADMELOG) corrective regimen sliding scale   SubCutaneous at bedtime  insulin lispro Injectable (ADMELOG) 3 Unit(s) SubCutaneous before breakfast  insulin lispro Injectable (ADMELOG) 5 Unit(s) SubCutaneous before lunch  insulin lispro Injectable (ADMELOG) 5 Unit(s) SubCutaneous before dinner  losartan 50 milliGRAM(s) Oral daily  multivitamin 1 Tablet(s) Oral daily  pantoprazole    Tablet 40 milliGRAM(s) Oral before breakfast  sodium chloride 1 Gram(s) Oral two times a day    MEDICATIONS  (PRN):  acetaminophen     Tablet .. 650 milliGRAM(s) Oral every 6 hours PRN Mild Pain (1 - 3), Moderate Pain (4 - 6)  dextrose Oral Gel 15 Gram(s) Oral once PRN Blood Glucose LESS THAN 70 milliGRAM(s)/deciliter  ibuprofen  Tablet. 600 milliGRAM(s) Oral three times a day PRN Severe Pain (7 - 10)      Allergies    No Known Allergies    Intolerances          ================PHYSICAL EXAM======================  VITALS: T(C): 36.6 (04-24-25 @ 12:44)  T(F): 97.9 (04-24-25 @ 12:44), Max: 98.8 (04-23-25 @ 23:54)  HR: 92 (04-24-25 @ 12:44) (72 - 95)  BP: 111/67 (04-24-25 @ 12:44) (111/67 - 178/83)  RR:  (18 - 18)  SpO2:  (97% - 100%)  Wt(kg): --  GENERAL: NAD, appears comfortable  EYES: No proptosis, no lid lag, anicteric  HEENT:  Atraumatic, Normocephalic, moist mucous membranes  RESPIRATORY: nonlabored respirations, no wheezing  PSYCH: Alert and awake  ===================================================    CAPILLARY BLOOD GLUCOSE      POCT Blood Glucose.: 210 mg/dL (24 Apr 2025 11:20)  POCT Blood Glucose.: 241 mg/dL (24 Apr 2025 07:25)  POCT Blood Glucose.: 191 mg/dL (23 Apr 2025 21:05)  POCT Blood Glucose.: 184 mg/dL (23 Apr 2025 16:25)      04-24    129[L]  |  93[L]  |  9   ----------------------------<  213[H]  4.7   |  25  |  0.68    eGFR: 108    Ca    9.7      04-24  Mg     1.7     04-23  Phos  3.5     04-23    TPro  6.6  /  Alb  4.3  /  TBili  0.4  /  DBili  x   /  AST  24  /  ALT  27  /  AlkPhos  64  04-24      A1C with Estimated Average Glucose Result: 9.9 % (04-23-25 @ 04:00)  A1C with Estimated Average Glucose Result: 10.3 % (02-20-25 @ 14:58)  A1C with Estimated Average Glucose Result: 9.7 % (02-18-25 @ 07:30)      Thyroid Function Tests:  04-22 @ 15:40 TSH 1.06 FreeT4 -- T3 -- Anti TPO -- Anti Thyroglobulin Ab -- TSI --

## 2025-04-24 NOTE — DIETITIAN INITIAL EVALUATION ADULT - PHYSCIAL ASSESSMENT
reports 15 pound weight loss x 3 months secondary to subjective weakness. has not been exercising as much.  Weight per [facility] .78 1/2025 per [facility]  Dosing weight this admission 145.1 pounds   5.6% loss x 3 months.   RD will continue to monitor trends.

## 2025-04-24 NOTE — DISCHARGE NOTE PROVIDER - DETAILS OF MALNUTRITION DIAGNOSIS/DIAGNOSES
This patient has been assessed with a concern for Malnutrition and was treated during this hospitalization for the following Nutrition diagnosis/diagnoses:     -  04/24/2025: Moderate protein-calorie malnutrition

## 2025-04-24 NOTE — DIETITIAN INITIAL EVALUATION ADULT - OTHER INFO
-PMHx of Diabetes noted. Most recent Hgb A1c 9.9%. Pt reports previous Hgb A1c was 15%, got down to 5.5%. Now back up. insulin regimen ordered to maintain glycemic control  -hyponatremia noted

## 2025-04-24 NOTE — DISCHARGE NOTE PROVIDER - NSDCMRMEDTOKEN_GEN_ALL_CORE_FT
atorvastatin 10 mg oral tablet: 1 tab(s) orally once a day  Basaglar KwikPen 100 units/mL subcutaneous solution: 20 unit(s) subcutaneous once a day (at bedtime)  DULoxetine 30 mg oral delayed release capsule: 2 cap(s) orally once a day (at bedtime)  ibuprofen 200 mg oral tablet: 3 tab(s) orally as needed  Men&#x27;s One a day Multivitamin tablet: 1 tablet orally once a day  methocarbamol 750 mg oral tablet: 2 tab(s) orally 3 times a day  NovoLOG 100 units/mL subcutaneous solution: 20 unit(s) subcutaneous once a day via patch  omeprazole 20 mg oral delayed release capsule: 1 cap(s) orally once a day before breakfast  telmisartan 40 mg oral tablet: 1 tab(s) orally once a day  Tylenol Arthritis Extended Release 650 mg oral tablet, extended release: 1 tab(s) orally as needed   atorvastatin 10 mg oral tablet: 1 tab(s) orally once a day  Basaglar KwikPen 100 units/mL subcutaneous solution: 20 unit(s) subcutaneous once a day (at bedtime)  DULoxetine 30 mg oral delayed release capsule: 2 cap(s) orally once a day (at bedtime)  gabapentin 300 mg oral capsule: 1 cap(s) orally 3 times a day  ibuprofen 200 mg oral tablet: 3 tab(s) orally as needed  Men&#x27;s One a day Multivitamin tablet: 1 tablet orally once a day  methocarbamol 750 mg oral tablet: 2 tab(s) orally 3 times a day  NovoLOG 100 units/mL subcutaneous solution: 20 unit(s) subcutaneous once a day via patch  omeprazole 20 mg oral delayed release capsule: 1 cap(s) orally once a day before breakfast  rolling walker: Please use as assistance with walking  telmisartan 40 mg oral tablet: 1 tab(s) orally once a day  Tylenol Arthritis Extended Release 650 mg oral tablet, extended release: 1 tab(s) orally as needed   amLODIPine 10 mg oral tablet: 1 tab(s) orally once a day  atorvastatin 10 mg oral tablet: 1 tab(s) orally once a day  Basaglar KwikPen 100 units/mL subcutaneous solution: 20 unit(s) subcutaneous once a day (at bedtime)  DULoxetine 30 mg oral delayed release capsule: 2 cap(s) orally once a day (at bedtime)  gabapentin 300 mg oral capsule: 1 cap(s) orally 3 times a day  Men&#x27;s One a day Multivitamin tablet: 1 tablet orally once a day  methocarbamol 750 mg oral tablet: 2 tab(s) orally 3 times a day  Multiple Vitamins oral tablet: 1 tab(s) orally once a day  NovoLOG FlexPen 100 units/mL injectable solution: 12 unit(s) injectable 3 times a day Give 8 to 12 units before meals.  omeprazole 20 mg oral delayed release capsule: 1 cap(s) orally once a day before breakfast  rolling walker: Please use as assistance with walking  telmisartan 40 mg oral tablet: 1 tab(s) orally once a day

## 2025-04-24 NOTE — PROGRESS NOTE ADULT - SUBJECTIVE AND OBJECTIVE BOX
SUBJECTIVE/INTERVAL HISTORY:    PAST MEDICAL & SURGICAL HISTORY:  GERD (gastroesophageal reflux disease)      Alcohol abuse      Left lower quadrant abdominal pain      Elbow fracture      Benign hypertension      Scoliosis      Pancreatic duct stones      Diabetes mellitus, type 2      Pancreatitis, chronic      Gallstones      H/O elbow surgery        FAMILY HISTORY:  FH: type 2 diabetes (Mother)        MEDICATIONS (HOME):  Home Medications:  atorvastatin 10 mg oral tablet: 1 tab(s) orally once a day (2025 15:04)  Basaglar KwikPen 100 units/mL subcutaneous solution: 20 unit(s) subcutaneous once a day (at bedtime) (2025 15:04)  DULoxetine 30 mg oral delayed release capsule: 2 cap(s) orally once a day (at bedtime) (2025 15:04)  ibuprofen 200 mg oral tablet: 3 tab(s) orally as needed (2025 15:06)  Men&#x27;s One a day Multivitamin tablet: 1 tablet orally once a day (2025 15:06)  methocarbamol 750 mg oral tablet: 2 tab(s) orally 3 times a day (2025 15:06)  NovoLOG 100 units/mL subcutaneous solution: 20 unit(s) subcutaneous once a day via patch (2025 14:51)  omeprazole 20 mg oral delayed release capsule: 1 cap(s) orally once a day before breakfast (2025 15:06)  telmisartan 40 mg oral tablet: 1 tab(s) orally once a day (2025 15:04)  Tylenol Arthritis Extended Release 650 mg oral tablet, extended release: 1 tab(s) orally as needed (2025 15:06)    MEDICATIONS  (STANDING):  amLODIPine   Tablet 10 milliGRAM(s) Oral daily  atorvastatin 10 milliGRAM(s) Oral daily  dextrose 5%. 1000 milliLiter(s) (50 mL/Hr) IV Continuous <Continuous>  dextrose 5%. 1000 milliLiter(s) (100 mL/Hr) IV Continuous <Continuous>  dextrose 50% Injectable 25 Gram(s) IV Push once  dextrose 50% Injectable 12.5 Gram(s) IV Push once  dextrose 50% Injectable 25 Gram(s) IV Push once  DULoxetine 60 milliGRAM(s) Oral at bedtime  gabapentin 300 milliGRAM(s) Oral three times a day  glucagon  Injectable 1 milliGRAM(s) IntraMuscular once  insulin glargine Injectable (LANTUS) 15 Unit(s) SubCutaneous at bedtime  insulin lispro (ADMELOG) corrective regimen sliding scale   SubCutaneous three times a day before meals  insulin lispro (ADMELOG) corrective regimen sliding scale   SubCutaneous at bedtime  insulin lispro Injectable (ADMELOG) 3 Unit(s) SubCutaneous before breakfast  insulin lispro Injectable (ADMELOG) 5 Unit(s) SubCutaneous before lunch  insulin lispro Injectable (ADMELOG) 5 Unit(s) SubCutaneous before dinner  losartan 50 milliGRAM(s) Oral daily  multivitamin 1 Tablet(s) Oral daily  pantoprazole    Tablet 40 milliGRAM(s) Oral before breakfast  sodium chloride 1 Gram(s) Oral two times a day    MEDICATIONS  (PRN):  acetaminophen     Tablet .. 650 milliGRAM(s) Oral every 6 hours PRN Mild Pain (1 - 3), Moderate Pain (4 - 6)  dextrose Oral Gel 15 Gram(s) Oral once PRN Blood Glucose LESS THAN 70 milliGRAM(s)/deciliter  ibuprofen  Tablet. 600 milliGRAM(s) Oral three times a day PRN Severe Pain (7 - 10)    ALLERGIES/INTOLERANCES:  Allergies  No Known Allergies    Intolerances    VITALS & EXAMINATION:  Vital Signs Last 24 Hrs  T(C): 37.1 (2025 23:54), Max: 37.1 (2025 23:54)  T(F): 98.8 (2025 23:54), Max: 98.8 (2025 23:54)  HR: 92 (2025 04:40) (72 - 95)  BP: 132/75 (2025 04:40) (116/60 - 178/83)  BP(mean): --  RR: 18 (2025 04:40) (18 - 18)  SpO2: 98% (2025 04:40) (97% - 100%)    Parameters below as of 2025 04:40  Patient On (Oxygen Delivery Method): room air          Physical Examination: unchanged from admission    Neurologic Exam:  Mental status:  Eyes open spontaneously, attends to examiner; Oriented to: person, place, and time; Speech: fluent; Repetition and naming: intact; Follows simple and cross commands; Attention/concentration: intact; Recent and remote memory (including registration and recall): registration intact; Fund of knowledge: intact    Cranial nerves - PERRL - no rAPD, VFF with finger counting, EOMI - no nystagmus, face sensation (V1-V3) intact b/l, facial strength intact without asymmetry b/l, [place], palate with symmetric elevation, shoulder shrug intact b/l, tongue midline on protrusion with full lateral movement  Dysarthria: not present    Motor - Muscle atrophy noted in both upper and lower extremities. No pronator drift.  Strength testing (R/L)  Deltoid:  5/5   Biceps:  5/5   Triceps:  5/5   Wrist Extension:  5/5   Wrist Flexion:  5/5   Interossei:  5/5   :  5/5  Hip Flexion:  5/5   Hip Extension:  5/5   Knee Flexion:  5/5   Knee Extension:  5/5   Dorsiflexion:  5/5   Plantar Flexion:  5/5  *strength in BLE limited by pain and paresthesia    Sensation - Light touch decreased from base of neck to the base of the rib cage - anterior/posteriorly (chest/back), decreased b/l UEs    DTRs (R/L)  Biceps:  1+/1+   Patellar:  0/0   Toes/plantar response:  Down/Down    Coordination - FNF, HTS intact b/l.    Gait and station - Did not assess d/t fall risk/safety concerns.      LABORATORY:  CBC                       14.5   5.62  )-----------( 236      ( 2025 05:25 )             40.7     Chem 04-24    129[L]  |  93[L]  |  9   ----------------------------<  213[H]  4.7   |  25  |  0.68    Ca    9.7      2025 05:25  Phos  3.5     04-23  Mg     1.7     04-23    TPro  6.6  /  Alb  4.3  /  TBili  0.4  /  DBili  x   /  AST  24  /  ALT  27  /  AlkPhos  64  04-24    LFTs LIVER FUNCTIONS - ( 2025 05:25 )  Alb: 4.3 g/dL / Pro: 6.6 g/dL / ALK PHOS: 64 U/L / ALT: 27 U/L / AST: 24 U/L / GGT: x           Coagulopathy PT/INR - ( 2025 09:27 )   PT: 10.7 sec;   INR: 0.93 ratio         PTT - ( 2025 09:27 )  PTT:32.5 sec  Lipid Panel   A1c   Cardiac enzymes     U/A Urinalysis Basic - ( 2025 05:25 )    Color: x / Appearance: x / SG: x / pH: x  Gluc: 213 mg/dL / Ketone: x  / Bili: x / Urobili: x   Blood: x / Protein: x / Nitrite: x   Leuk Esterase: x / RBC: x / WBC x   Sq Epi: x / Non Sq Epi: x / Bacteria: x        STUDIES & IMAGIN/22 MRI brain wwo and CTL wwo    IMPRESSION:    MRI BRAIN: No acute infarction. No abnormal enhancement.    MRI CERVICAL SPINE: Unremarkable.    MRI THORACIC SPINE: Posterior epidural lipomatosis. No cord signal   abnormality.    MRI LUMBAR SPINE: Mild spondylosis. No evidence of exiting nerve root   compression.       SUBJECTIVE/INTERVAL HISTORY:  Patient feeling well. He doesn't want home PT OT he wants outpatient as he has a clinic to go to. The importance of diabetes management and diligence was stressed with the patient.    PAST MEDICAL & SURGICAL HISTORY:  GERD (gastroesophageal reflux disease)      Alcohol abuse      Left lower quadrant abdominal pain      Elbow fracture      Benign hypertension      Scoliosis      Pancreatic duct stones      Diabetes mellitus, type 2      Pancreatitis, chronic      Gallstones      H/O elbow surgery        FAMILY HISTORY:  FH: type 2 diabetes (Mother)        MEDICATIONS (HOME):  Home Medications:  atorvastatin 10 mg oral tablet: 1 tab(s) orally once a day (2025 15:04)  Basaglar KwikPen 100 units/mL subcutaneous solution: 20 unit(s) subcutaneous once a day (at bedtime) (2025 15:04)  DULoxetine 30 mg oral delayed release capsule: 2 cap(s) orally once a day (at bedtime) (2025 15:04)  ibuprofen 200 mg oral tablet: 3 tab(s) orally as needed (2025 15:06)  Men&#x27;s One a day Multivitamin tablet: 1 tablet orally once a day (2025 15:06)  methocarbamol 750 mg oral tablet: 2 tab(s) orally 3 times a day (2025 15:06)  NovoLOG 100 units/mL subcutaneous solution: 20 unit(s) subcutaneous once a day via patch (2025 14:51)  omeprazole 20 mg oral delayed release capsule: 1 cap(s) orally once a day before breakfast (2025 15:06)  telmisartan 40 mg oral tablet: 1 tab(s) orally once a day (2025 15:04)  Tylenol Arthritis Extended Release 650 mg oral tablet, extended release: 1 tab(s) orally as needed (2025 15:06)    MEDICATIONS  (STANDING):  amLODIPine   Tablet 10 milliGRAM(s) Oral daily  atorvastatin 10 milliGRAM(s) Oral daily  dextrose 5%. 1000 milliLiter(s) (50 mL/Hr) IV Continuous <Continuous>  dextrose 5%. 1000 milliLiter(s) (100 mL/Hr) IV Continuous <Continuous>  dextrose 50% Injectable 25 Gram(s) IV Push once  dextrose 50% Injectable 12.5 Gram(s) IV Push once  dextrose 50% Injectable 25 Gram(s) IV Push once  DULoxetine 60 milliGRAM(s) Oral at bedtime  gabapentin 300 milliGRAM(s) Oral three times a day  glucagon  Injectable 1 milliGRAM(s) IntraMuscular once  insulin glargine Injectable (LANTUS) 15 Unit(s) SubCutaneous at bedtime  insulin lispro (ADMELOG) corrective regimen sliding scale   SubCutaneous three times a day before meals  insulin lispro (ADMELOG) corrective regimen sliding scale   SubCutaneous at bedtime  insulin lispro Injectable (ADMELOG) 3 Unit(s) SubCutaneous before breakfast  insulin lispro Injectable (ADMELOG) 5 Unit(s) SubCutaneous before lunch  insulin lispro Injectable (ADMELOG) 5 Unit(s) SubCutaneous before dinner  losartan 50 milliGRAM(s) Oral daily  multivitamin 1 Tablet(s) Oral daily  pantoprazole    Tablet 40 milliGRAM(s) Oral before breakfast  sodium chloride 1 Gram(s) Oral two times a day    MEDICATIONS  (PRN):  acetaminophen     Tablet .. 650 milliGRAM(s) Oral every 6 hours PRN Mild Pain (1 - 3), Moderate Pain (4 - 6)  dextrose Oral Gel 15 Gram(s) Oral once PRN Blood Glucose LESS THAN 70 milliGRAM(s)/deciliter  ibuprofen  Tablet. 600 milliGRAM(s) Oral three times a day PRN Severe Pain (7 - 10)    ALLERGIES/INTOLERANCES:  Allergies  No Known Allergies    Intolerances    VITALS & EXAMINATION:  Vital Signs Last 24 Hrs  T(C): 37.1 (2025 23:54), Max: 37.1 (2025 23:54)  T(F): 98.8 (2025 23:54), Max: 98.8 (2025 23:54)  HR: 92 (2025 04:40) (72 - 95)  BP: 132/75 (2025 04:40) (116/60 - 178/83)  BP(mean): --  RR: 18 (2025 04:40) (18 - 18)  SpO2: 98% (2025 04:40) (97% - 100%)    Parameters below as of 2025 04:40  Patient On (Oxygen Delivery Method): room air          Physical Examination: unchanged from admission    Neurologic Exam:  Mental status:  Eyes open spontaneously, attends to examiner; Oriented to: person, place, and time; Speech: fluent; Repetition and naming: intact; Follows simple and cross commands; Attention/concentration: intact; Recent and remote memory (including registration and recall): registration intact; Fund of knowledge: intact    Cranial nerves - PERRL - no rAPD, VFF with finger counting, EOMI - no nystagmus, face sensation (V1-V3) intact b/l, facial strength intact without asymmetry b/l, [place], palate with symmetric elevation, shoulder shrug intact b/l, tongue midline on protrusion with full lateral movement  Dysarthria: not present    Motor - Muscle atrophy noted in both upper and lower extremities. No pronator drift.  Strength testing (R/L)  Deltoid:  5/5   Biceps:  5/5   Triceps:  5/5   Wrist Extension:  5/5   Wrist Flexion:  5/5   Interossei:  5/5   :  5/5  Hip Flexion:  5/5   Hip Extension:  5/5   Knee Flexion:  5/5   Knee Extension:  5/5   Dorsiflexion:  5/5   Plantar Flexion:  5/5  *strength in BLE limited by pain and paresthesia    Sensation - Light touch decreased from base of neck to the base of the rib cage - anterior/posteriorly (chest/back), decreased b/l UEs    DTRs (R/L)  Biceps:  1+/1+   Patellar:  0/0   Toes/plantar response:  Down/Down    Coordination - FNF, HTS intact b/l.    Gait and station - Did not assess d/t fall risk/safety concerns.      LABORATORY:  CBC                       14.5   5.62  )-----------( 236      ( 2025 05:25 )             40.7     Chem 04-24    129[L]  |  93[L]  |  9   ----------------------------<  213[H]  4.7   |  25  |  0.68    Ca    9.7      2025 05:25  Phos  3.5     04-23  Mg     1.7     04-23    TPro  6.6  /  Alb  4.3  /  TBili  0.4  /  DBili  x   /  AST  24  /  ALT  27  /  AlkPhos  64  04-24    LFTs LIVER FUNCTIONS - ( 2025 05:25 )  Alb: 4.3 g/dL / Pro: 6.6 g/dL / ALK PHOS: 64 U/L / ALT: 27 U/L / AST: 24 U/L / GGT: x           Coagulopathy PT/INR - ( 2025 09:27 )   PT: 10.7 sec;   INR: 0.93 ratio         PTT - ( 2025 09:27 )  PTT:32.5 sec  Lipid Panel   A1c   Cardiac enzymes     U/A Urinalysis Basic - ( 2025 05:25 )    Color: x / Appearance: x / SG: x / pH: x  Gluc: 213 mg/dL / Ketone: x  / Bili: x / Urobili: x   Blood: x / Protein: x / Nitrite: x   Leuk Esterase: x / RBC: x / WBC x   Sq Epi: x / Non Sq Epi: x / Bacteria: x        STUDIES & IMAGIN/22 MRI brain wwo and CTL wwo    IMPRESSION:    MRI BRAIN: No acute infarction. No abnormal enhancement.    MRI CERVICAL SPINE: Unremarkable.    MRI THORACIC SPINE: Posterior epidural lipomatosis. No cord signal   abnormality.    MRI LUMBAR SPINE: Mild spondylosis. No evidence of exiting nerve root   compression.

## 2025-04-24 NOTE — DISCHARGE NOTE NURSING/CASE MANAGEMENT/SOCIAL WORK - FINANCIAL ASSISTANCE
[facility] provides services at a reduced cost to those who are determined to be eligible through [facility]’s financial assistance program. Information regarding [facility]’s financial assistance program can be found by going to [email or web address] or by calling [phone].

## 2025-04-24 NOTE — DISCHARGE NOTE NURSING/CASE MANAGEMENT/SOCIAL WORK - NSDCPEFALRISK_GEN_ALL_CORE
For information on Fall & Injury Prevention, visit: [email or web address] OR  [email or web address] OR  https://www.cdc.gov/steadi/patient.html

## 2025-04-24 NOTE — PROGRESS NOTE ADULT - TIME BILLING
I spent 80 minutes in preparation to see the patient, including reviewing the brain imaging in past one year, obtaining and/or reviewing the resident/ or PA note, separately obtained history, performing a medically appropriate examination and/or evaluation as documented in this encounter note, counseling and educating of the patient, ordering tests, documenting clinical information in patients electronic record , interpreting results (not separately reported) and communicating results to the patient.   Evaluation was performed on 4/23/25  Paresthesia   Started on Gabapentin/ Cymbalta  Poor control DM  Pending MRI results

## 2025-04-24 NOTE — PROGRESS NOTE ADULT - ATTENDING COMMENTS
Agree with assessment and plan as above by Dr. Worthy. Reviewed all pertinent labs, glucose values, and imaging studies. Modifications made as indicated above. Pt. with hx of СЕРГЕЙ admitted for weakness and paresthesias now improving. Awaiting discharge. Can d/c on home regimen.     Frandy Rosenbaum D.O  [phone]

## 2025-04-24 NOTE — PROGRESS NOTE ADULT - ASSESSMENT
59y RIGHT-handed man, with PMH significant for EtOH use disorder, chronic pancreatitis, poorly-controlled СЕРГЕЙ/T1DM (A1c 10.3, 2/2025), HTN, who presents to [facility] ED on 4/22/2025, as the behest of his outpatient neurologist, for further emergent workup for numbness, subjective weakness.    Impression: Anterior/posterior numbness in the thoracic region and UEs. Allodynia of the dorsum of the LEFT foot and cramping pain in the posterior RLE. Subjective c/o whole body weakness. neuroaxis imaging (-) for infarction or acute signal enhancement. No clear unifying diagnosis however patient presentation may be a sequelae of poorly controlled diabetes, chronic alcohol abuse, and spinal epidural lipomatosis compression thoracic spinal cord.    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 when pt presented for DKA, 10/2022: C-peptide 0.7 when glucose 155 - LOW INSULIN RESERVE)  INPATIENT RECOMMENDATIONS:  - increase Lantus to 15 units at bedtime  - start Admelog 3 units before breakfast (HOLD IF NPO) - breakfast usually smallest meal  - start Admelog 5 units before lunch (HOLD IF NPO)  - start Admelog 5 units before dinner (HOLD IF NPO)  - start low admelog correctional scale premeal   - start low admelog correctional scale at bedtime  ***Pt has СЕРГЕЙ/T1DM and cannot be without basal insulin due to the risk of going into DKA (pt had DKA in 2021 and 2022)****  - Registered Dietician consult placed     [] Continue all other home medications    misc and manage:  [] PT/OT  [] keep patient normotensive (<140/90)    Diet: CC + DASH/TLC, further recs pending diet consult  DVT prophylaxis: SCDs only - pending LP  Dispo: Pending clinical course. F/u with Dr. Bárbara Hays upon discharge - [phone].      Case discussed w Jeyson Jimenez 59y RIGHT-handed man, with PMH significant for EtOH use disorder, chronic pancreatitis, poorly-controlled СЕРГЕЙ/T1DM (A1c 10.3, 2/2025), HTN, who presents to [facility] ED on 4/22/2025, as the behest of his outpatient neurologist, for further emergent workup for numbness, subjective weakness.    Impression: Anterior/posterior numbness in the thoracic region and UEs. Allodynia of the dorsum of the LEFT foot and cramping pain in the posterior RLE. Subjective c/o whole body weakness. neuroaxis imaging (-) for infarction or acute signal enhancement. No clear unifying diagnosis however patient presentation may be a sequelae of poorly controlled diabetes, chronic alcohol abuse, and spinal epidural lipomatosis compression thoracic spinal cord.    Recommendations:  #NEURO  diag:  [x] MRI brain, C/T/L w/wo IV contrast  [/] Labs:   ---complete: HIV (-), Lyme (-), hepatitis panel (nonreactive including B surf ab, would need vaccine), B12 (441), folate (>20), TSH (1.06), A1c (9.9), UTox (-), ESR (6), CRP (<3), ammonia (13), SPEP (6.6 WNL)  ---pending:  UPEP, paraneoplastic panel, vitamin B1, B6 ACE, ANSELMO, ANCA, ganglioside panel, Sjogren antibodies, heavy metals, PETH    meds:  [/] Starting Gabapentin 300 mg TID  [] Continue with Duloxetine 60 mg QHS  [] Continue all other home medications, BP meds rec below    misc and manage:  [] PT/OT  [] keep patient normotensive (<140/90)      #ENDO  [] Endocrinology recs taken from endo consult note:  #T1DM/СЕРГЕЙ (+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 when pt presented for DKA, 10/2022: C-peptide 0.7 when glucose 155 - LOW INSULIN RESERVE)  INPATIENT RECOMMENDATIONS:  - increase Lantus to 15 units at bedtime  - start Admelog 3 units before breakfast (HOLD IF NPO) - breakfast usually smallest meal  - start Admelog 5 units before lunch (HOLD IF NPO)  - start Admelog 5 units before dinner (HOLD IF NPO)  - start low admelog correctional scale premeal   - start low admelog correctional scale at bedtime  ***Pt has СЕРГЕЙ/T1DM and cannot be without basal insulin due to the risk of going into DKA (pt had DKA in 2021 and 2022)****  - Registered Dietician consult placed     #CARDIO  #HTN  -patient continuing to have elevated pressures to 150s-170s SBP during admission  [] amlodipine increased to 10 mg daily        Diet: CC + DASH/TLC, further recs pending diet consult  DVT prophylaxis: SCDs only - pending LP  Dispo: Pending clinical course. F/u with Dr. Bárbara Hays upon discharge - [phone].      Case discussed w Jeyson Jimenez 59y RIGHT-handed man, with PMH significant for EtOH use disorder, chronic pancreatitis, poorly-controlled СЕРГЕЙ/T1DM (A1c 10.3, 2/2025), HTN, who presents to [facility] ED on 4/22/2025, as the behest of his outpatient neurologist, for further emergent workup for numbness, subjective weakness.    Impression: STABLE Anterior/posterior numbness in the thoracic region and UEs. Allodynia of the dorsum of the LEFT foot and cramping pain in the posterior RLE. Subjective c/o whole body weakness. neuroaxis imaging (-) for infarction or acute signal enhancement. No clear unifying diagnosis however patient presentation may be a sequelae of poorly controlled diabetes, chronic alcohol abuse, and spinal epidural lipomatosis compression thoracic spinal cord.  Patient stable for discharge 4/24/2023    Recommendations:  #NEURO  diag:  [x] MRI brain, C/T/L w/wo IV contrast  [/] Labs:   ---complete: HIV (-), Lyme (-), hepatitis panel (nonreactive including B surf ab, would need vaccine), B12 (441), folate (>20), TSH (1.06), A1c (9.9), UTox (-), ESR (6), CRP (<3), ammonia (13), SPEP (6.6 WNL)  ---pending:  UPEP, paraneoplastic panel, vitamin B1, B6 ACE, ANSELMO, ANCA, ganglioside panel, Sjogren antibodies, heavy metals, PETH    meds:  [/] Starting Gabapentin 300 mg TID  [] Continue with Duloxetine 60 mg QHS  [] Continue all other home medications, BP meds rec below    misc and manage:  [] PT/OT  [] keep patient normotensive (<140/90)      #ENDO  [] Endocrinology recs taken from endo consult note:  #T1DM/СЕРГЕЙ (+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 when pt presented for DKA, 10/2022: C-peptide 0.7 when glucose 155 - LOW INSULIN RESERVE)  INPATIENT RECOMMENDATIONS:  - increase Lantus to 15 units at bedtime  - start Admelog 3 units before breakfast (HOLD IF NPO) - breakfast usually smallest meal  - start Admelog 5 units before lunch (HOLD IF NPO)  - start Admelog 5 units before dinner (HOLD IF NPO)  - start low admelog correctional scale premeal   - start low admelog correctional scale at bedtime  ***Pt has СЕРГЕЙ/T1DM and cannot be without basal insulin due to the risk of going into DKA (pt had DKA in 2021 and 2022)****  - Registered Dietician consult placed     #CARDIO  #HTN  -patient continuing to have elevated pressures to 150s-170s SBP during admission  [] amlodipine increased to 10 mg daily        Diet: CC + DASH/TLC, further recs pending diet consult  DVT prophylaxis: SCDs only - pending LP  Dispo: Pending clinical course. F/u with Dr. Bárbara Hays upon discharge - [phone].      Case discussed w Jeyson Jimenez

## 2025-04-24 NOTE — DIETITIAN INITIAL EVALUATION ADULT - PERTINENT MEDS FT
MEDICATIONS  (STANDING):  amLODIPine   Tablet 10 milliGRAM(s) Oral daily  atorvastatin 10 milliGRAM(s) Oral daily  dextrose 5%. 1000 milliLiter(s) (50 mL/Hr) IV Continuous <Continuous>  dextrose 5%. 1000 milliLiter(s) (100 mL/Hr) IV Continuous <Continuous>  dextrose 50% Injectable 25 Gram(s) IV Push once  dextrose 50% Injectable 12.5 Gram(s) IV Push once  dextrose 50% Injectable 25 Gram(s) IV Push once  DULoxetine 60 milliGRAM(s) Oral at bedtime  gabapentin 300 milliGRAM(s) Oral three times a day  glucagon  Injectable 1 milliGRAM(s) IntraMuscular once  insulin glargine Injectable (LANTUS) 15 Unit(s) SubCutaneous at bedtime  insulin lispro (ADMELOG) corrective regimen sliding scale   SubCutaneous three times a day before meals  insulin lispro (ADMELOG) corrective regimen sliding scale   SubCutaneous at bedtime  insulin lispro Injectable (ADMELOG) 3 Unit(s) SubCutaneous before breakfast  insulin lispro Injectable (ADMELOG) 5 Unit(s) SubCutaneous before lunch  insulin lispro Injectable (ADMELOG) 5 Unit(s) SubCutaneous before dinner  losartan 50 milliGRAM(s) Oral daily  multivitamin 1 Tablet(s) Oral daily  pantoprazole    Tablet 40 milliGRAM(s) Oral before breakfast  sodium chloride 1 Gram(s) Oral two times a day    MEDICATIONS  (PRN):  acetaminophen     Tablet .. 650 milliGRAM(s) Oral every 6 hours PRN Mild Pain (1 - 3), Moderate Pain (4 - 6)  dextrose Oral Gel 15 Gram(s) Oral once PRN Blood Glucose LESS THAN 70 milliGRAM(s)/deciliter  ibuprofen  Tablet. 600 milliGRAM(s) Oral three times a day PRN Severe Pain (7 - 10)

## 2025-04-24 NOTE — DIETITIAN INITIAL EVALUATION ADULT - PERTINENT LABORATORY DATA
04-24    129[L]  |  93[L]  |  9   ----------------------------<  213[H]  4.7   |  25  |  0.68    Ca    9.7      24 Apr 2025 05:25  Phos  3.5     04-23  Mg     1.7     04-23    TPro  6.6  /  Alb  4.3  /  TBili  0.4  /  DBili  x   /  AST  24  /  ALT  27  /  AlkPhos  64  04-24  POCT Blood Glucose.: 241 mg/dL (04-24-25 @ 07:25)  A1C with Estimated Average Glucose Result: 9.9 % (04-23-25 @ 04:00)  A1C with Estimated Average Glucose Result: 10.3 % (02-20-25 @ 14:58)  A1C with Estimated Average Glucose Result: 9.7 % (02-18-25 @ 07:30)

## 2025-04-24 NOTE — DISCHARGE NOTE PROVIDER - HOSPITAL COURSE
59y (1965) RIGHT-handed man who presents to [facility] ED on 4/22/2025, as the behest of his outpatient neurologist, for further emergent workup.    PMH significant for: EtOH use disorder, chronic pancreatitis, poorly-controlled СЕРГЕЙ (A1c 10.3, 2/2025), HTN    Patient has seen Dr. Boone (Orthopedic Spine Surgery) and PCP outpatient. Had been referred to Neurology. Saw Dr. Bárbara Hays today. Sent to ED for LP and spine imaging. I spoke with Dr. Hays via Teams. She is concerned for possible transverse myelitis at T4 vs. AIDP vs. diabetic neuropathy. Patient has had entire MRI of the neuro-axis in April 2025, but without contrast. Findings were non-actionable.    Patient is accompanied by his wife. Patient tells me that in October 2024 - he developed excruciating pain in the dorsum of his LEFT foot. Painful to even light touch. He denies any imaging was performed on his foot. No edema or overlying rash. Also developing pain in the back of his RLE since then. Cramping pain. Says in the last three weeks - he has been developing numbness at the level of his rib cage upward - front and back.     Patient was on our exam to have signs concerning for sciatica and neuropathy involving the LLE. Patient was prescribed Gabapentin 300 mg TID with some improvement. 59y (1965) RIGHT-handed man who presents to [facility] ED on 4/22/2025, as the behest of his outpatient neurologist, for further emergent workup.    PMH significant for: EtOH use disorder, chronic pancreatitis, poorly-controlled СЕРГЕЙ (A1c 10.3, 2/2025), HTN    Patient has seen Dr. Boone (Orthopedic Spine Surgery) and PCP outpatient. Had been referred to Neurology. Saw Dr. Bárbara Hays today. Sent to ED for LP and spine imaging. I spoke with Dr. Hays via Teams. She is concerned for possible transverse myelitis at T4 vs. AIDP vs. diabetic neuropathy. Patient has had entire MRI of the neuro-axis in April 2025, but without contrast. Findings were non-actionable.    Patient is accompanied by his wife. Patient tells me that in October 2024 - he developed excruciating pain in the dorsum of his LEFT foot. Painful to even light touch. He denies any imaging was performed on his foot. No edema or overlying rash. Also developing pain in the back of his RLE since then. Cramping pain. Says in the last three weeks - he has been developing numbness at the level of his rib cage upward - front and back.     Patient was on our exam to have signs concerning for sciatica and neuropathy involving the LLE. Patient was prescribed Gabapentin 300 mg TID with some improvement. MRI Brain and spine was performed, grossly unremarkable except for chronic lipomyomatosis. He also was seen by endocrinology who provided recommendations for better glucose control. Patient was considered stable for discharge home on date of discharge.    59y (1965) RIGHT-handed man who presents to [facility] ED on 4/22/2025, as the behest of his outpatient neurologist, for further emergent workup.    PMH significant for: EtOH use disorder, chronic pancreatitis, poorly-controlled СЕРГЕЙ (A1c 10.3, 2/2025), HTN    Patient has seen Dr. Boone (Orthopedic Spine Surgery) and PCP outpatient. Had been referred to Neurology. Saw Dr. Bárbara Hays today. Sent to ED for LP and spine imaging. I spoke with Dr. Hays via Teams. She is concerned for possible transverse myelitis at T4 vs. AIDP vs. diabetic neuropathy. Patient has had entire MRI of the neuro-axis in April 2025, but without contrast. Findings were non-actionable.    Patient is accompanied by his wife. Patient tells me that in October 2024 - he developed excruciating pain in the dorsum of his LEFT foot. Painful to even light touch. He denies any imaging was performed on his foot. No edema or overlying rash. Also developing pain in the back of his RLE since then. Cramping pain. Says in the last three weeks - he has been developing numbness at the level of his rib cage upward - front and back.     Patient was on our exam to have signs concerning for sciatica and neuropathy involving the LLE. Patient was prescribed Gabapentin 300 mg TID with some improvement. MRI Brain and spine was performed, grossly unremarkable except for chronic lipomyomatosis. He also was seen by endocrinology who provided recommendations for better glucose control. Patient was considered stable for discharge home on date of discharge.

## 2025-04-24 NOTE — DIETITIAN INITIAL EVALUATION ADULT - ADD RECOMMEND
1) Will continue to monitor PO intake, weight, labs, skin, GI status and diet 2) RD to add diet Mighty shake daily to aid in meeting nutrient needs 3) nutrition risk placed in chart

## 2025-04-24 NOTE — PROGRESS NOTE ADULT - ASSESSMENT
59y RIGHT-handed man, with PMH significant for EtOH use disorder, chronic pancreatitis, poorly-controlled СЕРГЕЙ/T1Dm(A1c 10.3, 2/2025), HTN, who presents to [facility] ED on 4/22/2025, , for further emergent workup for numbness, subjective weakness. 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 Toscano (currently on materity leave, so in the interim following with Dr. Collette Padilla – last saw in 4/9/25)  --Outpatient regimen: - Basaglar 15-20 units of basal (Last time he took it basal insulin was ~1 week ago – states he has not been sleeping d/t pain so has not taken it). Uses Cequr patch, usually uses 8-12 premeal - took the patch off on admission  --Adherence: Poor – does not always use cequr – forgets he has it on     INPATIENT RECOMMENDATIONS:  - increase Lantus to 20 units at bedtime  - increase Admelog 5 units before meals (HOLD IF NPO)  - start low admelog correctional scale premeal   - start low admelog correctional scale at bedtime  ***Pt has СЕРГЕЙ/T1DM and cannot be without basal insulin due to the risk of going into DKA (pt had DKA in 2021 and 2022)****  - Registered Dietician consult placed     DISCHARGE RECOMMENDATIONS:  - Basaglar kwikpen 20 units at bedtime - they are requesting new perscription for this  - Premeal insulin - he will use the Cequr insulin patch 8-12 units before meals  - Incase there is an issue with the insulin patch, they are requesting a back up premeal insulin pen - can you please prescribe Novolog Flexpen - 8-12 units before meals  - CGM: uses a freestyle chay 3  - OUT-PATIENT FOLLOW UP: Patient should follow up with Endocrinologist Dr. Toscano (currently on materity leave, so in the interim following with Dr. Collette Padilla – last saw in 4/9/25)    #HLD  - goal LDL in diabetes mellitus is <70    #HTN  - goal BP in diabetes mellitus is <130/80  - can check microalbumin/Cr ratio outpatient      Discussed recommendations with primary team.    Pierce Worthy MD  Endocrine Fellow  Can be reached via [employer] teams.    For follow up questions, discharge recommendations, or new consults, please email [email or web address] or [email or web address] ([email or web address]) or call answering service at [phone] (weekdays); [phone] (nights/weekends).  For emergencies please page fellow on call.

## 2025-04-24 NOTE — DISCHARGE NOTE PROVIDER - CARE PROVIDERS DIRECT ADDRESSES
[email or web address]

## 2025-04-24 NOTE — DISCHARGE NOTE PROVIDER - NSDCCPCAREPLAN_GEN_ALL_CORE_FT
PRINCIPAL DISCHARGE DIAGNOSIS  Diagnosis: Numbness and tingling of left leg  Assessment and Plan of Treatment: You presented with left foot and leg pain. This is concerning for diabetic neuropathy vs sciatica. You should follow up with neurologist outpatient for further workup and EMG/NCS. Continue to take your duloxetine and gabapentin, and performing physical therapy to improve your strength.

## 2025-04-25 LAB
% ALBUMIN: 63.8 % — SIGNIFICANT CHANGE UP
% ALPHA 1: 4.2 % — SIGNIFICANT CHANGE UP
% ALPHA 2: 12.1 % — SIGNIFICANT CHANGE UP
% BETA: 9.7 % — SIGNIFICANT CHANGE UP
% GAMMA: 10.2 % — SIGNIFICANT CHANGE UP
ALBUMIN SERPL ELPH-MCNC: 4.2 G/DL — SIGNIFICANT CHANGE UP (ref 3.6–5.5)
ALBUMIN/GLOB SERPL ELPH: 1.8 RATIO — SIGNIFICANT CHANGE UP
ALPHA1 GLOB SERPL ELPH-MCNC: 0.3 G/DL — SIGNIFICANT CHANGE UP (ref 0.1–0.4)
ALPHA2 GLOB SERPL ELPH-MCNC: 0.8 G/DL — SIGNIFICANT CHANGE UP (ref 0.5–1)
ANA TITR SER: NEGATIVE — SIGNIFICANT CHANGE UP
B-GLOBULIN SERPL ELPH-MCNC: 0.6 G/DL — SIGNIFICANT CHANGE UP (ref 0.5–1)
DSDNA AB FLD-ACNC: <0.2 AI — SIGNIFICANT CHANGE UP
ENA SS-A AB FLD IA-ACNC: <0.2 AI — SIGNIFICANT CHANGE UP
GAMMA GLOBULIN: 0.7 G/DL — SIGNIFICANT CHANGE UP (ref 0.6–1.6)
GD1A GANGL IGG+IGM SER IA-ACNC: 5 IV — SIGNIFICANT CHANGE UP (ref 0–50)
GD1B GANGL IGG+IGM SER IA-ACNC: 7 IV — SIGNIFICANT CHANGE UP (ref 0–50)
GM1 ASIALO IGG+IGM SER IA-ACNC: 31 IV — SIGNIFICANT CHANGE UP (ref 0–50)
GM1 GANGL IGG+IGM SER-ACNC: 10 IV — SIGNIFICANT CHANGE UP (ref 0–50)
GM2 GANGL IGG+IGM SER IA-ACNC: 8 IV — SIGNIFICANT CHANGE UP (ref 0–50)
GQ1B GANGL IGG+IGM SER IA-ACNC: 5 IV — SIGNIFICANT CHANGE UP (ref 0–50)
INTERPRETATION SERPL IFE-IMP: SIGNIFICANT CHANGE UP
PROT PATTERN SERPL ELPH-IMP: SIGNIFICANT CHANGE UP
PROT SERPL-MCNC: 6.6 G/DL — SIGNIFICANT CHANGE UP (ref 6–8.3)

## 2025-04-26 LAB
PYRIDOXAL PHOS SERPL-MCNC: 24.4 UG/L — SIGNIFICANT CHANGE UP (ref 3.4–65.2)
VIT B1 SERPL-MCNC: 111.1 NMOL/L — SIGNIFICANT CHANGE UP (ref 66.5–200)

## 2025-04-27 LAB
% GAMMA, URINE: 11.2 % — SIGNIFICANT CHANGE UP
ALBUMIN 24H MFR UR ELPH: 31.9 % — SIGNIFICANT CHANGE UP
ALPHA1 GLOB 24H MFR UR ELPH: 28.7 % — SIGNIFICANT CHANGE UP
ALPHA2 GLOB 24H MFR UR ELPH: 17.6 % — SIGNIFICANT CHANGE UP
B-GLOBULIN 24H MFR UR ELPH: 10.6 % — SIGNIFICANT CHANGE UP
COLLECT DURATION TIME UR: 24 HR — SIGNIFICANT CHANGE UP
INTERPRETATION 24H UR IFE-IMP: SIGNIFICANT CHANGE UP
M PROTEIN 24H UR ELPH-MRATE: 0 % — SIGNIFICANT CHANGE UP
M PROTEIN 24H UR ELPH-MRATE: 0 MG/24HR — SIGNIFICANT CHANGE UP (ref 0–0)
M PROTEIN 24H UR ELPH-MRATE: 0 MG/DL — SIGNIFICANT CHANGE UP
PROT ?TM UR-MCNC: 5 MG/DL — SIGNIFICANT CHANGE UP (ref 0–12)
PROT PATTERN 24H UR ELPH-IMP: SIGNIFICANT CHANGE UP
PROTEIN QUANT CALC, URINE: 152 MG/24 H — HIGH (ref 50–100)
TOTAL VOLUME - 24 HOUR: 3050 ML — SIGNIFICANT CHANGE UP
URINE CREATININE CALCULATION: 1.6 G/24 H — SIGNIFICANT CHANGE UP (ref 1–2)

## 2025-04-29 LAB
AMPHIPHYSIN AB TITR SER: NEGATIVE — SIGNIFICANT CHANGE UP
CRMP-5-IGG WESTERN BLOT: NEGATIVE — SIGNIFICANT CHANGE UP
GLIAL NUC TYPE 1 AB TITR SER: NEGATIVE — SIGNIFICANT CHANGE UP
HU1 AB TITR SER: NEGATIVE — SIGNIFICANT CHANGE UP
HU2 AB TITR SER IF: NEGATIVE — SIGNIFICANT CHANGE UP
HU3 AB TITR SER: NEGATIVE — SIGNIFICANT CHANGE UP
IFA NOTES: SIGNIFICANT CHANGE UP
PARANEOPLASTIC AB SER-IMP: SIGNIFICANT CHANGE UP
PCA-1 AB TITR SER: NEGATIVE — SIGNIFICANT CHANGE UP
PCA-2 AB TITR SER: NEGATIVE — SIGNIFICANT CHANGE UP
PCA-TR AB TITR SER: NEGATIVE — SIGNIFICANT CHANGE UP
PETH 16:0/18:1: 37 NG/ML — HIGH
PETH 16:0/18:2: 32 NG/ML — HIGH
PETH COMMENTS: SIGNIFICANT CHANGE UP

## 2025-05-02 ENCOUNTER — APPOINTMENT (OUTPATIENT)
Dept: NEUROLOGY | Facility: CLINIC | Age: 60
End: 2025-05-02
Payer: COMMERCIAL

## 2025-05-02 VITALS
HEART RATE: 94 BPM | WEIGHT: 145 LBS | DIASTOLIC BLOOD PRESSURE: 73 MMHG | SYSTOLIC BLOOD PRESSURE: 112 MMHG | BODY MASS INDEX: 20.76 KG/M2 | HEIGHT: 70 IN

## 2025-05-02 DIAGNOSIS — R53.1 WEAKNESS: ICD-10-CM

## 2025-05-02 DIAGNOSIS — R20.0 ANESTHESIA OF SKIN: ICD-10-CM

## 2025-05-02 DIAGNOSIS — M54.16 RADICULOPATHY, LUMBAR REGION: ICD-10-CM

## 2025-05-02 LAB
ARSENIC SERPL-MCNC: <1 UG/L — SIGNIFICANT CHANGE UP (ref 0–9)
CADMIUM SERPL-MCNC: 0.9 UG/L — SIGNIFICANT CHANGE UP (ref 0–1.2)
LEAD BLD-MCNC: 1.3 UG/DL — SIGNIFICANT CHANGE UP (ref 0–3.4)
MERCURY SERPL-MCNC: <1 UG/L — SIGNIFICANT CHANGE UP (ref 0–14.9)

## 2025-05-02 PROCEDURE — 99205 OFFICE O/P NEW HI 60 MIN: CPT

## 2025-05-02 PROCEDURE — G2212 PROLONG OUTPT/OFFICE VIS: CPT

## 2025-05-05 ENCOUNTER — APPOINTMENT (OUTPATIENT)
Dept: INTERNAL MEDICINE | Facility: CLINIC | Age: 60
End: 2025-05-05

## 2025-05-05 RX ORDER — METHOCARBAMOL 750 MG/1
750 TABLET, FILM COATED ORAL 4 TIMES DAILY
Qty: 28 | Refills: 0 | Status: ACTIVE | COMMUNITY
Start: 2025-05-05 | End: 1900-01-01

## 2025-05-09 ENCOUNTER — APPOINTMENT (OUTPATIENT)
Dept: ORTHOPEDIC SURGERY | Facility: CLINIC | Age: 60
End: 2025-05-09
Payer: COMMERCIAL

## 2025-05-09 VITALS — WEIGHT: 145 LBS | HEIGHT: 70 IN | BODY MASS INDEX: 20.76 KG/M2

## 2025-05-09 DIAGNOSIS — M54.50 LOW BACK PAIN, UNSPECIFIED: ICD-10-CM

## 2025-05-09 PROCEDURE — 99213 OFFICE O/P EST LOW 20 MIN: CPT

## 2025-05-09 RX ORDER — METHOCARBAMOL 750 MG/1
750 TABLET, FILM COATED ORAL 3 TIMES DAILY
Qty: 42 | Refills: 2 | Status: ACTIVE | COMMUNITY
Start: 2025-05-09 | End: 1900-01-01

## 2025-05-09 RX ORDER — LIDOCAINE 5% 700 MG/1
5 PATCH TOPICAL DAILY
Qty: 30 | Refills: 3 | Status: ACTIVE | COMMUNITY
Start: 2025-05-09 | End: 1900-01-01

## 2025-05-13 ENCOUNTER — APPOINTMENT (OUTPATIENT)
Dept: PAIN MANAGEMENT | Facility: CLINIC | Age: 60
End: 2025-05-13
Payer: COMMERCIAL

## 2025-05-13 VITALS
WEIGHT: 145 LBS | OXYGEN SATURATION: 98 % | BODY MASS INDEX: 20.76 KG/M2 | SYSTOLIC BLOOD PRESSURE: 106 MMHG | TEMPERATURE: 97.6 F | RESPIRATION RATE: 16 BRPM | DIASTOLIC BLOOD PRESSURE: 66 MMHG | HEIGHT: 70 IN | HEART RATE: 115 BPM

## 2025-05-13 PROCEDURE — 99214 OFFICE O/P EST MOD 30 MIN: CPT

## 2025-05-13 RX ORDER — GABAPENTIN 300 MG/1
300 CAPSULE ORAL
Qty: 180 | Refills: 2 | Status: ACTIVE | COMMUNITY
Start: 2025-05-05 | End: 1900-01-01

## 2025-05-21 ENCOUNTER — RX RENEWAL (OUTPATIENT)
Age: 60
End: 2025-05-21

## 2025-05-26 ENCOUNTER — INPATIENT (INPATIENT)
Facility: HOSPITAL | Age: 60
LOS: 7 days | Discharge: NURSING, REHAB OR LONG-TERM CARE | DRG: 74 | End: 2025-06-03
Attending: STUDENT IN AN ORGANIZED HEALTH CARE EDUCATION/TRAINING PROGRAM | Admitting: STUDENT IN AN ORGANIZED HEALTH CARE EDUCATION/TRAINING PROGRAM
Payer: COMMERCIAL

## 2025-05-26 VITALS
WEIGHT: 149.91 LBS | OXYGEN SATURATION: 96 % | HEIGHT: 70 IN | DIASTOLIC BLOOD PRESSURE: 74 MMHG | TEMPERATURE: 97 F | RESPIRATION RATE: 17 BRPM | HEART RATE: 96 BPM | SYSTOLIC BLOOD PRESSURE: 116 MMHG

## 2025-05-26 DIAGNOSIS — Z98.890 OTHER SPECIFIED POSTPROCEDURAL STATES: Chronic | ICD-10-CM

## 2025-05-26 DIAGNOSIS — R20.0 ANESTHESIA OF SKIN: ICD-10-CM

## 2025-05-26 LAB
ALBUMIN SERPL ELPH-MCNC: 4.2 G/DL — SIGNIFICANT CHANGE UP (ref 3.3–5)
ALP SERPL-CCNC: 69 U/L — SIGNIFICANT CHANGE UP (ref 40–120)
ALT FLD-CCNC: 29 U/L — SIGNIFICANT CHANGE UP (ref 10–45)
ANION GAP SERPL CALC-SCNC: 13 MMOL/L — SIGNIFICANT CHANGE UP (ref 5–17)
APTT BLD: 31.5 SEC — SIGNIFICANT CHANGE UP (ref 26.1–36.8)
AST SERPL-CCNC: 23 U/L — SIGNIFICANT CHANGE UP (ref 10–40)
BASOPHILS # BLD AUTO: 0.03 K/UL — SIGNIFICANT CHANGE UP (ref 0–0.2)
BASOPHILS NFR BLD AUTO: 0.5 % — SIGNIFICANT CHANGE UP (ref 0–2)
BILIRUB SERPL-MCNC: 0.4 MG/DL — SIGNIFICANT CHANGE UP (ref 0.2–1.2)
BUN SERPL-MCNC: 7 MG/DL — SIGNIFICANT CHANGE UP (ref 7–23)
CALCIUM SERPL-MCNC: 9.8 MG/DL — SIGNIFICANT CHANGE UP (ref 8.4–10.5)
CHLORIDE SERPL-SCNC: 91 MMOL/L — LOW (ref 96–108)
CO2 SERPL-SCNC: 24 MMOL/L — SIGNIFICANT CHANGE UP (ref 22–31)
CREAT SERPL-MCNC: 0.77 MG/DL — SIGNIFICANT CHANGE UP (ref 0.5–1.3)
EGFR: 104 ML/MIN/1.73M2 — SIGNIFICANT CHANGE UP
EGFR: 104 ML/MIN/1.73M2 — SIGNIFICANT CHANGE UP
EOSINOPHIL # BLD AUTO: 0.16 K/UL — SIGNIFICANT CHANGE UP (ref 0–0.5)
EOSINOPHIL NFR BLD AUTO: 2.4 % — SIGNIFICANT CHANGE UP (ref 0–6)
FOLATE SERPL-MCNC: >20 NG/ML — SIGNIFICANT CHANGE UP
GAS PNL BLDV: SIGNIFICANT CHANGE UP
GLUCOSE BLDC GLUCOMTR-MCNC: 182 MG/DL — HIGH (ref 70–99)
GLUCOSE BLDC GLUCOMTR-MCNC: 205 MG/DL — HIGH (ref 70–99)
GLUCOSE BLDC GLUCOMTR-MCNC: 210 MG/DL — HIGH (ref 70–99)
GLUCOSE SERPL-MCNC: 113 MG/DL — HIGH (ref 70–99)
HCT VFR BLD CALC: 39.7 % — SIGNIFICANT CHANGE UP (ref 39–50)
HGB BLD-MCNC: 13.7 G/DL — SIGNIFICANT CHANGE UP (ref 13–17)
IMM GRANULOCYTES NFR BLD AUTO: 0.3 % — SIGNIFICANT CHANGE UP (ref 0–0.9)
INR BLD: 0.93 RATIO — SIGNIFICANT CHANGE UP (ref 0.85–1.16)
LYMPHOCYTES # BLD AUTO: 2.67 K/UL — SIGNIFICANT CHANGE UP (ref 1–3.3)
LYMPHOCYTES # BLD AUTO: 40.5 % — SIGNIFICANT CHANGE UP (ref 13–44)
MAGNESIUM SERPL-MCNC: 1.7 MG/DL — SIGNIFICANT CHANGE UP (ref 1.6–2.6)
MCHC RBC-ENTMCNC: 31 PG — SIGNIFICANT CHANGE UP (ref 27–34)
MCHC RBC-ENTMCNC: 34.5 G/DL — SIGNIFICANT CHANGE UP (ref 32–36)
MCV RBC AUTO: 89.8 FL — SIGNIFICANT CHANGE UP (ref 80–100)
MONOCYTES # BLD AUTO: 0.58 K/UL — SIGNIFICANT CHANGE UP (ref 0–0.9)
MONOCYTES NFR BLD AUTO: 8.8 % — SIGNIFICANT CHANGE UP (ref 2–14)
NEUTROPHILS # BLD AUTO: 3.13 K/UL — SIGNIFICANT CHANGE UP (ref 1.8–7.4)
NEUTROPHILS NFR BLD AUTO: 47.5 % — SIGNIFICANT CHANGE UP (ref 43–77)
NRBC BLD AUTO-RTO: 0 /100 WBCS — SIGNIFICANT CHANGE UP (ref 0–0)
PHOSPHATE SERPL-MCNC: 4.1 MG/DL — SIGNIFICANT CHANGE UP (ref 2.5–4.5)
PLATELET # BLD AUTO: 246 K/UL — SIGNIFICANT CHANGE UP (ref 150–400)
POTASSIUM SERPL-MCNC: 4.1 MMOL/L — SIGNIFICANT CHANGE UP (ref 3.5–5.3)
POTASSIUM SERPL-SCNC: 4.1 MMOL/L — SIGNIFICANT CHANGE UP (ref 3.5–5.3)
PROT SERPL-MCNC: 6.6 G/DL — SIGNIFICANT CHANGE UP (ref 6–8.3)
PROTHROM AB SERPL-ACNC: 10.6 SEC — SIGNIFICANT CHANGE UP (ref 9.9–13.4)
RBC # BLD: 4.42 M/UL — SIGNIFICANT CHANGE UP (ref 4.2–5.8)
RBC # FLD: 12.4 % — SIGNIFICANT CHANGE UP (ref 10.3–14.5)
SODIUM SERPL-SCNC: 128 MMOL/L — LOW (ref 135–145)
VIT B12 SERPL-MCNC: 398 PG/ML — SIGNIFICANT CHANGE UP (ref 232–1245)
WBC # BLD: 6.59 K/UL — SIGNIFICANT CHANGE UP (ref 3.8–10.5)
WBC # FLD AUTO: 6.59 K/UL — SIGNIFICANT CHANGE UP (ref 3.8–10.5)

## 2025-05-26 PROCEDURE — 99285 EMERGENCY DEPT VISIT HI MDM: CPT

## 2025-05-26 PROCEDURE — 72158 MRI LUMBAR SPINE W/O & W/DYE: CPT | Mod: 26

## 2025-05-26 PROCEDURE — 70498 CT ANGIOGRAPHY NECK: CPT | Mod: 26

## 2025-05-26 PROCEDURE — 70496 CT ANGIOGRAPHY HEAD: CPT | Mod: 26

## 2025-05-26 PROCEDURE — 70450 CT HEAD/BRAIN W/O DYE: CPT | Mod: 26,59

## 2025-05-26 RX ORDER — INSULIN LISPRO 100 U/ML
INJECTION, SOLUTION INTRAVENOUS; SUBCUTANEOUS AT BEDTIME
Refills: 0 | Status: DISCONTINUED | OUTPATIENT
Start: 2025-05-26 | End: 2025-05-28

## 2025-05-26 RX ORDER — SODIUM CHLORIDE 9 G/1000ML
1000 INJECTION, SOLUTION INTRAVENOUS
Refills: 0 | Status: DISCONTINUED | OUTPATIENT
Start: 2025-05-26 | End: 2025-06-03

## 2025-05-26 RX ORDER — INSULIN GLARGINE-YFGN 100 [IU]/ML
15 INJECTION, SOLUTION SUBCUTANEOUS AT BEDTIME
Refills: 0 | Status: DISCONTINUED | OUTPATIENT
Start: 2025-05-26 | End: 2025-05-29

## 2025-05-26 RX ORDER — GABAPENTIN 400 MG/1
600 CAPSULE ORAL THREE TIMES A DAY
Refills: 0 | Status: DISCONTINUED | OUTPATIENT
Start: 2025-05-26 | End: 2025-06-03

## 2025-05-26 RX ORDER — DEXTROSE 50 % IN WATER 50 %
25 SYRINGE (ML) INTRAVENOUS ONCE
Refills: 0 | Status: DISCONTINUED | OUTPATIENT
Start: 2025-05-26 | End: 2025-06-03

## 2025-05-26 RX ORDER — DEXTROSE 50 % IN WATER 50 %
12.5 SYRINGE (ML) INTRAVENOUS ONCE
Refills: 0 | Status: DISCONTINUED | OUTPATIENT
Start: 2025-05-26 | End: 2025-06-03

## 2025-05-26 RX ORDER — GLUCAGON 3 MG/1
1 POWDER NASAL ONCE
Refills: 0 | Status: DISCONTINUED | OUTPATIENT
Start: 2025-05-26 | End: 2025-06-03

## 2025-05-26 RX ORDER — DULOXETINE 20 MG/1
60 CAPSULE, DELAYED RELEASE ORAL DAILY
Refills: 0 | Status: DISCONTINUED | OUTPATIENT
Start: 2025-05-26 | End: 2025-06-03

## 2025-05-26 RX ORDER — INSULIN LISPRO 100 U/ML
5 INJECTION, SOLUTION INTRAVENOUS; SUBCUTANEOUS
Refills: 0 | Status: DISCONTINUED | OUTPATIENT
Start: 2025-05-26 | End: 2025-05-29

## 2025-05-26 RX ORDER — INSULIN LISPRO 100 U/ML
INJECTION, SOLUTION INTRAVENOUS; SUBCUTANEOUS
Refills: 0 | Status: DISCONTINUED | OUTPATIENT
Start: 2025-05-26 | End: 2025-05-28

## 2025-05-26 RX ORDER — AMLODIPINE BESYLATE 10 MG/1
10 TABLET ORAL DAILY
Refills: 0 | Status: DISCONTINUED | OUTPATIENT
Start: 2025-05-26 | End: 2025-06-03

## 2025-05-26 RX ORDER — B1/B2/B3/B5/B6/B12/VIT C/FOLIC 500-0.5 MG
1 TABLET ORAL DAILY
Refills: 0 | Status: DISCONTINUED | OUTPATIENT
Start: 2025-05-26 | End: 2025-06-03

## 2025-05-26 RX ORDER — METHOCARBAMOL 500 MG/1
750 TABLET, FILM COATED ORAL THREE TIMES A DAY
Refills: 0 | Status: DISCONTINUED | OUTPATIENT
Start: 2025-05-26 | End: 2025-05-28

## 2025-05-26 RX ORDER — INSULIN LISPRO 100 U/ML
5 INJECTION, SOLUTION INTRAVENOUS; SUBCUTANEOUS
Refills: 0 | Status: DISCONTINUED | OUTPATIENT
Start: 2025-05-27 | End: 2025-05-29

## 2025-05-26 RX ORDER — DEXTROSE 50 % IN WATER 50 %
15 SYRINGE (ML) INTRAVENOUS ONCE
Refills: 0 | Status: DISCONTINUED | OUTPATIENT
Start: 2025-05-26 | End: 2025-06-03

## 2025-05-26 RX ORDER — LOSARTAN POTASSIUM 100 MG/1
50 TABLET, FILM COATED ORAL DAILY
Refills: 0 | Status: DISCONTINUED | OUTPATIENT
Start: 2025-05-26 | End: 2025-06-03

## 2025-05-26 RX ORDER — ATORVASTATIN CALCIUM 80 MG/1
10 TABLET, FILM COATED ORAL AT BEDTIME
Refills: 0 | Status: DISCONTINUED | OUTPATIENT
Start: 2025-05-26 | End: 2025-06-03

## 2025-05-26 RX ORDER — INSULIN LISPRO 100 U/ML
3 INJECTION, SOLUTION INTRAVENOUS; SUBCUTANEOUS
Refills: 0 | Status: DISCONTINUED | OUTPATIENT
Start: 2025-05-27 | End: 2025-05-29

## 2025-05-26 RX ADMIN — GABAPENTIN 600 MILLIGRAM(S): 400 CAPSULE ORAL at 15:35

## 2025-05-26 RX ADMIN — GABAPENTIN 600 MILLIGRAM(S): 400 CAPSULE ORAL at 21:49

## 2025-05-26 RX ADMIN — DULOXETINE 60 MILLIGRAM(S): 20 CAPSULE, DELAYED RELEASE ORAL at 17:14

## 2025-05-26 RX ADMIN — INSULIN LISPRO 5 UNIT(S): 100 INJECTION, SOLUTION INTRAVENOUS; SUBCUTANEOUS at 17:10

## 2025-05-26 RX ADMIN — ATORVASTATIN CALCIUM 10 MILLIGRAM(S): 80 TABLET, FILM COATED ORAL at 21:49

## 2025-05-26 RX ADMIN — INSULIN LISPRO 2: 100 INJECTION, SOLUTION INTRAVENOUS; SUBCUTANEOUS at 17:11

## 2025-05-26 RX ADMIN — INSULIN GLARGINE-YFGN 15 UNIT(S): 100 INJECTION, SOLUTION SUBCUTANEOUS at 21:48

## 2025-05-26 RX ADMIN — Medication 100 MILLIGRAM(S): at 11:34

## 2025-05-26 RX ADMIN — AMLODIPINE BESYLATE 10 MILLIGRAM(S): 10 TABLET ORAL at 17:14

## 2025-05-26 RX ADMIN — METHOCARBAMOL 750 MILLIGRAM(S): 500 TABLET, FILM COATED ORAL at 15:35

## 2025-05-26 RX ADMIN — METHOCARBAMOL 750 MILLIGRAM(S): 500 TABLET, FILM COATED ORAL at 21:49

## 2025-05-26 NOTE — ED ADULT TRIAGE NOTE - CHIEF COMPLAINT QUOTE
Pt c/o b/l LE numbness/tingling x 1 month, endorsing difficulty ambulating   h/o diabetes and neuropathy

## 2025-05-26 NOTE — ED PROVIDER NOTE - PHYSICAL EXAMINATION
CONSTITUTIONAL: Well appearing and in no apparent distress.  ENT: Airway patent, moist mucous membranes.   EYES: Pupils equal, round and reactive to light. EOMI. Conjunctiva normal appearing.   CARDIAC: Normal rate, regular rhythm.  Heart sounds S1, S2.    RESPIRATORY: Breath sounds clear and equal bilaterally.   GASTROINTESTINAL: Abdomen soft, non-tender, not distended.  MUSCULOSKELETAL: Spine appears normal.  NEUROLOGICAL: Alert and oriented x3,  3/5 strength in the RLE, 4/5 strength in the LLE, 5/5 LUE/RUE. Diminished sensation in the BLE, sensation intact sensation in BUE.

## 2025-05-26 NOTE — ED PROVIDER NOTE - ATTENDING APP SHARED VISIT CONTRIBUTION OF CARE
59y RIGHT-handed man, with PMH significant for EtOH use disorder last drink 3 months ago, chronic pancreatitis, poorly-controlled СЕРГЕЙ/T1DM (A1c 10.3, 2/2025), HTN, who presents to [facility] ED  for worsening RLE weakness, pt states he cannot walk w/ his walker, reports that he has b/l lower leg numbness, he was seen in the hospital had unremarkable MRI w/w/out contrast pt w/ no fever,s no chills, no nausea no vomiting, no arm numbness/weakness, pt w/ no cp no sob. no headache, no vision changes.   I have reviewed the triage vital signs. Const: no acute distress, Well-developed, Eyes: no conjunctival injection and no scleral icterus ENMT: Moist mucus membranes, CVS: +S1/S2, radial/DP pulse 2+ bilaterally RESP: Unlabored respiratory effort, Clear to auscultation bilaterally GI: Nontender/Nondistended soft abdomen, no CVA tenderness MSK: Extremities w/o deformity or ttp, Psych: Awake, Alert, & Orientedx3;  Appropriate mood and affect, cooperative NEURO : 3/5 strength in the RLE, pt w/ 4/5 strength in the LLE, pt w/ 5/5 LUE/RUE diminished sensation in the b/l lower legs, pt w/ intact sensation in the b/l arms,   Plan for labs imaging and reassessment findings c/f posisble cva, vs cdip will discuss w/ neurology as there is a change in his neurologic exam, unable to Lift the R leg against gravity

## 2025-05-26 NOTE — H&P ADULT - ASSESSMENT
· Assessment	  ASSESSMENT:  59y (1965) RIGHT-handed man w PMHx of ethanol use disorder, chronic pancreatitis, poorly controlled СЕРГЕЙ (A1c 9.9 on 04/23/2025), chronic neuropathy of LLE who presents to [facility] ED on 05/26/2025 for ongoing LLE pain and subjective weakness as well as RLE pain and weakness. Neurology consulted. Patient complaining of new onset RLE pain and disabling weakness as well as worsening of LLE weakness.    IMPRESSION:  Severe weakness in BLE (R>L) that appears in L3-4 root distribution. Concern for space occupying lesion (e.g. compressivopathy). Differentials include plexopathy vs diabetic neuropathy vs myelitis. Patient would benefit from autoimmune and infectious work up given severity and chronicity of presentation.    RECS:  Diag:  [] pain management consult for help with managing patient pain  [] MRI lumbar spine wwo given acute change in presentation  [] MRI lumbar plexus R and L wwo given BL sx of worsening severity  [] peripheral labs: MMA, homocysteine, RPR, west nile virus  -previous peripheral neuropathy labs from last admission: B1 (111.1), B6 (24.4), B9 (>20.0), B12 (441), TSH (1.06), Heavy Metal Tox Screen (-), HIV (-), Lyme (-), hepatitis panel (nonreactive including B surf ab, would need vaccine), SPEP (6.6 WNL)  -previous rheumatologic work up: ESR (6), CRP (<3), , ganglioside panel (WNL in 08/22/2025), ANSELMO (-), ANCA (-), ganglioside panel (-), Sjogren antibodies (-)  [] LP for CSF analysis: IL-2, ACE, syphilis, glucose, protein, LDH, cell count, IgG studies, oligoclonal bands, MBP, cytology, autoimmune encephalitis, anti-MOG, infectious PCR, lyme, WNV, HIV    meds:  [] continue home meds specifically gabapentin 600 mg TID and methocarbamol (can take 2-3 times a day)  #previous hospital admission insulin recs  - increase Lantus to 15 units at bedtime  - start Admelog 3 units before breakfast (HOLD IF NPO) - breakfast usually smallest meal  - start Admelog 5 units before lunch (HOLD IF NPO)  - start Admelog 5 units before dinner (HOLD IF NPO)  - start low admelog correctional scale premeal   - start low admelog correctional scale at bedtime  [] no need for CIWA given patient sobriety    misc and manage:  [] PTOT    *case discussed with Dr. Radford   · Assessment	  ASSESSMENT:  59y (1965) RIGHT-handed man w PMHx of ethanol use disorder, chronic pancreatitis, poorly controlled СЕРГЕЙ (A1c 9.9 on 04/23/2025), chronic neuropathy of LLE who presents to [facility] ED on 05/26/2025 for ongoing LLE pain and subjective weakness as well as RLE pain and weakness. Neurology consulted. Patient complaining of new onset RLE pain and disabling weakness as well as worsening of LLE weakness.    IMPRESSION:  Severe weakness in BLE (R>L) that appears in L3-4 root distribution. Concern for space occupying lesion (e.g. compressivopathy). Differentials include plexopathy vs diabetic neuropathy vs myelitis. Patient would benefit from autoimmune and infectious work up given severity and chronicity of presentation.    RECS:  Diag:  [] pain management consult for help with managing patient pain  [] MRI lumbar spine wwo given acute change in presentation  [] MRI lumbar plexus R and L wwo given BL sx of worsening severity  [] peripheral labs: MMA, homocysteine, RPR, west nile virus  -previous peripheral neuropathy labs from last admission: B1 (111.1), B6 (24.4), B9 (>20.0), B12 (441), TSH (1.06), Heavy Metal Tox Screen (-), HIV (-), Lyme (-), hepatitis panel (nonreactive including B surf ab, would need vaccine), SPEP (6.6 WNL)  -previous rheumatologic work up: ESR (6), CRP (<3), , ganglioside panel (WNL in 08/22/2025), ANSELMO (-), ANCA (-), ganglioside panel (-), Sjogren antibodies (-)  [] LP for CSF analysis: IL-2, ACE, syphilis, glucose, protein, LDH, cell count, IgG studies, oligoclonal bands, MBP, cytology, autoimmune encephalitis, anti-MOG, infectious PCR, lyme, WNV, HIV    meds:  [] continue home meds specifically gabapentin 600 mg TID and methocarbamol (can take 2-3 times a day)  #previous hospital admission insulin recs  - increase Lantus to 15 units at bedtime  - start Admelog 3 units before breakfast (HOLD IF NPO) - breakfast usually smallest meal  - start Admelog 5 units before lunch (HOLD IF NPO)  - start Admelog 5 units before dinner (HOLD IF NPO)  - start low admelog correctional scale premeal   - start low admelog correctional scale at bedtime  [] no need for CIWA given patient sobriety    misc and manage:  [] PTOT      METRICS:  Diet: CC  DVT prophylaxis: SCDs only - pending LP  Dispo: Pending clinical course. F/u with Dr. Bárbara Hays upon discharge - [phone]    *case discussed with Dr. Radford

## 2025-05-26 NOTE — H&P ADULT - NSHPLABSRESULTS_GEN_ALL_CORE
Radiology:  04/22/2025 MRI brain wwo and ctl spine wwo  MRI BRAIN: No acute infarction. No abnormal enhancement.  MRI CERVICAL SPINE: Unremarkable.  MRI THORACIC SPINE: Posterior epidural lipomatosis. No cord signal abnormality.  MRI LUMBAR SPINE: Mild spondylosis. No evidence of exiting nerve root compression.

## 2025-05-26 NOTE — ED ADULT NURSE NOTE - OBJECTIVE STATEMENT
Pt with hx: of EtOH use disorder, chronic pancreatitis, , HTN,  , recent admission to neuro service for neuropathy/paresthesias, had MRs w/o acute pathology, and discharged p/w worsening lower extremity paresthesias and weakness, right worse than left.Pt unable to ambulate now. Denies headache, dizziness, nausea, vomiting, changes in speech/va, back pain. Pt's wife at bedside. No distress. Breathing easy and non labored.

## 2025-05-26 NOTE — ED PROVIDER NOTE - OBJECTIVE STATEMENT
60 yo M with a PMH of EtOH use disorder, chronic pancreatitis, poorly-controlled СЕРГЕЙ (A1c 10.3, 2/2025), HTN, follows with Neurology, Dr. Bárbara Hays , recent admission to neuro service for neuropathy/paresthesias, had MRs w/o acute pathology, pt was prescribed Gabapentin with improvement in sxs and discharged p/w worsening lower extremity paresthesias and weakness, right worse than left. Now cannot ambulate with walker. Denies headache, dizziness, nausea, vomiting, changes in speech/va, back pain.

## 2025-05-26 NOTE — CONSULT NOTE ADULT - ASSESSMENT
INCOMPLETE    ASSESSMENT:    IMPRESSION:    RECS:  Diag:  [] pain management consult  [] peripheral labs (can be followed outpatient): MMA, homocysteine, RPR, west nile virus  -peripheral neuriopathy labs from last admission: B1 (111.1), B6 (24.4), B9 (>20.0), B12 (441), TSH (1.06), Heavy Metal Tox Screen (-), HIV (-), Lyme (-)    meds:  [] continue gabapentin 300mg TID    misc and manage:    follow up:  [] follow up outpatient with your neurologist   ASSESSMENT:  59y (1965) RIGHT-handed man w PMHx of ethanol use disorder, chronic pancreatitis, poorly controlled СЕРГЕЙ (A1c 9.9 on 04/23/2025), chronic neuropathy of LLE who presents to [facility] ED on 05/26/2025 for ongoing LLE pain and subjective weakness as well as RLE pain and weakness. Neurology consulted. Patient complaining of new onset RLE pain and disabling weakness as well as worsening of LLE weakness.    IMPRESSION:  Severe weakness in BLE (R>L) that appears in L3-4 root distribution. Concern for space occupying lesion (e.g. compressivopathy). Differentials include plexopathy vs diabetic neuropathy vs myelitis. Patient would benefit from autoimmune and infectious work up given severity and chronicity of presentation.    RECS:  Diag:  [] pain management consult for help with managing patient pain  [] MRI lumbar spine wwo given acute change in presentation  [] MRI lumbar plexus R and L wwo given BL sx of worsening severity  [] peripheral labs (can be followed outpatient): MMA, homocysteine, RPR, west nile virus  -peripheral neuriopathy labs from last admission: B1 (111.1), B6 (24.4), B9 (>20.0), B12 (441), TSH (1.06), Heavy Metal Tox Screen (-), HIV (-), Lyme (-)  [] LP for CSF analysis: IL-2, ACE, syphilis, glucose, protein, LDH, cell count, IgG studies, oligoclonal bands, MBP, cytology, autoimmune encephalitis, anti-MOG, infectious PCR, lyme, WNV, HIV    meds:  [] continue home meds specifically gabapentin 600 mg TID and methocarbamol  [] moderate dose sliding scale insulin for diabetes  [] no need for CIWA given patient sobriety    misc and manage:  [] PTOT    *case discussed with Dr. Radford ASSESSMENT:  59y (1965) RIGHT-handed man w PMHx of ethanol use disorder, chronic pancreatitis, poorly controlled СЕРГЕЙ (A1c 9.9 on 04/23/2025), chronic neuropathy of LLE who presents to [facility] ED on 05/26/2025 for ongoing LLE pain and subjective weakness as well as RLE pain and weakness. Neurology consulted. Patient complaining of new onset RLE pain and disabling weakness as well as worsening of LLE weakness.    IMPRESSION:  Severe weakness in BLE (R>L) that appears in L3-4 root distribution. Concern for space occupying lesion (e.g. compressivopathy). Differentials include plexopathy vs diabetic neuropathy vs myelitis. Patient would benefit from autoimmune and infectious work up given severity and chronicity of presentation.    RECS:  Diag:  [] pain management consult for help with managing patient pain  [] MRI lumbar spine wwo given acute change in presentation  [] MRI lumbar plexus R and L wwo given BL sx of worsening severity  [] peripheral labs (can be followed outpatient): MMA, homocysteine, RPR, west nile virus  -peripheral neuriopathy labs from last admission: B1 (111.1), B6 (24.4), B9 (>20.0), B12 (441), TSH (1.06), Heavy Metal Tox Screen (-), HIV (-), Lyme (-)  [] LP for CSF analysis: IL-2, ACE, syphilis, glucose, protein, LDH, cell count, IgG studies, oligoclonal bands, MBP, cytology, autoimmune encephalitis, anti-MOG, infectious PCR, lyme, WNV, HIV    meds:  [] continue home meds specifically gabapentin 600 mg TID and methocarbamol (can take 2-3 times a day)  [] moderate dose sliding scale insulin for diabetes  [] no need for CIWA given patient sobriety    misc and manage:  [] PTOT    *case discussed with Dr. Radford ASSESSMENT:  59y (1965) RIGHT-handed man w PMHx of ethanol use disorder, chronic pancreatitis, poorly controlled СЕРГЕЙ (A1c 9.9 on 04/23/2025), chronic neuropathy of LLE who presents to [facility] ED on 05/26/2025 for ongoing LLE pain and subjective weakness as well as RLE pain and weakness. Neurology consulted. Patient complaining of new onset RLE pain and disabling weakness as well as worsening of LLE weakness.    IMPRESSION:  Severe weakness in BLE (R>L) that appears in L3-4 root distribution. Concern for space occupying lesion (e.g. compressivopathy). Differentials include plexopathy vs diabetic neuropathy vs myelitis. Patient would benefit from autoimmune and infectious work up given severity and chronicity of presentation.    RECS:  Diag:  [] pain management consult for help with managing patient pain  [] MRI lumbar spine wwo given acute change in presentation  [] MRI lumbar plexus R and L wwo given BL sx of worsening severity  [] peripheral labs: MMA, homocysteine, RPR, west nile virus  -previous peripheral neuropathy labs from last admission: B1 (111.1), B6 (24.4), B9 (>20.0), B12 (441), TSH (1.06), Heavy Metal Tox Screen (-), HIV (-), Lyme (-), hepatitis panel (nonreactive including B surf ab, would need vaccine), SPEP (6.6 WNL)  -previous rheumatologic work up: ESR (6), CRP (<3), , ganglioside panel (WNL in 08/22/2025), ANSELMO (-), ANCA (-), ganglioside panel (-), Sjogren antibodies (-)  [] LP for CSF analysis: IL-2, ACE, syphilis, glucose, protein, LDH, cell count, IgG studies, oligoclonal bands, MBP, cytology, autoimmune encephalitis, anti-MOG, infectious PCR, lyme, WNV, HIV    meds:  [] continue home meds specifically gabapentin 600 mg TID and methocarbamol (can take 2-3 times a day)  #previous hospital admission insulin recs  - increase Lantus to 15 units at bedtime  - start Admelog 3 units before breakfast (HOLD IF NPO) - breakfast usually smallest meal  - start Admelog 5 units before lunch (HOLD IF NPO)  - start Admelog 5 units before dinner (HOLD IF NPO)  - start low admelog correctional scale premeal   - start low admelog correctional scale at bedtime  [] no need for CIWA given patient sobriety    misc and manage:  [] PTOT    *case discussed with Dr. Radford

## 2025-05-26 NOTE — CONSULT NOTE ADULT - SUBJECTIVE AND OBJECTIVE BOX
Neurology - Consult Note    -  Spectra: [phone] ([facility]), [phone] ([facility])  -    59y (1965) RIGHT-handed man w PMHx of ethanol use disorder, chronic pancreatitis, poorly controlled СЕРГЕЙ (A1c 10.0who presents to [facility] ED on 4/22/2025, as the behest of his outpatient neurologist, for further emergent workup.    Last hospital admission 04/2025  59y (1965) RIGHT-handed man who presents to [facility] ED on 4/22/2025, as the behest of his outpatient neurologist, for further emergent workup.    PMH significant for: EtOH use disorder, chronic pancreatitis, poorly-controlled СЕРГЕЙ (A1c 9.9 04/23/2025), HTN, and chronic neuropathy of LLE    Patient has seen Dr. Boone (Orthopedic Spine Surgery) and PCP outpatient. Had been referred to Neurology. Saw Dr. Bárbara Hays today. Sent to ED for LP and spine imaging. I spoke with Dr. Hays via Teams. She is concerned for possible transverse myelitis at T4 vs. AIDP vs. diabetic neuropathy. Patient has had entire MRI of the neuro-axis in April 2025, but without contrast. Findings were non-actionable.    Patient is accompanied by his wife. Patient tells me that in October 2024 - he developed excruciating pain in the dorsum of his LEFT foot. Painful to even light touch. He denies any imaging was performed on his foot. No edema or overlying rash. Also developing pain in the back of his RLE since then. Cramping pain. Says in the last three weeks - he has been developing numbness at the level of his rib cage upward - front and back.     Patient was on our exam to have signs concerning for sciatica and neuropathy involving the LLE. Patient was prescribed Gabapentin 300 mg TID with some improvement. MRI Brain and spine was performed, grossly unremarkable except for chronic lipomyomatosis. He also was seen by endocrinology who provided recommendations for better glucose control. Patient was considered stable for discharge home on date of discharge.   Review of Systems:  INCOMPLETE   CONSTITUTIONAL: No fevers or chills  EYES AND ENT: No visual changes or no throat pain   NECK: No pain or stiffness  RESPIRATORY: No hemoptysis or shortness of breath  CARDIOVASCULAR: No chest pain or palpitations  GASTROINTESTINAL: No melena or hematochezia  GENITOURINARY: No dysuria or hematuria  NEUROLOGICAL: +As stated in HPI above  SKIN: No itching, burning, rashes, or lesions   All other review of systems is negative unless indicated above.    Allergies:  No Known Allergies      PMHx/PSHx/Family Hx: As above, otherwise see below   No pertinent past medical history    GERD (gastroesophageal reflux disease)    Insulin dependent type 2 diabetes mellitus    Alcohol abuse    Left lower quadrant abdominal pain    Elbow fracture    Benign hypertension    Scoliosis    Pancreatic duct stones    Diabetes mellitus, type 2    Pancreatitis, chronic    Gallstones        Social Hx:  No current use of tobacco, alcohol, or illicit drugs  Lives with ***    Medications:  MEDICATIONS  (STANDING):    MEDICATIONS  (PRN):      Vitals:  T(C): 36.3 (05-26-25 @ 10:53), Max: 36.3 (05-26-25 @ 10:53)  HR: 84 (05-26-25 @ 11:37) (84 - 96)  BP: 115/65 (05-26-25 @ 11:37) (115/65 - 116/74)  RR: 20 (05-26-25 @ 11:37) (17 - 20)  SpO2: 97% (05-26-25 @ 11:37) (96% - 97%)    Physical Examination: INCOMPLETE  General - NAD    Neurologic Exam:  Mental status - Awake, Alert, Oriented to person, place, and time. Speech fluent, repetition and naming intact. Follows simple and complex commands. Attention/concentration, recent and remote memory (including registration and recall), and fund of knowledge intact    Cranial nerves - PERRLA, VFF, EOMI, face sensation (V1-V3) intact b/l, facial strength intact without asymmetry b/l, hearing intact b/l, palate with symmetric elevation, trapezius OR sternocleidomastiod 5/5 strength b/l, tongue midline on protrusion with full lateral movement    Motor - Normal bulk and tone throughout. No pronator drift.  Strength testing            Deltoid      Biceps      Triceps     Wrist Extension    Wrist Flexion     Interossei         R            5                 5               5                     5                              5                        5                 5  L             5                 5               5                     5                              5                        5                 5              Hip Flexion    Hip Extension    Knee Flexion    Knee Extension    Dorsiflexion    Plantar Flexion  R              5                           5                       5                           5                            5                          5  L              5                           5                        5                           5                            5                          5    Sensation - Light touch/temperature OR pain/vibration intact throughout    DTR's -             Biceps      Triceps     Brachioradialis      Patellar    Ankle    Toes/plantar response  R             2+             2+                  2+                       2+            2+                 Down  L              2+             2+                 2+                        2+           2+                 Down    Coordination - Finger to Nose and heel to shin intact b/l. No tremors appreciated    Gait and station - Normal casual gait. Romberg (-)    Labs:          CAPILLARY BLOOD GLUCOSE              CSF:                  Radiology:     Neurology - Consult Note    -  Spectra: [phone] ([facility]), [phone] ([facility])  -    59y (1965) RIGHT-handed man w PMHx of ethanol use disorder, chronic pancreatitis, poorly controlled СЕРГЕЙ (A1c 9.9 on 04/23/2025), chronic neuropathy of LLE who presents to [facility] ED on 05/26/2025 for ongoing LLE pain and subjective weakness as well as RLE pain and weakness. Neurology consulted.    Last hospital admission 04/2025  Patient has seen Dr. Boone (Orthopedic Spine Surgery) and PCP outpatient and referred to Neurology, Dr. Bárbara Hays. Sent to ED for LP and spine imaging. Dr. Hays via Teams. She is concerned for possible transverse myelitis at T4 vs. AIDP vs. diabetic neuropathy. Patient has had entire MRI of the neuro-axis in April 2025, but without contrast. Findings were non-actionable.  Patient reported in October 2024 he developed excruciating pain in the dorsum of his LEFT foot. Painful to even light touch. Also developed cramping pain in the back of his RLE. Over the three weeks prior to the hospitalization, he developed numbness at the level of his rib cage upward - front and back.   During hospitalization, patient exam consistent w sciatica and neuropathy of LLE. Patient was prescribed gabapentin 300 mg tid w some improvement. MRI brain and CTL spine wwo were without acute pathology. Patient seen by endo for further management of his diabetes.    05/26/2025: Patient evaluated by neurology resident.    Review of Systems:  INCOMPLETE   CONSTITUTIONAL: No fevers or chills  EYES AND ENT: No visual changes or no throat pain   NECK: No pain or stiffness  RESPIRATORY: No hemoptysis or shortness of breath  CARDIOVASCULAR: No chest pain or palpitations  GASTROINTESTINAL: No melena or hematochezia  GENITOURINARY: No dysuria or hematuria  NEUROLOGICAL: +As stated in HPI above  SKIN: No itching, burning, rashes, or lesions   All other review of systems is negative unless indicated above.    Allergies:  No Known Allergies      PMHx/PSHx/Family Hx: As above, otherwise see below   No pertinent past medical history    GERD (gastroesophageal reflux disease)    Insulin dependent type 2 diabetes mellitus    Alcohol abuse    Left lower quadrant abdominal pain    Elbow fracture    Benign hypertension    Scoliosis    Pancreatic duct stones    Diabetes mellitus, type 2    Pancreatitis, chronic    Gallstones        Social Hx:  No current use of tobacco, alcohol, or illicit drugs  Lives with ***    Medications:  MEDICATIONS  (STANDING):    MEDICATIONS  (PRN):      Vitals:  T(C): 36.3 (05-26-25 @ 10:53), Max: 36.3 (05-26-25 @ 10:53)  HR: 84 (05-26-25 @ 11:37) (84 - 96)  BP: 115/65 (05-26-25 @ 11:37) (115/65 - 116/74)  RR: 20 (05-26-25 @ 11:37) (17 - 20)  SpO2: 97% (05-26-25 @ 11:37) (96% - 97%)    Physical Examination: INCOMPLETE  General - NAD    Neurologic Exam:  Mental status - Awake, Alert, Oriented to person, place, and time. Speech fluent, repetition and naming intact. Follows simple and complex commands. Attention/concentration, recent and remote memory (including registration and recall), and fund of knowledge intact    Cranial nerves - PERRLA, VFF, EOMI, face sensation (V1-V3) intact b/l, facial strength intact without asymmetry b/l, hearing intact b/l, palate with symmetric elevation, trapezius OR sternocleidomastiod 5/5 strength b/l, tongue midline on protrusion with full lateral movement    Motor - Normal bulk and tone throughout. No pronator drift.  Strength testing            Deltoid      Biceps      Triceps     Wrist Extension    Wrist Flexion     Interossei         R            5                 5               5                     5                              5                        5                 5  L             5                 5               5                     5                              5                        5                 5              Hip Flexion    Hip Extension    Knee Flexion    Knee Extension    Dorsiflexion    Plantar Flexion  R              5                           5                       5                           5                            5                          5  L              5                           5                        5                           5                            5                          5    Sensation - Light touch/temperature OR pain/vibration intact throughout    DTR's -             Biceps      Triceps     Brachioradialis      Patellar    Ankle    Toes/plantar response  R             2+             2+                  2+                       2+            2+                 Down  L              2+             2+                 2+                        2+           2+                 Down    Coordination - Finger to Nose and heel to shin intact b/l. No tremors appreciated    Gait and station - Normal casual gait. Romberg (-)    Labs:          CAPILLARY BLOOD GLUCOSE              CSF:                  Radiology:  04/22/2025 MRI brain wwo and ctl spine wwo  MRI BRAIN: No acute infarction. No abnormal enhancement.  MRI CERVICAL SPINE: Unremarkable.  MRI THORACIC SPINE: Posterior epidural lipomatosis. No cord signal abnormality.  MRI LUMBAR SPINE: Mild spondylosis. No evidence of exiting nerve root compression.     Neurology - Consult Note    -  Spectra: [phone] ([facility]), [phone] ([facility])  -    59y (1965) RIGHT-handed man w PMHx of ethanol use disorder, chronic pancreatitis, poorly controlled СЕРГЕЙ (A1c 9.9 on 04/23/2025), chronic neuropathy of LLE who presents to [facility] ED on 05/26/2025 for ongoing LLE pain and subjective weakness as well as RLE pain and weakness. Neurology consulted.    Last hospital admission 04/2025  Patient has seen Dr. Boone (Orthopedic Spine Surgery) and PCP outpatient and referred to Neurology, Dr. Bárbara Hays. Sent to ED for LP and spine imaging. Dr. Hays via Teams. She is concerned for possible transverse myelitis at T4 vs. AIDP vs. diabetic neuropathy. Patient has had entire MRI of the neuro-axis in April 2025, but without contrast. Findings were non-actionable.  Patient reported in October 2024 he developed excruciating pain in the dorsum of his LEFT foot. Painful to even light touch. Also developed cramping pain in the back of his RLE. Over the three weeks prior to the hospitalization, he developed numbness at the level of his rib cage upward - front and back.   During hospitalization, patient exam consistent w sciatica and neuropathy of LLE. Patient was prescribed gabapentin 300 mg tid w some improvement. MRI brain and CTL spine wwo were without acute pathology. Patient seen by lashawn for further management of his diabetes.    05/26/2025: Patient evaluated by neurology resident. Patient presents with his wife and complains of:  1) RLE weakness: onset 1 week ago, sudden, feels like his leg gives out on him, feels like he is dragging his leg and not able to  his foot  2) RLE pain: onset 1 week ago, located over R hip, feels like hip is popper out of place  3) LLE weakness: worsened from weakness evaluated during last hospitalization, feels like he can't pick his foot up and is dragging his LLE  4) RLE calf numbness: ongoing from previous  hospital admission and unchanged  The weakness described above has resulted in significant decrease in ambulation from baseline being able to ambulate with walker. Patient has falling several times over past week striking knees on the ground.  no headache, blurry vision, double vision, difficulty swallowing, chest pain, palpitations, SOB, cough, n/v, dysuria, hematuria, blood in stool  Per wife. Patient say Dr. Radford after hospital admission and was scheduled for EMG (06/18/2025) and gabapentin increased to 600 mg tid. Patient also say pain management who reported he can take methocarbamol 2 tablets up to twice a day. Next neurologist appt is scheduled for 07/29/2025.    Review of Systems: as above    Allergies:  No Known Allergies      PMHx/PSHx/Family Hx: As above, otherwise see below   No pertinent past medical history    GERD (gastroesophageal reflux disease)    Insulin dependent type 2 diabetes mellitus    Alcohol abuse    Left lower quadrant abdominal pain    Elbow fracture    Benign hypertension    Scoliosis    Pancreatic duct stones    Diabetes mellitus, type 2    Pancreatitis, chronic    Gallstones        Social Hx:  tobacco: 3/4 to 1 pack a day  ethanol: NO consumption over past 4 months  illicit drug use: no    Medications:  MEDICATIONS  (STANDING):    MEDICATIONS  (PRN):      Vitals:  T(C): 36.3 (05-26-25 @ 10:53), Max: 36.3 (05-26-25 @ 10:53)  HR: 84 (05-26-25 @ 11:37) (84 - 96)  BP: 115/65 (05-26-25 @ 11:37) (115/65 - 116/74)  RR: 20 (05-26-25 @ 11:37) (17 - 20)  SpO2: 97% (05-26-25 @ 11:37) (96% - 97%)    Physical Examination:  General - NAD    Neurologic Exam:  Mental status - Awake, Alert, Oriented to person, place, and time. Speech fluent, repetition and naming intact. Follows simple and complex commands. Attention/concentration, recent and remote memory (including registration and recall), and fund of knowledge intact    Cranial nerves - PERRLA, VFF, EOMI, face sensation (V1-V3) intact b/l, facial strength intact without asymmetry b/l, hearing intact b/l, palate with symmetric elevation, trapezius 5/5 strength b/l, tongue midline on protrusion with full lateral movement    Motor - Normal bulk and tone throughout. No pronator drift.  Strength testing            Deltoid      Biceps      Triceps              Wrist Extension    Wrist Flexion                   R            5                 5               5                     5                              5                        5                  L             5                 5               5                     5                              5                        5                                 aBduction               aDuction            Hip Flexion    Hip Extension             Knee Flexion    Knee Extension    Dorsiflexion    Plantar Flexion  R              1                           1                       2                         3+                         3+                       3                        2-                 4+  L              3+                          3                        4+                       4+                       4+                 4+                          2-                  5-    Sensation:  ·	Light touch:   ---RUE: decreased along R radius  ---LUE: decreased at L thenar   ---BLE: symmetric and intact  ·	temperature:  ---BUE: intact  ---RLE: decreased along lateral fibular area otherwise intact in limb  ---LLE: decreased along medial tibial area otherwise intact in limb  ·	pain:  ---BUE: intact  ---BLE: absent pinprick over medial dorsum of feet otherwise intact  ·	vibration:  ---BUE: intact  ---LLE: present at inferior aspect of tibial ridge just superior to medial malleolus. Absent distal from this point  ---RLE: intact  ·	proprioception:   ---intact thru out    DTR's -               Biceps      Triceps     Brachioradialis               Patellar    Ankle    Toes/plantar response  R             2+             2+                  2+                       2+            1+                 Down  L              2+             2+                 2+                        1+           1+                 Down    Coordination - Finger to Nose. ALEC of feet intact. unable to do HTS dt weakness    Gait and station - Not assessed dt disability    Labs:          CAPILLARY BLOOD GLUCOSE              CSF:                  Radiology:  04/22/2025 MRI brain wwo and ctl spine [facility]  MRI BRAIN: No acute infarction. No abnormal enhancement.  MRI CERVICAL SPINE: Unremarkable.  MRI THORACIC SPINE: Posterior epidural lipomatosis. No cord signal abnormality.  MRI LUMBAR SPINE: Mild spondylosis. No evidence of exiting nerve root compression.

## 2025-05-26 NOTE — H&P ADULT - ATTENDING COMMENTS
This is a  59M, known to me, who comes in with new right leg weakness.     Per my note when seeing on May 2, "Three months ago the patient started developing low back pain with red flag symptoms. He went to Dr. Sharpe (Orthopedics) and was recommended to start PT. Soon thereafter, at around early March, he started developing numbness of the left foot, had started in the toes and has moved proximally. He thought this was likely sciatic pain, but the symptoms continued to progress proximally. By late March the paresthesias involved climbed up to his mid-abdomen/rib line, he felt numbness in both arms, tingling in his groin, and he developed weakness in the left leg (buckling and dragging his feet). He was then referred to Dr. Boone (Orthopedics) and who was concerned about cauda equina syndrome and sent the patient to the ED for urgent MRI imaging. MRI was notable for thoracic epidural lipomatosis with collapse of the thecal sac at T8-T9 and scoliosis, but there was no significant neuroforaminal stenosis at the cervical or lumbar regions. The patient was instructed to follow up with a Neurologist.  ?  The patient saw Dr. Hays a week ago who was concerned about his symptoms so he was sent to the hospital for possible LP. There, repeat imaging was unremarkable and serologies were unremarkable, as below. He was discharged and comes in today for a second opinion.  ?  Currently, he describes that as of a couple weeks ago he has been developing paresthesias of the distal RLE as well. He has dense numbness and weakness of the LLE. He has paresthesias of the mid-waist and groin but wiping feels normal. Denies neck weakness, bulbar symptoms. Of note, he has lost 30 lbs in the last couple of months, unintentionally. He sees pain management and is on gabapentin 300 TID, methacarbamol 1500 q8h, duloxetine 60mg qhs."    Plan after visit with me was to get an EMG/NCS (scheduled for next month) and, if unremarkable, pursue an LP.     The patient reports that last week he developed deep pain in the right hip and progressive weakness of the right leg. He almost fell but was able to catch himself on his walker. His weakness has continued to worsen so he came to the ED last night.    Exam  General:  Healthy appearing.    Mental Status:  A&Ox3.    Cranial Nerves: VF intact, PERRL, EOMI, normal sensation bilaterally, no facial weakness, hearing intact, palate midline, SCM normal, tongue protrudes to midline.    Motor:  Normal tone, bulk and power throughout the arms.   RLE: HF 1/5, KF 1/5, KE 5-/5, dorisflexion 0/5, eversion 0/5, plantarflexion 2/5, inversion 3/5  LLE: HF 4/5, KF 4/5, KE 5/5, dorsiflexion 2/5, eversion 2/5, plantarflexion 4/5, inversion 4/5    Sensation: Decreased to lateral right leg and some patchiness otherwise    Reflexes: 1+ throughout, absent at the ankles.    Coordination: No dysmetria.    Imp: This is a 59M with months of progressive bilateral motor>sensory loss. Initially affected the left leg, but has now progressed to involve the right leg. Concerning for CIDP vs plexitis (though bilateral is unusual); would also rule out malignancy.  Of note, chronic hyponatremia on serologies.     - MRI L Spine, MRI bilateral LS plexus w/wo contrast  - LP  - Repeat ganglioside panel  - Consider empiric steroids vs IVIG (uncontrolled DM likely to worsen on steroids) pending above results  - Salt tabs  - Will need EMG after discharge

## 2025-05-26 NOTE — H&P ADULT - NSHPPHYSICALEXAM_GEN_ALL_CORE
Vitals:  T(C): 36.3 (05-26-25 @ 10:53), Max: 36.3 (05-26-25 @ 10:53)  HR: 84 (05-26-25 @ 11:37) (84 - 96)  BP: 115/65 (05-26-25 @ 11:37) (115/65 - 116/74)  RR: 20 (05-26-25 @ 11:37) (17 - 20)  SpO2: 97% (05-26-25 @ 11:37) (96% - 97%)      Physical Examination:  General - NAD    Neurologic Exam:  Mental status - Awake, Alert, Oriented to person, place, and time. Speech fluent, repetition and naming intact. Follows simple and complex commands. Attention/concentration, recent and remote memory (including registration and recall), and fund of knowledge intact    Cranial nerves - PERRLA, VFF, EOMI, face sensation (V1-V3) intact b/l, facial strength intact without asymmetry b/l, hearing intact b/l, palate with symmetric elevation, trapezius 5/5 strength b/l, tongue midline on protrusion with full lateral movement    Motor - Normal bulk and tone throughout. No pronator drift.  Strength testing            Deltoid      Biceps      Triceps              Wrist Extension    Wrist Flexion                   R            5                 5               5                     5                              5                        5                  L             5                 5               5                     5                              5                        5                                 aBduction    aDuction            Hip Flexion    Hip Extension       Knee Flexion    Knee Extension    Dorsiflexion    Plantar Flexion  R              1                           1                       2                         3+                         3+                                   3                        2-                 4+  L              3+                          3                        4+                       4+                       4+                                4+                          2-                  5-    Sensation:  Light touch: RUE: decreased along R radius; LUE: decreased at L thenar; BLE: symmetric and intact  temperature: BUE: intact; RLE: decreased along lateral fibular area otherwise intact in limb; LLE: decreased along medial tibial area otherwise intact in limb  pain: BUE: intact; BLE: absent pinprick over medial dorsum of feet otherwise intact  vibration: BUE: intact; LLE: present at inferior aspect of tibial ridge just superior to medial malleolus. Absent distal from this point; RLE: intact  proprioception: intact thru out    DTR's -               Biceps      Triceps      Brachioradialis               Patellar    Ankle    Toes/plantar response  R             2+             2+                  2+                                          2+            1+                 Down  L              2+             2+                 2+                                          1+           1+                 Down    Coordination - Finger to Nose. ALEC of feet intact. unable to do HTS dt weakness  Gait and station - Not assessed dt disability

## 2025-05-26 NOTE — ED ADULT NURSE NOTE - NS_SISCREENINGSR_GEN_ALL_ED
What Type Of Note Output Would You Prefer (Optional)?: Standard Output How Severe Is Your Skin Lesion?: mild Has Your Skin Lesion Been Treated?: not been treated Is This A New Presentation, Or A Follow-Up?: Skin Lesion Negative

## 2025-05-26 NOTE — H&P ADULT - HISTORY OF PRESENT ILLNESS
59y (1965) RIGHT-handed man w PMHx of ethanol use disorder, chronic pancreatitis, poorly controlled СЕРГЕЙ (A1c 9.9 on 04/23/2025), chronic neuropathy of LLE who presents to [facility] ED on 05/26/2025 for ongoing LLE pain and subjective weakness as well as RLE pain and weakness. Neurology consulted.    Last hospital admission 04/2025  Patient has seen Dr. Boone (Orthopedic Spine Surgery) and PCP outpatient and referred to Neurology, Dr. Bárbara Hays. Sent to ED for LP and spine imaging. Dr. Hays via Teams. She is concerned for possible transverse myelitis at T4 vs. AIDP vs. diabetic neuropathy. Patient has had entire MRI of the neuro-axis in April 2025, but without contrast. Findings were non-actionable.  Patient reported in October 2024 he developed excruciating pain in the dorsum of his LEFT foot. Painful to even light touch. Also developed cramping pain in the back of his RLE. Over the three weeks prior to the hospitalization, he developed numbness at the level of his rib cage upward - front and back.   During hospitalization, patient exam consistent w sciatica and neuropathy of LLE. Patient was prescribed gabapentin 300 mg tid w some improvement. MRI brain and CTL spine wwo were without acute pathology. Patient seen by lashawn for further management of his diabetes.    05/26/2025: Patient evaluated by neurology resident. Patient presents with his wife and complains of:  1) RLE weakness: onset 1 week ago, sudden, feels like his leg gives out on him, feels like he is dragging his leg and not able to  his foot  2) RLE pain: onset 1 week ago, located over R hip, feels like hip is popper out of place  3) LLE weakness: worsened from weakness evaluated during last hospitalization, feels like he can't pick his foot up and is dragging his LLE  4) RLE calf numbness: ongoing from previous  hospital admission and unchanged  The weakness described above has resulted in significant decrease in ambulation from baseline being able to ambulate with walker. Patient has falling several times over past week striking knees on the ground.  no headache, blurry vision, double vision, difficulty swallowing, chest pain, palpitations, SOB, cough, n/v, dysuria, hematuria, blood in stool  Per wife. Patient say Dr. Radford after hospital admission and was scheduled for EMG (06/18/2025) and gabapentin increased to 600 mg tid. Patient also say pain management who reported he can take methocarbamol 2 tablets up to twice a day. Next neurologist appt is scheduled for 07/29/2025.      Allergies:  No Known Allergies      PMHx/PSHx/Family Hx: As above, otherwise see below   No pertinent past medical history    GERD (gastroesophageal reflux disease)    Insulin dependent type 2 diabetes mellitus    Alcohol abuse    Left lower quadrant abdominal pain    Elbow fracture    Benign hypertension    Scoliosis    Pancreatic duct stones    Diabetes mellitus, type 2    Pancreatitis, chronic    Gallstones

## 2025-05-27 ENCOUNTER — RX RENEWAL (OUTPATIENT)
Age: 60
End: 2025-05-27

## 2025-05-27 ENCOUNTER — RESULT REVIEW (OUTPATIENT)
Age: 60
End: 2025-05-27

## 2025-05-27 LAB
ALBUMIN SERPL ELPH-MCNC: 4.2 G/DL — SIGNIFICANT CHANGE UP (ref 3.3–5)
ALP SERPL-CCNC: 60 U/L — SIGNIFICANT CHANGE UP (ref 40–120)
ALT FLD-CCNC: 29 U/L — SIGNIFICANT CHANGE UP (ref 10–45)
ANION GAP SERPL CALC-SCNC: 16 MMOL/L — SIGNIFICANT CHANGE UP (ref 5–17)
APPEARANCE CSF: CLEAR — SIGNIFICANT CHANGE UP
AST SERPL-CCNC: 29 U/L — SIGNIFICANT CHANGE UP (ref 10–40)
BILIRUB SERPL-MCNC: 0.5 MG/DL — SIGNIFICANT CHANGE UP (ref 0.2–1.2)
BUN SERPL-MCNC: 12 MG/DL — SIGNIFICANT CHANGE UP (ref 7–23)
CALCIUM SERPL-MCNC: 10 MG/DL — SIGNIFICANT CHANGE UP (ref 8.4–10.5)
CHLORIDE SERPL-SCNC: 92 MMOL/L — LOW (ref 96–108)
CO2 SERPL-SCNC: 20 MMOL/L — LOW (ref 22–31)
COLOR CSF: SIGNIFICANT CHANGE UP
CREAT SERPL-MCNC: 1.05 MG/DL — SIGNIFICANT CHANGE UP (ref 0.5–1.3)
CSF PCR RESULT: SIGNIFICANT CHANGE UP
EGFR: 83 ML/MIN/1.73M2 — SIGNIFICANT CHANGE UP
EGFR: 83 ML/MIN/1.73M2 — SIGNIFICANT CHANGE UP
GLUCOSE BLDC GLUCOMTR-MCNC: 138 MG/DL — HIGH (ref 70–99)
GLUCOSE BLDC GLUCOMTR-MCNC: 151 MG/DL — HIGH (ref 70–99)
GLUCOSE BLDC GLUCOMTR-MCNC: 164 MG/DL — HIGH (ref 70–99)
GLUCOSE BLDC GLUCOMTR-MCNC: 217 MG/DL — HIGH (ref 70–99)
GLUCOSE BLDC GLUCOMTR-MCNC: 98 MG/DL — SIGNIFICANT CHANGE UP (ref 70–99)
GLUCOSE CSF-MCNC: 75 MG/DL — HIGH (ref 40–70)
GLUCOSE SERPL-MCNC: 132 MG/DL — HIGH (ref 70–99)
GRAM STN FLD: SIGNIFICANT CHANGE UP
HCT VFR BLD CALC: 41.6 % — SIGNIFICANT CHANGE UP (ref 39–50)
HGB BLD-MCNC: 14.4 G/DL — SIGNIFICANT CHANGE UP (ref 13–17)
LYMPHOCYTES # CSF: 76 % — SIGNIFICANT CHANGE UP (ref 40–80)
MCHC RBC-ENTMCNC: 30.3 PG — SIGNIFICANT CHANGE UP (ref 27–34)
MCHC RBC-ENTMCNC: 34.6 G/DL — SIGNIFICANT CHANGE UP (ref 32–36)
MCV RBC AUTO: 87.4 FL — SIGNIFICANT CHANGE UP (ref 80–100)
MONOS+MACROS NFR CSF: 24 % — SIGNIFICANT CHANGE UP (ref 15–45)
NEUTROPHILS # CSF: 0 % — SIGNIFICANT CHANGE UP (ref 0–6)
NRBC BLD AUTO-RTO: 0 /100 WBCS — SIGNIFICANT CHANGE UP (ref 0–0)
NRBC NFR CSF: 1 /UL — SIGNIFICANT CHANGE UP (ref 0–5)
PLATELET # BLD AUTO: 275 K/UL — SIGNIFICANT CHANGE UP (ref 150–400)
POTASSIUM SERPL-MCNC: 4.7 MMOL/L — SIGNIFICANT CHANGE UP (ref 3.5–5.3)
POTASSIUM SERPL-SCNC: 4.7 MMOL/L — SIGNIFICANT CHANGE UP (ref 3.5–5.3)
PROT CSF-MCNC: 82 MG/DL — HIGH (ref 15–45)
PROT SERPL-MCNC: 6.6 G/DL — SIGNIFICANT CHANGE UP (ref 6–8.3)
RBC # BLD: 4.76 M/UL — SIGNIFICANT CHANGE UP (ref 4.2–5.8)
RBC # CSF: 0 /UL — SIGNIFICANT CHANGE UP (ref 0–0)
RBC # FLD: 12.3 % — SIGNIFICANT CHANGE UP (ref 10.3–14.5)
SODIUM SERPL-SCNC: 128 MMOL/L — LOW (ref 135–145)
SPECIMEN SOURCE: SIGNIFICANT CHANGE UP
T PALLIDUM AB TITR SER: NEGATIVE — SIGNIFICANT CHANGE UP
TUBE TYPE: SIGNIFICANT CHANGE UP
WBC # BLD: 6.54 K/UL — SIGNIFICANT CHANGE UP (ref 3.8–10.5)
WBC # FLD AUTO: 6.54 K/UL — SIGNIFICANT CHANGE UP (ref 3.8–10.5)

## 2025-05-27 PROCEDURE — 99222 1ST HOSP IP/OBS MODERATE 55: CPT

## 2025-05-27 PROCEDURE — 88189 FLOWCYTOMETRY/READ 16 & >: CPT | Mod: 59

## 2025-05-27 PROCEDURE — 99221 1ST HOSP IP/OBS SF/LOW 40: CPT

## 2025-05-27 PROCEDURE — 99223 1ST HOSP IP/OBS HIGH 75: CPT | Mod: GC

## 2025-05-27 PROCEDURE — 88108 CYTOPATH CONCENTRATE TECH: CPT | Mod: 26

## 2025-05-27 PROCEDURE — 72197 MRI PELVIS W/O & W/DYE: CPT | Mod: 26

## 2025-05-27 RX ORDER — LIDOCAINE HCL/PF 10 MG/ML
10 VIAL (ML) INJECTION ONCE
Refills: 0 | Status: DISCONTINUED | OUTPATIENT
Start: 2025-05-27 | End: 2025-06-03

## 2025-05-27 RX ADMIN — AMLODIPINE BESYLATE 10 MILLIGRAM(S): 10 TABLET ORAL at 05:53

## 2025-05-27 RX ADMIN — METHOCARBAMOL 750 MILLIGRAM(S): 500 TABLET, FILM COATED ORAL at 21:12

## 2025-05-27 RX ADMIN — ATORVASTATIN CALCIUM 10 MILLIGRAM(S): 80 TABLET, FILM COATED ORAL at 21:12

## 2025-05-27 RX ADMIN — GABAPENTIN 600 MILLIGRAM(S): 400 CAPSULE ORAL at 13:08

## 2025-05-27 RX ADMIN — METHOCARBAMOL 750 MILLIGRAM(S): 500 TABLET, FILM COATED ORAL at 05:53

## 2025-05-27 RX ADMIN — GABAPENTIN 600 MILLIGRAM(S): 400 CAPSULE ORAL at 21:12

## 2025-05-27 RX ADMIN — INSULIN LISPRO 5 UNIT(S): 100 INJECTION, SOLUTION INTRAVENOUS; SUBCUTANEOUS at 11:29

## 2025-05-27 RX ADMIN — LOSARTAN POTASSIUM 50 MILLIGRAM(S): 100 TABLET, FILM COATED ORAL at 05:53

## 2025-05-27 RX ADMIN — METHOCARBAMOL 750 MILLIGRAM(S): 500 TABLET, FILM COATED ORAL at 13:08

## 2025-05-27 RX ADMIN — INSULIN LISPRO 5 UNIT(S): 100 INJECTION, SOLUTION INTRAVENOUS; SUBCUTANEOUS at 18:34

## 2025-05-27 RX ADMIN — INSULIN GLARGINE-YFGN 15 UNIT(S): 100 INJECTION, SOLUTION SUBCUTANEOUS at 21:12

## 2025-05-27 RX ADMIN — Medication 1 GRAM(S): at 08:46

## 2025-05-27 RX ADMIN — DULOXETINE 60 MILLIGRAM(S): 20 CAPSULE, DELAYED RELEASE ORAL at 11:19

## 2025-05-27 RX ADMIN — Medication 1 TABLET(S): at 11:19

## 2025-05-27 RX ADMIN — INSULIN LISPRO 1: 100 INJECTION, SOLUTION INTRAVENOUS; SUBCUTANEOUS at 11:30

## 2025-05-27 RX ADMIN — Medication 40 MILLIGRAM(S): at 05:54

## 2025-05-27 RX ADMIN — GABAPENTIN 600 MILLIGRAM(S): 400 CAPSULE ORAL at 05:53

## 2025-05-27 NOTE — OCCUPATIONAL THERAPY INITIAL EVALUATION ADULT - PERTINENT HX OF CURRENT PROBLEM, REHAB EVAL
59y (1965) RIGHT-handed man w PMHx of ethanol use disorder, chronic pancreatitis, poorly controlled СЕРГЕЙ (A1c 9.9 on 04/23/2025), chronic neuropathy of LLE who presents to [facility] ED on 05/26/2025 for ongoing LLE pain and subjective weakness as well as RLE pain and weakness. Neurology consulted. Last hospital admission 04/2025. Patient has seen Dr. Boone (Orthopedic Spine Surgery) and PCP outpatient and referred to Neurology, Dr. Bárbara Hays. Sent to ED for LP and spine imaging. Dr. Hays via Teams. She is concerned for possible transverse myelitis at T4 vs. AIDP vs. diabetic neuropathy. Patient has had entire MRI of the neuro-axis in April 2025, but without contrast. Findings were non-actionable. Patient reported in October 2024 he developed excruciating pain in the dorsum of his LEFT foot. Painful to even light touch. Also developed cramping pain in the back of his RLE. Over the three weeks prior to the hospitalization, he developed numbness at the level of his rib cage upward - front and back.   During hospitalization, patient exam consistent w sciatica and neuropathy of LLE. Patient was prescribed gabapentin 300 mg tid w some improvement. MRI brain and CTL spine wwo were without acute pathology. Patient seen by lashawn for further management of his diabetes. 05/26/2025: Patient evaluated by neurology resident. Patient presents with his wife and complains of: 1) RLE weakness: onset 1 week ago, sudden, feels like his leg gives out on him, feels like he is dragging his leg and not able to  his foot 2) RLE pain: onset 1 week ago, located over R hip, feels like hip is popper out of place3) LLE weakness: worsened from weakness evaluated during last hospitalization, feels like he can't pick his foot up and is dragging his LLE 4) RLE calf numbness: ongoing from previous  hospital admission and unchangedThe weakness described above has resulted in significant decrease in ambulation from baseline being able to ambulate with walker. Patient has falling several times over past week striking knees on the ground. no headache, blurry vision, double vision, difficulty swallowing, chest pain, palpitations, SOB, cough, n/v, dysuria, hematuria, blood in stool. Per wife. Patient say Dr. Radford after hospital admission and was scheduled for EMG (06/18/2025) and gabapentin increased to 600 mg tid. Patient also say pain management who reported he can take methocarbamol 2 tablets up to twice a day. Next neurologist appt is scheduled for 07/29/2025.    CT HEAD: No acute intracranial hemorrhage, mass effect, or midline shift.  CTA NECK: Severe stenosis at the left carotid bifurcation, mild stenosis right carotid bifurcation secondary to atherosclerotic calcifications.  CTA HEAD: 2 mm aneurysm at the origin of the right superior cerebellar artery.

## 2025-05-27 NOTE — PROCEDURE NOTE - NSPROCDETAILS_GEN_ALL_CORE
Detail Level: Simple
location identified, draped/prepped, sterile technique used, needle inserted/introduced/area cleaned in sterile fashion

## 2025-05-27 NOTE — CONSULT NOTE ADULT - ASSESSMENT
59y (1965) RIGHT-handed man w PMHx of ethanol use disorder, chronic pancreatitis, poorly controlled СЕРГЕЙ (A1c 9.9 on 04/23/2025), chronic neuropathy of LLE who presents to [facility] ED on 05/26/2025 for ongoing LLE pain and subjective weakness as well as RLE pain and weakness. Neurology consulted.    Last hospital admission 04/2025  Patient has seen Dr. Boone (Orthopedic Spine Surgery) and PCP outpatient and referred to Neurology, Dr. Bárbara Hays. Sent to ED for LP and spine imaging. Dr. Hays via Teams. She is concerned for possible transverse myelitis at T4 vs. AIDP vs. diabetic neuropathy. Patient has had entire MRI of the neuro-axis in April 2025, but without contrast. Findings were non-actionable.  Patient reported in October 2024 he developed excruciating pain in the dorsum of his LEFT foot. Painful to even light touch. Also developed cramping pain in the back of his RLE. Over the three weeks prior to the hospitalization, he developed numbness at the level of his rib cage upward - front and back.   During hospitalization, patient exam consistent w sciatica and neuropathy of LLE. Patient was prescribed gabapentin 300 mg tid w some improvement. MRI brain and CTL spine wwo were without acute pathology. Patient seen by lashawn for further management of his diabetes.    05/26/2025: Patient evaluated by neurology resident. Patient presents with his wife and complains of:  1) RLE weakness: onset 1 week ago, sudden, feels like his leg gives out on him, feels like he is dragging his leg and not able to  his foot  2) RLE pain: onset 1 week ago, located over R hip, feels like hip is popper out of place  3) LLE weakness: worsened from weakness evaluated during last hospitalization, feels like he can't pick his foot up and is dragging his LLE  4) RLE calf numbness: ongoing from previous  hospital admission and unchanged  The weakness described above has resulted in significant decrease in ambulation from baseline being able to ambulate with walker. Patient has falling several times over past week striking knees on the ground.  no headache, blurry vision, double vision, difficulty swallowing, chest pain, palpitations, SOB, cough, n/v, dysuria, hematuria, blood in stool  Per wife. Patient say Dr. Radford after hospital admission and was scheduled for EMG (06/18/2025) and gabapentin increased to 600 mg tid. Patient also say pain management who reported he can take methocarbamol 2 tablets up to twice a day. Next neurologist appt is scheduled for 07/29/2025.    Current out- patient pain regimen: none  Out Patient Pain Management provider: none  [facility] Prescription Monitoring Program: Reference #[id number]    Current Pain Score: 5/10 down to 0/10    Pt with AIDP, diabetic neuropathy, alcohol neuropathy ?  States his pain is controlled and he does not want any medications changed.    Continue:  Cymbalta 60 mg QD- CrCl is 101.1.  Neurontin 600 mg Q 8 hours.  Robaxin 750 mg Q 8 hours.    Monitor for sedation and respiratory depression.  Bowel regimen PRN.   Incentive spirometer.  OOB/ PT per primary team.    Please prescribe a narcan rescue kit on discharge (naloxone 4 mg/ 0.1 ml nasal spray- 1 spray Q 2-3 minutes alternating between nostrils).   If outpatient pain management is necessary, can see Dr Jose G Ramos [phone].    Signing off.      Time spent on encounter: 40 minutes      [facility]  [phone]

## 2025-05-27 NOTE — PHYSICAL THERAPY INITIAL EVALUATION ADULT - MANUAL MUSCLE TESTING RESULTS, REHAB EVAL
BUE >/= 3; LLE hip 3-, knee ext 3-, ankle dorsiflexion 0; RLE hip 2-, knee ext 3-, ankle dorsiflexion 1

## 2025-05-27 NOTE — OCCUPATIONAL THERAPY INITIAL EVALUATION ADULT - BALANCE TRAINING, PT EVAL
GOAL: Pt will demonstrate improved static/dynamic balance to good in order to increase safety and independence in ADLs within 4 weeks .

## 2025-05-27 NOTE — PROCEDURE NOTE - ADDITIONAL PROCEDURE DETAILS
Informed consent obtained from patient. Risk and benefit discussed and informed about infectious, bleeding and perforation of internal organ risk. Patient was seated. Area was cleaned in a sterile fashion. 20 G spinal needle was inserted with successful CSF fluid collection from first attempt at L3-L4 attempted. Procedure tolerated well by patient. Minimal blood loss noted. Patient instructed to lie down for two hours.

## 2025-05-27 NOTE — PHYSICAL THERAPY INITIAL EVALUATION ADULT - PERTINENT HX OF CURRENT PROBLEM, REHAB EVAL
PMHx: ethanol use disorder, chronic pancreatitis, poorly controlled СЕРГЕЙ (A1c 9.9 on 04/23/2025), chronic neuropathy of LLE. presents to [facility] ED on 05/26/2025 for ongoing LLE pain and subjective weakness as well as RLE pain and weakness. Patient complaining of new onset RLE pain and disabling weakness as well as worsening of LLE weakness.    CTH: No acute intracranial hemorrhage, mass effect, or midline shift.  CTA NECK: Severe stenosis at the L carotid bifurcation, mild stenosis R carotid bifurcation secondary to atherosclerotic calcifications.  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    [Of note: Pt reported in October 2024 he developed excruciating pain in the dorsum of L foot. Painful to even light touch. Also developed cramping pain in the back of his RLE. Over the 3wks prior to the hospitalization, he developed numbness at the level of his rib cage upward - front and back. During hospitalization, pt exam consistent with sciatica and neuropathy of LLE. Patient was prescribed gabapentin 300 mg tid w some improvement. MRI brain and CTL spine in April were without acute pathology.]

## 2025-05-27 NOTE — OCCUPATIONAL THERAPY INITIAL EVALUATION ADULT - LIVES WITH, PROFILE
Pt lives with wife in apartment, "a lot of stairs" to enter, pt states prior to 1 month ago he was independent in ADLs, however now wife assisting in LB dressing, pt ambulates with RW ~1 month

## 2025-05-27 NOTE — OCCUPATIONAL THERAPY INITIAL EVALUATION ADULT - MANUAL MUSCLE TESTING RESULTS, REHAB EVAL
BUE at least 3/5, LLE grossly 3-/5 except L ankle 0/5, R hip 2-/5, R ankle 1/5/grossly assessed due to

## 2025-05-27 NOTE — CONSULT NOTE ADULT - SUBJECTIVE AND OBJECTIVE BOX
Chief Complaint:  Patient is a 59y old  Male who presents with a chief complaint of weakness (27 May 2025 11:07)    HPI:  59y (1965) RIGHT-handed man w PMHx of ethanol use disorder, chronic pancreatitis, poorly controlled СЕРГЕЙ (A1c 9.9 on 04/23/2025), chronic neuropathy of LLE who presents to [facility] ED on 05/26/2025 for ongoing LLE pain and subjective weakness as well as RLE pain and weakness. Neurology consulted.    Last hospital admission 04/2025  Patient has seen Dr. Boone (Orthopedic Spine Surgery) and PCP outpatient and referred to Neurology, Dr. Bárbara Hays. Sent to ED for LP and spine imaging. Dr. Hays via Teams. She is concerned for possible transverse myelitis at T4 vs. AIDP vs. diabetic neuropathy. Patient has had entire MRI of the neuro-axis in April 2025, but without contrast. Findings were non-actionable.  Patient reported in October 2024 he developed excruciating pain in the dorsum of his LEFT foot. Painful to even light touch. Also developed cramping pain in the back of his RLE. Over the three weeks prior to the hospitalization, he developed numbness at the level of his rib cage upward - front and back.   During hospitalization, patient exam consistent w sciatica and neuropathy of LLE. Patient was prescribed gabapentin 300 mg tid w some improvement. MRI brain and CTL spine wwo were without acute pathology. Patient seen by lashawn for further management of his diabetes.    05/26/2025: Patient evaluated by neurology resident. Patient presents with his wife and complains of:  1) RLE weakness: onset 1 week ago, sudden, feels like his leg gives out on him, feels like he is dragging his leg and not able to  his foot  2) RLE pain: onset 1 week ago, located over R hip, feels like hip is popper out of place  3) LLE weakness: worsened from weakness evaluated during last hospitalization, feels like he can't pick his foot up and is dragging his LLE  4) RLE calf numbness: ongoing from previous  hospital admission and unchanged  The weakness described above has resulted in significant decrease in ambulation from baseline being able to ambulate with walker. Patient has falling several times over past week striking knees on the ground.  no headache, blurry vision, double vision, difficulty swallowing, chest pain, palpitations, SOB, cough, n/v, dysuria, hematuria, blood in stool  Per wife. Patient say Dr. Radford after hospital admission and was scheduled for EMG (06/18/2025) and gabapentin increased to 600 mg tid. Patient also say pain management who reported he can take methocarbamol 2 tablets up to twice a day. Next neurologist appt is scheduled for 07/29/2025.       Current Pain Score: 5/10    Current out- patient pain regimen: none    Out Patient Pain Management provider: none    [facility] Prescription Monitoring Program: Reference #[id number]      PAST MEDICAL & SURGICAL HISTORY:  GERD (gastroesophageal reflux disease)      Alcohol abuse      Left lower quadrant abdominal pain      Elbow fracture      Benign hypertension      Scoliosis      Pancreatic duct stones      Diabetes mellitus, type 2      Pancreatitis, chronic      Gallstones      H/O elbow surgery        FAMILY HISTORY:  FH: type 2 diabetes (Mother)      SOCIAL HISTORY:  Tobacco Use: 1 PPD  Alcohol Use: + EtOH abuse  Recreational Marijuana: denies  Illicit Drug Use: denies    Opioid Risk Tool (ORT-OUD) Score: high      Allergies    No Known Allergies    Intolerances        REVIEW OF SYSTEMS:  CONSTITUTIONAL: No fever, weight loss, fatigue, falls  NEURO: No headaches, memory loss, tremors, dizziness or blurred vision  RESP: No shortness of breath, cough  CV: No chest pain, palpitations  GI: No abdominal pain, nausea, vomiting, diarrhea, constipation  : No urinary incontinence/retention, dysuria  MSK: No joint pain; No muscle, back, or extremity pain, no upper or lower motor strength weakness, no saddle anesthesia   SKIN: No itching, burning, rashes, or lesions   PSYCHIATRIC: No depression, anxiety, mood swings, or difficulty sleeping      MEDICATIONS  (STANDING):  amLODIPine   Tablet 10 milliGRAM(s) Oral daily  atorvastatin 10 milliGRAM(s) Oral at bedtime  dextrose 5%. 1000 milliLiter(s) (50 mL/Hr) IV Continuous <Continuous>  dextrose 5%. 1000 milliLiter(s) (100 mL/Hr) IV Continuous <Continuous>  dextrose 50% Injectable 25 Gram(s) IV Push once  dextrose 50% Injectable 12.5 Gram(s) IV Push once  dextrose 50% Injectable 25 Gram(s) IV Push once  DULoxetine 60 milliGRAM(s) Oral daily  gabapentin 600 milliGRAM(s) Oral three times a day  glucagon  Injectable 1 milliGRAM(s) IntraMuscular once  insulin glargine Injectable (LANTUS) 15 Unit(s) SubCutaneous at bedtime  insulin lispro (ADMELOG) corrective regimen sliding scale   SubCutaneous three times a day before meals  insulin lispro (ADMELOG) corrective regimen sliding scale   SubCutaneous at bedtime  insulin lispro Injectable (ADMELOG) 3 Unit(s) SubCutaneous before breakfast  insulin lispro Injectable (ADMELOG) 5 Unit(s) SubCutaneous before lunch  insulin lispro Injectable (ADMELOG) 5 Unit(s) SubCutaneous before dinner  lidocaine 1% Injectable 10 milliLiter(s) Local Injection once  losartan 50 milliGRAM(s) Oral daily  methocarbamol 750 milliGRAM(s) Oral three times a day  multivitamin 1 Tablet(s) Oral daily  pantoprazole    Tablet 40 milliGRAM(s) Oral before breakfast    MEDICATIONS  (PRN):  dextrose Oral Gel 15 Gram(s) Oral once PRN Blood Glucose LESS THAN 70 milliGRAM(s)/deciliter      PHYSICAL EXAM  GENERAL: seen at bedside; NAD; well developed, well groomed; just s/p lumbar puncture  HEENT: head atraumatic, normocephalic; anicteric; speech clear and fluent  NEURO: A + O X 3; good concentration; Cranial Nerves II- XII intact; sensory exam decreased B/L LE non-dermatomal  GI: abdomen soft, non distended; + bowel sounds; no BM  : voiding  EXTREMITIES/ PV: Moving UE; 2+ peripheral pulses; no cyanosis, edema or clubbing  MUSCULOSKELETAL: motor strength RLE 3/5 and LLE 4/5  SKIN: no rashes, lesions    PSYCH: affect flat; good eye contact; no signs of depression or anxiety  Vital Signs:  T(C): 36.6 (05-27-25 @ 12:49)  HR: 108 (05-27-25 @ 12:49)  BP: 96/65 (05-27-25 @ 12:49)  RR: 18 (05-27-25 @ 12:49)  SpO2: 99% (05-27-25 @ 12:49)    Pertinent labs/radiology:                        14.4   6.54  )-----------( 275      ( 27 May 2025 07:13 )             41.6   05-27    128[L]  |  92[L]  |  12  ----------------------------<  132[H]  4.7   |  20[L]  |  1.05    Ca    10.0      27 May 2025 15:56  Phos  4.1     05-26  Mg     1.7     05-26    TPro  6.6  /  Alb  4.2  /  TBili  0.5  /  DBili  x   /  AST  29  /  ALT  29  /  AlkPhos  60  05-27      < from: MR Thoracic Spine w/wo IV Cont (04.22.25 @ 22:07) >  IMPRESSION:    MRI BRAIN: No acute infarction. No abnormal enhancement.    MRI CERVICAL SPINE: Unremarkable.    MRI THORACIC SPINE: Posterior epidural lipomatosis. No cord signal   abnormality.    MRI LUMBAR SPINE: Mild spondylosis. No evidence of exiting nerve root   compression.    < from: CT Angio Neck w/ IV Cont (05.26.25 @ 14:27) >  IMPRESSION:    CT HEAD:  No acute intracranial hemorrhage, mass effect, or midline shift.    CTA NECK:  Severe stenosis at the left carotid bifurcation, mild stenosis right   carotid bifurcation secondary to atherosclerotic calcifications.    CTA HEAD:  2 mm aneurysm at the origin of the right superior cerebellar artery.

## 2025-05-27 NOTE — PHYSICAL THERAPY INITIAL EVALUATION ADULT - ADDITIONAL COMMENTS
lives in apartment with wife with 2 sets of stairs both with 1 rail, was using a rolling walker for the past month due to BLE weakness but prior to that pt ambulated independently, right hand dominant, wears glasses
9/29/17

## 2025-05-27 NOTE — CONSULT NOTE ADULT - SUBJECTIVE AND OBJECTIVE BOX
Patient is a 59y old  Male who presents with a chief complaint of weakness (26 May 2025 14:25)    Admission HPI:  59y (1965) RIGHT-handed man w PMHx of ethanol use disorder, chronic pancreatitis, poorly controlled СЕРГЕЙ (A1c 9.9 on 04/23/2025), chronic neuropathy of LLE who presents to [facility] ED on 05/26/2025 for ongoing LLE pain and subjective weakness as well as RLE pain and weakness. Neurology consulted.    Last hospital admission 04/2025  Patient has seen Dr. Boone (Orthopedic Spine Surgery) and PCP outpatient and referred to Neurology, Dr. Bárbara Hays. Sent to ED for LP and spine imaging. Dr. Hays via Teams. She is concerned for possible transverse myelitis at T4 vs. AIDP vs. diabetic neuropathy. Patient has had entire MRI of the neuro-axis in April 2025, but without contrast. Findings were non-actionable.  Patient reported in October 2024 he developed excruciating pain in the dorsum of his LEFT foot. Painful to even light touch. Also developed cramping pain in the back of his RLE. Over the three weeks prior to the hospitalization, he developed numbness at the level of his rib cage upward - front and back.   During hospitalization, patient exam consistent w sciatica and neuropathy of LLE. Patient was prescribed gabapentin 300 mg tid w some improvement. MRI brain and CTL spine wwo were without acute pathology. Patient seen by lashawn for further management of his diabetes.    05/26/2025: Patient evaluated by neurology resident. Patient presents with his wife and complains of:  1) RLE weakness: onset 1 week ago, sudden, feels like his leg gives out on him, feels like he is dragging his leg and not able to  his foot  2) RLE pain: onset 1 week ago, located over R hip, feels like hip is popper out of place  3) LLE weakness: worsened from weakness evaluated during last hospitalization, feels like he can't pick his foot up and is dragging his LLE  4) RLE calf numbness: ongoing from previous  hospital admission and unchanged  The weakness described above has resulted in significant decrease in ambulation from baseline being able to ambulate with walker. Patient has falling several times over past week striking knees on the ground.  no headache, blurry vision, double vision, difficulty swallowing, chest pain, palpitations, SOB, cough, n/v, dysuria, hematuria, blood in stool  Per wife. Patient say Dr. Radford after hospital admission and was scheduled for EMG (06/18/2025) and gabapentin increased to 600 mg tid. Patient also say pain management who reported he can take methocarbamol 2 tablets up to twice a day. Next neurologist appt is scheduled for 07/29/2025.    Interval History:  Patient admitted for w/u - has had worsening bl LE weakness.     REVIEW OF SYSTEMS: + LE weakness - worse on R than L, denies B/B incontinence, No chest pain, shortness of breath, nausea, vomiting or diarhea; other ROS neg     PAST MEDICAL & SURGICAL HISTORY  GERD (gastroesophageal reflux disease)    Insulin dependent type 2 diabetes mellitus    Alcohol abuse    Left lower quadrant abdominal pain    Elbow fracture    Benign hypertension    Scoliosis    Pancreatic duct stones    Diabetes mellitus, type 2    Pancreatitis, chronic    Gallstones    H/O elbow surgery    FUNCTIONAL HISTORY:   Lives w wife in home w 2 stairs.  PTA Independent    CURRENT FUNCTIONAL STATUS:  CG Transfers, Min A gait    FAMILY HISTORY   FH: type 2 diabetes (Mother)    MEDICATIONS   amLODIPine   Tablet 10 milliGRAM(s) Oral daily  atorvastatin 10 milliGRAM(s) Oral at bedtime  dextrose 5%. 1000 milliLiter(s) IV Continuous <Continuous>  dextrose 5%. 1000 milliLiter(s) IV Continuous <Continuous>  dextrose 50% Injectable 25 Gram(s) IV Push once  dextrose 50% Injectable 12.5 Gram(s) IV Push once  dextrose 50% Injectable 25 Gram(s) IV Push once  dextrose Oral Gel 15 Gram(s) Oral once PRN  DULoxetine 60 milliGRAM(s) Oral daily  gabapentin 600 milliGRAM(s) Oral three times a day  glucagon  Injectable 1 milliGRAM(s) IntraMuscular once  insulin glargine Injectable (LANTUS) 15 Unit(s) SubCutaneous at bedtime  insulin lispro (ADMELOG) corrective regimen sliding scale   SubCutaneous three times a day before meals  insulin lispro (ADMELOG) corrective regimen sliding scale   SubCutaneous at bedtime  insulin lispro Injectable (ADMELOG) 3 Unit(s) SubCutaneous before breakfast  insulin lispro Injectable (ADMELOG) 5 Unit(s) SubCutaneous before lunch  insulin lispro Injectable (ADMELOG) 5 Unit(s) SubCutaneous before dinner  losartan 50 milliGRAM(s) Oral daily  methocarbamol 750 milliGRAM(s) Oral three times a day  multivitamin 1 Tablet(s) Oral daily  pantoprazole    Tablet 40 milliGRAM(s) Oral before breakfast    ALLERGIES  No Known Allergies    VITALS  T(C): 36.6 (05-27-25 @ 08:48), Max: 36.9 (05-27-25 @ 00:39)  HR: 114 (05-27-25 @ 09:45) (84 - 114)  BP: 121/77 (05-27-25 @ 09:45) (115/65 - 172/93)  RR: 18 (05-27-25 @ 08:48) (18 - 20)  SpO2: 97% (05-27-25 @ 09:45) (97% - 99%)  Wt(kg): --    PHYSICAL EXAM  Constitutional - NAD, Comfortable  HEENT - NCAT, EOMI  Neck - Supple  Chest - No distress, no use of accessory muscles for respiration  Cardiovascular -Tachy, Well perfused  Abdomen - BS+, Soft, NTND  Extremities - No C/C/E, No calf tenderness   Neurologic Exam -                 AAO x 3  Motor RLE - HF/KE 1/5, APF 3/5, ADF 0/5; LLE 2/5 HF/KE, APF 3/5, ADF 0/5  Sensation intact  No clonus    Psychiatric - Mood stable, Affect WNL    RECENT LABS/IMAGING  CBC Full  -  ( 27 May 2025 07:13 )  WBC Count : 6.54 K/uL  RBC Count : 4.76 M/uL  Hemoglobin : 14.4 g/dL  Hematocrit : 41.6 %  Platelet Count - Automated : 275 K/uL  Mean Cell Volume : 87.4 fl  Mean Cell Hemoglobin : 30.3 pg  Mean Cell Hemoglobin Concentration : 34.6 g/dL  Auto Neutrophil # : x  Auto Lymphocyte # : x  Auto Monocyte # : x  Auto Eosinophil # : x  Auto Basophil # : x  Auto Neutrophil % : x  Auto Lymphocyte % : x  Auto Monocyte % : x  Auto Eosinophil % : x  Auto Basophil % : x    05-26    128[L]  |  91[L]  |  7   ----------------------------<  113[H]  4.1   |  24  |  0.77    Ca    9.8      26 May 2025 11:39  Phos  4.1     05-26  Mg     1.7     05-26    TPro  6.6  /  Alb  4.2  /  TBili  0.4  /  DBili  x   /  AST  23  /  ALT  29  /  AlkPhos  69  05-26    Urinalysis Basic - ( 26 May 2025 11:39 )    Color: x / Appearance: x / SG: x / pH: x  Gluc: 113 mg/dL / Ketone: x  / Bili: x / Urobili: x   Blood: x / Protein: x / Nitrite: x   Leuk Esterase: x / RBC: x / WBC x   Sq Epi: x / Non Sq Epi: x / Bacteria: x    Impression:  60 yo with functional deficits secondary to diagnosis of bl LE weakness- w/u for etiology in progress    Plan:  Work up per Neurology  PT- ROM, Bed Mob, Transfers, Amb w AD and bracing as needed  OT- ADLs, bracing  Prec- Falls, Cardiac  DVT Prophylaxis - SCDs  Skin- Turn q2 h  Dispo- Once diagnosis established will need Acute Rehab- patient requires active and ongoing therapeutic interventions of multiple disciplines and can tolerate and benefit from 3 hours of intensive therapies x 2-4wks depending on progress at rehabilitation facility. Can actively participate and benefit from  an intensive rehabilitation program. Requires supervision by a rehabilitation physician and a coordinated interdisciplinary approach to providing rehabilitation.     Total time taken to review relevant records and imaging results, examine patient, write note, and, when applicable, discuss case with patient, family, , resident, medical student and other medical providers:   [  ] 40 minutes (37795)  [X] 55 minutes ([id number])  [  ] 75 minutes (46777)    [  ] 25 minutes (78422)  [  ] 35 minutes (11914)  [  ] 50 minutes ([id number])

## 2025-05-28 ENCOUNTER — APPOINTMENT (OUTPATIENT)
Dept: UROLOGY | Facility: CLINIC | Age: 60
End: 2025-05-28

## 2025-05-28 DIAGNOSIS — E87.1 HYPO-OSMOLALITY AND HYPONATREMIA: ICD-10-CM

## 2025-05-28 LAB
ALBUMIN CSF-MCNC: 58.2 MG/DL — HIGH (ref 14–25)
ALBUMIN SERPL ELPH-MCNC: 3.9 G/DL — SIGNIFICANT CHANGE UP (ref 3.3–5)
ALBUMIN SERPL ELPH-MCNC: 3994 MG/DL — SIGNIFICANT CHANGE UP (ref 3500–5200)
ALP SERPL-CCNC: 61 U/L — SIGNIFICANT CHANGE UP (ref 40–120)
ALT FLD-CCNC: 26 U/L — SIGNIFICANT CHANGE UP (ref 10–45)
ANION GAP SERPL CALC-SCNC: 11 MMOL/L — SIGNIFICANT CHANGE UP (ref 5–17)
AST SERPL-CCNC: 24 U/L — SIGNIFICANT CHANGE UP (ref 10–40)
BILIRUB SERPL-MCNC: 0.4 MG/DL — SIGNIFICANT CHANGE UP (ref 0.2–1.2)
BUN SERPL-MCNC: 12 MG/DL — SIGNIFICANT CHANGE UP (ref 7–23)
CALCIUM SERPL-MCNC: 10.1 MG/DL — SIGNIFICANT CHANGE UP (ref 8.4–10.5)
CHLORIDE SERPL-SCNC: 93 MMOL/L — LOW (ref 96–108)
CO2 SERPL-SCNC: 22 MMOL/L — SIGNIFICANT CHANGE UP (ref 22–31)
CREAT SERPL-MCNC: 0.68 MG/DL — SIGNIFICANT CHANGE UP (ref 0.5–1.3)
EGFR: 108 ML/MIN/1.73M2 — SIGNIFICANT CHANGE UP
EGFR: 108 ML/MIN/1.73M2 — SIGNIFICANT CHANGE UP
GLUCOSE BLDC GLUCOMTR-MCNC: 152 MG/DL — HIGH (ref 70–99)
GLUCOSE BLDC GLUCOMTR-MCNC: 348 MG/DL — HIGH (ref 70–99)
GLUCOSE BLDC GLUCOMTR-MCNC: 360 MG/DL — HIGH (ref 70–99)
GLUCOSE BLDC GLUCOMTR-MCNC: 381 MG/DL — HIGH (ref 70–99)
GLUCOSE BLDC GLUCOMTR-MCNC: 418 MG/DL — HIGH (ref 70–99)
GLUCOSE BLDC GLUCOMTR-MCNC: 461 MG/DL — CRITICAL HIGH (ref 70–99)
GLUCOSE BLDC GLUCOMTR-MCNC: 88 MG/DL — SIGNIFICANT CHANGE UP (ref 70–99)
GLUCOSE SERPL-MCNC: 185 MG/DL — HIGH (ref 70–99)
HCT VFR BLD CALC: 40.8 % — SIGNIFICANT CHANGE UP (ref 39–50)
HGB BLD-MCNC: 14.3 G/DL — SIGNIFICANT CHANGE UP (ref 13–17)
IGG CSF-MCNC: 5.8 MG/DL — HIGH
IGG FLD-MCNC: 687 MG/DL — SIGNIFICANT CHANGE UP (ref 610–1660)
IGG SYNTH RATE SER+CSF CALC-MRATE: 4.6 MG/DAY — SIGNIFICANT CHANGE UP
IGG/ALB CLEAR SER+CSF-RTO: 0.6 — SIGNIFICANT CHANGE UP
IGG/ALB CSF: 0.1 RATIO — SIGNIFICANT CHANGE UP
IGG/ALB SER: 0.17 RATIO — SIGNIFICANT CHANGE UP
MCHC RBC-ENTMCNC: 30.9 PG — SIGNIFICANT CHANGE UP (ref 27–34)
MCHC RBC-ENTMCNC: 35 G/DL — SIGNIFICANT CHANGE UP (ref 32–36)
MCV RBC AUTO: 88.1 FL — SIGNIFICANT CHANGE UP (ref 80–100)
NON-GYNECOLOGICAL CYTOLOGY STUDY: SIGNIFICANT CHANGE UP
NRBC BLD AUTO-RTO: 0 /100 WBCS — SIGNIFICANT CHANGE UP (ref 0–0)
OSMOLALITY UR: 414 MOS/KG — SIGNIFICANT CHANGE UP (ref 300–900)
PLATELET # BLD AUTO: 253 K/UL — SIGNIFICANT CHANGE UP (ref 150–400)
POTASSIUM SERPL-MCNC: 4.4 MMOL/L — SIGNIFICANT CHANGE UP (ref 3.5–5.3)
POTASSIUM SERPL-SCNC: 4.4 MMOL/L — SIGNIFICANT CHANGE UP (ref 3.5–5.3)
POTASSIUM UR-SCNC: 31 MMOL/L — SIGNIFICANT CHANGE UP
PROT SERPL-MCNC: 6.5 G/DL — SIGNIFICANT CHANGE UP (ref 6–8.3)
RBC # BLD: 4.63 M/UL — SIGNIFICANT CHANGE UP (ref 4.2–5.8)
RBC # FLD: 12.3 % — SIGNIFICANT CHANGE UP (ref 10.3–14.5)
SODIUM SERPL-SCNC: 126 MMOL/L — LOW (ref 135–145)
SODIUM UR-SCNC: 22 MMOL/L — SIGNIFICANT CHANGE UP
WBC # BLD: 5.61 K/UL — SIGNIFICANT CHANGE UP (ref 3.8–10.5)
WBC # FLD AUTO: 5.61 K/UL — SIGNIFICANT CHANGE UP (ref 3.8–10.5)

## 2025-05-28 PROCEDURE — 99233 SBSQ HOSP IP/OBS HIGH 50: CPT | Mod: GC

## 2025-05-28 PROCEDURE — 99222 1ST HOSP IP/OBS MODERATE 55: CPT | Mod: GC

## 2025-05-28 RX ORDER — IMMUNE GLOBULIN (HUMAN) 10 G/100ML
25 INJECTION INTRAVENOUS; SUBCUTANEOUS DAILY
Refills: 0 | Status: COMPLETED | OUTPATIENT
Start: 2025-05-28 | End: 2025-06-01

## 2025-05-28 RX ORDER — INSULIN LISPRO 100 U/ML
3 INJECTION, SOLUTION INTRAVENOUS; SUBCUTANEOUS ONCE
Refills: 0 | Status: COMPLETED | OUTPATIENT
Start: 2025-05-28 | End: 2025-05-28

## 2025-05-28 RX ORDER — METHYLPREDNISOLONE ACETATE 80 MG/ML
1000 INJECTION, SUSPENSION INTRA-ARTICULAR; INTRALESIONAL; INTRAMUSCULAR; SOFT TISSUE DAILY
Refills: 0 | Status: DISCONTINUED | OUTPATIENT
Start: 2025-05-28 | End: 2025-05-30

## 2025-05-28 RX ORDER — INSULIN LISPRO 100 U/ML
INJECTION, SOLUTION INTRAVENOUS; SUBCUTANEOUS AT BEDTIME
Refills: 0 | Status: DISCONTINUED | OUTPATIENT
Start: 2025-05-28 | End: 2025-05-29

## 2025-05-28 RX ORDER — DIPHENHYDRAMINE HCL 12.5MG/5ML
50 ELIXIR ORAL ONCE
Refills: 0 | Status: COMPLETED | OUTPATIENT
Start: 2025-05-28 | End: 2025-05-28

## 2025-05-28 RX ORDER — ACETAMINOPHEN 500 MG/5ML
650 LIQUID (ML) ORAL ONCE
Refills: 0 | Status: COMPLETED | OUTPATIENT
Start: 2025-05-28 | End: 2025-05-28

## 2025-05-28 RX ORDER — ENOXAPARIN SODIUM 100 MG/ML
40 INJECTION SUBCUTANEOUS EVERY 24 HOURS
Refills: 0 | Status: DISCONTINUED | OUTPATIENT
Start: 2025-05-28 | End: 2025-06-03

## 2025-05-28 RX ORDER — METHOCARBAMOL 500 MG/1
750 TABLET, FILM COATED ORAL THREE TIMES A DAY
Refills: 0 | Status: DISCONTINUED | OUTPATIENT
Start: 2025-05-28 | End: 2025-06-03

## 2025-05-28 RX ADMIN — INSULIN GLARGINE-YFGN 15 UNIT(S): 100 INJECTION, SOLUTION SUBCUTANEOUS at 21:08

## 2025-05-28 RX ADMIN — INSULIN LISPRO 4: 100 INJECTION, SOLUTION INTRAVENOUS; SUBCUTANEOUS at 21:08

## 2025-05-28 RX ADMIN — Medication 1 TABLET(S): at 11:11

## 2025-05-28 RX ADMIN — INSULIN LISPRO 5 UNIT(S): 100 INJECTION, SOLUTION INTRAVENOUS; SUBCUTANEOUS at 16:45

## 2025-05-28 RX ADMIN — IMMUNE GLOBULIN (HUMAN) 41.67 GRAM(S): 10 INJECTION INTRAVENOUS; SUBCUTANEOUS at 12:47

## 2025-05-28 RX ADMIN — LOSARTAN POTASSIUM 50 MILLIGRAM(S): 100 TABLET, FILM COATED ORAL at 05:06

## 2025-05-28 RX ADMIN — INSULIN LISPRO 4: 100 INJECTION, SOLUTION INTRAVENOUS; SUBCUTANEOUS at 16:45

## 2025-05-28 RX ADMIN — INSULIN LISPRO 3 UNIT(S): 100 INJECTION, SOLUTION INTRAVENOUS; SUBCUTANEOUS at 23:27

## 2025-05-28 RX ADMIN — INSULIN LISPRO 3 UNIT(S): 100 INJECTION, SOLUTION INTRAVENOUS; SUBCUTANEOUS at 08:46

## 2025-05-28 RX ADMIN — Medication 650 MILLIGRAM(S): at 13:20

## 2025-05-28 RX ADMIN — GABAPENTIN 600 MILLIGRAM(S): 400 CAPSULE ORAL at 05:06

## 2025-05-28 RX ADMIN — METHOCARBAMOL 750 MILLIGRAM(S): 500 TABLET, FILM COATED ORAL at 13:05

## 2025-05-28 RX ADMIN — ENOXAPARIN SODIUM 40 MILLIGRAM(S): 100 INJECTION SUBCUTANEOUS at 16:43

## 2025-05-28 RX ADMIN — GABAPENTIN 600 MILLIGRAM(S): 400 CAPSULE ORAL at 21:08

## 2025-05-28 RX ADMIN — Medication 50 MILLIGRAM(S): at 12:24

## 2025-05-28 RX ADMIN — Medication 40 MILLIGRAM(S): at 05:06

## 2025-05-28 RX ADMIN — METHYLPREDNISOLONE ACETATE 50 MILLIGRAM(S): 80 INJECTION, SUSPENSION INTRA-ARTICULAR; INTRALESIONAL; INTRAMUSCULAR; SOFT TISSUE at 11:11

## 2025-05-28 RX ADMIN — DULOXETINE 60 MILLIGRAM(S): 20 CAPSULE, DELAYED RELEASE ORAL at 11:11

## 2025-05-28 RX ADMIN — INSULIN LISPRO 5 UNIT(S): 100 INJECTION, SOLUTION INTRAVENOUS; SUBCUTANEOUS at 11:31

## 2025-05-28 RX ADMIN — AMLODIPINE BESYLATE 10 MILLIGRAM(S): 10 TABLET ORAL at 05:06

## 2025-05-28 RX ADMIN — ATORVASTATIN CALCIUM 10 MILLIGRAM(S): 80 TABLET, FILM COATED ORAL at 21:08

## 2025-05-28 RX ADMIN — GABAPENTIN 600 MILLIGRAM(S): 400 CAPSULE ORAL at 13:05

## 2025-05-28 RX ADMIN — METHOCARBAMOL 750 MILLIGRAM(S): 500 TABLET, FILM COATED ORAL at 05:06

## 2025-05-28 RX ADMIN — Medication 650 MILLIGRAM(S): at 12:24

## 2025-05-28 RX ADMIN — INSULIN LISPRO 1: 100 INJECTION, SOLUTION INTRAVENOUS; SUBCUTANEOUS at 08:47

## 2025-05-28 NOTE — CONSULT NOTE ADULT - ASSESSMENT
59-year-old male with PMHx of chronic pancreatitis, СЕРГЕЙ, hx of ETOH use disorder (no ETOH in 4 mos), chronic neuropathy presenting with LE weakness and pain. Nephrology consulted from acute on chronic euvolemic hyponatremia.

## 2025-05-28 NOTE — PROGRESS NOTE ADULT - SUBJECTIVE AND OBJECTIVE BOX
Neurology Progress Note    SUBJECTIVE/OBJECTIVE/INTERVAL EVENTS: Patient seen and examined at bedside w/ neuro attending and team. LP was completed yesterday at the beside. Patient denies any new complaints or worsening symptoms.   CSF results showed: cells 1, protein 82, glucose 75, PCR negative, culture negative, IgG index 0.6, rest of study is pending  MRI of sacral plexus showed no abnormalities.  Bedside EMG/nerve conduction study performed with findings suggestive of a diagnosis of CIDP.  Labs noted with hyponatremia trending down 128->126. Nephrology consulted.    VITALS & EXAMINATION:  Vital Signs Last 24 Hrs  T(C): 36.7 (28 May 2025 13:15), Max: 36.8 (27 May 2025 22:26)  T(F): 98 (28 May 2025 13:15), Max: 98.3 (28 May 2025 04:30)  HR: 93 (28 May 2025 13:15) (86 - 101)  BP: 136/76 (28 May 2025 13:15) (104/67 - 150/90)  BP(mean): --  RR: 19 (28 May 2025 13:15) (18 - 20)  SpO2: 98% (28 May 2025 13:15) (95% - 99%)    Parameters below as of 28 May 2025 13:15  Patient On (Oxygen Delivery Method): room air        General: no acute distress     Neurological (>12):  MS: Awake, alert.  Oriented person place year month.  CNs: PERRL (R 3mm, L 3mm). VFF. EOMI. No disconjugate gaze, nystagmus. V1-3 intact LT, No facial asymmetry b/l. Hearing grossly normal b/l. Tongue midline and can move side to side.     Motor - Normal bulk and tone throughout.               Deltoid(C5)  Biceps(C6)    Triceps(C7)      Interossei  (T1)     R            5                 5                        5                     5             L             5                 5                        5                     5                      Hip Flexion(L2/3)    Hip Extension (L4/5)   Knee Flexion (L4/5/S1)    Knee Extension (L3/4)   Dorsiflexion (L4/5)   Plantar Flexion (S1)   Inversion    Eversion  R              2                                    4                                     4                         4                                 3                          4-                      4-             3  L              4-                                    5-                                   5-                         5-                              2                           4-                      4-             2                  Sensation - Pinprick intact over entirety of upper and lower extremities. Increased sensation to pinprick at left knee.     DTR's -             Biceps      Triceps     Brachioradialis      Patellar    Ankle    Toes/plantar response  R             1+             1+                  1+                0            0                 Down  L              1+             1+                 1+                0             0                 Down    Coordination - There is no dysmetria on finger-to-nose bilaterally and heel-knee-shin on left side. Unable to complete heel-knee-shine on right side due to weakness.    Gait and station - Unable to assess due to weakness.    LABORATORY:  CBC                       14.3   5.61  )-----------( 253      ( 28 May 2025 06:51 )             40.8     Chem 05-28    126[L]  |  93[L]  |  12  ----------------------------<  185[H]  4.4   |  22  |  0.68    Ca    10.1      28 May 2025 06:51    TPro  6.5  /  Alb  3.9  /  TBili  0.4  /  DBili  x   /  AST  24  /  ALT  26  /  AlkPhos  61  05-28    LFTs LIVER FUNCTIONS - ( 28 May 2025 06:51 )  Alb: 3.9 g/dL / Pro: 6.5 g/dL / ALK PHOS: 61 U/L / ALT: 26 U/L / AST: 24 U/L / GGT: x           Coagulopathy   Lipid Panel   A1c   Cardiac enzymes     U/A Urinalysis Basic - ( 28 May 2025 06:51 )    Color: x / Appearance: x / SG: x / pH: x  Gluc: 185 mg/dL / Ketone: x  / Bili: x / Urobili: x   Blood: x / Protein: x / Nitrite: x   Leuk Esterase: x / RBC: x / WBC x   Sq Epi: x / Non Sq Epi: x / Bacteria: x      CSF  Immunological  Other    STUDIES & IMAGING: (EEG, CT, MR, U/S, TTE/BOWEN):  < from: MR Lumbar Spine w/wo IV Cont (05.26.25 @ 23:43) >  IMPRESSION:    No significant central canal stenosis. The neural foramina appear patent.   No significant conus abnormality. No abnormal enhancement at the level of   the nerve roots.    < end of copied text >  < from: MR Sacrum Coccycx Joint w/wo IV Cont (05.27.25 @ 17:28) >  IMPRESSION:    MRI of the sacrum with dedication to the lumbosacral plexus demonstrates   normal appearance of the lumbosacral nerves within the limitations of   motion artifact. No mass along their course. No abnormal muscle signal to   suggest denervation artifact.    < end of copied text >

## 2025-05-28 NOTE — CONSULT NOTE ADULT - SUBJECTIVE AND OBJECTIVE BOX
[facility] DIVISION OF KIDNEY DISEASES AND HYPERTENSION -- [phone]  -- INITIAL CONSULT NOTE  --------------------------------------------------------------------------------  HPI: 59-year-old male with PMHx of chronic pancreatitis, СЕРГЕЙ, hx of ETOH use disorder (no ETOH in 4 mos), chronic neuropathy presenting with LE weakness and pain. Nephrology consulted from acute on chronic euvolemic hyponatremia.      Pt. seen and examined at bedside with wife present. They both report he has had low sodium during prior hospital stay in 4/2025. Pt. denies urinary retention, N/V, NSAID use, diuretic/HCTZ use. He states he has pain in B/L LE for weeks R>L and weakness as well. He has been seeing pain management as an outpatient and was started on Duloxetine a few months ago which he is compliant with. He reports drinking 3-4 gallons of liquids per day including water/soda etc. At home does not eat salty foods but due to low sodium in the hospital has been trying to increase salt intake.       PAST HISTORY  --------------------------------------------------------------------------------  PAST MEDICAL & SURGICAL HISTORY:  GERD (gastroesophageal reflux disease)      Alcohol abuse      Left lower quadrant abdominal pain      Elbow fracture      Benign hypertension      Scoliosis      Pancreatic duct stones      Diabetes mellitus, type 2      Pancreatitis, chronic      Gallstones      H/O elbow surgery        FAMILY HISTORY:  FH: type 2 diabetes (Mother)      PAST SOCIAL HISTORY:    ALLERGIES & MEDICATIONS  --------------------------------------------------------------------------------  Allergies    No Known Allergies    Intolerances      Standing Inpatient Medications  acetaminophen     Tablet .. 650 milliGRAM(s) Oral once  amLODIPine   Tablet 10 milliGRAM(s) Oral daily  atorvastatin 10 milliGRAM(s) Oral at bedtime  dextrose 5%. 1000 milliLiter(s) IV Continuous <Continuous>  dextrose 5%. 1000 milliLiter(s) IV Continuous <Continuous>  dextrose 50% Injectable 25 Gram(s) IV Push once  dextrose 50% Injectable 12.5 Gram(s) IV Push once  dextrose 50% Injectable 25 Gram(s) IV Push once  diphenhydrAMINE 50 milliGRAM(s) Oral once  DULoxetine 60 milliGRAM(s) Oral daily  gabapentin 600 milliGRAM(s) Oral three times a day  glucagon  Injectable 1 milliGRAM(s) IntraMuscular once  immune   globulin 10% (GAMMAGARD) IVPB 25 Gram(s) IV Intermittent daily  insulin glargine Injectable (LANTUS) 15 Unit(s) SubCutaneous at bedtime  insulin lispro (ADMELOG) corrective regimen sliding scale   SubCutaneous three times a day before meals  insulin lispro (ADMELOG) corrective regimen sliding scale   SubCutaneous at bedtime  insulin lispro Injectable (ADMELOG) 3 Unit(s) SubCutaneous before breakfast  insulin lispro Injectable (ADMELOG) 5 Unit(s) SubCutaneous before lunch  insulin lispro Injectable (ADMELOG) 5 Unit(s) SubCutaneous before dinner  lidocaine 1% Injectable 10 milliLiter(s) Local Injection once  losartan 50 milliGRAM(s) Oral daily  methocarbamol 750 milliGRAM(s) Oral three times a day  methylPREDNISolone sodium succinate IVPB 1000 milliGRAM(s) IV Intermittent daily  multivitamin 1 Tablet(s) Oral daily  pantoprazole    Tablet 40 milliGRAM(s) Oral before breakfast    PRN Inpatient Medications  dextrose Oral Gel 15 Gram(s) Oral once PRN      REVIEW OF SYSTEMS  --------------------------------------------------------------------------------  Gen: +frail chronically ill appearing.   Skin: No rashes  Head/Eyes/Ears: No HA  Respiratory: No SOB,  CV: No CP  GI: No abdominal pain, diarrhea, N/V  : No dysuria, hematuria  MSK: +R>L LE weakness and pain.   Heme: No easy bruising or bleeding  Psych: No significant depression    All other systems were reviewed and are negative, except as noted.    VITALS/PHYSICAL EXAM  --------------------------------------------------------------------------------  T(C): 36.5 (05-28-25 @ 09:38), Max: 36.8 (05-27-25 @ 22:26)  HR: 89 (05-28-25 @ 09:38) (87 - 108)  BP: 125/85 (05-28-25 @ 09:38) (96/65 - 150/90)  RR: 19 (05-28-25 @ 09:38) (18 - 20)  SpO2: 95% (05-28-25 @ 09:38) (95% - 99%)  Wt(kg): --    Physical Exam:  Gen: +chronically ill appearing.   Pulm: CTA B/L  CV: RRR, S1S2;  Abd: +BS, soft  : No suprapubic tenderness  Extremities: no bilateral LE edema noted. R>L LE pain and weakness.   Neuro: Awake, alert.  Skin: Warm    LABS/STUDIES  --------------------------------------------------------------------------------              14.3   5.61  >-----------<  253      [05-28-25 @ 06:51]              40.8     126  |  93  |  12  ----------------------------<  185      [05-28-25 @ 06:51]  4.4   |  22  |  0.68        Ca     10.1     [05-28-25 @ 06:51]      Mg     1.7     [05-26-25 @ 11:39]      Phos  4.1     [05-26-25 @ 11:39]    TPro  6.5  /  Alb  3.9  /  TBili  0.4  /  DBili  x   /  AST  24  /  ALT  26  /  AlkPhos  61  [05-28-25 @ 06:51]    PT/INR: PT 10.6 , INR 0.93       [05-26-25 @ 11:39]  PTT: 31.5       [05-26-25 @ 11:39]      Creatinine Trend:  SCr 0.68 [05-28 @ 06:51]  SCr 1.05 [05-27 @ 15:56]  SCr 0.77 [05-26 @ 11:39]    Urinalysis - [05-28-25 @ 06:51]      Color  / Appearance  / SG  / pH       Gluc 185 / Ketone   / Bili  / Urobili        Blood  / Protein  / Leuk Est  / Nitrite       RBC  / WBC  / Hyaline  / Gran  / Sq Epi  / Non Sq Epi  / Bacteria       TSH 1.06      [04-22-25 @ 15:40]  Lipid: chol 129, TG 63, HDL 63, LDL --      [04-24-25 @ 05:25]    HBsAb Nonreact      [04-22-25 @ 15:40]  HBsAg Nonreact      [04-22-25 @ 15:40]  HBcAb Nonreact      [04-22-25 @ 15:40]  HCV 0.05, Nonreact      [04-22-25 @ 15:40]  HIV Nonreact      [04-22-25 @ 15:40]    ANSELMO: titer Negative, pattern --      [04-22-25 @ 15:40]  ANCA: cANCA Negative, pANCA Negative, atypical ANCA Negative      [04-22-25 @ 15:40]  Syphilis Screen (Treponema Pallidum Ab) Negative      [05-26-25 @ 12:26]  Immunofixation Serum:   No Monoclonal Band Identified      Reference Range: None Detected      [04-22-25 @ 15:40]  SPEP Interpretation: Normal Electrophoresis Pattern      [04-22-25 @ 15:40]  Immunofixation Urine: No Monoclonal Band Identified      Reference Range: None Detected      [04-24-25 @ 11:09]  UPEP Interpretation: Normal Electrophoresis Pattern      [04-24-25 @ 11:09]

## 2025-05-28 NOTE — PROGRESS NOTE ADULT - ASSESSMENT
59y (1965) RIGHT-handed man w PMHx of ethanol use disorder, chronic pancreatitis, poorly controlled СЕРГЕЙ (A1c 9.9 on 04/23/2025), chronic neuropathy of LLE who presents to [facility] ED on 05/26/2025 for ongoing LLE pain and subjective weakness as well as RLE pain and weakness. Neurology consulted. Patient complaining of new onset RLE pain and disabling weakness as well as worsening of LLE weakness.     IMPRESSION:  Severe weakness in BLE (R>L). No evidence of compression or enhancement or sacral plexus abnormality on imaging. CSF demonstrated albuminocytologic dissociation, no evidence of infectious process. EMG/NCS with demyelinating pattern as well as evidence of chronic axonal loss. 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    Plan:  [x] MRI lumbar spine wwo no evidence of compression or enhancement  [x] MRI lumbosacral plexus R and L wwo no abnormalities  [] peripheral labs: Syphilis negative  -previous peripheral neuropathy labs from last admission: B1 (111.1), B6 (24.4), B9 (>20.0), B12 (441), TSH (1.06), Heavy Metal Tox Screen (-), HIV (-), Lyme (-), hepatitis panel (nonreactive including B surf ab, would need vaccine), SPEP (6.6 WNL)  -previous rheumatologic work up: ESR (6), CRP (<3), , ganglioside panel (WNL in 08/22/2024), ANSELMO (-), ANCA (-), ganglioside panel (-), Sjogren antibodies (-)  [] LP done 5/27: ACE, glucose 75, protein 82, cell count 1, IgG index 0.6, oligoclonal bands, MBP, cytology, autoimmune encephalitis, PCR negative, lyme, WNV  [] Bedside EMG/NCS with evidence of demyelinating process and chronic denervation   [] continue home meds specifically gabapentin 600 mg TID standing and methocarbamol 750 TID as needed  [] Diabetes management  - Lantus 15 units qHS  - Admelog 3 units before breakfast   - Admelog 5 units before lunch   - Admelog 5 units before dinner   - Insulin sliding scale  [] Start IV methylprednisolone 1g daily x5 days- monitor fingerstick while receiving steroids  [] Start IVIG 2g/kg divided over 5 days. Premedicate with Tylenol 650 PO and Benadryl 50 PO     #Hyponatremia- sodium downtrending 128->126 today  [] Nephrology recommendations appreciated, as below:  - Obtain serum osmolality, urine osmolality, and urine electrolytes including Alexandro and Ur K  - Check uric acid level and AM cortisol   - After obtaining urine studies can start UreNa 15 GM BID. Please note BUN is expected to rise while on UreNa.  - Fluid restrict to <1.2L fluid in a day.  - Change diet to remove renal restriction, as patient does not require it. Can keep Consistent Carbohydrate restriction in place. Encourage PO protein intake.   - Goal correction is 6-8meq in first 24 hours.       misc and manage:  [x] PTOT- recommending acute rehab      METRICS:  Diet: Consistent carbohydrate  DVT prophylaxis: Lovenox  Dispo: Acute rehab    Seen and discussed with attending Dr. Mirza Radford.

## 2025-05-28 NOTE — CONSULT NOTE ADULT - PROBLEM SELECTOR RECOMMENDATION 9
Pt. with acute on chronic hyponatremia likely in setting of increased FW water/poor solute intake and possible SIADH from pain and duloxetine use. Pt. has hx of hyponatremia in April 20225, SNa manfred is 125 on 4/22/25. SNa on admission 128 (5/26) and today is lower at 126 (5/28). TSH and lipid panels from 4/2025 are WNL.  - Obtain serum osmolality, urine osmolality, and urine electrolytes including Alexandro.   - Check uric acid level and AM cortisol   - After obtaining urine studies can start UreNa 15mL BID. Please note BUN is expected to rise while on UreNa.  - Fluid restrict to <1.2L fluid in a day.  - Change diet to remove renal restriction, as patient does not require it. Can keep Consistent Carbohydrate restriction in place. Encourage PO protein intake.     If you have any questions, please feel free to contact me:  Shyla Bianchi MD PGY-4  Nephrology Fellow  [employer] (Preferred)/ Pager [phone]   (After 5pm or on weekends please page the on-call fellow) Pt. with acute on chronic hyponatremia likely in setting of increased FW water/poor solute intake and possible SIADH from pain and duloxetine use. Pt. has hx of hyponatremia in April 20225, SNa manfred is 125 on 4/22/25. SNa on admission 128 (5/26) and today is lower at 126 (5/28). TSH and lipid panels from 4/2025 are WNL.  - Obtain serum osmolality, urine osmolality, and urine electrolytes including Alexandro.   - Check uric acid level and AM cortisol   - After obtaining urine studies can start UreNa 15mL BID. Please note BUN is expected to rise while on UreNa.  - Fluid restrict to <1.2L fluid in a day.  - Change diet to remove renal restriction, as patient does not require it. Can keep Consistent Carbohydrate restriction in place. Encourage PO protein intake.   - Goal correction is 6-8meq in first 24 hours.     If you have any questions, please feel free to contact me:  Shyla Bianchi MD PGY-4  Nephrology Fellow  [employer] Teams (Preferred)/ Pager [phone]   (After 5pm or on weekends please page the on-call fellow) Pt. with acute on chronic hyponatremia likely in setting of increased FW water/poor solute intake and possible SIADH from pain and duloxetine use. Pt. has hx of hyponatremia in April 20225, SNa manfred is 125 on 4/22/25. SNa on admission 128 (5/26) and today is lower at 126 (5/28). TSH and lipid panels from 4/2025 are WNL.  - Obtain serum osmolality, urine osmolality, and urine electrolytes including Alexandro and Ur K  - Check uric acid level and AM cortisol   - After obtaining urine studies can start UreNa 15 GM BID. Please note BUN is expected to rise while on UreNa.  - Fluid restrict to <1.2L fluid in a day.  - Change diet to remove renal restriction, as patient does not require it. Can keep Consistent Carbohydrate restriction in place. Encourage PO protein intake.   - Goal correction is 6-8meq in first 24 hours.     If you have any questions, please feel free to contact me:  Shyla Bianchi MD PGY-4  Nephrology Fellow  [employer] Teams (Preferred)/ Pager [phone]   (After 5pm or on weekends please page the on-call fellow)

## 2025-05-29 DIAGNOSIS — E11.65 TYPE 2 DIABETES MELLITUS WITH HYPERGLYCEMIA: ICD-10-CM

## 2025-05-29 DIAGNOSIS — G61.81 CHRONIC INFLAMMATORY DEMYELINATING POLYNEURITIS: ICD-10-CM

## 2025-05-29 DIAGNOSIS — I10 ESSENTIAL (PRIMARY) HYPERTENSION: ICD-10-CM

## 2025-05-29 DIAGNOSIS — R73.9 HYPERGLYCEMIA, UNSPECIFIED: ICD-10-CM

## 2025-05-29 DIAGNOSIS — E78.5 HYPERLIPIDEMIA, UNSPECIFIED: ICD-10-CM

## 2025-05-29 LAB
ADD ON TEST-SPECIMEN IN LAB: SIGNIFICANT CHANGE UP
ALBUMIN SERPL ELPH-MCNC: 4.1 G/DL — SIGNIFICANT CHANGE UP (ref 3.3–5)
ALP SERPL-CCNC: 64 U/L — SIGNIFICANT CHANGE UP (ref 40–120)
ALT FLD-CCNC: 29 U/L — SIGNIFICANT CHANGE UP (ref 10–45)
ANION GAP SERPL CALC-SCNC: 15 MMOL/L — SIGNIFICANT CHANGE UP (ref 5–17)
AST SERPL-CCNC: 29 U/L — SIGNIFICANT CHANGE UP (ref 10–40)
BILIRUB SERPL-MCNC: 0.3 MG/DL — SIGNIFICANT CHANGE UP (ref 0.2–1.2)
BUN SERPL-MCNC: 15 MG/DL — SIGNIFICANT CHANGE UP (ref 7–23)
CALCIUM SERPL-MCNC: 9.9 MG/DL — SIGNIFICANT CHANGE UP (ref 8.4–10.5)
CHLORIDE SERPL-SCNC: 95 MMOL/L — LOW (ref 96–108)
CO2 SERPL-SCNC: 20 MMOL/L — LOW (ref 22–31)
CORTIS AM PEAK SERPL-MCNC: 7.1 UG/DL — SIGNIFICANT CHANGE UP (ref 6–18.4)
CREAT SERPL-MCNC: 0.63 MG/DL — SIGNIFICANT CHANGE UP (ref 0.5–1.3)
EGFR: 111 ML/MIN/1.73M2 — SIGNIFICANT CHANGE UP
EGFR: 111 ML/MIN/1.73M2 — SIGNIFICANT CHANGE UP
FLOW CYTOMETRY FINAL REPORT: SIGNIFICANT CHANGE UP
GLUCOSE BLDC GLUCOMTR-MCNC: 245 MG/DL — HIGH (ref 70–99)
GLUCOSE BLDC GLUCOMTR-MCNC: 284 MG/DL — HIGH (ref 70–99)
GLUCOSE BLDC GLUCOMTR-MCNC: 313 MG/DL — HIGH (ref 70–99)
GLUCOSE BLDC GLUCOMTR-MCNC: 330 MG/DL — HIGH (ref 70–99)
GLUCOSE BLDC GLUCOMTR-MCNC: 413 MG/DL — HIGH (ref 70–99)
GLUCOSE BLDC GLUCOMTR-MCNC: 414 MG/DL — HIGH (ref 70–99)
GLUCOSE BLDC GLUCOMTR-MCNC: 415 MG/DL — HIGH (ref 70–99)
GLUCOSE BLDC GLUCOMTR-MCNC: 477 MG/DL — CRITICAL HIGH (ref 70–99)
GLUCOSE SERPL-MCNC: 312 MG/DL — HIGH (ref 70–99)
OSMOLALITY SERPL: 290 MOSMOL/KG — SIGNIFICANT CHANGE UP (ref 275–300)
POTASSIUM SERPL-MCNC: 4.6 MMOL/L — SIGNIFICANT CHANGE UP (ref 3.5–5.3)
POTASSIUM SERPL-SCNC: 4.6 MMOL/L — SIGNIFICANT CHANGE UP (ref 3.5–5.3)
PROT SERPL-MCNC: 7.6 G/DL — SIGNIFICANT CHANGE UP (ref 6–8.3)
SODIUM SERPL-SCNC: 130 MMOL/L — LOW (ref 135–145)
URATE SERPL-MCNC: 4.3 MG/DL — SIGNIFICANT CHANGE UP (ref 3.4–8.8)

## 2025-05-29 PROCEDURE — 99232 SBSQ HOSP IP/OBS MODERATE 35: CPT | Mod: GC

## 2025-05-29 PROCEDURE — 99223 1ST HOSP IP/OBS HIGH 75: CPT

## 2025-05-29 PROCEDURE — 99232 SBSQ HOSP IP/OBS MODERATE 35: CPT

## 2025-05-29 RX ORDER — INSULIN GLARGINE-YFGN 100 [IU]/ML
20 INJECTION, SOLUTION SUBCUTANEOUS AT BEDTIME
Refills: 0 | Status: DISCONTINUED | OUTPATIENT
Start: 2025-05-29 | End: 2025-05-31

## 2025-05-29 RX ORDER — INSULIN LISPRO 100 U/ML
INJECTION, SOLUTION INTRAVENOUS; SUBCUTANEOUS
Refills: 0 | Status: DISCONTINUED | OUTPATIENT
Start: 2025-05-29 | End: 2025-05-29

## 2025-05-29 RX ORDER — ACETAMINOPHEN 500 MG/5ML
650 LIQUID (ML) ORAL DAILY
Refills: 0 | Status: COMPLETED | OUTPATIENT
Start: 2025-05-29 | End: 2025-06-01

## 2025-05-29 RX ORDER — INSULIN LISPRO 100 U/ML
INJECTION, SOLUTION INTRAVENOUS; SUBCUTANEOUS AT BEDTIME
Refills: 0 | Status: DISCONTINUED | OUTPATIENT
Start: 2025-05-29 | End: 2025-05-31

## 2025-05-29 RX ORDER — INSULIN LISPRO 100 U/ML
10 INJECTION, SOLUTION INTRAVENOUS; SUBCUTANEOUS
Refills: 0 | Status: DISCONTINUED | OUTPATIENT
Start: 2025-05-29 | End: 2025-05-31

## 2025-05-29 RX ORDER — UREA 40 G
15 VIAL (EA) INTRAVENOUS
Refills: 0 | Status: DISCONTINUED | OUTPATIENT
Start: 2025-05-29 | End: 2025-06-03

## 2025-05-29 RX ORDER — IMMUNE GLOBULIN (HUMAN) 10 G/100ML
40 INJECTION INTRAVENOUS; SUBCUTANEOUS
Qty: 4 | Refills: 0 | Status: ACTIVE | COMMUNITY
Start: 2025-05-29 | End: 1900-01-01

## 2025-05-29 RX ORDER — ACETAMINOPHEN 500 MG/5ML
650 LIQUID (ML) ORAL EVERY 4 HOURS
Refills: 0 | Status: DISCONTINUED | OUTPATIENT
Start: 2025-05-29 | End: 2025-05-29

## 2025-05-29 RX ORDER — DIPHENHYDRAMINE HCL 12.5MG/5ML
25 ELIXIR ORAL DAILY
Refills: 0 | Status: COMPLETED | OUTPATIENT
Start: 2025-05-29 | End: 2025-06-01

## 2025-05-29 RX ORDER — INSULIN LISPRO 100 U/ML
INJECTION, SOLUTION INTRAVENOUS; SUBCUTANEOUS
Refills: 0 | Status: DISCONTINUED | OUTPATIENT
Start: 2025-05-29 | End: 2025-06-03

## 2025-05-29 RX ADMIN — INSULIN LISPRO 5 UNIT(S): 100 INJECTION, SOLUTION INTRAVENOUS; SUBCUTANEOUS at 12:07

## 2025-05-29 RX ADMIN — INSULIN LISPRO 6: 100 INJECTION, SOLUTION INTRAVENOUS; SUBCUTANEOUS at 16:52

## 2025-05-29 RX ADMIN — INSULIN LISPRO 3 UNIT(S): 100 INJECTION, SOLUTION INTRAVENOUS; SUBCUTANEOUS at 08:28

## 2025-05-29 RX ADMIN — GABAPENTIN 600 MILLIGRAM(S): 400 CAPSULE ORAL at 15:36

## 2025-05-29 RX ADMIN — METHYLPREDNISOLONE ACETATE 50 MILLIGRAM(S): 80 INJECTION, SUSPENSION INTRA-ARTICULAR; INTRALESIONAL; INTRAMUSCULAR; SOFT TISSUE at 05:08

## 2025-05-29 RX ADMIN — Medication 1 TABLET(S): at 12:07

## 2025-05-29 RX ADMIN — Medication 650 MILLIGRAM(S): at 12:40

## 2025-05-29 RX ADMIN — Medication 650 MILLIGRAM(S): at 12:10

## 2025-05-29 RX ADMIN — INSULIN LISPRO 12: 100 INJECTION, SOLUTION INTRAVENOUS; SUBCUTANEOUS at 12:06

## 2025-05-29 RX ADMIN — ENOXAPARIN SODIUM 40 MILLIGRAM(S): 100 INJECTION SUBCUTANEOUS at 16:52

## 2025-05-29 RX ADMIN — INSULIN LISPRO 4: 100 INJECTION, SOLUTION INTRAVENOUS; SUBCUTANEOUS at 22:06

## 2025-05-29 RX ADMIN — Medication 15 GRAM(S): at 16:50

## 2025-05-29 RX ADMIN — GABAPENTIN 600 MILLIGRAM(S): 400 CAPSULE ORAL at 05:05

## 2025-05-29 RX ADMIN — DULOXETINE 60 MILLIGRAM(S): 20 CAPSULE, DELAYED RELEASE ORAL at 12:07

## 2025-05-29 RX ADMIN — Medication 25 MILLIGRAM(S): at 12:10

## 2025-05-29 RX ADMIN — LOSARTAN POTASSIUM 50 MILLIGRAM(S): 100 TABLET, FILM COATED ORAL at 05:05

## 2025-05-29 RX ADMIN — Medication 40 MILLIGRAM(S): at 05:05

## 2025-05-29 RX ADMIN — GABAPENTIN 600 MILLIGRAM(S): 400 CAPSULE ORAL at 22:06

## 2025-05-29 RX ADMIN — IMMUNE GLOBULIN (HUMAN) 41.67 GRAM(S): 10 INJECTION INTRAVENOUS; SUBCUTANEOUS at 13:18

## 2025-05-29 RX ADMIN — INSULIN LISPRO 10 UNIT(S): 100 INJECTION, SOLUTION INTRAVENOUS; SUBCUTANEOUS at 16:52

## 2025-05-29 RX ADMIN — AMLODIPINE BESYLATE 10 MILLIGRAM(S): 10 TABLET ORAL at 05:05

## 2025-05-29 RX ADMIN — ATORVASTATIN CALCIUM 10 MILLIGRAM(S): 80 TABLET, FILM COATED ORAL at 22:06

## 2025-05-29 RX ADMIN — INSULIN GLARGINE-YFGN 20 UNIT(S): 100 INJECTION, SOLUTION SUBCUTANEOUS at 22:06

## 2025-05-29 NOTE — PROGRESS NOTE ADULT - PROBLEM SELECTOR PLAN 1
Pt. with acute on chronic hyponatremia likely in setting of increased FW water/poor solute intake and possible SIADH from pain and duloxetine use. Pt. has hx of hyponatremia in April 20225, SNa manfred is 125 on 4/22/25. SNa on admission 128 (5/26) and decreased to 126 (5/28). TSH and lipid panels from 4/2025 are WNL.  - Obtain serum osmolality, urine osmolality, and urine electrolytes including Alexandro and Ur K  - Check uric acid level and AM cortisol   - After obtaining urine studies can start UreNa 15 GM BID. Please note BUN is expected to rise while on UreNa.  - Fluid restrict to <1.2L fluid in a day.  - Change diet to remove renal restriction, as patient does not require it. Can keep Consistent Carbohydrate restriction in place. Encourage PO protein intake.   - Goal correction is 6-8meq in first 24 hours. Pt. with acute on chronic hyponatremia likely in setting of increased FW water/poor solute intake and possible SIADH from pain and duloxetine use. Pt. has hx of hyponatremia in April 20225, SNa manfred is 125 on 4/22/25. SNa on admission 128 (5/26) and decreased to 126 (5/28). Alexandro 22 and USom 414. TSH, lipid panels, uric acid level and AM cortisol wnl.  -Check labs today   -c/w UreNa 15 GM BID. Please note BUN is expected to rise while on UreNa.  -Fluid restrict to <1.2L fluid in a day.  -Encourage PO protein intake.     If you have any questions, please feel free to contact me  Lashae Coreas  Nephrology Fellow  [employer]/Page [phone]  (After 5pm or on weekends please page the on-call fellow)

## 2025-05-29 NOTE — CONSULT NOTE ADULT - PROBLEM SELECTOR RECOMMENDATION 3
c/w statin    Discussed with primary team.     Frandy Rosenbaum D.O  [phone] Goal BP < 130/80 c/w amlodipine and losartan

## 2025-05-29 NOTE — PROGRESS NOTE ADULT - ASSESSMENT
· Assessment	  59y (1965) RIGHT-handed man w PMHx of ethanol use disorder, chronic pancreatitis, poorly controlled СЕРГЕЙ (A1c 9.9 on 04/23/2025), chronic neuropathy of LLE who presents to [facility] ED on 05/26/2025 for ongoing LLE pain and subjective weakness as well as RLE pain and weakness. Neurology consulted. Patient complaining of new onset RLE pain and disabling weakness as well as worsening of LLE weakness.     IMPRESSION:  Severe weakness in BLE (R>L). No evidence of compression or enhancement or sacral plexus abnormality on imaging. CSF demonstrated albuminocytologic dissociation, no evidence of infectious process. EMG/NCS with demyelinating pattern as well as evidence of chronic axonal loss. 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    Plan:  [x] MRI lumbar spine wwo no evidence of compression or enhancement  [x] MRI lumbosacral plexus R and L wwo no abnormalities  [] peripheral labs: Syphilis negative  -previous peripheral neuropathy labs from last admission: B1 (111.1), B6 (24.4), B9 (>20.0), B12 (441), TSH (1.06), Heavy Metal Tox Screen (-), HIV (-), Lyme (-), hepatitis panel (nonreactive including B surf ab, would need vaccine), SPEP (6.6 WNL)  -previous rheumatologic work up: ESR (6), CRP (<3), , ganglioside panel (WNL in 08/22/2024), ANSELMO (-), ANCA (-), ganglioside panel (-), Sjogren antibodies (-)  [] LP done 5/27: ACE, glucose 75, protein 82, cell count 1, IgG index 0.6, oligoclonal bands, MBP, cytology, autoimmune encephalitis, PCR negative, lyme, WNV  [] Bedside EMG/NCS with evidence of demyelinating process and chronic denervation   [] continue home meds specifically gabapentin 600 mg TID standing and methocarbamol 750 TID as needed  [] Diabetes management  - Lantus 15 units qHS  - Admelog 3 units before breakfast   - Admelog 5 units before lunch   - Admelog 5 units before dinner   - Insulin sliding scale  [] Start IV methylprednisolone 1g daily x5 days- monitor fingerstick while receiving steroids  [] Start IVIG 2g/kg divided over 5 days. Premedicate with Tylenol 650 PO and Benadryl 50 PO     #Hyponatremia- sodium downtrending 128->126 today  [] Nephrology recommendations appreciated, as below:  - Obtain serum osmolality, urine osmolality, and urine electrolytes including Alexandro and Ur K  - Check uric acid level and AM cortisol   - After obtaining urine studies can start UreNa 15 GM BID. Please note BUN is expected to rise while on UreNa.  - Fluid restrict to <1.2L fluid in a day.  - Change diet to remove renal restriction, as patient does not require it. Can keep Consistent Carbohydrate restriction in place. Encourage PO protein intake.   - Goal correction is 6-8meq in first 24 hours.       misc and manage:  [x] PTOT- recommending acute rehab      METRICS:  Diet: Consistent carbohydrate  DVT prophylaxis: Lovenox  Dispo: Acute rehab    Seen and discussed with attending Dr. Mirza Radford.   · Assessment	  59y (1965) RIGHT-handed man w PMHx of ethanol use disorder, chronic pancreatitis, poorly controlled СЕРГЕЙ (A1c 9.9 on 04/23/2025), chronic neuropathy of LLE who presents to [facility] ED on 05/26/2025 for ongoing LLE pain and subjective weakness as well as RLE pain and weakness. Neurology consulted. Patient complaining of new onset RLE pain and disabling weakness as well as worsening of LLE weakness.     IMPRESSION:  Patient has improved strength in the RLE compared to yesterday following initiation of treatment with IVIG and steroids, further supporting a leading diagnosis of CIDP. POCT blood glucose, in the setting of СЕРГЕЙ, has been significantly elevated in response to initiation of treatment with steroids. Normal urine sodium supports differential of hypervolemic hyponatremia over SIADH.    Plan:  [x] MRI lumbar spine wwo no evidence of compression or enhancement  [x] MRI lumbosacral plexus R and L wwo no abnormalities  [] peripheral labs: Syphilis negative  -previous peripheral neuropathy labs from last admission: B1 (111.1), B6 (24.4), B9 (>20.0), B12 (441), TSH (1.06), Heavy Metal Tox Screen (-), HIV (-), Lyme (-), hepatitis panel (nonreactive including B surf ab, would need vaccine), SPEP (6.6 WNL)  -previous rheumatologic work up: ESR (6), CRP (<3), , ganglioside panel (WNL in 08/22/2024), ANSELMO (-), ANCA (-), ganglioside panel (-), Sjogren antibodies (-)  [] LP done 5/27: ACE, glucose 75, protein 82, cell count 1, IgG index 0.6, oligoclonal bands, MBP, cytology, autoimmune encephalitis, PCR negative, lyme, WNV  [] Bedside EMG/NCS with evidence of demyelinating process and chronic denervation   [] continue home meds specifically gabapentin 600 mg TID standing and methocarbamol 750 TID as needed  [] Diabetes management  - Lantus 15 units qHS  - Admelog 3 units before breakfast   - Admelog 5 units before lunch   - Admelog 5 units before dinner   - Insulin sliding scale  [] Continue IV methylprednisolone 1g daily x5 days- continue to monitor fingerstick while receiving steroids  [] Continue IVIG 2g/kg divided x5 days. Premedicate with Tylenol 650 PO and Benadryl 50 PO     #Hyponatremia - most recent sodium was 126 yesterday  [] Nephrology recommendations appreciated, as below:  - Serum osmolality 290, urine sodium 22, urine potassium 31, uric acid 4.3  - Check AM cortisol   - Start UreNa 15 GM BID. BUN is expected to rise while on UreNa.  - Fluid restrict to <1.2L fluid in a day.  - Change diet to remove renal restriction, as patient does not require it. Can keep Consistent Carbohydrate restriction in place. Encourage PO protein intake.   - Goal correction is 6-8meq in first 24 hours.     misc and manage:  [x] PTOT- recommending acute rehab    METRICS:  Diet: Consistent carbohydrate  DVT prophylaxis: Lovenox  Dispo: Acute rehab    Seen and discussed with attending Dr. Mirza Radford.

## 2025-05-29 NOTE — CONSULT NOTE ADULT - CONSULT REASON
continuation of known LLE pain and subjective weakness
hyponatremia
Evaluate Rehabilitation Needs
neuropathy
Uncontrolled СЕРГЕЙ with severe hyperglycemia on steroids

## 2025-05-29 NOTE — CONSULT NOTE ADULT - ASSESSMENT
60 y/o M w/ uncontrolled СЕРГЕЙ complicated by neuropathy with severe hyperglycemia exacerbated by high dose steroids, HTN, HLD admitted for LE weakness (high risk patient with severe hyperglycemia with glucose > 400's at high risk of CAD and CVA with high medical complexity and high level decision-making with high intensive medication requiring frequent monitoring).

## 2025-05-29 NOTE — CONSULT NOTE ADULT - PROBLEM SELECTOR RECOMMENDATION 2
Goal BP < 130/80 c/w amlodipine and losartan Will adjust insulin as above based on steroid dosing and insulin requirements.

## 2025-05-29 NOTE — PROGRESS NOTE ADULT - SUBJECTIVE AND OBJECTIVE BOX
[facility] Division of Kidney Diseases & Hypertension  FOLLOW UP NOTE  [phone]--------------------------------------------------------------------------------  Chief Complaint: hyponatremia    24 hour events/subjective: Pt seen and evaluated this morning. Reports feeling well, endorses no complaints. Denies any headaches, nausea, vomiting, fevers/chills, chest pain, SOB, abdominal pain, and leg swelling.    PAST HISTORY  --------------------------------------------------------------------------------  No significant changes to PMH, PSH, FHx, SHx, unless otherwise noted    ALLERGIES & MEDICATIONS  --------------------------------------------------------------------------------  Allergies    No Known Allergies    Intolerances    Standing Inpatient Medications  acetaminophen     Tablet .. 650 milliGRAM(s) Oral daily  amLODIPine   Tablet 10 milliGRAM(s) Oral daily  atorvastatin 10 milliGRAM(s) Oral at bedtime  dextrose 5%. 1000 milliLiter(s) IV Continuous <Continuous>  dextrose 5%. 1000 milliLiter(s) IV Continuous <Continuous>  dextrose 50% Injectable 25 Gram(s) IV Push once  dextrose 50% Injectable 12.5 Gram(s) IV Push once  dextrose 50% Injectable 25 Gram(s) IV Push once  diphenhydrAMINE 25 milliGRAM(s) Oral daily  DULoxetine 60 milliGRAM(s) Oral daily  enoxaparin Injectable 40 milliGRAM(s) SubCutaneous every 24 hours  gabapentin 600 milliGRAM(s) Oral three times a day  glucagon  Injectable 1 milliGRAM(s) IntraMuscular once  immune   globulin 10% (GAMMAGARD) IVPB 25 Gram(s) IV Intermittent daily  insulin glargine Injectable (LANTUS) 15 Unit(s) SubCutaneous at bedtime  insulin lispro (ADMELOG) corrective regimen sliding scale   SubCutaneous three times a day before meals  insulin lispro (ADMELOG) corrective regimen sliding scale   SubCutaneous at bedtime  insulin lispro Injectable (ADMELOG) 3 Unit(s) SubCutaneous before breakfast  insulin lispro Injectable (ADMELOG) 5 Unit(s) SubCutaneous before lunch  insulin lispro Injectable (ADMELOG) 5 Unit(s) SubCutaneous before dinner  lidocaine 1% Injectable 10 milliLiter(s) Local Injection once  losartan 50 milliGRAM(s) Oral daily  methylPREDNISolone sodium succinate IVPB 1000 milliGRAM(s) IV Intermittent daily  multivitamin 1 Tablet(s) Oral daily  pantoprazole    Tablet 40 milliGRAM(s) Oral before breakfast  urea Oral Powder 15 Gram(s) Oral two times a day    PRN Inpatient Medications  dextrose Oral Gel 15 Gram(s) Oral once PRN  methocarbamol 750 milliGRAM(s) Oral three times a day PRN    REVIEW OF SYSTEMS  --------------------------------------------------------------------------------  see above    VITALS/PHYSICAL EXAM  --------------------------------------------------------------------------------  T(C): 36.9 (05-29-25 @ 13:15), Max: 37.1 (05-28-25 @ 14:00)  HR: 101 (05-29-25 @ 13:15) (97 - 117)  BP: 129/71 (05-29-25 @ 13:15) (129/71 - 148/75)  RR: 18 (05-29-25 @ 13:15) (17 - 20)  SpO2: 97% (05-29-25 @ 13:15) (97% - 98%)  Wt(kg): --    05-28-25 @ 07:01  -  05-29-25 @ 07:00  --------------------------------------------------------  IN: 360 mL / OUT: 1000 mL / NET: -640 mL    05-29-25 @ 07:01  -  05-29-25 @ 13:51  --------------------------------------------------------  IN: 420 mL / OUT: 375 mL / NET: 45 mL    Physical Exam:  Gen: +chronically ill appearing.   Pulm: CTA B/L  CV: RRR, S1S2;  Abd: +BS, soft  : No suprapubic tenderness  Extremities: no bilateral LE edema noted. R>L LE pain and weakness.   Neuro: Awake, alert.  Skin: Warm    LABS/STUDIES  --------------------------------------------------------------------------------              14.3   5.61  >-----------<  253      [05-28-25 @ 06:51]              40.8     126  |  93  |  12  ----------------------------<  185      [05-28-25 @ 06:51]  4.4   |  22  |  0.68        Ca     10.1     [05-28-25 @ 06:51]    TPro  6.5  /  Alb  3.9  /  TBili  0.4  /  DBili  x   /  AST  24  /  ALT  26  /  AlkPhos  61  [05-28-25 @ 06:51]    Uric acid 4.3      [05-29-25 @ 05:46]  Serum Osmolality 290      [05-29-25 @ 05:46]    Creatinine Trend:  SCr 0.68 [05-28 @ 06:51]  SCr 1.05 [05-27 @ 15:56]  SCr 0.77 [05-26 @ 11:39]    Urine Sodium 22      [05-28-25 @ 18:58]  Urine Potassium 31      [05-28-25 @ 18:58]  Urine Osmolality 414      [05-28-25 @ 18:58]    Syphilis Screen (Treponema Pallidum Ab) Negative      [05-26-25 @ 12:26]

## 2025-05-29 NOTE — PROGRESS NOTE ADULT - SUBJECTIVE AND OBJECTIVE BOX
Patient is a 59y old  Male who presents with a chief complaint of weakness (26 May 2025 14:25)    Patient resting in bed.  Feels a little stronger.  Started on IVIG and steroids for suspected CIDP.  No CP, no SOB, no N/V  Pain controlled.    MEDICATIONS  (STANDING):  acetaminophen     Tablet .. 650 milliGRAM(s) Oral daily  amLODIPine   Tablet 10 milliGRAM(s) Oral daily  atorvastatin 10 milliGRAM(s) Oral at bedtime  dextrose 5%. 1000 milliLiter(s) (50 mL/Hr) IV Continuous <Continuous>  dextrose 5%. 1000 milliLiter(s) (100 mL/Hr) IV Continuous <Continuous>  dextrose 50% Injectable 25 Gram(s) IV Push once  dextrose 50% Injectable 12.5 Gram(s) IV Push once  dextrose 50% Injectable 25 Gram(s) IV Push once  diphenhydrAMINE 25 milliGRAM(s) Oral daily  DULoxetine 60 milliGRAM(s) Oral daily  enoxaparin Injectable 40 milliGRAM(s) SubCutaneous every 24 hours  gabapentin 600 milliGRAM(s) Oral three times a day  glucagon  Injectable 1 milliGRAM(s) IntraMuscular once  immune   globulin 10% (GAMMAGARD) IVPB 25 Gram(s) IV Intermittent daily  insulin glargine Injectable (LANTUS) 15 Unit(s) SubCutaneous at bedtime  insulin lispro (ADMELOG) corrective regimen sliding scale   SubCutaneous three times a day before meals  insulin lispro (ADMELOG) corrective regimen sliding scale   SubCutaneous at bedtime  insulin lispro Injectable (ADMELOG) 3 Unit(s) SubCutaneous before breakfast  insulin lispro Injectable (ADMELOG) 5 Unit(s) SubCutaneous before lunch  insulin lispro Injectable (ADMELOG) 5 Unit(s) SubCutaneous before dinner  lidocaine 1% Injectable 10 milliLiter(s) Local Injection once  losartan 50 milliGRAM(s) Oral daily  methylPREDNISolone sodium succinate IVPB 1000 milliGRAM(s) IV Intermittent daily  multivitamin 1 Tablet(s) Oral daily  pantoprazole    Tablet 40 milliGRAM(s) Oral before breakfast  urea Oral Powder 15 Gram(s) Oral two times a day    MEDICATIONS  (PRN):  dextrose Oral Gel 15 Gram(s) Oral once PRN Blood Glucose LESS THAN 70 milliGRAM(s)/deciliter  methocarbamol 750 milliGRAM(s) Oral three times a day PRN Muscle Spasm    Vital Signs Last 24 Hrs  T(C): 37 (29 May 2025 04:52), Max: 37.1 (28 May 2025 14:00)  T(F): 98.6 (29 May 2025 04:52), Max: 98.7 (28 May 2025 14:00)  HR: 99 (29 May 2025 04:52) (86 - 117)  BP: 141/83 (29 May 2025 04:52) (134/74 - 148/75)  BP(mean): --  RR: 18 (29 May 2025 04:52) (17 - 20)  SpO2: 97% (29 May 2025 04:52) (97% - 98%)    Parameters below as of 29 May 2025 04:52  Patient On (Oxygen Delivery Method): room air    PHYSICAL EXAM  Constitutional - NAD, Comfortable  HEENT - NCAT, EOMI  Neck - Supple  Chest - No distress, no use of accessory muscles for respiration  Cardiovascular -RRR, Well perfused  Abdomen - BS+, Soft, NTND  Extremities - No C/C/E, No calf tenderness   Neurologic Exam -                 AAO x 3  Motor RLE - HF/KE 2/5, APF 4/5, ADF 1/5; LLE 3/5 HF/KE, APF 3/5, ADF 1/5  Sensation intact  No clonus    Psychiatric - Mood stable, Affect WNL    MR Sacrum Coccycx Joint w/wo IV Cont (05.27.25 @ 17:28) >  MRI of the sacrum with dedication to the lumbosacral plexus demonstrates   normal appearance of the lumbosacral nerves within the limitations of   motion artifact. No mass along their course. No abnormal muscle signal to   suggest denervation artifact.    MR Lumbar Spine w/wo IV Cont (05.26.25 @ 23:43) >  No significant central canal stenosis. The neural foramina appear patent.   No significant conus abnormality. No abnormal enhancement at the level of   the nerve roots.    Impression:  60 yo with functional deficits secondary to diagnosis of bl LE weakness- apparent CIDP    Plan:  Work up and management per Neurology  PT- ROM, Bed Mob, Transfers, Amb w AD and bracing as needed  OT- ADLs, bracing  Prec- Falls, Cardiac  DVT Prophylaxis - SCDs, Lovenox  Pain controlled w gabapentin, acetaminophen and methocarbamol  Skin- Turn q2 h  Dispo- Acute Rehab- patient requires active and ongoing therapeutic interventions of multiple disciplines and can tolerate and benefit from 3 hours of intensive therapies x 2-4wks depending on progress at rehabilitation facility. Can actively participate and benefit from  an intensive rehabilitation program. Requires supervision by a rehabilitation physician and a coordinated interdisciplinary approach to providing rehabilitation.

## 2025-05-29 NOTE — CONSULT NOTE ADULT - SUBJECTIVE AND OBJECTIVE BOX
HPI:  58 y/o M w/ hx of СЕРГЕЙ diagnosed in 2021 with DKA and + ZnT8 ab. Complicated by neuropathy. Follows with Dr. Celis in [place]. At home on Basaglar 15-20 units qhs and Lispro via Cequr patch 8-12 units with meals. Some nonadherence to both diet and insulin administration with mostly hyperglycemia monitored on Freestyle Geronimo 3 CGM. No reported hypoglycemia or symptoms of hypoglycemia. Mother with hx of Type 2 DM.   Also hx of chronic pancreatitis, poorly controlled СЕРГЕЙ (A1c 9.9 on 04/23/2025), chronic neuropathy of LLE who presents to [facility] ED on 05/26/2025 for ongoing LLE pain and subjective weakness as well as RLE pain and weakness. Neurology consulted.  Now with severe hyperglycemia on high dose of Solumedrol 5/28-6/1.     Last hospital admission 04/2025  Patient has seen Dr. Boone (Orthopedic Spine Surgery) and PCP outpatient and referred to Neurology, Dr. Bárbara Hays. Sent to ED for LP and spine imaging. Dr. Hays via Teams. She is concerned for possible transverse myelitis at T4 vs. AIDP vs. diabetic neuropathy. Patient has had entire MRI of the neuro-axis in April 2025, but without contrast. Findings were non-actionable.  Patient reported in October 2024 he developed excruciating pain in the dorsum of his LEFT foot. Painful to even light touch. Also developed cramping pain in the back of his RLE. Over the three weeks prior to the hospitalization, he developed numbness at the level of his rib cage upward - front and back.   During hospitalization, patient exam consistent w sciatica and neuropathy of LLE. Patient was prescribed gabapentin 300 mg tid w some improvement. MRI brain and CTL spine wwo were without acute pathology. Patient seen by endo for further management of his diabetes.    05/26/2025: Patient evaluated by neurology resident. Patient presents with his wife and complains of:  1) RLE weakness: onset 1 week ago, sudden, feels like his leg gives out on him, feels like he is dragging his leg and not able to  his foot  2) RLE pain: onset 1 week ago, located over R hip, feels like hip is popper out of place  3) LLE weakness: worsened from weakness evaluated during last hospitalization, feels like he can't pick his foot up and is dragging his LLE  4) RLE calf numbness: ongoing from previous  hospital admission and unchanged  The weakness described above has resulted in significant decrease in ambulation from baseline being able to ambulate with walker. Patient has falling several times over past week striking knees on the ground.  no headache, blurry vision, double vision, difficulty swallowing, chest pain, palpitations, SOB, cough, n/v, dysuria, hematuria, blood in stool  Per wife. Patient say Dr. Radford after hospital admission and was scheduled for EMG (06/18/2025) and gabapentin increased to 600 mg tid. Patient also say pain management who reported he can take methocarbamol 2 tablets up to twice a day. Next neurologist appt is scheduled for 07/29/2025.      PMHx/PSHx/Family Hx: As above, otherwise see below   No pertinent past medical history    GERD (gastroesophageal reflux disease)    Insulin dependent type 2 diabetes mellitus    Alcohol abuse    Left lower quadrant abdominal pain    Elbow fracture    Benign hypertension    Scoliosis    Pancreatic duct stones    Diabetes mellitus, type 2    Pancreatitis, chronic    Gallstones   (26 May 2025 14:25)      PAST MEDICAL & SURGICAL HISTORY:  GERD (gastroesophageal reflux disease)      Alcohol abuse      Left lower quadrant abdominal pain      Elbow fracture      Benign hypertension      Scoliosis      Pancreatic duct stones      Diabetes mellitus, type 2      Pancreatitis, chronic      Gallstones      H/O elbow surgery          FAMILY HISTORY:  FH: type 2 diabetes (Mother)        Social History: +hx of alcohol abuse. No tobacco use    Outpatient Medications:  · 	NovoLOG FlexPen 100 units/mL injectable solution: Last Dose Taken:  , 12 unit(s) injectable 3 times a day Give 8 to 12 units before meals.  · 	Basaglar KwikPen 100 units/mL subcutaneous solution: Last Dose Taken:  , 20 unit(s) subcutaneous once a day (at bedtime)  · 	amLODIPine 10 mg oral tablet: Last Dose Taken:  , 1 tab(s) orally once a day  · 	Multiple Vitamins oral tablet: Last Dose Taken:  , 1 tab(s) orally once a day  · 	gabapentin 300 mg oral capsule: Last Dose Taken:  , 2 cap(s) orally 3 times a day 600 mg three times a day  · 	rolling walker: Last Dose Taken:  , Please use as assistance with walking  · 	omeprazole 20 mg oral delayed release capsule: Last Dose Taken:  , 1 cap(s) orally once a day before breakfast  · 	methocarbamol 750 mg oral tablet: Last Dose Taken:  , 2 tab(s) orally 3 times a day  · 	atorvastatin 10 mg oral tablet: Last Dose Taken:  , 1 tab(s) orally once a day  · 	telmisartan 40 mg oral tablet: Last Dose Taken:  , 1 tab(s) orally once a day  · 	DULoxetine 30 mg oral delayed release capsule: Last Dose Taken:  , 2 cap(s) orally once a day (at bedtime)    MEDICATIONS  (STANDING):  acetaminophen     Tablet .. 650 milliGRAM(s) Oral daily  amLODIPine   Tablet 10 milliGRAM(s) Oral daily  atorvastatin 10 milliGRAM(s) Oral at bedtime  dextrose 5%. 1000 milliLiter(s) (50 mL/Hr) IV Continuous <Continuous>  dextrose 5%. 1000 milliLiter(s) (100 mL/Hr) IV Continuous <Continuous>  dextrose 50% Injectable 25 Gram(s) IV Push once  dextrose 50% Injectable 12.5 Gram(s) IV Push once  dextrose 50% Injectable 25 Gram(s) IV Push once  diphenhydrAMINE 25 milliGRAM(s) Oral daily  DULoxetine 60 milliGRAM(s) Oral daily  enoxaparin Injectable 40 milliGRAM(s) SubCutaneous every 24 hours  gabapentin 600 milliGRAM(s) Oral three times a day  glucagon  Injectable 1 milliGRAM(s) IntraMuscular once  immune   globulin 10% (GAMMAGARD) IVPB 25 Gram(s) IV Intermittent daily  insulin glargine Injectable (LANTUS) 20 Unit(s) SubCutaneous at bedtime  insulin lispro (ADMELOG) corrective regimen sliding scale   SubCutaneous three times a day before meals  insulin lispro (ADMELOG) corrective regimen sliding scale   SubCutaneous at bedtime  insulin lispro Injectable (ADMELOG) 10 Unit(s) SubCutaneous three times a day before meals  lidocaine 1% Injectable 10 milliLiter(s) Local Injection once  losartan 50 milliGRAM(s) Oral daily  methylPREDNISolone sodium succinate IVPB 1000 milliGRAM(s) IV Intermittent daily  multivitamin 1 Tablet(s) Oral daily  pantoprazole    Tablet 40 milliGRAM(s) Oral before breakfast  urea Oral Powder 15 Gram(s) Oral two times a day    MEDICATIONS  (PRN):  dextrose Oral Gel 15 Gram(s) Oral once PRN Blood Glucose LESS THAN 70 milliGRAM(s)/deciliter  methocarbamol 750 milliGRAM(s) Oral three times a day PRN Muscle Spasm      Allergies    No Known Allergies    Intolerances      Review of Systems:  Constitutional: No fever, No change in weight  Eyes: No blurry vision  Neuro: No headache, +neuropathy  HEENT: No throat pain  Cardiovascular: No chest pain  Respiratory: No SOB  GI: No nausea or vomiting  : No polyuria  Skin: no rash  Psych: no depression  Endocrine: No polydipsia, No heat or cold intolerance, rest as noted in HPI  Hem/lymph: no swelling    All other review of systems negative      PHYSICAL EXAM:  VITALS: T(C): 36.9 (05-29-25 @ 13:15)  T(F): 98.4 (05-29-25 @ 13:15), Max: 98.6 (05-29-25 @ 04:52)  HR: 101 (05-29-25 @ 13:15) (99 - 117)  BP: 129/71 (05-29-25 @ 13:15) (129/71 - 148/75)  RR:  (17 - 18)  SpO2:  (97% - 98%)  Wt(kg): --  GENERAL: NAD at this time  EYES: No proptosis, EOMI  HEENT:  Atraumatic, Normocephalic,   THYROID: Normal size, no palpable nodules  RESPIRATORY: Clear to auscultation bilaterally, full excursion, non-labored  CARDIOVASCULAR: Regular rhythm; No murmurs; no peripheral edema  GI: Soft, nontender, non distended, normal bowel sounds  SKIN: Dry, intact, No rashes or lesions  MUSCULOSKELETAL: normal strength  NEURO: follows commands  PSYCH: Alert and oriented x 3, normal affect, normal mood  CUSHING'S SIGNS: no striae      POCT Blood Glucose.: 413 mg/dL (05-29-25 @ 13:35)  POCT Blood Glucose.: 477 mg/dL (05-29-25 @ 12:27)  POCT Blood Glucose.: 414 mg/dL (05-29-25 @ 12:05)  POCT Blood Glucose.: 415 mg/dL (05-29-25 @ 11:42)  POCT Blood Glucose.: 245 mg/dL (05-29-25 @ 08:23)  POCT Blood Glucose.: 330 mg/dL (05-29-25 @ 00:01)  POCT Blood Glucose.: 360 mg/dL (05-28-25 @ 23:14)  POCT Blood Glucose.: 381 mg/dL (05-28-25 @ 21:52)  POCT Blood Glucose.: 461 mg/dL (05-28-25 @ 21:50)  POCT Blood Glucose.: 418 mg/dL (05-28-25 @ 20:55)  POCT Blood Glucose.: 348 mg/dL (05-28-25 @ 16:31)  POCT Blood Glucose.: 88 mg/dL (05-28-25 @ 11:20)  POCT Blood Glucose.: 152 mg/dL (05-28-25 @ 08:19)  POCT Blood Glucose.: 217 mg/dL (05-27-25 @ 20:55)  POCT Blood Glucose.: 151 mg/dL (05-27-25 @ 18:11)  POCT Blood Glucose.: 138 mg/dL (05-27-25 @ 16:32)  POCT Blood Glucose.: 164 mg/dL (05-27-25 @ 11:22)  POCT Blood Glucose.: 98 mg/dL (05-27-25 @ 07:24)  POCT Blood Glucose.: 205 mg/dL (05-26-25 @ 21:34)  POCT Blood Glucose.: 210 mg/dL (05-26-25 @ 17:07)  POCT Blood Glucose.: 182 mg/dL (05-26-25 @ 16:04)                              14.3   5.61  )-----------( 253      ( 28 May 2025 06:51 )             40.8       05-28    126[L]  |  93[L]  |  12  ----------------------------<  185[H]  4.4   |  22  |  0.68    eGFR: 108    Ca    10.1      05-28    TPro  6.5  /  Alb  3.9  /  TBili  0.4  /  DBili  x   /  AST  24  /  ALT  26  /  AlkPhos  61  05-28    Thyroid Function Tests:          04-24 Chol 129 Direct LDL -- LDL calculated 53 HDL 63 Trig 63  Radiology:                  HPI:  58 y/o M w/ hx of СЕРГЕЙ diagnosed in 2021 with DKA and + ZnT8 ab. Complicated by neuropathy. Follows with Dr. Celis in [place]. At home on Basaglar 15-20 units qhs and Lispro via Cequr patch 8-12 units with meals. Some nonadherence diet. Has been better with adherence to insulin administration, still with mostly hyperglycemia monitored on Freestyle [place] 3 CGM. No reported hypoglycemia or symptoms of hypoglycemia. Mother with hx of Type 2 DM.   Also hx of chronic pancreatitis, poorly controlled СЕРГЕЙ (A1c 9.9 on 04/23/2025), chronic neuropathy of LLE who presents to [facility] ED on 05/26/2025 for ongoing LLE pain and subjective weakness as well as RLE pain and weakness. Neurology consulted.  Now with severe hyperglycemia on high dose of Solumedrol 5/28-6/1.     Last hospital admission 04/2025  Patient has seen Dr. Boone (Orthopedic Spine Surgery) and PCP outpatient and referred to Neurology, Dr. Bárbara Hays. Sent to ED for LP and spine imaging. Dr. Hays via Teams. She is concerned for possible transverse myelitis at T4 vs. AIDP vs. diabetic neuropathy. Patient has had entire MRI of the neuro-axis in April 2025, but without contrast. Findings were non-actionable.  Patient reported in October 2024 he developed excruciating pain in the dorsum of his LEFT foot. Painful to even light touch. Also developed cramping pain in the back of his RLE. Over the three weeks prior to the hospitalization, he developed numbness at the level of his rib cage upward - front and back.   During hospitalization, patient exam consistent w sciatica and neuropathy of LLE. Patient was prescribed gabapentin 300 mg tid w some improvement. MRI brain and CTL spine wwo were without acute pathology. Patient seen by endo for further management of his diabetes.    05/26/2025: Patient evaluated by neurology resident. Patient presents with his wife and complains of:  1) RLE weakness: onset 1 week ago, sudden, feels like his leg gives out on him, feels like he is dragging his leg and not able to  his foot  2) RLE pain: onset 1 week ago, located over R hip, feels like hip is popper out of place  3) LLE weakness: worsened from weakness evaluated during last hospitalization, feels like he can't pick his foot up and is dragging his LLE  4) RLE calf numbness: ongoing from previous  hospital admission and unchanged  The weakness described above has resulted in significant decrease in ambulation from baseline being able to ambulate with walker. Patient has falling several times over past week striking knees on the ground.  no headache, blurry vision, double vision, difficulty swallowing, chest pain, palpitations, SOB, cough, n/v, dysuria, hematuria, blood in stool  Per wife. Patient say Dr. Radford after hospital admission and was scheduled for EMG (06/18/2025) and gabapentin increased to 600 mg tid. Patient also say pain management who reported he can take methocarbamol 2 tablets up to twice a day. Next neurologist appt is scheduled for 07/29/2025.      PMHx/PSHx/Family Hx: As above, otherwise see below   No pertinent past medical history    GERD (gastroesophageal reflux disease)    Insulin dependent type 2 diabetes mellitus    Alcohol abuse    Left lower quadrant abdominal pain    Elbow fracture    Benign hypertension    Scoliosis    Pancreatic duct stones    Diabetes mellitus, type 2    Pancreatitis, chronic    Gallstones   (26 May 2025 14:25)      PAST MEDICAL & SURGICAL HISTORY:  GERD (gastroesophageal reflux disease)      Alcohol abuse      Left lower quadrant abdominal pain      Elbow fracture      Benign hypertension      Scoliosis      Pancreatic duct stones      Diabetes mellitus, type 2      Pancreatitis, chronic      Gallstones      H/O elbow surgery          FAMILY HISTORY:  FH: type 2 diabetes (Mother)        Social History: +hx of alcohol abuse. No tobacco use    Outpatient Medications:  · 	NovoLOG FlexPen 100 units/mL injectable solution: Last Dose Taken:  , 12 unit(s) injectable 3 times a day Give 8 to 12 units before meals.  · 	Basaglar KwikPen 100 units/mL subcutaneous solution: Last Dose Taken:  , 20 unit(s) subcutaneous once a day (at bedtime)  · 	amLODIPine 10 mg oral tablet: Last Dose Taken:  , 1 tab(s) orally once a day  · 	Multiple Vitamins oral tablet: Last Dose Taken:  , 1 tab(s) orally once a day  · 	gabapentin 300 mg oral capsule: Last Dose Taken:  , 2 cap(s) orally 3 times a day 600 mg three times a day  · 	rolling walker: Last Dose Taken:  , Please use as assistance with walking  · 	omeprazole 20 mg oral delayed release capsule: Last Dose Taken:  , 1 cap(s) orally once a day before breakfast  · 	methocarbamol 750 mg oral tablet: Last Dose Taken:  , 2 tab(s) orally 3 times a day  · 	atorvastatin 10 mg oral tablet: Last Dose Taken:  , 1 tab(s) orally once a day  · 	telmisartan 40 mg oral tablet: Last Dose Taken:  , 1 tab(s) orally once a day  · 	DULoxetine 30 mg oral delayed release capsule: Last Dose Taken:  , 2 cap(s) orally once a day (at bedtime)    MEDICATIONS  (STANDING):  acetaminophen     Tablet .. 650 milliGRAM(s) Oral daily  amLODIPine   Tablet 10 milliGRAM(s) Oral daily  atorvastatin 10 milliGRAM(s) Oral at bedtime  dextrose 5%. 1000 milliLiter(s) (50 mL/Hr) IV Continuous <Continuous>  dextrose 5%. 1000 milliLiter(s) (100 mL/Hr) IV Continuous <Continuous>  dextrose 50% Injectable 25 Gram(s) IV Push once  dextrose 50% Injectable 12.5 Gram(s) IV Push once  dextrose 50% Injectable 25 Gram(s) IV Push once  diphenhydrAMINE 25 milliGRAM(s) Oral daily  DULoxetine 60 milliGRAM(s) Oral daily  enoxaparin Injectable 40 milliGRAM(s) SubCutaneous every 24 hours  gabapentin 600 milliGRAM(s) Oral three times a day  glucagon  Injectable 1 milliGRAM(s) IntraMuscular once  immune   globulin 10% (GAMMAGARD) IVPB 25 Gram(s) IV Intermittent daily  insulin glargine Injectable (LANTUS) 20 Unit(s) SubCutaneous at bedtime  insulin lispro (ADMELOG) corrective regimen sliding scale   SubCutaneous three times a day before meals  insulin lispro (ADMELOG) corrective regimen sliding scale   SubCutaneous at bedtime  insulin lispro Injectable (ADMELOG) 10 Unit(s) SubCutaneous three times a day before meals  lidocaine 1% Injectable 10 milliLiter(s) Local Injection once  losartan 50 milliGRAM(s) Oral daily  methylPREDNISolone sodium succinate IVPB 1000 milliGRAM(s) IV Intermittent daily  multivitamin 1 Tablet(s) Oral daily  pantoprazole    Tablet 40 milliGRAM(s) Oral before breakfast  urea Oral Powder 15 Gram(s) Oral two times a day    MEDICATIONS  (PRN):  dextrose Oral Gel 15 Gram(s) Oral once PRN Blood Glucose LESS THAN 70 milliGRAM(s)/deciliter  methocarbamol 750 milliGRAM(s) Oral three times a day PRN Muscle Spasm      Allergies    No Known Allergies    Intolerances      Review of Systems:  Constitutional: No fever, No change in weight  Eyes: No blurry vision  Neuro: No headache, +neuropathy  HEENT: No throat pain  Cardiovascular: No chest pain  Respiratory: No SOB  GI: No nausea or vomiting  : No polyuria  Skin: no rash  Psych: no depression  Endocrine: No polydipsia, No heat or cold intolerance, rest as noted in HPI  Hem/lymph: no swelling    All other review of systems negative      PHYSICAL EXAM:  VITALS: T(C): 36.9 (05-29-25 @ 13:15)  T(F): 98.4 (05-29-25 @ 13:15), Max: 98.6 (05-29-25 @ 04:52)  HR: 101 (05-29-25 @ 13:15) (99 - 117)  BP: 129/71 (05-29-25 @ 13:15) (129/71 - 148/75)  RR:  (17 - 18)  SpO2:  (97% - 98%)  Wt(kg): --  GENERAL: NAD at this time, thin  EYES: No proptosis, EOMI  HEENT:  Atraumatic, Normocephalic,   THYROID: Normal size, no palpable nodules  RESPIRATORY: Clear to auscultation bilaterally, full excursion, non-labored  CARDIOVASCULAR: Regular rhythm; No murmurs; no peripheral edema  GI: Soft, nontender, non distended, normal bowel sounds  SKIN: Dry, intact, No rashes or lesions  MUSCULOSKELETAL: normal strength  NEURO: follows commands  PSYCH: Alert and oriented x 3, normal affect, normal mood  CUSHING'S SIGNS: no striae      POCT Blood Glucose.: 413 mg/dL (05-29-25 @ 13:35)  POCT Blood Glucose.: 477 mg/dL (05-29-25 @ 12:27)  POCT Blood Glucose.: 414 mg/dL (05-29-25 @ 12:05)  POCT Blood Glucose.: 415 mg/dL (05-29-25 @ 11:42)  POCT Blood Glucose.: 245 mg/dL (05-29-25 @ 08:23)  POCT Blood Glucose.: 330 mg/dL (05-29-25 @ 00:01)  POCT Blood Glucose.: 360 mg/dL (05-28-25 @ 23:14)  POCT Blood Glucose.: 381 mg/dL (05-28-25 @ 21:52)  POCT Blood Glucose.: 461 mg/dL (05-28-25 @ 21:50)  POCT Blood Glucose.: 418 mg/dL (05-28-25 @ 20:55)  POCT Blood Glucose.: 348 mg/dL (05-28-25 @ 16:31)  POCT Blood Glucose.: 88 mg/dL (05-28-25 @ 11:20)  POCT Blood Glucose.: 152 mg/dL (05-28-25 @ 08:19)  POCT Blood Glucose.: 217 mg/dL (05-27-25 @ 20:55)  POCT Blood Glucose.: 151 mg/dL (05-27-25 @ 18:11)  POCT Blood Glucose.: 138 mg/dL (05-27-25 @ 16:32)  POCT Blood Glucose.: 164 mg/dL (05-27-25 @ 11:22)  POCT Blood Glucose.: 98 mg/dL (05-27-25 @ 07:24)  POCT Blood Glucose.: 205 mg/dL (05-26-25 @ 21:34)  POCT Blood Glucose.: 210 mg/dL (05-26-25 @ 17:07)  POCT Blood Glucose.: 182 mg/dL (05-26-25 @ 16:04)                              14.3   5.61  )-----------( 253      ( 28 May 2025 06:51 )             40.8       05-28    126[L]  |  93[L]  |  12  ----------------------------<  185[H]  4.4   |  22  |  0.68    eGFR: 108    Ca    10.1      05-28    TPro  6.5  /  Alb  3.9  /  TBili  0.4  /  DBili  x   /  AST  24  /  ALT  26  /  AlkPhos  61  05-28    Thyroid Function Tests:          04-24 Chol 129 Direct LDL -- LDL calculated 53 HDL 63 Trig 63  Radiology:

## 2025-05-29 NOTE — CONSULT NOTE ADULT - PROBLEM SELECTOR RECOMMENDATION 9
Diabetes Education and Nutrition Eval  Now on high dose steroids.   Increase Lantus to 20 units qhs  Increase Admelog to 10 units qac  Mod correction scale qac + bedtime  Goal glucose 100-180. Will adjust as needed based on insulin requirements and as steroids are adjusted or discontinued.   Outpt. endo follow-up  Outpt. optho, podiatry, nephrology  Plan to d/c on basal bolus

## 2025-05-29 NOTE — PROGRESS NOTE ADULT - SUBJECTIVE AND OBJECTIVE BOX
Neurology Progress Note    SUBJECTIVE/OBJECTIVE/INTERVAL EVENTS: Patient seen and examined at bedside w/ neuro attending and team.     INTERVAL HISTORY:    REVIEW OF SYSTEMS: Few questions of a 10-system ROS was performed and is negative except for those items noted above and/or in the HPI.    VITALS & EXAMINATION:  Vital Signs Last 24 Hrs  T(C): 37 (29 May 2025 04:52), Max: 37.1 (28 May 2025 14:00)  T(F): 98.6 (29 May 2025 04:52), Max: 98.7 (28 May 2025 14:00)  HR: 99 (29 May 2025 04:52) (86 - 117)  BP: 141/83 (29 May 2025 04:52) (134/74 - 148/75)  BP(mean): --  RR: 18 (29 May 2025 04:52) (17 - 20)  SpO2: 97% (29 May 2025 04:52) (97% - 98%)    Parameters below as of 29 May 2025 04:52  Patient On (Oxygen Delivery Method): room air        General:  Constitutional: Male, appears stated age, nontoxic, not in distress,    Head: Normocephalic;   Eyes: clear sclera;   Extremities: No cyanosis;   Resp: breathing comfortably  Neck: no nuchal rigidity  Fundoscopic exam:      Neurological (>12):  MS: Awake, alert.  Oriented person place situation year month. Follows simple complex cross commands. Able to ID 3/3 objects. Attends to examiner  Language: Speech is hypophonic, clear, fluent, good repetition,  comprehension, registration of words.  CNs: PERRL (R 3mm, L 3mm). VFF. EOMI. No disconjugate gaze, nystagmus. V1-3 intact LT, No facial asymmetry b/l. Hearing grossly normal b/l. Tongue midline and can move side to side.     Motor - Normal bulk and tone throughout. No pronator drift.    L/R (out of 5 each)       Deltoid  5/5    Biceps   5/5      Triceps  5/5         Wrist Extension 5/5   Wrist Flexion  5/5   Interossei 5/5     5/5   L/R (out of 5 each)       Hip Flexion  5/5    Hip Extension  5/5  Knee Extension  5/5  Dorsiflexion  5/5      Plantar Flexion 5/5     Sensation: Intact to LT b/l x4 extremities. Cortical: Extinction on DSS (neglect): none  Reflexes L/R:  Biceps(C5) 2/2  BR(C6) 2/2   Triceps(C7)  2/2 Patellar(L4)   2/2   Ankles 2/2   Toes: mute b/l  no ankle clonus  Coordination: No dysmetria to FTN b/l UE  Gait: Normal Romberg. No postural instability. Normal stance. Gait baseline.    LABORATORY:  CBC                       14.3   5.61  )-----------( 253      ( 28 May 2025 06:51 )             40.8     Chem 05-28    126[L]  |  93[L]  |  12  ----------------------------<  185[H]  4.4   |  22  |  0.68    Ca    10.1      28 May 2025 06:51    TPro  6.5  /  Alb  3.9  /  TBili  0.4  /  DBili  x   /  AST  24  /  ALT  26  /  AlkPhos  61  05-28    LFTs LIVER FUNCTIONS - ( 28 May 2025 06:51 )  Alb: 3.9 g/dL / Pro: 6.5 g/dL / ALK PHOS: 61 U/L / ALT: 26 U/L / AST: 24 U/L / GGT: x           Coagulopathy   Lipid Panel   A1c   Cardiac enzymes     U/A Urinalysis Basic - ( 28 May 2025 06:51 )    Color: x / Appearance: x / SG: x / pH: x  Gluc: 185 mg/dL / Ketone: x  / Bili: x / Urobili: x   Blood: x / Protein: x / Nitrite: x   Leuk Esterase: x / RBC: x / WBC x   Sq Epi: x / Non Sq Epi: x / Bacteria: x      CSF  Immunological  Other    STUDIES & IMAGING: (EEG, CT, MR, U/S, TTE/BOWEN): Neurology Progress Note    SUBJECTIVE/OBJECTIVE/INTERVAL EVENTS: Patient was seen and examined at bedside w/ neuro attending and team. He reported that his RLE began feeling stronger last night following initiation of treatment with IVIG and steroids earlier in the day and he was excited that he could now wiggle his toes and move himself from his bed to his chair this morning which he was unable to do when he was evaluated yesterday afternoon. Motor exam was consistent with the patient's report, showing significant improvement in RLE strength. POCT blood glucose has been repeatedly elevated since initiating treatment with steroids: 348, 418, 461, 381, 360, 330.     VITALS & EXAMINATION:  Vital Signs Last 24 Hrs  T(C): 37 (29 May 2025 04:52), Max: 37.1 (28 May 2025 14:00)  T(F): 98.6 (29 May 2025 04:52), Max: 98.7 (28 May 2025 14:00)  HR: 99 (29 May 2025 04:52) (86 - 117)  BP: 141/83 (29 May 2025 04:52) (134/74 - 148/75)  BP(mean): --  RR: 18 (29 May 2025 04:52) (17 - 20)  SpO2: 97% (29 May 2025 04:52) (97% - 98%)    General: patient was in no acute distress      Neurological (>12):  MS: Awake, alert.  Oriented to person, place, and date.   CNs: PERRL (R 3mm, L 3mm). VFF. EOMI. No disconjugate gaze, nystagmus. V1-3 intact LT, No facial asymmetry b/l. Tongue midline and can move side to side.     Motor - Normal bulk and tone throughout.     L/R (out of 5 each)       Deltoid 5/5, Biceps 5/5, Triceps 5/5, Interossei 5/5    L/R (out of 5 each)       Hip Flexion 4+/2, Knee Flexion 4+/4-, Knee Extension 4+/4-, Dorsiflexion 2/2, Plantar Flexion 4+/4+, Inversion 5/4-, Eversion 4/4-     Sensation: Decreased sensation to LT in the right forearm. Intact sensation to LT in rest of RUE and all of LUE, LLE, and RLE.  Reflexes L/R:  Biceps(C5) 1/not able to obtain due to IV placement, BR(C6) 2/1+, Triceps(C7) 2/1+, Patellar(L4) 0/0, Ankles 0/0   Coordination: No dysmetria to finger-nose-finger or heel-shin test     LABORATORY:  CBC                       14.3   5.61  )-----------( 253      ( 28 May 2025 06:51 )             40.8     Chem 05-28    126[L]  |  93[L]  |  12  ----------------------------<  185[H]  4.4   |  22  |  0.68    Ca    10.1      28 May 2025 06:51    TPro  6.5  /  Alb  3.9  /  TBili  0.4  /  DBili  x   /  AST  24  /  ALT  26  /  AlkPhos  61  05-28    LFTs LIVER FUNCTIONS - ( 28 May 2025 06:51 )  Alb: 3.9 g/dL / Pro: 6.5 g/dL / ALK PHOS: 61 U/L / ALT: 26 U/L / AST: 24 U/L / GGT: x           U/A Urinalysis Basic - ( 28 May 2025 06:51 )    Color: x / Appearance: x / SG: x / pH: x  Gluc: 185 mg/dL / Ketone: x  / Bili: x / Urobili: x   Blood: x / Protein: x / Nitrite: x   Leuk Esterase: x / RBC: x / WBC x   Sq Epi: x / Non Sq Epi: x / Bacteria: x

## 2025-05-30 LAB
ALBUMIN SERPL ELPH-MCNC: 4.1 G/DL — SIGNIFICANT CHANGE UP (ref 3.3–5)
ALBUMIN SERPL ELPH-MCNC: 4.1 G/DL — SIGNIFICANT CHANGE UP (ref 3.3–5)
ALP SERPL-CCNC: 58 U/L — SIGNIFICANT CHANGE UP (ref 40–120)
ALP SERPL-CCNC: 58 U/L — SIGNIFICANT CHANGE UP (ref 40–120)
ALT FLD-CCNC: 28 U/L — SIGNIFICANT CHANGE UP (ref 10–45)
ALT FLD-CCNC: 30 U/L — SIGNIFICANT CHANGE UP (ref 10–45)
ANION GAP SERPL CALC-SCNC: 13 MMOL/L — SIGNIFICANT CHANGE UP (ref 5–17)
ANION GAP SERPL CALC-SCNC: 16 MMOL/L — SIGNIFICANT CHANGE UP (ref 5–17)
APTT BLD: 25.2 SEC — LOW (ref 26.1–36.8)
AST SERPL-CCNC: 17 U/L — SIGNIFICANT CHANGE UP (ref 10–40)
AST SERPL-CCNC: 20 U/L — SIGNIFICANT CHANGE UP (ref 10–40)
BASOPHILS # BLD AUTO: 0 K/UL — SIGNIFICANT CHANGE UP (ref 0–0.2)
BASOPHILS NFR BLD AUTO: 0 % — SIGNIFICANT CHANGE UP (ref 0–2)
BILIRUB SERPL-MCNC: 0.4 MG/DL — SIGNIFICANT CHANGE UP (ref 0.2–1.2)
BILIRUB SERPL-MCNC: 0.4 MG/DL — SIGNIFICANT CHANGE UP (ref 0.2–1.2)
BUN SERPL-MCNC: 25 MG/DL — HIGH (ref 7–23)
BUN SERPL-MCNC: 27 MG/DL — HIGH (ref 7–23)
CALCIUM SERPL-MCNC: 9.6 MG/DL — SIGNIFICANT CHANGE UP (ref 8.4–10.5)
CALCIUM SERPL-MCNC: 9.7 MG/DL — SIGNIFICANT CHANGE UP (ref 8.4–10.5)
CHLORIDE SERPL-SCNC: 92 MMOL/L — LOW (ref 96–108)
CHLORIDE SERPL-SCNC: 95 MMOL/L — LOW (ref 96–108)
CO2 SERPL-SCNC: 20 MMOL/L — LOW (ref 22–31)
CO2 SERPL-SCNC: 23 MMOL/L — SIGNIFICANT CHANGE UP (ref 22–31)
CREAT SERPL-MCNC: 0.63 MG/DL — SIGNIFICANT CHANGE UP (ref 0.5–1.3)
CREAT SERPL-MCNC: 0.68 MG/DL — SIGNIFICANT CHANGE UP (ref 0.5–1.3)
EGFR: 108 ML/MIN/1.73M2 — SIGNIFICANT CHANGE UP
EGFR: 108 ML/MIN/1.73M2 — SIGNIFICANT CHANGE UP
EGFR: 111 ML/MIN/1.73M2 — SIGNIFICANT CHANGE UP
EGFR: 111 ML/MIN/1.73M2 — SIGNIFICANT CHANGE UP
EOSINOPHIL # BLD AUTO: 0 K/UL — SIGNIFICANT CHANGE UP (ref 0–0.5)
EOSINOPHIL NFR BLD AUTO: 0 % — SIGNIFICANT CHANGE UP (ref 0–6)
GD1A GANGL IGG+IGM SER IA-ACNC: 7 IV — SIGNIFICANT CHANGE UP (ref 0–50)
GD1B GANGL IGG+IGM SER IA-ACNC: 7 IV — SIGNIFICANT CHANGE UP (ref 0–50)
GLUCOSE BLDC GLUCOMTR-MCNC: 220 MG/DL — HIGH (ref 70–99)
GLUCOSE BLDC GLUCOMTR-MCNC: 232 MG/DL — HIGH (ref 70–99)
GLUCOSE BLDC GLUCOMTR-MCNC: 241 MG/DL — HIGH (ref 70–99)
GLUCOSE BLDC GLUCOMTR-MCNC: 254 MG/DL — HIGH (ref 70–99)
GLUCOSE BLDC GLUCOMTR-MCNC: 381 MG/DL — HIGH (ref 70–99)
GLUCOSE SERPL-MCNC: 224 MG/DL — HIGH (ref 70–99)
GLUCOSE SERPL-MCNC: 235 MG/DL — HIGH (ref 70–99)
GM1 ASIALO IGG+IGM SER IA-ACNC: 32 IV — SIGNIFICANT CHANGE UP (ref 0–50)
GM1 GANGL IGG+IGM SER-ACNC: 10 IV — SIGNIFICANT CHANGE UP (ref 0–50)
GM2 GANGL IGG+IGM SER IA-ACNC: 7 IV — SIGNIFICANT CHANGE UP (ref 0–50)
GQ1B GANGL IGG+IGM SER IA-ACNC: 5 IV — SIGNIFICANT CHANGE UP (ref 0–50)
HCT VFR BLD CALC: 36.9 % — LOW (ref 39–50)
HCT VFR BLD CALC: 38.3 % — LOW (ref 39–50)
HGB BLD-MCNC: 12.9 G/DL — LOW (ref 13–17)
HGB BLD-MCNC: 13.3 G/DL — SIGNIFICANT CHANGE UP (ref 13–17)
IMM GRANULOCYTES NFR BLD AUTO: 0.5 % — SIGNIFICANT CHANGE UP (ref 0–0.9)
INR BLD: 0.93 RATIO — SIGNIFICANT CHANGE UP (ref 0.85–1.16)
LYMPHOCYTES # BLD AUTO: 0.76 K/UL — LOW (ref 1–3.3)
LYMPHOCYTES # BLD AUTO: 12.5 % — LOW (ref 13–44)
MAGNESIUM SERPL-MCNC: 1.9 MG/DL — SIGNIFICANT CHANGE UP (ref 1.6–2.6)
MCHC RBC-ENTMCNC: 30.6 PG — SIGNIFICANT CHANGE UP (ref 27–34)
MCHC RBC-ENTMCNC: 30.9 PG — SIGNIFICANT CHANGE UP (ref 27–34)
MCHC RBC-ENTMCNC: 34.7 G/DL — SIGNIFICANT CHANGE UP (ref 32–36)
MCHC RBC-ENTMCNC: 35 G/DL — SIGNIFICANT CHANGE UP (ref 32–36)
MCV RBC AUTO: 88.2 FL — SIGNIFICANT CHANGE UP (ref 80–100)
MCV RBC AUTO: 88.3 FL — SIGNIFICANT CHANGE UP (ref 80–100)
MONOCYTES # BLD AUTO: 0.15 K/UL — SIGNIFICANT CHANGE UP (ref 0–0.9)
MONOCYTES NFR BLD AUTO: 2.5 % — SIGNIFICANT CHANGE UP (ref 2–14)
NEUTROPHILS # BLD AUTO: 5.12 K/UL — SIGNIFICANT CHANGE UP (ref 1.8–7.4)
NEUTROPHILS NFR BLD AUTO: 84.5 % — HIGH (ref 43–77)
NRBC BLD AUTO-RTO: 0 /100 WBCS — SIGNIFICANT CHANGE UP (ref 0–0)
NRBC BLD AUTO-RTO: 0 /100 WBCS — SIGNIFICANT CHANGE UP (ref 0–0)
PHOSPHATE SERPL-MCNC: 2.7 MG/DL — SIGNIFICANT CHANGE UP (ref 2.5–4.5)
PLATELET # BLD AUTO: 209 K/UL — SIGNIFICANT CHANGE UP (ref 150–400)
PLATELET # BLD AUTO: 219 K/UL — SIGNIFICANT CHANGE UP (ref 150–400)
POTASSIUM SERPL-MCNC: 3.8 MMOL/L — SIGNIFICANT CHANGE UP (ref 3.5–5.3)
POTASSIUM SERPL-MCNC: 4.5 MMOL/L — SIGNIFICANT CHANGE UP (ref 3.5–5.3)
POTASSIUM SERPL-SCNC: 3.8 MMOL/L — SIGNIFICANT CHANGE UP (ref 3.5–5.3)
POTASSIUM SERPL-SCNC: 4.5 MMOL/L — SIGNIFICANT CHANGE UP (ref 3.5–5.3)
PROT SERPL-MCNC: 7.7 G/DL — SIGNIFICANT CHANGE UP (ref 6–8.3)
PROT SERPL-MCNC: 7.7 G/DL — SIGNIFICANT CHANGE UP (ref 6–8.3)
PROTHROM AB SERPL-ACNC: 10.6 SEC — SIGNIFICANT CHANGE UP (ref 9.9–13.4)
RBC # BLD: 4.18 M/UL — LOW (ref 4.2–5.8)
RBC # BLD: 4.34 M/UL — SIGNIFICANT CHANGE UP (ref 4.2–5.8)
RBC # FLD: 12.4 % — SIGNIFICANT CHANGE UP (ref 10.3–14.5)
RBC # FLD: 12.4 % — SIGNIFICANT CHANGE UP (ref 10.3–14.5)
SODIUM SERPL-SCNC: 128 MMOL/L — LOW (ref 135–145)
SODIUM SERPL-SCNC: 131 MMOL/L — LOW (ref 135–145)
WBC # BLD: 6.06 K/UL — SIGNIFICANT CHANGE UP (ref 3.8–10.5)
WBC # BLD: 7.73 K/UL — SIGNIFICANT CHANGE UP (ref 3.8–10.5)
WBC # FLD AUTO: 6.06 K/UL — SIGNIFICANT CHANGE UP (ref 3.8–10.5)
WBC # FLD AUTO: 7.73 K/UL — SIGNIFICANT CHANGE UP (ref 3.8–10.5)
WNV IGG CSF IA-ACNC: NEGATIVE — SIGNIFICANT CHANGE UP
WNV IGM CSF IA-ACNC: NEGATIVE — SIGNIFICANT CHANGE UP

## 2025-05-30 PROCEDURE — 99232 SBSQ HOSP IP/OBS MODERATE 35: CPT | Mod: GC

## 2025-05-30 PROCEDURE — 99231 SBSQ HOSP IP/OBS SF/LOW 25: CPT | Mod: GC

## 2025-05-30 PROCEDURE — 70450 CT HEAD/BRAIN W/O DYE: CPT | Mod: 26,59

## 2025-05-30 PROCEDURE — 71045 X-RAY EXAM CHEST 1 VIEW: CPT | Mod: 26

## 2025-05-30 PROCEDURE — 0042T: CPT

## 2025-05-30 PROCEDURE — 99232 SBSQ HOSP IP/OBS MODERATE 35: CPT

## 2025-05-30 PROCEDURE — 70496 CT ANGIOGRAPHY HEAD: CPT | Mod: 26

## 2025-05-30 PROCEDURE — 70498 CT ANGIOGRAPHY NECK: CPT | Mod: 26

## 2025-05-30 RX ORDER — METHYLPREDNISOLONE ACETATE 80 MG/ML
500 INJECTION, SUSPENSION INTRA-ARTICULAR; INTRALESIONAL; INTRAMUSCULAR; SOFT TISSUE DAILY
Refills: 0 | Status: COMPLETED | OUTPATIENT
Start: 2025-05-31 | End: 2025-06-01

## 2025-05-30 RX ADMIN — Medication 1 TABLET(S): at 13:14

## 2025-05-30 RX ADMIN — DULOXETINE 60 MILLIGRAM(S): 20 CAPSULE, DELAYED RELEASE ORAL at 13:14

## 2025-05-30 RX ADMIN — INSULIN LISPRO 10 UNIT(S): 100 INJECTION, SOLUTION INTRAVENOUS; SUBCUTANEOUS at 13:12

## 2025-05-30 RX ADMIN — INSULIN GLARGINE-YFGN 20 UNIT(S): 100 INJECTION, SOLUTION SUBCUTANEOUS at 21:17

## 2025-05-30 RX ADMIN — INSULIN LISPRO 4: 100 INJECTION, SOLUTION INTRAVENOUS; SUBCUTANEOUS at 08:16

## 2025-05-30 RX ADMIN — GABAPENTIN 600 MILLIGRAM(S): 400 CAPSULE ORAL at 13:15

## 2025-05-30 RX ADMIN — ENOXAPARIN SODIUM 40 MILLIGRAM(S): 100 INJECTION SUBCUTANEOUS at 17:15

## 2025-05-30 RX ADMIN — GABAPENTIN 600 MILLIGRAM(S): 400 CAPSULE ORAL at 21:17

## 2025-05-30 RX ADMIN — ATORVASTATIN CALCIUM 10 MILLIGRAM(S): 80 TABLET, FILM COATED ORAL at 21:17

## 2025-05-30 RX ADMIN — INSULIN LISPRO 4: 100 INJECTION, SOLUTION INTRAVENOUS; SUBCUTANEOUS at 17:04

## 2025-05-30 RX ADMIN — Medication 650 MILLIGRAM(S): at 13:13

## 2025-05-30 RX ADMIN — Medication 650 MILLIGRAM(S): at 14:31

## 2025-05-30 RX ADMIN — INSULIN LISPRO 6: 100 INJECTION, SOLUTION INTRAVENOUS; SUBCUTANEOUS at 21:18

## 2025-05-30 RX ADMIN — Medication 15 GRAM(S): at 05:57

## 2025-05-30 RX ADMIN — INSULIN LISPRO 10 UNIT(S): 100 INJECTION, SOLUTION INTRAVENOUS; SUBCUTANEOUS at 08:16

## 2025-05-30 RX ADMIN — AMLODIPINE BESYLATE 10 MILLIGRAM(S): 10 TABLET ORAL at 05:57

## 2025-05-30 RX ADMIN — IMMUNE GLOBULIN (HUMAN) 41.67 GRAM(S): 10 INJECTION INTRAVENOUS; SUBCUTANEOUS at 13:14

## 2025-05-30 RX ADMIN — Medication 15 GRAM(S): at 17:15

## 2025-05-30 RX ADMIN — Medication 40 MILLIGRAM(S): at 05:57

## 2025-05-30 RX ADMIN — INSULIN LISPRO 10 UNIT(S): 100 INJECTION, SOLUTION INTRAVENOUS; SUBCUTANEOUS at 17:04

## 2025-05-30 RX ADMIN — GABAPENTIN 600 MILLIGRAM(S): 400 CAPSULE ORAL at 05:59

## 2025-05-30 RX ADMIN — INSULIN LISPRO 6: 100 INJECTION, SOLUTION INTRAVENOUS; SUBCUTANEOUS at 13:13

## 2025-05-30 RX ADMIN — LOSARTAN POTASSIUM 50 MILLIGRAM(S): 100 TABLET, FILM COATED ORAL at 05:57

## 2025-05-30 RX ADMIN — METHYLPREDNISOLONE ACETATE 50 MILLIGRAM(S): 80 INJECTION, SUSPENSION INTRA-ARTICULAR; INTRALESIONAL; INTRAMUSCULAR; SOFT TISSUE at 05:58

## 2025-05-30 RX ADMIN — Medication 25 MILLIGRAM(S): at 13:13

## 2025-05-30 NOTE — PROGRESS NOTE ADULT - ASSESSMENT
59y (1965) RIGHT-handed man w PMHx of ethanol use disorder, chronic pancreatitis, poorly controlled СЕРГЕЙ (A1c 9.9 on 04/23/2025), chronic neuropathy of LLE who presents to [facility] ED on 05/26/2025 for ongoing LLE pain and subjective weakness as well as RLE pain and weakness. Neurology consulted. Patient complaining of new onset RLE pain and disabling weakness as well as worsening of LLE weakness.     IMPRESSION:  Severe weakness in BLE (R>L). No evidence of compression or enhancement or sacral plexus abnormality on imaging. CSF demonstrated albuminocytologic dissociation, no evidence of infectious process. EMG/NCS with demyelinating pattern as well as evidence of chronic axonal loss. 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    Plan:  [x] MRI lumbar spine wwo no evidence of compression or enhancement  [x] MRI lumbosacral plexus R and L wwo no abnormalities  [] peripheral labs: Syphilis negative  -previous peripheral neuropathy labs from last admission: B1 (111.1), B6 (24.4), B9 (>20.0), B12 (441), TSH (1.06), Heavy Metal Tox Screen (-), HIV (-), Lyme (-), hepatitis panel (nonreactive including B surf ab, would need vaccine), SPEP (6.6 WNL)  -previous rheumatologic work up: ESR (6), CRP (<3), , ganglioside panel (WNL in 08/22/2024), ANSELMO (-), ANCA (-), ganglioside panel (-), Sjogren antibodies (-)  [] LP done 5/27: ACE, glucose 75, protein 82, cell count 1, IgG index 0.6, oligoclonal bands, MBP, cytology, autoimmune encephalitis, PCR negative, lyme, WNV  [] Bedside EMG/NCS with evidence of demyelinating process and chronic denervation   [] continue home meds specifically gabapentin 600 mg TID standing and methocarbamol 750 TID as needed  [] Diabetes management  - Lantus 15 units qHS  - Admelog 3 units before breakfast   - Admelog 5 units before lunch   - Admelog 5 units before dinner   - Insulin sliding scale  [] Start IV methylprednisolone 1g daily x5 days- monitor fingerstick while receiving steroids  [] Start IVIG 2g/kg divided over 5 days. Premedicate with Tylenol 650 PO and Benadryl 50 PO     #Hyponatremia- sodium downtrending 128->126 today  [] Nephrology recommendations appreciated, as below:  - Obtain serum osmolality, urine osmolality, and urine electrolytes including Alexandro and Ur K  - Check uric acid level and AM cortisol   - After obtaining urine studies can start UreNa 15 GM BID. Please note BUN is expected to rise while on UreNa.  - Fluid restrict to <1.2L fluid in a day.  - Change diet to remove renal restriction, as patient does not require it. Can keep Consistent Carbohydrate restriction in place. Encourage PO protein intake.   - Goal correction is 6-8meq in first 24 hours.       misc and manage:  [x] PTOT- recommending acute rehab      METRICS:  Diet: Consistent carbohydrate  DVT prophylaxis: Lovenox  Dispo: Acute rehab    Seen and discussed with attending Dr. Mirza Radford.

## 2025-05-30 NOTE — PROGRESS NOTE ADULT - SUBJECTIVE AND OBJECTIVE BOX
ENDOCRINE FOLLOW UP     Chief Complaint:     History:     MEDICATIONS  (STANDING):  acetaminophen     Tablet .. 650 milliGRAM(s) Oral daily  amLODIPine   Tablet 10 milliGRAM(s) Oral daily  atorvastatin 10 milliGRAM(s) Oral at bedtime  dextrose 5%. 1000 milliLiter(s) (50 mL/Hr) IV Continuous <Continuous>  dextrose 5%. 1000 milliLiter(s) (100 mL/Hr) IV Continuous <Continuous>  dextrose 50% Injectable 25 Gram(s) IV Push once  dextrose 50% Injectable 12.5 Gram(s) IV Push once  dextrose 50% Injectable 25 Gram(s) IV Push once  diphenhydrAMINE 25 milliGRAM(s) Oral daily  DULoxetine 60 milliGRAM(s) Oral daily  enoxaparin Injectable 40 milliGRAM(s) SubCutaneous every 24 hours  gabapentin 600 milliGRAM(s) Oral three times a day  glucagon  Injectable 1 milliGRAM(s) IntraMuscular once  immune   globulin 10% (GAMMAGARD) IVPB 25 Gram(s) IV Intermittent daily  insulin glargine Injectable (LANTUS) 20 Unit(s) SubCutaneous at bedtime  insulin lispro (ADMELOG) corrective regimen sliding scale   SubCutaneous three times a day before meals  insulin lispro (ADMELOG) corrective regimen sliding scale   SubCutaneous at bedtime  insulin lispro Injectable (ADMELOG) 10 Unit(s) SubCutaneous three times a day before meals  lidocaine 1% Injectable 10 milliLiter(s) Local Injection once  losartan 50 milliGRAM(s) Oral daily  methylPREDNISolone sodium succinate IVPB 1000 milliGRAM(s) IV Intermittent daily  multivitamin 1 Tablet(s) Oral daily  pantoprazole    Tablet 40 milliGRAM(s) Oral before breakfast  urea Oral Powder 15 Gram(s) Oral two times a day    MEDICATIONS  (PRN):  dextrose Oral Gel 15 Gram(s) Oral once PRN Blood Glucose LESS THAN 70 milliGRAM(s)/deciliter  methocarbamol 750 milliGRAM(s) Oral three times a day PRN Muscle Spasm      Allergies    No Known Allergies    Intolerances        ROS: All other systems reviewed and negative    PHYSICAL EXAM:  VITALS: T(C): 36.6 (05-30-25 @ 05:02)  T(F): 97.9 (05-30-25 @ 05:02), Max: 98.7 (05-29-25 @ 15:39)  HR: 60 (05-30-25 @ 05:02) (60 - 101)  BP: 136/78 (05-30-25 @ 05:02) (119/86 - 151/81)  RR:  (18 - 18)  SpO2:  (95% - 97%)  Wt(kg): --  GENERAL: NAD, resting comfortably   EYES: No proptosis,  anicteric  HEENT:  Atraumatic, Normocephalic, moist mucous membranes  RESPIRATORY: Nonlabored respirations on room air, normal rate/effort   CARDIOVASCULAR: Regular rate and rhythm; No murmurs  GI: Soft, nontender, non distended, normal bowel sounds  NEURO: AOx3, moves all extremities spontaenuously   PSYCH:  reactive affect, euthymic mood    POCT Blood Glucose.: 232 mg/dL (05-30-25 @ 07:51)  POCT Blood Glucose.: 313 mg/dL (05-29-25 @ 22:01)  POCT Blood Glucose.: 284 mg/dL (05-29-25 @ 16:13)  POCT Blood Glucose.: 413 mg/dL (05-29-25 @ 13:35)  POCT Blood Glucose.: 477 mg/dL (05-29-25 @ 12:27)  POCT Blood Glucose.: 414 mg/dL (05-29-25 @ 12:05)  POCT Blood Glucose.: 415 mg/dL (05-29-25 @ 11:42)  POCT Blood Glucose.: 245 mg/dL (05-29-25 @ 08:23)  POCT Blood Glucose.: 330 mg/dL (05-29-25 @ 00:01)  POCT Blood Glucose.: 360 mg/dL (05-28-25 @ 23:14)  POCT Blood Glucose.: 381 mg/dL (05-28-25 @ 21:52)  POCT Blood Glucose.: 461 mg/dL (05-28-25 @ 21:50)  POCT Blood Glucose.: 418 mg/dL (05-28-25 @ 20:55)  POCT Blood Glucose.: 348 mg/dL (05-28-25 @ 16:31)  POCT Blood Glucose.: 88 mg/dL (05-28-25 @ 11:20)  POCT Blood Glucose.: 152 mg/dL (05-28-25 @ 08:19)  POCT Blood Glucose.: 217 mg/dL (05-27-25 @ 20:55)  POCT Blood Glucose.: 151 mg/dL (05-27-25 @ 18:11)  POCT Blood Glucose.: 138 mg/dL (05-27-25 @ 16:32)  POCT Blood Glucose.: 164 mg/dL (05-27-25 @ 11:22)      eMAR:atorvastatin   10 milliGRAM(s) Oral (05-29-25 @ 22:06)    insulin glargine Injectable (LANTUS)   20 Unit(s) SubCutaneous (05-29-25 @ 22:06)    insulin lispro (ADMELOG) corrective regimen sliding scale   4 Unit(s) SubCutaneous (05-30-25 @ 08:16)   6 Unit(s) SubCutaneous (05-29-25 @ 16:52)   12 Unit(s) SubCutaneous (05-29-25 @ 12:06)    insulin lispro (ADMELOG) corrective regimen sliding scale   4 Unit(s) SubCutaneous (05-29-25 @ 22:06)    insulin lispro Injectable (ADMELOG)   5 Unit(s) SubCutaneous (05-29-25 @ 12:07)    insulin lispro Injectable (ADMELOG)   10 Unit(s) SubCutaneous (05-30-25 @ 08:16)   10 Unit(s) SubCutaneous (05-29-25 @ 16:52)    methylPREDNISolone sodium succinate IVPB   50 mL/Hr IV Intermittent (05-30-25 @ 05:58)        05-30    128[L]  |  92[L]  |  25[H]  ----------------------------<  224[H]  3.8   |  23  |  0.68    eGFR: 108    Ca    9.7      05-30    TPro  7.7  /  Alb  4.1  /  TBili  0.4  /  DBili  x   /  AST  17  /  ALT  28  /  AlkPhos  58  05-30      A1C with Estimated Average Glucose Result: 9.9 % (04-23-25 @ 04:00)  A1C with Estimated Average Glucose Result: 10.3 % (02-20-25 @ 14:58)  A1C with Estimated Average Glucose Result: 9.7 % (02-18-25 @ 07:30)       ENDOCRINE FOLLOW UP     Chief Complaint: dm, steroid induced hyperglycemia     History: patient seen this morning, sleeping comfortably, arousable but sleepy today.   States he had breakfast this morning. Sugars remain above goal.     MEDICATIONS  (STANDING):  acetaminophen     Tablet .. 650 milliGRAM(s) Oral daily  amLODIPine   Tablet 10 milliGRAM(s) Oral daily  atorvastatin 10 milliGRAM(s) Oral at bedtime  dextrose 5%. 1000 milliLiter(s) (50 mL/Hr) IV Continuous <Continuous>  dextrose 5%. 1000 milliLiter(s) (100 mL/Hr) IV Continuous <Continuous>  dextrose 50% Injectable 25 Gram(s) IV Push once  dextrose 50% Injectable 12.5 Gram(s) IV Push once  dextrose 50% Injectable 25 Gram(s) IV Push once  diphenhydrAMINE 25 milliGRAM(s) Oral daily  DULoxetine 60 milliGRAM(s) Oral daily  enoxaparin Injectable 40 milliGRAM(s) SubCutaneous every 24 hours  gabapentin 600 milliGRAM(s) Oral three times a day  glucagon  Injectable 1 milliGRAM(s) IntraMuscular once  immune   globulin 10% (GAMMAGARD) IVPB 25 Gram(s) IV Intermittent daily  insulin glargine Injectable (LANTUS) 20 Unit(s) SubCutaneous at bedtime  insulin lispro (ADMELOG) corrective regimen sliding scale   SubCutaneous three times a day before meals  insulin lispro (ADMELOG) corrective regimen sliding scale   SubCutaneous at bedtime  insulin lispro Injectable (ADMELOG) 10 Unit(s) SubCutaneous three times a day before meals  lidocaine 1% Injectable 10 milliLiter(s) Local Injection once  losartan 50 milliGRAM(s) Oral daily  methylPREDNISolone sodium succinate IVPB 1000 milliGRAM(s) IV Intermittent daily  multivitamin 1 Tablet(s) Oral daily  pantoprazole    Tablet 40 milliGRAM(s) Oral before breakfast  urea Oral Powder 15 Gram(s) Oral two times a day    MEDICATIONS  (PRN):  dextrose Oral Gel 15 Gram(s) Oral once PRN Blood Glucose LESS THAN 70 milliGRAM(s)/deciliter  methocarbamol 750 milliGRAM(s) Oral three times a day PRN Muscle Spasm      Allergies    No Known Allergies    Intolerances        ROS: All other systems reviewed and negative    PHYSICAL EXAM:  VITALS: T(C): 36.6 (05-30-25 @ 05:02)  T(F): 97.9 (05-30-25 @ 05:02), Max: 98.7 (05-29-25 @ 15:39)  HR: 60 (05-30-25 @ 05:02) (60 - 101)  BP: 136/78 (05-30-25 @ 05:02) (119/86 - 151/81)  RR:  (18 - 18)  SpO2:  (95% - 97%)  Wt(kg): --  GENERAL: NAD, resting comfortably   RESPIRATORY: Nonlabored respirations on room air, normal rate/effort   CARDIOVASCULAR: Regular rate and rhythm    POCT Blood Glucose.: 232 mg/dL (05-30-25 @ 07:51)  POCT Blood Glucose.: 313 mg/dL (05-29-25 @ 22:01)  POCT Blood Glucose.: 284 mg/dL (05-29-25 @ 16:13)  POCT Blood Glucose.: 413 mg/dL (05-29-25 @ 13:35)  POCT Blood Glucose.: 477 mg/dL (05-29-25 @ 12:27)  POCT Blood Glucose.: 414 mg/dL (05-29-25 @ 12:05)  POCT Blood Glucose.: 415 mg/dL (05-29-25 @ 11:42)  POCT Blood Glucose.: 245 mg/dL (05-29-25 @ 08:23)  POCT Blood Glucose.: 330 mg/dL (05-29-25 @ 00:01)  POCT Blood Glucose.: 360 mg/dL (05-28-25 @ 23:14)  POCT Blood Glucose.: 381 mg/dL (05-28-25 @ 21:52)  POCT Blood Glucose.: 461 mg/dL (05-28-25 @ 21:50)  POCT Blood Glucose.: 418 mg/dL (05-28-25 @ 20:55)  POCT Blood Glucose.: 348 mg/dL (05-28-25 @ 16:31)  POCT Blood Glucose.: 88 mg/dL (05-28-25 @ 11:20)  POCT Blood Glucose.: 152 mg/dL (05-28-25 @ 08:19)  POCT Blood Glucose.: 217 mg/dL (05-27-25 @ 20:55)  POCT Blood Glucose.: 151 mg/dL (05-27-25 @ 18:11)  POCT Blood Glucose.: 138 mg/dL (05-27-25 @ 16:32)  POCT Blood Glucose.: 164 mg/dL (05-27-25 @ 11:22)      eMAR:atorvastatin   10 milliGRAM(s) Oral (05-29-25 @ 22:06)    insulin glargine Injectable (LANTUS)   20 Unit(s) SubCutaneous (05-29-25 @ 22:06)    insulin lispro (ADMELOG) corrective regimen sliding scale   4 Unit(s) SubCutaneous (05-30-25 @ 08:16)   6 Unit(s) SubCutaneous (05-29-25 @ 16:52)   12 Unit(s) SubCutaneous (05-29-25 @ 12:06)    insulin lispro (ADMELOG) corrective regimen sliding scale   4 Unit(s) SubCutaneous (05-29-25 @ 22:06)    insulin lispro Injectable (ADMELOG)   5 Unit(s) SubCutaneous (05-29-25 @ 12:07)    insulin lispro Injectable (ADMELOG)   10 Unit(s) SubCutaneous (05-30-25 @ 08:16)   10 Unit(s) SubCutaneous (05-29-25 @ 16:52)    methylPREDNISolone sodium succinate IVPB   50 mL/Hr IV Intermittent (05-30-25 @ 05:58)        05-30    128[L]  |  92[L]  |  25[H]  ----------------------------<  224[H]  3.8   |  23  |  0.68    eGFR: 108    Ca    9.7      05-30    TPro  7.7  /  Alb  4.1  /  TBili  0.4  /  DBili  x   /  AST  17  /  ALT  28  /  AlkPhos  58  05-30      A1C with Estimated Average Glucose Result: 9.9 % (04-23-25 @ 04:00)  A1C with Estimated Average Glucose Result: 10.3 % (02-20-25 @ 14:58)  A1C with Estimated Average Glucose Result: 9.7 % (02-18-25 @ 07:30)

## 2025-05-30 NOTE — CHART NOTE - NSCHARTNOTEFT_GEN_A_CORE
Team was called to the bedside by patient's nurse around 1200 today due to sudden episode of altered mental status. Patient appeared confused and was only oriented to self. He did not remember why he came to the hospital or what symptoms he had. He also did not recognize the neurology team at bedside. Patient was seen earlier in the day on rounds and was at baseline mental status, AAOx4. Evaluated by the writer and RRT called, as well as code stroke (patient at higher risk for hypercoagulability while on IVIG). Vital signs SBP: 127/74, HR:84, Temp: 98.4. NIHSS 3 for LOC question and RLE weakness (the latter is patient's baseline). He was not aphasic. No other focal deficits.     Patient taken down to CT scan, results as below:  < from: CT Brain Stroke Protocol (05.30.25 @ 12:51) >    IMPRESSION:    No acute transcortical infarct or intracranial hemorrhage.    < end of copied text >    < from: CT Angio Brain Stroke Protocol  w/ IV Cont (05.30.25 @ 12:52) >    IMPRESSION:  CT angiogram neck:  -Less than 50% stenosis of the proximal right ICA.  -At least 60% stenosis of the proximal left ICA.    CT angiogram head:  -No vaso-occlusive disease.  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 Recommend   neurointerventional consultation.    CT perfusion:  -No core infarct or at-risk ischemic tissue identified.    < end of copied text >    Patient deemed not a candidate for tenecteplase given isolated neurological deficits (ie confusion) without focal deficits. Per discussion with stroke fellow Dr. Newby and attending Dr. Radford, symptoms likely related to patient's high-dose steroids.     Patient was reevaluated after 30 minutes at which point he had returned to his baseline and was able to state why he came to the hospital and what treatment he has received.    Impression: Transient episode of disorientation, likely delirium in setting of high-dose steroids. Given no focal deficits and return to baseline, less likely stroke or TIA.    Recommendations:  [] Will decrease IV steroid dose going forward to 500 mg daily  [] Ok to continue IVIG  [] No antiplatelets/anticoagulation at this time  [] If episode recurs or any focal deficits, would get MRI brain without contrast to rule out stroke    Discussed with stroke fellow Dr. Newby and attending Dr. Radford Team was called to the bedside by patient's nurse around 1200 today due to sudden episode of altered mental status. Patient appeared confused and was only oriented to self. He did not remember why he came to the hospital or what symptoms he had. He also did not recognize the neurology team at bedside. Patient was seen earlier in the day on rounds and was at baseline mental status, AAOx4. Evaluated by the writer and RRT called, as well as code stroke (patient at higher risk for hypercoagulability while on IVIG). Vital signs SBP: 127/74, HR:84, Temp: 98.4. NIHSS 3 for LOC question and RLE weakness (the latter is patient's baseline). He was not aphasic. No other focal deficits.     Patient taken down to CT scan, results as below:  < from: CT Brain Stroke Protocol (05.30.25 @ 12:51) >    IMPRESSION:    No acute transcortical infarct or intracranial hemorrhage.    < end of copied text >    < from: CT Angio Brain Stroke Protocol  w/ IV Cont (05.30.25 @ 12:52) >    IMPRESSION:  CT angiogram neck:  -Less than 50% stenosis of the proximal right ICA.  -At least 60% stenosis of the proximal left ICA.    CT angiogram head:  -No vaso-occlusive disease.  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 Recommend   neurointerventional consultation.    CT perfusion:  -No core infarct or at-risk ischemic tissue identified.    < end of copied text >    Patient deemed not a candidate for tenecteplase given isolated neurological deficits (ie confusion) without focal deficits. Per discussion with stroke fellow Dr. Newby and attending Dr. Radford, symptoms likely related to patient's high-dose steroids.     Patient was reevaluated after 30 minutes at which point he had returned to his baseline and was able to state why he came to the hospital and what treatment he has received.    Impression: Transient episode of disorientation, likely delirium in setting of high-dose steroids. Given no focal deficits and return to baseline, less likely stroke or TIA.    Recommendations:  [] Will decrease IV steroid dose going forward to 500 mg daily  [] Ok to continue IVIG  [] No antiplatelets/anticoagulation at this time  [] Will send basic infectious workup (CXR, UA) to rule out other cause  [] If episode recurs or any focal deficits, would get MRI brain without contrast to rule out stroke    Discussed with stroke fellow Dr. Newby and attending Dr. Radford

## 2025-05-30 NOTE — PROGRESS NOTE ADULT - SUBJECTIVE AND OBJECTIVE BOX
Neurology Progress Note    SUBJECTIVE/OBJECTIVE/INTERVAL EVENTS: Patient seen and examined at bedside w/ neuro attending and team.     INTERVAL HISTORY:    REVIEW OF SYSTEMS: Few questions of a 10-system ROS was performed and is negative except for those items noted above and/or in the HPI.    VITALS & EXAMINATION:  Vital Signs Last 24 Hrs  T(C): 36.6 (30 May 2025 05:02), Max: 37.1 (29 May 2025 15:39)  T(F): 97.9 (30 May 2025 05:02), Max: 98.7 (29 May 2025 15:39)  HR: 60 (30 May 2025 05:02) (60 - 101)  BP: 136/78 (30 May 2025 05:02) (119/86 - 151/81)  BP(mean): --  RR: 18 (30 May 2025 05:02) (18 - 18)  SpO2: 96% (30 May 2025 05:02) (95% - 97%)    Parameters below as of 30 May 2025 05:02  Patient On (Oxygen Delivery Method): room air        General:  Constitutional: Male, appears stated age, nontoxic, not in distress,    Head: Normocephalic;   Eyes: clear sclera;   Extremities: No cyanosis;   Resp: breathing comfortably  Neck: no nuchal rigidity  Fundoscopic exam:      Neurological (>12):  MS: Awake, alert.  Oriented person place situation year month. Follows simple complex cross commands. Able to ID 3/3 objects. Attends to examiner  Language: Speech is hypophonic, clear, fluent, good repetition,  comprehension, registration of words.  CNs: PERRL (R 3mm, L 3mm). VFF. EOMI. No disconjugate gaze, nystagmus. V1-3 intact LT, No facial asymmetry b/l. Hearing grossly normal b/l. Tongue midline and can move side to side.     Motor - Normal bulk and tone throughout. No pronator drift.    L/R (out of 5 each)       Deltoid  5/5    Biceps   5/5      Triceps  5/5         Wrist Extension 5/5   Wrist Flexion  5/5   Interossei 5/5     5/5   L/R (out of 5 each)       Hip Flexion  5/5    Hip Extension  5/5  Knee Extension  5/5  Dorsiflexion  5/5      Plantar Flexion 5/5     Sensation: Intact to LT b/l x4 extremities. Cortical: Extinction on DSS (neglect): none  Reflexes L/R:  Biceps(C5) 2/2  BR(C6) 2/2   Triceps(C7)  2/2 Patellar(L4)   2/2   Ankles 2/2   Toes: mute b/l  no ankle clonus  Coordination: No dysmetria to FTN b/l UE  Gait: Normal Romberg. No postural instability. Normal stance. Gait baseline.    LABORATORY:  CBC                       12.9   7.73  )-----------( 219      ( 30 May 2025 07:09 )             36.9     Chem 05-30    128[L]  |  92[L]  |  25[H]  ----------------------------<  224[H]  3.8   |  23  |  0.68    Ca    9.7      30 May 2025 07:09    TPro  7.7  /  Alb  4.1  /  TBili  0.4  /  DBili  x   /  AST  17  /  ALT  28  /  AlkPhos  58  05-30    LFTs LIVER FUNCTIONS - ( 30 May 2025 07:09 )  Alb: 4.1 g/dL / Pro: 7.7 g/dL / ALK PHOS: 58 U/L / ALT: 28 U/L / AST: 17 U/L / GGT: x           Coagulopathy   Lipid Panel   A1c   Cardiac enzymes     U/A Urinalysis Basic - ( 30 May 2025 07:09 )    Color: x / Appearance: x / SG: x / pH: x  Gluc: 224 mg/dL / Ketone: x  / Bili: x / Urobili: x   Blood: x / Protein: x / Nitrite: x   Leuk Esterase: x / RBC: x / WBC x   Sq Epi: x / Non Sq Epi: x / Bacteria: x      CSF  Immunological  Other    STUDIES & IMAGING: (EEG, CT, MR, U/S, TTE/BOWEN): Neurology Progress Note    SUBJECTIVE/OBJECTIVE/INTERVAL EVENTS: Patient seen and examined at bedside w/ neuro attending and team. Patient reports he has felt no changes in LE weakness compared to yesterday. He felt tired this morning and attributes this to difficulty sleeping in the hospital. Motor exam showed similar strength as when the patient was evaluated yesterday, aligning with the patient's report. He    INTERVAL HISTORY:    REVIEW OF SYSTEMS: Few questions of a 10-system ROS was performed and is negative except for those items noted above and/or in the HPI.    VITALS & EXAMINATION:  Vital Signs Last 24 Hrs  T(C): 36.6 (30 May 2025 05:02), Max: 37.1 (29 May 2025 15:39)  T(F): 97.9 (30 May 2025 05:02), Max: 98.7 (29 May 2025 15:39)  HR: 60 (30 May 2025 05:02) (60 - 101)  BP: 136/78 (30 May 2025 05:02) (119/86 - 151/81)  BP(mean): --  RR: 18 (30 May 2025 05:02) (18 - 18)  SpO2: 96% (30 May 2025 05:02) (95% - 97%)    Parameters below as of 30 May 2025 05:02  Patient On (Oxygen Delivery Method): room air        General:  Constitutional: Male, appears stated age, nontoxic, not in distress,    Head: Normocephalic;   Eyes: clear sclera;   Extremities: No cyanosis;   Resp: breathing comfortably  Neck: no nuchal rigidity  Fundoscopic exam:      Neurological (>12):  MS: Awake, alert.  Oriented person place situation year month. Follows simple complex cross commands. Able to ID 3/3 objects. Attends to examiner  Language: Speech is hypophonic, clear, fluent, good repetition,  comprehension, registration of words.  CNs: PERRL (R 3mm, L 3mm). VFF. EOMI. No disconjugate gaze, nystagmus. V1-3 intact LT, No facial asymmetry b/l. Hearing grossly normal b/l. Tongue midline and can move side to side.     Motor - Normal bulk and tone throughout. No pronator drift.    L/R (out of 5 each)       Deltoid  5/5    Biceps   5/5      Triceps  5/5         Wrist Extension 5/5   Wrist Flexion  5/5   Interossei 5/5     5/5   L/R (out of 5 each)       Hip Flexion  5/5    Hip Extension  5/5  Knee Extension  5/5  Dorsiflexion  5/5      Plantar Flexion 5/5     Sensation: Intact to LT b/l x4 extremities. Cortical: Extinction on DSS (neglect): none  Reflexes L/R:  Biceps(C5) 2/2  BR(C6) 2/2   Triceps(C7)  2/2 Patellar(L4)   2/2   Ankles 2/2   Toes: mute b/l  no ankle clonus  Coordination: No dysmetria to FTN b/l UE  Gait: Normal Romberg. No postural instability. Normal stance. Gait baseline.    LABORATORY:  CBC                       12.9   7.73  )-----------( 219      ( 30 May 2025 07:09 )             36.9     Chem 05-30    128[L]  |  92[L]  |  25[H]  ----------------------------<  224[H]  3.8   |  23  |  0.68    Ca    9.7      30 May 2025 07:09    TPro  7.7  /  Alb  4.1  /  TBili  0.4  /  DBili  x   /  AST  17  /  ALT  28  /  AlkPhos  58  05-30    LFTs LIVER FUNCTIONS - ( 30 May 2025 07:09 )  Alb: 4.1 g/dL / Pro: 7.7 g/dL / ALK PHOS: 58 U/L / ALT: 28 U/L / AST: 17 U/L / GGT: x           Coagulopathy   Lipid Panel   A1c   Cardiac enzymes     U/A Urinalysis Basic - ( 30 May 2025 07:09 )    Color: x / Appearance: x / SG: x / pH: x  Gluc: 224 mg/dL / Ketone: x  / Bili: x / Urobili: x   Blood: x / Protein: x / Nitrite: x   Leuk Esterase: x / RBC: x / WBC x   Sq Epi: x / Non Sq Epi: x / Bacteria: x      CSF  Immunological  Other    STUDIES & IMAGING: (EEG, CT, MR, U/S, TTE/BOWEN): Neurology Progress Note    SUBJECTIVE/OBJECTIVE/INTERVAL EVENTS: Patient was seen and examined at bedside w/ neuro attending and team. Patient reports he has felt no changes in LE weakness compared to yesterday. He felt tired this morning and attributes this to difficulty sleeping in the hospital. Motor exam showed similar strength as motor exam yesterday, aligning with the patient's report that his weakness has remained the same. Left patellar reflex improved from 0/5 yesterday to 1/5 today and other LE reflexes remained absent. RBC count decreased from 4.63 yesterday to 4.18 today, hemoglobin decreased from 14.3 yesterday to 12.9 today, and hematocrit decreased from 40.8 yesterday to 36.9 today. No bleeding was observed on physical exam. Patient's hyponatremia was previously improving but his sodium level decreased from 130 yesterday to 128 this morning despite fluid restriction < 1.2 L/day and treatment with oral urea 15 MG BID as recommend by nephrology. BUN increased from 15 yesterday to 25 today which was expected due to treatment with the oral urea.    VITALS & EXAMINATION:  Vital Signs Last 24 Hrs  T(C): 36.6 (30 May 2025 05:02), Max: 37.1 (29 May 2025 15:39)  T(F): 97.9 (30 May 2025 05:02), Max: 98.7 (29 May 2025 15:39)  HR: 60 (30 May 2025 05:02) (60 - 101)  BP: 136/78 (30 May 2025 05:02) (119/86 - 151/81)  BP(mean): --  RR: 18 (30 May 2025 05:02) (18 - 18)  SpO2: 96% (30 May 2025 05:02) (95% - 97%)    Parameters below as of 30 May 2025 05:02  Patient On (Oxygen Delivery Method): room air        General: no acute distress     Neurological (>12):  MS: Awake, alert.  Oriented to person, place, and date.    CNs: PERRL (R 3mm, L 3mm). VFF. EOMI. No disconjugate gaze, nystagmus. V1-3 intact LT, No facial asymmetry b/l. Tongue midline and can move side to side.     Motor - Normal bulk and tone throughout.     L/R (out of 5 each)       Deltoid 5/5, Biceps 5/5, Triceps 5/5, Finger Abduction 5/5  L/R (out of 5 each)       Hip Flexion  4-/2, Knee Flexion 4-/2, Knee Extension 5/4-, Dorsiflexion 2/1, Plantar Flexion 4/4-     Sensation: Intact to LT b/l x4 extremities.   Reflexes L/R:  Biceps 2/1, BR 2/2, Triceps 1/1, Patellar 1/0, Ankles 0/0   Coordination: No abnormalities on finger-nose-finger or heal-shin tests.    LABORATORY:  CBC                       12.9   7.73  )-----------( 219      ( 30 May 2025 07:09 )             36.9     Chem 05-30    128[L]  |  92[L]  |  25[H]  ----------------------------<  224[H]  3.8   |  23  |  0.68    Ca    9.7      30 May 2025 07:09    TPro  7.7  /  Alb  4.1  /  TBili  0.4  /  DBili  x   /  AST  17  /  ALT  28  /  AlkPhos  58  05-30    LFTs LIVER FUNCTIONS - ( 30 May 2025 07:09 )  Alb: 4.1 g/dL / Pro: 7.7 g/dL / ALK PHOS: 58 U/L / ALT: 28 U/L / AST: 17 U/L / GGT: x           Coagulopathy   Lipid Panel   A1c   Cardiac enzymes     U/A Urinalysis Basic - ( 30 May 2025 07:09 )    Color: x / Appearance: x / SG: x / pH: x  Gluc: 224 mg/dL / Ketone: x  / Bili: x / Urobili: x   Blood: x / Protein: x / Nitrite: x   Leuk Esterase: x / RBC: x / WBC x   Sq Epi: x / Non Sq Epi: x / Bacteria: x      CSF  Immunological  Other    STUDIES & IMAGING: (EEG, CT, MR, U/S, TTE/BOWEN):

## 2025-05-30 NOTE — PROGRESS NOTE ADULT - SUBJECTIVE AND OBJECTIVE BOX
[facility] Division of Kidney Diseases & Hypertension  FOLLOW UP NOTE  [phone]--------------------------------------------------------------------------------  Chief Complaint: hyponatremia    24 hour events/subjective: Pt seen and evaluated earlier this morning. Reports feeling well, endorses no complaints. Denies any headaches, nausea, vomiting, fevers/chills, chest pain, SOB, abdominal pain, and leg swelling.    PAST HISTORY  --------------------------------------------------------------------------------  No significant changes to PMH, PSH, FHx, SHx, unless otherwise noted    ALLERGIES & MEDICATIONS  --------------------------------------------------------------------------------  Allergies    No Known Allergies    Intolerances      Standing Inpatient Medications  acetaminophen     Tablet .. 650 milliGRAM(s) Oral daily  amLODIPine   Tablet 10 milliGRAM(s) Oral daily  atorvastatin 10 milliGRAM(s) Oral at bedtime  dextrose 5%. 1000 milliLiter(s) IV Continuous <Continuous>  dextrose 5%. 1000 milliLiter(s) IV Continuous <Continuous>  dextrose 50% Injectable 25 Gram(s) IV Push once  dextrose 50% Injectable 12.5 Gram(s) IV Push once  dextrose 50% Injectable 25 Gram(s) IV Push once  diphenhydrAMINE 25 milliGRAM(s) Oral daily  DULoxetine 60 milliGRAM(s) Oral daily  enoxaparin Injectable 40 milliGRAM(s) SubCutaneous every 24 hours  gabapentin 600 milliGRAM(s) Oral three times a day  glucagon  Injectable 1 milliGRAM(s) IntraMuscular once  immune   globulin 10% (GAMMAGARD) IVPB 25 Gram(s) IV Intermittent daily  insulin glargine Injectable (LANTUS) 20 Unit(s) SubCutaneous at bedtime  insulin lispro (ADMELOG) corrective regimen sliding scale   SubCutaneous three times a day before meals  insulin lispro (ADMELOG) corrective regimen sliding scale   SubCutaneous at bedtime  insulin lispro Injectable (ADMELOG) 10 Unit(s) SubCutaneous three times a day before meals  lidocaine 1% Injectable 10 milliLiter(s) Local Injection once  losartan 50 milliGRAM(s) Oral daily  methylPREDNISolone sodium succinate IVPB 1000 milliGRAM(s) IV Intermittent daily  multivitamin 1 Tablet(s) Oral daily  pantoprazole    Tablet 40 milliGRAM(s) Oral before breakfast  urea Oral Powder 15 Gram(s) Oral two times a day    PRN Inpatient Medications  dextrose Oral Gel 15 Gram(s) Oral once PRN  methocarbamol 750 milliGRAM(s) Oral three times a day PRN      REVIEW OF SYSTEMS  --------------------------------------------------------------------------------  see above    VITALS/PHYSICAL EXAM  --------------------------------------------------------------------------------  T(C): 36.6 (05-30-25 @ 14:46), Max: 37.1 (05-29-25 @ 15:39)  HR: 80 (05-30-25 @ 14:46) (60 - 100)  BP: 143/78 (05-30-25 @ 14:46) (119/86 - 151/81)  RR: 18 (05-30-25 @ 14:46) (18 - 18)  SpO2: 97% (05-30-25 @ 14:46) (95% - 97%)  Wt(kg): --    05-29-25 @ 07:01  -  05-30-25 @ 07:00  --------------------------------------------------------  IN: 1711.7 mL / OUT: 1275 mL / NET: 436.7 mL    05-30-25 @ 07:01  -  05-30-25 @ 14:52  --------------------------------------------------------  IN: 720 mL / OUT: 300 mL / NET: 420 mL    Physical Exam:  Gen: NAD   Pulm: CTA B/L  CV: RRR, S1S2;  Abd: +BS, soft  : No suprapubic tenderness  Extremities: no bilateral LE edema noted.   Neuro: Awake, alert.  Skin: Warm    LABS/STUDIES  --------------------------------------------------------------------------------              13.3   6.06  >-----------<  209      [05-30-25 @ 12:28]              38.3     131  |  95  |  27  ----------------------------<  235      [05-30-25 @ 12:28]  4.5   |  20  |  0.63        Ca     9.6     [05-30-25 @ 12:28]      Mg     1.9     [05-30-25 @ 12:28]      Phos  2.7     [05-30-25 @ 12:28]    TPro  7.7  /  Alb  4.1  /  TBili  0.4  /  DBili  x   /  AST  20  /  ALT  30  /  AlkPhos  58  [05-30-25 @ 12:28]    PT/INR: PT 10.6 , INR 0.93       [05-30-25 @ 12:28]  PTT: 25.2       [05-30-25 @ 12:28]    Uric acid 4.3      [05-29-25 @ 05:46]  Serum Osmolality 290      [05-29-25 @ 05:46]    Creatinine Trend:  SCr 0.63 [05-30 @ 12:28]  SCr 0.68 [05-30 @ 07:09]  SCr 0.63 [05-29 @ 05:46]  SCr 0.68 [05-28 @ 06:51]  SCr 1.05 [05-27 @ 15:56]    Urine Sodium 22      [05-28-25 @ 18:58]  Urine Potassium 31      [05-28-25 @ 18:58]  Urine Osmolality 414      [05-28-25 @ 18:58]    Syphilis Screen (Treponema Pallidum Ab) Negative      [05-26-25 @ 12:26]

## 2025-05-30 NOTE — PROGRESS NOTE ADULT - ASSESSMENT
Recommendations:   - patient currently on Methylprednisolone 1gm daily until 6/1/25 per current orders; please inform endocrine if any changes to steroid doses as this will impact insulin requirements   - Increase to Lantus 24 units SC QHS   - Increase to Admelog 15 units SC Premeal/TIDAC - hold if npo or if eating < 50% of meals   - Admelog Moderate Correction Scale Premeal & Separate Moderate Correction Scale Bedtime - change to q6h if npo   - Goal -180  - Blood glucose monitoring Premeal/Bedtime - change to q6h if npo   - Hypoglycemia protocol   - Carb Consistent Diet        60 y/o M w/ uncontrolled СЕРГЕЙ complicated by neuropathy with severe hyperglycemia exacerbated by high dose steroids, HTN, HLD admitted for LE weakness (high risk patient with severe hyperglycemia with glucose > 400's at high risk of CAD and CVA with high medical complexity and high level decision-making with high intensive medication requiring frequent monitoring).    5/30 Glucose levels better today but still above goal, ranging from 232-413    #Type 2 diabetes mellitus with hyperglycemia, with long-term current use of insulin.   Recommendations:   - patient currently on Methylprednisolone 1gm daily until 6/1/25 per current orders; please inform endocrine if any changes to steroid doses as this will impact insulin requirements   - Increase to Lantus 24 units SC QHS   - Increase to Admelog 15 units SC Premeal/TIDAC - hold if npo or if eating < 50% of meals   - Will adjust as needed based on insulin requirements and as steroids are adjusted or discontinued.   - Admelog Moderate Correction Scale Premeal & Separate Moderate Correction Scale Bedtime - change to q6h if npo   - Goal -180  - Blood glucose monitoring Premeal/Bedtime - change to q6h if npo   - Hypoglycemia protocol   - Carb Consistent Diet   - Diabetes Education and Nutrition Eval  - Outpt. endo follow-up  - Outpt. optho, podiatry, nephrology  - Plan to d/c on basal bolus.    #Hypertension  Recommendation:   - Goal BP < 130/80 c/w amlodipine and losartan.  - outpatient UACR      #Hyperlipidemia  Recommendation:   - c/w statin  - outpatient fasting lipid panel     Yasmine Lira DO   Attending Physician   Department of Endocrinology, Diabetes and Metabolism     If before 9AM or after 5PM, or on weekends/holidays, please call the Endocrine answering service for assistance ([phone]).  For nonurgent matters, please email [email or web address] or [email or web address]     Please note that this patient will be followed by different provider tomorrow.  Notify endocrine 24 hours prior to discharge for final recommendations

## 2025-05-30 NOTE — RAPID RESPONSE TEAM SUMMARY - NSSITUATIONBACKGROUNDRRT_GEN_ALL_CORE
59y (1965) RIGHT-handed man w PMHx of ethanol use disorder, chronic pancreatitis, poorly controlled СЕРГЕЙ (A1c 9.9 on 04/23/2025), chronic neuropathy of LLE who presents to [facility] ED on 05/26/2025 for ongoing LLE pain and subjective weakness as well as RLE pain and weakness. Diagnosed with CIDP.   RRT for AMS. A/Ox4 w/ insight to A/Ox3 confused. Non-evolving neurological exam, hemodynamically stable. Neuro bedside as part of code stroke. Plan for labs and CT stroke protocol. RRT ended. Stroke team to obtain CT and f/u stroke plan.

## 2025-05-30 NOTE — PROGRESS NOTE ADULT - PROBLEM SELECTOR PLAN 1
Pt. with acute on chronic hyponatremia likely in setting of increased FW water/poor solute intake and possible SIADH from pain and duloxetine use. Pt. has hx of hyponatremia in April 20225, SNa manfred is 125 on 4/22/25. SNa on admission 128 (5/26) and decreased to 126 (5/28). Alexandro 22 and USom 414. TSH, lipid panels, uric acid level and AM cortisol wnl. SNa low/improved to 131 today.  -c/w UreNa 15 GM BID. Please note BUN is expected to rise while on UreNa.  -Fluid restrict to <1.2L fluid in a day.  -Encourage PO protein intake.     If you have any questions, please feel free to contact me  Lashae Coreas  Nephrology Fellow  [employer]/Page [phone]  (After 5pm or on weekends please page the on-call fellow) Pt. with acute on chronic hyponatremia likely in setting of increased FW water/poor solute intake and possible SIADH from pain and duloxetine use. Pt. has hx of hyponatremia in April 20225, SNa manfred is 125 on 4/22/25. SNa on admission 128 (5/26) and decreased to 126 (5/28). Alexandro 22 and USom 414. TSH, lipid panels, uric acid level and AM cortisol wnl. SNa low/improved to 131 today (corrected for glucose 133).  -c/w UreNa 15 GM BID. Please note BUN is expected to rise while on UreNa.  -Fluid restrict to <1.2L fluid in a day.  -Encourage PO protein intake.     If you have any questions, please feel free to contact me  Lashae Croeas  Nephrology Fellow  [employer]/Page [phone]  (After 5pm or on weekends please page the on-call fellow)

## 2025-05-31 LAB
ANION GAP SERPL CALC-SCNC: 11 MMOL/L — SIGNIFICANT CHANGE UP (ref 5–17)
BUN SERPL-MCNC: 23 MG/DL — SIGNIFICANT CHANGE UP (ref 7–23)
CALCIUM SERPL-MCNC: 10 MG/DL — SIGNIFICANT CHANGE UP (ref 8.4–10.5)
CHLORIDE SERPL-SCNC: 94 MMOL/L — LOW (ref 96–108)
CO2 SERPL-SCNC: 28 MMOL/L — SIGNIFICANT CHANGE UP (ref 22–31)
CREAT SERPL-MCNC: 0.59 MG/DL — SIGNIFICANT CHANGE UP (ref 0.5–1.3)
EGFR: 113 ML/MIN/1.73M2 — SIGNIFICANT CHANGE UP
EGFR: 113 ML/MIN/1.73M2 — SIGNIFICANT CHANGE UP
GLUCOSE BLDC GLUCOMTR-MCNC: 216 MG/DL — HIGH (ref 70–99)
GLUCOSE BLDC GLUCOMTR-MCNC: 241 MG/DL — HIGH (ref 70–99)
GLUCOSE BLDC GLUCOMTR-MCNC: 260 MG/DL — HIGH (ref 70–99)
GLUCOSE BLDC GLUCOMTR-MCNC: 302 MG/DL — HIGH (ref 70–99)
GLUCOSE BLDC GLUCOMTR-MCNC: 384 MG/DL — HIGH (ref 70–99)
GLUCOSE SERPL-MCNC: 223 MG/DL — HIGH (ref 70–99)
HCT VFR BLD CALC: 40.9 % — SIGNIFICANT CHANGE UP (ref 39–50)
HGB BLD-MCNC: 14.2 G/DL — SIGNIFICANT CHANGE UP (ref 13–17)
MCHC RBC-ENTMCNC: 30.7 PG — SIGNIFICANT CHANGE UP (ref 27–34)
MCHC RBC-ENTMCNC: 34.7 G/DL — SIGNIFICANT CHANGE UP (ref 32–36)
MCV RBC AUTO: 88.3 FL — SIGNIFICANT CHANGE UP (ref 80–100)
NRBC BLD AUTO-RTO: 0 /100 WBCS — SIGNIFICANT CHANGE UP (ref 0–0)
PLATELET # BLD AUTO: 219 K/UL — SIGNIFICANT CHANGE UP (ref 150–400)
POTASSIUM SERPL-MCNC: 4.1 MMOL/L — SIGNIFICANT CHANGE UP (ref 3.5–5.3)
POTASSIUM SERPL-SCNC: 4.1 MMOL/L — SIGNIFICANT CHANGE UP (ref 3.5–5.3)
RBC # BLD: 4.63 M/UL — SIGNIFICANT CHANGE UP (ref 4.2–5.8)
RBC # FLD: 12.4 % — SIGNIFICANT CHANGE UP (ref 10.3–14.5)
SODIUM SERPL-SCNC: 133 MMOL/L — LOW (ref 135–145)
WBC # BLD: 6.06 K/UL — SIGNIFICANT CHANGE UP (ref 3.8–10.5)
WBC # FLD AUTO: 6.06 K/UL — SIGNIFICANT CHANGE UP (ref 3.8–10.5)

## 2025-05-31 PROCEDURE — 99232 SBSQ HOSP IP/OBS MODERATE 35: CPT

## 2025-05-31 RX ORDER — INSULIN GLARGINE-YFGN 100 [IU]/ML
23 INJECTION, SOLUTION SUBCUTANEOUS AT BEDTIME
Refills: 0 | Status: DISCONTINUED | OUTPATIENT
Start: 2025-05-31 | End: 2025-05-31

## 2025-05-31 RX ORDER — INSULIN LISPRO 100 U/ML
14 INJECTION, SOLUTION INTRAVENOUS; SUBCUTANEOUS
Refills: 0 | Status: DISCONTINUED | OUTPATIENT
Start: 2025-05-31 | End: 2025-06-01

## 2025-05-31 RX ORDER — INSULIN LISPRO 100 U/ML
2 INJECTION, SOLUTION INTRAVENOUS; SUBCUTANEOUS AT BEDTIME
Refills: 0 | Status: DISCONTINUED | OUTPATIENT
Start: 2025-05-31 | End: 2025-06-03

## 2025-05-31 RX ORDER — INSULIN GLARGINE-YFGN 100 [IU]/ML
21 INJECTION, SOLUTION SUBCUTANEOUS AT BEDTIME
Refills: 0 | Status: DISCONTINUED | OUTPATIENT
Start: 2025-05-31 | End: 2025-06-01

## 2025-05-31 RX ORDER — INSULIN LISPRO 100 U/ML
16 INJECTION, SOLUTION INTRAVENOUS; SUBCUTANEOUS
Refills: 0 | Status: DISCONTINUED | OUTPATIENT
Start: 2025-05-31 | End: 2025-05-31

## 2025-05-31 RX ORDER — INSULIN LISPRO 100 U/ML
INJECTION, SOLUTION INTRAVENOUS; SUBCUTANEOUS
Refills: 0 | Status: DISCONTINUED | OUTPATIENT
Start: 2025-05-31 | End: 2025-06-03

## 2025-05-31 RX ADMIN — AMLODIPINE BESYLATE 10 MILLIGRAM(S): 10 TABLET ORAL at 05:22

## 2025-05-31 RX ADMIN — INSULIN LISPRO 6: 100 INJECTION, SOLUTION INTRAVENOUS; SUBCUTANEOUS at 08:34

## 2025-05-31 RX ADMIN — Medication 15 GRAM(S): at 05:22

## 2025-05-31 RX ADMIN — METHOCARBAMOL 750 MILLIGRAM(S): 500 TABLET, FILM COATED ORAL at 14:48

## 2025-05-31 RX ADMIN — Medication 1 TABLET(S): at 12:19

## 2025-05-31 RX ADMIN — GABAPENTIN 600 MILLIGRAM(S): 400 CAPSULE ORAL at 21:41

## 2025-05-31 RX ADMIN — GABAPENTIN 600 MILLIGRAM(S): 400 CAPSULE ORAL at 05:23

## 2025-05-31 RX ADMIN — DULOXETINE 60 MILLIGRAM(S): 20 CAPSULE, DELAYED RELEASE ORAL at 12:19

## 2025-05-31 RX ADMIN — Medication 650 MILLIGRAM(S): at 13:19

## 2025-05-31 RX ADMIN — Medication 25 MILLIGRAM(S): at 12:19

## 2025-05-31 RX ADMIN — Medication 650 MILLIGRAM(S): at 12:19

## 2025-05-31 RX ADMIN — INSULIN LISPRO 10 UNIT(S): 100 INJECTION, SOLUTION INTRAVENOUS; SUBCUTANEOUS at 08:34

## 2025-05-31 RX ADMIN — ENOXAPARIN SODIUM 40 MILLIGRAM(S): 100 INJECTION SUBCUTANEOUS at 17:25

## 2025-05-31 RX ADMIN — GABAPENTIN 600 MILLIGRAM(S): 400 CAPSULE ORAL at 14:48

## 2025-05-31 RX ADMIN — INSULIN LISPRO 10: 100 INJECTION, SOLUTION INTRAVENOUS; SUBCUTANEOUS at 17:26

## 2025-05-31 RX ADMIN — IMMUNE GLOBULIN (HUMAN) 41.67 GRAM(S): 10 INJECTION INTRAVENOUS; SUBCUTANEOUS at 12:33

## 2025-05-31 RX ADMIN — INSULIN LISPRO 14 UNIT(S): 100 INJECTION, SOLUTION INTRAVENOUS; SUBCUTANEOUS at 17:27

## 2025-05-31 RX ADMIN — INSULIN LISPRO 4: 100 INJECTION, SOLUTION INTRAVENOUS; SUBCUTANEOUS at 21:41

## 2025-05-31 RX ADMIN — LOSARTAN POTASSIUM 50 MILLIGRAM(S): 100 TABLET, FILM COATED ORAL at 05:22

## 2025-05-31 RX ADMIN — Medication 40 MILLIGRAM(S): at 05:22

## 2025-05-31 RX ADMIN — INSULIN LISPRO 4: 100 INJECTION, SOLUTION INTRAVENOUS; SUBCUTANEOUS at 12:22

## 2025-05-31 RX ADMIN — ATORVASTATIN CALCIUM 10 MILLIGRAM(S): 80 TABLET, FILM COATED ORAL at 21:41

## 2025-05-31 RX ADMIN — METHYLPREDNISOLONE ACETATE 100 MILLIGRAM(S): 80 INJECTION, SUSPENSION INTRA-ARTICULAR; INTRALESIONAL; INTRAMUSCULAR; SOFT TISSUE at 05:23

## 2025-05-31 RX ADMIN — INSULIN GLARGINE-YFGN 21 UNIT(S): 100 INJECTION, SOLUTION SUBCUTANEOUS at 21:41

## 2025-05-31 RX ADMIN — INSULIN LISPRO 16 UNIT(S): 100 INJECTION, SOLUTION INTRAVENOUS; SUBCUTANEOUS at 12:23

## 2025-05-31 RX ADMIN — Medication 15 GRAM(S): at 17:25

## 2025-05-31 NOTE — PROGRESS NOTE ADULT - SUBJECTIVE AND OBJECTIVE BOX
*************************************  NEUROLOGY PROGRESS NOTE  **************************************    MARIA R UMANA  Male  MRN-[id number]    Subjective: Patient seen on rounds, comfortable     Interval History:    - Had a period of altered mental status yesterday, now resolved. Thought to be secondary to high dose steroids ue      VITAL SIGNS:  Vital Signs Last 24 Hrs  T(C): 36.6 (31 May 2025 12:50), Max: 36.7 (30 May 2025 23:10)  T(F): 97.9 (31 May 2025 12:50), Max: 98.1 (30 May 2025 23:10)  HR: 82 (31 May 2025 12:50) (79 - 91)  BP: 133/71 (31 May 2025 12:50) (133/71 - 167/85)  BP(mean): --  RR: 18 (31 May 2025 12:50) (18 - 18)  SpO2: 98% (31 May 2025 12:50) (98% - 98%)    Parameters below as of 31 May 2025 12:50  Patient On (Oxygen Delivery Method): room air      PHYSICAL EXAMINATION:   Neurological (>12):  MS: Awake, alert.  Oriented to person, place, and time (Month/Year, current date). Speech fluent and follows commands    CNs: PERRL (R 3mm, L 3mm). VFF. EOMI. No disconjugate gaze, nystagmus. V1-3 intact LT, No facial asymmetry b/l. Tongue midline and can move side to side.     Motor - Normal bulk and tone throughout.   L/R (out of 5 each)       Deltoid 5/5, Biceps 5/5, Triceps 5/5, Finger Abduction 5/5  L/R (out of 5 each)       Hip Flexion  4-/2, Knee Flexion 4-/2, Knee Extension 5/4-, Dorsiflexion 2/1, Plantar Flexion 4/4-     Sensation: Intact to LT b/l x4 extremities.   Reflexes L/R:  Biceps 2/1, BR 2/2, Triceps 1/1, Patellar 1/0, Ankles 0/0   Coordination: No abnormalities on finger-nose-finger   Gait: Not assessed       LABS:    CBC                       14.2   6.06  )-----------( 219      ( 31 May 2025 07:18 )             40.9     Chem 05-31    133[L]  |  94[L]  |  23  ----------------------------<  223[H]  4.1   |  28  |  0.59    Ca    10.0      31 May 2025 07:18  Phos  2.7     05-30  Mg     1.9     05-30    TPro  7.7  /  Alb  4.1  /  TBili  0.4  /  DBili  x   /  AST  20  /  ALT  30  /  AlkPhos  58  05-30    LFTs LIVER FUNCTIONS - ( 30 May 2025 12:28 )  Alb: 4.1 g/dL / Pro: 7.7 g/dL / ALK PHOS: 58 U/L / ALT: 30 U/L / AST: 20 U/L / GGT: x           Coagulopathy PT/INR - ( 30 May 2025 12:28 )   PT: 10.6 sec;   INR: 0.93 ratio         PTT - ( 30 May 2025 12:28 )  PTT:25.2 sec  Lipid Panel   A1c   Cardiac enzymes     U/A Urinalysis Basic - ( 31 May 2025 07:18 )    Color: x / Appearance: x / SG: x / pH: x  Gluc: 223 mg/dL / Ketone: x  / Bili: x / Urobili: x   Blood: x / Protein: x / Nitrite: x   Leuk Esterase: x / RBC: x / WBC x   Sq Epi: x / Non Sq Epi: x / Bacteria: x        RADIOLOGY & ADDITIONAL STUDIES:             *************************************  NEUROLOGY PROGRESS NOTE  **************************************    MARIA R UMANA  Male  [id number]    Subjective: Patient seen on rounds, comfortable     Interval History:    - Had a period of altered mental status yesterday, now resolved. Thought to be secondary to high dose steroids      VITAL SIGNS:  Vital Signs Last 24 Hrs  T(C): 36.6 (31 May 2025 12:50), Max: 36.7 (30 May 2025 23:10)  T(F): 97.9 (31 May 2025 12:50), Max: 98.1 (30 May 2025 23:10)  HR: 82 (31 May 2025 12:50) (79 - 91)  BP: 133/71 (31 May 2025 12:50) (133/71 - 167/85)  BP(mean): --  RR: 18 (31 May 2025 12:50) (18 - 18)  SpO2: 98% (31 May 2025 12:50) (98% - 98%)    Parameters below as of 31 May 2025 12:50  Patient On (Oxygen Delivery Method): room air      PHYSICAL EXAMINATION:   Neurological (>12):  MS: Awake, alert.  Oriented to person, place, and time (Month/Year, current date). Speech fluent and follows commands    CNs: PERRL (R 3mm, L 3mm). VFF. EOMI. No disconjugate gaze, nystagmus. V1-3 intact LT, No facial asymmetry b/l. Tongue midline and can move side to side.     Motor - Normal bulk and tone throughout.   RLE: HF 2/5, KF 2/5, KE 4+/5, dorsiflexion 1/5, plantarflexion 4+/5  LLE: HF 4+/5, KF 4+/5, KE 5/5, dorsiflexion 3/5, plantarflexion 4/5    Sensation: Intact grossly to light touch     Reflexes L/R:  Biceps 2/1, BR 2/2, Triceps 1/1, Patellar 1/0, Ankles 0/0   Coordination: No dysmetria on finger-nose-finger   Gait: Not assessed       LABS:    CBC                       14.2   6.06  )-----------( 219      ( 31 May 2025 07:18 )             40.9     Chem 05-31    133[L]  |  94[L]  |  23  ----------------------------<  223[H]  4.1   |  28  |  0.59    Ca    10.0      31 May 2025 07:18  Phos  2.7     05-30  Mg     1.9     05-30    TPro  7.7  /  Alb  4.1  /  TBili  0.4  /  DBili  x   /  AST  20  /  ALT  30  /  AlkPhos  58  05-30    LFTs LIVER FUNCTIONS - ( 30 May 2025 12:28 )  Alb: 4.1 g/dL / Pro: 7.7 g/dL / ALK PHOS: 58 U/L / ALT: 30 U/L / AST: 20 U/L / GGT: x           Coagulopathy PT/INR - ( 30 May 2025 12:28 )   PT: 10.6 sec;   INR: 0.93 ratio         PTT - ( 30 May 2025 12:28 )  PTT:25.2 sec  Lipid Panel   A1c   Cardiac enzymes     U/A Urinalysis Basic - ( 31 May 2025 07:18 )    Color: x / Appearance: x / SG: x / pH: x  Gluc: 223 mg/dL / Ketone: x  / Bili: x / Urobili: x   Blood: x / Protein: x / Nitrite: x   Leuk Esterase: x / RBC: x / WBC x   Sq Epi: x / Non Sq Epi: x / Bacteria: x        RADIOLOGY & ADDITIONAL STUDIES:

## 2025-05-31 NOTE — PROGRESS NOTE ADULT - PROBLEM SELECTOR PLAN 1
Inpatient Plan:  - Check BG TID AC, HS, and 2AM while on PO diet   - Adjust Lantus to 21u QHS  - Adjust Admelog to 16u TID AC (HOLD if NPO)  - C/w moderate dose Admelog correctional scales TID AC, HS, and start 2AM    Discharge Plan:  - Basal/bolus, TBD closer to d/c pending final steroid plan.   - Patient should check BG TID AC and HS at home. Can use CGM to monitor BGs but if BG <100mg/dL or >250mg/dL, patient should confirm with fingerstick BG. Please tell patient to contact Endocrinologist if BG <70mg/dL x1 or >200mg/dL consistently or >400mg/dL x1.  - Close follow up with Endocrinologist Dr. Toscano/Dr. Padilla outpatient recommended.   - Recommend routine outpatient ophthalmology and podiatry follow up.  - Patient will need RX for basal insulin pen (ie. Basaglar, Lantus, Tresiba, Toujeo) and bolus insulin pen (ie. humalog/novolog/admelog) depending on insurance coverage; please send test scripts to see which is covered. Please make sure patient has all diabetes supplies on discharge (glucometer, test strips, lancets, alcohol swabs, insulin pen needles, FSL3 sensors x3). Please write RX for glucose tablets/gel> use as directed plus Glucagon nasal/ IM injection (use as directed)> Can prescribe any covered by pt's insurance. Inpatient Plan:  - Check BG TID AC, HS, and 2AM while on PO diet   - Adjust Lantus to 21u QHS  - Adjust Admelog to 16u TID AC (HOLD if NPO)  - Start Admelog 2u prn HS snack (HOLD if not having late night snack)  - C/w moderate dose Admelog correctional scales TID AC, HS, and start 2AM    Discharge Plan:  - Basal/bolus, TBD closer to d/c pending final steroid plan.   - Patient should check BG TID AC and HS at home. Can use CGM to monitor BGs but if BG <100mg/dL or >250mg/dL, patient should confirm with fingerstick BG. Please tell patient to contact Endocrinologist if BG <70mg/dL x1 or >200mg/dL consistently or >400mg/dL x1.  - Close follow up with Endocrinologist Dr. Toscano/Dr. Padilla outpatient recommended.   - Recommend routine outpatient ophthalmology and podiatry follow up.  - Patient will need RX for basal insulin pen (ie. Basaglar, Lantus, Tresiba, Toujeo) and bolus insulin pen (ie. humalog/novolog/admelog) depending on insurance coverage; please send test scripts to see which is covered. Please make sure patient has all diabetes supplies on discharge (glucometer, test strips, lancets, alcohol swabs, insulin pen needles, FSL3 sensors x3). Please write RX for glucose tablets/gel> use as directed plus Glucagon nasal/ IM injection (use as directed)> Can prescribe any covered by pt's insurance. Inpatient Plan:  - Check BG TID AC, HS, and 2AM while on PO diet   - Adjust Lantus to 21u QHS  - Adjust Admelog to 14u TID AC (HOLD if NPO)  - Start Admelog 2u prn HS snack (HOLD if not having late night snack)  - C/w moderate dose Admelog correctional scales TID AC, HS, and start 2AM    Discharge Plan:  - Basal/bolus, TBD closer to d/c pending final steroid plan.   - Patient should check BG TID AC and HS at home. Can use CGM to monitor BGs but if BG <100mg/dL or >250mg/dL, patient should confirm with fingerstick BG. Please tell patient to contact Endocrinologist if BG <70mg/dL x1 or >200mg/dL consistently or >400mg/dL x1.  - Close follow up with Endocrinologist Dr. Toscano/Dr. Padilla outpatient recommended.   - Recommend routine outpatient ophthalmology and podiatry follow up.  - Patient will need RX for basal insulin pen (ie. Basaglar, Lantus, Tresiba, Toujeo) and bolus insulin pen (ie. humalog/novolog/admelog) depending on insurance coverage; please send test scripts to see which is covered. Please make sure patient has all diabetes supplies on discharge (glucometer, test strips, lancets, alcohol swabs, insulin pen needles, FSL3 sensors x3). Please write RX for glucose tablets/gel> use as directed plus Glucagon nasal/ IM injection (use as directed)> Can prescribe any covered by pt's insurance. Inpatient Plan:  - Check BG TID AC, HS, and 2AM while on PO diet   - Adjust Lantus to 21u QHS  - Adjust Admelog to 14u TID AC (HOLD if NPO)  - Start Admelog 2u prn HS snack (HOLD if not having late night snack)  - Start NPH 7u with steroid in AM (HOLD if steroid held)  - C/w moderate dose Admelog correctional scales TID AC, HS, and start 2AM    Discharge Plan:  - Basal/bolus, TBD closer to d/c pending final steroid plan.   - Patient should check BG TID AC and HS at home. Can use CGM to monitor BGs but if BG <100mg/dL or >250mg/dL, patient should confirm with fingerstick BG. Please tell patient to contact Endocrinologist if BG <70mg/dL x1 or >200mg/dL consistently or >400mg/dL x1.  - Close follow up with Endocrinologist Dr. Toscano/Dr. Padilla outpatient recommended.   - Recommend routine outpatient ophthalmology and podiatry follow up.  - Patient will need RX for basal insulin pen (ie. Basaglar, Lantus, Tresiba, Toujeo) and bolus insulin pen (ie. humalog/novolog/admelog) depending on insurance coverage; please send test scripts to see which is covered. Please make sure patient has all diabetes supplies on discharge (glucometer, test strips, lancets, alcohol swabs, insulin pen needles, FSL3 sensors x3). Please write RX for glucose tablets/gel> use as directed plus Glucagon nasal/ IM injection (use as directed)> Can prescribe any covered by pt's insurance.

## 2025-05-31 NOTE — PROGRESS NOTE ADULT - SUBJECTIVE AND OBJECTIVE BOX
Patient seen today for follow up inpatient Diabetes Mellitus management.    Chief Complaint: Type 1 Diabetes Mellitus, steroid induced hyperglycemia     INTERVAL HX:  Patient seen in [facility] 468 D1. Patient is alert and oriented, resting in bed. Patient reports feeling good, eating full meals and tolerating POs. FBG elevated this am to 260mg/dL, 223mg/dL on blood serum. No hypoglycemia. Noted severe postprandial hyperglycemia last evening to 381mg/dL -> patient does report eating potato chips after dinner. Noted also on steroid causing hyperglycemia, IV Methylprednisolone, now tapered from 1g to 500mg x2 days per Neurology. Blood glucose levels in the last 24hrs have been X.     Review of Systems:  General: As above.  Respiratory: Denies any SOB, RUBIO, or cough.  Gastrointestinal: Denies any n/v/d or abdominal pain.   Endocrine: Denies any polyuria, polydipsia, polyphagia, visual changes, or numbness in feet.     Allergies  No Known Allergies      Intolerances  None.       MEDICATIONS  (STANDING):  acetaminophen     Tablet .. 650 milliGRAM(s) Oral daily  amLODIPine   Tablet 10 milliGRAM(s) Oral daily  atorvastatin 10 milliGRAM(s) Oral at bedtime  dextrose 5%. 1000 milliLiter(s) IV Continuous <Continuous>  dextrose 5%. 1000 milliLiter(s) IV Continuous <Continuous>  dextrose 50% Injectable 25 Gram(s) IV Push once  dextrose 50% Injectable 12.5 Gram(s) IV Push once  dextrose 50% Injectable 25 Gram(s) IV Push once  dextrose Oral Gel 15 Gram(s) Oral once PRN  diphenhydrAMINE 25 milliGRAM(s) Oral daily  DULoxetine 60 milliGRAM(s) Oral daily  enoxaparin Injectable 40 milliGRAM(s) SubCutaneous every 24 hours  gabapentin 600 milliGRAM(s) Oral three times a day  glucagon  Injectable 1 milliGRAM(s) IntraMuscular once  immune   globulin 10% (GAMMAGARD) IVPB 25 Gram(s) IV Intermittent daily  insulin glargine Injectable (LANTUS) 23 Unit(s) SubCutaneous at bedtime  insulin lispro (ADMELOG) corrective regimen sliding scale   SubCutaneous <User Schedule>  insulin lispro (ADMELOG) corrective regimen sliding scale   SubCutaneous three times a day before meals  insulin lispro Injectable (ADMELOG) 16 Unit(s) SubCutaneous three times a day before meals  lidocaine 1% Injectable 10 milliLiter(s) Local Injection once  losartan 50 milliGRAM(s) Oral daily  methocarbamol 750 milliGRAM(s) Oral three times a day PRN  methylPREDNISolone sodium succinate IVPB 500 milliGRAM(s) IV Intermittent daily  multivitamin 1 Tablet(s) Oral daily  pantoprazole    Tablet 40 milliGRAM(s) Oral before breakfast  urea Oral Powder 15 Gram(s) Oral two times a day      atorvastatin 10 milliGRAM(s) Oral at bedtime  dextrose 50% Injectable 25 Gram(s) IV Push once  dextrose 50% Injectable 12.5 Gram(s) IV Push once  dextrose 50% Injectable 25 Gram(s) IV Push once  dextrose Oral Gel 15 Gram(s) Oral once PRN  glucagon  Injectable 1 milliGRAM(s) IntraMuscular once  insulin glargine Injectable (LANTUS) 23 Unit(s) SubCutaneous at bedtime  insulin lispro (ADMELOG) corrective regimen sliding scale   SubCutaneous <User Schedule>  insulin lispro (ADMELOG) corrective regimen sliding scale   SubCutaneous three times a day before meals  insulin lispro Injectable (ADMELOG) 16 Unit(s) SubCutaneous three times a day before meals  methylPREDNISolone sodium succinate IVPB 500 milliGRAM(s) IV Intermittent daily      insulin lispro (ADMELOG) corrective regimen sliding scale   SubCutaneous <User Schedule>  insulin lispro (ADMELOG) corrective regimen sliding scale   SubCutaneous three times a day before meals  insulin lispro Injectable (ADMELOG) 16 Unit(s) SubCutaneous three times a day before meals      PHYSICAL EXAM:  VITALS:   T(C): 36.6 (05-31-25 @ 09:41), Max: 36.7 (05-30-25 @ 23:10)  HR: 87 (05-31-25 @ 09:41) (79 - 91)  BP: 158/76 (05-31-25 @ 09:41) (137/71 - 167/85)  RR: 18 (05-31-25 @ 09:41) (18 - 18)  SpO2: 98% (05-31-25 @ 09:41) (97% - 98%)    GENERAL: In no acute distress  Respiratory: Respirations unlabored  Extremities: Warm and dry, no edema  NEURO: Alert and oriented, appropriate     LABS:  POCT Blood Glucose.: 260 mg/dL (05-31-25 @ 07:50)  POCT Blood Glucose.: 381 mg/dL (05-30-25 @ 21:06)  POCT Blood Glucose.: 241 mg/dL (05-30-25 @ 16:12)  POCT Blood Glucose.: 254 mg/dL (05-30-25 @ 13:11)  POCT Blood Glucose.: 220 mg/dL (05-30-25 @ 11:51)  POCT Blood Glucose.: 232 mg/dL (05-30-25 @ 07:51)  POCT Blood Glucose.: 313 mg/dL (05-29-25 @ 22:01)  POCT Blood Glucose.: 284 mg/dL (05-29-25 @ 16:13)  POCT Blood Glucose.: 413 mg/dL (05-29-25 @ 13:35)  POCT Blood Glucose.: 477 mg/dL (05-29-25 @ 12:27)  POCT Blood Glucose.: 414 mg/dL (05-29-25 @ 12:05)  POCT Blood Glucose.: 415 mg/dL (05-29-25 @ 11:42)  POCT Blood Glucose.: 245 mg/dL (05-29-25 @ 08:23)  POCT Blood Glucose.: 330 mg/dL (05-29-25 @ 00:01)  POCT Blood Glucose.: 360 mg/dL (05-28-25 @ 23:14)  POCT Blood Glucose.: 381 mg/dL (05-28-25 @ 21:52)  POCT Blood Glucose.: 461 mg/dL (05-28-25 @ 21:50)  POCT Blood Glucose.: 418 mg/dL (05-28-25 @ 20:55)  POCT Blood Glucose.: 348 mg/dL (05-28-25 @ 16:31)  POCT Blood Glucose.: 88 mg/dL (05-28-25 @ 11:20)                          14.2   6.06  )-----------( 219      ( 31 May 2025 07:18 )             40.9     05-31    133[L]  |  94[L]  |  23  ----------------------------<  223[H]  4.1   |  28  |  0.59    Ca    10.0      31 May 2025 07:18  Phos  2.7     05-30  Mg     1.9     05-30    TPro  7.7  /  Alb  4.1  /  TBili  0.4  /  DBili  x   /  AST  20  /  ALT  30  /  AlkPhos  58  05-30    LIVER FUNCTIONS - ( 30 May 2025 12:28 )  Alb: 4.1 g/dL / Pro: 7.7 g/dL / ALK PHOS: 58 U/L / ALT: 30 U/L / AST: 20 U/L / GGT: x           PT/INR - ( 30 May 2025 12:28 )   PT: 10.6 sec;   INR: 0.93 ratio         PTT - ( 30 May 2025 12:28 )  PTT:25.2 sec      Urinalysis Basic - ( 31 May 2025 07:18 )    Color: x / Appearance: x / SG: x / pH: x  Gluc: 223 mg/dL / Ketone: x  / Bili: x / Urobili: x   Blood: x / Protein: x / Nitrite: x   Leuk Esterase: x / RBC: x / WBC x   Sq Epi: x / Non Sq Epi: x / Bacteria: x        A1C with Estimated Average Glucose Result: A1C with Estimated Average Glucose Result: 9.9 % (04-23-25 @ 04:00)  A1C with Estimated Average Glucose Result: 10.3 % (02-20-25 @ 14:58)  A1C with Estimated Average Glucose Result: 9.7 % (02-18-25 @ 07:30)       Patient seen today for follow up inpatient Diabetes Mellitus management.    Chief Complaint: Type 1 Diabetes Mellitus, steroid induced hyperglycemia     INTERVAL HX:  Patient seen in [facility] 468 D1. Patient is alert and oriented, resting in bed. Patient reports feeling good, eating full meals and tolerating POs. FBG elevated this am to 260mg/dL, 223mg/dL on blood serum. No hypoglycemia. Noted severe postprandial hyperglycemia last evening to 381mg/dL -> patient does report eating potato chips after dinner. Noted also on steroid causing hyperglycemia, IV Methylprednisolone, now tapered from 1g to 500mg x2 days per Neurology. Patient has CGm FSL3 on L arm -> noted CGM BG currently 276mg/dL at bedside, no hypoglycemia, BGs on CGM with hyperglycemia 200s-300s in the last 24hrs. Blood glucose levels in the last 24hrs have been X.     Review of Systems:  General: As above.  Respiratory: Denies any SOB, RUBIO, or cough.  Gastrointestinal: Denies any n/v/d or abdominal pain.   Endocrine: Denies any polyuria, polydipsia, polyphagia, visual changes, or numbness in feet.     Allergies  No Known Allergies      Intolerances  None.       MEDICATIONS  (STANDING):  acetaminophen     Tablet .. 650 milliGRAM(s) Oral daily  amLODIPine   Tablet 10 milliGRAM(s) Oral daily  atorvastatin 10 milliGRAM(s) Oral at bedtime  dextrose 5%. 1000 milliLiter(s) IV Continuous <Continuous>  dextrose 5%. 1000 milliLiter(s) IV Continuous <Continuous>  dextrose 50% Injectable 25 Gram(s) IV Push once  dextrose 50% Injectable 12.5 Gram(s) IV Push once  dextrose 50% Injectable 25 Gram(s) IV Push once  dextrose Oral Gel 15 Gram(s) Oral once PRN  diphenhydrAMINE 25 milliGRAM(s) Oral daily  DULoxetine 60 milliGRAM(s) Oral daily  enoxaparin Injectable 40 milliGRAM(s) SubCutaneous every 24 hours  gabapentin 600 milliGRAM(s) Oral three times a day  glucagon  Injectable 1 milliGRAM(s) IntraMuscular once  immune   globulin 10% (GAMMAGARD) IVPB 25 Gram(s) IV Intermittent daily  insulin glargine Injectable (LANTUS) 23 Unit(s) SubCutaneous at bedtime  insulin lispro (ADMELOG) corrective regimen sliding scale   SubCutaneous <User Schedule>  insulin lispro (ADMELOG) corrective regimen sliding scale   SubCutaneous three times a day before meals  insulin lispro Injectable (ADMELOG) 16 Unit(s) SubCutaneous three times a day before meals  lidocaine 1% Injectable 10 milliLiter(s) Local Injection once  losartan 50 milliGRAM(s) Oral daily  methocarbamol 750 milliGRAM(s) Oral three times a day PRN  methylPREDNISolone sodium succinate IVPB 500 milliGRAM(s) IV Intermittent daily  multivitamin 1 Tablet(s) Oral daily  pantoprazole    Tablet 40 milliGRAM(s) Oral before breakfast  urea Oral Powder 15 Gram(s) Oral two times a day      atorvastatin 10 milliGRAM(s) Oral at bedtime  dextrose 50% Injectable 25 Gram(s) IV Push once  dextrose 50% Injectable 12.5 Gram(s) IV Push once  dextrose 50% Injectable 25 Gram(s) IV Push once  dextrose Oral Gel 15 Gram(s) Oral once PRN  glucagon  Injectable 1 milliGRAM(s) IntraMuscular once  insulin glargine Injectable (LANTUS) 23 Unit(s) SubCutaneous at bedtime  insulin lispro (ADMELOG) corrective regimen sliding scale   SubCutaneous <User Schedule>  insulin lispro (ADMELOG) corrective regimen sliding scale   SubCutaneous three times a day before meals  insulin lispro Injectable (ADMELOG) 16 Unit(s) SubCutaneous three times a day before meals  methylPREDNISolone sodium succinate IVPB 500 milliGRAM(s) IV Intermittent daily      insulin lispro (ADMELOG) corrective regimen sliding scale   SubCutaneous <User Schedule>  insulin lispro (ADMELOG) corrective regimen sliding scale   SubCutaneous three times a day before meals  insulin lispro Injectable (ADMELOG) 16 Unit(s) SubCutaneous three times a day before meals      PHYSICAL EXAM:  VITALS:   T(C): 36.6 (05-31-25 @ 09:41), Max: 36.7 (05-30-25 @ 23:10)  HR: 87 (05-31-25 @ 09:41) (79 - 91)  BP: 158/76 (05-31-25 @ 09:41) (137/71 - 167/85)  RR: 18 (05-31-25 @ 09:41) (18 - 18)  SpO2: 98% (05-31-25 @ 09:41) (97% - 98%)    GENERAL: In no acute distress  Respiratory: Respirations unlabored  Extremities: Warm and dry, no edema  NEURO: Alert and oriented, appropriate     LABS:  POCT Blood Glucose.: 260 mg/dL (05-31-25 @ 07:50)  POCT Blood Glucose.: 381 mg/dL (05-30-25 @ 21:06)  POCT Blood Glucose.: 241 mg/dL (05-30-25 @ 16:12)  POCT Blood Glucose.: 254 mg/dL (05-30-25 @ 13:11)  POCT Blood Glucose.: 220 mg/dL (05-30-25 @ 11:51)  POCT Blood Glucose.: 232 mg/dL (05-30-25 @ 07:51)  POCT Blood Glucose.: 313 mg/dL (05-29-25 @ 22:01)  POCT Blood Glucose.: 284 mg/dL (05-29-25 @ 16:13)  POCT Blood Glucose.: 413 mg/dL (05-29-25 @ 13:35)  POCT Blood Glucose.: 477 mg/dL (05-29-25 @ 12:27)  POCT Blood Glucose.: 414 mg/dL (05-29-25 @ 12:05)  POCT Blood Glucose.: 415 mg/dL (05-29-25 @ 11:42)  POCT Blood Glucose.: 245 mg/dL (05-29-25 @ 08:23)  POCT Blood Glucose.: 330 mg/dL (05-29-25 @ 00:01)  POCT Blood Glucose.: 360 mg/dL (05-28-25 @ 23:14)  POCT Blood Glucose.: 381 mg/dL (05-28-25 @ 21:52)  POCT Blood Glucose.: 461 mg/dL (05-28-25 @ 21:50)  POCT Blood Glucose.: 418 mg/dL (05-28-25 @ 20:55)  POCT Blood Glucose.: 348 mg/dL (05-28-25 @ 16:31)  POCT Blood Glucose.: 88 mg/dL (05-28-25 @ 11:20)                          14.2   6.06  )-----------( 219      ( 31 May 2025 07:18 )             40.9     05-31    133[L]  |  94[L]  |  23  ----------------------------<  223[H]  4.1   |  28  |  0.59    Ca    10.0      31 May 2025 07:18  Phos  2.7     05-30  Mg     1.9     05-30    TPro  7.7  /  Alb  4.1  /  TBili  0.4  /  DBili  x   /  AST  20  /  ALT  30  /  AlkPhos  58  05-30    LIVER FUNCTIONS - ( 30 May 2025 12:28 )  Alb: 4.1 g/dL / Pro: 7.7 g/dL / ALK PHOS: 58 U/L / ALT: 30 U/L / AST: 20 U/L / GGT: x           PT/INR - ( 30 May 2025 12:28 )   PT: 10.6 sec;   INR: 0.93 ratio         PTT - ( 30 May 2025 12:28 )  PTT:25.2 sec      Urinalysis Basic - ( 31 May 2025 07:18 )    Color: x / Appearance: x / SG: x / pH: x  Gluc: 223 mg/dL / Ketone: x  / Bili: x / Urobili: x   Blood: x / Protein: x / Nitrite: x   Leuk Esterase: x / RBC: x / WBC x   Sq Epi: x / Non Sq Epi: x / Bacteria: x        A1C with Estimated Average Glucose Result: A1C with Estimated Average Glucose Result: 9.9 % (04-23-25 @ 04:00)  A1C with Estimated Average Glucose Result: 10.3 % (02-20-25 @ 14:58)  A1C with Estimated Average Glucose Result: 9.7 % (02-18-25 @ 07:30)

## 2025-05-31 NOTE — PROGRESS NOTE ADULT - ASSESSMENT
58 y/o M w/ uncontrolled СЕРГЕЙ complicated by neuropathy with severe hyperglycemia exacerbated by high dose steroids, HTN, HLD admitted for LE weakness (high risk patient with severe hyperglycemia with glucose > 400's at high risk of CAD and CVA with high medical complexity and high level decision-making with high intensive medication requiring frequent monitoring). Patient reports feeling good, eating full meals and tolerating POs. FBG elevate this am -> noted steroid taper today and patient had snack last night, would like to continue snacks because he needs salt intake for neuro sx, will adjust Lantus slightly to 21u QHS for now and also add Admelog 4u prn snack QHS. Noted postprandial hyperglycemia, will adjust mealtime insulin as well for tighter BG control but also noting steroid to taper to prevent hypoglycemia. Patient now tapered to IV MTP 500mg x2 days for now per Neuro. Endocrine will closely monitor BG and adjust insulin as needed for BG goal 100-180mg/dL inpatient.       #Type 2 diabetes mellitus with hyperglycemia, with long-term current use of insulin.  #Steroid induced hyperglycemia   Inpatient Plan:  - Check BG TID AC, HS, and 2AM while on PO diet   - Adjust Lantus to 21u QHS  - Adjust Admelog to 16u TID AC (HOLD if NPO)  - C/w moderate dose Admelog correctional scales TID AC, HS, and start 2AM    Discharge Plan:  - Basal/bolus, TBD closer to d/c pending final steroid plan.   - Patient should check BG TID AC and HS at home. Can use CGM to monitor BGs but if BG <100mg/dL or >250mg/dL, patient should confirm with fingerstick BG. Please tell patient to contact Endocrinologist if BG <70mg/dL x1 or >200mg/dL consistently or >400mg/dL x1.  - Close follow up with Endocrinologist Dr. Toscano/Dr. Padilla outpatient recommended.   - Recommend routine outpatient ophthalmology and podiatry follow up.  - Patient will need RX for basal insulin pen (ie. Basaglar, Lantus, Tresiba, Toujeo) and bolus insulin pen (ie. humalog/novolog/admelog) depending on insurance coverage; please send test scripts to see which is covered. Please make sure patient has all diabetes supplies on discharge (glucometer, test strips, lancets, alcohol swabs, insulin pen needles, FSL3 sensors x3). Please write RX for glucose tablets/gel> use as directed plus Glucagon nasal/ IM injection (use as directed)> Can prescribe any covered by pt's insurance.  #Hypertension  Recommendation:   - Goal BP < 130/80 c/w amlodipine and losartan.  - outpatient UACR      #Hyperlipidemia  Recommendation:   - c/w statin  - outpatient fasting lipid panel     Yasmine Lira DO   Attending Physician   Department of Endocrinology, Diabetes and Metabolism     If before 9AM or after 5PM, or on weekends/holidays, please call the Endocrine answering service for assistance ([phone]).  For nonurgent matters, please email [email or web address] or [email or web address]     Please note that this patient will be followed by different provider tomorrow.  Notify endocrine 24 hours prior to discharge for final recommendations         60 y/o M w/ uncontrolled СЕРГЕЙ complicated by neuropathy with severe hyperglycemia exacerbated by high dose steroids, HTN, HLD admitted for LE weakness (high risk patient with severe hyperglycemia with glucose > 400's at high risk of CAD and CVA with high medical complexity and high level decision-making with high intensive medication requiring frequent monitoring). Patient reports feeling good, eating full meals and tolerating POs. FBG elevate this am -> noted steroid taper today and patient had snack last night, would like to continue snacks because he needs salt intake for neuro sx, will adjust Lantus slightly to 21u QHS for now and also add Admelog 4u prn snack QHS. Will also add 2AM ISS for tighter BG control. Noted postprandial hyperglycemia, will adjust mealtime insulin as well for tighter BG control but also noting steroid to taper to prevent hypoglycemia. Patient now tapered to IV MTP 500mg x2 days for now per Neuro. Endocrine will closely monitor BG and adjust insulin as needed for BG goal 100-180mg/dL inpatient.       #Type 2 diabetes mellitus with hyperglycemia, with long-term current use of insulin.  #Steroid induced hyperglycemia   A1C with Estimated Average Glucose Result: 9.9 % (04-23-25 @ 04:00)  Home regimen: Basaglar 15-20u QHS, Cequr Patch 8-12u AC  Endocrinologist: Dr. Toscano/Dr. Padilla              58 y/o M w/ uncontrolled СЕРГЕЙ complicated by neuropathy with severe hyperglycemia exacerbated by high dose steroids, HTN, HLD admitted for LE weakness (high risk patient with severe hyperglycemia with glucose > 400's at high risk of CAD and CVA with high medical complexity and high level decision-making with high intensive medication requiring frequent monitoring). Patient reports feeling good, eating full meals and tolerating POs. FBG elevate this am -> noted steroid taper today and patient had snack last night, would like to continue snacks because he needs salt intake for neuro sx, will adjust Lantus slightly to 21u QHS for now and also add Admelog 2u prn snack QHS. Will also add 2AM ISS for tighter BG control. Noted postprandial hyperglycemia, will adjust mealtime insulin as well for tighter BG control but also noting steroid to taper to prevent hypoglycemia. Patient now tapered to IV MTP 500mg x2 days for now per Neuro. Endocrine will closely monitor BG and adjust insulin as needed for BG goal 100-180mg/dL inpatient.       #Type 2 diabetes mellitus with hyperglycemia, with long-term current use of insulin.  #Steroid induced hyperglycemia   A1C with Estimated Average Glucose Result: 9.9 % (04-23-25 @ 04:00)  Home regimen: Basaglar 15-20u QHS, Cequr Patch 8-12u AC  Endocrinologist: Dr. Toscano/Dr. Padilla              60 y/o M w/ uncontrolled СЕРГЕЙ complicated by neuropathy with severe hyperglycemia exacerbated by high dose steroids, HTN, HLD admitted for LE weakness (high risk patient with severe hyperglycemia with glucose > 400's at high risk of CAD and CVA with high medical complexity and high level decision-making with high intensive medication requiring frequent monitoring). Patient reports feeling good, eating full meals and tolerating POs. FBG elevate this am -> noted steroid taper today and patient had snack last night, would like to continue snacks because he needs salt intake for neuro sx, will adjust Lantus slightly to 21u QHS for now and also add Admelog 2u prn snack QHS. Will also add 2AM ISS for tighter BG control. Noted postprandial hyperglycemia, will adjust mealtime insulin as well for tighter BG control but also noting steroid to taper to prevent hypoglycemia. Severe hyperglycemia 2/2 to steroid -> will start NPH 7u with steroid QAM to prevent severe hyperglycemia. Patient now tapered to IV MTP 500mg x2 days for now per Neuro. Endocrine will closely monitor BG and adjust insulin as needed for BG goal 100-180mg/dL inpatient.       #Type 2 diabetes mellitus with hyperglycemia, with long-term current use of insulin.  #Steroid induced hyperglycemia   A1C with Estimated Average Glucose Result: 9.9 % (04-23-25 @ 04:00)  Home regimen: Basaglar 15-20u QHS, Cequr Patch 8-12u AC  Endocrinologist: Dr. Toscano/Dr. Padilla

## 2025-05-31 NOTE — PROGRESS NOTE ADULT - ASSESSMENT
Terence Galvez 58yo RH man w PMH of alcohol use disorder, chronic pancreatitis, poorly controlled СЕРГЕЙ (A1c 9.9 on 04/23/2025), chronic neuropathy of LLE who presented with worsening lower extremity weakness.     IMPRESSION:  Severe weakness in BLE (R>L). No evidence of compression or enhancement or sacral plexus abnormality on imaging. CSF demonstrated albuminocytologic dissociation, no evidence of infectious process. EMG/NCS with demyelinating pattern as well as evidence of chronic axonal loss. 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 Had a period of altered mental status yesterday, now resolved. Thought to be secondary to high dose steroids     Plan:  [x] MRI lumbar spine wwo no evidence of compression or enhancement  [x] MRI lumbosacral plexus R and L wwo no abnormalities  [] peripheral labs: Syphilis negative  -previous peripheral neuropathy labs from last admission: B1 (111.1), B6 (24.4), B9 (>20.0), B12 (441), TSH (1.06), Heavy Metal Tox Screen (-), HIV (-), Lyme (-), hepatitis panel (nonreactive including B surf ab, would need vaccine), SPEP (6.6 WNL)  -previous rheumatologic work up: ESR (6), CRP (<3), , ganglioside panel (WNL in 08/22/2024), ANSELMO (-), ANCA (-), ganglioside panel (-), Sjogren antibodies (-)  [] LP done 5/27: ACE, glucose 75, protein 82, cell count 1, IgG index 0.6, oligoclonal bands, MBP, cytology, autoimmune encephalitis, PCR negative, lyme, WNV  [] Bedside EMG/NCS with evidence of demyelinating process and chronic denervation   [] continue home meds specifically gabapentin 600 mg TID standing and methocarbamol 750 TID as needed  [] Diabetes management  - Lantus 15 units qHS  - Admelog 3 units before breakfast   - Admelog 5 units before lunch   - Admelog 5 units before dinner   - Insulin sliding scale  [] Start IV methylprednisolone 1g daily x5 days- monitor fingerstick while receiving steroids  [] Start IVIG 2g/kg divided over 5 days. Premedicate with Tylenol 650 PO and Benadryl 50 PO     #Hyponatremia- sodium downtrending 128->126 today  [] Nephrology recommendations appreciated, as below:  - Obtain serum osmolality, urine osmolality, and urine electrolytes including Alexandro and Ur K  - Check uric acid level and AM cortisol   - After obtaining urine studies can start UreNa 15 GM BID. Please note BUN is expected to rise while on UreNa.  - Fluid restrict to <1.2L fluid in a day.  - Change diet to remove renal restriction, as patient does not require it. Can keep Consistent Carbohydrate restriction in place. Encourage PO protein intake.   - Goal correction is 6-8meq in first 24 hours.     misc and manage:  [x] PTOT- recommending acute rehab    METRICS:  Diet: Consistent carbohydrate  DVT prophylaxis: Lovenox  Dispo: Acute rehab    Seen and discussed with attending Dr. Mirza Radford.   Terence Galvez 58yo RH man w PMH of alcohol use disorder, chronic pancreatitis, poorly controlled СЕРГЕЙ (A1c 9.9 on 04/23/2025), chronic neuropathy of LLE who presented with worsening lower extremity weakness.     IMPRESSION:  Severe weakness in BLE (R>L). No evidence of compression or enhancement or sacral plexus abnormality on imaging. CSF demonstrated albuminocytologic dissociation, no evidence of infectious process. EMG/NCS with demyelinating pattern as well as evidence of chronic axonal loss. 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 Had a period of altered mental status yesterday, now resolved. Thought to be secondary to high dose steroids     Plan:  [x] MRI lumbar spine wwo no evidence of compression or enhancement  [x] MRI lumbosacral plexus R and L wwo no abnormalities  [x] peripheral labs: Syphilis negative  -previous peripheral neuropathy labs from last admission: B1 (111.1), B6 (24.4), B9 (>20.0), B12 (441), TSH (1.06), Heavy Metal Tox Screen (-), HIV (-), Lyme (-), hepatitis panel (nonreactive including B surf ab, would need vaccine), SPEP (6.6 WNL)  -previous rheumatologic work up: ESR (6), CRP (<3), , ganglioside panel (WNL in 08/22/2024), ANSELMO (-), ANCA (-), ganglioside panel (-), Sjogren antibodies (-)  [x] LP done 5/27: ACE, glucose 75, protein 82, cell count 1, IgG index 0.6. PCR negative.   Pending CSF studies: oligoclonal bands, MBP, autoimmune encephalitis, Lyme, ACE  [x] Bedside EMG/NCS with evidence of demyelinating process and chronic denervation   [x] continue home meds specifically gabapentin 600 mg TID standing and methocarbamol 750 TID as needed  [] IV methylprednisolone 1g daily x5 days (5/28-6/1)  [] IVIG 2g/kg divided over 5 days (5-28-6/1). Premedicate with Tylenol 650 PO and Benadryl 50 PO   [x] PT/OT recommending acute rehab    #Diabetes   [x] Endocrine consulted  - Lantus 21 units qHS  - Lispro 14 units TIDAC  - Lispro 2 unit at bedtime   - Insulin sliding scale    #Hyponatremia, improving   Na 133 today   [x] Nephrology recommendations appreciated, as below:  - Obtain serum osmolality, urine osmolality, and urine electrolytes including Alexandro and Ur K  - Check uric acid level and AM cortisol   - After obtaining urine studies can start UreNa 15 GM BID. Please note BUN is expected to rise while on UreNa.  - Fluid restrict to <1.2L fluid in a day.  - Change diet to remove renal restriction, as patient does not require it. Can keep Consistent Carbohydrate restriction in place. Encourage PO protein intake.   - Goal correction is 6-8meq in first 24 hours.       Diet: Consistent carbohydrate w evening snack   DVT prophylaxis: Lovenox  Dispo: Acute rehab    Seen and discussed with attending Dr. Mirza Radford.   Terence Galvez 60yo RH man w PMH of alcohol use disorder, chronic pancreatitis, poorly controlled СЕРГЕЙ (A1c 9.9 on 04/23/2025), chronic neuropathy of LLE who presented with worsening lower extremity weakness.     IMPRESSION:  Severe weakness in BLE (R>L). No evidence of compression or enhancement or sacral plexus abnormality on imaging. CSF demonstrated albuminocytologic dissociation, no evidence of infectious process. EMG/NCS with demyelinating pattern as well as evidence of chronic axonal loss. 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 Had a period of altered mental status yesterday, now resolved. Thought to be secondary to high dose steroids     Plan:  [x] MRI lumbar spine wwo no evidence of compression or enhancement  [x] MRI lumbosacral plexus R and L wwo no abnormalities  [x] peripheral labs: Syphilis negative  -previous peripheral neuropathy labs from last admission: B1 (111.1), B6 (24.4), B9 (>20.0), B12 (441), TSH (1.06), Heavy Metal Tox Screen (-), HIV (-), Lyme (-), hepatitis panel (nonreactive including B surf ab, would need vaccine), SPEP (6.6 WNL)  -previous rheumatologic work up: ESR (6), CRP (<3), , ganglioside panel (WNL in 08/22/2024), ANSELMO (-), ANCA (-), ganglioside panel (-), Sjogren antibodies (-)  [x] LP done 5/27: ACE, glucose 75, protein 82, cell count 1, IgG index 0.6. PCR negative.   Pending CSF studies: oligoclonal bands, MBP, autoimmune encephalitis, Lyme, ACE  [x] Bedside EMG/NCS with evidence of demyelinating process and chronic denervation   [x] continue home meds specifically gabapentin 600 mg TID standing and methocarbamol 750 TID as needed  [] IV methylprednisolone 1g daily x5 days (5/28-6/1)  [] IVIG 2g/kg divided over 5 days (5-28-6/1). Premedicate with Tylenol 650 PO and Benadryl 50 PO   [x] PT/OT recommending acute rehab    #Diabetes   [x] Endocrine consulted  - Lantus 21 units qHS  - Lispro 14 units TIDAC  - Lispro 2 unit at bedtime   - Insulin sliding scale    #Hyponatremia, improving   Na 133 today   [x] Nephrology recommendations appreciated, as below:  - Obtain serum osmolality, urine osmolality, and urine electrolytes including Alexandro and Ur K  - Check uric acid level and AM cortisol   - After obtaining urine studies can start UreNa 15 GM BID. Please note BUN is expected to rise while on UreNa.  - Fluid restrict to <1.2L fluid in a day.  - Change diet to remove renal restriction, as patient does not require it. Can keep Consistent Carbohydrate restriction in place. Encourage PO protein intake.   - Goal correction is 6-8meq in first 24 hours.       Diet: Consistent carbohydrate w evening snack   DVT prophylaxis: Lovenox  Dispo: Acute rehab    Patient seen and discussed with Neurology attending Dr. Menard.    Terence Galvez 60yo RH man w PMH of alcohol use disorder, chronic pancreatitis, poorly controlled СЕРГЕЙ (A1c 9.9 on 04/23/2025), chronic neuropathy of LLE who presented with worsening lower extremity weakness.     IMPRESSION:  Severe weakness in BLE (R>L). No evidence of compression or enhancement or sacral plexus abnormality on imaging. CSF demonstrated albuminocytologic dissociation, no evidence of infectious process. EMG/NCS with demyelinating pattern as well as evidence of chronic axonal loss. 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 Had a period of altered mental status yesterday, now resolved. Thought to be secondary to high dose steroids     Plan:  [x] MRI lumbar spine wwo no evidence of compression or enhancement  [x] MRI lumbosacral plexus R and L wwo no abnormalities  [x] peripheral labs: Syphilis negative  -previous peripheral neuropathy labs from last admission: B1 (111.1), B6 (24.4), B9 (>20.0), B12 (441), TSH (1.06), Heavy Metal Tox Screen (-), HIV (-), Lyme (-), hepatitis panel (nonreactive including B surf ab, would need vaccine), SPEP (6.6 WNL)  -previous rheumatologic work up: ESR (6), CRP (<3), , ganglioside panel (WNL in 08/22/2024), ANSELMO (-), ANCA (-), ganglioside panel (-), Sjogren antibodies (-)  [x] LP done 5/27: ACE, glucose 75, protein 82, cell count 1, IgG index 0.6. PCR negative.   Pending CSF studies: oligoclonal bands, MBP, autoimmune encephalitis, Lyme, ACE  [x] Bedside EMG/NCS with evidence of demyelinating process and chronic denervation   [x] continue home meds specifically gabapentin 600 mg TID standing and methocarbamol 750 TID as needed  [] IV methylprednisolone 1g daily x5 days (5/28-6/1)  [] IVIG 2g/kg divided over 5 days (5-28-6/1). Premedicate with Tylenol 650 PO and Benadryl 50 PO   [x] PT/OT recommending acute rehab    #Diabetes   [x] Endocrine consulted  - Check BG TID AC, HS, and 2AM while on PO diet   - Adjust Lantus to 21u QHS  - Adjust Admelog to 14u TID AC (HOLD if NPO)  - Start Admelog 2u prn HS snack (HOLD if not having late night snack)  - Start NPH 7u with steroid in AM (HOLD if steroid held)  - C/w moderate dose Admelog correctional scales TID AC, HS, and start 2AM    #Hyponatremia, improving   Na 133 today   [x] Nephrology recommendations appreciated, as below:  - Obtain serum osmolality, urine osmolality, and urine electrolytes including Alexandro and Ur K  - Check uric acid level and AM cortisol   - After obtaining urine studies can start UreNa 15 GM BID. Please note BUN is expected to rise while on UreNa.  - Fluid restrict to <1.2L fluid in a day.  - Change diet to remove renal restriction, as patient does not require it. Can keep Consistent Carbohydrate restriction in place. Encourage PO protein intake.   - Goal correction is 6-8meq in first 24 hours.       Diet: Consistent carbohydrate w evening snack   DVT prophylaxis: Lovenox  Dispo: Acute rehab    Patient seen and discussed with Neurology attending Dr. Menard.    Terence Galvez 60yo RH man w PMH of alcohol use disorder, chronic pancreatitis, poorly controlled СЕРГЕЙ (A1c 9.9 on 04/23/2025), chronic neuropathy of LLE who presented with worsening lower extremity weakness.     IMPRESSION:  Severe weakness in BLE (R>L). No evidence of compression or enhancement or sacral plexus abnormality on imaging. CSF demonstrated albuminocytologic dissociation, no evidence of infectious process. EMG/NCS with demyelinating pattern as well as evidence of chronic axonal loss. 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 Had a period of altered mental status yesterday, now resolved. Thought to be secondary to high dose steroids     Plan:  [x] MRI lumbar spine wwo no evidence of compression or enhancement  [x] MRI lumbosacral plexus R and L wwo no abnormalities  [x] peripheral labs: Syphilis negative  -previous peripheral neuropathy labs from last admission: B1 (111.1), B6 (24.4), B9 (>20.0), B12 (441), TSH (1.06), Heavy Metal Tox Screen (-), HIV (-), Lyme (-), hepatitis panel (nonreactive including B surf ab, would need vaccine), SPEP (6.6 WNL)  -previous rheumatologic work up: ESR (6), CRP (<3), , ganglioside panel (WNL in 08/22/2024), ANSELMO (-), ANCA (-), ganglioside panel (-), Sjogren antibodies (-)  [x] LP done 5/27: ACE, glucose 75, protein 82, cell count 1, IgG index 0.6. PCR negative.   Pending CSF studies: oligoclonal bands, MBP, autoimmune encephalitis, Lyme, ACE  [x] Bedside EMG/NCS with evidence of demyelinating process and chronic denervation   [x] continue home meds specifically gabapentin 600 mg TID standing and methocarbamol 750 TID as needed  [] Planned for IV methylprednisolone 1g daily x5 days but developed AMS, will continue 500mg daily for 5/31 and 6/1  [] IVIG 2g/kg divided over 5 days (5-28-6/1). Premedicate with Tylenol 650 PO and Benadryl 50 PO   [x] PT/OT recommending acute rehab    #Diabetes   [x] Endocrine consulted  - Check BG TID AC, HS, and 2AM while on PO diet   - Adjust Lantus to 21u QHS  - Adjust Admelog to 14u TID AC (HOLD if NPO)  - Start Admelog 2u prn HS snack (HOLD if not having late night snack)  - Start NPH 7u with steroid in AM (HOLD if steroid held)  - C/w moderate dose Admelog correctional scales TID AC, HS, and start 2AM    #Hyponatremia, improving   Na 133 today   [x] Nephrology recommendations appreciated, as below:  - Obtain serum osmolality, urine osmolality, and urine electrolytes including Alexandro and Ur K  - Check uric acid level and AM cortisol   - After obtaining urine studies can start UreNa 15 GM BID. Please note BUN is expected to rise while on UreNa.  - Fluid restrict to <1.2L fluid in a day.  - Change diet to remove renal restriction, as patient does not require it. Can keep Consistent Carbohydrate restriction in place. Encourage PO protein intake.   - Goal correction is 6-8meq in first 24 hours.       Diet: Consistent carbohydrate w evening snack   DVT prophylaxis: Lovenox  Dispo: Acute rehab    Patient seen and discussed with Neurology attending Dr. Menard.

## 2025-06-01 LAB
ANION GAP SERPL CALC-SCNC: 14 MMOL/L — SIGNIFICANT CHANGE UP (ref 5–17)
BUN SERPL-MCNC: 20 MG/DL — SIGNIFICANT CHANGE UP (ref 7–23)
CALCIUM SERPL-MCNC: 9.6 MG/DL — SIGNIFICANT CHANGE UP (ref 8.4–10.5)
CHLORIDE SERPL-SCNC: 91 MMOL/L — LOW (ref 96–108)
CO2 SERPL-SCNC: 25 MMOL/L — SIGNIFICANT CHANGE UP (ref 22–31)
CREAT SERPL-MCNC: 0.55 MG/DL — SIGNIFICANT CHANGE UP (ref 0.5–1.3)
CULTURE RESULTS: SIGNIFICANT CHANGE UP
EGFR: 116 ML/MIN/1.73M2 — SIGNIFICANT CHANGE UP
EGFR: 116 ML/MIN/1.73M2 — SIGNIFICANT CHANGE UP
GLUCOSE BLDC GLUCOMTR-MCNC: 119 MG/DL — HIGH (ref 70–99)
GLUCOSE BLDC GLUCOMTR-MCNC: 124 MG/DL — HIGH (ref 70–99)
GLUCOSE BLDC GLUCOMTR-MCNC: 234 MG/DL — HIGH (ref 70–99)
GLUCOSE BLDC GLUCOMTR-MCNC: 304 MG/DL — HIGH (ref 70–99)
GLUCOSE BLDC GLUCOMTR-MCNC: 381 MG/DL — HIGH (ref 70–99)
GLUCOSE SERPL-MCNC: 99 MG/DL — SIGNIFICANT CHANGE UP (ref 70–99)
HCT VFR BLD CALC: 39.7 % — SIGNIFICANT CHANGE UP (ref 39–50)
HGB BLD-MCNC: 14 G/DL — SIGNIFICANT CHANGE UP (ref 13–17)
INNER EAR 68KD AB FLD QL: <1.5 U/L — SIGNIFICANT CHANGE UP (ref 0–3.1)
MAGNESIUM SERPL-MCNC: 1.8 MG/DL — SIGNIFICANT CHANGE UP (ref 1.6–2.6)
MCHC RBC-ENTMCNC: 30.6 PG — SIGNIFICANT CHANGE UP (ref 27–34)
MCHC RBC-ENTMCNC: 35.3 G/DL — SIGNIFICANT CHANGE UP (ref 32–36)
MCV RBC AUTO: 86.9 FL — SIGNIFICANT CHANGE UP (ref 80–100)
NRBC BLD AUTO-RTO: 0 /100 WBCS — SIGNIFICANT CHANGE UP (ref 0–0)
PHOSPHATE SERPL-MCNC: 2.3 MG/DL — LOW (ref 2.5–4.5)
PLATELET # BLD AUTO: 189 K/UL — SIGNIFICANT CHANGE UP (ref 150–400)
POTASSIUM SERPL-MCNC: 3.7 MMOL/L — SIGNIFICANT CHANGE UP (ref 3.5–5.3)
POTASSIUM SERPL-SCNC: 3.7 MMOL/L — SIGNIFICANT CHANGE UP (ref 3.5–5.3)
RBC # BLD: 4.57 M/UL — SIGNIFICANT CHANGE UP (ref 4.2–5.8)
RBC # FLD: 12.1 % — SIGNIFICANT CHANGE UP (ref 10.3–14.5)
SODIUM SERPL-SCNC: 130 MMOL/L — LOW (ref 135–145)
SPECIMEN SOURCE: SIGNIFICANT CHANGE UP
WBC # BLD: 6.59 K/UL — SIGNIFICANT CHANGE UP (ref 3.8–10.5)
WBC # FLD AUTO: 6.59 K/UL — SIGNIFICANT CHANGE UP (ref 3.8–10.5)

## 2025-06-01 PROCEDURE — 99232 SBSQ HOSP IP/OBS MODERATE 35: CPT

## 2025-06-01 RX ORDER — INSULIN LISPRO 100 U/ML
14 INJECTION, SOLUTION INTRAVENOUS; SUBCUTANEOUS
Refills: 0 | Status: COMPLETED | OUTPATIENT
Start: 2025-06-01 | End: 2025-06-01

## 2025-06-01 RX ORDER — INSULIN LISPRO 100 U/ML
8 INJECTION, SOLUTION INTRAVENOUS; SUBCUTANEOUS
Refills: 0 | Status: DISCONTINUED | OUTPATIENT
Start: 2025-06-02 | End: 2025-06-03

## 2025-06-01 RX ORDER — INSULIN GLARGINE-YFGN 100 [IU]/ML
16 INJECTION, SOLUTION SUBCUTANEOUS AT BEDTIME
Refills: 0 | Status: DISCONTINUED | OUTPATIENT
Start: 2025-06-01 | End: 2025-06-03

## 2025-06-01 RX ADMIN — INSULIN LISPRO 14 UNIT(S): 100 INJECTION, SOLUTION INTRAVENOUS; SUBCUTANEOUS at 12:24

## 2025-06-01 RX ADMIN — Medication 650 MILLIGRAM(S): at 12:22

## 2025-06-01 RX ADMIN — Medication 25 MILLIGRAM(S): at 12:22

## 2025-06-01 RX ADMIN — DULOXETINE 60 MILLIGRAM(S): 20 CAPSULE, DELAYED RELEASE ORAL at 18:14

## 2025-06-01 RX ADMIN — GABAPENTIN 600 MILLIGRAM(S): 400 CAPSULE ORAL at 21:48

## 2025-06-01 RX ADMIN — Medication 7 UNIT(S): at 08:53

## 2025-06-01 RX ADMIN — INSULIN LISPRO 8: 100 INJECTION, SOLUTION INTRAVENOUS; SUBCUTANEOUS at 18:19

## 2025-06-01 RX ADMIN — INSULIN LISPRO 14 UNIT(S): 100 INJECTION, SOLUTION INTRAVENOUS; SUBCUTANEOUS at 18:14

## 2025-06-01 RX ADMIN — IMMUNE GLOBULIN (HUMAN) 41.67 GRAM(S): 10 INJECTION INTRAVENOUS; SUBCUTANEOUS at 12:30

## 2025-06-01 RX ADMIN — GABAPENTIN 600 MILLIGRAM(S): 400 CAPSULE ORAL at 05:37

## 2025-06-01 RX ADMIN — Medication 40 MILLIGRAM(S): at 05:37

## 2025-06-01 RX ADMIN — ENOXAPARIN SODIUM 40 MILLIGRAM(S): 100 INJECTION SUBCUTANEOUS at 18:16

## 2025-06-01 RX ADMIN — INSULIN LISPRO 4: 100 INJECTION, SOLUTION INTRAVENOUS; SUBCUTANEOUS at 12:28

## 2025-06-01 RX ADMIN — Medication 1 TABLET(S): at 12:22

## 2025-06-01 RX ADMIN — INSULIN LISPRO 6: 100 INJECTION, SOLUTION INTRAVENOUS; SUBCUTANEOUS at 21:49

## 2025-06-01 RX ADMIN — ATORVASTATIN CALCIUM 10 MILLIGRAM(S): 80 TABLET, FILM COATED ORAL at 21:49

## 2025-06-01 RX ADMIN — Medication 15 GRAM(S): at 05:37

## 2025-06-01 RX ADMIN — AMLODIPINE BESYLATE 10 MILLIGRAM(S): 10 TABLET ORAL at 05:37

## 2025-06-01 RX ADMIN — INSULIN LISPRO 14 UNIT(S): 100 INJECTION, SOLUTION INTRAVENOUS; SUBCUTANEOUS at 08:52

## 2025-06-01 RX ADMIN — LOSARTAN POTASSIUM 50 MILLIGRAM(S): 100 TABLET, FILM COATED ORAL at 05:37

## 2025-06-01 RX ADMIN — METHYLPREDNISOLONE ACETATE 100 MILLIGRAM(S): 80 INJECTION, SUSPENSION INTRA-ARTICULAR; INTRALESIONAL; INTRAMUSCULAR; SOFT TISSUE at 05:38

## 2025-06-01 RX ADMIN — GABAPENTIN 600 MILLIGRAM(S): 400 CAPSULE ORAL at 12:23

## 2025-06-01 RX ADMIN — INSULIN GLARGINE-YFGN 16 UNIT(S): 100 INJECTION, SOLUTION SUBCUTANEOUS at 21:49

## 2025-06-01 NOTE — PROVIDER CONTACT NOTE (OTHER) - ASSESSMENT
pt was asymptomatic, pt did not have complaints of chest pain, sob, dizziness, or any discomfort
AOx1, disoriented to place, time and situation. Pt AOx4 and baseline. Able to follow simple commands but with continually coaching. POCT . SBP: 127/74, HR:84, Temp: 98.4. Pupils equal and reactive.
Pt AOx4, VS as charted

## 2025-06-01 NOTE — PROVIDER CONTACT NOTE (OTHER) - BACKGROUND
A/W LE weakness. CIDP w/u on Solumedrol 100mg IVPB and IVIG x5/days. Hospitalization c/b hyperglycemia ( endo following).
pt was admitted for anesthesia of skin with a pmh of DM, pancreatic duct stones, scoliosis, GERD
59y (1965) RIGHT-handed man w PMHx of ethanol use disorder, chronic pancreatitis, poorly controlled СЕРГЕЙ (A1c 9.9 on 04/23/2025), chronic neuropathy of LLE

## 2025-06-01 NOTE — PROGRESS NOTE ADULT - TIME BILLING
Chart review, exam, coordination of care

## 2025-06-01 NOTE — PROGRESS NOTE ADULT - SUBJECTIVE AND OBJECTIVE BOX
Neurology Progress Note    SUBJECTIVE/OBJECTIVE/INTERVAL EVENTS: Patient seen and examined at bedside w/ neuro attending and team. Finished 5 day IVIG + 5th dose methylpred today.    INTERVAL HISTORY: No acute events. Na 133 -> 130.     REVIEW OF SYSTEMS: Few questions of a 10-system ROS was performed and is negative except for those items noted above and/or in the HPI.    VITALS & EXAMINATION:  Vital Signs Last 24 Hrs  T(C): 37.1 (01 Jun 2025 12:56), Max: 37.1 (01 Jun 2025 09:37)  T(F): 98.7 (01 Jun 2025 12:56), Max: 98.7 (01 Jun 2025 09:37)  HR: 83 (01 Jun 2025 12:56) (83 - 91)  BP: 144/77 (01 Jun 2025 12:56) (144/77 - 168/83)  BP(mean): --  RR: 18 (01 Jun 2025 12:56) (17 - 20)  SpO2: 98% (01 Jun 2025 12:56) (94% - 98%)    Parameters below as of 01 Jun 2025 12:56  Patient On (Oxygen Delivery Method): room air    PHYSICAL EXAMINATION:   Neurological (>12):  MS: Awake, alert.  Oriented to person, place, and time (Month/Year, current date). Speech fluent and follows commands  CNs: PERRL (R 3mm, L 3mm). VFF. EOMI. No disconjugate gaze, nystagmus. V1-3 intact LT, No facial asymmetry b/l. Tongue midline and can move side to side.   Motor - Normal bulk and tone throughout.   RLE: HF 2/5, KF 3/5, KE 4+/5, dorsiflexion 1/5, plantarflexion 4+/5  LLE: HF 4+/5, KF 4+/5, KE 5/5, dorsiflexion 3/5, plantarflexion 4/5  Sensation: Intact grossly to light touch   Reflexes L/R:  Biceps 2/1, BR 2/2, Triceps 1/1, Patellar 1/0, Ankles 0/0   Coordination: No dysmetria on finger-nose-finger   Gait: Not assessed     LABORATORY:  CBC                       14.0   6.59  )-----------( 189      ( 01 Jun 2025 06:37 )             39.7     Chem 06-01    130[L]  |  91[L]  |  20  ----------------------------<  99  3.7   |  25  |  0.55    Ca    9.6      01 Jun 2025 06:37  Phos  2.3     06-01  Mg     1.8     06-01      LFTs   Coagulopathy   Lipid Panel   A1c   Cardiac enzymes     U/A Urinalysis Basic - ( 01 Jun 2025 06:37 )    Color: x / Appearance: x / SG: x / pH: x  Gluc: 99 mg/dL / Ketone: x  / Bili: x / Urobili: x   Blood: x / Protein: x / Nitrite: x   Leuk Esterase: x / RBC: x / WBC x   Sq Epi: x / Non Sq Epi: x / Bacteria: x      CSF  Immunological  Other    STUDIES & IMAGING: (EEG, CT, MR, U/S, TTE/BOWEN):

## 2025-06-01 NOTE — PROGRESS NOTE ADULT - ASSESSMENT
Assessment	  Terence Galvez 58yo RH man w PMH of alcohol use disorder, chronic pancreatitis, poorly controlled СЕРГЕЙ (A1c 9.9 on 04/23/2025), chronic neuropathy of LLE who presented with worsening lower extremity weakness.     IMPRESSION:  Severe weakness in BLE (R>L). No evidence of compression or enhancement or sacral plexus abnormality on imaging. CSF demonstrated albuminocytologic dissociation, no evidence of infectious process. EMG/NCS with demyelinating pattern as well as evidence of chronic axonal loss. 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 Had a period of altered mental status two days prior, now resolved. Thought to be secondary to high dose steroids, therefore steroid dose lowered.    Plan:  [x] MRI lumbar spine wwo no evidence of compression or enhancement  [x] MRI lumbosacral plexus R and L wwo no abnormalities  [x] peripheral labs: Syphilis negative  -previous peripheral neuropathy labs from last admission: B1 (111.1), B6 (24.4), B9 (>20.0), B12 (441), TSH (1.06), Heavy Metal Tox Screen (-), HIV (-), Lyme (-), hepatitis panel (nonreactive including B surf ab, would need vaccine), SPEP (6.6 WNL)  -previous rheumatologic work up: ESR (6), CRP (<3), , ganglioside panel (WNL in 08/22/2024), ANSELMO (-), ANCA (-), ganglioside panel (-), Sjogren antibodies (-)  [x] LP done 5/27: ACE, glucose 75, protein 82, cell count 1, IgG index 0.6. PCR negative.   Pending CSF studies: oligoclonal bands, MBP, autoimmune encephalitis, Lyme, ACE  [x] Bedside EMG/NCS with evidence of demyelinating process and chronic denervation   [x] continue home meds specifically gabapentin 600 mg TID standing and methocarbamol 750 TID as needed  [x] IV methylprednisolone 1g daily x5 days (5/28-6/1)  [x] IVIG 2g/kg divided over 5 days (5-28-6/1). Premedicate with Tylenol 650 PO and Benadryl 50 PO   [x] PT/OT recommending acute rehab    #Diabetes   [x] Endocrine consulted  - Check BG TID AC, HS, and 2AM while on PO diet   - Adjust Lantus to 16u QHS  - Adjust Admelog to 14u at bedtime (HOLD if NPO), 8TIDAC  - Start Admelog 2u prn HS snack (HOLD if not having late night snack)  - d/justina NPH 7u  - C/w moderate dose Admelog correctional scales TID AC, HS, and start 2AM    #Hyponatremia, improving   Na 133 today   [x] Nephrology recommendations appreciated, as below:  - Obtain serum osmolality, urine osmolality, and urine electrolytes including Alexandro and Ur K  - Check uric acid level and AM cortisol   - on UreNa 15 GM BID. Please note BUN is expected to rise while on UreNa.  - Fluid restrict to <1.2L fluid in a day.  - Change diet to remove renal restriction, as patient does not require it. Can keep Consistent Carbohydrate restriction in place. Encourage PO protein intake.   - Goal correction is 6-8meq in first 24 hours.     Diet: Consistent carbohydrate w evening snack   DVT prophylaxis: Lovenox  Dispo: Acute rehab    Patient seen and discussed with Neurology attending Dr. Menadr.

## 2025-06-01 NOTE — PROVIDER CONTACT NOTE (OTHER) - RECOMMENDATIONS
Notify Percy Sousa
notify provider; give standing insulin per sliding scale
RRT and Neuro team at Bedside.

## 2025-06-01 NOTE — CHART NOTE - NSCHARTNOTEFT_GEN_A_CORE
MARIA R UMANA  Gender: Male  59y  [facility] 4CO[facility] 468 D1    Patient not seen today, chart reviewed.     POCT Blood Glucose:  124 mg/dL (06-01-25 @ 07:45)  119 mg/dL (06-01-25 @ 01:27)  302 mg/dL (05-31-25 @ 21:14)  384 mg/dL (05-31-25 @ 17:24)  241 mg/dL (05-31-25 @ 16:09)  216 mg/dL (05-31-25 @ 11:29)      eMAR:atorvastatin   10 milliGRAM(s) Oral (05-31-25 @ 21:41)    insulin glargine Injectable (LANTUS)   21 Unit(s) SubCutaneous (05-31-25 @ 21:41)    insulin lispro (ADMELOG) corrective regimen sliding scale   4 Unit(s) SubCutaneous (05-31-25 @ 21:41)    insulin lispro (ADMELOG) corrective regimen sliding scale   10 Unit(s) SubCutaneous (05-31-25 @ 17:26)   4 Unit(s) SubCutaneous (05-31-25 @ 12:22)    insulin lispro Injectable (ADMELOG)   16 Unit(s) SubCutaneous (05-31-25 @ 12:23)    insulin lispro Injectable (ADMELOG)   14 Unit(s) SubCutaneous (06-01-25 @ 08:52)   14 Unit(s) SubCutaneous (05-31-25 @ 17:27)    insulin NPH human recombinant   7 Unit(s) SubCutaneous (06-01-25 @ 08:53)    methylPREDNISolone sodium succinate IVPB   100 mL/Hr IV Intermittent (06-01-25 @ 05:38)    Endocrinology following patient for T2DM management. In the last 24hrs BG levels have been 119-384mg/dL. FBG stale this am to 124mg/dL. Noted last dose of IV Methylprednisolone 500mg given this am -> per primary team, no more steroids starting tomorrow. No hypoglycemia. Will d/c NPH 7u with steroid since steroids stopped, will also lower Lantus to 16u QHS and lower mealtime insulin as well to prevent hypoglycemia since steroids will now be stopped. Will continue with the higher dose of mealtime insulin for today with the last dose of steroid given. BG goal 100-180mg/dL. Endocrine will closely monitor BG levels. Please let Endocrine know if there is any changes to steroid plan.

## 2025-06-01 NOTE — PROVIDER CONTACT NOTE (OTHER) - ACTION/TREATMENT ORDERED:
RRT called, see RRT flowsheet for continual assessment and interventions. Code stroke called. Pt receiving CTH. Plan of care remains in progress.
Percy Sousa aware, nursing care continues.
notify provider; plan of care ongoing

## 2025-06-01 NOTE — PROVIDER CONTACT NOTE (OTHER) - SITUATION
pt blood glucose was 418
RRT called for AMS
Pt frustrated and taking off tele box and trying to get OOB w/o assistance, refusing chair alarm, RN and PCA speaking to pt, pt more agreeable and states he will call if he has to get up

## 2025-06-02 LAB
ANION GAP SERPL CALC-SCNC: 10 MMOL/L — SIGNIFICANT CHANGE UP (ref 5–17)
BUN SERPL-MCNC: 15 MG/DL — SIGNIFICANT CHANGE UP (ref 7–23)
CALCIUM SERPL-MCNC: 9.1 MG/DL — SIGNIFICANT CHANGE UP (ref 8.4–10.5)
CHLORIDE SERPL-SCNC: 96 MMOL/L — SIGNIFICANT CHANGE UP (ref 96–108)
CO2 SERPL-SCNC: 28 MMOL/L — SIGNIFICANT CHANGE UP (ref 22–31)
CREAT SERPL-MCNC: 0.55 MG/DL — SIGNIFICANT CHANGE UP (ref 0.5–1.3)
EGFR: 116 ML/MIN/1.73M2 — SIGNIFICANT CHANGE UP
EGFR: 116 ML/MIN/1.73M2 — SIGNIFICANT CHANGE UP
GLUCOSE BLDC GLUCOMTR-MCNC: 106 MG/DL — HIGH (ref 70–99)
GLUCOSE BLDC GLUCOMTR-MCNC: 131 MG/DL — HIGH (ref 70–99)
GLUCOSE BLDC GLUCOMTR-MCNC: 199 MG/DL — HIGH (ref 70–99)
GLUCOSE BLDC GLUCOMTR-MCNC: 229 MG/DL — HIGH (ref 70–99)
GLUCOSE BLDC GLUCOMTR-MCNC: 296 MG/DL — HIGH (ref 70–99)
GLUCOSE BLDC GLUCOMTR-MCNC: 382 MG/DL — HIGH (ref 70–99)
GLUCOSE SERPL-MCNC: 289 MG/DL — HIGH (ref 70–99)
OLIGOCLONAL BANDS CSF ELPH-IMP: SIGNIFICANT CHANGE UP
POTASSIUM SERPL-MCNC: 3.8 MMOL/L — SIGNIFICANT CHANGE UP (ref 3.5–5.3)
POTASSIUM SERPL-SCNC: 3.8 MMOL/L — SIGNIFICANT CHANGE UP (ref 3.5–5.3)
SODIUM SERPL-SCNC: 134 MMOL/L — LOW (ref 135–145)

## 2025-06-02 PROCEDURE — 99231 SBSQ HOSP IP/OBS SF/LOW 25: CPT

## 2025-06-02 PROCEDURE — 99232 SBSQ HOSP IP/OBS MODERATE 35: CPT | Mod: GC

## 2025-06-02 RX ORDER — ACETAMINOPHEN 500 MG/5ML
650 LIQUID (ML) ORAL EVERY 6 HOURS
Refills: 0 | Status: DISCONTINUED | OUTPATIENT
Start: 2025-06-02 | End: 2025-06-03

## 2025-06-02 RX ADMIN — Medication 1 TABLET(S): at 11:36

## 2025-06-02 RX ADMIN — INSULIN LISPRO 2 UNIT(S): 100 INJECTION, SOLUTION INTRAVENOUS; SUBCUTANEOUS at 21:30

## 2025-06-02 RX ADMIN — INSULIN LISPRO 8 UNIT(S): 100 INJECTION, SOLUTION INTRAVENOUS; SUBCUTANEOUS at 16:40

## 2025-06-02 RX ADMIN — INSULIN LISPRO 8 UNIT(S): 100 INJECTION, SOLUTION INTRAVENOUS; SUBCUTANEOUS at 11:34

## 2025-06-02 RX ADMIN — INSULIN LISPRO 6: 100 INJECTION, SOLUTION INTRAVENOUS; SUBCUTANEOUS at 21:29

## 2025-06-02 RX ADMIN — Medication 650 MILLIGRAM(S): at 12:06

## 2025-06-02 RX ADMIN — Medication 650 MILLIGRAM(S): at 11:36

## 2025-06-02 RX ADMIN — AMLODIPINE BESYLATE 10 MILLIGRAM(S): 10 TABLET ORAL at 05:29

## 2025-06-02 RX ADMIN — Medication 15 GRAM(S): at 16:41

## 2025-06-02 RX ADMIN — DULOXETINE 60 MILLIGRAM(S): 20 CAPSULE, DELAYED RELEASE ORAL at 11:37

## 2025-06-02 RX ADMIN — GABAPENTIN 600 MILLIGRAM(S): 400 CAPSULE ORAL at 21:30

## 2025-06-02 RX ADMIN — ENOXAPARIN SODIUM 40 MILLIGRAM(S): 100 INJECTION SUBCUTANEOUS at 16:41

## 2025-06-02 RX ADMIN — INSULIN LISPRO 8 UNIT(S): 100 INJECTION, SOLUTION INTRAVENOUS; SUBCUTANEOUS at 07:31

## 2025-06-02 RX ADMIN — GABAPENTIN 600 MILLIGRAM(S): 400 CAPSULE ORAL at 13:40

## 2025-06-02 RX ADMIN — LOSARTAN POTASSIUM 50 MILLIGRAM(S): 100 TABLET, FILM COATED ORAL at 05:29

## 2025-06-02 RX ADMIN — INSULIN LISPRO 4: 100 INJECTION, SOLUTION INTRAVENOUS; SUBCUTANEOUS at 07:31

## 2025-06-02 RX ADMIN — Medication 15 GRAM(S): at 05:30

## 2025-06-02 RX ADMIN — Medication 40 MILLIGRAM(S): at 05:29

## 2025-06-02 RX ADMIN — GABAPENTIN 600 MILLIGRAM(S): 400 CAPSULE ORAL at 05:29

## 2025-06-02 RX ADMIN — ATORVASTATIN CALCIUM 10 MILLIGRAM(S): 80 TABLET, FILM COATED ORAL at 21:30

## 2025-06-02 RX ADMIN — INSULIN GLARGINE-YFGN 16 UNIT(S): 100 INJECTION, SOLUTION SUBCUTANEOUS at 21:30

## 2025-06-02 RX ADMIN — INSULIN LISPRO 2: 100 INJECTION, SOLUTION INTRAVENOUS; SUBCUTANEOUS at 16:41

## 2025-06-02 RX ADMIN — INSULIN LISPRO 2: 100 INJECTION, SOLUTION INTRAVENOUS; SUBCUTANEOUS at 01:57

## 2025-06-02 NOTE — CHART NOTE - NSCHARTNOTEFT_GEN_A_CORE
POCT Blood Glucose:  199 mg/dL (06-02-25 @ 16:06)  106 mg/dL (06-02-25 @ 13:41)  131 mg/dL (06-02-25 @ 11:31)  229 mg/dL (06-02-25 @ 07:28)  296 mg/dL (06-02-25 @ 01:52)  381 mg/dL (06-01-25 @ 21:08)      eMAR:atorvastatin   10 milliGRAM(s) Oral (06-01-25 @ 21:49)    insulin glargine Injectable (LANTUS)   16 Unit(s) SubCutaneous (06-01-25 @ 21:49)    insulin lispro (ADMELOG) corrective regimen sliding scale   2 Unit(s) SubCutaneous (06-02-25 @ 01:57)   6 Unit(s) SubCutaneous (06-01-25 @ 21:49)    insulin lispro (ADMELOG) corrective regimen sliding scale   2 Unit(s) SubCutaneous (06-02-25 @ 16:41)   4 Unit(s) SubCutaneous (06-02-25 @ 07:31)    insulin lispro Injectable (ADMELOG)   8 Unit(s) SubCutaneous (06-02-25 @ 16:40)   8 Unit(s) SubCutaneous (06-02-25 @ 11:34)   8 Unit(s) SubCutaneous (06-02-25 @ 07:31)    Diet, Consistent Carbohydrate w/Evening Snack:   1200mL Fluid Restriction ([id number]) (05-30-25 @ 16:52) [Active]       Attempted to see patient but patient was working with PT  Chart reviewed. Currently off steroid Last dose of Methylprednisone 500mg X1 yesterday at 5 am   BGs were elevated yesterday in 200s-300s and fasting  then improved to 100s today   Expecting BGs to improved as steroid effects wearing off     - Would continue Lantus 16 units at HS  - Continue Admelog 8 units with meals ( Hold if NPO)       Contact via Microsoft Teams during business hours  To reach covering provider access AMION via sunrise tools  For Urgent matters/after-hours/weekends/holidays please page endocrine fellow on call   For nonurgent matters please email [email or web address]    Please note that this patient may be followed by different provider tomorrow.  Notify endocrine 24 hours prior to discharge for final recommendations

## 2025-06-02 NOTE — PROGRESS NOTE ADULT - SUBJECTIVE AND OBJECTIVE BOX
[facility] DIVISION OF KIDNEY DISEASES AND HYPERTENSION --    24 hour events/subjective:    denies new complaints    PAST HISTORY  --------------------------------------------------------------------------------  No significant changes to PMH, PSH, FHx, SHx, unless otherwise noted    ALLERGIES & MEDICATIONS  --------------------------------------------------------------------------------  Allergies    No Known Allergies    Intolerances      Standing Inpatient Medications  amLODIPine   Tablet 10 milliGRAM(s) Oral daily  atorvastatin 10 milliGRAM(s) Oral at bedtime  dextrose 5%. 1000 milliLiter(s) IV Continuous <Continuous>  dextrose 5%. 1000 milliLiter(s) IV Continuous <Continuous>  dextrose 50% Injectable 25 Gram(s) IV Push once  dextrose 50% Injectable 12.5 Gram(s) IV Push once  dextrose 50% Injectable 25 Gram(s) IV Push once  DULoxetine 60 milliGRAM(s) Oral daily  enoxaparin Injectable 40 milliGRAM(s) SubCutaneous every 24 hours  gabapentin 600 milliGRAM(s) Oral three times a day  glucagon  Injectable 1 milliGRAM(s) IntraMuscular once  insulin glargine Injectable (LANTUS) 16 Unit(s) SubCutaneous at bedtime  insulin lispro (ADMELOG) corrective regimen sliding scale   SubCutaneous <User Schedule>  insulin lispro (ADMELOG) corrective regimen sliding scale   SubCutaneous three times a day before meals  insulin lispro Injectable (ADMELOG) 8 Unit(s) SubCutaneous three times a day before meals  insulin lispro Injectable (ADMELOG) 2 Unit(s) SubCutaneous at bedtime  lidocaine 1% Injectable 10 milliLiter(s) Local Injection once  losartan 50 milliGRAM(s) Oral daily  multivitamin 1 Tablet(s) Oral daily  pantoprazole    Tablet 40 milliGRAM(s) Oral before breakfast  urea Oral Powder 15 Gram(s) Oral two times a day    PRN Inpatient Medications  acetaminophen     Tablet .. 650 milliGRAM(s) Oral every 6 hours PRN  dextrose Oral Gel 15 Gram(s) Oral once PRN  methocarbamol 750 milliGRAM(s) Oral three times a day PRN      REVIEW OF SYSTEMS  --------------------------------------------------------------------------------    All other systems were reviewed and are negative, except as noted.    VITALS/PHYSICAL EXAM  --------------------------------------------------------------------------------  T(C): 36.5 (06-02-25 @ 09:36), Max: 37.2 (06-01-25 @ 17:00)  HR: 83 (06-02-25 @ 09:36) (83 - 114)  BP: 142/90 (06-02-25 @ 09:36) (134/75 - 158/84)  RR: 18 (06-02-25 @ 09:36) (18 - 20)  SpO2: 98% (06-02-25 @ 09:36) (96% - 99%)  Wt(kg): --        06-01-25 @ 07:01  -  06-02-25 @ 07:00  --------------------------------------------------------  IN: 1290 mL / OUT: 1400 mL / NET: -110 mL      Physical Exam:  	Gen: NAD  	Pulm: CTA B/L  	CV: RRR, S1S2;   	Abd: +BS, soft,   	LE: Warm, FROM,  no edema  	Skin: Warm,       LABS/STUDIES  --------------------------------------------------------------------------------              14.0   6.59  >-----------<  189      [06-01-25 @ 06:37]              39.7     134  |  96  |  15  ----------------------------<  289      [06-02-25 @ 05:28]  3.8   |  28  |  0.55        Ca     9.1     [06-02-25 @ 05:28]      Mg     1.8     [06-01-25 @ 06:37]      Phos  2.3     [06-01-25 @ 06:37]            Creatinine Trend:  SCr 0.55 [06-02 @ 05:28]  SCr 0.55 [06-01 @ 06:37]  SCr 0.59 [05-31 @ 07:18]  SCr 0.63 [05-30 @ 12:28]  SCr 0.68 [05-30 @ 07:09]    Urinalysis - [06-02-25 @ 05:28]      Color  / Appearance  / SG  / pH       Gluc 289 / Ketone   / Bili  / Urobili        Blood  / Protein  / Leuk Est  / Nitrite       RBC  / WBC  / Hyaline  / Gran  / Sq Epi  / Non Sq Epi  / Bacteria     Urine Sodium 22      [05-28-25 @ 18:58]  Urine Potassium 31      [05-28-25 @ 18:58]  Urine Osmolality 414      [05-28-25 @ 18:58]    TSH 1.06      [04-22-25 @ 15:40]  Lipid: chol 129, TG 63, HDL 63, LDL --      [04-24-25 @ 05:25]      Syphilis Screen (Treponema Pallidum Ab) Negative      [05-26-25 @ 12:26]

## 2025-06-02 NOTE — PROGRESS NOTE ADULT - ASSESSMENT
Terence Galvez 60yo RH man w PMH of alcohol use disorder, chronic pancreatitis, poorly controlled СЕРГЕЙ (A1c 9.9 on 04/23/2025), chronic neuropathy of LLE who presented with worsening lower extremity weakness.     IMPRESSION:  Severe weakness in BLE (R>L). No evidence of compression or enhancement or sacral plexus abnormality on imaging. CSF demonstrated albuminocytologic dissociation, no evidence of infectious process. EMG/NCS with demyelinating pattern as well as evidence of chronic axonal loss. 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 Had a period of altered mental status two days prior, now resolved. Thought to be secondary to high dose steroids, therefore steroid dose lowered. Now s/p completion of therapy and pending placement for rehab    Plan:  [x] MRI lumbar spine wwo no evidence of compression or enhancement  [x] MRI lumbosacral plexus R and L wwo no abnormalities  [x] peripheral labs: Syphilis negative  -previous peripheral neuropathy labs from last admission: B1 (111.1), B6 (24.4), B9 (>20.0), B12 (441), TSH (1.06), Heavy Metal Tox Screen (-), HIV (-), Lyme (-), hepatitis panel (nonreactive including B surf ab, would need vaccine), SPEP (6.6 WNL)  -previous rheumatologic work up: ESR (6), CRP (<3), , ganglioside panel (WNL in 08/22/2024), ANSELMO (-), ANCA (-), ganglioside panel (-), Sjogren antibodies (-)  [x] LP done 5/27: ACE, glucose 75, protein 82, cell count 1, IgG index 0.6. PCR negative.   Pending CSF studies: oligoclonal bands, MBP, autoimmune encephalitis, Lyme, ACE  [x] Bedside EMG/NCS with evidence of demyelinating process and chronic denervation   [x] continue home meds specifically gabapentin 600 mg TID standing and methocarbamol 750 TID as needed  [x] IV methylprednisolone 1g daily x5 days (5/28-6/1)  [x] IVIG 2g/kg divided over 5 days (5-28-6/1). Premedicate with Tylenol 650 PO and Benadryl 50 PO   [x] PT/OT recommending acute rehab    #Diabetes   [x] Endocrine consulted, appreciate recs   - Check BG TID AC, HS, and 2AM while on PO diet   - Adjust Lantus to 16u QHS  - Adjust Admelog to 14u at bedtime (HOLD if NPO), 8TIDAC  - Start Admelog 2u prn HS snack (HOLD if not having late night snack)  - d/justina NPH 7u  - C/w moderate dose Admelog correctional scales TID AC, HS, and start 2AM    #Hyponatremia, improving   Na 134 today   [x] Nephrology recommendations appreciated, as below:  - Obtain serum osmolality, urine osmolality, and urine electrolytes including Alexandro and Ur K  - Check uric acid level and AM cortisol   - on UreNa 15 GM BID. Please note BUN is expected to rise while on UreNa.  - Fluid restrict to <1.2L fluid in a day.  - Change diet to remove renal restriction, as patient does not require it. Can keep Consistent Carbohydrate restriction in place. Encourage PO protein intake.   - Goal correction is 6-8meq in first 24 hours.     Diet: Consistent carbohydrate w evening snack   DVT prophylaxis: Lovenox  Dispo: Acute rehab    Patient seen and discussed with Neurology attending Dr. Mckeon.

## 2025-06-02 NOTE — PROGRESS NOTE ADULT - PROBLEM SELECTOR PLAN 1
Pt. with acute on chronic hyponatremia likely in setting of increased FW water/poor solute intake and possible SIADH from pain and duloxetine use. Pt. has hx of hyponatremia in April 2025, SNa manfred is 125 on 4/22/25. SNa on admission 128 (5/26) and decreased to 126 (5/28). Alexandro 22 and USom 414. TSH, lipid panels, uric acid level and AM cortisol wnl. SNa low/improved to 134 today .  -c/w UreNa 15 GM BID today and then can dc UreNa and observe with outpt PMD.   -Fluid restrict to <1.2L fluid in a day.  -Encourage PO protein intake.     If you have any questions, please feel free to contact me  Lashae Coreas  Nephrology Fellow  [employer]/Page [phone]  (After 5pm or on weekends please page the on-call fellow)

## 2025-06-02 NOTE — PROGRESS NOTE ADULT - SUBJECTIVE AND OBJECTIVE BOX
*************************************  NEUROLOGY PROGRESS NOTE  **************************************    MARIA R UMANA  Male  MRN-[id number]    Subjective: Patient reports he is stable, no new complaints     Interval History:    - No clinical events overnight   - Hyperglycemic to 300s  - Na improved to 134     VITAL SIGNS:  Vital Signs Last 24 Hrs  T(C): 36.4 (02 Jun 2025 13:53), Max: 37.2 (01 Jun 2025 17:00)  T(F): 97.6 (02 Jun 2025 13:53), Max: 98.9 (01 Jun 2025 17:00)  HR: 83 (02 Jun 2025 13:53) (83 - 114)  BP: 97/61 (02 Jun 2025 13:53) (97/61 - 158/84)  BP(mean): --  RR: 18 (02 Jun 2025 13:53) (18 - 20)  SpO2: 98% (02 Jun 2025 13:53) (96% - 99%)    Parameters below as of 02 Jun 2025 05:00  Patient On (Oxygen Delivery Method): room air      PHYSICAL EXAMINATION:    Neurological (>12):  MS: Awake, alert.  Oriented to person, place, and time (Month/Year, current date). Speech fluent and follows commands  CNs: PERRL (R 3mm, L 3mm). VFF. EOMI. No disconjugate gaze, nystagmus. V1-3 intact LT, No facial asymmetry b/l. Tongue midline and can move side to side.   Motor - Normal bulk and tone throughout.   RLE: HF 2/5, KF 3/5, KE 4+/5, dorsiflexion 1/5, plantarflexion 4+/5  LLE: HF 4+/5, KF 4+/5, KE 5/5, dorsiflexion 3/5, plantarflexion 4/5  Sensation: Intact grossly to light touch   Reflexes L/R:  Biceps 2/1, BR 2/2, Triceps 1/1, Patellar 1/0, Ankles 0/0   Coordination: No dysmetria on finger-nose-finger   Gait: Not assessed       LABS:    CBC                       14.0   6.59  )-----------( 189      ( 01 Jun 2025 06:37 )             39.7     Chem 06-02    134[L]  |  96  |  15  ----------------------------<  289[H]  3.8   |  28  |  0.55    Ca    9.1      02 Jun 2025 05:28  Phos  2.3     06-01  Mg     1.8     06-01      U/A Urinalysis Basic - ( 02 Jun 2025 05:28 )    Color: x / Appearance: x / SG: x / pH: x  Gluc: 289 mg/dL / Ketone: x  / Bili: x / Urobili: x   Blood: x / Protein: x / Nitrite: x   Leuk Esterase: x / RBC: x / WBC x   Sq Epi: x / Non Sq Epi: x / Bacteria: x      RADIOLOGY & ADDITIONAL STUDIES:

## 2025-06-03 ENCOUNTER — TRANSCRIPTION ENCOUNTER (OUTPATIENT)
Age: 60
End: 2025-06-03

## 2025-06-03 ENCOUNTER — INPATIENT (INPATIENT)
Facility: HOSPITAL | Age: 60
LOS: 13 days | Discharge: HOME | DRG: 74 | End: 2025-06-17
Attending: INTERNAL MEDICINE | Admitting: INTERNAL MEDICINE
Payer: COMMERCIAL

## 2025-06-03 VITALS
DIASTOLIC BLOOD PRESSURE: 72 MMHG | OXYGEN SATURATION: 97 % | SYSTOLIC BLOOD PRESSURE: 133 MMHG | HEART RATE: 84 BPM | TEMPERATURE: 99 F | RESPIRATION RATE: 18 BRPM

## 2025-06-03 VITALS
WEIGHT: 145.28 LBS | SYSTOLIC BLOOD PRESSURE: 119 MMHG | DIASTOLIC BLOOD PRESSURE: 71 MMHG | HEIGHT: 70 IN | TEMPERATURE: 98 F | OXYGEN SATURATION: 99 % | RESPIRATION RATE: 16 BRPM | HEART RATE: 98 BPM

## 2025-06-03 DIAGNOSIS — Z98.890 OTHER SPECIFIED POSTPROCEDURAL STATES: Chronic | ICD-10-CM

## 2025-06-03 DIAGNOSIS — G61.81 CHRONIC INFLAMMATORY DEMYELINATING POLYNEURITIS: ICD-10-CM

## 2025-06-03 LAB
ANION GAP SERPL CALC-SCNC: 14 MMOL/L — SIGNIFICANT CHANGE UP (ref 5–17)
BUN SERPL-MCNC: 13 MG/DL — SIGNIFICANT CHANGE UP (ref 7–23)
CALCIUM SERPL-MCNC: 9.6 MG/DL — SIGNIFICANT CHANGE UP (ref 8.4–10.5)
CHLORIDE SERPL-SCNC: 95 MMOL/L — LOW (ref 96–108)
CO2 SERPL-SCNC: 27 MMOL/L — SIGNIFICANT CHANGE UP (ref 22–31)
CREAT SERPL-MCNC: 0.64 MG/DL — SIGNIFICANT CHANGE UP (ref 0.5–1.3)
EGFR: 110 ML/MIN/1.73M2 — SIGNIFICANT CHANGE UP
EGFR: 110 ML/MIN/1.73M2 — SIGNIFICANT CHANGE UP
GLUCOSE BLDC GLUCOMTR-MCNC: 110 MG/DL — HIGH (ref 70–99)
GLUCOSE BLDC GLUCOMTR-MCNC: 130 MG/DL — HIGH (ref 70–99)
GLUCOSE BLDC GLUCOMTR-MCNC: 187 MG/DL — HIGH (ref 70–99)
GLUCOSE BLDC GLUCOMTR-MCNC: 196 MG/DL — HIGH (ref 70–99)
GLUCOSE BLDC GLUCOMTR-MCNC: 232 MG/DL — HIGH (ref 70–99)
GLUCOSE BLDC GLUCOMTR-MCNC: 268 MG/DL — HIGH (ref 70–99)
GLUCOSE SERPL-MCNC: 221 MG/DL — HIGH (ref 70–99)
LYME ADDITIONAL INFORMATION: SIGNIFICANT CHANGE UP
LYME IGG LINE BLOT INTERP.: NEGATIVE — SIGNIFICANT CHANGE UP
LYME IGM LINE BLOT INTERP.: NEGATIVE — SIGNIFICANT CHANGE UP
P18 AB. IGG: SIGNIFICANT CHANGE UP
P23 AB. IGG: SIGNIFICANT CHANGE UP
P23 AB. IGM: SIGNIFICANT CHANGE UP
P28 AB. IGG: SIGNIFICANT CHANGE UP
P30 AB. IGG: SIGNIFICANT CHANGE UP
P39 AB. IGG: SIGNIFICANT CHANGE UP
P39 AB. IGM: SIGNIFICANT CHANGE UP
P41 AB. IGG: SIGNIFICANT CHANGE UP
P41 AB. IGM: SIGNIFICANT CHANGE UP
P45 AB. IGG: SIGNIFICANT CHANGE UP
P58 AB. IGG: SIGNIFICANT CHANGE UP
P66 AB. IGG: SIGNIFICANT CHANGE UP
P93 AB. IGG: SIGNIFICANT CHANGE UP
POTASSIUM SERPL-MCNC: 4 MMOL/L — SIGNIFICANT CHANGE UP (ref 3.5–5.3)
POTASSIUM SERPL-SCNC: 4 MMOL/L — SIGNIFICANT CHANGE UP (ref 3.5–5.3)
SARS-COV-2 RNA SPEC QL NAA+PROBE: SIGNIFICANT CHANGE UP
SODIUM SERPL-SCNC: 136 MMOL/L — SIGNIFICANT CHANGE UP (ref 135–145)

## 2025-06-03 PROCEDURE — 85730 THROMBOPLASTIN TIME PARTIAL: CPT

## 2025-06-03 PROCEDURE — 84295 ASSAY OF SERUM SODIUM: CPT

## 2025-06-03 PROCEDURE — 0042T: CPT

## 2025-06-03 PROCEDURE — 86255 FLUORESCENT ANTIBODY SCREEN: CPT

## 2025-06-03 PROCEDURE — 82164 ANGIOTENSIN I ENZYME TEST: CPT

## 2025-06-03 PROCEDURE — 85025 COMPLETE CBC W/AUTO DIFF WBC: CPT

## 2025-06-03 PROCEDURE — 82784 ASSAY IGA/IGD/IGG/IGM EACH: CPT

## 2025-06-03 PROCEDURE — 82040 ASSAY OF SERUM ALBUMIN: CPT

## 2025-06-03 PROCEDURE — 97535 SELF CARE MNGMENT TRAINING: CPT

## 2025-06-03 PROCEDURE — 83605 ASSAY OF LACTIC ACID: CPT

## 2025-06-03 PROCEDURE — 89051 BODY FLUID CELL COUNT: CPT

## 2025-06-03 PROCEDURE — A9585: CPT

## 2025-06-03 PROCEDURE — 99231 SBSQ HOSP IP/OBS SF/LOW 25: CPT

## 2025-06-03 PROCEDURE — 87205 SMEAR GRAM STAIN: CPT

## 2025-06-03 PROCEDURE — 80053 COMPREHEN METABOLIC PANEL: CPT

## 2025-06-03 PROCEDURE — 71045 X-RAY EXAM CHEST 1 VIEW: CPT

## 2025-06-03 PROCEDURE — 86341 ISLET CELL ANTIBODY: CPT

## 2025-06-03 PROCEDURE — 83735 ASSAY OF MAGNESIUM: CPT

## 2025-06-03 PROCEDURE — 83873 ASSAY OF CSF PROTEIN: CPT

## 2025-06-03 PROCEDURE — 85027 COMPLETE CBC AUTOMATED: CPT

## 2025-06-03 PROCEDURE — 86789 WEST NILE VIRUS ANTIBODY: CPT

## 2025-06-03 PROCEDURE — 85610 PROTHROMBIN TIME: CPT

## 2025-06-03 PROCEDURE — 72158 MRI LUMBAR SPINE W/O & W/DYE: CPT

## 2025-06-03 PROCEDURE — 82330 ASSAY OF CALCIUM: CPT

## 2025-06-03 PROCEDURE — 83916 OLIGOCLONAL BANDS: CPT

## 2025-06-03 PROCEDURE — 88184 FLOWCYTOMETRY/ TC 1 MARKER: CPT

## 2025-06-03 PROCEDURE — 99232 SBSQ HOSP IP/OBS MODERATE 35: CPT | Mod: GC

## 2025-06-03 PROCEDURE — 84132 ASSAY OF SERUM POTASSIUM: CPT

## 2025-06-03 PROCEDURE — 84300 ASSAY OF URINE SODIUM: CPT

## 2025-06-03 PROCEDURE — 86617 LYME DISEASE ANTIBODY: CPT

## 2025-06-03 PROCEDURE — 88185 FLOWCYTOMETRY/TC ADD-ON: CPT

## 2025-06-03 PROCEDURE — 96374 THER/PROPH/DIAG INJ IV PUSH: CPT

## 2025-06-03 PROCEDURE — 97110 THERAPEUTIC EXERCISES: CPT

## 2025-06-03 PROCEDURE — 85014 HEMATOCRIT: CPT

## 2025-06-03 PROCEDURE — 84133 ASSAY OF URINE POTASSIUM: CPT

## 2025-06-03 PROCEDURE — 99232 SBSQ HOSP IP/OBS MODERATE 35: CPT

## 2025-06-03 PROCEDURE — 82533 TOTAL CORTISOL: CPT

## 2025-06-03 PROCEDURE — 86788 WEST NILE VIRUS AB IGM: CPT

## 2025-06-03 PROCEDURE — 82947 ASSAY GLUCOSE BLOOD QUANT: CPT

## 2025-06-03 PROCEDURE — 84550 ASSAY OF BLOOD/URIC ACID: CPT

## 2025-06-03 PROCEDURE — 82962 GLUCOSE BLOOD TEST: CPT

## 2025-06-03 PROCEDURE — 87483 CNS DNA AMP PROBE TYPE 12-25: CPT

## 2025-06-03 PROCEDURE — 82607 VITAMIN B-12: CPT

## 2025-06-03 PROCEDURE — 82803 BLOOD GASES ANY COMBINATION: CPT

## 2025-06-03 PROCEDURE — 70498 CT ANGIOGRAPHY NECK: CPT

## 2025-06-03 PROCEDURE — 83930 ASSAY OF BLOOD OSMOLALITY: CPT

## 2025-06-03 PROCEDURE — 85018 HEMOGLOBIN: CPT

## 2025-06-03 PROCEDURE — 36415 COLL VENOUS BLD VENIPUNCTURE: CPT

## 2025-06-03 PROCEDURE — 97161 PT EVAL LOW COMPLEX 20 MIN: CPT

## 2025-06-03 PROCEDURE — 82746 ASSAY OF FOLIC ACID SERUM: CPT

## 2025-06-03 PROCEDURE — 84100 ASSAY OF PHOSPHORUS: CPT

## 2025-06-03 PROCEDURE — 82945 GLUCOSE OTHER FLUID: CPT

## 2025-06-03 PROCEDURE — 82435 ASSAY OF BLOOD CHLORIDE: CPT

## 2025-06-03 PROCEDURE — 87015 SPECIMEN INFECT AGNT CONCNTJ: CPT

## 2025-06-03 PROCEDURE — 80048 BASIC METABOLIC PNL TOTAL CA: CPT

## 2025-06-03 PROCEDURE — 83516 IMMUNOASSAY NONANTIBODY: CPT

## 2025-06-03 PROCEDURE — 97116 GAIT TRAINING THERAPY: CPT

## 2025-06-03 PROCEDURE — 82042 OTHER SOURCE ALBUMIN QUAN EA: CPT

## 2025-06-03 PROCEDURE — 99285 EMERGENCY DEPT VISIT HI MDM: CPT

## 2025-06-03 PROCEDURE — 70450 CT HEAD/BRAIN W/O DYE: CPT

## 2025-06-03 PROCEDURE — 83935 ASSAY OF URINE OSMOLALITY: CPT

## 2025-06-03 PROCEDURE — 70496 CT ANGIOGRAPHY HEAD: CPT

## 2025-06-03 PROCEDURE — 87070 CULTURE OTHR SPECIMN AEROBIC: CPT

## 2025-06-03 PROCEDURE — 84157 ASSAY OF PROTEIN OTHER: CPT

## 2025-06-03 PROCEDURE — 99223 1ST HOSP IP/OBS HIGH 75: CPT

## 2025-06-03 PROCEDURE — 88108 CYTOPATH CONCENTRATE TECH: CPT

## 2025-06-03 PROCEDURE — 97530 THERAPEUTIC ACTIVITIES: CPT

## 2025-06-03 PROCEDURE — 97165 OT EVAL LOW COMPLEX 30 MIN: CPT

## 2025-06-03 PROCEDURE — 86780 TREPONEMA PALLIDUM: CPT

## 2025-06-03 RX ORDER — B1/B2/B3/B5/B6/B12/VIT C/FOLIC 500-0.5 MG
1 TABLET ORAL
Qty: 0 | Refills: 0 | DISCHARGE
Start: 2025-06-03

## 2025-06-03 RX ORDER — DEXTROSE 50 % IN WATER 50 %
12.5 SYRINGE (ML) INTRAVENOUS ONCE
Refills: 0 | Status: DISCONTINUED | OUTPATIENT
Start: 2025-06-03 | End: 2025-06-17

## 2025-06-03 RX ORDER — LOSARTAN POTASSIUM 100 MG/1
1 TABLET, FILM COATED ORAL
Qty: 0 | Refills: 0 | DISCHARGE
Start: 2025-06-03

## 2025-06-03 RX ORDER — INSULIN LISPRO 100 U/ML
10 INJECTION, SOLUTION INTRAVENOUS; SUBCUTANEOUS
Refills: 0 | Status: DISCONTINUED | OUTPATIENT
Start: 2025-06-03 | End: 2025-06-03

## 2025-06-03 RX ORDER — AMLODIPINE BESYLATE 10 MG/1
10 TABLET ORAL DAILY
Refills: 0 | Status: DISCONTINUED | OUTPATIENT
Start: 2025-06-03 | End: 2025-06-05

## 2025-06-03 RX ORDER — B1/B2/B3/B5/B6/B12/VIT C/FOLIC 500-0.5 MG
1 TABLET ORAL DAILY
Refills: 0 | Status: DISCONTINUED | OUTPATIENT
Start: 2025-06-03 | End: 2025-06-17

## 2025-06-03 RX ORDER — ACETAMINOPHEN 500 MG/5ML
2 LIQUID (ML) ORAL
Qty: 0 | Refills: 0 | DISCHARGE
Start: 2025-06-03

## 2025-06-03 RX ORDER — INSULIN LISPRO 100 U/ML
INJECTION, SOLUTION INTRAVENOUS; SUBCUTANEOUS
Refills: 0 | Status: DISCONTINUED | OUTPATIENT
Start: 2025-06-03 | End: 2025-06-04

## 2025-06-03 RX ORDER — LOSARTAN POTASSIUM 100 MG/1
50 TABLET, FILM COATED ORAL DAILY
Refills: 0 | Status: DISCONTINUED | OUTPATIENT
Start: 2025-06-03 | End: 2025-06-05

## 2025-06-03 RX ORDER — GABAPENTIN 400 MG/1
1 CAPSULE ORAL
Qty: 0 | Refills: 0 | DISCHARGE
Start: 2025-06-03

## 2025-06-03 RX ORDER — DULOXETINE 20 MG/1
60 CAPSULE, DELAYED RELEASE ORAL DAILY
Refills: 0 | Status: DISCONTINUED | OUTPATIENT
Start: 2025-06-03 | End: 2025-06-17

## 2025-06-03 RX ORDER — INSULIN LISPRO 100 U/ML
INJECTION, SOLUTION INTRAVENOUS; SUBCUTANEOUS AT BEDTIME
Refills: 0 | Status: DISCONTINUED | OUTPATIENT
Start: 2025-06-03 | End: 2025-06-04

## 2025-06-03 RX ORDER — METHOCARBAMOL 500 MG/1
1 TABLET, FILM COATED ORAL
Qty: 0 | Refills: 0 | DISCHARGE
Start: 2025-06-03

## 2025-06-03 RX ORDER — SODIUM CHLORIDE 9 G/1000ML
1000 INJECTION, SOLUTION INTRAVENOUS
Refills: 0 | Status: DISCONTINUED | OUTPATIENT
Start: 2025-06-03 | End: 2025-06-17

## 2025-06-03 RX ORDER — DEXTROSE 50 % IN WATER 50 %
25 SYRINGE (ML) INTRAVENOUS ONCE
Refills: 0 | Status: DISCONTINUED | OUTPATIENT
Start: 2025-06-03 | End: 2025-06-17

## 2025-06-03 RX ORDER — INSULIN GLARGINE-YFGN 100 [IU]/ML
16 INJECTION, SOLUTION SUBCUTANEOUS
Qty: 0 | Refills: 0 | DISCHARGE
Start: 2025-06-03

## 2025-06-03 RX ORDER — ENOXAPARIN SODIUM 100 MG/ML
40 INJECTION SUBCUTANEOUS
Qty: 0 | Refills: 0 | DISCHARGE
Start: 2025-06-03

## 2025-06-03 RX ORDER — ACETAMINOPHEN 500 MG/5ML
650 LIQUID (ML) ORAL EVERY 6 HOURS
Refills: 0 | Status: DISCONTINUED | OUTPATIENT
Start: 2025-06-03 | End: 2025-06-17

## 2025-06-03 RX ORDER — GLUCAGON 3 MG/1
1 POWDER NASAL ONCE
Refills: 0 | Status: DISCONTINUED | OUTPATIENT
Start: 2025-06-03 | End: 2025-06-17

## 2025-06-03 RX ORDER — DULOXETINE 20 MG/1
1 CAPSULE, DELAYED RELEASE ORAL
Qty: 0 | Refills: 0 | DISCHARGE
Start: 2025-06-03

## 2025-06-03 RX ORDER — SENNA 187 MG
2 TABLET ORAL AT BEDTIME
Refills: 0 | Status: DISCONTINUED | OUTPATIENT
Start: 2025-06-03 | End: 2025-06-17

## 2025-06-03 RX ORDER — DEXTROSE 50 % IN WATER 50 %
15 SYRINGE (ML) INTRAVENOUS ONCE
Refills: 0 | Status: DISCONTINUED | OUTPATIENT
Start: 2025-06-03 | End: 2025-06-17

## 2025-06-03 RX ORDER — INSULIN GLARGINE-YFGN 100 [IU]/ML
18 INJECTION, SOLUTION SUBCUTANEOUS AT BEDTIME
Refills: 0 | Status: DISCONTINUED | OUTPATIENT
Start: 2025-06-03 | End: 2025-06-03

## 2025-06-03 RX ORDER — METHOCARBAMOL 500 MG/1
750 TABLET, FILM COATED ORAL THREE TIMES A DAY
Refills: 0 | Status: DISCONTINUED | OUTPATIENT
Start: 2025-06-03 | End: 2025-06-17

## 2025-06-03 RX ORDER — ENOXAPARIN SODIUM 100 MG/ML
40 INJECTION SUBCUTANEOUS EVERY 24 HOURS
Refills: 0 | Status: DISCONTINUED | OUTPATIENT
Start: 2025-06-03 | End: 2025-06-17

## 2025-06-03 RX ORDER — ATORVASTATIN CALCIUM 80 MG/1
10 TABLET, FILM COATED ORAL AT BEDTIME
Refills: 0 | Status: DISCONTINUED | OUTPATIENT
Start: 2025-06-03 | End: 2025-06-17

## 2025-06-03 RX ORDER — INSULIN LISPRO 100 U/ML
8 INJECTION, SOLUTION INTRAVENOUS; SUBCUTANEOUS
Refills: 0 | Status: DISCONTINUED | OUTPATIENT
Start: 2025-06-03 | End: 2025-06-04

## 2025-06-03 RX ORDER — ATORVASTATIN CALCIUM 80 MG/1
1 TABLET, FILM COATED ORAL
Qty: 0 | Refills: 0 | DISCHARGE
Start: 2025-06-03

## 2025-06-03 RX ORDER — GABAPENTIN 400 MG/1
600 CAPSULE ORAL THREE TIMES A DAY
Refills: 0 | Status: DISCONTINUED | OUTPATIENT
Start: 2025-06-03 | End: 2025-06-10

## 2025-06-03 RX ORDER — AMLODIPINE BESYLATE 10 MG/1
1 TABLET ORAL
Qty: 0 | Refills: 0 | DISCHARGE
Start: 2025-06-03

## 2025-06-03 RX ORDER — INSULIN LISPRO 100 U/ML
4 INJECTION, SOLUTION INTRAVENOUS; SUBCUTANEOUS AT BEDTIME
Refills: 0 | Status: DISCONTINUED | OUTPATIENT
Start: 2025-06-03 | End: 2025-06-03

## 2025-06-03 RX ORDER — INSULIN LISPRO 100 U/ML
8 INJECTION, SOLUTION INTRAVENOUS; SUBCUTANEOUS
Qty: 0 | Refills: 0 | DISCHARGE
Start: 2025-06-03

## 2025-06-03 RX ORDER — INSULIN GLARGINE-YFGN 100 [IU]/ML
16 INJECTION, SOLUTION SUBCUTANEOUS AT BEDTIME
Refills: 0 | Status: DISCONTINUED | OUTPATIENT
Start: 2025-06-03 | End: 2025-06-12

## 2025-06-03 RX ADMIN — INSULIN LISPRO 2: 100 INJECTION, SOLUTION INTRAVENOUS; SUBCUTANEOUS at 07:40

## 2025-06-03 RX ADMIN — INSULIN GLARGINE-YFGN 16 UNIT(S): 100 INJECTION, SOLUTION SUBCUTANEOUS at 21:59

## 2025-06-03 RX ADMIN — Medication 650 MILLIGRAM(S): at 18:24

## 2025-06-03 RX ADMIN — Medication 650 MILLIGRAM(S): at 05:34

## 2025-06-03 RX ADMIN — INSULIN LISPRO 8 UNIT(S): 100 INJECTION, SOLUTION INTRAVENOUS; SUBCUTANEOUS at 07:40

## 2025-06-03 RX ADMIN — INSULIN LISPRO 4: 100 INJECTION, SOLUTION INTRAVENOUS; SUBCUTANEOUS at 17:35

## 2025-06-03 RX ADMIN — Medication 15 GRAM(S): at 05:14

## 2025-06-03 RX ADMIN — LOSARTAN POTASSIUM 50 MILLIGRAM(S): 100 TABLET, FILM COATED ORAL at 05:13

## 2025-06-03 RX ADMIN — GABAPENTIN 600 MILLIGRAM(S): 400 CAPSULE ORAL at 21:41

## 2025-06-03 RX ADMIN — AMLODIPINE BESYLATE 10 MILLIGRAM(S): 10 TABLET ORAL at 05:13

## 2025-06-03 RX ADMIN — GABAPENTIN 600 MILLIGRAM(S): 400 CAPSULE ORAL at 05:13

## 2025-06-03 RX ADMIN — ATORVASTATIN CALCIUM 10 MILLIGRAM(S): 80 TABLET, FILM COATED ORAL at 22:00

## 2025-06-03 RX ADMIN — INSULIN LISPRO 2: 100 INJECTION, SOLUTION INTRAVENOUS; SUBCUTANEOUS at 01:26

## 2025-06-03 RX ADMIN — Medication 650 MILLIGRAM(S): at 19:24

## 2025-06-03 RX ADMIN — Medication 650 MILLIGRAM(S): at 06:55

## 2025-06-03 RX ADMIN — Medication 1 TABLET(S): at 11:38

## 2025-06-03 RX ADMIN — INSULIN LISPRO 8 UNIT(S): 100 INJECTION, SOLUTION INTRAVENOUS; SUBCUTANEOUS at 11:39

## 2025-06-03 RX ADMIN — INSULIN LISPRO 8 UNIT(S): 100 INJECTION, SOLUTION INTRAVENOUS; SUBCUTANEOUS at 17:35

## 2025-06-03 RX ADMIN — Medication 2 TABLET(S): at 22:01

## 2025-06-03 RX ADMIN — ENOXAPARIN SODIUM 40 MILLIGRAM(S): 100 INJECTION SUBCUTANEOUS at 18:25

## 2025-06-03 RX ADMIN — DULOXETINE 60 MILLIGRAM(S): 20 CAPSULE, DELAYED RELEASE ORAL at 11:38

## 2025-06-03 RX ADMIN — Medication 40 MILLIGRAM(S): at 05:13

## 2025-06-03 NOTE — DISCHARGE NOTE PROVIDER - NSFOLLOWUPCLINICS_GEN_ALL_ED_FT
[facility] Endocrinology  Endocrinology  [address] [place] [address]  Phone: [phone]  Fax:

## 2025-06-03 NOTE — PROGRESS NOTE ADULT - PROBLEM SELECTOR PLAN 1
Pt. with acute on chronic hyponatremia likely iso increased FW water/poor solute intake and possible SIADH from pain and duloxetine use. Pt. has hx of hyponatremia in April 2025, SNa manfred is 125 on 4/22/25. SNa on admission 128 (5/26) and decreased to 126 (5/28). Alexandro 22 and USom 414. TSH, lipid panels, uric acid level and AM cortisol wnl. SNa wnl to 136 today .  -can dc UreNa and observe with outpt PMD/at rehab (needs labs w/in 1wk)  -Fluid restrict to <1.2L fluid in a day.  -Encourage PO protein intake.     If you have any questions, please feel free to contact me  Lashae Coreas  Nephrology Fellow  [employer]/Page [phone]  (After 5pm or on weekends please page the on-call fellow)

## 2025-06-03 NOTE — DISCHARGE NOTE PROVIDER - NSDCFUSCHEDAPPT_GEN_ALL_CORE_FT
Mirza Radford  [facility]  NEUROLOGY [address]  Scheduled Appointment: 06/18/2025    [facility]  ENDOCRIN [address]  Scheduled Appointment: 06/20/2025    Aniyah Julian  [facility]  RHEUM [address]  Scheduled Appointment: 07/29/2025    [facility]  ONCPAINMGT [address]   Scheduled Appointment: 08/11/2025

## 2025-06-03 NOTE — H&P ADULT - NSHPREVIEWOFSYSTEMS_GEN_ALL_CORE
REVIEW OF SYSTEMS  Constitutional: No fever, No Chills, No fatigue  HEENT: No eye pain, No visual disturbances, No difficulty hearing  Pulm: No cough,  No shortness of breath  Cardio: No chest pain, No palpitations  GI:  No abdominal pain, No nausea, No vomiting, No diarrhea, No constipation  : No dysuria, No frequency, No hematuria  Neuro: No headaches, No memory loss, No tremors, RLE weakness > LLE weakness, +numbness/tingling BL LEs   Skin: No itching, No rashes, No lesions   Endo: No temperature intolerance  MSK: No joint pain, No joint swelling, No muscle pain, No Neck or back pain, +fall ~3 weeks ago  Psych:  No depression, No anxiety

## 2025-06-03 NOTE — DISCHARGE NOTE NURSING/CASE MANAGEMENT/SOCIAL WORK - FINANCIAL ASSISTANCE
[facility] provides services at a reduced cost to those who are determined to be eligible through [facility]’s financial assistance program. Information regarding [facility]’s financial assistance program can be found by going to [email or web address] or by calling [phone]. No

## 2025-06-03 NOTE — PROGRESS NOTE ADULT - SUBJECTIVE AND OBJECTIVE BOX
Patient seen today for follow up inpatient Diabetes Mellitus management.    Chief Complaint: Type 1 Diabetes Mellitus     INTERVAL HX:  Patient seen in [facility] 468 D1. Patient is alert and oriented, sitting up in chair dressed and ready to be discharged. Patient reports feeling good, eating full meals and tolerating POs. FBG 221mg/dL on blood serum. No hypoglycemia. Noted severe hyperglycemia last evening to 382mg/dL -> patient reports he did have a snack last night, some chips, did get prn Admelog with snack last night. Patient has FSL3 CGM on L arm -> reviewed CGM BGs, current BG 142mg/dL, in the last 24hrs no hypoglycemia, -380s. Fingerstick blood glucose levels in the last 24hrs have been 131-382mg/dL.     Review of Systems:  General: As above.  Respiratory: Denies any SOB, RUBIO, or cough.  Gastrointestinal: Denies any n/v/d or abdominal pain.   Endocrine: Denies any polyuria, polydipsia, polyphagia, visual changes, or numbness in feet.     Allergies  No Known Allergies      Intolerances  None.       MEDICATIONS  (STANDING):  acetaminophen     Tablet .. 650 milliGRAM(s) Oral every 6 hours PRN  amLODIPine   Tablet 10 milliGRAM(s) Oral daily  atorvastatin 10 milliGRAM(s) Oral at bedtime  dextrose 5%. 1000 milliLiter(s) IV Continuous <Continuous>  dextrose 5%. 1000 milliLiter(s) IV Continuous <Continuous>  dextrose 50% Injectable 25 Gram(s) IV Push once  dextrose 50% Injectable 12.5 Gram(s) IV Push once  dextrose 50% Injectable 25 Gram(s) IV Push once  dextrose Oral Gel 15 Gram(s) Oral once PRN  DULoxetine 60 milliGRAM(s) Oral daily  enoxaparin Injectable 40 milliGRAM(s) SubCutaneous every 24 hours  gabapentin 600 milliGRAM(s) Oral three times a day  glucagon  Injectable 1 milliGRAM(s) IntraMuscular once  insulin glargine Injectable (LANTUS) 16 Unit(s) SubCutaneous at bedtime  insulin lispro (ADMELOG) corrective regimen sliding scale   SubCutaneous <User Schedule>  insulin lispro (ADMELOG) corrective regimen sliding scale   SubCutaneous three times a day before meals  insulin lispro Injectable (ADMELOG) 8 Unit(s) SubCutaneous three times a day before meals  insulin lispro Injectable (ADMELOG) 2 Unit(s) SubCutaneous at bedtime  lidocaine 1% Injectable 10 milliLiter(s) Local Injection once  losartan 50 milliGRAM(s) Oral daily  methocarbamol 750 milliGRAM(s) Oral three times a day PRN  multivitamin 1 Tablet(s) Oral daily  pantoprazole    Tablet 40 milliGRAM(s) Oral before breakfast      atorvastatin 10 milliGRAM(s) Oral at bedtime  dextrose 50% Injectable 25 Gram(s) IV Push once  dextrose 50% Injectable 12.5 Gram(s) IV Push once  dextrose 50% Injectable 25 Gram(s) IV Push once  dextrose Oral Gel 15 Gram(s) Oral once PRN  glucagon  Injectable 1 milliGRAM(s) IntraMuscular once  insulin glargine Injectable (LANTUS) 16 Unit(s) SubCutaneous at bedtime  insulin lispro (ADMELOG) corrective regimen sliding scale   SubCutaneous <User Schedule>  insulin lispro (ADMELOG) corrective regimen sliding scale   SubCutaneous three times a day before meals  insulin lispro Injectable (ADMELOG) 8 Unit(s) SubCutaneous three times a day before meals  insulin lispro Injectable (ADMELOG) 2 Unit(s) SubCutaneous at bedtime      insulin lispro (ADMELOG) corrective regimen sliding scale   SubCutaneous <User Schedule>  insulin lispro (ADMELOG) corrective regimen sliding scale   SubCutaneous three times a day before meals  insulin lispro Injectable (ADMELOG) 8 Unit(s) SubCutaneous three times a day before meals  insulin lispro Injectable (ADMELOG) 2 Unit(s) SubCutaneous at bedtime      PHYSICAL EXAM:  VITALS:   T(C): 37.2 (06-03-25 @ 09:09), Max: 37.2 (06-03-25 @ 09:09)  HR: 84 (06-03-25 @ 09:09) (83 - 94)  BP: 133/72 (06-03-25 @ 09:09) (97/61 - 149/77)  RR: 18 (06-03-25 @ 09:09) (18 - 20)  SpO2: 97% (06-03-25 @ 09:09) (97% - 100%)    GENERAL: In no acute distress  Respiratory: Respirations unlabored  Extremities: Warm and dry, no edema  NEURO: Alert and oriented, appropriate     LABS:  POCT Blood Glucose.: 268 mg/dL (06-03-25 @ 01:20)  POCT Blood Glucose.: 382 mg/dL (06-02-25 @ 21:25)  POCT Blood Glucose.: 199 mg/dL (06-02-25 @ 16:06)  POCT Blood Glucose.: 106 mg/dL (06-02-25 @ 13:41)  POCT Blood Glucose.: 131 mg/dL (06-02-25 @ 11:31)  POCT Blood Glucose.: 229 mg/dL (06-02-25 @ 07:28)  POCT Blood Glucose.: 296 mg/dL (06-02-25 @ 01:52)  POCT Blood Glucose.: 381 mg/dL (06-01-25 @ 21:08)  POCT Blood Glucose.: 304 mg/dL (06-01-25 @ 16:29)  POCT Blood Glucose.: 234 mg/dL (06-01-25 @ 11:23)  POCT Blood Glucose.: 124 mg/dL (06-01-25 @ 07:45)  POCT Blood Glucose.: 119 mg/dL (06-01-25 @ 01:27)  POCT Blood Glucose.: 302 mg/dL (05-31-25 @ 21:14)  POCT Blood Glucose.: 384 mg/dL (05-31-25 @ 17:24)  POCT Blood Glucose.: 241 mg/dL (05-31-25 @ 16:09)  POCT Blood Glucose.: 216 mg/dL (05-31-25 @ 11:29)      06-03    136  |  95[L]  |  13  ----------------------------<  221[H]  4.0   |  27  |  0.64    Ca    9.6      03 Jun 2025 06:41          Urinalysis Basic - ( 03 Jun 2025 06:41 )    Color: x / Appearance: x / SG: x / pH: x  Gluc: 221 mg/dL / Ketone: x  / Bili: x / Urobili: x   Blood: x / Protein: x / Nitrite: x   Leuk Esterase: x / RBC: x / WBC x   Sq Epi: x / Non Sq Epi: x / Bacteria: x        A1C with Estimated Average Glucose Result: A1C with Estimated Average Glucose Result: 9.9 % (04-23-25 @ 04:00)  A1C with Estimated Average Glucose Result: 10.3 % (02-20-25 @ 14:58)  A1C with Estimated Average Glucose Result: 9.7 % (02-18-25 @ 07:30)       Patient seen today for follow up inpatient Diabetes Mellitus management.    Chief Complaint: Type 1 Diabetes Mellitus     INTERVAL HX:  Patient seen in [facility] 468 D1. Patient is alert and oriented, sitting up in chair dressed and ready to be discharged. Patient reports feeling good, eating full meals and tolerating POs. FBG stable to 169mg/dL, elevated on blood serum to 221mg/dL on blood serum. No hypoglycemia. Noted severe hyperglycemia last evening to 382mg/dL -> patient reports he did have a snack last night, some chips, did get prn Admelog with snack last night. Patient has FSL3 CGM on L arm -> reviewed CGM BGs, current BG 142mg/dL, in the last 24hrs no hypoglycemia, -380s. Fingerstick blood glucose levels in the last 24hrs have been 131-382mg/dL.     Review of Systems:  General: As above.  Respiratory: Denies any SOB, RUBIO, or cough.  Gastrointestinal: Denies any n/v/d or abdominal pain.   Endocrine: Denies any polyuria, polydipsia, polyphagia, visual changes, or numbness in feet.     Allergies  No Known Allergies      Intolerances  None.       MEDICATIONS  (STANDING):  acetaminophen     Tablet .. 650 milliGRAM(s) Oral every 6 hours PRN  amLODIPine   Tablet 10 milliGRAM(s) Oral daily  atorvastatin 10 milliGRAM(s) Oral at bedtime  dextrose 5%. 1000 milliLiter(s) IV Continuous <Continuous>  dextrose 5%. 1000 milliLiter(s) IV Continuous <Continuous>  dextrose 50% Injectable 25 Gram(s) IV Push once  dextrose 50% Injectable 12.5 Gram(s) IV Push once  dextrose 50% Injectable 25 Gram(s) IV Push once  dextrose Oral Gel 15 Gram(s) Oral once PRN  DULoxetine 60 milliGRAM(s) Oral daily  enoxaparin Injectable 40 milliGRAM(s) SubCutaneous every 24 hours  gabapentin 600 milliGRAM(s) Oral three times a day  glucagon  Injectable 1 milliGRAM(s) IntraMuscular once  insulin glargine Injectable (LANTUS) 16 Unit(s) SubCutaneous at bedtime  insulin lispro (ADMELOG) corrective regimen sliding scale   SubCutaneous <User Schedule>  insulin lispro (ADMELOG) corrective regimen sliding scale   SubCutaneous three times a day before meals  insulin lispro Injectable (ADMELOG) 8 Unit(s) SubCutaneous three times a day before meals  insulin lispro Injectable (ADMELOG) 2 Unit(s) SubCutaneous at bedtime  lidocaine 1% Injectable 10 milliLiter(s) Local Injection once  losartan 50 milliGRAM(s) Oral daily  methocarbamol 750 milliGRAM(s) Oral three times a day PRN  multivitamin 1 Tablet(s) Oral daily  pantoprazole    Tablet 40 milliGRAM(s) Oral before breakfast      atorvastatin 10 milliGRAM(s) Oral at bedtime  dextrose 50% Injectable 25 Gram(s) IV Push once  dextrose 50% Injectable 12.5 Gram(s) IV Push once  dextrose 50% Injectable 25 Gram(s) IV Push once  dextrose Oral Gel 15 Gram(s) Oral once PRN  glucagon  Injectable 1 milliGRAM(s) IntraMuscular once  insulin glargine Injectable (LANTUS) 16 Unit(s) SubCutaneous at bedtime  insulin lispro (ADMELOG) corrective regimen sliding scale   SubCutaneous <User Schedule>  insulin lispro (ADMELOG) corrective regimen sliding scale   SubCutaneous three times a day before meals  insulin lispro Injectable (ADMELOG) 8 Unit(s) SubCutaneous three times a day before meals  insulin lispro Injectable (ADMELOG) 2 Unit(s) SubCutaneous at bedtime      insulin lispro (ADMELOG) corrective regimen sliding scale   SubCutaneous <User Schedule>  insulin lispro (ADMELOG) corrective regimen sliding scale   SubCutaneous three times a day before meals  insulin lispro Injectable (ADMELOG) 8 Unit(s) SubCutaneous three times a day before meals  insulin lispro Injectable (ADMELOG) 2 Unit(s) SubCutaneous at bedtime      PHYSICAL EXAM:  VITALS:   T(C): 37.2 (06-03-25 @ 09:09), Max: 37.2 (06-03-25 @ 09:09)  HR: 84 (06-03-25 @ 09:09) (83 - 94)  BP: 133/72 (06-03-25 @ 09:09) (97/61 - 149/77)  RR: 18 (06-03-25 @ 09:09) (18 - 20)  SpO2: 97% (06-03-25 @ 09:09) (97% - 100%)    GENERAL: In no acute distress  Respiratory: Respirations unlabored  Extremities: Warm and dry, no edema  NEURO: Alert and oriented, appropriate     LABS:  POCT Blood Glucose.: 268 mg/dL (06-03-25 @ 01:20)  POCT Blood Glucose.: 382 mg/dL (06-02-25 @ 21:25)  POCT Blood Glucose.: 199 mg/dL (06-02-25 @ 16:06)  POCT Blood Glucose.: 106 mg/dL (06-02-25 @ 13:41)  POCT Blood Glucose.: 131 mg/dL (06-02-25 @ 11:31)  POCT Blood Glucose.: 229 mg/dL (06-02-25 @ 07:28)  POCT Blood Glucose.: 296 mg/dL (06-02-25 @ 01:52)  POCT Blood Glucose.: 381 mg/dL (06-01-25 @ 21:08)  POCT Blood Glucose.: 304 mg/dL (06-01-25 @ 16:29)  POCT Blood Glucose.: 234 mg/dL (06-01-25 @ 11:23)  POCT Blood Glucose.: 124 mg/dL (06-01-25 @ 07:45)  POCT Blood Glucose.: 119 mg/dL (06-01-25 @ 01:27)  POCT Blood Glucose.: 302 mg/dL (05-31-25 @ 21:14)  POCT Blood Glucose.: 384 mg/dL (05-31-25 @ 17:24)  POCT Blood Glucose.: 241 mg/dL (05-31-25 @ 16:09)  POCT Blood Glucose.: 216 mg/dL (05-31-25 @ 11:29)      06-03    136  |  95[L]  |  13  ----------------------------<  221[H]  4.0   |  27  |  0.64    Ca    9.6      03 Jun 2025 06:41          Urinalysis Basic - ( 03 Jun 2025 06:41 )    Color: x / Appearance: x / SG: x / pH: x  Gluc: 221 mg/dL / Ketone: x  / Bili: x / Urobili: x   Blood: x / Protein: x / Nitrite: x   Leuk Esterase: x / RBC: x / WBC x   Sq Epi: x / Non Sq Epi: x / Bacteria: x        A1C with Estimated Average Glucose Result: A1C with Estimated Average Glucose Result: 9.9 % (04-23-25 @ 04:00)  A1C with Estimated Average Glucose Result: 10.3 % (02-20-25 @ 14:58)  A1C with Estimated Average Glucose Result: 9.7 % (02-18-25 @ 07:30)

## 2025-06-03 NOTE — H&P ADULT - ASSESSMENT
Assessment/Plan:  MARIA R UMANA is a 59 year old male with PMH of ethanol use disorder, chronic pancreatitis, poorly controlled СЕРГЕЙ (A1c 9.9 on 04/23/2025), chronic neuropathy of LLE, recent hospitalization April 2025 for sciatica; who presented to [facility] ED on 5/26/25 for progressively worsening BL LE pain, weakness and numbness. He was evaluated by Neurology and underwent bedside EMG which was significant for CIDP (s/p 5 days IVIG and IV steroids). He developed an episode of confusion/AMS (CT Head negative). His hospital course was significant for hyponatremia, and hyperglycemia (both now resolved). Patient now admitted for a multidisciplinary rehab program. 06-03-25 @ 14:29  -------------  Rehab Management/MEDICAL MANAGEMENT     #CIDP  - Gait Instability, ADL impairments and Functional impairments: start Comprehensive Rehab Program of PT/OT/SLP  - 3 hours a day, 5 days a week  - P&O as needed     #HTN/HLD  - Amlodipine 10mg daily  - Atorvastatin 10mg at HS  - Losartan 50mg daily - (Home regimen: Telmisartan 40mg daily)    #Type 2 Diabetes Mellitus  - A1c: 9.9% (April 2025)   - FS AC & HS  - ISS  - Premeal  - Lantus   - Appreciate ongoing hospitalist recs     #Mood  - Duloxetine 60mg daily     #Skin  - Skin on admission  - Pressure injury/Skin: OOB to Chair, PT/OT     #Pain Mgmt   #Neuropathy  #Stiffness/Spasiticty  - Gabapentin 600mg TID   - PRN: Methocarbamol 750mg TID; Tylenol 650mg QID    #GI/Bowel Mgmt   - Pantoprazole   - Senna     #/Bladder Mgmt   -  PVR q8h, CIC>400cc    #FEN   - Diet - Regular + Thins  [CCHO]    - Fluid Restriction: 1200mL  - MVI    #Precautions / PROPHYLAXIS:   - Falls  - ortho: Weight bearing status: WBAT   - Lungs: Aspiration, Incentive Spirometer   - DVT: Lovenox 40 mg daily   ----------------------------------------------  Dr. Chavira Liaison with Family/Providers:    -----------------------------------------------  OUTPATIENT/FOLLOW UP:    Mirza Radford   Neurology    Bárbara Hays  Neurology  [address], [address]  [place] [address]  Phone: [phone]  Fax: [phone]  Established Patient  Follow Up Time:     [facility] Endocrinology  Endocrinology  [address] [place] [address]  Phone: [phone]  Fax:     Scheduled Appts:  Mirza Radford  [facility] Physician [facility]  NEUROLOGY [address]  Scheduled Appointment: 06/18/2025    [facility]  ENDOCRIN [address]  Scheduled Appointment: 06/20/2025    Aniyah Julian  [facility]  RHEUM [address]  Scheduled Appointment: 07/29/2025    [facility]  ONCPAINMGT [address]   Scheduled Appointment: 08/11/2025    -----------------------------------------------  MEDICAL PROGNOSIS: GOOD                                   REHAB POTENTIAL: GOOD  ESTIMATED DISPOSITION: HOME                             ELOS: 10-14 Days   EXPECTED THERAPY:     P.T. 2hr/day       O.T. 1hr/day      S.L.P. 0hr/day      EXP FREQUENCY: 5 days per 7 day period     PRESCREEN COMPARISON: I have reviewed the prescreen information and I have found no relevant changes between the preadmission screening and my post admission evaluation     RATIONALE FOR INPATIENT ADMISSION - Patient demonstrates the following: (check all that apply)  [X] Medically appropriate for rehabilitation admission  [X] Has attainable rehab goals with an appropriate initial discharge plan  [X] Has rehabilitation potential (expected to make a significant improvement within a reasonable period of time)   [X] Requires close medical managment by a rehab physician, rehab nursing care, Hospitalist and comprehensive interdisciplinary team (including PT, OT, & or SLP, Prosthetics and Orthotics)     Assessment/Plan:  MARIA R UMANA is a 59 year old male with PMH of ethanol use disorder, chronic pancreatitis, poorly controlled СЕРГЕЙ (A1c 9.9 on 04/23/2025), chronic neuropathy of LLE, recent hospitalization April 2025 for sciatica; who presented [facility] ED on 5/26/25 for progressively worsening BL LE pain, weakness and numbness. He was evaluated by Neurology and underwent bedside EMG which was significant for CIDP (s/p 5 days IVIG and IV steroids). He developed an episode of confusion/AMS (CT Head negative). His hospital course was significant for hyponatremia, and hyperglycemia (both now resolved). Patient now admitted for a multidisciplinary rehab program. 06-03-25 @ 14:29  -------------  Rehab Management/MEDICAL MANAGEMENT     #CIDP  - Gait Instability, ADL impairments and Functional impairments: start Comprehensive Rehab Program of PT/OT/SLP  - 3 hours a day, 5 days a week  - P&O as needed     #HTN/HLD  - Amlodipine 10mg daily  - Atorvastatin 10mg at HS  - Losartan 50mg daily - (Home regimen: Telmisartan 40mg daily)    #Latent Autoimmune Diabetes Mellitus  - A1c: 9.9% (April 2025)   - FS AC & HS  - ISS  - Premeal  - Lantus   - Appreciate ongoing hospitalist recs     #Mood  - Duloxetine 60mg daily     #Skin  - Skin on admission  - Pressure injury/Skin: OOB to Chair, PT/OT     #Pain Mgmt   #Neuropathy  #Stiffness/Spasiticty  - Gabapentin 600mg TID   - PRN: Methocarbamol 750mg TID; Tylenol 650mg QID    #GI/Bowel Mgmt   - Pantoprazole   - Senna     #/Bladder Mgmt   -  PVR q8h, CIC>400cc    #FEN   - Diet - Regular + Thins  [CCHO]    - Fluid Restriction: 1200mL  - MVI    #Precautions / PROPHYLAXIS:   - Falls  - ortho: Weight bearing status: WBAT   - Lungs: Aspiration, Incentive Spirometer   - DVT: Lovenox 40 mg daily   ----------------------------------------------  Dr. Chavira Liaison with Family/Providers:    -----------------------------------------------  OUTPATIENT/FOLLOW UP:    Mirza Radford   Neurology    Bárbara Hays  Neurology  [address], [address]  [place] [address]  Phone: [phone]  Fax: [phone]  Established Patient  Follow Up Time:     [facility] Endocrinology  Endocrinology  [address] [place] [address]  Phone: [phone]  Fax:     Scheduled Appts:  Mirza Radford  [facility] Physician [facility]  NEUROLOGY [address]  Scheduled Appointment: 06/18/2025    [facility]  ENDOCRIN [address]  Scheduled Appointment: 06/20/2025    Aniyah Julian  [facility]  RHEUM [address]  Scheduled Appointment: 07/29/2025    [facility]  ONCPAINMGT [address]   Scheduled Appointment: 08/11/2025    -----------------------------------------------  MEDICAL PROGNOSIS: GOOD                                   REHAB POTENTIAL: GOOD  ESTIMATED DISPOSITION: HOME                             ELOS: 10-14 Days   EXPECTED THERAPY:     P.T. 2hr/day       O.T. 1hr/day      S.L.P. 0hr/day      EXP FREQUENCY: 5 days per 7 day period     PRESCREEN COMPARISON: I have reviewed the prescreen information and I have found no relevant changes between the preadmission screening and my post admission evaluation     RATIONALE FOR INPATIENT ADMISSION - Patient demonstrates the following: (check all that apply)  [X] Medically appropriate for rehabilitation admission  [X] Has attainable rehab goals with an appropriate initial discharge plan  [X] Has rehabilitation potential (expected to make a significant improvement within a reasonable period of time)   [X] Requires close medical managment by a rehab physician, rehab nursing care, Hospitalist and comprehensive interdisciplinary team (including PT, OT, & or SLP, Prosthetics and Orthotics)     Assessment/Plan:  MARIA R UMANA is a 59 year old male with PMH of ethanol use disorder, chronic pancreatitis, poorly controlled СЕРГЕЙ (A1c 9.9 on 04/23/2025), chronic neuropathy of LLE, recent hospitalization April 2025 for sciatica; who presented to [facility] ED on 5/26/25 for progressively worsening BL LE pain, weakness and numbness. He was evaluated by Neurology and underwent bedside EMG which was significant for CIDP (s/p 5 days IVIG and IV steroids). He developed an episode of confusion/AMS (CT Head negative). His hospital course was significant for hyponatremia, and hyperglycemia (both now resolved). Patient now admitted for a multidisciplinary rehab program. 06-03-25 @ 14:29  -------------  Rehab Management/MEDICAL MANAGEMENT     * Falls history - await osteoporosis labs  * BL foot drop - await orthotic eval for BL AFOs    #CIDP  - Gait Instability, ADL impairments and Functional impairments: start Comprehensive Rehab Program of PT/OT- 3 hours a day, 5 days a week  - P&O as needed   - Orthotic eval for BL AFOs     #HTN/HLD  - Amlodipine 10mg daily  - Atorvastatin 10mg at HS  - Losartan 50mg daily - (Home regimen: Telmisartan 40mg daily)    #Latent Autoimmune Diabetes Mellitus  - A1c: 9.9% (April 2025)   - FS AC & HS  - ISS  - Premeal  - Lantus   - Appreciate ongoing hospitalist recs     #Mood  - Duloxetine 60mg daily     #Skin  - Skin on admission  - Pressure injury/Skin: OOB to Chair, PT/OT     #Pain Mgmt   #Neuropathy  #Stiffness/Spasiticty  - Gabapentin 600mg TID   - PRN: Methocarbamol 750mg TID; Tylenol 650mg QID    #GI/Bowel Mgmt   - Pantoprazole   - Senna     #/Bladder Mgmt   -  PVR q8h, CIC>400cc    #FEN   - Diet - Regular + Thins  [CCHO]    - Fluid Restriction: 1200mL  - MVI    #Falls history  - Await Osteoporosis labs     #Precautions / PROPHYLAXIS:   - Falls  - ortho: Weight bearing status: WBAT   - Lungs: Aspiration, Incentive Spirometer   - DVT: Lovenox 40 mg daily   ----------------------------------------------  Dr. Chavira Liaison with Family/Providers:    -----------------------------------------------  OUTPATIENT/FOLLOW UP:    Mirza Radford   Neurology    Bárbara Hays  Neurology  [address], [address]  [place] [address]  Phone: [phone]  Fax: [phone]  Established Patient  Follow Up Time:     [facility] Endocrinology  Endocrinology  [address] [place] [address]  Phone: [phone]  Fax:     Scheduled Appts:  Mirza Radford  [facility]  NEUROLOGY [address] [facility] [facility] [address] [facility]  Scheduled Appointment: 06/18/2025    [facility]  ENDOCRIN [address]  Scheduled Appointment: 06/20/2025    Aniyah Julian  [facility]  RHEUM [address]  Scheduled Appointment: 07/29/2025    [facility]  ONCPAINMGT [address]   Scheduled Appointment: 08/11/2025    -----------------------------------------------  MEDICAL PROGNOSIS: GOOD                                   REHAB POTENTIAL: GOOD  ESTIMATED DISPOSITION: HOME                             ELOS: 10-14 Days   EXPECTED THERAPY:     P.T. 2hr/day       O.T. 1hr/day      S.L.P. 0hr/day      EXP FREQUENCY: 5 days per 7 day period     PRESCREEN COMPARISON: I have reviewed the prescreen information and I have found no relevant changes between the preadmission screening and my post admission evaluation     RATIONALE FOR INPATIENT ADMISSION - Patient demonstrates the following: (check all that apply)  [X] Medically appropriate for rehabilitation admission  [X] Has attainable rehab goals with an appropriate initial discharge plan  [X] Has rehabilitation potential (expected to make a significant improvement within a reasonable period of time)   [X] Requires close medical managment by a rehab physician, rehab nursing care, Hospitalist and comprehensive interdisciplinary team (including PT, OT, & or SLP, Prosthetics and Orthotics)     59 year old male with PMH of ethanol use disorder, chronic pancreatitis, poorly controlled СЕРГЕЙ (A1c 9.9 on 04/23/2025), chronic neuropathy of LLE, recent hospitalization April 2025 for sciatica; who presented to [facility] ED on 5/26/25 for progressively worsening BL LE pain, weakness and numbness. He was evaluated by Neurology and underwent bedside EMG which was significant for CIDP (s/p 5 days IVIG and IV steroids). He developed an episode of confusion/AMS (CT Head negative). His hospital course was significant for hyponatremia, and hyperglycemia (both now resolved). Patient now admitted for a multidisciplinary rehab program. 06-03-25 @ 14:29    * Falls history - await osteoporosis labs  * BL foot drop - await orthotic eval for BL AFOs    #CIDP  - Gait Instability, ADL impairments and Functional impairments: start Comprehensive Rehab Program of PT/OT- 3 hours a day, 5 days a week  - P&O as needed--orthotics referral    - Orthotic eval for BL AFOs     #HTN/HLD  - Amlodipine 10mg daily  - Atorvastatin 10mg at HS  - Losartan 50mg daily - (Home regimen: Telmisartan 40mg daily)    #Latent Autoimmune Diabetes Mellitus  - A1c: 9.9% (April 2025)   - FS AC & HS  - ISS  - Premeal  - Lantus   - Appreciate ongoing hospitalist recs     #Mood  - Duloxetine 60mg daily     #Skin  - Skin  - Pressure injury/Skin: OOB to Chair, PT/OT     #Pain Mgmt   #Neuropathy  #Stiffness/Spasiticity  - Gabapentin 600mg TID   - PRN: Methocarbamol 750mg TID; Tylenol 650mg QID    #GI/Bowel Mgmt   - Pantoprazole   - Senna     #/Bladder Mgmt --voiding     #FEN   - Diet - Regular + Thins  [CCHO]    - Fluid Restriction: 1200mL  - MVI    #Falls history  - Await Osteoporosis labs     #Precautions / PROPHYLAXIS:   - Falls  - ortho: Weight bearing status: WBAT   - Lungs: Aspiration, Incentive Spirometer   - DVT: Lovenox 40 mg daily   ----------------------------------------------  Dr. RUSSELLs Liaison with Family/Providers:    -----------------------------------------------  OUTPATIENT/FOLLOW UP:    Mirza Radford   Neurology    Bárbara Hays  Neurology  [address] [facility] [address], [address]  [place] [address]  Phone: [phone]  Fax: [phone]  Established Patient  Follow Up Time:     [facility] Endocrinology  Endocrinology  [address] [place] [address]  Phone: [phone]  Fax:     Scheduled Appts:  Mirza Radford  [facility] Physician Counts include 234 beds at the [facility]  NEUROLOGY [address]   Scheduled Appointment: 06/18/2025    [facility]  ENDOCRIN [address]  Scheduled Appointment: 06/20/2025    Aniyah Julian  [facility] Physician Counts include 234 beds at the [facility]  RHEUM [address]  Scheduled Appointment: 07/29/2025    [facility]  ONCPAINMGT [address]   Scheduled Appointment: 08/11/2025    -----------------------------------------------  MEDICAL PROGNOSIS: GOOD                                   REHAB POTENTIAL: GOOD  ESTIMATED DISPOSITION: HOME                             ELOS: 10-14 Days   EXPECTED THERAPY:     P.T. 2hr/day       O.T. 1hr/day      S.L.P. 0hr/day      EXP FREQUENCY: 5 days per 7 day period     PRESCREEN COMPARISON: I have reviewed the prescreen information and I have found no relevant changes between the preadmission screening and my post admission evaluation     RATIONALE FOR INPATIENT ADMISSION - Patient demonstrates the following: (check all that apply)  [X] Medically appropriate for rehabilitation admission  [X] Has attainable rehab goals with an appropriate initial discharge plan  [X] Has rehabilitation potential (expected to make a significant improvement within a reasonable period of time)   [X] Requires close medical managment by a rehab physician, rehab nursing care, Hospitalist and comprehensive interdisciplinary team (including PT, OT, & or SLP, Prosthetics and Orthotics)

## 2025-06-03 NOTE — PROGRESS NOTE ADULT - PROBLEM SELECTOR PLAN 2
Patient started on pulse steroids managed by Neurology causing hyperglycemia and increased insulin needs.     - Patient tapered to IV Methylprednisolone 500mg once daily x2 days (5/31-6/1)  - Please inform Endocrine of any changes to steroid doses
Patient started on pulse steroids managed by Neurology causing hyperglycemia and increased insulin needs.     - Patient tapered to IV Methylprednisolone 500mg once daily x2 days (5/31-6/1)  - Please inform Endocrine of any changes to steroid doses

## 2025-06-03 NOTE — DISCHARGE NOTE PROVIDER - NSDCMRMEDTOKEN_GEN_ALL_CORE_FT
amLODIPine 10 mg oral tablet: 1 tab(s) orally once a day  atorvastatin 10 mg oral tablet: 1 tab(s) orally once a day  Basaglar KwikPen 100 units/mL subcutaneous solution: 20 unit(s) subcutaneous once a day (at bedtime)  DULoxetine 30 mg oral delayed release capsule: 2 cap(s) orally once a day (at bedtime)  gabapentin 300 mg oral capsule: 2 cap(s) orally 3 times a day 600 mg three times a day  methocarbamol 750 mg oral tablet: 2 tab(s) orally 3 times a day  Multiple Vitamins oral tablet: 1 tab(s) orally once a day  NovoLOG FlexPen 100 units/mL injectable solution: 12 unit(s) injectable 3 times a day Give 8 to 12 units before meals.  omeprazole 20 mg oral delayed release capsule: 1 cap(s) orally once a day before breakfast  outpatient occupational therapy for leg pain and weakness: outpatient occupational therapy for leg pain and weakness  outpatient physical therapy for leg pain and weakness: outpatient physical therapy for leg pain and weakness  rolling walker: Please use as assistance with walking  telmisartan 40 mg oral tablet: 1 tab(s) orally once a day   acetaminophen 325 mg oral tablet: 2 tab(s) orally every 6 hours As needed Mild Pain (1 - 3), Moderate Pain (4 - 6)  amLODIPine 10 mg oral tablet: 1 tab(s) orally once a day  atorvastatin 10 mg oral tablet: 1 tab(s) orally once a day (at bedtime)  DULoxetine 60 mg oral delayed release capsule: 1 cap(s) orally once a day  enoxaparin: 40 milligram(s) subcutaneous once a day  gabapentin 600 mg oral tablet: 1 tab(s) orally 3 times a day  insulin glargine 100 units/mL subcutaneous solution: 16 unit(s) subcutaneous once a day (at bedtime)  insulin lispro 100 units/mL injectable solution: 8 unit(s) injectable 3 times a day (with meals)  losartan 50 mg oral tablet: 1 tab(s) orally once a day  methocarbamol 750 mg oral tablet: 1 tab(s) orally 3 times a day As needed Muscle Spasm  Multiple Vitamins oral tablet: 1 tab(s) orally once a day  NovoLOG FlexPen 100 units/mL injectable solution: 12 unit(s) injectable 3 times a day Give 8 to 12 units before meals.  omeprazole 20 mg oral delayed release capsule: 1 cap(s) orally once a day before breakfast  outpatient occupational therapy for leg pain and weakness: outpatient occupational therapy for leg pain and weakness  outpatient physical therapy for leg pain and weakness: outpatient physical therapy for leg pain and weakness  pantoprazole 40 mg oral delayed release tablet: 1 tab(s) orally once a day (before a meal)  rolling walker: Please use as assistance with walking  telmisartan 40 mg oral tablet: 1 tab(s) orally once a day

## 2025-06-03 NOTE — H&P ADULT - ATTENDING COMMENTS
Seen and examined with NP, H and P revised,    58 y/o M community-dwelling   Background as noted  Recent hosp admission to [facility] 5/26/25 with progress LE weakness, and subsequent, Dx of CIDP s/p 5 days IVIG and IV steroids and subsequent T/F to acute rehab 6/3.    Reports good family support,  Working as an  at [employer],  Reports multiple symptoms--chronic abd pain, renal calculi, Rt hip pain and progressive LE weakness  Requiring use of walker within the last few months  Tolerating oral diet,     Exam  Alert and fully oriented,   No facial asymmetry  Chest clear   Abd soft non tender   Ext no edema     MMT as stated  Upper extremity 4/5  LE weakness Rt >Left  Weak dorsiflexion.  TTP right lateral hip     RECENT LABS/IMAGING  06-03    136  |  95[L]  |  13  ----------------------------<  221[H]  4.0   |  27  |  0.64    Ca    9.6      03 Jun 2025 06:41    Urinalysis Basic - ( 03 Jun 2025 06:41 )    Color: x / Appearance: x / SG: x / pH: x  Gluc: 221 mg/dL / Ketone: x  / Bili: x / Urobili: x   Blood: x / Protein: x / Nitrite: x   Leuk Esterase: x / RBC: x / WBC x   Sq Epi: x / Non Sq Epi: x / Bacteria: x    Dx: CIDP s/p 5 days IVIG and IV steroids  Commence therapy  Continue GI protection, bowel regimen  Falls history - await osteoporosis labs  BL foot drop - await orthotic eval for BL AFOs  Collateral hx to be obtained  Est dc 10-14 days

## 2025-06-03 NOTE — PROGRESS NOTE ADULT - ASSESSMENT
58 y/o M w/ uncontrolled СЕРГЕЙ complicated by neuropathy with severe hyperglycemia exacerbated by high dose steroids, HTN, HLD admitted for LE weakness (high risk patient with severe hyperglycemia with glucose > 400's at high risk of CAD and CVA with high medical complexity and high level decision-making with high intensive medication requiring frequent monitoring). Patient reports feeling good, eating full meals and tolerating POs. FBG elevate this am -> noted patient did have snack last night but also did get snack insulin prn -> will adjust Lantus slightly to 18u QHS and increase snack time insulin dose. No hypoglycemia. Patient is pending discharge home today. Continues off steroids. Endocrine will closely monitor BG and adjust insulin as needed for BG goal 100-180mg/dL inpatient.       #Type 2 diabetes mellitus with hyperglycemia, with long-term current use of insulin.  #Steroid induced hyperglycemia   A1C with Estimated Average Glucose Result: 9.9 % (04-23-25 @ 04:00)  Home regimen: Basaglar 15-20u QHS, Cequr Patch 8-12u AC  Endocrinologist: Dr. Toscano/Dr. Padilla

## 2025-06-03 NOTE — PATIENT PROFILE ADULT - FALL HARM RISK - HARM RISK INTERVENTIONS

## 2025-06-03 NOTE — H&P ADULT - NSHPPHYSICALEXAM_GEN_ALL_CORE
Vital Signs  T(C): 36.8 (06-03-25 @ 13:50), Max: 37.2 (06-03-25 @ 09:09)  HR: 98 (06-03-25 @ 13:50) (84 - 98)  BP: 119/71 (06-03-25 @ 13:50) (112/72 - 149/77)  RR: 16 (06-03-25 @ 13:50) (16 - 20)  SpO2: 99% (06-03-25 @ 13:50) (97% - 100%)    Gen - NAD, Comfortable  HEENT - NCAT, EOMI, MMM  Neck - Supple, No limited ROM  Pulm - CTAB, No wheeze, No rhonchi, No crackles  Cardiovascular - RRR, S1S2, No m/r/g  Abdomen - Soft, NT/ND, +BS  Extremities - No C/C/E, No calf tenderness  Neuro-     Cognitive - AAOx3     Communication - Fluent, No dysarthria     Attention: Intact      Judgement: Good evidence of judgement     Motor -                    LEFT    UE - 4/5                    RIGHT UE - 4/5                    LEFT    LE - HF 4/5, KE 4/5, DF 2/5, PF 4/5                    RIGHT LE - HF 2/5, KE 3/5, DF 2/5, PF 4/5        Sensory - Intact to LT, Impaired propioception     Reflexes - DTR Intact, No primitive reflex  Psychiatric - Mood stable, Affect WNL  Skin: Intact

## 2025-06-03 NOTE — PROGRESS NOTE ADULT - PROBLEM SELECTOR PLAN 1
Inpatient Plan:  - Check BG TID AC, HS, and 2AM while on PO diet   - Adjust Lantus to 18u QHS  - Adjust Admelog to 10u TID AC (HOLD if NPO)  - Adjust Admelog to 4u prn HS snack (HOLD if not having late night snack)  - C/w moderate dose Admelog correctional scales TID AC, HS, and start 2AM    Discharge Plan:  - Discharge on Lantus 18u QHS and can resume Cequr patch 10u TID AC. Patient should adjust insulin doses to current home doses if noticing BGs elevated at home. Close f/u with his Endocrinologist.   - Patient should check BG TID AC and HS at home. Can use CGM to monitor BGs but if BG <100mg/dL or >250mg/dL, patient should confirm with fingerstick BG. Please tell patient to contact Endocrinologist if BG <70mg/dL x1 or >200mg/dL consistently or >400mg/dL x1.  - Close follow up with Endocrinologist Dr. Toscano/Dr. Padilla outpatient recommended.   - Recommend routine outpatient ophthalmology and podiatry follow up.  - Patient will need RX for basal insulin pen (ie. Basaglar, Lantus, Tresiba, Toujeo) and bolus insulin pen (ie. humalog/novolog/admelog) depending on insurance coverage; please send test scripts to see which is covered. Please make sure patient has all diabetes supplies on discharge (glucometer, test strips, lancets, alcohol swabs, insulin pen needles, FSL3 sensors x3). Please write RX for glucose tablets/gel> use as directed plus Glucagon nasal/ IM injection (use as directed)> Can prescribe any covered by pt's insurance.

## 2025-06-03 NOTE — PROGRESS NOTE ADULT - PROBLEM SELECTOR PROBLEM 1
Hyponatremia
Type 2 diabetes mellitus with hyperglycemia, with long-term current use of insulin

## 2025-06-03 NOTE — DISCHARGE NOTE PROVIDER - HOSPITAL COURSE
HPI  59y (1965) RIGHT-handed man w PMHx of ethanol use disorder, chronic pancreatitis, poorly controlled СЕРГЕЙ (A1c 9.9 on 04/23/2025), chronic neuropathy of LLE who presents to [facility] ED on 05/26/2025 for ongoing LLE pain and subjective weakness as well as RLE pain and weakness. Neurology consulted.    Last hospital admission 04/2025  Patient has seen Dr. Boone (Orthopedic Spine Surgery) and PCP outpatient and referred to Neurology, Dr. Bárbara Hyas. Sent to ED for LP and spine imaging. Dr. Hays via Teams. She is concerned for possible transverse myelitis at T4 vs. AIDP vs. diabetic neuropathy. Patient has had entire MRI of the neuro-axis in April 2025, but without contrast. Findings were non-actionable.  Patient reported in October 2024 he developed excruciating pain in the dorsum of his LEFT foot. Painful to even light touch. Also developed cramping pain in the back of his RLE. Over the three weeks prior to the hospitalization, he developed numbness at the level of his rib cage upward - front and back.   During hospitalization, patient exam consistent w sciatica and neuropathy of LLE. Patient was prescribed gabapentin 300 mg tid w some improvement. MRI brain and CTL spine wwo were without acute pathology. Patient seen by lashawn for further management of his diabetes.    05/26/2025: Patient evaluated by neurology resident. Patient presents with his wife and complains of:  1) RLE weakness: onset 1 week ago, sudden, feels like his leg gives out on him, feels like he is dragging his leg and not able to  his foot  2) RLE pain: onset 1 week ago, located over R hip, feels like hip is popper out of place  3) LLE weakness: worsened from weakness evaluated during last hospitalization, feels like he can't pick his foot up and is dragging his LLE  4) RLE calf numbness: ongoing from previous  hospital admission and unchanged  The weakness described above has resulted in significant decrease in ambulation from baseline being able to ambulate with walker. Patient has falling several times over past week striking knees on the ground.  no headache, blurry vision, double vision, difficulty swallowing, chest pain, palpitations, SOB, cough, n/v, dysuria, hematuria, blood in stool  Per wife. Patient say Dr. Radford after hospital admission and was scheduled for EMG (06/18/2025) and gabapentin increased to 600 mg tid. Patient also say pain management who reported he can take methocarbamol 2 tablets up to twice a day. Next neurologist appt is scheduled for 07/29/2025.    Hospital Course:    Patient admitted to the General Neurology service for further evaluation. Bedside EMG/NCS showed evidence of CIDP. Patient was treated with 5 days of IVIGand 5 days of IV steroids. During hospital course, he had episodes of confusion which were thought to be secondary to high dose steroids, therefore the last 2 doses were decreased from 1g to 500mg of methylprednisolone. He returned to baseline mental status (A&Ox3) and finished IVIG and IV methylpred on 6/1. He is neurologically stable. Patient does have some distal B/L leg pain likely MSK etiology due to foot drop and would benefit from B/L AFO. He was recommended for acute rehab.     Medical issues adressed during admission were hyponatremia and hyperglycemia (in setting of DM and IV steroid use). He was followed by nephrology and endocrinology. Hyponatremia resoolved       Plan:            Imaging:  MR Lumbar/ Sacrum Coccyx Spine w/wo IV Cont (05.26.25 @ 23:43)   FINDINGS:  LOCALIZER: No additional findings.  BONES: There is no fracture or bone marrow edema.  ALIGNMENT: The alignment is normal.  SACROILIAC JOINTS/SACRUM: There is no sacral fracture. The SI joints are   partially visualized but are intact.  CONUS AND CAUDA EQUINA: The distal cord and conus are normal in signal.   Conus terminates at L1.  VISUALIZED INTRAPELVIC/INTRA-ABDOMINAL SOFT TISSUES: Normal.  PARASPINAL SOFT TISSUES: Normal.  INDIVIDUAL LEVELS:  LOWER THORACIC SPINE: No spinal canal or neuroforaminal stenosis.  L1-L2: No spinal canal or neuroforaminal stenosis.  L2-L3: No spinal canal or neuroforaminal stenosis.  L3-L4: No spinal canal or neuroforaminal stenosis.  L4-L5: No spinal canal or neuroforaminal stenosis.  L5-S1: No spinal canal or neuroforaminal stenosis.  IMPRESSION:  No significant central canal stenosis. The neural foramina appear patent.   No significant conus abnormality. No abnormal enhancement at the level of   the nerve roots.     HPI  59y (1965) RIGHT-handed man w PMHx of ethanol use disorder, chronic pancreatitis, poorly controlled СЕРГЕЙ (A1c 9.9 on 04/23/2025), chronic neuropathy of LLE who presents to [facility] ED on 05/26/2025 for ongoing LLE pain and subjective weakness as well as RLE pain and weakness. Neurology consulted.    Last hospital admission 04/2025  Patient has seen Dr. Boone (Orthopedic Spine Surgery) and PCP outpatient and referred to Neurology, Dr. Bárbara Hays. Sent to ED for LP and spine imaging. Dr. Hays via Teams. She is concerned for possible transverse myelitis at T4 vs. AIDP vs. diabetic neuropathy. Patient has had entire MRI of the neuro-axis in April 2025, but without contrast. Findings were non-actionable.  Patient reported in October 2024 he developed excruciating pain in the dorsum of his LEFT foot. Painful to even light touch. Also developed cramping pain in the back of his RLE. Over the three weeks prior to the hospitalization, he developed numbness at the level of his rib cage upward - front and back.   During hospitalization, patient exam consistent w sciatica and neuropathy of LLE. Patient was prescribed gabapentin 300 mg tid w some improvement. MRI brain and CTL spine wwo were without acute pathology. Patient seen by lashawn for further management of his diabetes.    05/26/2025: Patient evaluated by neurology resident. Patient presents with his wife and complains of:  1) RLE weakness: onset 1 week ago, sudden, feels like his leg gives out on him, feels like he is dragging his leg and not able to  his foot  2) RLE pain: onset 1 week ago, located over R hip, feels like hip is popper out of place  3) LLE weakness: worsened from weakness evaluated during last hospitalization, feels like he can't pick his foot up and is dragging his LLE  4) RLE calf numbness: ongoing from previous  hospital admission and unchanged  The weakness described above has resulted in significant decrease in ambulation from baseline being able to ambulate with walker. Patient has falling several times over past week striking knees on the ground.  no headache, blurry vision, double vision, difficulty swallowing, chest pain, palpitations, SOB, cough, n/v, dysuria, hematuria, blood in stool  Per wife. Patient say Dr. Radford after hospital admission and was scheduled for EMG (06/18/2025) and gabapentin increased to 600 mg tid. Patient also say pain management who reported he can take methocarbamol 2 tablets up to twice a day. Next neurologist appt is scheduled for 07/29/2025.    Hospital Course:    Patient admitted to the General Neurology service for further evaluation. Bedside EMG/NCS showed evidence of CIDP. Patient was treated with 5 days of IVIG and 5 days of IV steroids. During hospital course, he had episodes of confusion which were thought to be secondary to high dose steroids, therefore the last 2 doses were decreased from 1g to 500mg of methylprednisolone. He returned to baseline mental status (A&Ox3) and finished IVIG and IV methylpred on 6/1. He is neurologically stable. Patient does have some distal B/L leg pain likely MSK etiology due to foot drop and would benefit from B/L AFO. He was recommended for acute rehab.     Medical issues adressed during admission were hyponatremia and hyperglycemia (in setting of DM and IV steroid use). He was followed by nephrology and endocrinology. Hyponatremia resoolved. As per endocrinology, hyperglycemia was 2/2 steroid use. Better controll off steroids and currently on 8u lispro insulin 3 times with meals, 16u lantus insulin nightly, medium dose sliding scale.     Plan:  f/u with outpatient neurologist Dr. Hays and Dr. Radford for follow up for CIDP  f/u with endocrinology outpatient  continue moderate dose insulin sliding scale, 16u lantus, 8u lispro three times with meals  continue fluids restriction 1200ml        Imaging:  MR Lumbar/ Sacrum Coccyx Spine w/wo IV Cont (05.26.25 @ 23:43)   FINDINGS:  LOCALIZER: No additional findings.  BONES: There is no fracture or bone marrow edema.  ALIGNMENT: The alignment is normal.  SACROILIAC JOINTS/SACRUM: There is no sacral fracture. The SI joints are   partially visualized but are intact.  CONUS AND CAUDA EQUINA: The distal cord and conus are normal in signal.   Conus terminates at L1.  VISUALIZED INTRAPELVIC/INTRA-ABDOMINAL SOFT TISSUES: Normal.  PARASPINAL SOFT TISSUES: Normal.  INDIVIDUAL LEVELS:  LOWER THORACIC SPINE: No spinal canal or neuroforaminal stenosis.  L1-L2: No spinal canal or neuroforaminal stenosis.  L2-L3: No spinal canal or neuroforaminal stenosis.  L3-L4: No spinal canal or neuroforaminal stenosis.  L4-L5: No spinal canal or neuroforaminal stenosis.  L5-S1: No spinal canal or neuroforaminal stenosis.  IMPRESSION:  No significant central canal stenosis. The neural foramina appear patent.   No significant conus abnormality. No abnormal enhancement at the level of   the nerve roots.

## 2025-06-03 NOTE — PROGRESS NOTE ADULT - PROBLEM SELECTOR PLAN 4
- LDL goal <70   - C/w statin therapy or recommend statin therapy if not contraindicated   - Check lipid panel as outpatient on a yearly basis
- LDL goal <70   - C/w statin therapy or recommend statin therapy if not contraindicated   - Check lipid panel as outpatient on a yearly basis

## 2025-06-03 NOTE — DISCHARGE NOTE NURSING/CASE MANAGEMENT/SOCIAL WORK - PATIENT PORTAL LINK FT
You can access the FollowMyHealth Patient Portal offered by [facility] by registering at the following website: [email or web address]. By joining [facility]’s FollowMyHealth portal, you will also be able to view your health information using other applications (apps) compatible with our system.

## 2025-06-03 NOTE — PROGRESS NOTE ADULT - PROVIDER SPECIALTY LIST ADULT
Neurology
Rehab Medicine
Nephrology
Endocrinology
Endocrinology
Nephrology
Neurology
Nephrology
Nephrology
Endocrinology

## 2025-06-03 NOTE — DISCHARGE NOTE PROVIDER - CARE PROVIDER_API CALL
Bárbara Hays  Neurology  [address] [place] [address], [address]  [place] [address]  Phone: [phone]  Fax: [phone]  Established Patient  Follow Up Time:

## 2025-06-03 NOTE — DISCHARGE NOTE NURSING/CASE MANAGEMENT/SOCIAL WORK - NSDCPEFALRISK_GEN_ALL_CORE
For information on Fall & Injury Prevention, visit: [email or web address] OR  [email or web address] OR  https://www.cdc.gov/steadi/patient.html

## 2025-06-03 NOTE — DISCHARGE NOTE PROVIDER - NSDCCPCAREPLAN_GEN_ALL_CORE_FT
PRINCIPAL DISCHARGE DIAGNOSIS  Diagnosis: CIDP (chronic inflammatory demyelinating polyneuropathy)  Assessment and Plan of Treatment: EMG diagnosed, recieved steroids and IVIG

## 2025-06-03 NOTE — H&P ADULT - HISTORY OF PRESENT ILLNESS
Terence Galvez is a 59 year old male with PMH of ethanol use disorder, chronic pancreatitis, poorly controlled СЕРГЕЙ (A1c 9.9 on 04/23/2025), chronic neuropathy of LLE, recent hospitalization April 2025 for sciatica; who presented to [facility] ED on 5/26/25 for progressively worsening BL LE pain, weakness and numbness. He was evaluated by Neurology and underwent bedside EMG which was significant for CIDP (s/p 5 days IVIG and IV steroids). He developed an episode of confusion/AMS (CT Head negative). His hospital course was significant for hyponatremia, and hyperglycemia (both now resolved). PM&R was consulted and deemed him an appropriate candidate for IRF. He was medically stabilized and cleared for discharge to [facility].

## 2025-06-03 NOTE — H&P ADULT - NSHPLABSRESULTS_GEN_ALL_CORE
LABS:    06-03    136  |  95[L]  |  13  ----------------------------<  221[H]  4.0   |  27  |  0.64    Ca    9.6      03 Jun 2025 06:41      Urinalysis Basic - ( 03 Jun 2025 06:41 )    Color: x / Appearance: x / SG: x / pH: x  Gluc: 221 mg/dL / Ketone: x  / Bili: x / Urobili: x   Blood: x / Protein: x / Nitrite: x   Leuk Esterase: x / RBC: x / WBC x   Sq Epi: x / Non Sq Epi: x / Bacteria: x      IMAGING:  CTA HEAD & NECK/ PERFUSIONS (5/30/25)  IMPRESSION:  CT angiogram neck:  -Less than 50% stenosis of the proximal right ICA.  -At least 60% stenosis of the proximal left ICA.  CT angiogram head:  -No vaso-occlusive disease.  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 Recommend   neurointerventional consultation.  CT perfusion:  -No core infarct or at-risk ischemic tissue identified.    CT BRAIN/STROKE PROTOCOL (5/30/25)  IMPRESSION:  No acute transcortical infarct or intracranial hemorrhage.    MR SACRUM COCCYX (5/27/25)  IMPRESSION:  MRI of the sacrum with dedication to the lumbosacral plexus demonstrates   normal appearance of the lumbosacral nerves within the limitations of   motion artifact. No mass along their course. No abnormal muscle signal to   suggest denervation artifact.    MR LUMBAR SPINE (5/26/25)  IMPRESSION:  No significant central canal stenosis. The neural foramina appear patent.   No significant conus abnormality. No abnormal enhancement at the level of   the nerve roots.    CT HEAD/CTA NECK/CTA HEAD (5/26/25)  IMPRESSION:  CT HEAD:No acute intracranial hemorrhage, mass effect, or midline shift.  CTA NECK:Severe stenosis at the left carotid bifurcation, mild stenosis right carotid bifurcation secondary to atherosclerotic calcifications.  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

## 2025-06-03 NOTE — PROGRESS NOTE ADULT - NSPROGADDITIONALINFOA_GEN_ALL_CORE
Contact via [employer] Teams during business hours  To reach covering provider access AMION via sunrise tools  For Urgent matters/after-hours/weekends/holidays please page endocrine fellow on call   For nonurgent matters please email [email or web address]    Please note that this patient may be followed by different provider tomorrow.  Notify endocrine 24 hours prior to discharge for final recommendations
Contact via Microsoft Teams during business hours  To reach covering provider access AMION via sunrise tools  For Urgent matters/after-hours/weekends/holidays please page endocrine fellow on call   For nonurgent matters please email [email or web address]    Please note that this patient may be followed by different provider tomorrow.  Notify endocrine 24 hours prior to discharge for final recommendations

## 2025-06-03 NOTE — PROGRESS NOTE ADULT - NUTRITIONAL ASSESSMENT
Diet, Consistent Carbohydrate w/Evening Snack:   1200mL Fluid Restriction ([id number]) (05-30-25 @ 16:52) [Active]
Diet, Consistent Carbohydrate w/Evening Snack:   1200mL Fluid Restriction ([id number]) (05-30-25 @ 16:52) [Active]

## 2025-06-03 NOTE — PROGRESS NOTE ADULT - SUBJECTIVE AND OBJECTIVE BOX
[facility] Division of Kidney Diseases & Hypertension  FOLLOW UP NOTE  [phone]--------------------------------------------------------------------------------  Chief Complaint: Hyponatremia     24 hour events/subjective: Pt seen and evaluated this morning. Reports feeling well, endorses no fresh complaints. Denies any headaches, nausea, vomiting, fevers/chills, chest pain, SOB, abdominal pain, and leg swelling.    PAST HISTORY  --------------------------------------------------------------------------------  No significant changes to PMH, PSH, FHx, SHx, unless otherwise noted    ALLERGIES & MEDICATIONS  --------------------------------------------------------------------------------  Allergies    No Known Allergies    Intolerances    Standing Inpatient Medications  amLODIPine   Tablet 10 milliGRAM(s) Oral daily  atorvastatin 10 milliGRAM(s) Oral at bedtime  dextrose 5%. 1000 milliLiter(s) IV Continuous <Continuous>  dextrose 5%. 1000 milliLiter(s) IV Continuous <Continuous>  dextrose 50% Injectable 25 Gram(s) IV Push once  dextrose 50% Injectable 12.5 Gram(s) IV Push once  dextrose 50% Injectable 25 Gram(s) IV Push once  DULoxetine 60 milliGRAM(s) Oral daily  enoxaparin Injectable 40 milliGRAM(s) SubCutaneous every 24 hours  gabapentin 600 milliGRAM(s) Oral three times a day  glucagon  Injectable 1 milliGRAM(s) IntraMuscular once  insulin glargine Injectable (LANTUS) 18 Unit(s) SubCutaneous at bedtime  insulin lispro (ADMELOG) corrective regimen sliding scale   SubCutaneous <User Schedule>  insulin lispro (ADMELOG) corrective regimen sliding scale   SubCutaneous three times a day before meals  insulin lispro Injectable (ADMELOG) 10 Unit(s) SubCutaneous three times a day before meals  insulin lispro Injectable (ADMELOG) 4 Unit(s) SubCutaneous at bedtime  lidocaine 1% Injectable 10 milliLiter(s) Local Injection once  losartan 50 milliGRAM(s) Oral daily  multivitamin 1 Tablet(s) Oral daily  pantoprazole    Tablet 40 milliGRAM(s) Oral before breakfast    PRN Inpatient Medications  acetaminophen     Tablet .. 650 milliGRAM(s) Oral every 6 hours PRN  dextrose Oral Gel 15 Gram(s) Oral once PRN  methocarbamol 750 milliGRAM(s) Oral three times a day PRN    REVIEW OF SYSTEMS  --------------------------------------------------------------------------------  see above    VITALS/PHYSICAL EXAM  --------------------------------------------------------------------------------  T(C): 37.2 (06-03-25 @ 09:09), Max: 37.2 (06-03-25 @ 09:09)  HR: 84 (06-03-25 @ 09:09) (83 - 94)  BP: 133/72 (06-03-25 @ 09:09) (97/61 - 149/77)  RR: 18 (06-03-25 @ 09:09) (18 - 20)  SpO2: 97% (06-03-25 @ 09:09) (97% - 100%)  Wt(kg): --    06-02-25 @ 07:01  -  06-03-25 @ 07:00  --------------------------------------------------------  IN: 920 mL / OUT: 500 mL / NET: 420 mL    06-03-25 @ 07:01  -  06-03-25 @ 12:09  --------------------------------------------------------  IN: 480 mL / OUT: 0 mL / NET: 480 mL    Physical Exam:  	Gen: NAD  	Pulm: CTA B/L  	CV: RRR, S1S2;   	Abd: +BS, soft,   	LE: Warm, FROM,  no edema  	Skin: Warm    LABS/STUDIES  --------------------------------------------------------------------------------    136  |  95  |  13  ----------------------------<  221      [06-03-25 @ 06:41]  4.0   |  27  |  0.64        Ca     9.6     [06-03-25 @ 06:41]    Creatinine Trend:  SCr 0.64 [06-03 @ 06:41]  SCr 0.55 [06-02 @ 05:28]  SCr 0.55 [06-01 @ 06:37]  SCr 0.59 [05-31 @ 07:18]  SCr 0.63 [05-30 @ 12:28]    Urine Sodium 22      [05-28-25 @ 18:58]  Urine Potassium 31      [05-28-25 @ 18:58]  Urine Osmolality 414      [05-28-25 @ 18:58]

## 2025-06-03 NOTE — PROGRESS NOTE ADULT - ATTENDING COMMENTS
Aisha Whaley MD  Off: [phone]  contact me on teams    (After 5 pm or on weekends please page the on-call fellow/attending, can check [email or web address] for schedule. Login is luisito rossi, schedule under [facility] medicine, psych, derm)
LP yesterday shows albuinocytologic dissociation.   NCS/EMG performed by myself at bedside shows demyelinating neuropathy with secondary axonal involvement, consistent with CIDP. Will start steroids and IVIG.    Exam  General:  Healthy appearing.    Mental Status:  A&Ox3.    Cranial Nerves: VF intact, PERRL, EOMI, normal sensation bilaterally, no facial weakness, hearing intact, palate midline, SCM normal, tongue protrudes to midline.    Motor:  Normal tone, bulk and power throughout the arms.   RLE: HF 1/5, KF 1/5, KE 5-/5, dorisflexion 0/5, eversion 0/5, plantarflexion 2/5, inversion 3/5  LLE: HF 4/5, KF 4/5, KE 5/5, dorsiflexion 2/5, eversion 2/5, plantarflexion 4/5, inversion 4/5    Sensation: Decreased to lateral right leg and some patchiness otherwise    Reflexes: 1+ throughout, absent at the ankles.    Coordination: No dysmetria.    Imp: This is a 59M with months of progressive bilateral motor>sensory loss, CSF and neurodiagnostic studies consistent with CIDP. Will start IVIG and steroids due to significant weakness. Also noted to have worsening hyponatremia, renal consulted.     - IVMP 1g daily x 5 days  - IVIG 2g/kg divided over 5 days  - Will need monthly IVIG for maintenance therapy thereafter, will arrange with our outpatient office  - F/u ganglioside panel  - Apprecaite renal c/s  - EMG/NCS results to be uploaded to outpatient chart
A 59-year-old right-handed man with a history of alcohol use disorder, chronic pancreatitis, poorly controlled СЕРГЕЙ (A1c 9.9), and chronic left lower extremity neuropathy presented with progressive bilateral lower extremity weakness (R>L). Imaging showed no compressive or plexus abnormalities. CSF revealed albuminocytologic dissociation, and EMG/NCS demonstrated a demyelinating process with chronic axonal loss, consistent with CIDP. He was treated with IV steroids and IVIG, with transient altered mental status likely due to steroids, now resolved. Diabetes management was optimized, hyponatremia addressed per nephrology and improving. He he is now clinically stable and pending transfer to acute rehab. I agree with exam findings and Plan of care.
Continues to improve, having improved strength of RLE>LLE. Hyperglycemia worsening in setting of steroids, Endocrinology assisting in glucose management. Na improving as well. Episode of altered mental status, now resolved. Suspect relates to IVMP    Ex  Imp: This is a 59M with months of progressive bilateral motor>sensory loss, CSF and neurodiagnostic studies consistent with CIDP, responding to IVIG and steroids c/b worsening hyperglycemia. Patient also with worsening chronic hyponatremia.      - Day 5/5 IVMP 1g daily  - Day 5/5 IVIG 2g/kg divided over 5 days  - Will need monthly IVIG for maintenance therapy thereafter, will arrange with our outpatient office  - F/u ganglioside panel  - Insulin adjustment per endocrinology recs  - Sodium correction per Renal recs  - Endocrinology consult appreciated  - Renal consult appreciated  - EMG/NCS results uploaded to outpatient chart .
Has had significant improvement since starting treatment yesterday. Numbness somewhat improved as well.   Hyperglycemia worsening with steroids, will consult endocrinology.    Exam:  General:  Healthy appearing.    Mental Status:  A&Ox3.    Cranial Nerves: VF intact, PERRL, EOMI, normal sensation bilaterally, no facial weakness, hearing intact, palate midline, SCM normal, tongue protrudes to midline.    Motor:  Normal tone, bulk and power throughout the arms.   RLE: HF 2/5, KF 2/5, KE 5-/5, dorisflexion 0/5, eversion 3/5, plantarflexion 4/5, inversion 4/5  LLE: HF 4+/5, KF 4+/5, KE 5/5, dorsiflexion 3/5, eversion 3/5, plantarflexion 4/5, inversion 4/5    Sensation: Decreased to lateral right leg and some patchiness otherwise    Reflexes: 1+ throughout, absent at the ankles.    Coordination: No dysmetria.    Imp: This is a 59M with months of progressive bilateral motor>sensory loss, CSF and neurodiagnostic studies consistent with CIDP, responding to IVIG and steroids c/b worsening hyperglycemia. Patient also with worsening chronic hyponatremia.      - Day 2/5 IVMP 1g martin  - Day 2/5 IVIG 2g/kg divided over 5 days  - Will need monthly IVIG for maintenance therapy thereafter, will arrange with our outpatient office  - F/u ganglioside panel  - Endocrinology consult appreciated  - Renal consult appreciated  - EMG/NCS results uploaded to outpatient chart
Aisha Whaley MD  Off: [phone]  contact me on teams    (After 5 pm or on weekends please page the on-call fellow/attending, can check [email or web address] for schedule. Login is luisito rossi, schedule under [facility] medicine, psych, derm)
christian acosta  nephrology attending   please contact me on TEAMS   Office- [phone]
christian acosta  nephrology attending   please contact me on TEAMS   Office- [phone]
Continues to improve, having improved strength of RLE>LLE. Hyperglycemia worsening in setting of steroids, Endocrinology assisting in glucose management. Na improving as well    Exam:  General:  Healthy appearing.    Mental Status:  A&Ox3.    Cranial Nerves: VF intact, PERRL, EOMI, normal sensation bilaterally, no facial weakness, hearing intact, palate midline, SCM normal, tongue protrudes to midline.    Motor:  Normal tone, bulk and power throughout the arms.   RLE: HF 2/5, KF 2/5, KE 5-/5, dorisflexion 1/5, eversion 3/5, plantarflexion 4+/5, inversion 4+/5  LLE: HF 4+/5, KF 4+/5, KE 5/5, dorsiflexion 3/5, eversion 3/5, plantarflexion 4/5, inversion 4/5    Sensation: Decreased to lateral right leg and some patchiness otherwise    Reflexes: 1+ throughout, absent at the ankles.    Coordination: No dysmetria.      Imp: This is a 59M with months of progressive bilateral motor>sensory loss, CSF and neurodiagnostic studies consistent with CIDP, responding to IVIG and steroids c/b worsening hyperglycemia. Patient also with worsening chronic hyponatremia.      - Day 3/5 IVMP 1g martin  - Day 3/5 IVIG 2g/kg divided over 5 days  - Will need monthly IVIG for maintenance therapy thereafter, will arrange with our outpatient office  - F/u ganglioside panel  - Insulin adjustment per endocrinology recs  - Sodium correction per Renal recs  - Endocrinology consult appreciated  - Renal consult appreciated  - EMG/NCS results uploaded to outpatient chart .
Continues to improve, having improved strength of RLE>LLE. Hyperglycemia worsening in setting of steroids, Endocrinology assisting in glucose management. Na improving as well. Episode of altered mental status, now resolved. Suspect relates to IVMP    Ex  Imp: This is a 59M with months of progressive bilateral motor>sensory loss, CSF and neurodiagnostic studies consistent with CIDP, responding to IVIG and steroids c/b worsening hyperglycemia. Patient also with worsening chronic hyponatremia.      - Day 4/5 IVMP 1g martin  - Day 4/5 IVIG 2g/kg divided over 5 days  - Will need monthly IVIG for maintenance therapy thereafter, will arrange with our outpatient office  - F/u ganglioside panel  - Insulin adjustment per endocrinology recs  - Sodium correction per Renal recs  - Endocrinology consult appreciated  - Renal consult appreciated  - EMG/NCS results uploaded to outpatient chart .

## 2025-06-04 LAB
24R-OH-CALCIDIOL SERPL-MCNC: 29.3 NG/ML — SIGNIFICANT CHANGE UP
ALBUMIN SERPL ELPH-MCNC: 3 G/DL — LOW (ref 3.3–5)
ALP SERPL-CCNC: 52 U/L — SIGNIFICANT CHANGE UP (ref 40–120)
ALT FLD-CCNC: 59 U/L — HIGH (ref 10–45)
ANION GAP SERPL CALC-SCNC: 4 MMOL/L — LOW (ref 5–17)
AST SERPL-CCNC: 32 U/L — SIGNIFICANT CHANGE UP (ref 10–40)
BASOPHILS # BLD AUTO: 0 K/UL — SIGNIFICANT CHANGE UP (ref 0–0.2)
BASOPHILS NFR BLD AUTO: 0 % — SIGNIFICANT CHANGE UP (ref 0–2)
BILIRUB SERPL-MCNC: 0.7 MG/DL — SIGNIFICANT CHANGE UP (ref 0.2–1.2)
BUN SERPL-MCNC: 12 MG/DL — SIGNIFICANT CHANGE UP (ref 7–23)
CALCIUM SERPL-MCNC: 9.2 MG/DL — SIGNIFICANT CHANGE UP (ref 8.4–10.5)
CALCIUM SERPL-MCNC: 9.4 MG/DL — SIGNIFICANT CHANGE UP (ref 8.4–10.5)
CHLORIDE SERPL-SCNC: 95 MMOL/L — LOW (ref 96–108)
CO2 SERPL-SCNC: 31 MMOL/L — SIGNIFICANT CHANGE UP (ref 22–31)
CREAT SERPL-MCNC: 0.77 MG/DL — SIGNIFICANT CHANGE UP (ref 0.5–1.3)
EGFR: 105 ML/MIN/1.73M2 — SIGNIFICANT CHANGE UP
EGFR: 105 ML/MIN/1.73M2 — SIGNIFICANT CHANGE UP
EOSINOPHIL # BLD AUTO: 0.24 K/UL — SIGNIFICANT CHANGE UP (ref 0–0.5)
EOSINOPHIL NFR BLD AUTO: 3.3 % — SIGNIFICANT CHANGE UP (ref 0–6)
GLUCOSE BLDC GLUCOMTR-MCNC: 164 MG/DL — HIGH (ref 70–99)
GLUCOSE BLDC GLUCOMTR-MCNC: 186 MG/DL — HIGH (ref 70–99)
GLUCOSE BLDC GLUCOMTR-MCNC: 301 MG/DL — HIGH (ref 70–99)
GLUCOSE BLDC GLUCOMTR-MCNC: 385 MG/DL — HIGH (ref 70–99)
GLUCOSE BLDC GLUCOMTR-MCNC: 77 MG/DL — SIGNIFICANT CHANGE UP (ref 70–99)
GLUCOSE BLDC GLUCOMTR-MCNC: 84 MG/DL — SIGNIFICANT CHANGE UP (ref 70–99)
GLUCOSE SERPL-MCNC: 161 MG/DL — HIGH (ref 70–99)
HCT VFR BLD CALC: 40.7 % — SIGNIFICANT CHANGE UP (ref 39–50)
HGB BLD-MCNC: 14.2 G/DL — SIGNIFICANT CHANGE UP (ref 13–17)
IMM GRANULOCYTES NFR BLD AUTO: 0.3 % — SIGNIFICANT CHANGE UP (ref 0–0.9)
LYMPHOCYTES # BLD AUTO: 3.14 K/UL — SIGNIFICANT CHANGE UP (ref 1–3.3)
LYMPHOCYTES # BLD AUTO: 43.3 % — SIGNIFICANT CHANGE UP (ref 13–44)
MCHC RBC-ENTMCNC: 30.5 PG — SIGNIFICANT CHANGE UP (ref 27–34)
MCHC RBC-ENTMCNC: 34.9 G/DL — SIGNIFICANT CHANGE UP (ref 32–36)
MCV RBC AUTO: 87.5 FL — SIGNIFICANT CHANGE UP (ref 80–100)
MONOCYTES # BLD AUTO: 0.53 K/UL — SIGNIFICANT CHANGE UP (ref 0–0.9)
MONOCYTES NFR BLD AUTO: 7.3 % — SIGNIFICANT CHANGE UP (ref 2–14)
NEUTROPHILS # BLD AUTO: 3.32 K/UL — SIGNIFICANT CHANGE UP (ref 1.8–7.4)
NEUTROPHILS NFR BLD AUTO: 45.8 % — SIGNIFICANT CHANGE UP (ref 43–77)
NRBC BLD AUTO-RTO: 0 /100 WBCS — SIGNIFICANT CHANGE UP (ref 0–0)
PLATELET # BLD AUTO: 211 K/UL — SIGNIFICANT CHANGE UP (ref 150–400)
POTASSIUM SERPL-MCNC: 4.2 MMOL/L — SIGNIFICANT CHANGE UP (ref 3.5–5.3)
POTASSIUM SERPL-SCNC: 4.2 MMOL/L — SIGNIFICANT CHANGE UP (ref 3.5–5.3)
PROT SERPL-MCNC: 7.7 G/DL — SIGNIFICANT CHANGE UP (ref 6–8.3)
PTH-INTACT FLD-MCNC: 27 PG/ML — SIGNIFICANT CHANGE UP (ref 15–65)
RBC # BLD: 4.65 M/UL — SIGNIFICANT CHANGE UP (ref 4.2–5.8)
RBC # FLD: 12.2 % — SIGNIFICANT CHANGE UP (ref 10.3–14.5)
SODIUM SERPL-SCNC: 130 MMOL/L — LOW (ref 135–145)
T4 FREE+ TSH PNL SERPL: 1.76 UIU/ML — SIGNIFICANT CHANGE UP (ref 0.27–4.2)
VIT B12 SERPL-MCNC: 409 PG/ML — SIGNIFICANT CHANGE UP (ref 232–1245)
VIT D25+D1,25 OH+D1,25 PNL SERPL-MCNC: 46.6 PG/ML — SIGNIFICANT CHANGE UP (ref 19.9–79.3)
WBC # BLD: 7.25 K/UL — SIGNIFICANT CHANGE UP (ref 3.8–10.5)
WBC # FLD AUTO: 7.25 K/UL — SIGNIFICANT CHANGE UP (ref 3.8–10.5)

## 2025-06-04 PROCEDURE — 99255 IP/OBS CONSLTJ NEW/EST HI 80: CPT

## 2025-06-04 PROCEDURE — 99232 SBSQ HOSP IP/OBS MODERATE 35: CPT

## 2025-06-04 RX ORDER — INSULIN LISPRO 100 U/ML
8 INJECTION, SOLUTION INTRAVENOUS; SUBCUTANEOUS
Refills: 0 | Status: DISCONTINUED | OUTPATIENT
Start: 2025-06-04 | End: 2025-06-14

## 2025-06-04 RX ORDER — INSULIN LISPRO 100 U/ML
INJECTION, SOLUTION INTRAVENOUS; SUBCUTANEOUS AT BEDTIME
Refills: 0 | Status: DISCONTINUED | OUTPATIENT
Start: 2025-06-04 | End: 2025-06-17

## 2025-06-04 RX ORDER — INSULIN LISPRO 100 U/ML
INJECTION, SOLUTION INTRAVENOUS; SUBCUTANEOUS
Refills: 0 | Status: DISCONTINUED | OUTPATIENT
Start: 2025-06-04 | End: 2025-06-17

## 2025-06-04 RX ADMIN — INSULIN LISPRO 8 UNIT(S): 100 INJECTION, SOLUTION INTRAVENOUS; SUBCUTANEOUS at 17:20

## 2025-06-04 RX ADMIN — INSULIN GLARGINE-YFGN 16 UNIT(S): 100 INJECTION, SOLUTION SUBCUTANEOUS at 21:35

## 2025-06-04 RX ADMIN — INSULIN LISPRO 4: 100 INJECTION, SOLUTION INTRAVENOUS; SUBCUTANEOUS at 17:19

## 2025-06-04 RX ADMIN — DULOXETINE 60 MILLIGRAM(S): 20 CAPSULE, DELAYED RELEASE ORAL at 12:49

## 2025-06-04 RX ADMIN — ENOXAPARIN SODIUM 40 MILLIGRAM(S): 100 INJECTION SUBCUTANEOUS at 17:22

## 2025-06-04 RX ADMIN — AMLODIPINE BESYLATE 10 MILLIGRAM(S): 10 TABLET ORAL at 06:03

## 2025-06-04 RX ADMIN — LOSARTAN POTASSIUM 50 MILLIGRAM(S): 100 TABLET, FILM COATED ORAL at 06:03

## 2025-06-04 RX ADMIN — Medication 40 MILLIGRAM(S): at 06:03

## 2025-06-04 RX ADMIN — GABAPENTIN 600 MILLIGRAM(S): 400 CAPSULE ORAL at 06:03

## 2025-06-04 RX ADMIN — INSULIN LISPRO 8 UNIT(S): 100 INJECTION, SOLUTION INTRAVENOUS; SUBCUTANEOUS at 12:47

## 2025-06-04 RX ADMIN — Medication 1 TABLET(S): at 12:49

## 2025-06-04 RX ADMIN — ATORVASTATIN CALCIUM 10 MILLIGRAM(S): 80 TABLET, FILM COATED ORAL at 21:34

## 2025-06-04 RX ADMIN — INSULIN LISPRO 2: 100 INJECTION, SOLUTION INTRAVENOUS; SUBCUTANEOUS at 12:47

## 2025-06-04 RX ADMIN — GABAPENTIN 600 MILLIGRAM(S): 400 CAPSULE ORAL at 21:34

## 2025-06-04 RX ADMIN — INSULIN LISPRO 8 UNIT(S): 100 INJECTION, SOLUTION INTRAVENOUS; SUBCUTANEOUS at 07:42

## 2025-06-04 RX ADMIN — GABAPENTIN 600 MILLIGRAM(S): 400 CAPSULE ORAL at 13:29

## 2025-06-04 RX ADMIN — INSULIN LISPRO 2: 100 INJECTION, SOLUTION INTRAVENOUS; SUBCUTANEOUS at 07:42

## 2025-06-04 RX ADMIN — INSULIN LISPRO 3: 100 INJECTION, SOLUTION INTRAVENOUS; SUBCUTANEOUS at 21:36

## 2025-06-04 NOTE — PROGRESS NOTE ADULT - NS ATTEND AMEND GEN_ALL_CORE FT
I have made amendments to the documentation where necessary. Additional comments: I have personally seen and examined the patient independently Medical records reviewed. I have made amendments to the documentation where necessary and adjusted the history, physical examination, and plan as documented by the NP.     Patient slept well  No acute med symptoms     Rt hip pain is mild, occuring with movement   Regular urine void and BM    Reduced LE strength R>L and b/l foot drop      Continue therapy   DVT ppx  Orthotics referral

## 2025-06-04 NOTE — CONSULT NOTE ADULT - SUBJECTIVE AND OBJECTIVE BOX
HPI  59 year old male with PMH of active smoker, ethanol use disorder, chronic pancreatitis, poorly controlled СЕРГЕЙ (A1c 9.9 on 04/23/2025), chronic neuropathy of LLE, recent hospitalization April 2025 for sciatica; who presented to [facility] ED on 5/26/25 for progressively worsening BL LE pain, weakness and numbness. He was evaluated by Neurology and underwent bedside EMG which was significant for CIDP (s/p 5 days IVIG and IV steroids). He developed an episode of confusion/AMS (CT Head negative). His hospital course was significant for hyponatremia, and hyperglycemia (both now resolved). PM&R was consulted and deemed him an appropriate candidate for IRF. He was medically stabilized and cleared for discharge to [facility].     Patient seen and examined at bedside. No acute events.  Endorses numbness from knee down and RLE weakness - unchanged.  No other complaints. Rest of ROS are negative.     ALLERGIES:  No Known Allergies    PAST MEDICAL HISTORY:  Alcohol abuse   Benign hypertension   Diabetes mellitus, type 2   Elbow fracture   Gallstones   GERD (gastroesophageal reflux disease)   Left lower quadrant abdominal pain   Pancreatic duct stones   Pancreatitis, chronic   Scoliosis.     PAST SURGICAL HISTORY:  H/O elbow surgery.     SOCIAL HISTORY  Smoking - Current everyday cigarette smoker. Last use prior to 5/26/25  EtOH - Last use ~ 4 months ago  Drugs - Denied    MEDICATIONS  (STANDING):  amLODIPine   Tablet 10 milliGRAM(s) Oral daily  atorvastatin 10 milliGRAM(s) Oral at bedtime  dextrose 5%. 1000 milliLiter(s) (50 mL/Hr) IV Continuous <Continuous>  dextrose 5%. 1000 milliLiter(s) (100 mL/Hr) IV Continuous <Continuous>  dextrose 50% Injectable 25 Gram(s) IV Push once  dextrose 50% Injectable 12.5 Gram(s) IV Push once  dextrose 50% Injectable 25 Gram(s) IV Push once  DULoxetine 60 milliGRAM(s) Oral daily  enoxaparin Injectable 40 milliGRAM(s) SubCutaneous every 24 hours  gabapentin 600 milliGRAM(s) Oral three times a day  glucagon  Injectable 1 milliGRAM(s) IntraMuscular once  insulin glargine Injectable (LANTUS) 16 Unit(s) SubCutaneous at bedtime  insulin lispro (ADMELOG) corrective regimen sliding scale   SubCutaneous three times a day before meals  insulin lispro (ADMELOG) corrective regimen sliding scale   SubCutaneous at bedtime  insulin lispro Injectable (ADMELOG) 8 Unit(s) SubCutaneous three times a day before meals  losartan 50 milliGRAM(s) Oral daily  multivitamin 1 Tablet(s) Oral daily  pantoprazole    Tablet 40 milliGRAM(s) Oral before breakfast  senna 2 Tablet(s) Oral at bedtime    MEDICATIONS  (PRN):  acetaminophen     Tablet .. 650 milliGRAM(s) Oral every 6 hours PRN Mild Pain (1 - 3)  dextrose Oral Gel 15 Gram(s) Oral once PRN Blood Glucose LESS THAN 70 milliGRAM(s)/deciliter  methocarbamol 750 milliGRAM(s) Oral three times a day PRN Muscle Spasm    Vital Signs Last 24 Hrs  T(F): 97.2 (04 Jun 2025 07:36), Max: 98.3 (03 Jun 2025 13:50)  HR: 95 (04 Jun 2025 07:36) (94 - 98)  BP: 149/83 (04 Jun 2025 07:36) (119/71 - 149/83)  RR: 16 (04 Jun 2025 07:36) (16 - 16)  SpO2: 99% (04 Jun 2025 07:36) (99% - 99%)  I&O's Summary    03 Jun 2025 07:01  -  04 Jun 2025 07:00  --------------------------------------------------------  IN: 0 mL / OUT: 200 mL / NET: -200 mL    BMI (kg/m2): 20.8 (06-03-25 @ 13:50)    PHYSICAL EXAM:  GENERAL: NAD  HEENT: NCAT  CHEST/LUNG: Clear to percussion bilaterally; No rales, rhonchi, wheezing  HEART: Regular rate and rhythm, no murmur   ABDOMEN: Soft, Nontender, Nondistended; Bowel sounds present  MUSCULOSKELETAL/EXTREMITIES:  2+ Peripheral Pulses, No LE edema  PSYCH: Appropriate affect  NEURO: Alert & Oriented x 3, RLE weakness, decreased sensation bilaterally LE from knee down     I personally reviewed the below data/images/labs:    LABS:                        14.2   7.25  )-----------( 211      ( 04 Jun 2025 06:21 )             40.7       06-04    130  |  95  |  12  ----------------------------<  161  4.2   |  31  |  0.77    Ca    9.2      04 Jun 2025 06:21    TPro  7.7  /  Alb  3.0  /  TBili  0.7  /  DBili  x   /  AST  32  /  ALT  59  /  AlkPhos  52  06-04 04-24 Chol 129 mg/dL LDL -- HDL 63 mg/dL Trig 63 mg/dL    POCT Blood Glucose.: 77 mg/dL (04 Jun 2025 09:38)  POCT Blood Glucose.: 164 mg/dL (04 Jun 2025 07:39)  POCT Blood Glucose.: 187 mg/dL (03 Jun 2025 21:58)  POCT Blood Glucose.: 232 mg/dL (03 Jun 2025 16:55)  POCT Blood Glucose.: 110 mg/dL (03 Jun 2025 13:46)  POCT Blood Glucose.: 130 mg/dL (03 Jun 2025 11:29)    Urinalysis Basic - ( 04 Jun 2025 06:21 )    Color: x / Appearance: x / SG: x / pH: x  Gluc: 161 mg/dL / Ketone: x  / Bili: x / Urobili: x   Blood: x / Protein: x / Nitrite: x   Leuk Esterase: x / RBC: x / WBC x   Sq Epi: x / Non Sq Epi: x / Bacteria: x    COVID-19 PCR: NotDetec (06-03-25 @ 14:15)    Consultant(s) Notes Reviewed:   Care Discussed with Consultants/Other Providers:  Imaging Personally Reviewed:     IMAGING:  CTA HEAD & NECK/ PERFUSIONS (5/30/25)  IMPRESSION:  CT angiogram neck:  -Less than 50% stenosis of the proximal right ICA.  -At least 60% stenosis of the proximal left ICA.  CT angiogram head:  -No vaso-occlusive disease.  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 Recommend   neurointerventional consultation.  CT perfusion:  -No core infarct or at-risk ischemic tissue identified.    CT BRAIN/STROKE PROTOCOL (5/30/25)  IMPRESSION:  No acute transcortical infarct or intracranial hemorrhage.    MR SACRUM COCCYX (5/27/25)  IMPRESSION:  MRI of the sacrum with dedication to the lumbosacral plexus demonstrates   normal appearance of the lumbosacral nerves within the limitations of   motion artifact. No mass along their course. No abnormal muscle signal to   suggest denervation artifact.    MR LUMBAR SPINE (5/26/25)  IMPRESSION:  No significant central canal stenosis. The neural foramina appear patent.   No significant conus abnormality. No abnormal enhancement at the level of   the nerve roots.    CT HEAD/CTA NECK/CTA HEAD (5/26/25)  IMPRESSION:  CT HEAD:No acute intracranial hemorrhage, mass effect, or midline shift.  CTA NECK:Severe stenosis at the left carotid bifurcation, mild stenosis right carotid bifurcation secondary to atherosclerotic calcifications.  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       HPI  59 year old male with PMH of active smoker, ethanol use disorder, chronic pancreatitis, poorly controlled СЕРГЕЙ (A1c 9.9 on 04/23/2025), chronic neuropathy of LLE, recent hospitalization April 2025 for sciatica; who presented to [facility] ED on 5/26/25 for progressively worsening BL LE pain, weakness and numbness. He was evaluated by Neurology and underwent bedside EMG which was significant for CIDP (s/p 5 days IVIG and IV steroids). He developed an episode of confusion/AMS (CT Head negative). His hospital course was significant for hyponatremia, and hyperglycemia (both now resolved). PM&R was consulted and deemed him an appropriate candidate for IRF. He was medically stabilized and cleared for discharge to [facility].     Patient seen and examined at bedside. No acute events.  Endorses numbness from knee down and RLE weakness - unchanged.  No other complaints. Rest of ROS are negative.     ALLERGIES:  No Known Allergies    PAST MEDICAL HISTORY:  Alcohol abuse   Benign hypertension   Diabetes mellitus, type 2   Elbow fracture   Gallstones   GERD (gastroesophageal reflux disease)   Left lower quadrant abdominal pain   Pancreatic duct stones   Pancreatitis, chronic   Scoliosis.     PAST SURGICAL HISTORY:  H/O elbow surgery.     SOCIAL HISTORY  Smoking - Current everyday cigarette smoker. Last use prior to 5/26/25  EtOH - Last use ~ 4 months ago  Drugs - Denied    MEDICATIONS  (STANDING):  amLODIPine   Tablet 10 milliGRAM(s) Oral daily  atorvastatin 10 milliGRAM(s) Oral at bedtime  dextrose 5%. 1000 milliLiter(s) (50 mL/Hr) IV Continuous <Continuous>  dextrose 5%. 1000 milliLiter(s) (100 mL/Hr) IV Continuous <Continuous>  dextrose 50% Injectable 25 Gram(s) IV Push once  dextrose 50% Injectable 12.5 Gram(s) IV Push once  dextrose 50% Injectable 25 Gram(s) IV Push once  DULoxetine 60 milliGRAM(s) Oral daily  enoxaparin Injectable 40 milliGRAM(s) SubCutaneous every 24 hours  gabapentin 600 milliGRAM(s) Oral three times a day  glucagon  Injectable 1 milliGRAM(s) IntraMuscular once  insulin glargine Injectable (LANTUS) 16 Unit(s) SubCutaneous at bedtime  insulin lispro (ADMELOG) corrective regimen sliding scale   SubCutaneous three times a day before meals  insulin lispro (ADMELOG) corrective regimen sliding scale   SubCutaneous at bedtime  insulin lispro Injectable (ADMELOG) 8 Unit(s) SubCutaneous three times a day before meals  losartan 50 milliGRAM(s) Oral daily  multivitamin 1 Tablet(s) Oral daily  pantoprazole    Tablet 40 milliGRAM(s) Oral before breakfast  senna 2 Tablet(s) Oral at bedtime    MEDICATIONS  (PRN):  acetaminophen     Tablet .. 650 milliGRAM(s) Oral every 6 hours PRN Mild Pain (1 - 3)  dextrose Oral Gel 15 Gram(s) Oral once PRN Blood Glucose LESS THAN 70 milliGRAM(s)/deciliter  methocarbamol 750 milliGRAM(s) Oral three times a day PRN Muscle Spasm    Vital Signs Last 24 Hrs  T(F): 97.2 (04 Jun 2025 07:36), Max: 98.3 (03 Jun 2025 13:50)  HR: 95 (04 Jun 2025 07:36) (94 - 98)  BP: 149/83 (04 Jun 2025 07:36) (119/71 - 149/83)  RR: 16 (04 Jun 2025 07:36) (16 - 16)  SpO2: 99% (04 Jun 2025 07:36) (99% - 99%)  I&O's Summary    03 Jun 2025 07:01  -  04 Jun 2025 07:00  --------------------------------------------------------  IN: 0 mL / OUT: 200 mL / NET: -200 mL    BMI (kg/m2): 20.8 (06-03-25 @ 13:50)    PHYSICAL EXAM:  GENERAL: NAD  HEENT: NCAT  CHEST/LUNG: Clear to percussion bilaterally; No rales, rhonchi, wheezing  HEART: Regular rate and rhythm, no murmur   ABDOMEN: Soft, Nontender, Nondistended; Bowel sounds present  MUSCULOSKELETAL/EXTREMITIES:  2+ Peripheral Pulses, No LE edema  PSYCH: Appropriate affect  NEURO: Alert & Oriented x 3, RLE weakness, decreased sensation bilaterally LE from knee down     I personally reviewed the below data/images/labs:    LABS:                        14.2   7.25  )-----------( 211      ( 04 Jun 2025 06:21 )             40.7       06-04    130  |  95  |  12  ----------------------------<  161  4.2   |  31  |  0.77    Ca    9.2      04 Jun 2025 06:21    TPro  7.7  /  Alb  3.0  /  TBili  0.7  /  DBili  x   /  AST  32  /  ALT  59  /  AlkPhos  52  06-04 04-24 Chol 129 mg/dL LDL -- HDL 63 mg/dL Trig 63 mg/dL    POCT Blood Glucose.: 77 mg/dL (04 Jun 2025 09:38)  POCT Blood Glucose.: 164 mg/dL (04 Jun 2025 07:39)  POCT Blood Glucose.: 187 mg/dL (03 Jun 2025 21:58)  POCT Blood Glucose.: 232 mg/dL (03 Jun 2025 16:55)  POCT Blood Glucose.: 110 mg/dL (03 Jun 2025 13:46)  POCT Blood Glucose.: 130 mg/dL (03 Jun 2025 11:29)    Urinalysis Basic - ( 04 Jun 2025 06:21 )    Color: x / Appearance: x / SG: x / pH: x  Gluc: 161 mg/dL / Ketone: x  / Bili: x / Urobili: x   Blood: x / Protein: x / Nitrite: x   Leuk Esterase: x / RBC: x / WBC x   Sq Epi: x / Non Sq Epi: x / Bacteria: x    COVID-19 PCR: NotDetec (06-03-25 @ 14:15)    Consultant(s) Notes Reviewed:   Care Discussed with Consultants/Other Providers:  Imaging Personally Reviewed:     IMAGING:  CTA HEAD & NECK/ PERFUSIONS (5/30/25)  IMPRESSION:  CT angiogram neck:  -Less than 50% stenosis of the proximal right ICA.  -At least 60% stenosis of the proximal left ICA.  CT angiogram head:  -No vaso-occlusive disease.  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 Recommend   neurointerventional consultation.  CT perfusion:  -No core infarct or at-risk ischemic tissue identified.    CT BRAIN/STROKE PROTOCOL (5/30/25)  IMPRESSION:  No acute transcortical infarct or intracranial hemorrhage.    MR SACRUM COCCYX (5/27/25)  IMPRESSION:  MRI of the sacrum with dedication to the lumbosacral plexus demonstrates   normal appearance of the lumbosacral nerves within the limitations of   motion artifact. No mass along their course. No abnormal muscle signal to   suggest denervation artifact.    MR LUMBAR SPINE (5/26/25)  IMPRESSION:  No significant central canal stenosis. The neural foramina appear patent.   No significant conus abnormality. No abnormal enhancement at the level of   the nerve roots.    CT HEAD/CTA NECK/CTA HEAD (5/26/25)  IMPRESSION:  CT HEAD: No acute intracranial hemorrhage, mass effect, or midline shift.  CTA NECK: Severe stenosis at the left carotid bifurcation, mild stenosis right carotid bifurcation secondary to atherosclerotic calcifications.  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

## 2025-06-04 NOTE — PROGRESS NOTE ADULT - ASSESSMENT
59 year old male with PMH of ethanol use disorder, chronic pancreatitis, poorly controlled СЕРГЕЙ (A1c 9.9 on 04/23/2025), chronic neuropathy of LLE, recent hospitalization April 2025 for sciatica; who presented to [facility] ED on 5/26/25 for progressively worsening BL LE pain, weakness and numbness. He was evaluated by Neurology and underwent bedside EMG which was significant for CIDP (s/p 5 days IVIG and IV steroids). He developed an episode of confusion/AMS (CT Head negative). His hospital course was significant for hyponatremia, and hyperglycemia (both now resolved). Patient now admitted for a multidisciplinary rehab program. 06-03-25 @ 14:29    * Falls history - await osteoporosis labs  * BL foot drop - await orthotic eval for BL AFOs    #CIDP  - Gait Instability, ADL impairments and Functional impairments: start Comprehensive Rehab Program of PT/OT- 3 hours a day, 5 days a week  - P&O as needed--orthotics referral    - Orthotic eval for BL AFOs     #HTN/HLD  - Amlodipine 10mg daily  - Atorvastatin 10mg at HS  - Losartan 50mg daily - (Home regimen: Telmisartan 40mg daily)    #Latent Autoimmune Diabetes Mellitus  - A1c: 9.9% (April 2025)   - FS AC & HS  - ISS  - Premeal  - Lantus   - Appreciate ongoing hospitalist recs     #Mood  - Duloxetine 60mg daily     #Skin  - Skin  - Pressure injury/Skin: OOB to Chair, PT/OT     #Pain Mgmt   #Neuropathy  #Stiffness/Spasiticity  - Gabapentin 600mg TID   - PRN: Methocarbamol 750mg TID; Tylenol 650mg QID    #GI/Bowel Mgmt   - Pantoprazole   - Senna     #/Bladder Mgmt --voiding     #FEN   - Diet - Regular + Thins  [CCHO]    - Fluid Restriction: 1200mL  - MVI    #Falls history  - Await Osteoporosis labs     #Precautions / PROPHYLAXIS:   - Falls  - ortho: Weight bearing status: WBAT   - Lungs: Aspiration, Incentive Spirometer   - DVT: Lovenox 40 mg daily   ----------------------------------------------  Dr. RUSSELLs Liaison with Family/Providers:    -----------------------------------------------  OUTPATIENT/FOLLOW UP:    Mirza Radford   Neurology    Bárbara Hays  Neurology  [address], [address]  [place] [address]  Phone: [phone]  Fax: [phone]  Established Patient  Follow Up Time:     [facility] End[facility]  Endocrinology  [address] [place] [address]  Phone: [phone]  Fax:     Scheduled Appts:  Mirza Radford  [facility] Physician [facility]  NEUROLOGY [address]  Scheduled Appointment: 06/18/2025    [facility]  ENDOCRIN [address]  Scheduled Appointment: 06/20/2025    Aniyah Julian  [facility] Physician [facility]  RHEUM [address]  Scheduled Appointment: 07/29/2025    [facility]  ONCPAINMGT [address] [place] [address]   Scheduled Appointment: 08/11/2025    ----------------------------------------------- 59 year old male with PMH of ethanol use disorder, chronic pancreatitis, poorly controlled СЕРГЕЙ (A1c 9.9 on 04/23/2025), chronic neuropathy of LLE, recent hospitalization April 2025 for sciatica; who presented to [facility] ED on 5/26/25 for progressively worsening BL LE pain, weakness and numbness. He was evaluated by Neurology and underwent bedside EMG which was significant for CIDP (s/p 5 days IVIG and IV steroids). He developed an episode of confusion/AMS (CT Head negative). His hospital course was significant for hyponatremia, and hyperglycemia (both now resolved). Patient now admitted for a multidisciplinary rehab program. 06-03-25 @ 14:29    * Falls history - WNL- await vitamin B12 level  * BL foot drop - await orthotic eval for BL AFOs    #CIDP  - Gait Instability, ADL impairments and Functional impairments: start Comprehensive Rehab Program of PT/OT- 3 hours a day, 5 days a week  - P&O as needed--orthotics referral    - Orthotic eval for BL AFOs     #HTN/HLD  - Amlodipine 10mg daily  - Atorvastatin 10mg at HS  - Losartan 50mg daily - (Home regimen: Telmisartan 40mg daily)    #Latent Autoimmune Diabetes Mellitus  - A1c: 9.9% (April 2025)   - FS AC & HS  - ISS  - Premeal  - Lantus   - Appreciate ongoing hospitalist recs     #Mood  - Duloxetine 60mg daily     #Skin  - Skin  - Pressure injury/Skin: OOB to Chair, PT/OT     #Pain Mgmt   #Neuropathy  #Stiffness/Spasiticity  - Gabapentin 600mg TID   - PRN: Methocarbamol 750mg TID; Tylenol 650mg QID    #GI/Bowel Mgmt   - Pantoprazole   - Senna     #/Bladder Mgmt --voiding     #FEN   - Diet - Regular + Thins  [CCHO]    - Fluid Restriction: 1200mL  - MVI    #Falls history  - Osteoporosis labs - WNL- await vitamin B12 level    #Precautions / PROPHYLAXIS:   - Falls  - ortho: Weight bearing status: WBAT   - Lungs: Aspiration, Incentive Spirometer   - DVT: Lovenox 40 mg daily   ----------------------------------------------  Dr. O's Liaison with Family/Providers:    -----------------------------------------------  OUTPATIENT/FOLLOW UP:    Mirza Radford   Neurology    Bárbara Hays  Neurology  [address] [facility] [facility], [address]  [place] [address]  Phone: [phone]  Fax: [phone]  Established Patient  Follow Up Time:     [facility] Endocrinology  Endocrinology  [address] [place] [address]  Phone: [phone]  Fax:     Scheduled Appts:  Mirza Radford  [facility]  NEUROLOGY [address] [facility]  Scheduled Appointment: 06/18/2025    [facility]  ENDOCRIN [address]  Scheduled Appointment: 06/20/2025    Aniyah Julian  [facility]  RHEUM [address] [facility] [facility] [facility] [address]  Scheduled Appointment: 07/29/2025    [facility]  ONCPAINMGT [address] [place] [address]   Scheduled Appointment: 08/11/2025    -----------------------------------------------

## 2025-06-04 NOTE — ADVANCED PRACTICE NURSE CONSULT - ASSESSMENT
HPI 60 y/o M w/ hx of СЕРГЕЙ diagnosed in 2021 with DKA and + ZnT8 ab. Complicated by neuropathy. Follows with Dr. Celis in [place]. At home on Basaglar 15-20 units qhs and Lispro via Cequr patch 8-12 units with meals. Some nonadherence diet. Has been better with adherence to insulin administration, still with mostly hyperglycemia monitored on Freestyle Geronimo 3 CGM. No reported hypoglycemia or symptoms of hypoglycemia. Mother with hx of Type 2 DM.   Also hx of chronic pancreatitis, poorly controlled СЕРГЕЙ (A1c 9.9 on 04/23/2025), chronic neuropathy of LLE who presents to [facility] ED on 05/26/2025 for ongoing LLE pain and subjective weakness as well as RLE pain and weakness. Neurology consulted.  Now with severe hyperglycemia on high dose of Solumedrol 5/28-6/1.     Last hospital admission 04/2025  Patient has seen Dr. Boone (Orthopedic Spine Surgery) and PCP outpatient and referred to Neurology, Dr. Bárbara Hays. Sent to ED for LP and spine imaging. Dr. Hays via Teams. She is concerned for possible transverse myelitis at T4 vs. AIDP vs. diabetic neuropathy. Patient has had entire MRI of the neuro-axis in April 2025, but without contrast. Findings were non-actionable.  Patient reported in October 2024 he developed excruciating pain in the dorsum of his LEFT foot. Painful to even light touch. Also developed cramping pain in the back of his RLE. Over the three weeks prior to the hospitalization, he developed numbness at the level of his rib cage upward - front and back.   During hospitalization, patient exam consistent w sciatica and neuropathy of LLE. Patient was prescribed gabapentin 300 mg tid w some improvement. MRI brain and CTL spine wwo were without acute pathology. Patient seen by endo for further management of his diabetes while in [facility]. .  His hospital course was significant for hyponatremia, and hyperglycemia (both now resolved). PM&R was consulted and deemed him an appropriate candidate for IRF. He was medically stabilized and cleared for discharge to [facility].     HgbA1c- 9.9 (4/23/20250  eGFR-  105 (6/4/2025)   	  Current In-patient Diabetes Regimen-  insulin glargine Injectable (LANTUS) 16 Unit(s) SubCutaneous at bedtime  insulin lispro (ADMELOG) corrective regimen sliding scale   SubCutaneous three times a day before meals  insulin lispro (ADMELOG) corrective regimen sliding scale   SubCutaneous at bedtime  insulin lispro Injectable (ADMELOG) 8 Unit(s) SubCutaneous three times a day before meals  Home Diabetes medication Regimen-  · 	NovoLOG FlexPen 100 units/mL injectable solution: Last Dose Taken:  , 12 unit(s) injectable 3 times a day Give 8 to 12 units before meals.  · 	Basaglar KwikPen 100 units/mL subcutaneous solution: Last Dose Taken:  , 20 unit(s) subcutaneous once a day (at bedtime)  Treats hypoglycemia with glucose powder- 15 GM carbs  Wears FS Geronimo 3 plus    Endocrinology- Dr. Celis in [place]    Pt and wife present for my visit. Pt and wife knowledgeable regarding diabetes management although they report thy were told pt has Type 3 C diabetes not СЕРГЕЙ. Advised, СЕРГЕЙ and Type 3 c diabetes managed same way. PT uses FS Geronimo 3 plus and cequor insulin patch to manage BG at home. Discussed advantages of insulin pump for BG control. Pt will discuss with outpt Endocrinologist.    Pt and wife report that pt has been taking glucose powder packs and eating to maintain BG above 70.  Advised pt to inform RN if Bg 100 or less and take sanck of 15 GMs of carnbs with protein. This way insulin regimen can be adjusted.  Advised pt to notify RN for any CGM alarms < 70 or s/s of hypoglycemia so hospital hypoglycemia procedure can be followed. Pt/wife in agreement. Advised nurse to put glucose powder packets in medication closet. Pt may use this in place of juice/soda/glucose gel since it is 15 GM of carbs.   PT oral intake is varied which is resulting in labile BG- will try to match intake to insulin dose better.    Pt does know how to check BG and use BG meter   Pt demonstrated proper use of insulin pen and pt was able to dial to correct units when asked.     Primary RN to Provide written resources of Leaving the hospital with Diabetes, A1c goal, BG Goals, BG Monitoring, Insulin pen instruction, sharps disposal, Hyper and Hypoglycemia s/s and tx, sick day management, and Preventing complications due to diabetes & educate pt on how to access diabetes education Videos. Pt validated education via show back.

## 2025-06-04 NOTE — CONSULT NOTE ADULT - ASSESSMENT
59 year old male with PMH of ethanol use disorder, chronic pancreatitis, poorly controlled СЕРГЕЙ (A1c 9.9 on 04/23/2025), chronic neuropathy of LLE, recent hospitalization April 2025 for sciatica; who presented to [facility] ED on 5/26/25 for progressively worsening BL LE pain, weakness and numbness. He was evaluated by Neurology and underwent bedside EMG which was significant for CIDP (s/p 5 days IVIG and IV steroids). He developed an episode of confusion/AMS (CT Head negative). His hospital course was significant for hyponatremia, and hyperglycemia (both now resolved). Patient now admitted for a multidisciplinary rehab program on 6/3    #CIDP  -MRI lumbar spine wwo no evidence of compression or enhancement  -MRI lumbosacral plexus R and L wwo no abnormalities  -s/p EMG/NCS with evidence of demyelinating process and chronic denervation   -s/p IVIG and IV steroids x 5 days   -Fall precaution  -Pain control and bowel regimen per rehab team  -Comprehensive Rehab Program w/ PT/OT  -Orthotic eval for BL AFOs   -Outpatient follow up with neurology    #HTN/HLD  -Continue Amlodipine and Losartan (Home regimen: Telmisartan 40mg daily)  -Monitor BP   -Continue Atorvastatin    #Latent Autoimmune Diabetes in Adults  #Peripheral Neuropathy  -HbA1c: 9.9% on 4/23/2025  -Continue Lantus 16units QHS  -Continue premeal admelog 8units TID w/ meals   -RAISS  -Monitor FS, adjust regimen as needed  -Continue gabapentin     #Mild Hyponatremia  -Na 130 (131 when corrected for glucose)  -    #Mood  -Continue Duloxetine     #GI PPx  -PPI     #/Bladder Mgmt --voiding     #DVT PPx  -Lovenox SC      59 year old male with PMH of ethanol use disorder, chronic pancreatitis, poorly controlled СЕРГЕЙ (A1c 9.9 on 04/23/2025), chronic neuropathy of LLE, recent hospitalization April 2025 for sciatica; who presented to [facility] ED on 5/26/25 for progressively worsening BL LE pain, weakness and numbness. He was evaluated by Neurology and underwent bedside EMG which was significant for CIDP (s/p 5 days IVIG and IV steroids). He developed an episode of confusion/AMS (CT Head negative). His hospital course was significant for hyponatremia, and hyperglycemia (both now resolved). Patient now admitted for a multidisciplinary rehab program on 6/3    #CIDP  -MRI lumbar spine wwo no evidence of compression or enhancement  -MRI lumbosacral plexus R and L wwo no abnormalities  -s/p EMG/NCS with evidence of demyelinating process and chronic denervation   -s/p IVIG and IV steroids x 5 days   -Fall precaution  -Pain control and bowel regimen per rehab team  -Comprehensive Rehab Program w/ PT/OT  -Orthotic eval for BL AFOs   -Outpatient follow up with neurology    #HTN/HLD  -Continue Amlodipine and Losartan (Home regimen: Telmisartan 40mg daily)  -Monitor BP   -Continue Atorvastatin    #Latent Autoimmune Diabetes in Adults  #Peripheral Neuropathy  -HbA1c: 9.9% on 4/23/2025  -Continue Lantus 16units QHS  -Continue premeal admelog 8units TID w/ meals   -RAISS  -Monitor FS, adjust regimen as needed  -Continue gabapentin     #Mild Hyponatremia  -Na 130 (131 when corrected for glucose)  -Seen by nephrology during his hospitalization, w/ impression: acute on chronic hyponatremia likely iso increased FW water/poor solute intake and possible SIADH from pain and duloxetine use  -Was on UreNa during his hospitalization and d/c'd once Na improved  -Continue free water restriction to <1.2L/day  -Encourage oral intake  -Monitor BMP     #Mood  -Continue Duloxetine     #GI PPx  -PPI     #DVT PPx  -Lovenox SC     Discussed with patient and rehab team

## 2025-06-04 NOTE — ADVANCED PRACTICE NURSE CONSULT - RECOMMEDATIONS
1.	BG Goal 100- 180 mg/dL   2.	c/w insulin glargine Injectable (LANTUS) 16 Unit(s) SubCutaneous at bedtime  3.	change insulin lispro (ADMELOG) from moderate to low corrective regimen sliding scale   SubCutaneous three times a day before meals  4.	insulin lispro (ADMELOG) corrective regimen sliding scale   SubCutaneous at bedtime  5.	administer admelog 15 minutes (no later) into meal based on % orf meal consumed- insulin lispro Injectable (ADMELOG) 8 units if consumes 75 or greater, 6 units for 50- 75 %, 4 units for less than 50 %.     Discharge Recommendations:  Basaglar 15-20 units qhs and Lispro insulin via Cequr patch 8-12 units with meals  FS chay 3 plus CGM

## 2025-06-04 NOTE — PROGRESS NOTE ADULT - SUBJECTIVE AND OBJECTIVE BOX
CC: CIDP    Today's Subjective & Objective Findings:  Patient seen and examined at bedside this AM.  Admits to sleeping well.  Admits he needs to become more comfortable with using call bell for assistance.   Noted R HF and KE weakness.  Discussed awaiting orthotic eval for BL AFOs.     Denies headache, dizziness, visual changes, chest pain, SOB/RUBIO, abdominal pain, nausea, vomiting, diarrhea, dysuria.  + numbness/tingling in BL LEs.    Therapy-- engaging, motivated    VITALS  T(C): 36.2 (06-04-25 @ 07:36), Max: 36.8 (06-03-25 @ 13:50)  HR: 95 (06-04-25 @ 07:36) (94 - 98)  BP: 149/83 (06-04-25 @ 07:36) (119/71 - 149/83)  RR: 16 (06-04-25 @ 07:36) (16 - 16)  SpO2: 99% (06-04-25 @ 07:36) (99% - 99%)      PHYSICAL EXAM  Constitutional - No acute distress, Comfortable  Neck - Supple  Cardiovascular - Palpable peripheral pulses   Respiratory/Chest- Symmetrical chest expansion, No dyspnea  Abdomen - Soft, Non-tender  Extremities - No cyanosis, No edema,   Neurologic Exam - Alert, Oriented, Interactive  Sensory - Light touch sensation intact  Motor Function - Moves all extremities spontaneously  Skin -  Intact        LABS:                        14.2   7.25  )-----------( 211      ( 04 Jun 2025 06:21 )             40.7     06-04    130[L]  |  95[L]  |  12  ----------------------------<  161[H]  4.2   |  31  |  0.77    Ca    9.2      04 Jun 2025 06:21    TPro  7.7  /  Alb  3.0[L]  /  TBili  0.7  /  DBili  x   /  AST  32  /  ALT  59[H]  /  AlkPhos  52  06-04      Urinalysis Basic - ( 04 Jun 2025 06:21 )    Color: x / Appearance: x / SG: x / pH: x  Gluc: 161 mg/dL / Ketone: x  / Bili: x / Urobili: x   Blood: x / Protein: x / Nitrite: x   Leuk Esterase: x / RBC: x / WBC x   Sq Epi: x / Non Sq Epi: x / Bacteria: x      CAPILLARY BLOOD GLUCOSE  POCT Blood Glucose.: 164 mg/dL (04 Jun 2025 07:39)  POCT Blood Glucose.: 187 mg/dL (03 Jun 2025 21:58)  POCT Blood Glucose.: 232 mg/dL (03 Jun 2025 16:55)  POCT Blood Glucose.: 110 mg/dL (03 Jun 2025 13:46)  POCT Blood Glucose.: 130 mg/dL (03 Jun 2025 11:29)        MEDICATIONS  (STANDING):  amLODIPine   Tablet 10 milliGRAM(s) Oral daily  atorvastatin 10 milliGRAM(s) Oral at bedtime  dextrose 5%. 1000 milliLiter(s) (50 mL/Hr) IV Continuous <Continuous>  dextrose 5%. 1000 milliLiter(s) (100 mL/Hr) IV Continuous <Continuous>  dextrose 50% Injectable 25 Gram(s) IV Push once  dextrose 50% Injectable 12.5 Gram(s) IV Push once  dextrose 50% Injectable 25 Gram(s) IV Push once  DULoxetine 60 milliGRAM(s) Oral daily  enoxaparin Injectable 40 milliGRAM(s) SubCutaneous every 24 hours  gabapentin 600 milliGRAM(s) Oral three times a day  glucagon  Injectable 1 milliGRAM(s) IntraMuscular once  insulin glargine Injectable (LANTUS) 16 Unit(s) SubCutaneous at bedtime  insulin lispro (ADMELOG) corrective regimen sliding scale   SubCutaneous three times a day before meals  insulin lispro (ADMELOG) corrective regimen sliding scale   SubCutaneous at bedtime  insulin lispro Injectable (ADMELOG) 8 Unit(s) SubCutaneous three times a day before meals  losartan 50 milliGRAM(s) Oral daily  multivitamin 1 Tablet(s) Oral daily  pantoprazole    Tablet 40 milliGRAM(s) Oral before breakfast  senna 2 Tablet(s) Oral at bedtime    MEDICATIONS  (PRN):  acetaminophen     Tablet .. 650 milliGRAM(s) Oral every 6 hours PRN Mild Pain (1 - 3)  dextrose Oral Gel 15 Gram(s) Oral once PRN Blood Glucose LESS THAN 70 milliGRAM(s)/deciliter  methocarbamol 750 milliGRAM(s) Oral three times a day PRN Muscle Spasm   CC: CIDP    Today's Subjective & Objective Findings:  Patient seen and examined at bedside this AM.  Admits to sleeping well.  Admits he needs to become more comfortable with using call bell for assistance.   Noted R HF and KE weakness.  Discussed awaiting orthotic eval for BL AFOs.     Denies headache, dizziness, visual changes, chest pain, SOB/RUBIO, abdominal pain, nausea, vomiting, diarrhea, dysuria.  + numbness/tingling in BL LEs.    Therapy  supine to/from sit with bedrails min A x 1  SPT mod A x 1 bed to/from WC  rolling CG a x 1  PT became orthostatic OOB and symptomatic.        VITALS  T(C): 36.2 (06-04-25 @ 07:36), Max: 36.8 (06-03-25 @ 13:50)  HR: 95 (06-04-25 @ 07:36) (94 - 98)  BP: 149/83 (06-04-25 @ 07:36) (119/71 - 149/83)  RR: 16 (06-04-25 @ 07:36) (16 - 16)  SpO2: 99% (06-04-25 @ 07:36) (99% - 99%)      PHYSICAL EXAM  Constitutional -, Comfortable  Neck - Supple  Cardiovascular - Palpable peripheral pulses   Respiratory/Chest- Symmetrical chest expansion, No dyspnea  Abdomen - Soft, Non-tender  Extremities - No cyanosis, No edema,     Neurologic Exam - Alert, Oriented, Interactive  Sensory - Light touch sensation intact  Motor Function - Moves all extremities spontaneously, except for limited strength both ankles  Skin -  Intact        LABS:                        14.2   7.25  )-----------( 211      ( 04 Jun 2025 06:21 )             40.7     06-04    130[L]  |  95[L]  |  12  ----------------------------<  161[H]  4.2   |  31  |  0.77    Ca    9.2      04 Jun 2025 06:21    TPro  7.7  /  Alb  3.0[L]  /  TBili  0.7  /  DBili  x   /  AST  32  /  ALT  59[H]  /  AlkPhos  52  06-04      Urinalysis Basic - ( 04 Jun 2025 06:21 )    Color: x / Appearance: x / SG: x / pH: x  Gluc: 161 mg/dL / Ketone: x  / Bili: x / Urobili: x   Blood: x / Protein: x / Nitrite: x   Leuk Esterase: x / RBC: x / WBC x   Sq Epi: x / Non Sq Epi: x / Bacteria: x      CAPILLARY BLOOD GLUCOSE  POCT Blood Glucose.: 164 mg/dL (04 Jun 2025 07:39)  POCT Blood Glucose.: 187 mg/dL (03 Jun 2025 21:58)  POCT Blood Glucose.: 232 mg/dL (03 Jun 2025 16:55)  POCT Blood Glucose.: 110 mg/dL (03 Jun 2025 13:46)  POCT Blood Glucose.: 130 mg/dL (03 Jun 2025 11:29)        MEDICATIONS  (STANDING):  amLODIPine   Tablet 10 milliGRAM(s) Oral daily  atorvastatin 10 milliGRAM(s) Oral at bedtime  dextrose 5%. 1000 milliLiter(s) (50 mL/Hr) IV Continuous <Continuous>  dextrose 5%. 1000 milliLiter(s) (100 mL/Hr) IV Continuous <Continuous>  dextrose 50% Injectable 25 Gram(s) IV Push once  dextrose 50% Injectable 12.5 Gram(s) IV Push once  dextrose 50% Injectable 25 Gram(s) IV Push once  DULoxetine 60 milliGRAM(s) Oral daily  enoxaparin Injectable 40 milliGRAM(s) SubCutaneous every 24 hours  gabapentin 600 milliGRAM(s) Oral three times a day  glucagon  Injectable 1 milliGRAM(s) IntraMuscular once  insulin glargine Injectable (LANTUS) 16 Unit(s) SubCutaneous at bedtime  insulin lispro (ADMELOG) corrective regimen sliding scale   SubCutaneous three times a day before meals  insulin lispro (ADMELOG) corrective regimen sliding scale   SubCutaneous at bedtime  insulin lispro Injectable (ADMELOG) 8 Unit(s) SubCutaneous three times a day before meals  losartan 50 milliGRAM(s) Oral daily  multivitamin 1 Tablet(s) Oral daily  pantoprazole    Tablet 40 milliGRAM(s) Oral before breakfast  senna 2 Tablet(s) Oral at bedtime    MEDICATIONS  (PRN):  acetaminophen     Tablet .. 650 milliGRAM(s) Oral every 6 hours PRN Mild Pain (1 - 3)  dextrose Oral Gel 15 Gram(s) Oral once PRN Blood Glucose LESS THAN 70 milliGRAM(s)/deciliter  methocarbamol 750 milliGRAM(s) Oral three times a day PRN Muscle Spasm

## 2025-06-05 LAB
ALBUMIN SERPL ELPH-MCNC: 2.9 G/DL — LOW (ref 3.3–5)
ALP SERPL-CCNC: 103 U/L — SIGNIFICANT CHANGE UP (ref 40–120)
ALT FLD-CCNC: 51 U/L — HIGH (ref 10–45)
ANION GAP SERPL CALC-SCNC: 4 MMOL/L — LOW (ref 5–17)
AST SERPL-CCNC: 24 U/L — SIGNIFICANT CHANGE UP (ref 10–40)
BASOPHILS # BLD AUTO: 0.01 K/UL — SIGNIFICANT CHANGE UP (ref 0–0.2)
BASOPHILS NFR BLD AUTO: 0.2 % — SIGNIFICANT CHANGE UP (ref 0–2)
BILIRUB SERPL-MCNC: 0.4 MG/DL — SIGNIFICANT CHANGE UP (ref 0.2–1.2)
BUN SERPL-MCNC: 12 MG/DL — SIGNIFICANT CHANGE UP (ref 7–23)
CALCIUM SERPL-MCNC: 9.2 MG/DL — SIGNIFICANT CHANGE UP (ref 8.4–10.5)
CHLORIDE SERPL-SCNC: 95 MMOL/L — LOW (ref 96–108)
CO2 SERPL-SCNC: 30 MMOL/L — SIGNIFICANT CHANGE UP (ref 22–31)
CREAT SERPL-MCNC: 0.84 MG/DL — SIGNIFICANT CHANGE UP (ref 0.5–1.3)
EGFR: 102 ML/MIN/1.73M2 — SIGNIFICANT CHANGE UP
EGFR: 102 ML/MIN/1.73M2 — SIGNIFICANT CHANGE UP
EOSINOPHIL # BLD AUTO: 0.39 K/UL — SIGNIFICANT CHANGE UP (ref 0–0.5)
EOSINOPHIL NFR BLD AUTO: 7.2 % — HIGH (ref 0–6)
GLUCOSE BLDC GLUCOMTR-MCNC: 199 MG/DL — HIGH (ref 70–99)
GLUCOSE BLDC GLUCOMTR-MCNC: 216 MG/DL — HIGH (ref 70–99)
GLUCOSE BLDC GLUCOMTR-MCNC: 386 MG/DL — HIGH (ref 70–99)
GLUCOSE BLDC GLUCOMTR-MCNC: 85 MG/DL — SIGNIFICANT CHANGE UP (ref 70–99)
GLUCOSE BLDC GLUCOMTR-MCNC: 95 MG/DL — SIGNIFICANT CHANGE UP (ref 70–99)
GLUCOSE SERPL-MCNC: 410 MG/DL — HIGH (ref 70–99)
HCT VFR BLD CALC: 39.4 % — SIGNIFICANT CHANGE UP (ref 39–50)
HGB BLD-MCNC: 13.8 G/DL — SIGNIFICANT CHANGE UP (ref 13–17)
IMM GRANULOCYTES NFR BLD AUTO: 0.4 % — SIGNIFICANT CHANGE UP (ref 0–0.9)
LYMPHOCYTES # BLD AUTO: 2.43 K/UL — SIGNIFICANT CHANGE UP (ref 1–3.3)
LYMPHOCYTES # BLD AUTO: 44.7 % — HIGH (ref 13–44)
MCHC RBC-ENTMCNC: 30.6 PG — SIGNIFICANT CHANGE UP (ref 27–34)
MCHC RBC-ENTMCNC: 35 G/DL — SIGNIFICANT CHANGE UP (ref 32–36)
MCV RBC AUTO: 87.4 FL — SIGNIFICANT CHANGE UP (ref 80–100)
MONOCYTES # BLD AUTO: 0.58 K/UL — SIGNIFICANT CHANGE UP (ref 0–0.9)
MONOCYTES NFR BLD AUTO: 10.7 % — SIGNIFICANT CHANGE UP (ref 2–14)
NEUTROPHILS # BLD AUTO: 2.01 K/UL — SIGNIFICANT CHANGE UP (ref 1.8–7.4)
NEUTROPHILS NFR BLD AUTO: 36.8 % — LOW (ref 43–77)
NRBC BLD AUTO-RTO: 0 /100 WBCS — SIGNIFICANT CHANGE UP (ref 0–0)
PLATELET # BLD AUTO: 208 K/UL — SIGNIFICANT CHANGE UP (ref 150–400)
POTASSIUM SERPL-MCNC: 5.3 MMOL/L — SIGNIFICANT CHANGE UP (ref 3.5–5.3)
POTASSIUM SERPL-SCNC: 5.3 MMOL/L — SIGNIFICANT CHANGE UP (ref 3.5–5.3)
PROT SERPL-MCNC: 7.6 G/DL — SIGNIFICANT CHANGE UP (ref 6–8.3)
RBC # BLD: 4.51 M/UL — SIGNIFICANT CHANGE UP (ref 4.2–5.8)
RBC # FLD: 12.1 % — SIGNIFICANT CHANGE UP (ref 10.3–14.5)
SODIUM SERPL-SCNC: 129 MMOL/L — LOW (ref 135–145)
WBC # BLD: 5.44 K/UL — SIGNIFICANT CHANGE UP (ref 3.8–10.5)
WBC # FLD AUTO: 5.44 K/UL — SIGNIFICANT CHANGE UP (ref 3.8–10.5)

## 2025-06-05 PROCEDURE — 99232 SBSQ HOSP IP/OBS MODERATE 35: CPT

## 2025-06-05 PROCEDURE — 99233 SBSQ HOSP IP/OBS HIGH 50: CPT

## 2025-06-05 RX ORDER — AMLODIPINE BESYLATE 10 MG/1
5 TABLET ORAL DAILY
Refills: 0 | Status: DISCONTINUED | OUTPATIENT
Start: 2025-06-06 | End: 2025-06-17

## 2025-06-05 RX ORDER — LOSARTAN POTASSIUM 100 MG/1
50 TABLET, FILM COATED ORAL DAILY
Refills: 0 | Status: DISCONTINUED | OUTPATIENT
Start: 2025-06-06 | End: 2025-06-17

## 2025-06-05 RX ADMIN — ENOXAPARIN SODIUM 40 MILLIGRAM(S): 100 INJECTION SUBCUTANEOUS at 17:29

## 2025-06-05 RX ADMIN — Medication 1 TABLET(S): at 11:40

## 2025-06-05 RX ADMIN — Medication 2 TABLET(S): at 21:33

## 2025-06-05 RX ADMIN — INSULIN LISPRO 8 UNIT(S): 100 INJECTION, SOLUTION INTRAVENOUS; SUBCUTANEOUS at 12:20

## 2025-06-05 RX ADMIN — ATORVASTATIN CALCIUM 10 MILLIGRAM(S): 80 TABLET, FILM COATED ORAL at 21:33

## 2025-06-05 RX ADMIN — INSULIN LISPRO 2: 100 INJECTION, SOLUTION INTRAVENOUS; SUBCUTANEOUS at 17:27

## 2025-06-05 RX ADMIN — INSULIN LISPRO 5: 100 INJECTION, SOLUTION INTRAVENOUS; SUBCUTANEOUS at 08:14

## 2025-06-05 RX ADMIN — GABAPENTIN 600 MILLIGRAM(S): 400 CAPSULE ORAL at 21:34

## 2025-06-05 RX ADMIN — DULOXETINE 60 MILLIGRAM(S): 20 CAPSULE, DELAYED RELEASE ORAL at 11:40

## 2025-06-05 RX ADMIN — GABAPENTIN 600 MILLIGRAM(S): 400 CAPSULE ORAL at 06:15

## 2025-06-05 RX ADMIN — Medication 40 MILLIGRAM(S): at 06:15

## 2025-06-05 RX ADMIN — INSULIN GLARGINE-YFGN 16 UNIT(S): 100 INJECTION, SOLUTION SUBCUTANEOUS at 21:34

## 2025-06-05 RX ADMIN — INSULIN LISPRO 8 UNIT(S): 100 INJECTION, SOLUTION INTRAVENOUS; SUBCUTANEOUS at 08:14

## 2025-06-05 RX ADMIN — LOSARTAN POTASSIUM 50 MILLIGRAM(S): 100 TABLET, FILM COATED ORAL at 06:15

## 2025-06-05 RX ADMIN — INSULIN LISPRO 8 UNIT(S): 100 INJECTION, SOLUTION INTRAVENOUS; SUBCUTANEOUS at 17:27

## 2025-06-05 RX ADMIN — AMLODIPINE BESYLATE 10 MILLIGRAM(S): 10 TABLET ORAL at 06:15

## 2025-06-05 RX ADMIN — GABAPENTIN 600 MILLIGRAM(S): 400 CAPSULE ORAL at 14:08

## 2025-06-05 NOTE — DIETITIAN INITIAL EVALUATION ADULT - ADD RECOMMEND
1. Recommend Ensure Max 11oz PO Daily (Provides 150kcal & 30grams of Protein)   2. Continue to monitor nutritional parameters and clinical course

## 2025-06-05 NOTE — DIETITIAN INITIAL EVALUATION ADULT - PERTINENT LABORATORY DATA
06-05    129[L]  |  95[L]  |  12  ----------------------------<  410[H]  5.3   |  30  |  0.84    Ca    9.2      05 Jun 2025 05:52    TPro  7.6  /  Alb  2.9[L]  /  TBili  0.4  /  DBili  x   /  AST  24  /  ALT  51[H]  /  AlkPhos  103  06-05  POCT Blood Glucose.: 95 mg/dL (06-05-25 @ 11:35)  A1C with Estimated Average Glucose Result: 9.9 % (04-23-25 @ 04:00)  A1C with Estimated Average Glucose Result: 10.3 % (02-20-25 @ 14:58)  A1C with Estimated Average Glucose Result: 9.7 % (02-18-25 @ 07:30)

## 2025-06-05 NOTE — PROGRESS NOTE ADULT - ASSESSMENT
59 year old male with PMH of ethanol use disorder, chronic pancreatitis, poorly controlled СЕРГЕЙ (A1c 9.9 on 04/23/2025), chronic neuropathy of LLE, recent hospitalization April 2025 for sciatica; who presented to [facility] ED on 5/26/25 for progressively worsening BL LE pain, weakness and numbness. He was evaluated by Neurology and underwent bedside EMG which was significant for CIDP (s/p 5 days IVIG and IV steroids). He developed an episode of confusion/AMS (CT Head negative). His hospital course was significant for hyponatremia, and hyperglycemia (both now resolved). Patient now admitted for a multidisciplinary rehab program on 6/3    #CIDP  -MRI lumbar spine wwo no evidence of compression or enhancement  -MRI lumbosacral plexus R and L wwo no abnormalities  -s/p EMG/NCS with evidence of demyelinating process and chronic denervation   -s/p IVIG and IV steroids x 5 days   -Fall precaution  -Pain control and bowel regimen per rehab team  -Comprehensive Rehab Program w/ PT/OT  -Orthotic eval for BL AFOs   -Outpatient follow up with neurology    #HTN/HLD  -Continue Amlodipine (reduce dose to 5mg QD 6/6 due to low BP w/ therapy) and Losartan (Home regimen: Telmisartan 40mg daily) w/ holding parameters   -Monitor BP   -Continue Atorvastatin    #Latent Autoimmune Diabetes in Adults, Uncontrolled   #Peripheral Neuropathy  -HbA1c: 9.9% on 4/23/2025, will repeat   -Continue Lantus 16units QHS  -Continue premeal admelog 8units TID w/ meals if he consume >75%, 6untis if he consumes 50-75% and 4units if he consumes <50%   -RAISS (low dose)  -Monitor FS, labile due to dietary indiscretion, he was counseled  -Continue gabapentin   -Diabetes educator following     #"Pseudohyponatremia  -Na 129 -> 134 when corrected for glucose   -Seen by nephrology during his hospitalization, w/ impression: acute on chronic hyponatremia likely iso increased FW water/poor solute intake and possible SIADH from pain and duloxetine use  -Was on UreNa during his hospitalization and d/c'd once Na improved  -Continue free water restriction to <1.2L/day  -Encourage oral intake  -Monitor BMP     #Mood  -Continue Duloxetine     #Incidental Findings   -CTA neck showed Less than 50% stenosis of the proximal right ICA. At least 60% stenosis of the proximal left ICA.  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

## 2025-06-05 NOTE — PROGRESS NOTE ADULT - SUBJECTIVE AND OBJECTIVE BOX
CC: CIDP    Today's Subjective & Objective Findings:  Patient seen and examined in therapy this AM.   Admits to sleeping well.  He is becoming more comfortable with using call bell for assistance.   Noted R HF and KE weakness.  Await BL AFOs.     Denies headache, dizziness, visual changes, chest pain, SOB/RUBIO, abdominal pain, nausea, vomiting, diarrhea, dysuria. LBM- 6/4   + numbness/tingling in BL LEs.    Therapy  supine to/from sit with bedrails min A x 1  SPT mod A x 1 bed to/from WC  rolling CG a x 1  PT became orthostatic OOB and symptomatic.        Vital Signs Last 24 Hrs  T(C): 36.8 (05 Jun 2025 08:28), Max: 36.8 (04 Jun 2025 20:41)  T(F): 98.2 (05 Jun 2025 08:28), Max: 98.2 (04 Jun 2025 20:41)  HR: 114 (05 Jun 2025 08:28) (93 - 114)  BP: 134/81 (05 Jun 2025 08:28) (97/70 - 148/91)  BP(mean): --  RR: 16 (05 Jun 2025 08:28) (16 - 16)  SpO2: 98% (05 Jun 2025 08:28) (98% - 99%)      PHYSICAL EXAM  Constitutional -, Comfortable  Neck - Supple  Cardiovascular - Palpable peripheral pulses   Respiratory/Chest- Symmetrical chest expansion, No dyspnea  Abdomen - Soft, Non-tender  Extremities - No cyanosis, No edema,     Neurologic Exam - Alert, Oriented, Interactive  Sensory - Light touch sensation intact  Motor Function - Moves all extremities spontaneously, except for limited strength both ankles  Skin -  Intact      LABS:                        13.8   5.44  )-----------( 208      ( 05 Jun 2025 05:52 )             39.4     06-05    129[L]  |  95[L]  |  12  ----------------------------<  410[H]  5.3   |  30  |  0.84    Ca    9.2      05 Jun 2025 05:52    TPro  7.6  /  Alb  2.9[L]  /  TBili  0.4  /  DBili  x   /  AST  24  /  ALT  51[H]  /  AlkPhos  103  06-05      Urinalysis Basic - ( 05 Jun 2025 05:52 )    Color: x / Appearance: x / SG: x / pH: x  Gluc: 410 mg/dL / Ketone: x  / Bili: x / Urobili: x   Blood: x / Protein: x / Nitrite: x   Leuk Esterase: x / RBC: x / WBC x   Sq Epi: x / Non Sq Epi: x / Bacteria: x        CAPILLARY BLOOD GLUCOSE  POCT Blood Glucose.: 386 mg/dL (05 Jun 2025 08:08)  POCT Blood Glucose.: 385 mg/dL (04 Jun 2025 21:30)  POCT Blood Glucose.: 301 mg/dL (04 Jun 2025 16:47)  POCT Blood Glucose.: 84 mg/dL (04 Jun 2025 14:15)  POCT Blood Glucose.: 186 mg/dL (04 Jun 2025 12:45)  POCT Blood Glucose.: 77 mg/dL (04 Jun 2025 09:38)        MEDICATIONS  (STANDING):  amLODIPine   Tablet 10 milliGRAM(s) Oral daily  atorvastatin 10 milliGRAM(s) Oral at bedtime  dextrose 5%. 1000 milliLiter(s) (50 mL/Hr) IV Continuous <Continuous>  dextrose 5%. 1000 milliLiter(s) (100 mL/Hr) IV Continuous <Continuous>  dextrose 50% Injectable 25 Gram(s) IV Push once  dextrose 50% Injectable 12.5 Gram(s) IV Push once  dextrose 50% Injectable 25 Gram(s) IV Push once  DULoxetine 60 milliGRAM(s) Oral daily  enoxaparin Injectable 40 milliGRAM(s) SubCutaneous every 24 hours  gabapentin 600 milliGRAM(s) Oral three times a day  glucagon  Injectable 1 milliGRAM(s) IntraMuscular once  insulin glargine Injectable (LANTUS) 16 Unit(s) SubCutaneous at bedtime  insulin lispro (ADMELOG) corrective regimen sliding scale   SubCutaneous three times a day before meals  insulin lispro (ADMELOG) corrective regimen sliding scale   SubCutaneous at bedtime  insulin lispro Injectable (ADMELOG) 8 Unit(s) SubCutaneous three times a day with meals  losartan 50 milliGRAM(s) Oral daily  multivitamin 1 Tablet(s) Oral daily  pantoprazole    Tablet 40 milliGRAM(s) Oral before breakfast  senna 2 Tablet(s) Oral at bedtime    MEDICATIONS  (PRN):  acetaminophen     Tablet .. 650 milliGRAM(s) Oral every 6 hours PRN Mild Pain (1 - 3)  dextrose Oral Gel 15 Gram(s) Oral once PRN Blood Glucose LESS THAN 70 milliGRAM(s)/deciliter  methocarbamol 750 milliGRAM(s) Oral three times a day PRN Muscle Spasm   CC: CIDP    Today's Subjective & Objective Findings:  Patient seen and examined in therapy this AM. and observed in therapy   Admits to sleeping well.  He is becoming more comfortable with using call bell for assistance.   Noted Rt HF and KE weakness.  Await BL AFOs. to address b/l ankle DF weakness    Denies headache, dizziness, visual changes, chest pain, SOB/RUBIO, abdominal pain, nausea, vomiting, diarrhea, dysuria. LBM- 6/4   + numbness/tingling in BL LEs.    Function--improvement with ambulatory distance  Ambulated 30 ft with RW with bilateral DF A with min/mod A with WC follow  (multiple trials)  Stairs: 2 steps with bilateral HR with bilateral DF A with mod A of 1 and CG A  of another    Vital Signs Last 24 Hrs  T(C): 36.8 (05 Jun 2025 08:28), Max: 36.8 (04 Jun 2025 20:41)  T(F): 98.2 (05 Jun 2025 08:28), Max: 98.2 (04 Jun 2025 20:41)  HR: 114 (05 Jun 2025 08:28) (93 - 114)  BP: 134/81 (05 Jun 2025 08:28) (97/70 - 148/91)  RR: 16 (05 Jun 2025 08:28) (16 - 16)  SpO2: 98% (05 Jun 2025 08:28) (98% - 99%)      PHYSICAL EXAM  Constitutional -, Comfortable  Neck - Supple  Cardiovascular - Palpable peripheral pulses   Respiratory/Chest- Symmetrical chest expansion, No dyspnea  Abdomen - Soft, Non-tender  Extremities - No cyanosis, No edema,     Neurologic Exam - Alert, Oriented, Interactive  Sensory - Light touch sensation intact  Motor Function - Moves all extremities spontaneously, except for limited strength both ankles and Rt hip  Skin -  Intact      LABS:                        13.8   5.44  )-----------( 208      ( 05 Jun 2025 05:52 )             39.4     06-05    129[L]  |  95[L]  |  12  ----------------------------<  410[H]  5.3   |  30  |  0.84    Ca    9.2      05 Jun 2025 05:52    TPro  7.6  /  Alb  2.9[L]  /  TBili  0.4  /  DBili  x   /  AST  24  /  ALT  51[H]  /  AlkPhos  103  06-05      Urinalysis Basic - ( 05 Jun 2025 05:52 )    Color: x / Appearance: x / SG: x / pH: x  Gluc: 410 mg/dL / Ketone: x  / Bili: x / Urobili: x   Blood: x / Protein: x / Nitrite: x   Leuk Esterase: x / RBC: x / WBC x   Sq Epi: x / Non Sq Epi: x / Bacteria: x        CAPILLARY BLOOD GLUCOSE  POCT Blood Glucose.: 386 mg/dL (05 Jun 2025 08:08)  POCT Blood Glucose.: 385 mg/dL (04 Jun 2025 21:30)  POCT Blood Glucose.: 301 mg/dL (04 Jun 2025 16:47)  POCT Blood Glucose.: 84 mg/dL (04 Jun 2025 14:15)  POCT Blood Glucose.: 186 mg/dL (04 Jun 2025 12:45)  POCT Blood Glucose.: 77 mg/dL (04 Jun 2025 09:38)        MEDICATIONS  (STANDING):  amLODIPine   Tablet 10 milliGRAM(s) Oral daily  atorvastatin 10 milliGRAM(s) Oral at bedtime  dextrose 5%. 1000 milliLiter(s) (50 mL/Hr) IV Continuous <Continuous>  dextrose 5%. 1000 milliLiter(s) (100 mL/Hr) IV Continuous <Continuous>  dextrose 50% Injectable 25 Gram(s) IV Push once  dextrose 50% Injectable 12.5 Gram(s) IV Push once  dextrose 50% Injectable 25 Gram(s) IV Push once  DULoxetine 60 milliGRAM(s) Oral daily  enoxaparin Injectable 40 milliGRAM(s) SubCutaneous every 24 hours  gabapentin 600 milliGRAM(s) Oral three times a day  glucagon  Injectable 1 milliGRAM(s) IntraMuscular once  insulin glargine Injectable (LANTUS) 16 Unit(s) SubCutaneous at bedtime  insulin lispro (ADMELOG) corrective regimen sliding scale   SubCutaneous three times a day before meals  insulin lispro (ADMELOG) corrective regimen sliding scale   SubCutaneous at bedtime  insulin lispro Injectable (ADMELOG) 8 Unit(s) SubCutaneous three times a day with meals  losartan 50 milliGRAM(s) Oral daily  multivitamin 1 Tablet(s) Oral daily  pantoprazole    Tablet 40 milliGRAM(s) Oral before breakfast  senna 2 Tablet(s) Oral at bedtime    MEDICATIONS  (PRN):  acetaminophen     Tablet .. 650 milliGRAM(s) Oral every 6 hours PRN Mild Pain (1 - 3)  dextrose Oral Gel 15 Gram(s) Oral once PRN Blood Glucose LESS THAN 70 milliGRAM(s)/deciliter  methocarbamol 750 milliGRAM(s) Oral three times a day PRN Muscle Spasm

## 2025-06-05 NOTE — DIETITIAN INITIAL EVALUATION ADULT - NUTRITIONGOAL OUTCOME1
Patient to show improvement in glycemic control (100-180mg/dl at most checks) on Consistent Carbohydrate diet.

## 2025-06-05 NOTE — DIETITIAN INITIAL EVALUATION ADULT - ORAL INTAKE PTA/DIET HISTORY
Pt lying in bed during time of visit. Pt reports having a fair appetite PTA. Pt typically cooks for self and tends to have more of a bland diet.   Pt with hx of etoh abuse - recommend adding thiamine supplementation.   Pt consuming ~ 76 - 100% of meals provided since admission as per nursing documentation.     Pt weight upon admission - 145lbs (06/03/2025)  Pt reports weight stability for last few months however states being 190 lbs ~4 years prior.   Pt endorses weight loss was partially unintentional.     All labs and medications reviewed   A1C 9.9 % (4/23/25)   ^ POCT BG levels - discussed consistent CHO diet both verbal and written materials left with patient. Pt receiving pre meal insulin lispro regardless of PO intakes TID.   Pt with a 1200 mL fluid restriction  Pt lying in bed during time of visit. Pt reports having a fair appetite PTA. Pt typically cooks for self and tends to have more of a bland diet.   Pt with hx of etoh abuse - Thiamine supplementation not warranted at this time, continue MVI.   Pt consuming ~ 76 - 100% of meals provided since admission as per nursing documentation.     Pt weight upon admission - 145lbs (06/03/2025)  Pt reports weight stability for last few months however states being 190 lbs ~4 years prior.   Pt endorses weight loss was partially unintentional.     All labs and medications reviewed   A1C 9.9 % (4/23/25)   ^ POCT BG levels - discussed consistent CHO diet both verbal and written materials left with patient. Pt receiving pre meal insulin lispro regardless of PO intakes TID.   Pt with a 1200 mL fluid restriction

## 2025-06-05 NOTE — DIETITIAN INITIAL EVALUATION ADULT - PERTINENT MEDS FT
MEDICATIONS  (STANDING):  amLODIPine   Tablet 10 milliGRAM(s) Oral daily  atorvastatin 10 milliGRAM(s) Oral at bedtime  dextrose 5%. 1000 milliLiter(s) (50 mL/Hr) IV Continuous <Continuous>  dextrose 5%. 1000 milliLiter(s) (100 mL/Hr) IV Continuous <Continuous>  dextrose 50% Injectable 25 Gram(s) IV Push once  dextrose 50% Injectable 12.5 Gram(s) IV Push once  dextrose 50% Injectable 25 Gram(s) IV Push once  DULoxetine 60 milliGRAM(s) Oral daily  enoxaparin Injectable 40 milliGRAM(s) SubCutaneous every 24 hours  gabapentin 600 milliGRAM(s) Oral three times a day  glucagon  Injectable 1 milliGRAM(s) IntraMuscular once  insulin glargine Injectable (LANTUS) 16 Unit(s) SubCutaneous at bedtime  insulin lispro (ADMELOG) corrective regimen sliding scale   SubCutaneous three times a day before meals  insulin lispro (ADMELOG) corrective regimen sliding scale   SubCutaneous at bedtime  insulin lispro Injectable (ADMELOG) 8 Unit(s) SubCutaneous three times a day with meals  losartan 50 milliGRAM(s) Oral daily  multivitamin 1 Tablet(s) Oral daily  pantoprazole    Tablet 40 milliGRAM(s) Oral before breakfast  senna 2 Tablet(s) Oral at bedtime    MEDICATIONS  (PRN):  acetaminophen     Tablet .. 650 milliGRAM(s) Oral every 6 hours PRN Mild Pain (1 - 3)  dextrose Oral Gel 15 Gram(s) Oral once PRN Blood Glucose LESS THAN 70 milliGRAM(s)/deciliter  methocarbamol 750 milliGRAM(s) Oral three times a day PRN Muscle Spasm

## 2025-06-05 NOTE — PROGRESS NOTE ADULT - NS ATTEND AMEND GEN_ALL_CORE FT
I have made amendments to the documentation where necessary. Additional comments: I have personally seen and examined the patient independently Medical records reviewed. I have made amendments to the documentation where necessary and adjusted the history, physical examination, and plan as documented by the NP.       Patient reports good night rest  Regular BM    Persisting Rt hip and b/l ankle weakness    Labs- Hyponatremia and mild hyperkalemia--F/U Hospitalist recs    Improved ambulatory distance noted  Deficits with stairs negotiating, poor endurance noted    Continue therapy  DVT ppx  Await orthotics review for ankle bracing   Electrolyte derangement, f/u hospitalist recs     Spent 52 mins, patient review, discussion of lab results, observation in therapy and care co ordination

## 2025-06-05 NOTE — PROGRESS NOTE ADULT - ASSESSMENT
59 year old male with PMH of ethanol use disorder, chronic pancreatitis, poorly controlled СЕРГЕЙ (A1c 9.9 on 04/23/2025), chronic neuropathy of LLE, recent hospitalization April 2025 for sciatica; who presented to [facility] ED on 5/26/25 for progressively worsening BL LE pain, weakness and numbness. He was evaluated by Neurology and underwent bedside EMG which was significant for CIDP (s/p 5 days IVIG and IV steroids). He developed an episode of confusion/AMS (CT Head negative). His hospital course was significant for hyponatremia, and hyperglycemia (both now resolved). Patient now admitted for a multidisciplinary rehab program. 06-03-25 @ 14:29      * BL foot drop - await BL AFOs  * R HF weakness - continue to monitor     #CIDP  - Gait Instability, ADL impairments and Functional impairments: start Comprehensive Rehab Program of PT/OT- 3 hours a day, 5 days a week  - P&O as needed--orthotics referral    - Orthotic eval for BL AFOs     #HTN/HLD  - Amlodipine 10mg daily  - Atorvastatin 10mg at HS  - Losartan 50mg daily - (Home regimen: Telmisartan 40mg daily)    #Latent Autoimmune Diabetes Mellitus  - A1c: 9.9% (April 2025)   - FS AC & HS  - ISS  - Premeal  - Lantus   - Appreciate ongoing hospitalist recs     #Mood  - Duloxetine 60mg daily     #Skin  - Skin  - Pressure injury/Skin: OOB to Chair, PT/OT     #Pain Mgmt   #Neuropathy  #Stiffness/Spasiticity  - Gabapentin 600mg TID   - PRN: Methocarbamol 750mg TID; Tylenol 650mg QID    #GI/Bowel Mgmt   - Pantoprazole   - Senna     #/Bladder Mgmt --voiding     #FEN   - Diet - Regular + Thins  [CCHO]    - Fluid Restriction: 1200mL  - MVI    #Falls history  - Osteoporosis labs - WNL    #Precautions / PROPHYLAXIS:   - Falls  - ortho: Weight bearing status: WBAT   - Lungs: Aspiration, Incentive Spirometer   - DVT: Lovenox 40 mg daily   ----------------------------------------------  Dr. Chavira Liaison with Family/Providers:    -----------------------------------------------  OUTPATIENT/FOLLOW UP:    Mirza Radford   Neurology    Bárbara Hays  Neurology  [address] [facility], [address]  [place] [address]  Phone: [phone]  Fax: [phone]  Established Patient  Follow Up Time:     [facility] Endocrinology  Endocrinology  [address] [place] [address]  Phone: [phone]  Fax:     Scheduled Appts:  Mirza Radford  [facility]  NEUROLOGY [address] [facility]  Scheduled Appointment: 06/18/2025    [facility]  ENDOCRIN [address]  Scheduled Appointment: 06/20/2025    Aniyah Julian  [facility]  RHEUM [address] [facility]  Scheduled Appointment: 07/29/2025    [facility]  ONCPAINMGT [address] [place] [address]   Scheduled Appointment: 08/11/2025    ----------------------------------------------- 59 year old male with PMH of ethanol use disorder, chronic pancreatitis, poorly controlled СЕРГЕЙ (A1c 9.9 on 04/23/2025), chronic neuropathy of LLE, recent hospitalization April 2025 for sciatica; who presented to [facility] ED on 5/26/25 for progressively worsening BL LE pain, weakness and numbness. He was evaluated by Neurology and underwent bedside EMG which was significant for CIDP (s/p 5 days IVIG and IV steroids). He developed an episode of confusion/AMS (CT Head negative). His hospital course was significant for hyponatremia, and hyperglycemia (both now resolved). Patient now admitted for a multidisciplinary rehab program. 06-03-25 @ 14:29      * BL foot drop - await BL AFOs  * R HF weakness - continue to monitor     #CIDP  - Gait Instability, ADL impairments and Functional impairments: start Comprehensive Rehab Program of PT/OT- 3 hours a day, 5 days a week  - P&O as needed--orthotics referral      #BL foot drop  - Orthotic eval for BL hinged AFOs   - This patient has a diagnosis of BL foot drop. Patient is ambulatory. Patient requires a custom molded hinged AFO to preserve ankle motion while stabilizing talar and subtalar joints. Device will give a more natural gait without destabilizing the knee and hip. Device requires a low density lining to protect prominent louis areas of effected skin. Patient will need the device for a term of greater than 9 months. No reasonable prefabricated orthosis exists.     #HTN/HLD  - Amlodipine 10mg daily  - Atorvastatin 10mg at HS  - Losartan 50mg daily - (Home regimen: Telmisartan 40mg daily)    #Latent Autoimmune Diabetes Mellitus  - A1c: 9.9% (April 2025)   - FS AC & HS  - ISS  - Premeal  - Lantus   - Appreciate ongoing hospitalist recs     #Mood  - Duloxetine 60mg daily     #Skin  - Skin  - Pressure injury/Skin: OOB to Chair, PT/OT     #Pain Mgmt   #Neuropathy  #Stiffness/Spasiticity  - Gabapentin 600mg TID   - PRN: Methocarbamol 750mg TID; Tylenol 650mg QID    #GI/Bowel Mgmt   - Pantoprazole   - Senna     #/Bladder Mgmt --voiding     #FEN   - Diet - Regular + Thins  [CCHO]    - Fluid Restriction: 1200mL  - MVI    #Falls history  - Osteoporosis labs - WNL    #Precautions / PROPHYLAXIS:   - Falls  - ortho: Weight bearing status: WBAT   - Lungs: Aspiration, Incentive Spirometer   - DVT: Lovenox 40 mg daily   ----------------------------------------------  Dr. Chavira Liaison with Family/Providers:    -----------------------------------------------  OUTPATIENT/FOLLOW UP:    Mirza Radford   Neurology    Bárbara Hays  Neurology  [address] [place] [address], [address]  [place] [address]  Phone: [phone]  Fax: [phone]  Established Patient  Follow Up Time:     [facility] Endocrinology  Endocrinology  [address] [place] [address]  Phone: [phone]  Fax:     Scheduled Appts:  Mirza Radford  [facility]  NEUROLOGY [address] [facility]  Scheduled Appointment: 06/18/2025    [facility]  ENDOCRIN [address]  Scheduled Appointment: 06/20/2025    Aniyah Julian  [facility]  [facility]HEUM [address] [facility]  Scheduled Appointment: 07/29/2025    [facility]  ONCPAINMGT [address]   Scheduled Appointment: 08/11/2025    ----------------------------------------------- 59 year old male with PMH of ethanol use disorder, chronic pancreatitis, poorly controlled СЕРГЕЙ (A1c 9.9 on 04/23/2025), chronic neuropathy of LLE, recent hospitalization April 2025 for sciatica; who presented to [facility] ED on 5/26/25 for progressively worsening BL LE pain, weakness and numbness. He was evaluated by Neurology and underwent bedside EMG which was significant for CIDP (s/p 5 days IVIG and IV steroids). He developed an episode of confusion/AMS (CT Head negative). His hospital course was significant for hyponatremia, and hyperglycemia (both now resolved). Patient now admitted for a multidisciplinary rehab program. 06-03-25 @ 14:29    * Labs discussed, unremarkable except hyponatremia and mild hyperkalemia  * BL foot drop - await BL AFOs  * R HF weakness - continue to monitor     #CIDP  - Gait Instability, ADL impairments and Functional impairments: start Comprehensive Rehab Program of PT/OT- 3 hours a day, 5 days a week  - P&O as needed--orthotics referral      #BL foot drop  - Orthotic eval for BL hinged AFOs   - This patient has a diagnosis of BL foot drop. Patient is ambulatory. Patient requires a custom molded hinged AFO to preserve ankle motion while stabilizing talar and subtalar joints. Device will give a more natural gait without destabilizing the knee and hip. Device requires a low density lining to protect prominent louis areas of effected skin. Patient will need the device for a term of greater than 9 months. No reasonable prefabricated orthosis exists.     #HTN/HLD  - Amlodipine 10mg daily  - Atorvastatin 10mg at HS  - Losartan 50mg daily - (Home regimen: Telmisartan 40mg daily)    #Latent Autoimmune Diabetes Mellitus  - A1c: 9.9% (April 2025)   - FS AC & HS  - Premeal  - Lantus     * Hyponatremia and mild hyperkalemia--F/U Hospitalist recs    #Mood  - Duloxetine 60mg daily     #Skin  - Skin  - Pressure injury/Skin: OOB to Chair, PT/OT     #Pain Mgmt   #Neuropathy  #Stiffness/Spasiticity  - Gabapentin 600mg TID   - PRN: Methocarbamol 750mg TID; Tylenol 650mg QID    #GI/Bowel Mgmt   - Pantoprazole   - Senna     #/Bladder Mgmt --voiding     #FEN   - Diet - Regular + Thins  [CCHO]    - Fluid Restriction: 1200mL  - MVI    #Falls history  - Osteoporosis labs - WNL    #Precautions / PROPHYLAXIS:   - Falls  - ortho: Weight bearing status: WBAT   - Lungs: Aspiration, Incentive Spirometer   - DVT: Lovenox 40 mg daily   ----------------------------------------------  Dr. Chavira Liaison with Family/Providers:    -----------------------------------------------  OUTPATIENT/FOLLOW UP:    Mirza Radford   Neurology    Bárbara Hays  Neurology  [address] [place] [address], [address]  [place] [address]  Phone: [phone]  Fax: [phone]  Established Patient  Follow Up Time:     [facility] Endocrinology  Endocrinology  [address] [place] [address]  Phone: [phone]  Fax:     Scheduled Appts:  Mirza Radford  [facility]  NEUROLOGY [address] [facility]  Scheduled Appointment: 06/18/2025    [facility]  ENDOCRIN [address]  Scheduled Appointment: 06/20/2025    Aniyah Julian  [facility]  RHEUM [address]  Scheduled Appointment: 07/29/2025    [facility]  ONCPAINMGT [address] [place] [address]   Scheduled Appointment: 08/11/2025    ----------------------------------------------- 59 year old male with PMH of ethanol use disorder, chronic pancreatitis, poorly controlled СЕРГЕЙ (A1c 9.9 on 04/23/2025), chronic neuropathy of LLE, recent hospitalization April 2025 for sciatica; who presented to [facility] ED on 5/26/25 for progressively worsening BL LE pain, weakness and numbness. He was evaluated by Neurology and underwent bedside EMG which was significant for CIDP (s/p 5 days IVIG and IV steroids). He developed an episode of confusion/AMS (CT Head negative). His hospital course was significant for hyponatremia, and hyperglycemia (both now resolved). Patient now admitted for a multidisciplinary rehab program. 06-03-25 @ 14:29    * Labs discussed, unremarkable except hyponatremia and mild hyperkalemia  * BL foot drop - await BL AFOs  * R HF weakness - continue to monitor     #CIDP  - Gait Instability, ADL impairments and Functional impairments: start Comprehensive Rehab Program of PT/OT- 3 hours a day, 5 days a week  - P&O as needed--orthotics referral      #BL foot drop  - Orthotic eval for BL hinged AFOs   - This patient has a diagnosis of BL foot drop. Patient is ambulatory. Patient requires a custom molded hinged AFO to preserve ankle motion while stabilizing talar and subtalar joints. Device will give a more natural gait without destabilizing the knee and hip. Device requires a low density lining to protect prominent louis areas of effected skin. Patient will need the device for a term of greater than 9 months. No reasonable prefabricated orthosis exists.     #HTN/HLD  - Amlodipine 10mg daily  - Atorvastatin 10mg at HS  - Losartan 50mg daily - (Home regimen: Telmisartan 40mg daily)    #Latent Autoimmune Diabetes Mellitus  - A1c: 9.9% (April 2025)   - FS AC & HS  - Premeal  - Lantus     * Hyponatremia and mild hyperkalemia--F/U Hospitalist recs    #Mood  - Duloxetine 60mg daily     #Skin  - Skin  - Pressure injury/Skin: OOB to Chair, PT/OT     #Pain Mgmt   #Neuropathy  #Stiffness/Spasiticity  - Gabapentin 600mg TID   - PRN: Methocarbamol 750mg TID; Tylenol 650mg QID    #GI/Bowel Mgmt   - Pantoprazole   - Senna     #/Bladder Mgmt --voiding     #FEN   - Diet - Regular + Thins  [CCHO]    - Fluid Restriction: 1200mL  - MVI    #Falls history  - Osteoporosis labs - WNL    #Precautions / PROPHYLAXIS:   - Falls  - ortho: Weight bearing status: WBAT   - Lungs: Aspiration, Incentive Spirometer   - DVT: Lovenox 40 mg daily   ----------------------------------------------    IDT 6/5  Ambulating 30ft RW mod assist wc follow through 2 stairs 2 hand rails  b/l foot drop and Rt hip weakness  Await orthotist  Sharif metzger 6/17 to home    Dr. Chavira Liaison with Family/Providers:    -----------------------------------------------  OUTPATIENT/FOLLOW UP:    Mirza Radford   Neurology    Bárbara Hays  Neurology  [address] [place] [address], [address]  [place] [address]  Phone: [phone]  Fax: [phone]  Established Patient  Follow Up Time:     [facility] Endocrinology  Endocrinology  [address] [place] [address]  Phone: [phone]  Fax:     Scheduled Appts:  Mirza Radford  [facility]  NEUROLOGY [address] [facility]  Scheduled Appointment: 06/18/2025    [facility]  ENDOCRIN [address]  Scheduled Appointment: 06/20/2025    Aniyah Julian  [facility]  RHEUM [address] [facility]  Scheduled Appointment: 07/29/2025    [facility]  ONCPAINMGT [address] [place]   Scheduled Appointment: 08/11/2025    -----------------------------------------------

## 2025-06-05 NOTE — PROGRESS NOTE ADULT - SUBJECTIVE AND OBJECTIVE BOX
Interval History  Patient seen and examined at bedside. No acute events noted  FS noted to be labile, had 2 packages of sugar free cookies last night.   Patient denied any acute complaints this morning.     ALLERGIES:  No Known Allergies    MEDICATIONS  (STANDING):  atorvastatin 10 milliGRAM(s) Oral at bedtime  dextrose 5%. 1000 milliLiter(s) (50 mL/Hr) IV Continuous <Continuous>  dextrose 5%. 1000 milliLiter(s) (100 mL/Hr) IV Continuous <Continuous>  dextrose 50% Injectable 25 Gram(s) IV Push once  dextrose 50% Injectable 12.5 Gram(s) IV Push once  dextrose 50% Injectable 25 Gram(s) IV Push once  DULoxetine 60 milliGRAM(s) Oral daily  enoxaparin Injectable 40 milliGRAM(s) SubCutaneous every 24 hours  gabapentin 600 milliGRAM(s) Oral three times a day  glucagon  Injectable 1 milliGRAM(s) IntraMuscular once  insulin glargine Injectable (LANTUS) 16 Unit(s) SubCutaneous at bedtime  insulin lispro (ADMELOG) corrective regimen sliding scale   SubCutaneous three times a day before meals  insulin lispro (ADMELOG) corrective regimen sliding scale   SubCutaneous at bedtime  insulin lispro Injectable (ADMELOG) 8 Unit(s) SubCutaneous three times a day with meals  losartan 50 milliGRAM(s) Oral daily  multivitamin 1 Tablet(s) Oral daily  pantoprazole    Tablet 40 milliGRAM(s) Oral before breakfast  senna 2 Tablet(s) Oral at bedtime    MEDICATIONS  (PRN):  acetaminophen     Tablet .. 650 milliGRAM(s) Oral every 6 hours PRN Mild Pain (1 - 3)  dextrose Oral Gel 15 Gram(s) Oral once PRN Blood Glucose LESS THAN 70 milliGRAM(s)/deciliter  methocarbamol 750 milliGRAM(s) Oral three times a day PRN Muscle Spasm    Vital Signs Last 24 Hrs  T(F): 98.2 (05 Jun 2025 08:28), Max: 98.2 (04 Jun 2025 20:41)  HR: 114 (05 Jun 2025 08:28) (93 - 114)  BP: 134/81 (05 Jun 2025 08:28) (134/81 - 148/91)  RR: 16 (05 Jun 2025 08:28) (16 - 16)  SpO2: 98% (05 Jun 2025 08:28) (98% - 98%)  I&O's Summary    05 Jun 2025 07:01  -  05 Jun 2025 18:15  --------------------------------------------------------  IN: 959 mL / OUT: 0 mL / NET: 959 mL    BMI (kg/m2): 20.8 (06-03-25 @ 13:50)    PHYSICAL EXAM:  GENERAL: NAD  HEENT: NCAT  CHEST/LUNG: Clear to percussion bilaterally; No rales, rhonchi, wheezing  HEART: Regular rate and rhythm, no murmur   ABDOMEN: Soft, Nontender, Nondistended; Bowel sounds present  MUSCULOSKELETAL/EXTREMITIES:  2+ Peripheral Pulses, No LE edema  PSYCH: Appropriate affect  NEURO: Alert & Oriented x 3, RLE weakness, decreased sensation bilaterally LE from knee down     I personally reviewed the below data/images/labs:    LABS:                        13.8   5.44  )-----------( 208      ( 05 Jun 2025 05:52 )             39.4       06-05    129  |  95  |  12  ----------------------------<  410  5.3   |  30  |  0.84    Ca    9.2      05 Jun 2025 05:52    TPro  7.6  /  Alb  2.9  /  TBili  0.4  /  DBili  x   /  AST  24  /  ALT  51  /  AlkPhos  103  06-05     04-24 Chol 129 mg/dL LDL -- HDL 63 mg/dL Trig 63 mg/dL  TSH --   TSH with FT4 reflex 1.76  Total T3 --    POCT Blood Glucose.: 216 mg/dL (05 Jun 2025 16:42)  POCT Blood Glucose.: 85 mg/dL (05 Jun 2025 14:06)  POCT Blood Glucose.: 95 mg/dL (05 Jun 2025 11:35)  POCT Blood Glucose.: 386 mg/dL (05 Jun 2025 08:08)  POCT Blood Glucose.: 385 mg/dL (04 Jun 2025 21:30)    Urinalysis Basic - ( 05 Jun 2025 05:52 )    Color: x / Appearance: x / SG: x / pH: x  Gluc: 410 mg/dL / Ketone: x  / Bili: x / Urobili: x   Blood: x / Protein: x / Nitrite: x   Leuk Esterase: x / RBC: x / WBC x   Sq Epi: x / Non Sq Epi: x / Bacteria: x    COVID-19 PCR: NotDetec (06-03-25 @ 14:15)    Consultant(s) Notes Reviewed:   Care Discused with Consultants/Other Providers:  Imaging Personally Reviewed:

## 2025-06-06 LAB
A1C WITH ESTIMATED AVERAGE GLUCOSE RESULT: 8.9 % — HIGH (ref 4–5.6)
AMPA-R AB CBA, CSF: NEGATIVE — SIGNIFICANT CHANGE UP
AMPHIPHYSIN AB TITR CSF: NEGATIVE — SIGNIFICANT CHANGE UP
ANION GAP SERPL CALC-SCNC: 3 MMOL/L — LOW (ref 5–17)
BUN SERPL-MCNC: 16 MG/DL — SIGNIFICANT CHANGE UP (ref 7–23)
CALCIUM SERPL-MCNC: 9.4 MG/DL — SIGNIFICANT CHANGE UP (ref 8.4–10.5)
CASPR2-IGG CBA, CSF: NEGATIVE — SIGNIFICANT CHANGE UP
CHLORIDE SERPL-SCNC: 96 MMOL/L — SIGNIFICANT CHANGE UP (ref 96–108)
CO2 SERPL-SCNC: 32 MMOL/L — HIGH (ref 22–31)
CREAT SERPL-MCNC: 0.69 MG/DL — SIGNIFICANT CHANGE UP (ref 0.5–1.3)
CV2 IGG TITR CSF: NEGATIVE — SIGNIFICANT CHANGE UP
EGFR: 108 ML/MIN/1.73M2 — SIGNIFICANT CHANGE UP
EGFR: 108 ML/MIN/1.73M2 — SIGNIFICANT CHANGE UP
ESTIMATED AVERAGE GLUCOSE: 209 MG/DL — HIGH (ref 68–114)
GABA-B-R AB CBA, CSF: NEGATIVE — SIGNIFICANT CHANGE UP
GAD65 AB CSF-SCNC: 0 NMOL/L — SIGNIFICANT CHANGE UP
GFAP IFA, CSF: NEGATIVE — SIGNIFICANT CHANGE UP
GLIAL NUC TYPE 1 AB TITR CSF: NEGATIVE — SIGNIFICANT CHANGE UP
GLUCOSE BLDC GLUCOMTR-MCNC: 122 MG/DL — HIGH (ref 70–99)
GLUCOSE BLDC GLUCOMTR-MCNC: 137 MG/DL — HIGH (ref 70–99)
GLUCOSE BLDC GLUCOMTR-MCNC: 192 MG/DL — HIGH (ref 70–99)
GLUCOSE BLDC GLUCOMTR-MCNC: 194 MG/DL — HIGH (ref 70–99)
GLUCOSE BLDC GLUCOMTR-MCNC: 80 MG/DL — SIGNIFICANT CHANGE UP (ref 70–99)
GLUCOSE SERPL-MCNC: 212 MG/DL — HIGH (ref 70–99)
HU1 AB TITR CSF IF: NEGATIVE — SIGNIFICANT CHANGE UP
HU2 AB TITR CSF IF: NEGATIVE — SIGNIFICANT CHANGE UP
HU3 AB TITR CSF: NEGATIVE — SIGNIFICANT CHANGE UP
IFA NOTES: SIGNIFICANT CHANGE UP
IMMUNOLOGIST REVIEW: SIGNIFICANT CHANGE UP
LGI1-IGG CBA, CSF: NEGATIVE — SIGNIFICANT CHANGE UP
MGLUR1 AB IFA, CSF: NEGATIVE — SIGNIFICANT CHANGE UP
PCA-TR AB TITR CSF: NEGATIVE — SIGNIFICANT CHANGE UP
POTASSIUM SERPL-MCNC: 4.6 MMOL/L — SIGNIFICANT CHANGE UP (ref 3.5–5.3)
POTASSIUM SERPL-SCNC: 4.6 MMOL/L — SIGNIFICANT CHANGE UP (ref 3.5–5.3)
PURKINJE CELL CYTOPLASMIC AB TYPE 2: NEGATIVE — SIGNIFICANT CHANGE UP
PURKINJE CELLS AB TITR CSF IF: NEGATIVE — SIGNIFICANT CHANGE UP
SODIUM SERPL-SCNC: 131 MMOL/L — LOW (ref 135–145)

## 2025-06-06 PROCEDURE — 99232 SBSQ HOSP IP/OBS MODERATE 35: CPT

## 2025-06-06 PROCEDURE — 99233 SBSQ HOSP IP/OBS HIGH 50: CPT

## 2025-06-06 RX ORDER — LIDOCAINE HYDROCHLORIDE 20 MG/ML
1 JELLY TOPICAL
Refills: 0 | Status: DISCONTINUED | OUTPATIENT
Start: 2025-06-07 | End: 2025-06-17

## 2025-06-06 RX ORDER — MELATONIN 5 MG
6 TABLET ORAL AT BEDTIME
Refills: 0 | Status: DISCONTINUED | OUTPATIENT
Start: 2025-06-06 | End: 2025-06-17

## 2025-06-06 RX ORDER — LIDOCAINE HYDROCHLORIDE 20 MG/ML
1 JELLY TOPICAL ONCE
Refills: 0 | Status: COMPLETED | OUTPATIENT
Start: 2025-06-06 | End: 2025-06-06

## 2025-06-06 RX ADMIN — ATORVASTATIN CALCIUM 10 MILLIGRAM(S): 80 TABLET, FILM COATED ORAL at 21:48

## 2025-06-06 RX ADMIN — GABAPENTIN 600 MILLIGRAM(S): 400 CAPSULE ORAL at 21:48

## 2025-06-06 RX ADMIN — ENOXAPARIN SODIUM 40 MILLIGRAM(S): 100 INJECTION SUBCUTANEOUS at 17:20

## 2025-06-06 RX ADMIN — AMLODIPINE BESYLATE 5 MILLIGRAM(S): 10 TABLET ORAL at 06:11

## 2025-06-06 RX ADMIN — INSULIN LISPRO 8 UNIT(S): 100 INJECTION, SOLUTION INTRAVENOUS; SUBCUTANEOUS at 08:31

## 2025-06-06 RX ADMIN — Medication 2 TABLET(S): at 21:48

## 2025-06-06 RX ADMIN — GABAPENTIN 600 MILLIGRAM(S): 400 CAPSULE ORAL at 13:44

## 2025-06-06 RX ADMIN — DULOXETINE 60 MILLIGRAM(S): 20 CAPSULE, DELAYED RELEASE ORAL at 11:33

## 2025-06-06 RX ADMIN — Medication 1 TABLET(S): at 11:33

## 2025-06-06 RX ADMIN — GABAPENTIN 600 MILLIGRAM(S): 400 CAPSULE ORAL at 06:11

## 2025-06-06 RX ADMIN — Medication 6 MILLIGRAM(S): at 21:48

## 2025-06-06 RX ADMIN — INSULIN GLARGINE-YFGN 16 UNIT(S): 100 INJECTION, SOLUTION SUBCUTANEOUS at 21:48

## 2025-06-06 RX ADMIN — INSULIN LISPRO 8 UNIT(S): 100 INJECTION, SOLUTION INTRAVENOUS; SUBCUTANEOUS at 17:16

## 2025-06-06 RX ADMIN — INSULIN LISPRO 1: 100 INJECTION, SOLUTION INTRAVENOUS; SUBCUTANEOUS at 08:30

## 2025-06-06 RX ADMIN — LIDOCAINE HYDROCHLORIDE 1 PATCH: 20 JELLY TOPICAL at 11:33

## 2025-06-06 RX ADMIN — LOSARTAN POTASSIUM 50 MILLIGRAM(S): 100 TABLET, FILM COATED ORAL at 06:11

## 2025-06-06 RX ADMIN — INSULIN LISPRO 8 UNIT(S): 100 INJECTION, SOLUTION INTRAVENOUS; SUBCUTANEOUS at 12:13

## 2025-06-06 RX ADMIN — Medication 40 MILLIGRAM(S): at 06:11

## 2025-06-06 NOTE — PROGRESS NOTE ADULT - SUBJECTIVE AND OBJECTIVE BOX
CC: CIDP    Today's Subjective & Objective Findings:  Patient seen and examined in therapy this AM.  Admits to sleeping well.  He is becoming more comfortable with using call bell for assistance.   Noted Rt HF and KE weakness.  Experiencing R hip pain, states this is from limited mobility/ROM.  Await BL AFOs, to address b/l ankle DF weakness.  Discussed Lidocaine patch to R hip.     Denies headache, dizziness, visual changes, chest pain, SOB/RUBIO, abdominal pain, nausea, vomiting, diarrhea, dysuria. LBM- 6/5   + numbness/tingling in BL LEs.    Function--improvement with ambulatory distance  Ambulated 30 ft with RW with bilateral DF A with min/mod A with WC follow  (multiple trials)  Stairs: 2 steps with bilateral HR with bilateral DF A with mod A of 1 and CG A  of another    Vital Signs Last 24 Hrs  T(C): 36.3 (06 Jun 2025 08:00), Max: 36.6 (05 Jun 2025 20:38)  T(F): 97.3 (06 Jun 2025 08:00), Max: 97.9 (05 Jun 2025 20:38)  HR: 91 (06 Jun 2025 08:00) (88 - 96)  BP: 129/82 (06 Jun 2025 08:00) (109/68 - 154/88)  BP(mean): --  RR: 16 (06 Jun 2025 08:00) (16 - 16)  SpO2: 99% (06 Jun 2025 08:00) (98% - 99%)      PHYSICAL EXAM  Constitutional -, Comfortable  Neck - Supple  Cardiovascular - Palpable peripheral pulses   Respiratory/Chest- Symmetrical chest expansion, No dyspnea  Abdomen - Soft, Non-tender  Extremities - No cyanosis, No edema,     Neurologic Exam - Alert, Oriented, Interactive  Sensory - Light touch sensation intact  Motor Function - Moves all extremities spontaneously, except for limited strength both ankles and Rt hip  Skin -  Intact    LABS:                        13.8   5.44  )-----------( 208      ( 05 Jun 2025 05:52 )             39.4     06-06    131[L]  |  96  |  16  ----------------------------<  212[H]  4.6   |  32[H]  |  0.69    Ca    9.4      06 Jun 2025 05:34    TPro  7.6  /  Alb  2.9[L]  /  TBili  0.4  /  DBili  x   /  AST  24  /  ALT  51[H]  /  AlkPhos  103  06-05      Urinalysis Basic - ( 06 Jun 2025 05:34 )    Color: x / Appearance: x / SG: x / pH: x  Gluc: 212 mg/dL / Ketone: x  / Bili: x / Urobili: x   Blood: x / Protein: x / Nitrite: x   Leuk Esterase: x / RBC: x / WBC x   Sq Epi: x / Non Sq Epi: x / Bacteria: x        CAPILLARY BLOOD GLUCOSE  POCT Blood Glucose.: 192 mg/dL (06 Jun 2025 08:02)  POCT Blood Glucose.: 199 mg/dL (05 Jun 2025 21:16)  POCT Blood Glucose.: 216 mg/dL (05 Jun 2025 16:42)  POCT Blood Glucose.: 85 mg/dL (05 Jun 2025 14:06)  POCT Blood Glucose.: 95 mg/dL (05 Jun 2025 11:35)        MEDICATIONS  (STANDING):  amLODIPine   Tablet 5 milliGRAM(s) Oral daily  atorvastatin 10 milliGRAM(s) Oral at bedtime  dextrose 5%. 1000 milliLiter(s) (50 mL/Hr) IV Continuous <Continuous>  dextrose 5%. 1000 milliLiter(s) (100 mL/Hr) IV Continuous <Continuous>  dextrose 50% Injectable 25 Gram(s) IV Push once  dextrose 50% Injectable 12.5 Gram(s) IV Push once  dextrose 50% Injectable 25 Gram(s) IV Push once  DULoxetine 60 milliGRAM(s) Oral daily  enoxaparin Injectable 40 milliGRAM(s) SubCutaneous every 24 hours  gabapentin 600 milliGRAM(s) Oral three times a day  glucagon  Injectable 1 milliGRAM(s) IntraMuscular once  insulin glargine Injectable (LANTUS) 16 Unit(s) SubCutaneous at bedtime  insulin lispro (ADMELOG) corrective regimen sliding scale   SubCutaneous three times a day before meals  insulin lispro (ADMELOG) corrective regimen sliding scale   SubCutaneous at bedtime  insulin lispro Injectable (ADMELOG) 8 Unit(s) SubCutaneous three times a day with meals  lidocaine   4% Patch 1 Patch Transdermal once  losartan 50 milliGRAM(s) Oral daily  multivitamin 1 Tablet(s) Oral daily  pantoprazole    Tablet 40 milliGRAM(s) Oral before breakfast  senna 2 Tablet(s) Oral at bedtime      MEDICATIONS  (PRN):  acetaminophen     Tablet .. 650 milliGRAM(s) Oral every 6 hours PRN Mild Pain (1 - 3)  dextrose Oral Gel 15 Gram(s) Oral once PRN Blood Glucose LESS THAN 70 milliGRAM(s)/deciliter  methocarbamol 750 milliGRAM(s) Oral three times a day PRN Muscle Spasm   CC: CIDP    Today's Subjective & Objective Findings:  Patient seen and examined in therapy this AM.  Admits to sleeping well.  He is becoming more comfortable with using call bell for assistance.   Noted Rt HF and KE weakness.  Experiencing R hip pain, states this is from limited mobility/ROM.  Await BL AFOs, to address b/l ankle DF weakness.  Discussed Lidocaine patch to R hip.     Denies headache, dizziness, visual changes, chest pain, SOB/RUBIO, abdominal pain, nausea, vomiting, diarrhea, dysuria. LBM- 6/5   + numbness/tingling in BL LEs.    Function--improvement with ambulatory distance  Ambulated 30 ft with RW with bilateral DF A with min/mod A with WC follow  (multiple trials)  Stairs: 2 steps with bilateral HR with bilateral DF A with mod A of 1 and CG A  of another    Vital Signs Last 24 Hrs  T(C): 36.3 (06 Jun 2025 08:00), Max: 36.6 (05 Jun 2025 20:38)  T(F): 97.3 (06 Jun 2025 08:00), Max: 97.9 (05 Jun 2025 20:38)  HR: 91 (06 Jun 2025 08:00) (88 - 96)  BP: 129/82 (06 Jun 2025 08:00) (109/68 - 154/88)  BP(mean): --  RR: 16 (06 Jun 2025 08:00) (16 - 16)  SpO2: 99% (06 Jun 2025 08:00) (98% - 99%)      PHYSICAL EXAM  Constitutional -, Comfortable  Neck - Supple  Cardiovascular - Palpable peripheral pulses   Respiratory/Chest- Symmetrical chest expansion, No dyspnea  Abdomen - Soft, Non-tender  Extremities - No cyanosis, No edema,     Neurologic Exam - Alert, Oriented, Interactive  Sensory - Light touch sensation intact  Motor Function - Moves all extremities spontaneously, except for limited strength both ankles and Rt hip  Skin -  Intact    LABS:                        13.8   5.44  )-----------( 208      ( 05 Jun 2025 05:52 )             39.4     06-06    131[L]  |  96  |  16  ----------------------------<  212[H]  4.6   |  32[H]  |  0.69    Ca    9.4      06 Jun 2025 05:34    TPro  7.6  /  Alb  2.9[L]  /  TBili  0.4  /  DBili  x   /  AST  24  /  ALT  51[H]  /  AlkPhos  103  06-05      Urinalysis Basic - ( 06 Jun 2025 05:34 )    Color: x / Appearance: x / SG: x / pH: x  Gluc: 212 mg/dL / Ketone: x  / Bili: x / Urobili: x   Blood: x / Protein: x / Nitrite: x   Leuk Esterase: x / RBC: x / WBC x   Sq Epi: x / Non Sq Epi: x / Bacteria: x      CAPILLARY BLOOD GLUCOSE  POCT Blood Glucose.: 192 mg/dL (06 Jun 2025 08:02)  POCT Blood Glucose.: 199 mg/dL (05 Jun 2025 21:16)  POCT Blood Glucose.: 216 mg/dL (05 Jun 2025 16:42)  POCT Blood Glucose.: 85 mg/dL (05 Jun 2025 14:06)  POCT Blood Glucose.: 95 mg/dL (05 Jun 2025 11:35)        MEDICATIONS  (STANDING):  amLODIPine   Tablet 5 milliGRAM(s) Oral daily  atorvastatin 10 milliGRAM(s) Oral at bedtime  dextrose 5%. 1000 milliLiter(s) (50 mL/Hr) IV Continuous <Continuous>  dextrose 5%. 1000 milliLiter(s) (100 mL/Hr) IV Continuous <Continuous>  dextrose 50% Injectable 25 Gram(s) IV Push once  dextrose 50% Injectable 12.5 Gram(s) IV Push once  dextrose 50% Injectable 25 Gram(s) IV Push once  DULoxetine 60 milliGRAM(s) Oral daily  enoxaparin Injectable 40 milliGRAM(s) SubCutaneous every 24 hours  gabapentin 600 milliGRAM(s) Oral three times a day  glucagon  Injectable 1 milliGRAM(s) IntraMuscular once  insulin glargine Injectable (LANTUS) 16 Unit(s) SubCutaneous at bedtime  insulin lispro (ADMELOG) corrective regimen sliding scale   SubCutaneous three times a day before meals  insulin lispro (ADMELOG) corrective regimen sliding scale   SubCutaneous at bedtime  insulin lispro Injectable (ADMELOG) 8 Unit(s) SubCutaneous three times a day with meals  lidocaine   4% Patch 1 Patch Transdermal once  losartan 50 milliGRAM(s) Oral daily  multivitamin 1 Tablet(s) Oral daily  pantoprazole    Tablet 40 milliGRAM(s) Oral before breakfast  senna 2 Tablet(s) Oral at bedtime      MEDICATIONS  (PRN):  acetaminophen     Tablet .. 650 milliGRAM(s) Oral every 6 hours PRN Mild Pain (1 - 3)  dextrose Oral Gel 15 Gram(s) Oral once PRN Blood Glucose LESS THAN 70 milliGRAM(s)/deciliter  methocarbamol 750 milliGRAM(s) Oral three times a day PRN Muscle Spasm

## 2025-06-06 NOTE — PROGRESS NOTE ADULT - ASSESSMENT
59 year old male with PMH of ethanol use disorder, chronic pancreatitis, poorly controlled СЕРГЕЙ (A1c 9.9 on 04/23/2025), chronic neuropathy of LLE, recent hospitalization April 2025 for sciatica; who presented to [facility] ED on 5/26/25 for progressively worsening BL LE pain, weakness and numbness. He was evaluated by Neurology and underwent bedside EMG which was significant for CIDP (s/p 5 days IVIG and IV steroids). He developed an episode of confusion/AMS (CT Head negative). His hospital course was significant for hyponatremia, and hyperglycemia (both now resolved). Patient now admitted for a multidisciplinary rehab program. 06-03-25 @ 14:29    * Labs discussed, unremarkable except hyponatremia   * BL foot drop - await BL AFOs  * R HF weakness - continue to monitor   * R hip pain - start Lidocaine patch     #CIDP  - Gait Instability, ADL impairments and Functional impairments: start Comprehensive Rehab Program of PT/OT- 3 hours a day, 5 days a week  - P&O as needed--orthotics referral      #BL foot drop  - Orthotic eval for BL hinged AFOs   - This patient has a diagnosis of BL foot drop. Patient is ambulatory. Patient requires a custom molded hinged AFO to preserve ankle motion while stabilizing talar and subtalar joints. Device will give a more natural gait without destabilizing the knee and hip. Device requires a low density lining to protect prominent louis areas of effected skin. Patient will need the device for a term of greater than 9 months. No reasonable prefabricated orthosis exists.     #HTN/HLD  - Amlodipine 10mg daily  - Atorvastatin 10mg at HS  - Losartan 50mg daily - (Home regimen: Telmisartan 40mg daily)    #Latent Autoimmune Diabetes Mellitus  - A1c: 9.9% (April 2025)   - FS AC & HS  - Premeal  - Lantus     #Electrolyte Imbalance  - Hyponatremia  - Appreciate ongoing hospitalist recs     #Mood  - Duloxetine 60mg daily     #Skin  - Skin  - Pressure injury/Skin: OOB to Chair, PT/OT     #Pain Mgmt   #Neuropathy  #Stiffness/Spasiticity  - Gabapentin 600mg TID   - Lidocaine patch daily  - PRN: Methocarbamol 750mg TID; Tylenol 650mg QID    #GI/Bowel Mgmt   - Pantoprazole   - Senna     #/Bladder Mgmt --voiding     #FEN   - Diet - Regular + Thins  [facility]    - Fluid Restriction: 1200mL  - MVI    #Falls history  - Osteoporosis labs - WNL    #Precautions / PROPHYLAXIS:   - Falls  - ortho: Weight bearing status: WBAT   - Lungs: Aspiration, Incentive Spirometer   - DVT: Lovenox 40 mg daily   ----------------------------------------------    IDT 6/5  Ambulating 30ft RW mod assist wc follow through 2 stairs 2 hand rails  b/l foot drop and Rt hip weakness  Await orthotist  Sharif metzger 6/17 to home    Dr. Chavira Liaison with Family/Providers:    -----------------------------------------------  OUTPATIENT/FOLLOW UP:    Mirza Radford   Neurology    Bárbara Hays  Neurology  [address] [place] [address], [address]  [place] [address]  Phone: [phone]  Fax: [phone]  Established Patient  Follow Up Time:     [facility]  Endocrinology  [address] [place] [id number]  Phone: [phone]  Fax:     Scheduled Appts:  Mirza Radford  [facility]  NEUROLOGY [address]  Scheduled Appointment: 06/18/2025    [facility]  ENDOCRIN [address]  Scheduled Appointment: 06/20/2025    Aniyah Julian  [facility]  RHEUM [address]  Scheduled Appointment: 07/29/2025    [facility]  ONCPAINMGT [address]   Scheduled Appointment: 08/11/2025    -----------------------------------------------

## 2025-06-06 NOTE — PROGRESS NOTE ADULT - ASSESSMENT
59 year old male with PMH of ethanol use disorder, chronic pancreatitis, poorly controlled СЕРГЕЙ (A1c 9.9 on 04/23/2025), chronic neuropathy of LLE, recent hospitalization April 2025 for sciatica; who presented to [facility] ED on 5/26/25 for progressively worsening BL LE pain, weakness and numbness. He was evaluated by Neurology and underwent bedside EMG which was significant for CIDP (s/p 5 days IVIG and IV steroids). He developed an episode of confusion/AMS (CT Head negative). His hospital course was significant for hyponatremia, and hyperglycemia (both now resolved). Patient now admitted for a multidisciplinary rehab program on 6/3    #CIDP  -MRI lumbar spine wwo no evidence of compression or enhancement  -MRI lumbosacral plexus R and L wwo no abnormalities  -s/p EMG/NCS with evidence of demyelinating process and chronic denervation   -s/p IVIG and IV steroids x 5 days   -Fall precaution  -Pain control and bowel regimen per rehab team  -Comprehensive Rehab Program w/ PT/OT  -Orthotic eval for BL AFOs   -Outpatient follow up with neurology    #HTN/HLD  -Continue Amlodipine (reduced dose to 5mg QD 6/6 due to low BP w/ therapy) and Losartan (Home regimen: Telmisartan 40mg daily) w/ holding parameters   -Monitor BP   -Continue Atorvastatin    #Latent Autoimmune Diabetes in Adults, Uncontrolled, Complicated by Peripheral Neuropathy  -HbA1c: 9.9% on 4/23/2025, repeat pending   -Continue Lantus 16units QHS  -Continue premeal admelog 8units TID w/ meals if he consume >75%, 6untis if he consumes 50-75% and 4units if he consumes <50%   -RAISS (low dose)  -Monitor FS, labile due to dietary indiscretion, he was counseled  -Continue gabapentin   -Diabetes educator following     #"Pseudohyponatremia  -Na 131 -> 133 when corrected for glucose   -Seen by nephrology during his hospitalization, w/ impression: acute on chronic hyponatremia likely iso increased FW water/poor solute intake and possible SIADH from pain and duloxetine use  -Was on UreNa during his hospitalization and d/c'd once Na improved  -Continue free water restriction to <1.2L/day  -Encourage oral intake  -Monitor BMP     #Mood  -Continue Duloxetine     #Incidental Findings   -CTA neck showed Less than 50% stenosis of the proximal right ICA. At least 60% stenosis of the proximal left ICA.  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

## 2025-06-06 NOTE — PROGRESS NOTE ADULT - NS ATTEND AMEND GEN_ALL_CORE FT
I have made amendments to the documentation where necessary. Additional comments: I have personally seen and examined the patient independently Medical records reviewed. I have made amendments to the documentation where necessary and adjusted the history, physical examination, and plan as documented by the NP.     Patient reports good night rest, continued reduction of pain symptoms on right hip  Regular BM    Continued improvement of ambulatory distance noted  Deficits with stairs negotiating, poor endurance noted    Electrolyte derangement resolveding Na rising     Continue therapy  DVT ppx  Await orthotics review for ankle bracing   Electrolyte derangement, f/u hospitalist recs     Spent 54 mins, patient review, discussion of lab results, observation in therapy and care co ordination.

## 2025-06-06 NOTE — PROGRESS NOTE ADULT - SUBJECTIVE AND OBJECTIVE BOX
Interval History  Patient seen and examined at bedside. No acute events noted.  Patient denied any acute complaints this morning. Tolerating PT    ALLERGIES:  No Known Allergies    MEDICATIONS  (STANDING):  amLODIPine   Tablet 5 milliGRAM(s) Oral daily  atorvastatin 10 milliGRAM(s) Oral at bedtime  dextrose 5%. 1000 milliLiter(s) (100 mL/Hr) IV Continuous <Continuous>  dextrose 5%. 1000 milliLiter(s) (50 mL/Hr) IV Continuous <Continuous>  dextrose 50% Injectable 25 Gram(s) IV Push once  dextrose 50% Injectable 12.5 Gram(s) IV Push once  dextrose 50% Injectable 25 Gram(s) IV Push once  DULoxetine 60 milliGRAM(s) Oral daily  enoxaparin Injectable 40 milliGRAM(s) SubCutaneous every 24 hours  gabapentin 600 milliGRAM(s) Oral three times a day  glucagon  Injectable 1 milliGRAM(s) IntraMuscular once  insulin glargine Injectable (LANTUS) 16 Unit(s) SubCutaneous at bedtime  insulin lispro (ADMELOG) corrective regimen sliding scale   SubCutaneous three times a day before meals  insulin lispro (ADMELOG) corrective regimen sliding scale   SubCutaneous at bedtime  insulin lispro Injectable (ADMELOG) 8 Unit(s) SubCutaneous three times a day with meals  losartan 50 milliGRAM(s) Oral daily  melatonin 6 milliGRAM(s) Oral at bedtime  multivitamin 1 Tablet(s) Oral daily  pantoprazole    Tablet 40 milliGRAM(s) Oral before breakfast  senna 2 Tablet(s) Oral at bedtime    MEDICATIONS  (PRN):  acetaminophen     Tablet .. 650 milliGRAM(s) Oral every 6 hours PRN Mild Pain (1 - 3)  dextrose Oral Gel 15 Gram(s) Oral once PRN Blood Glucose LESS THAN 70 milliGRAM(s)/deciliter  methocarbamol 750 milliGRAM(s) Oral three times a day PRN Muscle Spasm    Vital Signs Last 24 Hrs  T(F): 97.3 (06 Jun 2025 08:00), Max: 97.9 (05 Jun 2025 20:38)  HR: 91 (06 Jun 2025 08:00) (88 - 96)  BP: 129/82 (06 Jun 2025 08:00) (109/68 - 154/88)  RR: 16 (06 Jun 2025 08:00) (16 - 16)  SpO2: 99% (06 Jun 2025 08:00) (98% - 99%)  I&O's Summary    05 Jun 2025 07:01  -  06 Jun 2025 07:00  --------------------------------------------------------  IN: 959 mL / OUT: 0 mL / NET: 959 mL    06 Jun 2025 07:01  -  06 Jun 2025 16:08  --------------------------------------------------------  IN: 560 mL / OUT: 0 mL / NET: 560 mL    BMI (kg/m2): 20.8 (06-05-25 @ 18:14)      PHYSICAL EXAM:  GENERAL: NAD  HEENT: NCAT  CHEST/LUNG: Clear to percussion bilaterally; No rales, rhonchi, wheezing  HEART: Regular rate and rhythm, no murmur   ABDOMEN: Soft, Nontender, Nondistended; Bowel sounds present  MUSCULOSKELETAL/EXTREMITIES:  2+ Peripheral Pulses, No LE edema  PSYCH: Appropriate affect  NEURO: Alert & Oriented x 3, RLE weakness, decreased sensation bilaterally LE from knee down     I personally reviewed the below data/images/labs:    LABS:                        13.8   5.44  )-----------( 208      ( 05 Jun 2025 05:52 )             39.4       06-06    131  |  96  |  16  ----------------------------<  212  4.6   |  32  |  0.69    Ca    9.4      06 Jun 2025 05:34    TPro  7.6  /  Alb  2.9  /  TBili  0.4  /  DBili  x   /  AST  24  /  ALT  51  /  AlkPhos  103  06-05     04-24 Chol 129 mg/dL LDL -- HDL 63 mg/dL Trig 63 mg/dL  TSH --   TSH with FT4 reflex 1.76  Total T3 --    POCT Blood Glucose.: 122 mg/dL (06 Jun 2025 11:31)  POCT Blood Glucose.: 80 mg/dL (06 Jun 2025 10:33)  POCT Blood Glucose.: 192 mg/dL (06 Jun 2025 08:02)  POCT Blood Glucose.: 199 mg/dL (05 Jun 2025 21:16)  POCT Blood Glucose.: 216 mg/dL (05 Jun 2025 16:42)      Urinalysis Basic - ( 06 Jun 2025 05:34 )    Color: x / Appearance: x / SG: x / pH: x  Gluc: 212 mg/dL / Ketone: x  / Bili: x / Urobili: x   Blood: x / Protein: x / Nitrite: x   Leuk Esterase: x / RBC: x / WBC x   Sq Epi: x / Non Sq Epi: x / Bacteria: x        COVID-19 PCR: NotDetec (06-03-25 @ 14:15)      Consultant(s) Notes Reviewed:   Care Discused with Consultants/Other Providers:  Imaging Personally Reviewed:

## 2025-06-07 LAB
GLUCOSE BLDC GLUCOMTR-MCNC: 156 MG/DL — HIGH (ref 70–99)
GLUCOSE BLDC GLUCOMTR-MCNC: 166 MG/DL — HIGH (ref 70–99)
GLUCOSE BLDC GLUCOMTR-MCNC: 190 MG/DL — HIGH (ref 70–99)
GLUCOSE BLDC GLUCOMTR-MCNC: 216 MG/DL — HIGH (ref 70–99)
MBP CSF-MCNC: 1.7 NG/ML — SIGNIFICANT CHANGE UP (ref 0–4.7)

## 2025-06-07 PROCEDURE — 99232 SBSQ HOSP IP/OBS MODERATE 35: CPT

## 2025-06-07 RX ADMIN — Medication 1 TABLET(S): at 11:13

## 2025-06-07 RX ADMIN — INSULIN LISPRO 1: 100 INJECTION, SOLUTION INTRAVENOUS; SUBCUTANEOUS at 08:16

## 2025-06-07 RX ADMIN — Medication 2 TABLET(S): at 21:22

## 2025-06-07 RX ADMIN — GABAPENTIN 600 MILLIGRAM(S): 400 CAPSULE ORAL at 13:04

## 2025-06-07 RX ADMIN — LOSARTAN POTASSIUM 50 MILLIGRAM(S): 100 TABLET, FILM COATED ORAL at 05:36

## 2025-06-07 RX ADMIN — GABAPENTIN 600 MILLIGRAM(S): 400 CAPSULE ORAL at 21:22

## 2025-06-07 RX ADMIN — ENOXAPARIN SODIUM 40 MILLIGRAM(S): 100 INJECTION SUBCUTANEOUS at 17:26

## 2025-06-07 RX ADMIN — INSULIN LISPRO 1: 100 INJECTION, SOLUTION INTRAVENOUS; SUBCUTANEOUS at 13:01

## 2025-06-07 RX ADMIN — INSULIN LISPRO 8 UNIT(S): 100 INJECTION, SOLUTION INTRAVENOUS; SUBCUTANEOUS at 13:02

## 2025-06-07 RX ADMIN — INSULIN GLARGINE-YFGN 16 UNIT(S): 100 INJECTION, SOLUTION SUBCUTANEOUS at 21:23

## 2025-06-07 RX ADMIN — Medication 40 MILLIGRAM(S): at 05:36

## 2025-06-07 RX ADMIN — INSULIN LISPRO 8 UNIT(S): 100 INJECTION, SOLUTION INTRAVENOUS; SUBCUTANEOUS at 08:17

## 2025-06-07 RX ADMIN — INSULIN LISPRO 8 UNIT(S): 100 INJECTION, SOLUTION INTRAVENOUS; SUBCUTANEOUS at 17:23

## 2025-06-07 RX ADMIN — INSULIN LISPRO 1: 100 INJECTION, SOLUTION INTRAVENOUS; SUBCUTANEOUS at 17:23

## 2025-06-07 RX ADMIN — Medication 6 MILLIGRAM(S): at 21:22

## 2025-06-07 RX ADMIN — DULOXETINE 60 MILLIGRAM(S): 20 CAPSULE, DELAYED RELEASE ORAL at 11:13

## 2025-06-07 RX ADMIN — GABAPENTIN 600 MILLIGRAM(S): 400 CAPSULE ORAL at 05:37

## 2025-06-07 RX ADMIN — AMLODIPINE BESYLATE 5 MILLIGRAM(S): 10 TABLET ORAL at 05:37

## 2025-06-07 RX ADMIN — LIDOCAINE HYDROCHLORIDE 1 PATCH: 20 JELLY TOPICAL at 08:16

## 2025-06-07 RX ADMIN — Medication 650 MILLIGRAM(S): at 05:37

## 2025-06-07 RX ADMIN — ATORVASTATIN CALCIUM 10 MILLIGRAM(S): 80 TABLET, FILM COATED ORAL at 21:22

## 2025-06-07 NOTE — PROGRESS NOTE ADULT - ASSESSMENT
59 year old male with PMH of ethanol use disorder, chronic pancreatitis, poorly controlled СЕРГЕЙ (A1c 9.9 on 04/23/2025), chronic neuropathy of LLE, recent hospitalization April 2025 for sciatica; who presented to [facility] ED on 5/26/25 for progressively worsening BL LE pain, weakness and numbness. He was evaluated by Neurology and underwent bedside EMG which was significant for CIDP (s/p 5 days IVIG and IV steroids). He developed an episode of confusion/AMS (CT Head negative). His hospital course was significant for hyponatremia, and hyperglycemia (both now resolved). Patient now admitted for a multidisciplinary rehab program on 6/3    #CIDP  -MRI lumbar spine wwo no evidence of compression or enhancement  -MRI lumbosacral plexus R and L wwo no abnormalities  -s/p EMG/NCS with evidence of demyelinating process and chronic denervation   -s/p IVIG and IV steroids x 5 days   -Fall precaution  -Pain control and bowel regimen per rehab team  -Comprehensive Rehab Program w/ PT/OT  -Orthotic eval for BL AFOs   -Outpatient follow up with neurology    #HTN/HLD  -Continue Amlodipine (reduced dose to 5mg QD 6/6 due to low BP w/ therapy) and Losartan (Home regimen: Telmisartan 40mg daily) w/ holding parameters   -Monitor BP   -Continue Atorvastatin    #Latent Autoimmune Diabetes in Adults, Uncontrolled, Complicated by Peripheral Neuropathy  -HbA1c: 9.9% on 4/23/2025, repeat pending   -Continue Lantus 16units QHS  -Continue premeal admelog 8units TID w/ meals if he consume >75%, 6untis if he consumes 50-75% and 4units if he consumes <50%   -RAISS (low dose)  -Monitor FS, labile due to dietary indiscretion, he was counseled  -Continue gabapentin   -Diabetes educator following     #"Pseudohyponatremia"  -Na 131 6/6  -> 133 when corrected for glucose   -Seen by nephrology during his hospitalization, w/ impression: acute on chronic hyponatremia likely iso increased FW water/poor solute intake and possible SIADH from pain and duloxetine use  -Was on UreNa during his hospitalization and d/c'd once Na improved  -Continue free water restriction to <1.2L/day  -Encourage oral intake  -Monitor BMP     #Mood  -Continue Duloxetine     #Incidental Findings   -CTA neck showed Less than 50% stenosis of the proximal right ICA. At least 60% stenosis of the proximal left ICA.  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 0.1 cm right MARCELINO A1 aneurysm  -Outpatient neurology follow up     #GI PPx  -PPI     #DVT PPx  -Lovenox SC     Discussed with rehab team

## 2025-06-07 NOTE — PROGRESS NOTE ADULT - ASSESSMENT
Imp: Patient with diagnosis of CIDP admitted for comprehensive acute rehabilitation.    Plan:  - Continue current multidisciplinary rehab program  - DVT prophylaxis  - Skin- Turn q2h, check skin daily  - Continue current medications; patient medically stable.   -Active issues-     ##Foot Pain - mild in nature, well managed w/ current pain meds.    - Patient is stable to continue current rehabilitation program.

## 2025-06-07 NOTE — PROGRESS NOTE ADULT - SUBJECTIVE AND OBJECTIVE BOX
Interval History  Patient seen and examined at bedside. No acute events noted.  Endorses bilateral LE neuropathic pain. No other complaints.     ALLERGIES:  No Known Allergies    MEDICATIONS  (STANDING):  amLODIPine   Tablet 5 milliGRAM(s) Oral daily  atorvastatin 10 milliGRAM(s) Oral at bedtime  dextrose 5%. 1000 milliLiter(s) (50 mL/Hr) IV Continuous <Continuous>  dextrose 5%. 1000 milliLiter(s) (100 mL/Hr) IV Continuous <Continuous>  dextrose 50% Injectable 25 Gram(s) IV Push once  dextrose 50% Injectable 12.5 Gram(s) IV Push once  dextrose 50% Injectable 25 Gram(s) IV Push once  DULoxetine 60 milliGRAM(s) Oral daily  enoxaparin Injectable 40 milliGRAM(s) SubCutaneous every 24 hours  gabapentin 600 milliGRAM(s) Oral three times a day  glucagon  Injectable 1 milliGRAM(s) IntraMuscular once  insulin glargine Injectable (LANTUS) 16 Unit(s) SubCutaneous at bedtime  insulin lispro (ADMELOG) corrective regimen sliding scale   SubCutaneous three times a day before meals  insulin lispro (ADMELOG) corrective regimen sliding scale   SubCutaneous at bedtime  insulin lispro Injectable (ADMELOG) 8 Unit(s) SubCutaneous three times a day with meals  lidocaine   4% Patch 1 Patch Transdermal <User Schedule>  losartan 50 milliGRAM(s) Oral daily  melatonin 6 milliGRAM(s) Oral at bedtime  multivitamin 1 Tablet(s) Oral daily  pantoprazole    Tablet 40 milliGRAM(s) Oral before breakfast  senna 2 Tablet(s) Oral at bedtime    MEDICATIONS  (PRN):  acetaminophen     Tablet .. 650 milliGRAM(s) Oral every 6 hours PRN Mild Pain (1 - 3)  dextrose Oral Gel 15 Gram(s) Oral once PRN Blood Glucose LESS THAN 70 milliGRAM(s)/deciliter  methocarbamol 750 milliGRAM(s) Oral three times a day PRN Muscle Spasm    Vital Signs Last 24 Hrs  T(F): 97.2 (06 Jun 2025 19:58), Max: 97.2 (06 Jun 2025 19:58)  HR: 81 (07 Jun 2025 05:11) (81 - 92)  BP: 162/83 (07 Jun 2025 05:11) (139/75 - 162/83)  RR: 16 (07 Jun 2025 05:11) (16 - 16)  SpO2: 99% (07 Jun 2025 05:11) (98% - 99%)  I&O's Summary    06 Jun 2025 07:01  -  07 Jun 2025 07:00  --------------------------------------------------------  IN: 560 mL / OUT: 0 mL / NET: 560 mL    BMI (kg/m2): 20.8 (06-06-25 @ 16:06)    PHYSICAL EXAM:  GENERAL: NAD  HEENT: NCAT  CHEST/LUNG: Clear to percussion bilaterally; No rales, rhonchi, wheezing  HEART: Regular rate and rhythm, no murmur   ABDOMEN: Soft, Nontender, Nondistended; Bowel sounds present  MUSCULOSKELETAL/EXTREMITIES:  2+ Peripheral Pulses, No LE edema  PSYCH: Appropriate affect  NEURO: Alert & Oriented x 3, RLE weakness, decreased sensation bilaterally LE from knee down     I personally reviewed the below data/images/labs:    LABS:                        13.8   5.44  )-----------( 208      ( 05 Jun 2025 05:52 )             39.4       06-06    131  |  96  |  16  ----------------------------<  212  4.6   |  32  |  0.69    Ca    9.4      06 Jun 2025 05:34    TPro  7.6  /  Alb  2.9  /  TBili  0.4  /  DBili  x   /  AST  24  /  ALT  51  /  AlkPhos  103  06-05     04-24 Chol 129 mg/dL LDL -- HDL 63 mg/dL Trig 63 mg/dL    POCT Blood Glucose.: 156 mg/dL (07 Jun 2025 13:00)  POCT Blood Glucose.: 190 mg/dL (07 Jun 2025 08:12)  POCT Blood Glucose.: 194 mg/dL (06 Jun 2025 21:21)  POCT Blood Glucose.: 137 mg/dL (06 Jun 2025 16:48)    Urinalysis Basic - ( 06 Jun 2025 05:34 )    Color: x / Appearance: x / SG: x / pH: x  Gluc: 212 mg/dL / Ketone: x  / Bili: x / Urobili: x   Blood: x / Protein: x / Nitrite: x   Leuk Esterase: x / RBC: x / WBC x   Sq Epi: x / Non Sq Epi: x / Bacteria: x    COVID-19 PCR: NotDetec (06-03-25 @ 14:15)    Consultant(s) Notes Reviewed:   Care Discused with Consultants/Other Providers:  Imaging Personally Reviewed:

## 2025-06-07 NOTE — PROGRESS NOTE ADULT - SUBJECTIVE AND OBJECTIVE BOX
INTERVAL HPI: Patient seen and examined at bedside. No acute events overnight.  mild foot pain this AM  Otherwise pain controlled, no chest pain, no N/V, no Fevers/Chills. No other new ROS  Has been tolerating rehabilitation program.    Medications:  MEDICATIONS  (STANDING):  amLODIPine   Tablet 5 milliGRAM(s) Oral daily  atorvastatin 10 milliGRAM(s) Oral at bedtime  dextrose 5%. 1000 milliLiter(s) (50 mL/Hr) IV Continuous <Continuous>  dextrose 5%. 1000 milliLiter(s) (100 mL/Hr) IV Continuous <Continuous>  dextrose 50% Injectable 25 Gram(s) IV Push once  dextrose 50% Injectable 12.5 Gram(s) IV Push once  dextrose 50% Injectable 25 Gram(s) IV Push once  DULoxetine 60 milliGRAM(s) Oral daily  enoxaparin Injectable 40 milliGRAM(s) SubCutaneous every 24 hours  gabapentin 600 milliGRAM(s) Oral three times a day  glucagon  Injectable 1 milliGRAM(s) IntraMuscular once  insulin glargine Injectable (LANTUS) 16 Unit(s) SubCutaneous at bedtime  insulin lispro (ADMELOG) corrective regimen sliding scale   SubCutaneous three times a day before meals  insulin lispro (ADMELOG) corrective regimen sliding scale   SubCutaneous at bedtime  insulin lispro Injectable (ADMELOG) 8 Unit(s) SubCutaneous three times a day with meals  lidocaine   4% Patch 1 Patch Transdermal <User Schedule>  losartan 50 milliGRAM(s) Oral daily  melatonin 6 milliGRAM(s) Oral at bedtime  multivitamin 1 Tablet(s) Oral daily  pantoprazole    Tablet 40 milliGRAM(s) Oral before breakfast  senna 2 Tablet(s) Oral at bedtime    MEDICATIONS  (PRN):  acetaminophen     Tablet .. 650 milliGRAM(s) Oral every 6 hours PRN Mild Pain (1 - 3)  dextrose Oral Gel 15 Gram(s) Oral once PRN Blood Glucose LESS THAN 70 milliGRAM(s)/deciliter  methocarbamol 750 milliGRAM(s) Oral three times a day PRN Muscle Spasm      Vital Signs:  T(C): 36.2 (06-06-25 @ 19:58), Max: 36.2 (06-06-25 @ 19:58)  HR: 81 (06-07-25 @ 05:11) (81 - 92)  BP: 162/83 (06-07-25 @ 05:11) (139/75 - 162/83)  RR: 16 (06-07-25 @ 05:11) (16 - 16)  SpO2: 99% (06-07-25 @ 05:11) (98% - 99%)    PHYSICAL EXAM:    GEN - In NAD  HEENT- EOMI  Heart- S1S2, appears well perfused  Lungs- breathing comfortably in room air, no audible wheezing, no accessory muscle use.  Abd- + BS, NT  Ext- No calf pain  Neuro/MSK- Exam unchanged    Trended Labs Reviewed:  06-06    131[L]  |  96  |  16  ----------------------------<  212[H]  4.6   |  32[H]  |  0.69  06-05    129[L]  |  95[L]  |  12  ----------------------------<  410[H]  5.3   |  30  |  0.84    Ca    9.4      06 Jun 2025 05:34  Ca    9.2      05 Jun 2025 05:52    TPro  7.6  /  Alb  2.9[L]  /  TBili  0.4  /  DBili  x   /  AST  24  /  ALT  51[H]  /  AlkPhos  103  06-05                    Urinalysis Basic - ( 06 Jun 2025 05:34 )    Color: x / Appearance: x / SG: x / pH: x  Gluc: 212 mg/dL / Ketone: x  / Bili: x / Urobili: x   Blood: x / Protein: x / Nitrite: x   Leuk Esterase: x / RBC: x / WBC x   Sq Epi: x / Non Sq Epi: x / Bacteria: x                              13.8   5.44  )-----------( 208      ( 05 Jun 2025 05:52 )             39.4     CAPILLARY BLOOD GLUCOSE      POCT Blood Glucose.: 156 mg/dL (07 Jun 2025 13:00)  POCT Blood Glucose.: 190 mg/dL (07 Jun 2025 08:12)  POCT Blood Glucose.: 194 mg/dL (06 Jun 2025 21:21)          I&O's Detail:    06-06-25 @ 07:01  -  06-07-25 @ 07:00  --------------------------------------------------------  IN: 560 mL / OUT: 0 mL / NET: 560 mL          Images Reviewed:  < from: Xray Chest 1 View- PORTABLE-Urgent (Xray Chest 1 View- PORTABLE-Urgent .) (05.30.25 @ 19:26) >  ACC: [id number] EXAM:  XR CHEST PORTABLE URGENT 1V   ORDERED BY:  LUÍS SINGER     PROCEDURE DATE:  05/30/2025          INTERPRETATION:  EXAMINATION: XR CHEST URGENT    CLINICAL INDICATION: Rule out infection    TECHNIQUE: Single frontal, portable view of the chest was obtained.    COMPARISON: Chest x-ray 11/19/2024.    FINDINGS:    The heart is normal in size.  The lungs are clear. No pleural effusion.  There is no pneumothorax.  No acute displaced bony abnormality.    IMPRESSION:  Clear lungs.    < end of copied text >    My impression:  benign appearing CXR    ******

## 2025-06-07 NOTE — PROGRESS NOTE ADULT - NS ATTEST RISK PROBLEM GEN_ALL_CORE FT
Moderate number or complexity of illness, moderate amount/complexity of data reviewed, and moderate risk of complications/morbidity from additional treatment. (2 our of 3 elements)

## 2025-06-08 LAB
GLUCOSE BLDC GLUCOMTR-MCNC: 169 MG/DL — HIGH (ref 70–99)
GLUCOSE BLDC GLUCOMTR-MCNC: 191 MG/DL — HIGH (ref 70–99)
GLUCOSE BLDC GLUCOMTR-MCNC: 287 MG/DL — HIGH (ref 70–99)
GLUCOSE BLDC GLUCOMTR-MCNC: 398 MG/DL — HIGH (ref 70–99)
GLUCOSE BLDC GLUCOMTR-MCNC: 407 MG/DL — HIGH (ref 70–99)
GLUCOSE BLDC GLUCOMTR-MCNC: 459 MG/DL — CRITICAL HIGH (ref 70–99)
GLUCOSE SERPL-MCNC: 435 MG/DL — HIGH (ref 70–99)

## 2025-06-08 PROCEDURE — 99232 SBSQ HOSP IP/OBS MODERATE 35: CPT

## 2025-06-08 RX ADMIN — Medication 5 DROP(S): at 23:04

## 2025-06-08 RX ADMIN — METHOCARBAMOL 750 MILLIGRAM(S): 500 TABLET, FILM COATED ORAL at 05:21

## 2025-06-08 RX ADMIN — AMLODIPINE BESYLATE 5 MILLIGRAM(S): 10 TABLET ORAL at 05:21

## 2025-06-08 RX ADMIN — DULOXETINE 60 MILLIGRAM(S): 20 CAPSULE, DELAYED RELEASE ORAL at 13:38

## 2025-06-08 RX ADMIN — INSULIN LISPRO 8 UNIT(S): 100 INJECTION, SOLUTION INTRAVENOUS; SUBCUTANEOUS at 08:02

## 2025-06-08 RX ADMIN — LIDOCAINE HYDROCHLORIDE 1 PATCH: 20 JELLY TOPICAL at 20:00

## 2025-06-08 RX ADMIN — LIDOCAINE HYDROCHLORIDE 1 PATCH: 20 JELLY TOPICAL at 20:05

## 2025-06-08 RX ADMIN — ENOXAPARIN SODIUM 40 MILLIGRAM(S): 100 INJECTION SUBCUTANEOUS at 17:18

## 2025-06-08 RX ADMIN — LOSARTAN POTASSIUM 50 MILLIGRAM(S): 100 TABLET, FILM COATED ORAL at 05:21

## 2025-06-08 RX ADMIN — INSULIN LISPRO 8 UNIT(S): 100 INJECTION, SOLUTION INTRAVENOUS; SUBCUTANEOUS at 12:21

## 2025-06-08 RX ADMIN — INSULIN LISPRO 8 UNIT(S): 100 INJECTION, SOLUTION INTRAVENOUS; SUBCUTANEOUS at 17:16

## 2025-06-08 RX ADMIN — INSULIN LISPRO 4: 100 INJECTION, SOLUTION INTRAVENOUS; SUBCUTANEOUS at 22:23

## 2025-06-08 RX ADMIN — INSULIN LISPRO 3: 100 INJECTION, SOLUTION INTRAVENOUS; SUBCUTANEOUS at 12:25

## 2025-06-08 RX ADMIN — INSULIN LISPRO 1: 100 INJECTION, SOLUTION INTRAVENOUS; SUBCUTANEOUS at 17:15

## 2025-06-08 RX ADMIN — LIDOCAINE HYDROCHLORIDE 1 PATCH: 20 JELLY TOPICAL at 08:02

## 2025-06-08 RX ADMIN — INSULIN GLARGINE-YFGN 16 UNIT(S): 100 INJECTION, SOLUTION SUBCUTANEOUS at 22:22

## 2025-06-08 RX ADMIN — GABAPENTIN 600 MILLIGRAM(S): 400 CAPSULE ORAL at 13:37

## 2025-06-08 RX ADMIN — INSULIN LISPRO 1: 100 INJECTION, SOLUTION INTRAVENOUS; SUBCUTANEOUS at 08:01

## 2025-06-08 RX ADMIN — Medication 40 MILLIGRAM(S): at 05:21

## 2025-06-08 RX ADMIN — GABAPENTIN 600 MILLIGRAM(S): 400 CAPSULE ORAL at 22:27

## 2025-06-08 RX ADMIN — Medication 6 MILLIGRAM(S): at 22:18

## 2025-06-08 RX ADMIN — Medication 2 TABLET(S): at 22:28

## 2025-06-08 RX ADMIN — Medication 650 MILLIGRAM(S): at 05:21

## 2025-06-08 RX ADMIN — GABAPENTIN 600 MILLIGRAM(S): 400 CAPSULE ORAL at 05:21

## 2025-06-08 RX ADMIN — Medication 1 TABLET(S): at 13:38

## 2025-06-08 RX ADMIN — ATORVASTATIN CALCIUM 10 MILLIGRAM(S): 80 TABLET, FILM COATED ORAL at 22:27

## 2025-06-08 NOTE — PROGRESS NOTE ADULT - SUBJECTIVE AND OBJECTIVE BOX
Interval History  Patient seen and examined at bedside. No acute events noted.  Patient endorses mid back and right thigh pain due to increased activity with therapy. Pain is controlled on current regimen. No other complaints.     ALLERGIES:  No Known Allergies    MEDICATIONS  (STANDING):  amLODIPine   Tablet 5 milliGRAM(s) Oral daily  atorvastatin 10 milliGRAM(s) Oral at bedtime  dextrose 5%. 1000 milliLiter(s) (50 mL/Hr) IV Continuous <Continuous>  dextrose 5%. 1000 milliLiter(s) (100 mL/Hr) IV Continuous <Continuous>  dextrose 50% Injectable 25 Gram(s) IV Push once  dextrose 50% Injectable 12.5 Gram(s) IV Push once  dextrose 50% Injectable 25 Gram(s) IV Push once  DULoxetine 60 milliGRAM(s) Oral daily  enoxaparin Injectable 40 milliGRAM(s) SubCutaneous every 24 hours  gabapentin 600 milliGRAM(s) Oral three times a day  glucagon  Injectable 1 milliGRAM(s) IntraMuscular once  insulin glargine Injectable (LANTUS) 16 Unit(s) SubCutaneous at bedtime  insulin lispro (ADMELOG) corrective regimen sliding scale   SubCutaneous three times a day before meals  insulin lispro (ADMELOG) corrective regimen sliding scale   SubCutaneous at bedtime  insulin lispro Injectable (ADMELOG) 8 Unit(s) SubCutaneous three times a day with meals  lidocaine   4% Patch 1 Patch Transdermal <User Schedule>  losartan 50 milliGRAM(s) Oral daily  melatonin 6 milliGRAM(s) Oral at bedtime  multivitamin 1 Tablet(s) Oral daily  pantoprazole    Tablet 40 milliGRAM(s) Oral before breakfast  senna 2 Tablet(s) Oral at bedtime    MEDICATIONS  (PRN):  acetaminophen     Tablet .. 650 milliGRAM(s) Oral every 6 hours PRN Mild Pain (1 - 3)  dextrose Oral Gel 15 Gram(s) Oral once PRN Blood Glucose LESS THAN 70 milliGRAM(s)/deciliter  methocarbamol 750 milliGRAM(s) Oral three times a day PRN Muscle Spasm    Vital Signs Last 24 Hrs  T(F): 98.9 (08 Jun 2025 05:02), Max: 98.9 (08 Jun 2025 05:02)  HR: 77 (08 Jun 2025 05:02) (77 - 92)  BP: 158/87 (08 Jun 2025 05:02) (142/80 - 158/87)  RR: 18 (08 Jun 2025 05:02) (16 - 18)  SpO2: 100% (08 Jun 2025 05:02) (99% - 100%)  I&O's Summary    BMI (kg/m2): 20.8 (06-07-25 @ 14:39)    PHYSICAL EXAM:  GENERAL: NAD  HEENT: NCAT  CHEST/LUNG: Clear to percussion bilaterally; No rales, rhonchi, wheezing  HEART: Regular rate and rhythm, no murmur   ABDOMEN: Soft, Nontender, Nondistended; Bowel sounds present  MUSCULOSKELETAL/EXTREMITIES:  2+ Peripheral Pulses, No LE edema  Mid back paraspinal muscles are tender to palpation   PSYCH: Appropriate affect  NEURO: Alert & Oriented x 3, RLE weakness improving, decreased sensation bilaterally LE from knee down     I personally reviewed the below data/images/labs:    LABS:      06-06    131  |  96  |  16  ----------------------------<  212  4.6   |  32  |  0.69    Ca    9.4      06 Jun 2025 05:34    04-24 Chol 129 mg/dL LDL -- HDL 63 mg/dL Trig 63 mg/dL    POCT Blood Glucose.: 287 mg/dL (08 Jun 2025 12:21)  POCT Blood Glucose.: 169 mg/dL (08 Jun 2025 07:47)  POCT Blood Glucose.: 216 mg/dL (07 Jun 2025 21:15)  POCT Blood Glucose.: 166 mg/dL (07 Jun 2025 17:17)    Urinalysis Basic - ( 06 Jun 2025 05:34 )    Color: x / Appearance: x / SG: x / pH: x  Gluc: 212 mg/dL / Ketone: x  / Bili: x / Urobili: x   Blood: x / Protein: x / Nitrite: x   Leuk Esterase: x / RBC: x / WBC x   Sq Epi: x / Non Sq Epi: x / Bacteria: x    COVID-19 PCR: NotDetec (06-03-25 @ 14:15)    Consultant(s) Notes Reviewed:   Care Discused with Consultants/Other Providers:  Imaging Personally Reviewed:

## 2025-06-08 NOTE — PROGRESS NOTE ADULT - SUBJECTIVE AND OBJECTIVE BOX
INTERVAL HPI: Patient seen and examined at bedside. No acute events overnight.   Pain controlled, no chest pain, no N/V, no Fevers/Chills. No other new ROS  Has been tolerating rehabilitation program.    Medications:  MEDICATIONS  (STANDING):  amLODIPine   Tablet 5 milliGRAM(s) Oral daily  atorvastatin 10 milliGRAM(s) Oral at bedtime  dextrose 5%. 1000 milliLiter(s) (50 mL/Hr) IV Continuous <Continuous>  dextrose 5%. 1000 milliLiter(s) (100 mL/Hr) IV Continuous <Continuous>  dextrose 50% Injectable 25 Gram(s) IV Push once  dextrose 50% Injectable 12.5 Gram(s) IV Push once  dextrose 50% Injectable 25 Gram(s) IV Push once  DULoxetine 60 milliGRAM(s) Oral daily  enoxaparin Injectable 40 milliGRAM(s) SubCutaneous every 24 hours  gabapentin 600 milliGRAM(s) Oral three times a day  glucagon  Injectable 1 milliGRAM(s) IntraMuscular once  insulin glargine Injectable (LANTUS) 16 Unit(s) SubCutaneous at bedtime  insulin lispro (ADMELOG) corrective regimen sliding scale   SubCutaneous three times a day before meals  insulin lispro (ADMELOG) corrective regimen sliding scale   SubCutaneous at bedtime  insulin lispro Injectable (ADMELOG) 8 Unit(s) SubCutaneous three times a day with meals  lidocaine   4% Patch 1 Patch Transdermal <User Schedule>  losartan 50 milliGRAM(s) Oral daily  melatonin 6 milliGRAM(s) Oral at bedtime  multivitamin 1 Tablet(s) Oral daily  pantoprazole    Tablet 40 milliGRAM(s) Oral before breakfast  senna 2 Tablet(s) Oral at bedtime    MEDICATIONS  (PRN):  acetaminophen     Tablet .. 650 milliGRAM(s) Oral every 6 hours PRN Mild Pain (1 - 3)  dextrose Oral Gel 15 Gram(s) Oral once PRN Blood Glucose LESS THAN 70 milliGRAM(s)/deciliter  methocarbamol 750 milliGRAM(s) Oral three times a day PRN Muscle Spasm      Vital Signs:  T(C): 37.2 (06-08-25 @ 05:02), Max: 37.2 (06-08-25 @ 05:02)  HR: 77 (06-08-25 @ 05:02) (77 - 92)  BP: 158/87 (06-08-25 @ 05:02) (142/80 - 158/87)  RR: 18 (06-08-25 @ 05:02) (16 - 18)  SpO2: 100% (06-08-25 @ 05:02) (99% - 100%)    PHYSICAL EXAM:    GEN - In NAD  HEENT- EOMI  Heart- S1S2, appears well perfused  Lungs- breathing comfortably in room air, no audible wheezing, no accessory muscle use.  Abd- + BS, NT  Ext- No calf pain  Neuro/MSK- Exam unchanged    Trended Labs Reviewed:  06-06    131[L]  |  96  |  16  ----------------------------<  212[H]  4.6   |  32[H]  |  0.69    Ca    9.4      06 Jun 2025 05:34                            CAPILLARY BLOOD GLUCOSE      POCT Blood Glucose.: 287 mg/dL (08 Jun 2025 12:21)  POCT Blood Glucose.: 169 mg/dL (08 Jun 2025 07:47)  POCT Blood Glucose.: 216 mg/dL (07 Jun 2025 21:15)  POCT Blood Glucose.: 166 mg/dL (07 Jun 2025 17:17)

## 2025-06-08 NOTE — PROGRESS NOTE ADULT - ASSESSMENT
59 year old male with PMH of ethanol use disorder, chronic pancreatitis, poorly controlled СЕРГЕЙ (A1c 9.9 on 04/23/2025), chronic neuropathy of LLE, recent hospitalization April 2025 for sciatica; who presented to [facility] ED on 5/26/25 for progressively worsening BL LE pain, weakness and numbness. He was evaluated by Neurology and underwent bedside EMG which was significant for CIDP (s/p 5 days IVIG and IV steroids). He developed an episode of confusion/AMS (CT Head negative). His hospital course was significant for hyponatremia, and hyperglycemia (both now resolved). Patient now admitted for a multidisciplinary rehab program on 6/3    #CIDP  -MRI lumbar spine wwo no evidence of compression or enhancement  -MRI lumbosacral plexus R and L wwo no abnormalities  -s/p EMG/NCS with evidence of demyelinating process and chronic denervation   -s/p IVIG and IV steroids x 5 days   -Fall precaution  -Pain control and bowel regimen per rehab team  -Comprehensive Rehab Program w/ PT/OT  -Orthotic eval for BL AFOs   -Outpatient follow up with neurology    #HTN/HLD  -Continue Amlodipine (reduced dose to 5mg QD 6/6 due to low BP w/ therapy) and Losartan (Home regimen: Telmisartan 40mg daily) w/ holding parameters   -Monitor BP   -Continue Atorvastatin    #Latent Autoimmune Diabetes in Adults, Uncontrolled, Complicated by Peripheral Neuropathy  -HbA1c: 8.9 on 6/6   -Continue Lantus 16units QHS  -Continue premeal admelog 8units TID w/ meals if he consume >75%, 6untis if he consumes 50-75% and 4units if he consumes <50%   -RAISS (low dose)  -Monitor FS, labile due to dietary indiscretion, he was counseled  -Continue gabapentin   -Diabetes educator following     #"Pseudohyponatremia"  -Na 131 6/6  -> 133 when corrected for glucose   -Seen by nephrology during his hospitalization, w/ impression: acute on chronic hyponatremia likely iso increased FW water/poor solute intake and possible SIADH from pain and duloxetine use  -Was on UreNa during his hospitalization and d/c'd once Na improved  -Continue free water restriction to <1.2L/day  -Encourage oral intake  -Monitor BMP     #Mood  -Continue Duloxetine     #Incidental Findings   -CTA neck showed Less than 50% stenosis of the proximal right ICA. At least 60% stenosis of the proximal left ICA.  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

## 2025-06-09 LAB
ALBUMIN SERPL ELPH-MCNC: 3.3 G/DL — SIGNIFICANT CHANGE UP (ref 3.3–5)
ALP SERPL-CCNC: 97 U/L — SIGNIFICANT CHANGE UP (ref 40–120)
ALT FLD-CCNC: 62 U/L — HIGH (ref 10–45)
ANION GAP SERPL CALC-SCNC: 6 MMOL/L — SIGNIFICANT CHANGE UP (ref 5–17)
AST SERPL-CCNC: 40 U/L — SIGNIFICANT CHANGE UP (ref 10–40)
BASOPHILS # BLD AUTO: 0.02 K/UL — SIGNIFICANT CHANGE UP (ref 0–0.2)
BASOPHILS NFR BLD AUTO: 0.4 % — SIGNIFICANT CHANGE UP (ref 0–2)
BILIRUB SERPL-MCNC: 0.4 MG/DL — SIGNIFICANT CHANGE UP (ref 0.2–1.2)
BUN SERPL-MCNC: 10 MG/DL — SIGNIFICANT CHANGE UP (ref 7–23)
CALCIUM SERPL-MCNC: 10 MG/DL — SIGNIFICANT CHANGE UP (ref 8.4–10.5)
CHLORIDE SERPL-SCNC: 98 MMOL/L — SIGNIFICANT CHANGE UP (ref 96–108)
CO2 SERPL-SCNC: 32 MMOL/L — HIGH (ref 22–31)
CREAT SERPL-MCNC: 1.04 MG/DL — SIGNIFICANT CHANGE UP (ref 0.5–1.3)
EGFR: 84 ML/MIN/1.73M2 — SIGNIFICANT CHANGE UP
EGFR: 84 ML/MIN/1.73M2 — SIGNIFICANT CHANGE UP
EOSINOPHIL # BLD AUTO: 0.11 K/UL — SIGNIFICANT CHANGE UP (ref 0–0.5)
EOSINOPHIL NFR BLD AUTO: 2.4 % — SIGNIFICANT CHANGE UP (ref 0–6)
GLUCOSE BLDC GLUCOMTR-MCNC: 131 MG/DL — HIGH (ref 70–99)
GLUCOSE BLDC GLUCOMTR-MCNC: 195 MG/DL — HIGH (ref 70–99)
GLUCOSE BLDC GLUCOMTR-MCNC: 216 MG/DL — HIGH (ref 70–99)
GLUCOSE BLDC GLUCOMTR-MCNC: 239 MG/DL — HIGH (ref 70–99)
GLUCOSE BLDC GLUCOMTR-MCNC: 349 MG/DL — HIGH (ref 70–99)
GLUCOSE SERPL-MCNC: 279 MG/DL — HIGH (ref 70–99)
HCT VFR BLD CALC: 39 % — SIGNIFICANT CHANGE UP (ref 39–50)
HGB BLD-MCNC: 13.6 G/DL — SIGNIFICANT CHANGE UP (ref 13–17)
IMM GRANULOCYTES NFR BLD AUTO: 0.2 % — SIGNIFICANT CHANGE UP (ref 0–0.9)
LYMPHOCYTES # BLD AUTO: 2.39 K/UL — SIGNIFICANT CHANGE UP (ref 1–3.3)
LYMPHOCYTES # BLD AUTO: 53.2 % — HIGH (ref 13–44)
MCHC RBC-ENTMCNC: 30.4 PG — SIGNIFICANT CHANGE UP (ref 27–34)
MCHC RBC-ENTMCNC: 34.9 G/DL — SIGNIFICANT CHANGE UP (ref 32–36)
MCV RBC AUTO: 87.1 FL — SIGNIFICANT CHANGE UP (ref 80–100)
MONOCYTES # BLD AUTO: 0.66 K/UL — SIGNIFICANT CHANGE UP (ref 0–0.9)
MONOCYTES NFR BLD AUTO: 14.7 % — HIGH (ref 2–14)
NEUTROPHILS # BLD AUTO: 1.3 K/UL — LOW (ref 1.8–7.4)
NEUTROPHILS NFR BLD AUTO: 29.1 % — LOW (ref 43–77)
NRBC BLD AUTO-RTO: 0 /100 WBCS — SIGNIFICANT CHANGE UP (ref 0–0)
PLATELET # BLD AUTO: 210 K/UL — SIGNIFICANT CHANGE UP (ref 150–400)
POTASSIUM SERPL-MCNC: 4.7 MMOL/L — SIGNIFICANT CHANGE UP (ref 3.5–5.3)
POTASSIUM SERPL-SCNC: 4.7 MMOL/L — SIGNIFICANT CHANGE UP (ref 3.5–5.3)
PROT SERPL-MCNC: 7.6 G/DL — SIGNIFICANT CHANGE UP (ref 6–8.3)
RBC # BLD: 4.48 M/UL — SIGNIFICANT CHANGE UP (ref 4.2–5.8)
RBC # FLD: 11.9 % — SIGNIFICANT CHANGE UP (ref 10.3–14.5)
SODIUM SERPL-SCNC: 136 MMOL/L — SIGNIFICANT CHANGE UP (ref 135–145)
WBC # BLD: 4.49 K/UL — SIGNIFICANT CHANGE UP (ref 3.8–10.5)
WBC # FLD AUTO: 4.49 K/UL — SIGNIFICANT CHANGE UP (ref 3.8–10.5)

## 2025-06-09 PROCEDURE — 99232 SBSQ HOSP IP/OBS MODERATE 35: CPT

## 2025-06-09 PROCEDURE — 99233 SBSQ HOSP IP/OBS HIGH 50: CPT

## 2025-06-09 RX ADMIN — LIDOCAINE HYDROCHLORIDE 1 PATCH: 20 JELLY TOPICAL at 20:52

## 2025-06-09 RX ADMIN — GABAPENTIN 600 MILLIGRAM(S): 400 CAPSULE ORAL at 22:47

## 2025-06-09 RX ADMIN — ENOXAPARIN SODIUM 40 MILLIGRAM(S): 100 INJECTION SUBCUTANEOUS at 17:27

## 2025-06-09 RX ADMIN — LIDOCAINE HYDROCHLORIDE 1 PATCH: 20 JELLY TOPICAL at 08:10

## 2025-06-09 RX ADMIN — Medication 40 MILLIGRAM(S): at 05:15

## 2025-06-09 RX ADMIN — DULOXETINE 60 MILLIGRAM(S): 20 CAPSULE, DELAYED RELEASE ORAL at 12:19

## 2025-06-09 RX ADMIN — INSULIN GLARGINE-YFGN 16 UNIT(S): 100 INJECTION, SOLUTION SUBCUTANEOUS at 22:48

## 2025-06-09 RX ADMIN — INSULIN LISPRO 2: 100 INJECTION, SOLUTION INTRAVENOUS; SUBCUTANEOUS at 08:09

## 2025-06-09 RX ADMIN — Medication 6 MILLIGRAM(S): at 22:47

## 2025-06-09 RX ADMIN — GABAPENTIN 600 MILLIGRAM(S): 400 CAPSULE ORAL at 05:15

## 2025-06-09 RX ADMIN — INSULIN LISPRO 8 UNIT(S): 100 INJECTION, SOLUTION INTRAVENOUS; SUBCUTANEOUS at 08:09

## 2025-06-09 RX ADMIN — Medication 2 TABLET(S): at 22:48

## 2025-06-09 RX ADMIN — AMLODIPINE BESYLATE 5 MILLIGRAM(S): 10 TABLET ORAL at 05:15

## 2025-06-09 RX ADMIN — INSULIN LISPRO 1: 100 INJECTION, SOLUTION INTRAVENOUS; SUBCUTANEOUS at 17:26

## 2025-06-09 RX ADMIN — GABAPENTIN 600 MILLIGRAM(S): 400 CAPSULE ORAL at 13:45

## 2025-06-09 RX ADMIN — Medication 5 DROP(S): at 05:15

## 2025-06-09 RX ADMIN — ATORVASTATIN CALCIUM 10 MILLIGRAM(S): 80 TABLET, FILM COATED ORAL at 22:48

## 2025-06-09 RX ADMIN — LIDOCAINE HYDROCHLORIDE 1 PATCH: 20 JELLY TOPICAL at 21:44

## 2025-06-09 RX ADMIN — Medication 5 DROP(S): at 00:01

## 2025-06-09 RX ADMIN — INSULIN LISPRO 8 UNIT(S): 100 INJECTION, SOLUTION INTRAVENOUS; SUBCUTANEOUS at 17:27

## 2025-06-09 RX ADMIN — INSULIN LISPRO 8 UNIT(S): 100 INJECTION, SOLUTION INTRAVENOUS; SUBCUTANEOUS at 12:18

## 2025-06-09 RX ADMIN — Medication 5 DROP(S): at 17:27

## 2025-06-09 RX ADMIN — LOSARTAN POTASSIUM 50 MILLIGRAM(S): 100 TABLET, FILM COATED ORAL at 05:15

## 2025-06-09 RX ADMIN — Medication 1 TABLET(S): at 12:19

## 2025-06-09 NOTE — PROGRESS NOTE ADULT - NS ATTEND AMEND GEN_ALL_CORE FT
I have made amendments to the documentation where necessary. Additional comments: I have personally seen and examined the patient independently Medical records reviewed. I have made amendments to the documentation where necessary and adjusted the history, physical examination, and plan as documented by the NP.     Seen, examined and observed in therapy   D/W PT decreased strength Rt knee and hip with Rt knee buckling during therapy as noted today    Labs unremarkable   B/l foot drop persisting, Rt knee declined in strength noted, with adverse effect on gait  Continue therapy   DVT ppx  Monitor acute on chronic weakness, will get neuro consult if this persists         Spent 53 mins, patient review, discussion of lab results, recent functional declined and care co ordination

## 2025-06-09 NOTE — PROGRESS NOTE ADULT - ASSESSMENT
59 year old male with PMH of ethanol use disorder, chronic pancreatitis, poorly controlled СЕРГЕЙ (A1c 9.9 on 04/23/2025), chronic neuropathy of LLE, recent hospitalization April 2025 for sciatica; who presented to [facility] ED on 5/26/25 for progressively worsening BL LE pain, weakness and numbness. He was evaluated by Neurology and underwent bedside EMG which was significant for CIDP (s/p 5 days IVIG and IV steroids). He developed an episode of confusion/AMS (CT Head negative). His hospital course was significant for hyponatremia, and hyperglycemia (both now resolved). Patient now admitted for a multidisciplinary rehab program. 06-03-25 @ 14:29      * BL foot drop - await BL AFOs  * R HF weakness - consider neuro consult for repeat IVIG?   * L ear fullness - on Debrox - will consider ENT if no improvement with Debrox     #CIDP  - Gait Instability, ADL impairments and Functional impairments: start Comprehensive Rehab Program of PT/OT- 3 hours a day, 5 days a week  - P&O as needed--orthotics referral   - Last IVIG and Methylprednisolone on 6/1     #BL foot drop  - Orthotic eval for BL hinged AFOs   - This patient has a diagnosis of BL foot drop. Patient is ambulatory. Patient requires a custom molded hinged AFO to preserve ankle motion while stabilizing talar and subtalar joints. Device will give a more natural gait without destabilizing the knee and hip. Device requires a low density lining to protect prominent louis areas of effected skin. Patient will need the device for a term of greater than 9 months. No reasonable prefabricated orthosis exists.     #HTN/HLD  - Amlodipine 10mg daily  - Atorvastatin 10mg at HS  - Losartan 50mg daily - (Home regimen: Telmisartan 40mg daily)    #Latent Autoimmune Diabetes Mellitus  - A1c: 9.9% (April 2025)   - FS AC & HS  - Premeal  - Lantus     #Electrolyte Imbalance  - Monitor CMP  - Appreciate ongoing hospitalist recs     #L ear fullness   - on Debrox   - will consider ENT if no improvement with Debrox     #Mood  - Duloxetine 60mg daily     #Skin  - Skin  - Pressure injury/Skin: OOB to Chair, PT/OT     #Pain Mgmt   #Neuropathy  #Stiffness/Spasiticity  - Gabapentin 600mg TID   - Lidocaine patch daily  - PRN: Methocarbamol 750mg TID; Tylenol 650mg QID    #GI/Bowel Mgmt   - Pantoprazole   - Senna     #/Bladder Mgmt --voiding     #FEN   - Diet - Regular + Thins  [CCHO]    - Fluid Restriction: 1200mL  - MVI    #Falls history  - Osteoporosis labs - WNL    #Precautions / PROPHYLAXIS:   - Falls  - ortho: Weight bearing status: WBAT   - Lungs: Aspiration, Incentive Spirometer   - DVT: Lovenox 40 mg daily   ----------------------------------------------    IDT 6/5  Ambulating 30ft RW mod assist wc follow through 2 stairs 2 hand rails  b/l foot drop and Rt hip weakness  Await orthotist  Sharif metzger 6/17 to home    Dr. Chavira Liaison with Family/Providers:    -----------------------------------------------  OUTPATIENT/FOLLOW UP:    Mirza Radford   Neurology    Bárbara Hays  Neurology  [address] [facility] [address], [address]  [place] [address]  Phone: [phone]  Fax: [phone]  Established Patient  Follow Up Time:     [facility]  Endocrinology  [address] [place] [address]  Phone: [phone]  Fax:     Scheduled Appts:  Mirza Radford  [facility]  NEUROLOGY [address] [facility]  Scheduled Appointment: 06/18/2025    [facility]  ENDOCRIN [address]  Scheduled Appointment: 06/20/2025    Aniyah Julian  [facility]  RHEUM [address] [facility] [address] [facility]  Scheduled Appointment: 07/29/2025    [facility]  ONCPAINMGT [address]   Scheduled Appointment: 08/11/2025    ----------------------------------------------- 59 year old male with PMH of ethanol use disorder, chronic pancreatitis, poorly controlled СЕРГЕЙ (A1c 9.9 on 04/23/2025), chronic neuropathy of LLE, recent hospitalization April 2025 for sciatica; who presented to [facility] ED on 5/26/25 for progressively worsening BL LE pain, weakness and numbness. He was evaluated by Neurology and underwent bedside EMG which was significant for CIDP (s/p 5 days IVIG and IV steroids). He developed an episode of confusion/AMS (CT Head negative). His hospital course was significant for hyponatremia, and hyperglycemia (both now resolved). Patient now admitted for a multidisciplinary rehab program. 06-03-25 @ 14:29    * Labs unremarkable   * BL foot drop - await BL AFOs  * Interval functional declined--decreased strength Rt hip 1/5 and knee 2/5 (last dose of IVIG 6/1) - will get neuro consult for evaluations if the Rt side motor weakness (acute on chronic) persists   * L ear fullness - on Debrox - will consider ENT if no improvement with Debrox     #CIDP  - Gait Instability, ADL impairments and Functional impairments: start Comprehensive Rehab Program of PT/OT- 3 hours a day, 5 days a week  - P&O as needed--orthotics referral   - Last IVIG and Methylprednisolone on 6/1   * Interval functional declined--decreased strength Rt hip 1/5 and knee 2/5 (last dose of PLEX 6/1) - will get neuro consult for evaluations if the Rt side motor weakness (acute on chronic) persists    # BL foot drop  - Orthotic eval for BL hinged AFOs   - This patient has a diagnosis of BL foot drop. Patient is ambulatory. Patient requires a custom molded hinged AFO to preserve ankle motion while stabilizing talar and subtalar joints. Device will give a more natural gait without destabilizing the knee and hip. Device requires a low density lining to protect prominent louis areas of effected skin. Patient will need the device for a term of greater than 9 months. No reasonable prefabricated orthosis exists.     #HTN/HLD  - Amlodipine 10mg daily  - Atorvastatin 10mg at HS  - Losartan 50mg daily - (Home regimen: Telmisartan 40mg daily)    #Latent Autoimmune Diabetes Mellitus  - A1c: 9.9% (April 2025)   - FS AC & HS  - Premeal  - Lantus     #Electrolyte Imbalance  - Monitor CMP  - Appreciate ongoing hospitalist recs     #L ear fullness   - on Debrox   - will consider ENT if no improvement with Debrox     #Mood  - Duloxetine 60mg daily     #Skin  - Skin  - Pressure injury/Skin: OOB to Chair, PT/OT     #Pain Mgmt   #Neuropathy  #Stiffness/Spasiticity  - Gabapentin 600mg TID   - Lidocaine patch daily  - PRN: Methocarbamol 750mg TID; Tylenol 650mg QID    #GI/Bowel Mgmt   - Pantoprazole   - Senna     #/Bladder Mgmt --voiding     #FEN   - Diet - Regular + Thins  [CCHO]    - Fluid Restriction: 1200mL  - MVI    #Falls history  - Osteoporosis labs - WNL    #Precautions / PROPHYLAXIS:   - Falls  - ortho: Weight bearing status: WBAT   - Lungs: Aspiration, Incentive Spirometer   - DVT: Lovenox 40 mg daily   ----------------------------------------------    IDT 6/5  Ambulating 30ft RW mod assist wc follow through 2 stairs 2 hand rails  b/l foot drop and Rt hip weakness  Await orthotist  Sharif metzger 6/17 to home    Dr. Chavira Liaison with Family/Providers:    -----------------------------------------------  OUTPATIENT/FOLLOW UP:    Mirza Radford   Neurology    Shelley HaysSkip  Neurology  [address] [place] [address], [address]  [place] [address]  Phone: [phone]  Fax: [phone]  Established Patient  Follow Up Time:     [facility] Endocrinology  Endocrinology  [address] [place] [address]  Phone: [phone]  Fax:     Scheduled Appts:  Mirza Radford  [facility]  NEUROLOGY [address] [facility]  Scheduled Appointment: 06/18/2025    [facility]  ENDOCRIN [address]  Scheduled Appointment: 06/20/2025    Aniyah Julian  [facility]  RHEUM [address] [facility]  Scheduled Appointment: 07/29/2025    [facility]  ONCPAINMGT [address] [place] [address]   Scheduled Appointment: 08/11/2025    -----------------------------------------------

## 2025-06-09 NOTE — PROGRESS NOTE ADULT - SUBJECTIVE AND OBJECTIVE BOX
CC: Patient is a 59y old  Male who presents with a chief complaint of CIDP (09 Jun 2025 09:46)      Interval History:  Patient seen and examined at bedside.  No overnight events  No complaints this morning  high sugars yest but reports eating cookies. no sxs. discussed protein intake with snacks.   current sugar 157 on CGm.     ALLERGIES:  No Known Allergies    MEDICATIONS  (STANDING):  amLODIPine   Tablet 5 milliGRAM(s) Oral daily  atorvastatin 10 milliGRAM(s) Oral at bedtime  carbamide peroxide Otic Solution 5 Drop(s) Both Ears two times a day  dextrose 5%. 1000 milliLiter(s) (100 mL/Hr) IV Continuous <Continuous>  dextrose 5%. 1000 milliLiter(s) (50 mL/Hr) IV Continuous <Continuous>  dextrose 50% Injectable 25 Gram(s) IV Push once  dextrose 50% Injectable 12.5 Gram(s) IV Push once  dextrose 50% Injectable 25 Gram(s) IV Push once  DULoxetine 60 milliGRAM(s) Oral daily  enoxaparin Injectable 40 milliGRAM(s) SubCutaneous every 24 hours  gabapentin 600 milliGRAM(s) Oral three times a day  glucagon  Injectable 1 milliGRAM(s) IntraMuscular once  insulin glargine Injectable (LANTUS) 16 Unit(s) SubCutaneous at bedtime  insulin lispro (ADMELOG) corrective regimen sliding scale   SubCutaneous three times a day before meals  insulin lispro (ADMELOG) corrective regimen sliding scale   SubCutaneous at bedtime  insulin lispro Injectable (ADMELOG) 8 Unit(s) SubCutaneous three times a day with meals  lidocaine   4% Patch 1 Patch Transdermal <User Schedule>  losartan 50 milliGRAM(s) Oral daily  melatonin 6 milliGRAM(s) Oral at bedtime  multivitamin 1 Tablet(s) Oral daily  pantoprazole    Tablet 40 milliGRAM(s) Oral before breakfast  senna 2 Tablet(s) Oral at bedtime    MEDICATIONS  (PRN):  acetaminophen     Tablet .. 650 milliGRAM(s) Oral every 6 hours PRN Mild Pain (1 - 3)  dextrose Oral Gel 15 Gram(s) Oral once PRN Blood Glucose LESS THAN 70 milliGRAM(s)/deciliter  methocarbamol 750 milliGRAM(s) Oral three times a day PRN Muscle Spasm    Vital Signs Last 24 Hrs  T(F): 97.7 (09 Jun 2025 08:11), Max: 98.2 (08 Jun 2025 20:05)  HR: 91 (09 Jun 2025 08:11) (89 - 98)  BP: 161/96 (09 Jun 2025 08:11) (161/96 - 168/90)  RR: 16 (09 Jun 2025 08:11) (16 - 16)  SpO2: 100% (09 Jun 2025 08:11) (98% - 100%)  I&O's Summary    BMI (kg/m2): 20.8 (06-08-25 @ 15:11)    PHYSICAL EXAM:  GENERAL: NAD in bed. sleeping but arouses easily.   HEENT: NCAT  CHEST/LUNG: Clear to percussion bilaterally; No rales, rhonchi, wheezing  HEART: Regular rate and rhythm, no murmur   ABDOMEN: Soft, Nontender, Nondistended; Bowel sounds present  MUSCULOSKELETAL/EXTREMITIES:  2+ Peripheral Pulses, No LE edema  PSYCH: Appropriate affect    LABS:                        13.6   4.49  )-----------( 210      ( 09 Jun 2025 05:40 )             39.0       06-09    136  |  98  |  10  ----------------------------<  279  4.7   |  32  |  1.04    Ca    10.0      09 Jun 2025 05:40    TPro  7.6  /  Alb  3.3  /  TBili  0.4  /  DBili  x   /  AST  40  /  ALT  62  /  AlkPhos  97  06-09                04-24 Chol 129 mg/dL LDL -- HDL 63 mg/dL Trig 63 mg/dL              POCT Blood Glucose.: 131 mg/dL (09 Jun 2025 12:01)  POCT Blood Glucose.: 216 mg/dL (09 Jun 2025 08:01)  POCT Blood Glucose.: 349 mg/dL (09 Jun 2025 00:15)  POCT Blood Glucose.: 459 mg/dL (08 Jun 2025 22:22)  POCT Blood Glucose.: 407 mg/dL (08 Jun 2025 22:19)  POCT Blood Glucose.: 398 mg/dL (08 Jun 2025 22:11)  POCT Blood Glucose.: 191 mg/dL (08 Jun 2025 16:34)      Urinalysis Basic - ( 09 Jun 2025 05:40 )    Color: x / Appearance: x / SG: x / pH: x  Gluc: 279 mg/dL / Ketone: x  / Bili: x / Urobili: x   Blood: x / Protein: x / Nitrite: x   Leuk Esterase: x / RBC: x / WBC x   Sq Epi: x / Non Sq Epi: x / Bacteria: x        COVID-19 PCR: NotDetec (06-03-25 @ 14:15)      Care Discussed with Consultants/Other Providers: Yes

## 2025-06-09 NOTE — PROGRESS NOTE ADULT - SUBJECTIVE AND OBJECTIVE BOX
CC: CIDP    Today's Subjective & Objective Findings:  Patient seen and examined in therapy this AM.  Admits to sleeping well.  Noted with worsening RLE weakness. Noted with knee buckling in therapy this AM.   Experiencing L ear fullness.  Await BL AFOs, to address b/l ankle DF weakness.  Discussed monitoring of ear fullness on Debrox.     Denies headache, dizziness, visual changes, chest pain, SOB/RUBIO, abdominal pain, nausea, vomiting, diarrhea, dysuria. LBM- 6/8  + numbness/tingling in BL LEs.    Function--improvement with ambulatory distance  Ambulated 30 ft with RW with bilateral DF A with min/mod A with WC follow  (multiple trials)  Stairs: 2 steps with bilateral HR with bilateral DF A with mod A of 1 and CG A  of another    Vital Signs Last 24 Hrs  T(C): 36.5 (09 Jun 2025 08:11), Max: 36.8 (08 Jun 2025 20:05)  T(F): 97.7 (09 Jun 2025 08:11), Max: 98.2 (08 Jun 2025 20:05)  HR: 91 (09 Jun 2025 08:11) (89 - 102)  BP: 161/96 (09 Jun 2025 08:11) (120/65 - 168/90)  BP(mean): --  RR: 16 (09 Jun 2025 08:11) (16 - 16)  SpO2: 100% (09 Jun 2025 08:11) (98% - 100%)    PHYSICAL EXAM  Constitutional -, Comfortable  Neck - Supple  Cardiovascular - Palpable peripheral pulses   Respiratory/Chest- Symmetrical chest expansion, No dyspnea  Abdomen - Soft, Non-tender  Extremities - No cyanosis, No edema,     Neurologic Exam - Alert, Oriented, Interactive  Sensory - Light touch sensation intact  Motor Function - Moves all extremities spontaneously, except for limited strength both ankles and Rt hip  Skin -  Intact      LABS:                        13.6   4.49  )-----------( 210      ( 09 Jun 2025 05:40 )             39.0     06-09    136  |  98  |  10  ----------------------------<  279[H]  4.7   |  32[H]  |  1.04    Ca    10.0      09 Jun 2025 05:40    TPro  7.6  /  Alb  3.3  /  TBili  0.4  /  DBili  x   /  AST  40  /  ALT  62[H]  /  AlkPhos  97  06-09      Urinalysis Basic - ( 09 Jun 2025 05:40 )    Color: x / Appearance: x / SG: x / pH: x  Gluc: 279 mg/dL / Ketone: x  / Bili: x / Urobili: x   Blood: x / Protein: x / Nitrite: x   Leuk Esterase: x / RBC: x / WBC x   Sq Epi: x / Non Sq Epi: x / Bacteria: x      CAPILLARY BLOOD GLUCOSE      POCT Blood Glucose.: 216 mg/dL (09 Jun 2025 08:01)  POCT Blood Glucose.: 349 mg/dL (09 Jun 2025 00:15)  POCT Blood Glucose.: 459 mg/dL (08 Jun 2025 22:22)  POCT Blood Glucose.: 407 mg/dL (08 Jun 2025 22:19)  POCT Blood Glucose.: 398 mg/dL (08 Jun 2025 22:11)  POCT Blood Glucose.: 191 mg/dL (08 Jun 2025 16:34)  POCT Blood Glucose.: 287 mg/dL (08 Jun 2025 12:21)        MEDICATIONS  (STANDING):  amLODIPine   Tablet 5 milliGRAM(s) Oral daily  atorvastatin 10 milliGRAM(s) Oral at bedtime  carbamide peroxide Otic Solution 5 Drop(s) Both Ears two times a day  dextrose 5%. 1000 milliLiter(s) (100 mL/Hr) IV Continuous <Continuous>  dextrose 5%. 1000 milliLiter(s) (50 mL/Hr) IV Continuous <Continuous>  dextrose 50% Injectable 25 Gram(s) IV Push once  dextrose 50% Injectable 12.5 Gram(s) IV Push once  dextrose 50% Injectable 25 Gram(s) IV Push once  DULoxetine 60 milliGRAM(s) Oral daily  enoxaparin Injectable 40 milliGRAM(s) SubCutaneous every 24 hours  gabapentin 600 milliGRAM(s) Oral three times a day  glucagon  Injectable 1 milliGRAM(s) IntraMuscular once  insulin glargine Injectable (LANTUS) 16 Unit(s) SubCutaneous at bedtime  insulin lispro (ADMELOG) corrective regimen sliding scale   SubCutaneous three times a day before meals  insulin lispro (ADMELOG) corrective regimen sliding scale   SubCutaneous at bedtime  insulin lispro Injectable (ADMELOG) 8 Unit(s) SubCutaneous three times a day with meals  lidocaine   4% Patch 1 Patch Transdermal <User Schedule>  losartan 50 milliGRAM(s) Oral daily  melatonin 6 milliGRAM(s) Oral at bedtime  multivitamin 1 Tablet(s) Oral daily  pantoprazole    Tablet 40 milliGRAM(s) Oral before breakfast  senna 2 Tablet(s) Oral at bedtime      MEDICATIONS  (PRN):  acetaminophen     Tablet .. 650 milliGRAM(s) Oral every 6 hours PRN Mild Pain (1 - 3)  dextrose Oral Gel 15 Gram(s) Oral once PRN Blood Glucose LESS THAN 70 milliGRAM(s)/deciliter  methocarbamol 750 milliGRAM(s) Oral three times a day PRN Muscle Spasm   CC: CIDP    Today's Subjective & Objective Findings:  Patient seen and examined in therapy this AM.  Admits to sleeping well.  Noted with worsening RLE weakness. Noted with knee buckling in therapy this AM.   Experiencing L ear fullness.  Await BL AFOs, to address b/l ankle DF weakness.  Discussed monitoring of ear fullness on Debrox.     Denies headache, dizziness, visual changes, chest pain, SOB/RUBIO, abdominal pain, nausea, vomiting, diarrhea, dysuria. LBM- 6/8  + numbness/tingling in BL LEs.    Function  Ambulated 40 ft with RW mod/max A x 1 with DF A (bilaterally). pt displayed  increased weightbearing through arms and decreased weightbearing of Right LE to  ground. PT stretched bilateral hamstrings and DF before ambulation to loosen  muscles and nerves to increase weightbearing  Stairs: 1 step with max A x 1 with bilateral DF A. pt buckled ascending step  required max A x 2 to bring into WC      Vital Signs Last 24 Hrs  T(C): 36.5 (09 Jun 2025 08:11), Max: 36.8 (08 Jun 2025 20:05)  T(F): 97.7 (09 Jun 2025 08:11), Max: 98.2 (08 Jun 2025 20:05)  HR: 91 (09 Jun 2025 08:11) (89 - 102)  BP: 161/96 (09 Jun 2025 08:11) (120/65 - 168/90)  RR: 16 (09 Jun 2025 08:11) (16 - 16)  SpO2: 100% (09 Jun 2025 08:11) (98% - 100%)    PHYSICAL EXAM  Constitutional -, Comfortable  Neck - Supple  Cardiovascular - Palpable peripheral pulses   Respiratory/Chest- Symmetrical chest expansion, No dyspnea  Abdomen - Soft, Non-tender  Extremities - No cyanosis, No edema,     Neurologic Exam - Alert, Oriented, Interactive  Sensory - Light touch sensation intact  Motor Function - Moves all extremities spontaneously, except for limited strength both ankles and Rt hip and, as noted today reduced strength Rt knee extension  UE 4/5  Left leg 4/5 except DF 1/5 and PF 3/5  Rt leg  2/5, DF 1/5 PF 3/5  Skin -  Intact      LABS:                        13.6   4.49  )-----------( 210      ( 09 Jun 2025 05:40 )             39.0     06-09    136  |  98  |  10  ----------------------------<  279[H]  4.7   |  32[H]  |  1.04    Ca    10.0      09 Jun 2025 05:40    TPro  7.6  /  Alb  3.3  /  TBili  0.4  /  DBili  x   /  AST  40  /  ALT  62[H]  /  AlkPhos  97  06-09      Urinalysis Basic - ( 09 Jun 2025 05:40 )    Color: x / Appearance: x / SG: x / pH: x  Gluc: 279 mg/dL / Ketone: x  / Bili: x / Urobili: x   Blood: x / Protein: x / Nitrite: x   Leuk Esterase: x / RBC: x / WBC x   Sq Epi: x / Non Sq Epi: x / Bacteria: x      CAPILLARY BLOOD GLUCOSE      POCT Blood Glucose.: 216 mg/dL (09 Jun 2025 08:01)  POCT Blood Glucose.: 349 mg/dL (09 Jun 2025 00:15)  POCT Blood Glucose.: 459 mg/dL (08 Jun 2025 22:22)  POCT Blood Glucose.: 407 mg/dL (08 Jun 2025 22:19)  POCT Blood Glucose.: 398 mg/dL (08 Jun 2025 22:11)  POCT Blood Glucose.: 191 mg/dL (08 Jun 2025 16:34)  POCT Blood Glucose.: 287 mg/dL (08 Jun 2025 12:21)        MEDICATIONS  (STANDING):  amLODIPine   Tablet 5 milliGRAM(s) Oral daily  atorvastatin 10 milliGRAM(s) Oral at bedtime  carbamide peroxide Otic Solution 5 Drop(s) Both Ears two times a day  dextrose 5%. 1000 milliLiter(s) (100 mL/Hr) IV Continuous <Continuous>  dextrose 5%. 1000 milliLiter(s) (50 mL/Hr) IV Continuous <Continuous>  dextrose 50% Injectable 25 Gram(s) IV Push once  dextrose 50% Injectable 12.5 Gram(s) IV Push once  dextrose 50% Injectable 25 Gram(s) IV Push once  DULoxetine 60 milliGRAM(s) Oral daily  enoxaparin Injectable 40 milliGRAM(s) SubCutaneous every 24 hours  gabapentin 600 milliGRAM(s) Oral three times a day  glucagon  Injectable 1 milliGRAM(s) IntraMuscular once  insulin glargine Injectable (LANTUS) 16 Unit(s) SubCutaneous at bedtime  insulin lispro (ADMELOG) corrective regimen sliding scale   SubCutaneous three times a day before meals  insulin lispro (ADMELOG) corrective regimen sliding scale   SubCutaneous at bedtime  insulin lispro Injectable (ADMELOG) 8 Unit(s) SubCutaneous three times a day with meals  lidocaine   4% Patch 1 Patch Transdermal <User Schedule>  losartan 50 milliGRAM(s) Oral daily  melatonin 6 milliGRAM(s) Oral at bedtime  multivitamin 1 Tablet(s) Oral daily  pantoprazole    Tablet 40 milliGRAM(s) Oral before breakfast  senna 2 Tablet(s) Oral at bedtime      MEDICATIONS  (PRN):  acetaminophen     Tablet .. 650 milliGRAM(s) Oral every 6 hours PRN Mild Pain (1 - 3)  dextrose Oral Gel 15 Gram(s) Oral once PRN Blood Glucose LESS THAN 70 milliGRAM(s)/deciliter  methocarbamol 750 milliGRAM(s) Oral three times a day PRN Muscle Spasm

## 2025-06-09 NOTE — PROGRESS NOTE ADULT - TIME BILLING
The time spent for this encounter includes time spent reviewing patient's chart, notes from other providers, including PT, OT, and/or SLP notes and assessment in relation to the patient's current rehab course. Time spent may also include time evaluating the patient, including history taking, physical exam, determining the patient's current and post-discharge home situation (which can also include time spent discussing this with family or support system), inquiry of any equipments or modifications that may be needed if not already present, review of medications and treatment plan,  as well as coordinating care with the patient's hospitalist team, nursing staff, patient care associates, /social workers, and rehabilitation team.
Time spent includes direct patient care (interview and examination of patient), discussion with other providers, support staff and/or patient's family members, review of medical records, ordering diagnostic tests and analyzing results, and documentation

## 2025-06-10 LAB
GLUCOSE BLDC GLUCOMTR-MCNC: 116 MG/DL — HIGH (ref 70–99)
GLUCOSE BLDC GLUCOMTR-MCNC: 125 MG/DL — HIGH (ref 70–99)
GLUCOSE BLDC GLUCOMTR-MCNC: 145 MG/DL — HIGH (ref 70–99)
GLUCOSE BLDC GLUCOMTR-MCNC: 304 MG/DL — HIGH (ref 70–99)

## 2025-06-10 PROCEDURE — 99221 1ST HOSP IP/OBS SF/LOW 40: CPT

## 2025-06-10 PROCEDURE — 99232 SBSQ HOSP IP/OBS MODERATE 35: CPT

## 2025-06-10 PROCEDURE — 99233 SBSQ HOSP IP/OBS HIGH 50: CPT

## 2025-06-10 RX ORDER — POLYETHYLENE GLYCOL 3350 17 G/17G
17 POWDER, FOR SOLUTION ORAL
Refills: 0 | Status: DISCONTINUED | OUTPATIENT
Start: 2025-06-10 | End: 2025-06-17

## 2025-06-10 RX ORDER — PREDNISONE 20 MG/1
60 TABLET ORAL DAILY
Refills: 0 | Status: COMPLETED | OUTPATIENT
Start: 2025-06-10 | End: 2025-06-17

## 2025-06-10 RX ORDER — PREDNISONE 20 MG/1
TABLET ORAL
Refills: 0 | Status: DISCONTINUED | OUTPATIENT
Start: 2025-06-10 | End: 2025-06-17

## 2025-06-10 RX ORDER — PREDNISONE 20 MG/1
30 TABLET ORAL DAILY
Refills: 0 | Status: CANCELLED | OUTPATIENT
Start: 2025-07-02 | End: 2025-06-17

## 2025-06-10 RX ORDER — PREDNISONE 20 MG/1
20 TABLET ORAL DAILY
Refills: 0 | Status: CANCELLED | OUTPATIENT
Start: 2025-07-09 | End: 2025-06-17

## 2025-06-10 RX ORDER — GABAPENTIN 400 MG/1
800 CAPSULE ORAL EVERY 8 HOURS
Refills: 0 | Status: DISCONTINUED | OUTPATIENT
Start: 2025-06-10 | End: 2025-06-17

## 2025-06-10 RX ORDER — PREDNISONE 20 MG/1
60 TABLET ORAL ONCE
Refills: 0 | Status: DISCONTINUED | OUTPATIENT
Start: 2025-06-10 | End: 2025-06-10

## 2025-06-10 RX ORDER — PREDNISONE 20 MG/1
40 TABLET ORAL DAILY
Refills: 0 | Status: CANCELLED | OUTPATIENT
Start: 2025-06-25 | End: 2025-06-17

## 2025-06-10 RX ORDER — PREDNISONE 20 MG/1
50 TABLET ORAL DAILY
Refills: 0 | Status: CANCELLED | OUTPATIENT
Start: 2025-06-18 | End: 2025-06-17

## 2025-06-10 RX ADMIN — GABAPENTIN 800 MILLIGRAM(S): 400 CAPSULE ORAL at 21:32

## 2025-06-10 RX ADMIN — INSULIN LISPRO 8 UNIT(S): 100 INJECTION, SOLUTION INTRAVENOUS; SUBCUTANEOUS at 12:25

## 2025-06-10 RX ADMIN — INSULIN LISPRO 2: 100 INJECTION, SOLUTION INTRAVENOUS; SUBCUTANEOUS at 21:33

## 2025-06-10 RX ADMIN — GABAPENTIN 600 MILLIGRAM(S): 400 CAPSULE ORAL at 13:57

## 2025-06-10 RX ADMIN — Medication 5 DROP(S): at 17:36

## 2025-06-10 RX ADMIN — LIDOCAINE HYDROCHLORIDE 1 PATCH: 20 JELLY TOPICAL at 21:40

## 2025-06-10 RX ADMIN — Medication 40 MILLIGRAM(S): at 05:20

## 2025-06-10 RX ADMIN — Medication 6 MILLIGRAM(S): at 21:32

## 2025-06-10 RX ADMIN — GABAPENTIN 600 MILLIGRAM(S): 400 CAPSULE ORAL at 05:19

## 2025-06-10 RX ADMIN — LOSARTAN POTASSIUM 50 MILLIGRAM(S): 100 TABLET, FILM COATED ORAL at 05:19

## 2025-06-10 RX ADMIN — PREDNISONE 60 MILLIGRAM(S): 20 TABLET ORAL at 13:57

## 2025-06-10 RX ADMIN — Medication 2 TABLET(S): at 21:32

## 2025-06-10 RX ADMIN — LIDOCAINE HYDROCHLORIDE 1 PATCH: 20 JELLY TOPICAL at 08:27

## 2025-06-10 RX ADMIN — LIDOCAINE HYDROCHLORIDE 1 PATCH: 20 JELLY TOPICAL at 20:02

## 2025-06-10 RX ADMIN — Medication 1 TABLET(S): at 12:25

## 2025-06-10 RX ADMIN — Medication 5 DROP(S): at 05:19

## 2025-06-10 RX ADMIN — INSULIN LISPRO 8 UNIT(S): 100 INJECTION, SOLUTION INTRAVENOUS; SUBCUTANEOUS at 08:35

## 2025-06-10 RX ADMIN — INSULIN LISPRO 8 UNIT(S): 100 INJECTION, SOLUTION INTRAVENOUS; SUBCUTANEOUS at 17:33

## 2025-06-10 RX ADMIN — ATORVASTATIN CALCIUM 10 MILLIGRAM(S): 80 TABLET, FILM COATED ORAL at 21:33

## 2025-06-10 RX ADMIN — METHOCARBAMOL 750 MILLIGRAM(S): 500 TABLET, FILM COATED ORAL at 19:58

## 2025-06-10 RX ADMIN — ENOXAPARIN SODIUM 40 MILLIGRAM(S): 100 INJECTION SUBCUTANEOUS at 17:36

## 2025-06-10 RX ADMIN — DULOXETINE 60 MILLIGRAM(S): 20 CAPSULE, DELAYED RELEASE ORAL at 12:24

## 2025-06-10 RX ADMIN — INSULIN GLARGINE-YFGN 16 UNIT(S): 100 INJECTION, SOLUTION SUBCUTANEOUS at 21:33

## 2025-06-10 RX ADMIN — AMLODIPINE BESYLATE 5 MILLIGRAM(S): 10 TABLET ORAL at 05:19

## 2025-06-10 NOTE — PROGRESS NOTE ADULT - NS ATTEND AMEND GEN_ALL_CORE FT
I have made amendments to the documentation where necessary. Additional comments: I have made amendments to the documentation where necessary. Additional comments: I have personally seen and examined the patient independently Medical records reviewed. I have made amendments to the documentation where necessary and adjusted the history, physical examination, and plan as documented by the NP.     Seen, examined and observed in therapy   Continued functional decline with reduced strength of LE, and deteriorating gait noted  B/l foot drop persisting,   Admits to progressive fatigue  No resp symptoms    Continue therapy   DVT ppx  Commence steroid treatment as recs by neurology  Await orthotics review      Spent 52 mins, patient review, discussion of planned treatments, liaison with neurology,and care co ordination.

## 2025-06-10 NOTE — CONSULT NOTE ADULT - ASSESSMENT
60 yo male with history of CIDP s/p recent cycle of pulse IV steroids and IVIG completed on 6/1, admitted to rehab. Reported by self and rehab team worsening weakness in RLE > LLE.   Exam notable for worsened weakness of RLE > LLE compared to prior neurologic evaluation.   Contacted treating neurologist at [facility] Dr. Bibiana Radford to discuss case.     Plan:   - start oral prednisone taper 60 mg daily for a week, then taper 10 mg weekly and continue on 20 mg daily until follow up

## 2025-06-10 NOTE — PROGRESS NOTE ADULT - ASSESSMENT
59 year old male with PMH of ethanol use disorder, chronic pancreatitis, poorly controlled СЕРГЕЙ (A1c 9.9 on 04/23/2025), chronic neuropathy of LLE, recent hospitalization April 2025 for sciatica; who presented to [facility] ED on 5/26/25 for progressively worsening BL LE pain, weakness and numbness. He was evaluated by Neurology and underwent bedside EMG which was significant for CIDP (s/p 5 days IVIG and IV steroids). He developed an episode of confusion/AMS (CT Head negative). His hospital course was significant for hyponatremia, and hyperglycemia (both now resolved). Patient now admitted for a multidisciplinary rehab program on 6/3    #CIDP  -MRI lumbar spine wwo no evidence of compression or enhancement  -MRI lumbosacral plexus R and L wwo no abnormalities  -s/p EMG/NCS with evidence of demyelinating process and chronic denervation   -s/p IVIG and IV steroids x 5 days   -Fall precaution  -Pain control and bowel regimen per rehab team  -Comprehensive Rehab Program w/ PT/OT  -Orthotic eval for BL AFOs   -Outpatient follow up with neurology    #HTN/HLD  -Continue Amlodipine (reduced dose to 5mg QD 6/6 due to low BP w/ therapy) and Losartan (Home regimen: Telmisartan 40mg daily) w/ holding parameters   -Monitor BP   -Continue Atorvastatin    #Latent Autoimmune Diabetes in Adults, Uncontrolled, Complicated by Peripheral Neuropathy  -HbA1c: 8.9 on 6/6   -Continue Lantus 16units QHS  -Continue premeal admelog 8units TID w/ meals if he consume >75%, 6untis if he consumes 50-75% and 4units if he consumes <50%   -RAISS (low dose)  -Monitor FS, labile due to dietary indiscretion, he was counseled  -Continue gabapentin   -Diabetes educator following; reviewed with educater 6/9. hyperglycemia d/t diet. no changes to insulin regimen today    #"Pseudohyponatremia"  -Na 131 6/6  -> 133 when corrected for glucose   -Seen by nephrology during his hospitalization, w/ impression: acute on chronic hyponatremia likely iso increased FW water/poor solute intake and possible SIADH from pain and duloxetine use  -Was on UreNa during his hospitalization and d/c'd once Na improved  -Continue free water restriction to <1.2L/day  -Encourage oral intake  -Monitor BMP     #Mood  -Continue Duloxetine     #Incidental Findings   -CTA neck showed Less than 50% stenosis of the proximal right ICA. At least 60% stenosis of the proximal left ICA.  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

## 2025-06-10 NOTE — PROGRESS NOTE ADULT - SUBJECTIVE AND OBJECTIVE BOX
CC: CIDP    Today's Subjective & Objective Findings:  Patient seen and examined in therapy this AM.  Admits to sleeping well.  Noted with progressively worsening RLE weakness.  Experiencing L ear fullness.  Await BL AFOs, to address b/l ankle DF weakness.  Discussed neurology and otolaryngology consults.     Denies headache, dizziness, visual changes, chest pain, SOB/RUBIO, abdominal pain, nausea, vomiting, diarrhea, dysuria. LBM- 6/9  + numbness/tingling in BL LEs.    Function  Ambulated 40 ft with RW mod/max A x 1 with DF A (bilaterally). pt displayed  increased weightbearing through arms and decreased weightbearing of Right LE to  ground. PT stretched bilateral hamstrings and DF before ambulation to loosen  muscles and nerves to increase weightbearing  Stairs: 1 step with max A x 1 with bilateral DF A. pt buckled ascending step  required max A x 2 to bring into WC      Vital Signs Last 24 Hrs  T(C): 36.5 (10 Romario 2025 08:28), Max: 36.6 (09 Jun 2025 19:44)  T(F): 97.7 (10 Romario 2025 08:28), Max: 97.9 (09 Jun 2025 19:44)  HR: 95 (10 Romario 2025 08:28) (95 - 97)  BP: 171/94 (10 Romario 2025 08:28) (150/74 - 171/94)  BP(mean): --  RR: 16 (10 Romario 2025 08:28) (16 - 16)  SpO2: 100% (10 Romario 2025 08:28) (100% - 100%)      PHYSICAL EXAM  Constitutional -, Comfortable  Neck - Supple  Cardiovascular - Palpable peripheral pulses   Respiratory/Chest- Symmetrical chest expansion, No dyspnea  Abdomen - Soft, Non-tender  Extremities - No cyanosis, No edema,     Neurologic Exam - Alert, Oriented, Interactive  Sensory - Light touch sensation intact  Motor Function - Moves all extremities spontaneously, except for limited strength both ankles and Rt hip and, as noted today reduced strength Rt knee extension  UE 4/5  Left leg 4/5 except DF 1/5 and PF 3/5  Rt leg  2/5, DF 1/5 PF 3/5  Skin -  Intact      LABS:                        13.6   4.49  )-----------( 210      ( 09 Jun 2025 05:40 )             39.0     06-09    136  |  98  |  10  ----------------------------<  279[H]  4.7   |  32[H]  |  1.04    Ca    10.0      09 Jun 2025 05:40    TPro  7.6  /  Alb  3.3  /  TBili  0.4  /  DBili  x   /  AST  40  /  ALT  62[H]  /  AlkPhos  97  06-09      Urinalysis Basic - ( 09 Jun 2025 05:40 )    Color: x / Appearance: x / SG: x / pH: x  Gluc: 279 mg/dL / Ketone: x  / Bili: x / Urobili: x   Blood: x / Protein: x / Nitrite: x   Leuk Esterase: x / RBC: x / WBC x   Sq Epi: x / Non Sq Epi: x / Bacteria: x      CAPILLARY BLOOD GLUCOSE      POCT Blood Glucose.: 125 mg/dL (10 Romario 2025 08:01)  POCT Blood Glucose.: 239 mg/dL (09 Jun 2025 22:46)  POCT Blood Glucose.: 195 mg/dL (09 Jun 2025 16:47)  POCT Blood Glucose.: 131 mg/dL (09 Jun 2025 12:01)      MEDICATIONS  (STANDING):  amLODIPine   Tablet 5 milliGRAM(s) Oral daily  atorvastatin 10 milliGRAM(s) Oral at bedtime  carbamide peroxide Otic Solution 5 Drop(s) Both Ears two times a day  dextrose 5%. 1000 milliLiter(s) (100 mL/Hr) IV Continuous <Continuous>  dextrose 5%. 1000 milliLiter(s) (50 mL/Hr) IV Continuous <Continuous>  dextrose 50% Injectable 25 Gram(s) IV Push once  dextrose 50% Injectable 12.5 Gram(s) IV Push once  dextrose 50% Injectable 25 Gram(s) IV Push once  DULoxetine 60 milliGRAM(s) Oral daily  enoxaparin Injectable 40 milliGRAM(s) SubCutaneous every 24 hours  gabapentin 600 milliGRAM(s) Oral three times a day  glucagon  Injectable 1 milliGRAM(s) IntraMuscular once  insulin glargine Injectable (LANTUS) 16 Unit(s) SubCutaneous at bedtime  insulin lispro (ADMELOG) corrective regimen sliding scale   SubCutaneous three times a day before meals  insulin lispro (ADMELOG) corrective regimen sliding scale   SubCutaneous at bedtime  insulin lispro Injectable (ADMELOG) 8 Unit(s) SubCutaneous three times a day with meals  lidocaine   4% Patch 1 Patch Transdermal <User Schedule>  losartan 50 milliGRAM(s) Oral daily  melatonin 6 milliGRAM(s) Oral at bedtime  multivitamin 1 Tablet(s) Oral daily  pantoprazole    Tablet 40 milliGRAM(s) Oral before breakfast  polyethylene glycol 3350 17 Gram(s) Oral two times a day  senna 2 Tablet(s) Oral at bedtime    MEDICATIONS  (PRN):  acetaminophen     Tablet .. 650 milliGRAM(s) Oral every 6 hours PRN Mild Pain (1 - 3)  dextrose Oral Gel 15 Gram(s) Oral once PRN Blood Glucose LESS THAN 70 milliGRAM(s)/deciliter  methocarbamol 750 milliGRAM(s) Oral three times a day PRN Muscle Spasm   CC: CIDP    Today's Subjective & Objective Findings:  Patient seen and examined in therapy this AM.  Admits to sleeping well.  Noted with progressively worsening RLE weakness.   Experiencing L ear fullness.  Await BL AFOs, to address b/l ankle DF weakness.  Discussed neurology and otolaryngology consults.     Denies headache, dizziness, visual changes, chest pain, SOB/RUBIO, abdominal pain, nausea, vomiting, diarrhea, dysuria. LBM- 6/9  + numbness/tingling in BL LEs.    Function  Ambulated 40 ft with RW mod/max A x 1 with DF A (bilaterally). pt displayed  increased weightbearing through arms and decreased weightbearing of Right LE to  ground. PT stretched bilateral hamstrings and DF before ambulation to loosen  muscles and nerves to increase weightbearing  Stairs: 1 step with max A x 1 with bilateral DF A. pt buckled ascending step  required max A x 2 to bring into WC      Vital Signs Last 24 Hrs  T(C): 36.5 (10 Romario 2025 08:28), Max: 36.6 (09 Jun 2025 19:44)  T(F): 97.7 (10 Romario 2025 08:28), Max: 97.9 (09 Jun 2025 19:44)  HR: 95 (10 Romario 2025 08:28) (95 - 97)  BP: 171/94 (10 Rmoario 2025 08:28) (150/74 - 171/94)  BP(mean): --  RR: 16 (10 Romario 2025 08:28) (16 - 16)  SpO2: 100% (10 Romario 2025 08:28) (100% - 100%)      PHYSICAL EXAM  Constitutional -, Comfortable  Neck - Supple  Cardiovascular - Palpable peripheral pulses   Respiratory/Chest- Symmetrical chest expansion, No dyspnea  Abdomen - Soft, Non-tender  Extremities - No cyanosis, No edema,     Neurologic Exam - Alert, Oriented, Interactive  Sensory - Light touch sensation intact  Motor Function - Moves all extremities spontaneously, except for limited strength both ankles and Rt hip and, as noted today reduced strength Rt knee extension  UE 4/5  Left leg 4/5 except DF 1/5 and PF 3/5  Rt leg  2/5, DF 1/5 PF 3/5  Skin -  Intact      LABS:                        13.6   4.49  )-----------( 210      ( 09 Jun 2025 05:40 )             39.0     06-09    136  |  98  |  10  ----------------------------<  279[H]  4.7   |  32[H]  |  1.04    Ca    10.0      09 Jun 2025 05:40    TPro  7.6  /  Alb  3.3  /  TBili  0.4  /  DBili  x   /  AST  40  /  ALT  62[H]  /  AlkPhos  97  06-09      Urinalysis Basic - ( 09 Jun 2025 05:40 )    Color: x / Appearance: x / SG: x / pH: x  Gluc: 279 mg/dL / Ketone: x  / Bili: x / Urobili: x   Blood: x / Protein: x / Nitrite: x   Leuk Esterase: x / RBC: x / WBC x   Sq Epi: x / Non Sq Epi: x / Bacteria: x      CAPILLARY BLOOD GLUCOSE      POCT Blood Glucose.: 125 mg/dL (10 Romario 2025 08:01)  POCT Blood Glucose.: 239 mg/dL (09 Jun 2025 22:46)  POCT Blood Glucose.: 195 mg/dL (09 Jun 2025 16:47)  POCT Blood Glucose.: 131 mg/dL (09 Jun 2025 12:01)      MEDICATIONS  (STANDING):  amLODIPine   Tablet 5 milliGRAM(s) Oral daily  atorvastatin 10 milliGRAM(s) Oral at bedtime  carbamide peroxide Otic Solution 5 Drop(s) Both Ears two times a day  dextrose 5%. 1000 milliLiter(s) (100 mL/Hr) IV Continuous <Continuous>  dextrose 5%. 1000 milliLiter(s) (50 mL/Hr) IV Continuous <Continuous>  dextrose 50% Injectable 25 Gram(s) IV Push once  dextrose 50% Injectable 12.5 Gram(s) IV Push once  dextrose 50% Injectable 25 Gram(s) IV Push once  DULoxetine 60 milliGRAM(s) Oral daily  enoxaparin Injectable 40 milliGRAM(s) SubCutaneous every 24 hours  gabapentin 600 milliGRAM(s) Oral three times a day  glucagon  Injectable 1 milliGRAM(s) IntraMuscular once  insulin glargine Injectable (LANTUS) 16 Unit(s) SubCutaneous at bedtime  insulin lispro (ADMELOG) corrective regimen sliding scale   SubCutaneous three times a day before meals  insulin lispro (ADMELOG) corrective regimen sliding scale   SubCutaneous at bedtime  insulin lispro Injectable (ADMELOG) 8 Unit(s) SubCutaneous three times a day with meals  lidocaine   4% Patch 1 Patch Transdermal <User Schedule>  losartan 50 milliGRAM(s) Oral daily  melatonin 6 milliGRAM(s) Oral at bedtime  multivitamin 1 Tablet(s) Oral daily  pantoprazole    Tablet 40 milliGRAM(s) Oral before breakfast  polyethylene glycol 3350 17 Gram(s) Oral two times a day  senna 2 Tablet(s) Oral at bedtime    MEDICATIONS  (PRN):  acetaminophen     Tablet .. 650 milliGRAM(s) Oral every 6 hours PRN Mild Pain (1 - 3)  dextrose Oral Gel 15 Gram(s) Oral once PRN Blood Glucose LESS THAN 70 milliGRAM(s)/deciliter  methocarbamol 750 milliGRAM(s) Oral three times a day PRN Muscle Spasm

## 2025-06-10 NOTE — PROGRESS NOTE ADULT - SUBJECTIVE AND OBJECTIVE BOX
CC: Patient is a 59y old  Male who presents with a chief complaint of CIDP (10 Romario 2025 09:35)      Interval History:    No overnight events.  No new complaints.    ALLERGIES:  No Known Allergies    MEDICATIONS  (STANDING):  amLODIPine   Tablet 5 milliGRAM(s) Oral daily  atorvastatin 10 milliGRAM(s) Oral at bedtime  carbamide peroxide Otic Solution 5 Drop(s) Both Ears two times a day  dextrose 5%. 1000 milliLiter(s) (100 mL/Hr) IV Continuous <Continuous>  dextrose 5%. 1000 milliLiter(s) (50 mL/Hr) IV Continuous <Continuous>  dextrose 50% Injectable 25 Gram(s) IV Push once  dextrose 50% Injectable 12.5 Gram(s) IV Push once  dextrose 50% Injectable 25 Gram(s) IV Push once  DULoxetine 60 milliGRAM(s) Oral daily  enoxaparin Injectable 40 milliGRAM(s) SubCutaneous every 24 hours  gabapentin 600 milliGRAM(s) Oral three times a day  glucagon  Injectable 1 milliGRAM(s) IntraMuscular once  insulin glargine Injectable (LANTUS) 16 Unit(s) SubCutaneous at bedtime  insulin lispro (ADMELOG) corrective regimen sliding scale   SubCutaneous three times a day before meals  insulin lispro (ADMELOG) corrective regimen sliding scale   SubCutaneous at bedtime  insulin lispro Injectable (ADMELOG) 8 Unit(s) SubCutaneous three times a day with meals  lidocaine   4% Patch 1 Patch Transdermal <User Schedule>  losartan 50 milliGRAM(s) Oral daily  melatonin 6 milliGRAM(s) Oral at bedtime  multivitamin 1 Tablet(s) Oral daily  pantoprazole    Tablet 40 milliGRAM(s) Oral before breakfast  polyethylene glycol 3350 17 Gram(s) Oral two times a day  senna 2 Tablet(s) Oral at bedtime    MEDICATIONS  (PRN):  acetaminophen     Tablet .. 650 milliGRAM(s) Oral every 6 hours PRN Mild Pain (1 - 3)  dextrose Oral Gel 15 Gram(s) Oral once PRN Blood Glucose LESS THAN 70 milliGRAM(s)/deciliter  methocarbamol 750 milliGRAM(s) Oral three times a day PRN Muscle Spasm    Vital Signs Last 24 Hrs  T(F): 97.7 (10 Romario 2025 08:28), Max: 97.9 (09 Jun 2025 19:44)  HR: 95 (10 Romario 2025 08:28) (95 - 97)  BP: 171/94 (10 Romario 2025 08:28) (150/74 - 171/94)  RR: 16 (10 Romario 2025 08:28) (16 - 16)  SpO2: 100% (10 Romario 2025 08:28) (100% - 100%)  I&O's Summary    BMI (kg/m2): 20.8 (06-08-25 @ 15:11)    PHYSICAL EXAM:  GENERAL: NAD  CHEST/LUNG: No rales, rhonchi, wheezing; normal respiratory effort  HEART: RRR; No murmurs, rubs, or gallops  ABDOMEN: Soft, Nontender, Nondistended; Bowel sounds present  MSK/EXT:  No peripheral edema, 2+ Peripheral Pulses, No clubbing or digital cyanosis  PSYCH: Appropriate affect, Alert & Oriented    LABS:                        13.6   4.49  )-----------( 210      ( 09 Jun 2025 05:40 )             39.0       06-09    136  |  98  |  10  ----------------------------<  279  4.7   |  32  |  1.04    Ca    10.0      09 Jun 2025 05:40    TPro  7.6  /  Alb  3.3  /  TBili  0.4  /  DBili  x   /  AST  40  /  ALT  62  /  AlkPhos  97  06-09                04-24 Chol 129 mg/dL LDL -- HDL 63 mg/dL Trig 63 mg/dL              POCT Blood Glucose.: 125 mg/dL (10 Romario 2025 08:01)  POCT Blood Glucose.: 239 mg/dL (09 Jun 2025 22:46)  POCT Blood Glucose.: 195 mg/dL (09 Jun 2025 16:47)  POCT Blood Glucose.: 131 mg/dL (09 Jun 2025 12:01)      Urinalysis Basic - ( 09 Jun 2025 05:40 )    Color: x / Appearance: x / SG: x / pH: x  Gluc: 279 mg/dL / Ketone: x  / Bili: x / Urobili: x   Blood: x / Protein: x / Nitrite: x   Leuk Esterase: x / RBC: x / WBC x   Sq Epi: x / Non Sq Epi: x / Bacteria: x        COVID-19 PCR: NotDetec (06-03-25 @ 14:15)      Care Discussed with Consultants/Other Providers: Yes

## 2025-06-10 NOTE — PROGRESS NOTE ADULT - ASSESSMENT
59 year old male with PMH of ethanol use disorder, chronic pancreatitis, poorly controlled СЕРГЕЙ (A1c 9.9 on 04/23/2025), chronic neuropathy of LLE, recent hospitalization April 2025 for sciatica; who presented to [facility] ED on 5/26/25 for progressively worsening BL LE pain, weakness and numbness. He was evaluated by Neurology and underwent bedside EMG which was significant for CIDP (s/p 5 days IVIG and IV steroids). He developed an episode of confusion/AMS (CT Head negative). His hospital course was significant for hyponatremia, and hyperglycemia (both now resolved). Patient now admitted for a multidisciplinary rehab program. 06-03-25 @ 14:29    * BL foot drop - await BL AFOs  * Interval functional declined--decreased strength Rt hip 1/5 and knee 2/5 (last dose of IVIG 6/1) - await Neuro recs  * L ear fullness - on Debrox - await ENT recs     #CIDP  - Gait Instability, ADL impairments and Functional impairments: start Comprehensive Rehab Program of PT/OT- 3 hours a day, 5 days a week  - P&O as needed--orthotics referral   - Last IVIG and Methylprednisolone on 6/1 - Interval functional declined--decreased strength Rt hip 1/5 and knee 2/5 (last dose of PLEX 6/1) - await Neuro recs     # BL foot drop  - Orthotic eval for BL hinged AFOs   - This patient has a diagnosis of BL foot drop. Patient is ambulatory. Patient requires a custom molded hinged AFO to preserve ankle motion while stabilizing talar and subtalar joints. Device will give a more natural gait without destabilizing the knee and hip. Device requires a low density lining to protect prominent louis areas of effected skin. Patient will need the device for a term of greater than 9 months. No reasonable prefabricated orthosis exists.     #HTN/HLD  - Amlodipine 10mg daily  - Atorvastatin 10mg at HS  - Losartan 50mg daily - (Home regimen: Telmisartan 40mg daily)    #Latent Autoimmune Diabetes Mellitus  - A1c: 9.9% (April 2025)   - FS AC & HS  - Premeal  - Lantus     #Electrolyte Imbalance  - Monitor CMP  - Appreciate ongoing hospitalist recs     #L ear fullness   - on Debrox   - Await ENT recs     #Mood  - Duloxetine 60mg daily     #Skin  - Skin  - Pressure injury/Skin: OOB to Chair, PT/OT     #Pain Mgmt   #Neuropathy  #Stiffness/Spasiticity  - Gabapentin 600mg TID   - Lidocaine patch daily  - PRN: Methocarbamol 750mg TID; Tylenol 650mg QID    #GI/Bowel Mgmt   - Pantoprazole   - Senna     #/Bladder Mgmt --voiding     #FEN   - Diet - Regular + Thins  [CCHO]    - Fluid Restriction: 1200mL  - MVI    #Falls history  - Osteoporosis labs - WNL    #Precautions / PROPHYLAXIS:   - Falls  - ortho: Weight bearing status: WBAT   - Lungs: Aspiration, Incentive Spirometer   - DVT: Lovenox 40 mg daily   ----------------------------------------------  IDT 6/5  Ambulating 30ft RW mod assist wc follow through 2 stairs 2 hand rails  b/l foot drop and Rt hip weakness  Await orthotist  Est [place] 6/17 to home  ----------------------------------------------    Dr. Chavira Liaison with Family/Providers:    -----------------------------------------------  OUTPATIENT/FOLLOW UP:    Mirza Radford   Neurology    Bárbara Hays  Neurology  [address] [place] [address], [address]  [place] [address]  Phone: [phone]  Fax: [phone]  Established Patient  Follow Up Time:     [facility]  Endocrinology  [address] [place] [address]  Phone: [phone]  Fax:     Scheduled Appts:  Mirza Radford  [facility]  NEUROLOGY [address] [facility]  Scheduled Appointment: 06/18/2025    [facility]  ENDOCRIN [address]  Scheduled Appointment: 06/20/2025    Aniyah Julian  [facility]  RHEUM [address] [facility]  Scheduled Appointment: 07/29/2025    [facility]  ONCPAINMGT [address]   Scheduled Appointment: 08/11/2025    ----------------------------------------------- 59 year old male with PMH of ethanol use disorder, chronic pancreatitis, poorly controlled СЕРГЕЙ (A1c 9.9 on 04/23/2025), chronic neuropathy of LLE, recent hospitalization April 2025 for sciatica; who presented to [facility] ED on 5/26/25 for progressively worsening BL LE pain, weakness and numbness. He was evaluated by Neurology and underwent bedside EMG which was significant for CIDP (s/p 5 days IVIG and IV steroids). He developed an episode of confusion/AMS (CT Head negative). His hospital course was significant for hyponatremia, and hyperglycemia (both now resolved). Patient now admitted for a multidisciplinary rehab program. 06-03-25 @ 14:29    * BL foot drop - await BL AFOs  * Interval functional declined--decreased strength Rt hip 1/5 and knee 2/5 (last dose of IVIG 6/1) -  Neuro recs for steroid treatment, appreciated and will be implemented   * L ear fullness - on Debrox - await ENT recs     #CIDP  - Gait Instability, ADL impairments and Functional impairments: start Comprehensive Rehab Program of PT/OT- 3 hours a day, 5 days a week  - P&O as needed--orthotics referral   - Last IVIG and Methylprednisolone on 6/1 - Interval functional declined--decreased strength Rt hip 1/5 and knee 2/5 (last dose of PLEX 6/1) - await Neuro recs     # BL foot drop  - Orthotic eval for BL hinged AFOs   - This patient has a diagnosis of BL foot drop. Patient is ambulatory. Patient requires a custom molded hinged AFO to preserve ankle motion while stabilizing talar and subtalar joints. Device will give a more natural gait without destabilizing the knee and hip. Device requires a low density lining to protect prominent louis areas of effected skin. Patient will need the device for a term of greater than 9 months. No reasonable prefabricated orthosis exists.     #HTN/HLD  - Amlodipine 10mg daily  - Atorvastatin 10mg at HS  - Losartan 50mg daily - (Home regimen: Telmisartan 40mg daily)    #Latent Autoimmune Diabetes Mellitus  - A1c: 9.9% (April 2025)   - FS AC & HS  - Premeal  - Lantus     #Electrolyte Imbalance  - Monitor CMP  - Appreciate ongoing hospitalist recs     #L ear fullness   - on Debrox   - Await ENT recs     #Mood  - Duloxetine 60mg daily     #Skin  - Skin  - Pressure injury/Skin: OOB to Chair, PT/OT     #Pain Mgmt   #Neuropathy  #Stiffness/Spasiticity  - Gabapentin 600mg TID   - Lidocaine patch daily  - PRN: Methocarbamol 750mg TID; Tylenol 650mg QID    #GI/Bowel Mgmt   - Pantoprazole   - Senna     #/Bladder Mgmt --voiding     #FEN   - Diet - Regular + Thins  [CCHO]    - Fluid Restriction: 1200mL  - MVI    #Falls history  - Osteoporosis labs - WNL    #Precautions / PROPHYLAXIS:   - Falls  - ortho: Weight bearing status: WBAT   - Lungs: Aspiration, Incentive Spirometer   - DVT: Lovenox 40 mg daily   ----------------------------------------------  IDT 6/5  Ambulating 30ft RW mod assist wc follow through 2 stairs 2 hand rails  b/l foot drop and Rt hip weakness  Await orthotist  Est [place] 6/17 to home  ----------------------------------------------    Dr. Chavira Liaison with Family/Providers:    -----------------------------------------------  OUTPATIENT/FOLLOW UP:    Mirza Radford   Neurology    Bárbara Hays  Neurology  [address] [place] [address], [address]  [place] [address]  Phone: [phone]  Fax: [phone]  Established Patient  Follow Up Time:     [facility] Endocrinology  Endocrinology  [address] [place] [address]  Phone: [phone]  Fax:     Scheduled Appts:  Mirza Radford  [facility]  NEUROLOGY [address] [facility]  Scheduled Appointment: 06/18/2025    [facility]  ENDOCRIN [address]  Scheduled Appointment: 06/20/2025    Aniyah Julian  [facility]  RHEUM [address]  Scheduled Appointment: 07/29/2025    [facility]  ONCPAINMGT [address]   Scheduled Appointment: 08/11/2025    -----------------------------------------------

## 2025-06-11 DIAGNOSIS — H61.23 IMPACTED CERUMEN, BILATERAL: ICD-10-CM

## 2025-06-11 LAB
GLUCOSE BLDC GLUCOMTR-MCNC: 225 MG/DL — HIGH (ref 70–99)
GLUCOSE BLDC GLUCOMTR-MCNC: 239 MG/DL — HIGH (ref 70–99)
GLUCOSE BLDC GLUCOMTR-MCNC: 299 MG/DL — HIGH (ref 70–99)
GLUCOSE BLDC GLUCOMTR-MCNC: 308 MG/DL — HIGH (ref 70–99)

## 2025-06-11 PROCEDURE — 69210 REMOVE IMPACTED EAR WAX UNI: CPT

## 2025-06-11 PROCEDURE — 99232 SBSQ HOSP IP/OBS MODERATE 35: CPT

## 2025-06-11 PROCEDURE — 99221 1ST HOSP IP/OBS SF/LOW 40: CPT | Mod: 25

## 2025-06-11 PROCEDURE — 99233 SBSQ HOSP IP/OBS HIGH 50: CPT

## 2025-06-11 PROCEDURE — 73501 X-RAY EXAM HIP UNI 1 VIEW: CPT | Mod: 26,RT

## 2025-06-11 RX ADMIN — PREDNISONE 60 MILLIGRAM(S): 20 TABLET ORAL at 05:35

## 2025-06-11 RX ADMIN — AMLODIPINE BESYLATE 5 MILLIGRAM(S): 10 TABLET ORAL at 05:10

## 2025-06-11 RX ADMIN — INSULIN LISPRO 1: 100 INJECTION, SOLUTION INTRAVENOUS; SUBCUTANEOUS at 21:22

## 2025-06-11 RX ADMIN — LIDOCAINE HYDROCHLORIDE 1 PATCH: 20 JELLY TOPICAL at 20:59

## 2025-06-11 RX ADMIN — POLYETHYLENE GLYCOL 3350 17 GRAM(S): 17 POWDER, FOR SOLUTION ORAL at 05:11

## 2025-06-11 RX ADMIN — INSULIN LISPRO 8 UNIT(S): 100 INJECTION, SOLUTION INTRAVENOUS; SUBCUTANEOUS at 16:55

## 2025-06-11 RX ADMIN — ATORVASTATIN CALCIUM 10 MILLIGRAM(S): 80 TABLET, FILM COATED ORAL at 21:23

## 2025-06-11 RX ADMIN — ENOXAPARIN SODIUM 40 MILLIGRAM(S): 100 INJECTION SUBCUTANEOUS at 17:15

## 2025-06-11 RX ADMIN — INSULIN GLARGINE-YFGN 16 UNIT(S): 100 INJECTION, SOLUTION SUBCUTANEOUS at 21:22

## 2025-06-11 RX ADMIN — INSULIN LISPRO 4: 100 INJECTION, SOLUTION INTRAVENOUS; SUBCUTANEOUS at 08:20

## 2025-06-11 RX ADMIN — INSULIN LISPRO 2: 100 INJECTION, SOLUTION INTRAVENOUS; SUBCUTANEOUS at 16:55

## 2025-06-11 RX ADMIN — Medication 6 MILLIGRAM(S): at 21:23

## 2025-06-11 RX ADMIN — LIDOCAINE HYDROCHLORIDE 1 PATCH: 20 JELLY TOPICAL at 19:00

## 2025-06-11 RX ADMIN — LOSARTAN POTASSIUM 50 MILLIGRAM(S): 100 TABLET, FILM COATED ORAL at 05:10

## 2025-06-11 RX ADMIN — Medication 40 MILLIGRAM(S): at 05:10

## 2025-06-11 RX ADMIN — LIDOCAINE HYDROCHLORIDE 1 PATCH: 20 JELLY TOPICAL at 08:23

## 2025-06-11 RX ADMIN — GABAPENTIN 800 MILLIGRAM(S): 400 CAPSULE ORAL at 13:38

## 2025-06-11 RX ADMIN — INSULIN LISPRO 2: 100 INJECTION, SOLUTION INTRAVENOUS; SUBCUTANEOUS at 12:06

## 2025-06-11 RX ADMIN — INSULIN LISPRO 8 UNIT(S): 100 INJECTION, SOLUTION INTRAVENOUS; SUBCUTANEOUS at 12:06

## 2025-06-11 RX ADMIN — GABAPENTIN 800 MILLIGRAM(S): 400 CAPSULE ORAL at 05:10

## 2025-06-11 RX ADMIN — GABAPENTIN 800 MILLIGRAM(S): 400 CAPSULE ORAL at 21:22

## 2025-06-11 RX ADMIN — DULOXETINE 60 MILLIGRAM(S): 20 CAPSULE, DELAYED RELEASE ORAL at 12:07

## 2025-06-11 RX ADMIN — INSULIN LISPRO 8 UNIT(S): 100 INJECTION, SOLUTION INTRAVENOUS; SUBCUTANEOUS at 08:20

## 2025-06-11 RX ADMIN — Medication 5 DROP(S): at 05:10

## 2025-06-11 RX ADMIN — Medication 1 TABLET(S): at 12:07

## 2025-06-11 NOTE — PROGRESS NOTE ADULT - ASSESSMENT
59 year old male with PMH of ethanol use disorder, chronic pancreatitis, poorly controlled СЕРГЕЙ (A1c 9.9 on 04/23/2025), chronic neuropathy of LLE, recent hospitalization April 2025 for sciatica; who presented to [facility] ED on 5/26/25 for progressively worsening BL LE pain, weakness and numbness. He was evaluated by Neurology and underwent bedside EMG which was significant for CIDP (s/p 5 days IVIG and IV steroids). He developed an episode of confusion/AMS (CT Head negative). His hospital course was significant for hyponatremia, and hyperglycemia (both now resolved). Patient now admitted for a multidisciplinary rehab program on 6/3    #CIDP  -MRI lumbar spine wwo no evidence of compression or enhancement  -MRI lumbosacral plexus R and L wwo no abnormalities  -s/p EMG/NCS with evidence of demyelinating process and chronic denervation   -s/p IVIG and IV steroids x 5 days   -Fall precaution  -Pain control and bowel regimen per rehab team  -Comprehensive Rehab Program w/ PT/OT  -Orthotic eval for BL AFOs   -Outpatient follow up with neurology    #HTN/HLD  -Continue Amlodipine (reduced dose to 5mg QD 6/6 due to low BP w/ therapy) and Losartan (Home regimen: Telmisartan 40mg daily) w/ holding parameters   -Monitor BP   -Continue Atorvastatin    #Latent Autoimmune Diabetes in Adults, Uncontrolled, Complicated by Peripheral Neuropathy  -HbA1c: 8.9 on 6/6   -Continue Lantus 16units QHS  -Continue premeal admelog 8units TID w/ meals if he consume >75%, 6untis if he consumes 50-75% and 4units if he consumes <50%   -RAISS (low dose)  -Monitor FS, labile due to dietary indiscretion, he was counseled  -Continue gabapentin   -Diabetes educator following; reviewed with educater 6/9. hyperglycemia d/t diet. no changes to insulin regimen today    #"Pseudohyponatremia"  -Na 131 6/6  -> 133 when corrected for glucose   -Seen by nephrology during his hospitalization, w/ impression: acute on chronic hyponatremia likely iso increased FW water/poor solute intake and possible SIADH from pain and duloxetine use  -Was on UreNa during his hospitalization and d/c'd once Na improved  -Continue free water restriction to <1.2L/day  -Encourage oral intake  -Monitor BMP     #Mood  -Continue Duloxetine     #Incidental Findings   -CTA neck showed Less than 50% stenosis of the proximal right ICA. At least 60% stenosis of the proximal left ICA.  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

## 2025-06-11 NOTE — PROGRESS NOTE ADULT - SUBJECTIVE AND OBJECTIVE BOX
CC: CIDP    Today's Subjective & Objective Findings:  Patient seen and examined at bedside this afternoon.  Admits to sleeping well.  Hip pain and RLE weakness slightly improved with Prednisone taper.   Experiencing L ear fullness, unchanged, awaiting ENT consult.   Await BL AFOs, to address b/l ankle DF weakness.    Denies headache, dizziness, visual changes, chest pain, SOB/RUBIO, abdominal pain, nausea, vomiting, diarrhea, dysuria. LBM- 6/10  + numbness/tingling in BL LEs.    Function  Ambulated 40 ft with RW mod/max A x 1 with DF A (bilaterally). pt displayed  increased weightbearing through arms and decreased weightbearing of Right LE to  ground. PT stretched bilateral hamstrings and DF before ambulation to loosen  muscles and nerves to increase weightbearing  Stairs: 1 step with max A x 1 with bilateral DF A. pt buckled ascending step  required max A x 2 to bring into WC      Vital Signs Last 24 Hrs  T(C): 36.7 (11 Jun 2025 08:14), Max: 36.7 (11 Jun 2025 08:14)  T(F): 98.1 (11 Jun 2025 08:14), Max: 98.1 (11 Jun 2025 08:14)  HR: 107 (11 Jun 2025 08:14) (95 - 110)  BP: 146/89 (11 Jun 2025 08:14) (142/91 - 151/74)  BP(mean): --  RR: 16 (11 Jun 2025 08:14) (16 - 16)  SpO2: 97% (11 Jun 2025 08:14) (96% - 97%)      PHYSICAL EXAM  Constitutional -, Comfortable  Neck - Supple  Cardiovascular - Palpable peripheral pulses   Respiratory/Chest- Symmetrical chest expansion, No dyspnea  Abdomen - Soft, Non-tender  Extremities - No cyanosis, No edema,     Neurologic Exam - Alert, Oriented, Interactive  Sensory - Light touch sensation intact  Motor Function - Moves all extremities spontaneously, except for limited strength both ankles and Rt hip and, as noted today reduced strength Rt knee extension  UE 4/5  Left leg 4/5 except DF 1/5 and PF 3/5  Rt leg  2/5, DF 1/5 PF 3/5  Skin -  Intact      LABS:                        13.6   4.49  )-----------( 210      ( 09 Jun 2025 05:40 )             39.0     06-09    136  |  98  |  10  ----------------------------<  279[H]  4.7   |  32[H]  |  1.04    Ca    10.0      09 Jun 2025 05:40    TPro  7.6  /  Alb  3.3  /  TBili  0.4  /  DBili  x   /  AST  40  /  ALT  62[H]  /  AlkPhos  97  06-09      Urinalysis Basic - ( 09 Jun 2025 05:40 )    Color: x / Appearance: x / SG: x / pH: x  Gluc: 279 mg/dL / Ketone: x  / Bili: x / Urobili: x   Blood: x / Protein: x / Nitrite: x   Leuk Esterase: x / RBC: x / WBC x   Sq Epi: x / Non Sq Epi: x / Bacteria: x      CAPILLARY BLOOD GLUCOSE      POCT Blood Glucose.: 225 mg/dL (11 Jun 2025 12:04)  POCT Blood Glucose.: 308 mg/dL (11 Jun 2025 08:18)  POCT Blood Glucose.: 304 mg/dL (10 Romario 2025 21:22)  POCT Blood Glucose.: 145 mg/dL (10 Romario 2025 16:37)          MEDICATIONS  (STANDING):  amLODIPine   Tablet 5 milliGRAM(s) Oral daily  atorvastatin 10 milliGRAM(s) Oral at bedtime  carbamide peroxide Otic Solution 5 Drop(s) Both Ears two times a day  dextrose 5%. 1000 milliLiter(s) (50 mL/Hr) IV Continuous <Continuous>  dextrose 5%. 1000 milliLiter(s) (100 mL/Hr) IV Continuous <Continuous>  dextrose 50% Injectable 25 Gram(s) IV Push once  dextrose 50% Injectable 12.5 Gram(s) IV Push once  dextrose 50% Injectable 25 Gram(s) IV Push once  DULoxetine 60 milliGRAM(s) Oral daily  enoxaparin Injectable 40 milliGRAM(s) SubCutaneous every 24 hours  gabapentin 800 milliGRAM(s) Oral every 8 hours  glucagon  Injectable 1 milliGRAM(s) IntraMuscular once  insulin glargine Injectable (LANTUS) 16 Unit(s) SubCutaneous at bedtime  insulin lispro (ADMELOG) corrective regimen sliding scale   SubCutaneous three times a day before meals  insulin lispro (ADMELOG) corrective regimen sliding scale   SubCutaneous at bedtime  insulin lispro Injectable (ADMELOG) 8 Unit(s) SubCutaneous three times a day with meals  lidocaine   4% Patch 1 Patch Transdermal <User Schedule>  losartan 50 milliGRAM(s) Oral daily  melatonin 6 milliGRAM(s) Oral at bedtime  multivitamin 1 Tablet(s) Oral daily  pantoprazole    Tablet 40 milliGRAM(s) Oral before breakfast  polyethylene glycol 3350 17 Gram(s) Oral two times a day  predniSONE   Tablet   Oral   predniSONE   Tablet 60 milliGRAM(s) Oral daily  senna 2 Tablet(s) Oral at bedtime    MEDICATIONS  (PRN):  acetaminophen     Tablet .. 650 milliGRAM(s) Oral every 6 hours PRN Mild Pain (1 - 3)  dextrose Oral Gel 15 Gram(s) Oral once PRN Blood Glucose LESS THAN 70 milliGRAM(s)/deciliter  methocarbamol 750 milliGRAM(s) Oral three times a day PRN Muscle Spasm   CC: CIDP    Today's Subjective & Objective Findings:  Patient seen and examined at bedside this afternoon.  Admits to sleeping well.  Hip pain and RLE weakness slightly improved with Prednisone taper.   Experiencing L ear fullness, unchanged, prior to ENT consult review, and ear wax removal  Await BL AFOs, to address b/l ankle DF weakness.    Denies headache, dizziness, visual changes, chest pain, SOB/RUBIO, abdominal pain, nausea, vomiting, diarrhea, dysuria. LBM- 6/10  + numbness/tingling in BL LEs.    Function  -Liko lift and ambulation sling used to walk 30' with RW and bilateral ankle DF  assist wraps used due to severe foot drop.  Patient with inability to extended  right knee for stance control, bent knee posture throughout gait cycle with  trace contractions at quad. Noted buckling into sling without ability to  self-correct. Poor hip/pelvis extension observed due to weakness. MAX A needed  at trunk to steady and weight shift for improved safety with assist of 2 to  advance Liko lift and for wheelchair follow.    Vital Signs Last 24 Hrs  T(C): 36.7 (11 Jun 2025 08:14), Max: 36.7 (11 Jun 2025 08:14)  T(F): 98.1 (11 Jun 2025 08:14), Max: 98.1 (11 Jun 2025 08:14)  HR: 107 (11 Jun 2025 08:14) (95 - 110)  BP: 146/89 (11 Jun 2025 08:14) (142/91 - 151/74)  BP(mean): --  RR: 16 (11 Jun 2025 08:14) (16 - 16)  SpO2: 97% (11 Jun 2025 08:14) (96% - 97%)      PHYSICAL EXAM  Constitutional -, Comfortable  Neck - Supple  Cardiovascular - Palpable peripheral pulses   Respiratory/Chest- Symmetrical chest expansion, No dyspnea  Abdomen - Soft, Non-tender  Extremities - No cyanosis, No edema,     Neurologic Exam - Alert, Oriented, Interactive  Sensory - Light touch sensation intact  Motor Function - Moves all extremities spontaneously, except for limited strength both ankles and Rt hip and, as noted today reduced strength Rt knee extension  UE 4/5  Left leg 4/5 except DF 1/5 and PF 3/5  Rt leg  2/5, DF 1/5 PF 3/5  Skin -  Intact      LABS:                        13.6   4.49  )-----------( 210      ( 09 Jun 2025 05:40 )             39.0     06-09    136  |  98  |  10  ----------------------------<  279[H]  4.7   |  32[H]  |  1.04    Ca    10.0      09 Jun 2025 05:40    TPro  7.6  /  Alb  3.3  /  TBili  0.4  /  DBili  x   /  AST  40  /  ALT  62[H]  /  AlkPhos  97  06-09      Urinalysis Basic - ( 09 Jun 2025 05:40 )    Color: x / Appearance: x / SG: x / pH: x  Gluc: 279 mg/dL / Ketone: x  / Bili: x / Urobili: x   Blood: x / Protein: x / Nitrite: x   Leuk Esterase: x / RBC: x / WBC x   Sq Epi: x / Non Sq Epi: x / Bacteria: x      CAPILLARY BLOOD GLUCOSE      POCT Blood Glucose.: 225 mg/dL (11 Jun 2025 12:04)  POCT Blood Glucose.: 308 mg/dL (11 Jun 2025 08:18)  POCT Blood Glucose.: 304 mg/dL (10 Romario 2025 21:22)  POCT Blood Glucose.: 145 mg/dL (10 Romario 2025 16:37)          MEDICATIONS  (STANDING):  amLODIPine   Tablet 5 milliGRAM(s) Oral daily  atorvastatin 10 milliGRAM(s) Oral at bedtime  carbamide peroxide Otic Solution 5 Drop(s) Both Ears two times a day  dextrose 5%. 1000 milliLiter(s) (50 mL/Hr) IV Continuous <Continuous>  dextrose 5%. 1000 milliLiter(s) (100 mL/Hr) IV Continuous <Continuous>  dextrose 50% Injectable 25 Gram(s) IV Push once  dextrose 50% Injectable 12.5 Gram(s) IV Push once  dextrose 50% Injectable 25 Gram(s) IV Push once  DULoxetine 60 milliGRAM(s) Oral daily  enoxaparin Injectable 40 milliGRAM(s) SubCutaneous every 24 hours  gabapentin 800 milliGRAM(s) Oral every 8 hours  glucagon  Injectable 1 milliGRAM(s) IntraMuscular once  insulin glargine Injectable (LANTUS) 16 Unit(s) SubCutaneous at bedtime  insulin lispro (ADMELOG) corrective regimen sliding scale   SubCutaneous three times a day before meals  insulin lispro (ADMELOG) corrective regimen sliding scale   SubCutaneous at bedtime  insulin lispro Injectable (ADMELOG) 8 Unit(s) SubCutaneous three times a day with meals  lidocaine   4% Patch 1 Patch Transdermal <User Schedule>  losartan 50 milliGRAM(s) Oral daily  melatonin 6 milliGRAM(s) Oral at bedtime  multivitamin 1 Tablet(s) Oral daily  pantoprazole    Tablet 40 milliGRAM(s) Oral before breakfast  polyethylene glycol 3350 17 Gram(s) Oral two times a day  predniSONE   Tablet   Oral   predniSONE   Tablet 60 milliGRAM(s) Oral daily  senna 2 Tablet(s) Oral at bedtime    MEDICATIONS  (PRN):  acetaminophen     Tablet .. 650 milliGRAM(s) Oral every 6 hours PRN Mild Pain (1 - 3)  dextrose Oral Gel 15 Gram(s) Oral once PRN Blood Glucose LESS THAN 70 milliGRAM(s)/deciliter  methocarbamol 750 milliGRAM(s) Oral three times a day PRN Muscle Spasm

## 2025-06-11 NOTE — PROGRESS NOTE ADULT - NS ATTEND AMEND GEN_ALL_CORE FT
I have made amendments to the documentation where necessary. Additional comments: I have personally seen and examined the patient independently Medical records reviewed. I have made amendments to the documentation where necessary and adjusted the history, physical examination, and plan as documented by the NP.     Patient reports slightly improved energy levels, Reduced intensity of Rt hip pain  Improved engagement in therapy today    ENT review and wax removal tolerated today     Continue therapy   DVT ppx  BL foot drop - await BL AFOs  Neuro recs for steroid treatment being implemented  Continue increased dose gabapentin to treat neuropathic pain       Spent 53 mins, patient review, observation in therapy, discussion of treatment plan and liaison with other providers

## 2025-06-11 NOTE — PROGRESS NOTE ADULT - ASSESSMENT
59 year old male with PMH of ethanol use disorder, chronic pancreatitis, poorly controlled СЕРГЕЙ (A1c 9.9 on 04/23/2025), chronic neuropathy of LLE, recent hospitalization April 2025 for sciatica; who presented to [facility] ED on 5/26/25 for progressively worsening BL LE pain, weakness and numbness. He was evaluated by Neurology and underwent bedside EMG which was significant for CIDP (s/p 5 days IVIG and IV steroids). He developed an episode of confusion/AMS (CT Head negative). His hospital course was significant for hyponatremia, and hyperglycemia (both now resolved). Patient now admitted for a multidisciplinary rehab program. 06-03-25 @ 14:29    * BL foot drop - await BL AFOs  * Interval functional declined--decreased strength Rt hip 1/5 and knee 2/5 (last dose of IVIG 6/1) -  on steroid taper per Neuro   * L ear fullness - on Debrox - await ENT recs     #CIDP  - Gait Instability, ADL impairments and Functional impairments: start Comprehensive Rehab Program of PT/OT- 3 hours a day, 5 days a week  - P&O as needed--orthotics referral   - Last IVIG and Methylprednisolone on 6/1 - Interval functional declined--decreased strength Rt hip 1/5 and knee 2/5 (last dose of PLEX 6/1) - on steroid taper per Neuro     # BL foot drop  - Orthotic eval for BL hinged AFOs   - This patient has a diagnosis of BL foot drop. Patient is ambulatory. Patient requires a custom molded hinged AFO to preserve ankle motion while stabilizing talar and subtalar joints. Device will give a more natural gait without destabilizing the knee and hip. Device requires a low density lining to protect prominent louis areas of effected skin. Patient will need the device for a term of greater than 9 months. No reasonable prefabricated orthosis exists.     #HTN/HLD  - Amlodipine 10mg daily  - Atorvastatin 10mg at HS  - Losartan 50mg daily - (Home regimen: Telmisartan 40mg daily)    #Latent Autoimmune Diabetes Mellitus  - A1c: 9.9% (April 2025)   - FS AC & HS  - Premeal  - Lantus     #Electrolyte Imbalance  - Monitor CMP  - Appreciate ongoing hospitalist recs     #L ear fullness   - on Debrox   - Await ENT recs     #Mood  - Duloxetine 60mg daily     #Skin  - Skin  - Pressure injury/Skin: OOB to Chair, PT/OT     #Pain Mgmt   #Neuropathy  #Stiffness/Spasiticity  - Gabapentin 600mg TID   - Lidocaine patch daily  - PRN: Methocarbamol 750mg TID; Tylenol 650mg QID    #GI/Bowel Mgmt   - Pantoprazole   - Senna     #/Bladder Mgmt --voiding     #FEN   - Diet - Regular + Thins  [CCHO]    - Fluid Restriction: 1200mL  - MVI    #Falls history  - Osteoporosis labs - WNL    #Precautions / PROPHYLAXIS:   - Falls  - ortho: Weight bearing status: WBAT   - Lungs: Aspiration, Incentive Spirometer   - DVT: Lovenox 40 mg daily   ----------------------------------------------  IDT 6/5  Ambulating 30ft RW mod assist wc follow through 2 stairs 2 hand rails  b/l foot drop and Rt hip weakness  Await orthotist  Est [place] 6/17 to home  ----------------------------------------------    Dr. Chavira Liaison with Family/Providers:    -----------------------------------------------  OUTPATIENT/FOLLOW UP:    Mirza Radford   Neurology    Bárbara Hays  Neurology  [address] [place] [address], [address]  [place] [address]  Phone: [phone]  Fax: [phone]  Established Patient  Follow Up Time:     [facility]  Endocrinology  [address] [place] [address]  Phone: [phone]  Fax:     Scheduled Appts:  Mirza Radford  [facility]  NEUROLOGY [address] [facility]  Scheduled Appointment: 06/18/2025    [facility]  ENDOCRIN [address]  Scheduled Appointment: 06/20/2025    Aniyah Julian  [facility]  RHEUM [address] [facility]  Scheduled Appointment: 07/29/2025    [facility]  ONCPAINMGT [address]   Scheduled Appointment: 08/11/2025    ----------------------------------------------- 59 year old male with PMH of ethanol use disorder, chronic pancreatitis, poorly controlled СЕРГЕЙ (A1c 9.9 on 04/23/2025), chronic neuropathy of LLE, recent hospitalization April 2025 for sciatica; who presented to [facility] ED on 5/26/25 for progressively worsening BL LE pain, weakness and numbness. He was evaluated by Neurology and underwent bedside EMG which was significant for CIDP (s/p 5 days IVIG and IV steroids). He developed an episode of confusion/AMS (CT Head negative). His hospital course was significant for hyponatremia, and hyperglycemia (both now resolved). Patient now admitted for a multidisciplinary rehab program. 06-03-25 @ 14:29    * BL foot drop - await BL AFOs  * Interval functional declined--decreased strength Rt hip 1/5 and knee 2/5 (last dose of IVIG 6/1) -  on steroid taper per Neuro   * R hip XR (6/10) with mild degenerative changes, increased from prior   * L ear fullness - on Debrox - s/p cerumen removal with ENT on 6/11     #CIDP  - Gait Instability, ADL impairments and Functional impairments: start Comprehensive Rehab Program of PT/OT- 3 hours a day, 5 days a week  - P&O as needed--orthotics referral   - Last IVIG and Methylprednisolone on 6/1 - Interval functional declined--decreased strength Rt hip 1/5 and knee 2/5 (last dose of PLEX 6/1) - on steroid taper per Neuro     #R hip pain   - R hip XR (6/10) with mild degenerative changes, increased from prior     # BL foot drop  - Orthotic eval for BL hinged AFOs   - This patient has a diagnosis of BL foot drop. Patient is ambulatory. Patient requires a custom molded hinged AFO to preserve ankle motion while stabilizing talar and subtalar joints. Device will give a more natural gait without destabilizing the knee and hip. Device requires a low density lining to protect prominent louis areas of effected skin. Patient will need the device for a term of greater than 9 months. No reasonable prefabricated orthosis exists.     #HTN/HLD  - Amlodipine 10mg daily  - Atorvastatin 10mg at HS  - Losartan 50mg daily - (Home regimen: Telmisartan 40mg daily)    #Latent Autoimmune Diabetes Mellitus  - A1c: 9.9% (April 2025)   - FS AC & HS  - Premeal  - Lantus     #Electrolyte Imbalance  - Monitor CMP  - Appreciate ongoing hospitalist recs     #L ear fullness   - on Debrox   - S/p cerumen removal with ENT on 6/11     #Mood  - Duloxetine 60mg daily     #Skin  - Skin  - Pressure injury/Skin: OOB to Chair, PT/OT     #Pain Mgmt   #Neuropathy  #Stiffness/Spasiticity  - Gabapentin 600mg TID   - Lidocaine patch daily  - PRN: Methocarbamol 750mg TID; Tylenol 650mg QID    #GI/Bowel Mgmt   - Pantoprazole   - Senna     #/Bladder Mgmt --voiding     #FEN   - Diet - Regular + Thins  [CCHO]    - Fluid Restriction: 1200mL  - MVI    #Falls history  - Osteoporosis labs - WNL    #Precautions / PROPHYLAXIS:   - Falls  - ortho: Weight bearing status: WBAT   - Lungs: Aspiration, Incentive Spirometer   - DVT: Lovenox 40 mg daily   ----------------------------------------------  IDT 6/5  Ambulating 30ft RW mod assist wc follow through 2 stairs 2 hand rails  b/l foot drop and Rt hip weakness  Await orthotist  Est dc 6/17 to home  ----------------------------------------------    Dr. Chavira Liaison with Family/Providers:    -----------------------------------------------  OUTPATIENT/FOLLOW UP:    Mirza Radford   Neurology    Bárbara Hays  Neurology  [address] [facility], [address]  [place] [address]  Phone: [phone]  Fax: [phone]  Established Patient  Follow Up Time:     [facility] Endocrinology  Endocrinology  [address] [place] [address]  Phone: [phone]  Fax:     Scheduled Appts:  Mirza Radfodr  [facility]  NEUROLOGY [address] [place] [address]  Scheduled Appointment: 06/18/2025    [facility]  ENDOCRIN [address]  Scheduled Appointment: 06/20/2025    Aniyah Julian  [facility]  RHEUM [address] [facility]  Scheduled Appointment: 07/29/2025    [facility]  ONCPAINMGT [address] [place]   Scheduled Appointment: 08/11/2025    ----------------------------------------------- 59 year old male with PMH of ethanol use disorder, chronic pancreatitis, poorly controlled СЕРГЕЙ (A1c 9.9 on 04/23/2025), chronic neuropathy of LLE, recent hospitalization April 2025 for sciatica; who presented to [facility] ED on 5/26/25 for progressively worsening BL LE pain, weakness and numbness. He was evaluated by Neurology and underwent bedside EMG which was significant for CIDP (s/p 5 days IVIG and IV steroids). He developed an episode of confusion/AMS (CT Head negative). His hospital course was significant for hyponatremia, and hyperglycemia (both now resolved). Patient now admitted for a multidisciplinary rehab program. 06-03-25 @ 14:29    * BL foot drop - await BL AFOs  * Interval functional declined--decreased strength Rt hip 1/5 and knee 2/5 (last dose of IVIG 6/1) -  on steroid taper per Neuro   * R hip XR (6/10) with mild degenerative changes, increased from prior but pain symptoms reducing  * L ear fullness - on Debrox - s/p cerumen removal with ENT on 6/11     #CIDP  - Gait Instability, ADL impairments and Functional impairments: start Comprehensive Rehab Program of PT/OT- 3 hours a day, 5 days a week  - P&O as needed--orthotics referral   - Last IVIG and Methylprednisolone on 6/1 - Interval functional declined--decreased strength Rt hip 1/5 and knee 2/5 (last dose of PLEX 6/1) - on steroid taper per Neuro     #R hip pain   - R hip XR (6/10) with mild degenerative changes, increased from prior     # BL foot drop  - Orthotic eval for BL hinged AFOs   - This patient has a diagnosis of BL foot drop. Patient is ambulatory. Patient requires a custom molded hinged AFO to preserve ankle motion while stabilizing talar and subtalar joints. Device will give a more natural gait without destabilizing the knee and hip. Device requires a low density lining to protect prominent louis areas of effected skin. Patient will need the device for a term of greater than 9 months. No reasonable prefabricated orthosis exists.     #HTN/HLD  - Amlodipine 10mg daily  - Atorvastatin 10mg at HS  - Losartan 50mg daily - (Home regimen: Telmisartan 40mg daily)    #Latent Autoimmune Diabetes Mellitus  - A1c: 9.9% (April 2025)   - FS AC & HS  - Premeal  - Lantus     #Electrolyte Imbalance  - Monitor CMP  - Appreciate ongoing hospitalist recs     #L ear fullness   - on Debrox   - S/p cerumen removal with ENT on 6/11     #Mood  - Duloxetine 60mg daily     #Skin  - Skin  - Pressure injury/Skin: OOB to Chair, PT/OT     #Pain Mgmt   #Neuropathy  #Stiffness/Spasiticity  - Gabapentin 600mg TID   - Lidocaine patch daily  - PRN: Methocarbamol 750mg TID; Tylenol 650mg QID    #GI/Bowel Mgmt   - Pantoprazole   - Senna     #/Bladder Mgmt --voiding     #FEN   - Diet - Regular + Thins  [CCHO]    - Fluid Restriction: 1200mL  - MVI    #Falls history  - Osteoporosis labs - WNL    #Precautions / PROPHYLAXIS:   - Falls  - ortho: Weight bearing status: WBAT   - Lungs: Aspiration, Incentive Spirometer   - DVT: Lovenox 40 mg daily   ----------------------------------------------  IDT 6/5  Ambulating 30ft RW mod assist wc follow through 2 stairs 2 hand rails  b/l foot drop and Rt hip weakness  Await orthotist  Est dc 6/17 to home  ----------------------------------------------    Dr. Chavira Liaison with Family/Providers:    -----------------------------------------------  OUTPATIENT/FOLLOW UP:    Mirza Radford   Neurology    Shelley HaysSkip  Neurology  [address] [facility], [address]  [place] [address]  Phone: [phone]  Fax: [phone]  Established Patient  Follow Up Time:     [facility] Endocrinology  Endocrinology  [address] [place] [address]  Phone: [phone]  Fax:     Scheduled Appts:  Mirza Radford  [facility]  NEUROLOGY [address] [facility]  Scheduled Appointment: 06/18/2025    [facility]  ENDOCRIN [address]  Scheduled Appointment: 06/20/2025    Aniyah Julian  [facility]  RHEUM [address] [facility] [address]  Scheduled Appointment: 07/29/2025    [facility]  ONCPAINMGT [address]   Scheduled Appointment: 08/11/2025    -----------------------------------------------

## 2025-06-11 NOTE — CONSULT NOTE ADULT - TIME BILLING
I have spent time minutes of time on the encounter, which excludes teaching to the patient and/or family and separetely reported services.
Time spent includes direct patient care (interview and examination of patient), discussion with other providers, support staff and/or patient's family members, review of medical records, ordering diagnostic tests and analyzing results, and documentation

## 2025-06-11 NOTE — PROGRESS NOTE ADULT - SUBJECTIVE AND OBJECTIVE BOX
CC: Patient is a 59y old  Male who presents with a chief complaint of CIDP (10 Romario 2025 12:24)      Interval History:    Pt seen and examined at bedside.  No overnight events.  No new complaints.    ALLERGIES:  No Known Allergies    MEDICATIONS  (STANDING):  amLODIPine   Tablet 5 milliGRAM(s) Oral daily  atorvastatin 10 milliGRAM(s) Oral at bedtime  carbamide peroxide Otic Solution 5 Drop(s) Both Ears two times a day  dextrose 5%. 1000 milliLiter(s) (50 mL/Hr) IV Continuous <Continuous>  dextrose 5%. 1000 milliLiter(s) (100 mL/Hr) IV Continuous <Continuous>  dextrose 50% Injectable 25 Gram(s) IV Push once  dextrose 50% Injectable 12.5 Gram(s) IV Push once  dextrose 50% Injectable 25 Gram(s) IV Push once  DULoxetine 60 milliGRAM(s) Oral daily  enoxaparin Injectable 40 milliGRAM(s) SubCutaneous every 24 hours  gabapentin 800 milliGRAM(s) Oral every 8 hours  glucagon  Injectable 1 milliGRAM(s) IntraMuscular once  insulin glargine Injectable (LANTUS) 16 Unit(s) SubCutaneous at bedtime  insulin lispro (ADMELOG) corrective regimen sliding scale   SubCutaneous three times a day before meals  insulin lispro (ADMELOG) corrective regimen sliding scale   SubCutaneous at bedtime  insulin lispro Injectable (ADMELOG) 8 Unit(s) SubCutaneous three times a day with meals  lidocaine   4% Patch 1 Patch Transdermal <User Schedule>  losartan 50 milliGRAM(s) Oral daily  melatonin 6 milliGRAM(s) Oral at bedtime  multivitamin 1 Tablet(s) Oral daily  pantoprazole    Tablet 40 milliGRAM(s) Oral before breakfast  polyethylene glycol 3350 17 Gram(s) Oral two times a day  predniSONE   Tablet   Oral   predniSONE   Tablet 60 milliGRAM(s) Oral daily  senna 2 Tablet(s) Oral at bedtime    MEDICATIONS  (PRN):  acetaminophen     Tablet .. 650 milliGRAM(s) Oral every 6 hours PRN Mild Pain (1 - 3)  dextrose Oral Gel 15 Gram(s) Oral once PRN Blood Glucose LESS THAN 70 milliGRAM(s)/deciliter  methocarbamol 750 milliGRAM(s) Oral three times a day PRN Muscle Spasm    Vital Signs Last 24 Hrs  T(F): 98.1 (11 Jun 2025 08:14), Max: 98.1 (11 Jun 2025 08:14)  HR: 107 (11 Jun 2025 08:14) (95 - 110)  BP: 146/89 (11 Jun 2025 08:14) (142/91 - 151/74)  RR: 16 (11 Jun 2025 08:14) (16 - 16)  SpO2: 97% (11 Jun 2025 08:14) (96% - 97%)  I&O's Summary    BMI (kg/m2): 20.8 (06-08-25 @ 15:11)    PHYSICAL EXAM:  GENERAL: NAD  CHEST/LUNG: No rales, rhonchi, wheezing; normal respiratory effort  HEART: RRR; No murmurs, rubs, or gallops  ABDOMEN: Soft, Nontender, Nondistended; Bowel sounds present  MSK/EXT:  No peripheral edema, 2+ Peripheral Pulses, No clubbing or digital cyanosis  PSYCH: Appropriate affect, Alert & Oriented    LABS:                        13.6   4.49  )-----------( 210      ( 09 Jun 2025 05:40 )             39.0       06-09    136  |  98  |  10  ----------------------------<  279  4.7   |  32  |  1.04    Ca    10.0      09 Jun 2025 05:40    TPro  7.6  /  Alb  3.3  /  TBili  0.4  /  DBili  x   /  AST  40  /  ALT  62  /  AlkPhos  97  06-09             04-24 Chol 129 mg/dL LDL -- HDL 63 mg/dL Trig 63 mg/dL          POCT Blood Glucose.: 308 mg/dL (11 Jun 2025 08:18)  POCT Blood Glucose.: 304 mg/dL (10 Romario 2025 21:22)  POCT Blood Glucose.: 145 mg/dL (10 Romario 2025 16:37)  POCT Blood Glucose.: 116 mg/dL (10 Romario 2025 12:05)      Urinalysis Basic - ( 09 Jun 2025 05:40 )    Color: x / Appearance: x / SG: x / pH: x  Gluc: 279 mg/dL / Ketone: x  / Bili: x / Urobili: x   Blood: x / Protein: x / Nitrite: x   Leuk Esterase: x / RBC: x / WBC x   Sq Epi: x / Non Sq Epi: x / Bacteria: x        COVID-19 PCR: NotDetec (06-03-25 @ 14:15)      Care Discussed with Consultants/Other Providers: Yes

## 2025-06-11 NOTE — CONSULT NOTE ADULT - SUBJECTIVE AND OBJECTIVE BOX
Patient is a 59y old  Male who presents with a chief complaint of CIDP (11 Jun 2025 14:30)      HPI:  Terence Galvez is a 59 year old male with PMH of ethanol use disorder, chronic pancreatitis, poorly controlled СЕРГЕЙ (A1c 9.9 on 04/23/2025), chronic neuropathy of LLE, recent hospitalization April 2025 for sciatica; who presented to [facility] ED on 5/26/25 for progressively worsening BL LE pain, weakness and numbness. He was evaluated by Neurology and underwent bedside EMG which was significant for CIDP (s/p 5 days IVIG and IV steroids). He developed an episode of confusion/AMS (CT Head negative). His hospital course was significant for hyponatremia, and hyperglycemia (both now resolved). PM&R was consulted and deemed him an appropriate candidate for IRF. He was medically stabilized and cleared for discharge to [facility].  (03 Jun 2025 14:26)      Interval Events:  Called to see and evaluate Mr. Galvez for bilateral ear fullness, left > right.  He gives a history of having impacted cerumen in both ears.  Debrox has been used the past couple of days but he reports that his hearing is still obstructed.  He denies pain, discomfort or drainage.    PAST MEDICAL & SURGICAL HISTORY:  GERD (gastroesophageal reflux disease)      Alcohol abuse      Left lower quadrant abdominal pain      Elbow fracture      Benign hypertension      Scoliosis      Pancreatic duct stones      Diabetes mellitus, type 2      Pancreatitis, chronic      Gallstones      H/O elbow surgery          Allergies    No Known Allergies    Intolerances        Social History:  SOCIAL HISTORY  Smoking - Current everyday cigarette smoker. Last use prior to 5/26/25  EtOH - Last use ~ 4 months ago  Drugs - Denied    FUNCTIONAL HISTORY  Occupation:  at [employer]  lives in apartment with wife with 2 sets of stairs both with 1 rail, was using a rolling walker for the past month due to BLE weakness but prior to that pt ambulated independently, right hand dominant, wears glasses    CURRENT FUNCTIONAL STATUS  - Bed Mobility: CGA  - Transfers: CGA   - Gait: Min A   - ADLs: Max A for LBD (03 Jun 2025 14:26)      FAMILY HISTORY:  FH: type 2 diabetes (Mother)        REVIEW OF SYSTEMS:     CONSTITUTIONAL: No weakness, fevers or chills     EYES/ENT: No visual changes;  No vertigo or throat pain      NECK: No pain or stiffness     RESPIRATORY: No cough, wheezing, hemoptysis; No shortness of breath     CARDIOVASCULAR: No chest pain or palpitations     GASTROINTESTINAL: No abdominal or epigastric pain. No nausea, vomiting, or hematemesis; No diarrhea or constipation. No melena or hematochezia.     GENITOURINARY: No dysuria, frequency or hematuria     NEUROLOGICAL: No numbness or weakness     SKIN: No itching, burning, rashes, or lesions   All other review of systems is negative unless indicated above.    MEDICATIONS  (STANDING):  amLODIPine   Tablet 5 milliGRAM(s) Oral daily  atorvastatin 10 milliGRAM(s) Oral at bedtime  carbamide peroxide Otic Solution 5 Drop(s) Both Ears two times a day  dextrose 5%. 1000 milliLiter(s) (50 mL/Hr) IV Continuous <Continuous>  dextrose 5%. 1000 milliLiter(s) (100 mL/Hr) IV Continuous <Continuous>  dextrose 50% Injectable 25 Gram(s) IV Push once  dextrose 50% Injectable 12.5 Gram(s) IV Push once  dextrose 50% Injectable 25 Gram(s) IV Push once  DULoxetine 60 milliGRAM(s) Oral daily  enoxaparin Injectable 40 milliGRAM(s) SubCutaneous every 24 hours  gabapentin 800 milliGRAM(s) Oral every 8 hours  glucagon  Injectable 1 milliGRAM(s) IntraMuscular once  insulin glargine Injectable (LANTUS) 16 Unit(s) SubCutaneous at bedtime  insulin lispro (ADMELOG) corrective regimen sliding scale   SubCutaneous three times a day before meals  insulin lispro (ADMELOG) corrective regimen sliding scale   SubCutaneous at bedtime  insulin lispro Injectable (ADMELOG) 8 Unit(s) SubCutaneous three times a day with meals  lidocaine   4% Patch 1 Patch Transdermal <User Schedule>  losartan 50 milliGRAM(s) Oral daily  melatonin 6 milliGRAM(s) Oral at bedtime  multivitamin 1 Tablet(s) Oral daily  pantoprazole    Tablet 40 milliGRAM(s) Oral before breakfast  polyethylene glycol 3350 17 Gram(s) Oral two times a day  predniSONE   Tablet   Oral   predniSONE   Tablet 60 milliGRAM(s) Oral daily  senna 2 Tablet(s) Oral at bedtime    MEDICATIONS  (PRN):  acetaminophen     Tablet .. 650 milliGRAM(s) Oral every 6 hours PRN Mild Pain (1 - 3)  dextrose Oral Gel 15 Gram(s) Oral once PRN Blood Glucose LESS THAN 70 milliGRAM(s)/deciliter  methocarbamol 750 milliGRAM(s) Oral three times a day PRN Muscle Spasm                            13.6   4.49  )-----------( 210      ( 09 Jun 2025 05:40 )             39.0           I&O's Detail    Vital Signs Last 24 Hrs  T(C): 36.7 (11 Jun 2025 08:14), Max: 36.7 (11 Jun 2025 08:14)  T(F): 98.1 (11 Jun 2025 08:14), Max: 98.1 (11 Jun 2025 08:14)  HR: 107 (11 Jun 2025 08:14) (95 - 110)  BP: 146/89 (11 Jun 2025 08:14) (142/91 - 151/74)  BP(mean): --  RR: 16 (11 Jun 2025 08:14) (16 - 16)  SpO2: 97% (11 Jun 2025 08:14) (96% - 97%)    Parameters below as of 11 Jun 2025 08:14  Patient On (Oxygen Delivery Method): room air            PHYSICAL EXAM:     Constitutional Normal: well nourished, well developed, non-dysmorphic, no acute distress     Psychiatric: age appropriate behavior, cooperative     Skin: no obvious skin lesions     Head: Normocephalic, Atraumatic     Lymphatic: no cervical lymphadenopathy     ENT:        Left EAC: +cerumen impaction - removed.        Right EAC: +cerumen impaction -removed          Left Tympanic Membrane: Normal, Clear, No effusion        Right Tympanic Membrane: Normal, Clear, No effusion			          External Nose:  Normal, no structural deformities		     Oral Cavity:  Good dentition, tongue midline, no lesions or ulcerations, uvula midline     Neck: No palpable lymphadenopathy     Pulmonary: No Acute Distress.      CV: no peripheral edema/cyanosis     GI: nondistended, nontender     Peripheral vascular: no JVD or edema     Neurologic: awake and alert, no obvious facial weakness    PROCEDURE:    After informed verbal consent is obtained, cerumen is removed from both canals with the use of an otoscope and suctioning.  Patient tolerated procedure well and is instructed to follow up with ENT after he is discharged.

## 2025-06-11 NOTE — CONSULT NOTE ADULT - PROBLEM SELECTOR RECOMMENDATION 9
-  Cerumen removed from both ear canals with minimal difficulty  -  Follow up with ENT in 6-12 months for ear check.  -  Discontinue Debrox

## 2025-06-12 LAB
ALBUMIN SERPL ELPH-MCNC: 3.5 G/DL — SIGNIFICANT CHANGE UP (ref 3.3–5)
ALP SERPL-CCNC: 73 U/L — SIGNIFICANT CHANGE UP (ref 40–120)
ALT FLD-CCNC: 81 U/L — HIGH (ref 10–45)
ANION GAP SERPL CALC-SCNC: 6 MMOL/L — SIGNIFICANT CHANGE UP (ref 5–17)
AST SERPL-CCNC: 47 U/L — HIGH (ref 10–40)
BASOPHILS # BLD AUTO: 0.03 K/UL — SIGNIFICANT CHANGE UP (ref 0–0.2)
BASOPHILS NFR BLD AUTO: 0.4 % — SIGNIFICANT CHANGE UP (ref 0–2)
BILIRUB SERPL-MCNC: 0.4 MG/DL — SIGNIFICANT CHANGE UP (ref 0.2–1.2)
BUN SERPL-MCNC: 21 MG/DL — SIGNIFICANT CHANGE UP (ref 7–23)
CALCIUM SERPL-MCNC: 9.6 MG/DL — SIGNIFICANT CHANGE UP (ref 8.4–10.5)
CHLORIDE SERPL-SCNC: 97 MMOL/L — SIGNIFICANT CHANGE UP (ref 96–108)
CO2 SERPL-SCNC: 32 MMOL/L — HIGH (ref 22–31)
CREAT SERPL-MCNC: 0.65 MG/DL — SIGNIFICANT CHANGE UP (ref 0.5–1.3)
EGFR: 110 ML/MIN/1.73M2 — SIGNIFICANT CHANGE UP
EGFR: 110 ML/MIN/1.73M2 — SIGNIFICANT CHANGE UP
EOSINOPHIL # BLD AUTO: 0.07 K/UL — SIGNIFICANT CHANGE UP (ref 0–0.5)
EOSINOPHIL NFR BLD AUTO: 1 % — SIGNIFICANT CHANGE UP (ref 0–6)
GLUCOSE BLDC GLUCOMTR-MCNC: 131 MG/DL — HIGH (ref 70–99)
GLUCOSE BLDC GLUCOMTR-MCNC: 210 MG/DL — HIGH (ref 70–99)
GLUCOSE BLDC GLUCOMTR-MCNC: 212 MG/DL — HIGH (ref 70–99)
GLUCOSE BLDC GLUCOMTR-MCNC: 297 MG/DL — HIGH (ref 70–99)
GLUCOSE SERPL-MCNC: 205 MG/DL — HIGH (ref 70–99)
HCT VFR BLD CALC: 39.1 % — SIGNIFICANT CHANGE UP (ref 39–50)
HGB BLD-MCNC: 13.4 G/DL — SIGNIFICANT CHANGE UP (ref 13–17)
IMM GRANULOCYTES NFR BLD AUTO: 0.4 % — SIGNIFICANT CHANGE UP (ref 0–0.9)
LYMPHOCYTES # BLD AUTO: 2.75 K/UL — SIGNIFICANT CHANGE UP (ref 1–3.3)
LYMPHOCYTES # BLD AUTO: 40.8 % — SIGNIFICANT CHANGE UP (ref 13–44)
MCHC RBC-ENTMCNC: 30.1 PG — SIGNIFICANT CHANGE UP (ref 27–34)
MCHC RBC-ENTMCNC: 34.3 G/DL — SIGNIFICANT CHANGE UP (ref 32–36)
MCV RBC AUTO: 87.9 FL — SIGNIFICANT CHANGE UP (ref 80–100)
MONOCYTES # BLD AUTO: 0.54 K/UL — SIGNIFICANT CHANGE UP (ref 0–0.9)
MONOCYTES NFR BLD AUTO: 8 % — SIGNIFICANT CHANGE UP (ref 2–14)
NEUTROPHILS # BLD AUTO: 3.32 K/UL — SIGNIFICANT CHANGE UP (ref 1.8–7.4)
NEUTROPHILS NFR BLD AUTO: 49.4 % — SIGNIFICANT CHANGE UP (ref 43–77)
NRBC BLD AUTO-RTO: 0 /100 WBCS — SIGNIFICANT CHANGE UP (ref 0–0)
PLATELET # BLD AUTO: 220 K/UL — SIGNIFICANT CHANGE UP (ref 150–400)
POTASSIUM SERPL-MCNC: 4.3 MMOL/L — SIGNIFICANT CHANGE UP (ref 3.5–5.3)
POTASSIUM SERPL-SCNC: 4.3 MMOL/L — SIGNIFICANT CHANGE UP (ref 3.5–5.3)
PROT SERPL-MCNC: 7.7 G/DL — SIGNIFICANT CHANGE UP (ref 6–8.3)
RBC # BLD: 4.45 M/UL — SIGNIFICANT CHANGE UP (ref 4.2–5.8)
RBC # FLD: 11.9 % — SIGNIFICANT CHANGE UP (ref 10.3–14.5)
SODIUM SERPL-SCNC: 135 MMOL/L — SIGNIFICANT CHANGE UP (ref 135–145)
WBC # BLD: 6.74 K/UL — SIGNIFICANT CHANGE UP (ref 3.8–10.5)
WBC # FLD AUTO: 6.74 K/UL — SIGNIFICANT CHANGE UP (ref 3.8–10.5)

## 2025-06-12 PROCEDURE — 99233 SBSQ HOSP IP/OBS HIGH 50: CPT

## 2025-06-12 PROCEDURE — 99232 SBSQ HOSP IP/OBS MODERATE 35: CPT

## 2025-06-12 RX ORDER — INSULIN GLARGINE-YFGN 100 [IU]/ML
22 INJECTION, SOLUTION SUBCUTANEOUS AT BEDTIME
Refills: 0 | Status: DISCONTINUED | OUTPATIENT
Start: 2025-06-12 | End: 2025-06-14

## 2025-06-12 RX ADMIN — INSULIN GLARGINE-YFGN 22 UNIT(S): 100 INJECTION, SOLUTION SUBCUTANEOUS at 21:16

## 2025-06-12 RX ADMIN — Medication 6 MILLIGRAM(S): at 21:17

## 2025-06-12 RX ADMIN — Medication 1 TABLET(S): at 12:31

## 2025-06-12 RX ADMIN — INSULIN LISPRO 8 UNIT(S): 100 INJECTION, SOLUTION INTRAVENOUS; SUBCUTANEOUS at 17:03

## 2025-06-12 RX ADMIN — PREDNISONE 60 MILLIGRAM(S): 20 TABLET ORAL at 05:16

## 2025-06-12 RX ADMIN — LIDOCAINE HYDROCHLORIDE 1 PATCH: 20 JELLY TOPICAL at 20:00

## 2025-06-12 RX ADMIN — INSULIN LISPRO 8 UNIT(S): 100 INJECTION, SOLUTION INTRAVENOUS; SUBCUTANEOUS at 12:55

## 2025-06-12 RX ADMIN — INSULIN LISPRO 3: 100 INJECTION, SOLUTION INTRAVENOUS; SUBCUTANEOUS at 17:02

## 2025-06-12 RX ADMIN — GABAPENTIN 800 MILLIGRAM(S): 400 CAPSULE ORAL at 21:17

## 2025-06-12 RX ADMIN — INSULIN LISPRO 8 UNIT(S): 100 INJECTION, SOLUTION INTRAVENOUS; SUBCUTANEOUS at 08:00

## 2025-06-12 RX ADMIN — Medication 40 MILLIGRAM(S): at 05:16

## 2025-06-12 RX ADMIN — AMLODIPINE BESYLATE 5 MILLIGRAM(S): 10 TABLET ORAL at 05:16

## 2025-06-12 RX ADMIN — ATORVASTATIN CALCIUM 10 MILLIGRAM(S): 80 TABLET, FILM COATED ORAL at 21:17

## 2025-06-12 RX ADMIN — GABAPENTIN 800 MILLIGRAM(S): 400 CAPSULE ORAL at 05:15

## 2025-06-12 RX ADMIN — ENOXAPARIN SODIUM 40 MILLIGRAM(S): 100 INJECTION SUBCUTANEOUS at 17:04

## 2025-06-12 RX ADMIN — LIDOCAINE HYDROCHLORIDE 1 PATCH: 20 JELLY TOPICAL at 08:00

## 2025-06-12 RX ADMIN — DULOXETINE 60 MILLIGRAM(S): 20 CAPSULE, DELAYED RELEASE ORAL at 12:31

## 2025-06-12 RX ADMIN — GABAPENTIN 800 MILLIGRAM(S): 400 CAPSULE ORAL at 13:29

## 2025-06-12 RX ADMIN — LIDOCAINE HYDROCHLORIDE 1 PATCH: 20 JELLY TOPICAL at 19:00

## 2025-06-12 RX ADMIN — INSULIN LISPRO 2: 100 INJECTION, SOLUTION INTRAVENOUS; SUBCUTANEOUS at 07:59

## 2025-06-12 RX ADMIN — LOSARTAN POTASSIUM 50 MILLIGRAM(S): 100 TABLET, FILM COATED ORAL at 05:16

## 2025-06-12 NOTE — CHART NOTE - NSCHARTNOTEFT_GEN_A_CORE
NUTRITION FOLLOW UP    SOURCE: Patient [X)   Family [ ]    Medical Record (X)    Diet, Consistent Carbohydrate w/Evening Snack:   1200mL Fluid Restriction ([id number])  Supplement Feeding Modality:  Oral  Ensure Max Cans or Servings Per Day:  1       Frequency:  Daily (06-05-25 @ 13:25) [Active]    Pt tolerating diet with report of good appetite- states that he has been choosing menu alternatives that suit his palette. Typically orders 2 chicken legs, pasta with butter, SF ice cream, diet gingerale. Requesting salt pckts with meals given hyponatremia (6/6)- level WDL at this time. Continue on 1.2L fluid restriction. POCT noted, + prednisone. A1c 8.9%. Continues on lantus (22), Admelog (8) with meal.  Encouraged pt to incorporate more fiber/color to diet- continues on MVI to cover micronutrient needs. Good acceptance of Ensure Max supplements. Pt denies GI issues, last BM 6/11.       PO INTAKE: %       CURRENT WEIGHT:  145.2lbs (6/8)    PERTINENT MEDS:   Pertinent Medications: MEDICATIONS  (STANDING):  amLODIPine   Tablet 5 milliGRAM(s) Oral daily  atorvastatin 10 milliGRAM(s) Oral at bedtime  dextrose 5%. 1000 milliLiter(s) (50 mL/Hr) IV Continuous <Continuous>  dextrose 5%. 1000 milliLiter(s) (100 mL/Hr) IV Continuous <Continuous>  dextrose 50% Injectable 25 Gram(s) IV Push once  dextrose 50% Injectable 12.5 Gram(s) IV Push once  dextrose 50% Injectable 25 Gram(s) IV Push once  DULoxetine 60 milliGRAM(s) Oral daily  enoxaparin Injectable 40 milliGRAM(s) SubCutaneous every 24 hours  gabapentin 800 milliGRAM(s) Oral every 8 hours  glucagon  Injectable 1 milliGRAM(s) IntraMuscular once  insulin glargine Injectable (LANTUS) 22 Unit(s) SubCutaneous at bedtime  insulin lispro (ADMELOG) corrective regimen sliding scale   SubCutaneous three times a day before meals  insulin lispro (ADMELOG) corrective regimen sliding scale   SubCutaneous at bedtime  insulin lispro Injectable (ADMELOG) 8 Unit(s) SubCutaneous three times a day with meals  lidocaine   4% Patch 1 Patch Transdermal <User Schedule>  losartan 50 milliGRAM(s) Oral daily  melatonin 6 milliGRAM(s) Oral at bedtime  multivitamin 1 Tablet(s) Oral daily  pantoprazole    Tablet 40 milliGRAM(s) Oral before breakfast  polyethylene glycol 3350 17 Gram(s) Oral two times a day  predniSONE   Tablet   Oral   predniSONE   Tablet 60 milliGRAM(s) Oral daily  senna 2 Tablet(s) Oral at bedtime    MEDICATIONS  (PRN):  acetaminophen     Tablet .. 650 milliGRAM(s) Oral every 6 hours PRN Mild Pain (1 - 3)  dextrose Oral Gel 15 Gram(s) Oral once PRN Blood Glucose LESS THAN 70 milliGRAM(s)/deciliter  methocarbamol 750 milliGRAM(s) Oral three times a day PRN Muscle Spasm      PERTINENT LABS:  06-12 Na135 mmol/L Glu 205 mg/dL[H] K+ 4.3 mmol/L Cr  0.65 mg/dL BUN 21 mg/dL 06-12 Alb 3.5 g/dL    CAPILLARY BLOOD GLUCOSE      POCT Blood Glucose.: 131 mg/dL (12 Jun 2025 11:58)  POCT Blood Glucose.: 210 mg/dL (12 Jun 2025 07:58)  POCT Blood Glucose.: 299 mg/dL (11 Jun 2025 21:19)  POCT Blood Glucose.: 239 mg/dL (11 Jun 2025 16:53)      SKIN:  no pressure ulcers   EDEMA: none  LAST BM: 6/11    ESTIMATED NEEDS:   [X] no change since previous assessment  [ ] recalculated:     PREVIOUS NUTRITION DIAGNOSIS:    1. altered nutrition related lab values- pt continues on consistent carbohydrate diet, ongoing diet education     NUTRITION DIAGNOSIS is :  (X)  Ongoing        NEW NUTRITION DIAGNOSIS: N/A    NUTRITION RECOMMENDATIONS:   1. Continue with current nutrition plan of care  2. Obtain and honor food preferences as able- standing lunch/dinner menu  3. Salt pckts with meals, fluid restriction per medical team   4. Ongoing diet education     MONITORING AND EVALUATION:   1. Tolerance to diet prescription   2. PO intake  3. Weights prn  4. Labs  5. Follow Up per protocol     RD to remain available   Meena Garland RDN   [phone] or TEAMS

## 2025-06-12 NOTE — PROGRESS NOTE ADULT - ASSESSMENT
59 year old male with PMH of ethanol use disorder, chronic pancreatitis, poorly controlled СЕРГЕЙ (A1c 9.9 on 04/23/2025), chronic neuropathy of LLE, recent hospitalization April 2025 for sciatica; who presented to [facility] ED on 5/26/25 for progressively worsening BL LE pain, weakness and numbness. He was evaluated by Neurology and underwent bedside EMG which was significant for CIDP (s/p 5 days IVIG and IV steroids). He developed an episode of confusion/AMS (CT Head negative). His hospital course was significant for hyponatremia, and hyperglycemia (both now resolved). Patient now admitted for a multidisciplinary rehab program. 06-03-25 @ 14:29    * BL foot drop - await BL AFOs  * Interval functional declined--decreased strength Rt hip 1/5 and knee 2/5 (last dose of IVIG 6/1) -  on steroid taper per Neuro - improved ROM  * L ear fullness -s/p Debrox - s/p cerumen removal with ENT on 6/11     #CIDP  - Gait Instability, ADL impairments and Functional impairments: start Comprehensive Rehab Program of PT/OT- 3 hours a day, 5 days a week  - P&O as needed--orthotics referral   - Last IVIG and Methylprednisolone on 6/1 - Interval functional declined--decreased strength Rt hip 1/5 and knee 2/5 (last dose of PLEX 6/1) - on steroid taper per Neuro - improved ROM     #R hip pain   - R hip XR (6/10) with mild degenerative changes, increased from prior     # BL foot drop  - Orthotic eval for BL hinged AFOs   - This patient has a diagnosis of BL foot drop. Patient is ambulatory. Patient requires a custom molded hinged AFO to preserve ankle motion while stabilizing talar and subtalar joints. Device will give a more natural gait without destabilizing the knee and hip. Device requires a low density lining to protect prominent louis areas of effected skin. Patient will need the device for a term of greater than 9 months. No reasonable prefabricated orthosis exists.     #HTN/HLD  - Amlodipine 10mg daily  - Atorvastatin 10mg at HS  - Losartan 50mg daily - (Home regimen: Telmisartan 40mg daily)    #Latent Autoimmune Diabetes Mellitus  - A1c: 9.9% (April 2025)   - FS AC & HS  - Premeal  - Lantus     #Electrolyte Imbalance  - Monitor CMP  - Appreciate ongoing hospitalist recs     #L ear fullness   - on Debrox   - S/p cerumen removal with ENT on 6/11     #Mood  - Duloxetine 60mg daily     #Skin  - Skin  - Pressure injury/Skin: OOB to Chair, PT/OT     #Pain Mgmt   #Neuropathy  #Stiffness/Spasiticity  - Gabapentin 600mg TID   - Lidocaine patch daily  - PRN: Methocarbamol 750mg TID; Tylenol 650mg QID    #GI/Bowel Mgmt   - Pantoprazole   - Senna     #/Bladder Mgmt --voiding     #FEN   - Diet - Regular + Thins  [CCHO]    - Fluid Restriction: 1200mL  - MVI    #Falls history  - Osteoporosis labs - WNL    #Precautions / PROPHYLAXIS:   - Falls  - ortho: Weight bearing status: WBAT   - Lungs: Aspiration, Incentive Spirometer   - DVT: Lovenox 40 mg daily   ----------------------------------------------  IDT 6/5  Ambulating 30ft RW mod assist wc follow through 2 stairs 2 hand rails  b/l foot drop and Rt hip weakness  Await orthotist  Est dc 6/17 to home  ----------------------------------------------    Dr. Chavira Liaison with Family/Providers:    -----------------------------------------------  OUTPATIENT/FOLLOW UP:    Mirza Radford   Neurology    Bárbara Hays  Neurology  [address], [address]  [place] [address]  Phone: [phone]  Fax: [phone]  Established Patient  Follow Up Time:     [facility] Endocrinology  Endocrinology  [address] [place] [address]  Phone: [phone]  Fax:     Scheduled Appts:  Mirza Radford  [facility]  NEUROLOGY [address] [place] [address]  Scheduled Appointment: 06/18/2025    [facility]  ENDOCRIN [address]  Scheduled Appointment: 06/20/2025    Aniyah Julian  [facility] [address] [facility]  Scheduled Appointment: 07/29/2025    [facility]  ONCPAINMGT [address]   Scheduled Appointment: 08/11/2025    ----------------------------------------------- 59 year old male with PMH of ethanol use disorder, chronic pancreatitis, poorly controlled СЕРГЕЙ (A1c 9.9 on 04/23/2025), chronic neuropathy of LLE, recent hospitalization April 2025 for sciatica; who presented to [facility] ED on 5/26/25 for progressively worsening BL LE pain, weakness and numbness. He was evaluated by Neurology and underwent bedside EMG which was significant for CIDP (s/p 5 days IVIG and IV steroids). He developed an episode of confusion/AMS (CT Head negative). His hospital course was significant for hyponatremia, and hyperglycemia (both now resolved). Patient now admitted for a multidisciplinary rehab program. 06-03-25 @ 14:29    * BL foot drop - await BL AFOs  * Interval functional declined--decreased strength Rt hip 1/5 and knee 2/5 (last dose of IVIG 6/1) -  on steroid taper per Neuro - improved ROM  * L ear fullness -s/p Debrox - s/p cerumen removal with ENT on 6/11   * Labs discussed  * Function as discussed during IDT, patient prefers home dc and plans to work towards that    #CIDP  - Gait Instability, ADL impairments and Functional impairments: start Comprehensive Rehab Program of PT/OT- 3 hours a day, 5 days a week  - P&O as needed--orthotics referral   - Last IVIG and Methylprednisolone on 6/1 - Interval functional declined--decreased strength Rt hip 1/5 and knee 2/5 (last dose of PLEX 6/1) - on steroid taper per Neuro - improved ROM     #R hip pain   - R hip XR (6/10) with mild degenerative changes, increased from prior     # BL foot drop  - Orthotic eval for BL hinged AFOs   - This patient has a diagnosis of BL foot drop. Patient is ambulatory. Patient requires a custom molded hinged AFO to preserve ankle motion while stabilizing talar and subtalar joints. Device will give a more natural gait without destabilizing the knee and hip. Device requires a low density lining to protect prominent louis areas of effected skin. Patient will need the device for a term of greater than 9 months. No reasonable prefabricated orthosis exists.     #HTN/HLD  - Amlodipine 10mg daily  - Atorvastatin 10mg at HS  - Losartan 50mg daily - (Home regimen: Telmisartan 40mg daily)    #Latent Autoimmune Diabetes Mellitus  - A1c: 9.9% (April 2025)   - FS AC & HS  - Premeal  - Lantus     #Electrolyte Imbalance  - Monitor CMP  - Appreciate ongoing hospitalist recs     #Left ear fullness   - on Debrox   - S/p cerumen removal with ENT on 6/11     #Mood  - Duloxetine 60mg daily     #Skin  - Skin  - Pressure injury/Skin: OOB to Chair, PT/OT     #Pain Mgmt   #Neuropathy  #Stiffness/Spasiticity  - Gabapentin 600mg TID   - Lidocaine patch daily  - PRN: Methocarbamol 750mg TID; Tylenol 650mg QID    #GI/Bowel Mgmt   - Pantoprazole   - Senna     #/Bladder Mgmt --voiding     #FEN   - Diet - Regular + Thins  [CCHO]    - Fluid Restriction: 1200mL  - MVI    #Falls history  - Osteoporosis labs - WNL    #Precautions / PROPHYLAXIS:   - Falls  - ortho: Weight bearing status: WBAT   - Lungs: Aspiration, Incentive Spirometer   - DVT: Lovenox 40 mg daily   ----------------------------------------------  IDT 6/12  Ambulating 30ft RW mod assist wc follow through 2 stairs 2 hand rails  Slight functional decline, ambulating with likghtgait, non functional ambulation  Rt leg weak, but improving today   b/l foot drop and Rt hip weakness  Await orthotist  Est dc 6/17 to home vs [facility] due to significant LE weakness  But patient preferring home DC  Family training due tomorrow with wife (all other family members live in [place])  ----------------------------------------------    Dr. RUSSELLs Liaison with Family/Providers:    -----------------------------------------------  OUTPATIENT/FOLLOW UP:    Mirza Radford   Neurology    Bárbara Hays  Neurology  [address] [place] [address], [address]  [place] [address]  Phone: [phone]  Fax: [phone]  Established Patient  Follow Up Time:     [facility] Endocrinology  Endocrinology  [address] [place] [address]  Phone: [phone]  Fax:     Scheduled Appts:  Mirza Radford  [facility]  NEUROLOGY [address] [facility]  Scheduled Appointment: 06/18/2025    [facility]  ENDOCRIN [address]  Scheduled Appointment: 06/20/2025    Aniyah Julian  [facility]  RHEUM [address] [facility]  Scheduled Appointment: 07/29/2025    [facility]  ONCPAINMGT [address] [place] [address]   Scheduled Appointment: 08/11/2025    -----------------------------------------------

## 2025-06-12 NOTE — PROGRESS NOTE ADULT - SUBJECTIVE AND OBJECTIVE BOX
CC: CIDP    Today's Subjective & Objective Findings:  Patient seen and examined at bedside this AM.  Admits to sleeping well.  Hip pain and RLE weakness improved. Now able to initiate RLE KE with 3/5 strength.   Ear fullness resolved, after cerumen removal with ENT yesterday.   Await BL AFOs, to address b/l ankle DF weakness.    Denies headache, dizziness, visual changes, chest pain, SOB/RUBIO, abdominal pain, nausea, vomiting, diarrhea, dysuria. LBM- 6/12  + numbness/tingling in BL LEs.    Function  -Liko lift and ambulation sling used to walk 30' with RW and bilateral ankle DF  assist wraps used due to severe foot drop.  Patient with inability to extended  right knee for stance control, bent knee posture throughout gait cycle with  trace contractions at quad. Noted buckling into sling without ability to  self-correct. Poor hip/pelvis extension observed due to weakness. MAX A needed  at trunk to steady and weight shift for improved safety with assist of 2 to  advance Liko lift and for wheelchair follow.    Vital Signs Last 24 Hrs  T(C): 36.3 (12 Jun 2025 07:53), Max: 36.5 (11 Jun 2025 21:16)  T(F): 97.3 (12 Jun 2025 07:53), Max: 97.7 (11 Jun 2025 21:16)  HR: 88 (12 Jun 2025 07:53) (82 - 88)  BP: 144/82 (12 Jun 2025 07:53) (144/82 - 150/80)  BP(mean): --  RR: 16 (12 Jun 2025 07:53) (16 - 16)  SpO2: 100% (12 Jun 2025 07:53) (98% - 100%)    PHYSICAL EXAM  Constitutional -, Comfortable  Neck - Supple  Cardiovascular - Palpable peripheral pulses   Respiratory/Chest- Symmetrical chest expansion, No dyspnea  Abdomen - Soft, Non-tender  Extremities - No cyanosis, No edema,     Neurologic Exam - Alert, Oriented, Interactive  Sensory - Light touch sensation intact  Motor Function - Moves all extremities spontaneously, except for limited strength both ankles and Rt hip and, as noted today reduced strength Rt knee extension  UE 4/5  Left leg 4/5 except DF 1/5 and PF 3/5  Rt leg  2/5, DF 1/5 PF 3/5  Skin -  Intact      LABS:                        13.4   6.74  )-----------( 220      ( 12 Jun 2025 06:55 )             39.1     06-12    135  |  97  |  21  ----------------------------<  205[H]  4.3   |  32[H]  |  0.65    Ca    9.6      12 Jun 2025 06:55    TPro  7.7  /  Alb  3.5  /  TBili  0.4  /  DBili  x   /  AST  47[H]  /  ALT  81[H]  /  AlkPhos  73  06-12      Urinalysis Basic - ( 12 Jun 2025 06:55 )    Color: x / Appearance: x / SG: x / pH: x  Gluc: 205 mg/dL / Ketone: x  / Bili: x / Urobili: x   Blood: x / Protein: x / Nitrite: x   Leuk Esterase: x / RBC: x / WBC x   Sq Epi: x / Non Sq Epi: x / Bacteria: x        CAPILLARY BLOOD GLUCOSE  POCT Blood Glucose.: 131 mg/dL (12 Jun 2025 11:58)  POCT Blood Glucose.: 210 mg/dL (12 Jun 2025 07:58)  POCT Blood Glucose.: 299 mg/dL (11 Jun 2025 21:19)  POCT Blood Glucose.: 239 mg/dL (11 Jun 2025 16:53)          MEDICATIONS  (STANDING):  amLODIPine   Tablet 5 milliGRAM(s) Oral daily  atorvastatin 10 milliGRAM(s) Oral at bedtime  dextrose 5%. 1000 milliLiter(s) (50 mL/Hr) IV Continuous <Continuous>  dextrose 5%. 1000 milliLiter(s) (100 mL/Hr) IV Continuous <Continuous>  dextrose 50% Injectable 25 Gram(s) IV Push once  dextrose 50% Injectable 12.5 Gram(s) IV Push once  dextrose 50% Injectable 25 Gram(s) IV Push once  DULoxetine 60 milliGRAM(s) Oral daily  enoxaparin Injectable 40 milliGRAM(s) SubCutaneous every 24 hours  gabapentin 800 milliGRAM(s) Oral every 8 hours  glucagon  Injectable 1 milliGRAM(s) IntraMuscular once  insulin glargine Injectable (LANTUS) 22 Unit(s) SubCutaneous at bedtime  insulin lispro (ADMELOG) corrective regimen sliding scale   SubCutaneous three times a day before meals  insulin lispro (ADMELOG) corrective regimen sliding scale   SubCutaneous at bedtime  insulin lispro Injectable (ADMELOG) 8 Unit(s) SubCutaneous three times a day with meals  lidocaine   4% Patch 1 Patch Transdermal <User Schedule>  losartan 50 milliGRAM(s) Oral daily  melatonin 6 milliGRAM(s) Oral at bedtime  multivitamin 1 Tablet(s) Oral daily  pantoprazole    Tablet 40 milliGRAM(s) Oral before breakfast  polyethylene glycol 3350 17 Gram(s) Oral two times a day  predniSONE   Tablet   Oral   predniSONE   Tablet 60 milliGRAM(s) Oral daily  senna 2 Tablet(s) Oral at bedtime      MEDICATIONS  (PRN):  acetaminophen     Tablet .. 650 milliGRAM(s) Oral every 6 hours PRN Mild Pain (1 - 3)  dextrose Oral Gel 15 Gram(s) Oral once PRN Blood Glucose LESS THAN 70 milliGRAM(s)/deciliter  methocarbamol 750 milliGRAM(s) Oral three times a day PRN Muscle Spasm   CC: CIDP    Today's Subjective & Objective Findings:  Patient seen and examined at bedside this AM.  Admits to sleeping well.  Hip pain and RLE weakness improved. Now able to initiate RLE KE with 3/5 strength.   Ear fullness resolved, after cerumen removal with ENT yesterday.   Await BL AFOs, to address b/l ankle DF weakness.    Denies headache, dizziness, visual changes, chest pain, SOB/RUBIO, abdominal pain, nausea, vomiting, diarrhea, dysuria. LBM- 6/12  + numbness/tingling in BL LEs.    Function  Supine/Hookline LE strengthening:  glute sets  quad sets  SAQ with Left; AAROM with Right LE  green TB (clams, bridges)  AAROM Right LE heel slides, ITB/hamstring stretch via pt    Therapeutic Activity  supervision for bed mobility  CG A for squat pivot transfer WC to/from mat  independent with WC mobility    Vital Signs Last 24 Hrs  T(C): 36.3 (12 Jun 2025 07:53), Max: 36.5 (11 Jun 2025 21:16)  T(F): 97.3 (12 Jun 2025 07:53), Max: 97.7 (11 Jun 2025 21:16)  HR: 88 (12 Jun 2025 07:53) (82 - 88)  BP: 144/82 (12 Jun 2025 07:53) (144/82 - 150/80)  RR: 16 (12 Jun 2025 07:53) (16 - 16)  SpO2: 100% (12 Jun 2025 07:53) (98% - 100%)    PHYSICAL EXAM  Constitutional -,Comfortable  Neck - Supple  Cardiovascular - Palpable peripheral pulses   Respiratory/Chest- Symmetrical chest expansion, No dyspnea  Abdomen - Soft, Non-tender  Extremities - No cyanosis, No edema,     Neurologic Exam - Alert, Oriented, Interactive  Sensory - Light touch sensation intact  Motor Function - Moves all extremities spontaneously, except for limited strength both ankles and Rt hip and, as noted today reduced strength Rt knee extension  UE 4/5  Left leg 4/5 except DF 1/5 and PF 3/5  Rt leg  2/5, DF 1/5 PF 3/5  Skin -  diffuse numbness     LABS:                13.4   6.74  )-----------( 220      ( 12 Jun 2025 06:55 )             39.1     06-12    135  |  97  |  21  ----------------------------<  205[H]  4.3   |  32[H]  |  0.65    Ca    9.6      12 Jun 2025 06:55    TPro  7.7  /  Alb  3.5  /  TBili  0.4  /  DBili  x   /  AST  47[H]  /  ALT  81[H]  /  AlkPhos  73  06-12      Urinalysis Basic - ( 12 Jun 2025 06:55 )    Color: x / Appearance: x / SG: x / pH: x  Gluc: 205 mg/dL / Ketone: x  / Bili: x / Urobili: x   Blood: x / Protein: x / Nitrite: x   Leuk Esterase: x / RBC: x / WBC x   Sq Epi: x / Non Sq Epi: x / Bacteria: x    CAPILLARY BLOOD GLUCOSE  POCT Blood Glucose.: 131 mg/dL (12 Jun 2025 11:58)  POCT Blood Glucose.: 210 mg/dL (12 Jun 2025 07:58)  POCT Blood Glucose.: 299 mg/dL (11 Jun 2025 21:19)  POCT Blood Glucose.: 239 mg/dL (11 Jun 2025 16:53)    MEDICATIONS  (STANDING):  amLODIPine   Tablet 5 milliGRAM(s) Oral daily  atorvastatin 10 milliGRAM(s) Oral at bedtime  dextrose 5%. 1000 milliLiter(s) (50 mL/Hr) IV Continuous <Continuous>  dextrose 5%. 1000 milliLiter(s) (100 mL/Hr) IV Continuous <Continuous>  dextrose 50% Injectable 25 Gram(s) IV Push once  dextrose 50% Injectable 12.5 Gram(s) IV Push once  dextrose 50% Injectable 25 Gram(s) IV Push once  DULoxetine 60 milliGRAM(s) Oral daily  enoxaparin Injectable 40 milliGRAM(s) SubCutaneous every 24 hours  gabapentin 800 milliGRAM(s) Oral every 8 hours  glucagon  Injectable 1 milliGRAM(s) IntraMuscular once  insulin glargine Injectable (LANTUS) 22 Unit(s) SubCutaneous at bedtime  insulin lispro (ADMELOG) corrective regimen sliding scale   SubCutaneous three times a day before meals  insulin lispro (ADMELOG) corrective regimen sliding scale   SubCutaneous at bedtime  insulin lispro Injectable (ADMELOG) 8 Unit(s) SubCutaneous three times a day with meals  lidocaine   4% Patch 1 Patch Transdermal <User Schedule>  losartan 50 milliGRAM(s) Oral daily  melatonin 6 milliGRAM(s) Oral at bedtime  multivitamin 1 Tablet(s) Oral daily  pantoprazole    Tablet 40 milliGRAM(s) Oral before breakfast  polyethylene glycol 3350 17 Gram(s) Oral two times a day  predniSONE   Tablet   Oral   predniSONE   Tablet 60 milliGRAM(s) Oral daily  senna 2 Tablet(s) Oral at bedtime      MEDICATIONS  (PRN):  acetaminophen     Tablet .. 650 milliGRAM(s) Oral every 6 hours PRN Mild Pain (1 - 3)  dextrose Oral Gel 15 Gram(s) Oral once PRN Blood Glucose LESS THAN 70 milliGRAM(s)/deciliter  methocarbamol 750 milliGRAM(s) Oral three times a day PRN Muscle Spasm

## 2025-06-12 NOTE — PROGRESS NOTE ADULT - SUBJECTIVE AND OBJECTIVE BOX
Patient is a 59y old  Male who presents with a chief complaint of CIDP (11 Jun 2025 15:00)      Subjective and overnight events:  Patient seen and examined at bedside. pt reports feeling well. no new complaints. no acute event overnight.  no fever, chills, sob, cp, abd pain. slept well last night. + BM     ALLERGIES:  No Known Allergies    MEDICATIONS  (STANDING):  amLODIPine   Tablet 5 milliGRAM(s) Oral daily  atorvastatin 10 milliGRAM(s) Oral at bedtime  dextrose 5%. 1000 milliLiter(s) (50 mL/Hr) IV Continuous <Continuous>  dextrose 5%. 1000 milliLiter(s) (100 mL/Hr) IV Continuous <Continuous>  dextrose 50% Injectable 25 Gram(s) IV Push once  dextrose 50% Injectable 12.5 Gram(s) IV Push once  dextrose 50% Injectable 25 Gram(s) IV Push once  DULoxetine 60 milliGRAM(s) Oral daily  enoxaparin Injectable 40 milliGRAM(s) SubCutaneous every 24 hours  gabapentin 800 milliGRAM(s) Oral every 8 hours  glucagon  Injectable 1 milliGRAM(s) IntraMuscular once  insulin glargine Injectable (LANTUS) 16 Unit(s) SubCutaneous at bedtime  insulin lispro (ADMELOG) corrective regimen sliding scale   SubCutaneous three times a day before meals  insulin lispro (ADMELOG) corrective regimen sliding scale   SubCutaneous at bedtime  insulin lispro Injectable (ADMELOG) 8 Unit(s) SubCutaneous three times a day with meals  lidocaine   4% Patch 1 Patch Transdermal <User Schedule>  losartan 50 milliGRAM(s) Oral daily  melatonin 6 milliGRAM(s) Oral at bedtime  multivitamin 1 Tablet(s) Oral daily  pantoprazole    Tablet 40 milliGRAM(s) Oral before breakfast  polyethylene glycol 3350 17 Gram(s) Oral two times a day  predniSONE   Tablet   Oral   predniSONE   Tablet 60 milliGRAM(s) Oral daily  senna 2 Tablet(s) Oral at bedtime    MEDICATIONS  (PRN):  acetaminophen     Tablet .. 650 milliGRAM(s) Oral every 6 hours PRN Mild Pain (1 - 3)  dextrose Oral Gel 15 Gram(s) Oral once PRN Blood Glucose LESS THAN 70 milliGRAM(s)/deciliter  methocarbamol 750 milliGRAM(s) Oral three times a day PRN Muscle Spasm    Vital Signs Last 24 Hrs  T(F): 97.3 (12 Jun 2025 07:53), Max: 97.7 (11 Jun 2025 21:16)  HR: 88 (12 Jun 2025 07:53) (82 - 88)  BP: 144/82 (12 Jun 2025 07:53) (144/82 - 150/80)  RR: 16 (12 Jun 2025 07:53) (16 - 16)  SpO2: 100% (12 Jun 2025 07:53) (98% - 100%)  I&O's Summary    11 Jun 2025 07:01  -  12 Jun 2025 07:00  --------------------------------------------------------  IN: 0 mL / OUT: 750 mL / NET: -750 mL      PHYSICAL EXAM:  General: NAD, A/O x 3  ENT: MMM  Neck: Supple, No JVD  Lungs: Clear to auscultation bilaterally  Cardio: RRR, S1/S2, No murmurs  Abdomen: Soft, Nontender, Nondistended; Bowel sounds present  Extremities: No calf tenderness, No pitting edema    LABS:                        13.4   6.74  )-----------( 220      ( 12 Jun 2025 06:55 )             39.1     06-12    135  |  97  |  21  ----------------------------<  205  4.3   |  32  |  0.65    Ca    9.6      12 Jun 2025 06:55    TPro  7.7  /  Alb  3.5  /  TBili  0.4  /  DBili  x   /  AST  47  /  ALT  81  /  AlkPhos  73  06-12      04-24 Chol 129 mg/dL LDL -- HDL 63 mg/dL Trig 63 mg/dL      POCT Blood Glucose.: 210 mg/dL (12 Jun 2025 07:58)  POCT Blood Glucose.: 299 mg/dL (11 Jun 2025 21:19)  POCT Blood Glucose.: 239 mg/dL (11 Jun 2025 16:53)  POCT Blood Glucose.: 225 mg/dL (11 Jun 2025 12:04)      Urinalysis Basic - ( 12 Jun 2025 06:55 )    Color: x / Appearance: x / SG: x / pH: x  Gluc: 205 mg/dL / Ketone: x  / Bili: x / Urobili: x   Blood: x / Protein: x / Nitrite: x   Leuk Esterase: x / RBC: x / WBC x   Sq Epi: x / Non Sq Epi: x / Bacteria: x        COVID-19 PCR: NotDetec (06-03-25 @ 14:15)      RADIOLOGY & ADDITIONAL TESTS:    Care Discussed with Consultants/Other Providers:

## 2025-06-12 NOTE — PROGRESS NOTE ADULT - ASSESSMENT
59 year old male with PMH of ethanol use disorder, chronic pancreatitis, poorly controlled СЕРГЕЙ (A1c 9.9 on 04/23/2025), chronic neuropathy of LLE, recent hospitalization April 2025 for sciatica; who presented to [facility] ED on 5/26/25 for progressively worsening BL LE pain, weakness and numbness. He was evaluated by Neurology and underwent bedside EMG which was significant for CIDP (s/p 5 days IVIG and IV steroids). He developed an episode of confusion/AMS (CT Head negative). His hospital course was significant for hyponatremia, and hyperglycemia (both now resolved). Patient now admitted for a multidisciplinary rehab program on 6/3    #CIDP  -MRI lumbar spine wwo no evidence of compression or enhancement  -MRI lumbosacral plexus R and L wwo no abnormalities  -s/p EMG/NCS with evidence of demyelinating process and chronic denervation   -s/p IVIG and IV steroids x 5 days   -Fall precaution  -Pain control and bowel regimen per rehab team  -Comprehensive Rehab Program w/ PT/OT  -Orthotic eval for BL AFOs   -Outpatient follow up with neurology    #HTN/HLD  -Continue Amlodipine (reduced dose to 5mg QD 6/6 due to low BP w/ therapy) and Losartan (Home regimen: Telmisartan 40mg daily) w/ holding parameters   -Monitor BP   -Continue Atorvastatin    #Latent Autoimmune Diabetes in Adults, Uncontrolled, Complicated by Peripheral Neuropathy  -HbA1c: 8.9 on 6/6  -FS elevated. increase Lantus to 20unit qHS - 6/12   -Continue premeal admelog 8units TID w/ meals if he consume >75%, 6untis if he consumes 50-75% and 4units if he consumes <50%   -RAISS (low dose)  -Monitor FS, labile due to dietary indiscretion, he was counseled  -Continue gabapentin   -Diabetes educator following; reviewed with educater 6/9. hyperglycemia d/t diet. no changes to insulin regimen today    #Mood  -Continue Duloxetine     #Incidental Findings   -CTA neck showed Less than 50% stenosis of the proximal right ICA. At least 60% stenosis of the proximal left ICA.  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      59 year old male with PMH of ethanol use disorder, chronic pancreatitis, poorly controlled СЕРГЕЙ (A1c 9.9 on 04/23/2025), chronic neuropathy of LLE, recent hospitalization April 2025 for sciatica; who presented to [facility] ED on 5/26/25 for progressively worsening BL LE pain, weakness and numbness. He was evaluated by Neurology and underwent bedside EMG which was significant for CIDP (s/p 5 days IVIG and IV steroids). He developed an episode of confusion/AMS (CT Head negative). His hospital course was significant for hyponatremia, and hyperglycemia (both now resolved). Patient now admitted for a multidisciplinary rehab program on 6/3    #CIDP  -MRI lumbar spine wwo no evidence of compression or enhancement  -MRI lumbosacral plexus R and L wwo no abnormalities  -s/p EMG/NCS with evidence of demyelinating process and chronic denervation   -s/p IVIG and IV steroids x 5 days   -Fall precaution  -Pain control and bowel regimen per rehab team  -Comprehensive Rehab Program w/ PT/OT  -Orthotic eval for BL AFOs   -Outpatient follow up with neurology    #HTN/HLD  -Continue Amlodipine (reduced dose to 5mg QD 6/6 due to low BP w/ therapy) and Losartan (Home regimen: Telmisartan 40mg daily) w/ holding parameters   -Monitor BP   -Continue Atorvastatin    #Latent Autoimmune Diabetes in Adults, Uncontrolled, Complicated by Peripheral Neuropathy  -HbA1c: 8.9 on 6/6  -FS elevated. increase Lantus to 22unit qHS - 6/12   -Continue premeal admelog 8units TID w/ meals if he consume >75%, 6untis if he consumes 50-75% and 4units if he consumes <50%   -RAISS (low dose)  -Monitor FS, labile due to dietary indiscretion, he was counseled  -Continue gabapentin   -Diabetes educator following; reviewed with educater 6/9. hyperglycemia d/t diet. no changes to insulin regimen today    #Mood  -Continue Duloxetine     #Incidental Findings   -CTA neck showed Less than 50% stenosis of the proximal right ICA. At least 60% stenosis of the proximal left ICA.  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

## 2025-06-12 NOTE — PROGRESS NOTE ADULT - NS ATTEND AMEND GEN_ALL_CORE FT
I have made amendments to the documentation where necessary. Additional comments: I have personally seen and examined the patient independently Medical records reviewed. I have made amendments to the documentation where necessary and adjusted the history, physical examination, and plan as documented by the NP.     Seen and examined    Interval functional decline, noted but improving since commencement of steroid treatment by neurology  Rt hip and knee pain much reduced   Making some progress in therapy     Still has persisting b/l foot drop and LE weakness R>L    Labs unremarkable     Discussed risk issues--subtle impulsivity, LE weakness, multiple stairs at home  Patient reports that he has spent a very long time in hosp and wants to be home  He has no other care except his wife, due family training tomorrow  He plans to climb up his stairs, with arms/legs and buttock  Falls risk discussed  Declined SCARLET    Continue therapy   DVT ppx  Continue steroid treatment   DC planning     Spent 53 mins, patient review, discussion of labs, care co ordination and details from IDT

## 2025-06-13 ENCOUNTER — TRANSCRIPTION ENCOUNTER (OUTPATIENT)
Age: 60
End: 2025-06-13

## 2025-06-13 LAB
GLUCOSE BLDC GLUCOMTR-MCNC: 197 MG/DL — HIGH (ref 70–99)
GLUCOSE BLDC GLUCOMTR-MCNC: 206 MG/DL — HIGH (ref 70–99)
GLUCOSE BLDC GLUCOMTR-MCNC: 307 MG/DL — HIGH (ref 70–99)
GLUCOSE BLDC GLUCOMTR-MCNC: 358 MG/DL — HIGH (ref 70–99)
GLUCOSE BLDC GLUCOMTR-MCNC: 380 MG/DL — HIGH (ref 70–99)

## 2025-06-13 PROCEDURE — 99233 SBSQ HOSP IP/OBS HIGH 50: CPT

## 2025-06-13 RX ORDER — INSULIN ASPART INJECTION 100 [IU]/ML
100 INJECTION, SOLUTION SUBCUTANEOUS
Qty: 3 | Refills: 3 | Status: ACTIVE | COMMUNITY
Start: 2025-06-13 | End: 1900-01-01

## 2025-06-13 RX ORDER — POLYETHYLENE GLYCOL 3350 17 G/17G
17 POWDER, FOR SOLUTION ORAL
Qty: 0 | Refills: 0 | DISCHARGE
Start: 2025-06-13

## 2025-06-13 RX ORDER — INSULIN LISPRO 100 U/ML
10 INJECTION, SOLUTION INTRAVENOUS; SUBCUTANEOUS ONCE
Refills: 0 | Status: COMPLETED | OUTPATIENT
Start: 2025-06-13 | End: 2025-06-13

## 2025-06-13 RX ORDER — LIDOCAINE HYDROCHLORIDE 20 MG/ML
1 JELLY TOPICAL
Qty: 0 | Refills: 0 | DISCHARGE
Start: 2025-06-13

## 2025-06-13 RX ORDER — B1/B2/B3/B5/B6/B12/VIT C/FOLIC 500-0.5 MG
1 TABLET ORAL
Qty: 0 | Refills: 0 | DISCHARGE
Start: 2025-06-13

## 2025-06-13 RX ORDER — INSULIN GLARGINE-YFGN 100 [IU]/ML
25 INJECTION, SOLUTION SUBCUTANEOUS
Qty: 0 | Refills: 0 | DISCHARGE
Start: 2025-06-13

## 2025-06-13 RX ORDER — MELATONIN 5 MG
2 TABLET ORAL
Qty: 0 | Refills: 0 | DISCHARGE
Start: 2025-06-13

## 2025-06-13 RX ORDER — ACETAMINOPHEN 500 MG/5ML
2 LIQUID (ML) ORAL
Qty: 0 | Refills: 0 | DISCHARGE
Start: 2025-06-13

## 2025-06-13 RX ORDER — SENNA 187 MG
2 TABLET ORAL
Qty: 0 | Refills: 0 | DISCHARGE
Start: 2025-06-13

## 2025-06-13 RX ADMIN — GABAPENTIN 800 MILLIGRAM(S): 400 CAPSULE ORAL at 05:08

## 2025-06-13 RX ADMIN — ENOXAPARIN SODIUM 40 MILLIGRAM(S): 100 INJECTION SUBCUTANEOUS at 17:09

## 2025-06-13 RX ADMIN — INSULIN LISPRO 5: 100 INJECTION, SOLUTION INTRAVENOUS; SUBCUTANEOUS at 17:06

## 2025-06-13 RX ADMIN — DULOXETINE 60 MILLIGRAM(S): 20 CAPSULE, DELAYED RELEASE ORAL at 12:18

## 2025-06-13 RX ADMIN — INSULIN LISPRO 1: 100 INJECTION, SOLUTION INTRAVENOUS; SUBCUTANEOUS at 08:07

## 2025-06-13 RX ADMIN — LIDOCAINE HYDROCHLORIDE 1 PATCH: 20 JELLY TOPICAL at 19:00

## 2025-06-13 RX ADMIN — Medication 6 MILLIGRAM(S): at 21:08

## 2025-06-13 RX ADMIN — INSULIN LISPRO 8 UNIT(S): 100 INJECTION, SOLUTION INTRAVENOUS; SUBCUTANEOUS at 17:06

## 2025-06-13 RX ADMIN — LOSARTAN POTASSIUM 50 MILLIGRAM(S): 100 TABLET, FILM COATED ORAL at 05:09

## 2025-06-13 RX ADMIN — Medication 1 TABLET(S): at 12:18

## 2025-06-13 RX ADMIN — AMLODIPINE BESYLATE 5 MILLIGRAM(S): 10 TABLET ORAL at 05:08

## 2025-06-13 RX ADMIN — LIDOCAINE HYDROCHLORIDE 1 PATCH: 20 JELLY TOPICAL at 08:07

## 2025-06-13 RX ADMIN — INSULIN LISPRO 2: 100 INJECTION, SOLUTION INTRAVENOUS; SUBCUTANEOUS at 12:17

## 2025-06-13 RX ADMIN — INSULIN GLARGINE-YFGN 22 UNIT(S): 100 INJECTION, SOLUTION SUBCUTANEOUS at 21:07

## 2025-06-13 RX ADMIN — INSULIN LISPRO 2: 100 INJECTION, SOLUTION INTRAVENOUS; SUBCUTANEOUS at 21:07

## 2025-06-13 RX ADMIN — GABAPENTIN 800 MILLIGRAM(S): 400 CAPSULE ORAL at 21:08

## 2025-06-13 RX ADMIN — INSULIN LISPRO 10 UNIT(S): 100 INJECTION, SOLUTION INTRAVENOUS; SUBCUTANEOUS at 20:06

## 2025-06-13 RX ADMIN — INSULIN LISPRO 8 UNIT(S): 100 INJECTION, SOLUTION INTRAVENOUS; SUBCUTANEOUS at 12:18

## 2025-06-13 RX ADMIN — LIDOCAINE HYDROCHLORIDE 1 PATCH: 20 JELLY TOPICAL at 21:00

## 2025-06-13 RX ADMIN — INSULIN LISPRO 8 UNIT(S): 100 INJECTION, SOLUTION INTRAVENOUS; SUBCUTANEOUS at 08:07

## 2025-06-13 RX ADMIN — PREDNISONE 60 MILLIGRAM(S): 20 TABLET ORAL at 05:08

## 2025-06-13 RX ADMIN — GABAPENTIN 800 MILLIGRAM(S): 400 CAPSULE ORAL at 13:31

## 2025-06-13 RX ADMIN — Medication 2 TABLET(S): at 21:07

## 2025-06-13 RX ADMIN — Medication 40 MILLIGRAM(S): at 05:08

## 2025-06-13 RX ADMIN — ATORVASTATIN CALCIUM 10 MILLIGRAM(S): 80 TABLET, FILM COATED ORAL at 21:08

## 2025-06-13 NOTE — PROVIDER CONTACT NOTE (OTHER) - RECOMMENDATIONS
Insulin given as per sliding scale, Lantus given as ordered
Hospitalist made aware, Give supplemental insulin or early administration of bedtime dose

## 2025-06-13 NOTE — DISCHARGE NOTE PROVIDER - NSFOLLOWUPCLINICS_GEN_ALL_ED_FT
[facility] Endocr[facility]  Endocrinology  [address] [place] [address]  Phone: [phone]  Fax:

## 2025-06-13 NOTE — PROVIDER CONTACT NOTE (OTHER) - SITUATION
Sean cartwright read "HI". Assessed BG, result was 358
59yr Pt is hyperglycemic initiating protocol.

## 2025-06-13 NOTE — PROVIDER CONTACT NOTE (OTHER) - ACTION/TREATMENT ORDERED:
Insulin given as per sliding scale, Lantus given as ordered. Blood serum orders. Will recheck POCT in 2 hrs.
Additional 10 units to be given

## 2025-06-13 NOTE — DISCHARGE NOTE PROVIDER - CARE PROVIDER_API CALL
Delilah Galeas  Physical/Rehab Medicine  [address] [place] [address]  Phone: [phone]  Fax: [phone]  Follow Up Time: 1 month    Mirza Radford  Neurology  [address], [address]  [place] [address]  Phone: [phone]  Fax: [phone]  Follow Up Time: 1 week    Bárbara Hays  Neurology  [address], [address]  [place] [address]  Phone: [phone]  Fax: [phone]  Follow Up Time: 1 week

## 2025-06-13 NOTE — DISCHARGE NOTE PROVIDER - PROVIDER TOKENS
PROVIDER:[TOKEN:[[id number]],FOLLOWUP:[1 month]],PROVIDER:[TOKEN:[[id number]],FOLLOWUP:[1 week]],PROVIDER:[TOKEN:[[id number]],FOLLOWUP:[1 week]]

## 2025-06-13 NOTE — DISCHARGE NOTE PROVIDER - NSDCACTIVITY_GEN_ALL_CORE
Do not drive or operate machinery/Showering allowed/Do not make important decisions/No heavy lifting/straining/Activity as tolerated

## 2025-06-13 NOTE — DISCHARGE NOTE PROVIDER - HOSPITAL COURSE
HPI:  Terence Galvez is a 59 year old male with PMH of ethanol use disorder, chronic pancreatitis, poorly controlled СЕРГЕЙ (A1c 9.9 on 04/23/2025), chronic neuropathy of LLE, recent hospitalization April 2025 for sciatica; who presented to [facility] ED on 5/26/25 for progressively worsening BL LE pain, weakness and numbness. He was evaluated by Neurology and underwent bedside EMG which was significant for CIDP (s/p 5 days IVIG and IV steroids). He developed an episode of confusion/AMS (CT Head negative). His hospital course was significant for hyponatremia, and hyperglycemia (both now resolved). PM&R was consulted and deemed him an appropriate candidate for IRF. He was medically stabilized and cleared for discharge to [facility].  (03 Jun 2025 14:26)      Patient was evaluated by PM&R and therapy for gait/ADL impairments and recommended acute rehabilitation. Patient was medically optimized for discharge to [facility] on 6/03/25. Admitted with gait instability, ADL, and functional impairments.     Rehab course significant for:        All other medical co-morbidities were stable. Patient tolerated course of inpatient PT/OT/SLP rehab with significant improvements and met rehab goals prior to discharge. Patient was medically cleared on 6/17/25for discharge to home. HPI:  Terence Galvez is a 59 year old male with PMH of ethanol use disorder, chronic pancreatitis, poorly controlled СЕРГЕЙ (A1c 9.9 on 04/23/2025), chronic neuropathy of LLE, recent hospitalization April 2025 for sciatica; who presented to [facility] ED on 5/26/25 for progressively worsening BL LE pain, weakness and numbness. He was evaluated by Neurology and underwent bedside EMG which was significant for CIDP (s/p 5 days IVIG and IV steroids). He developed an episode of confusion/AMS (CT Head negative). His hospital course was significant for hyponatremia, and hyperglycemia (both now resolved). PM&R was consulted and deemed him an appropriate candidate for IRF. He was medically stabilized and cleared for discharge to [facility].  (03 Jun 2025 14:26)      Patient was evaluated by PM&R and therapy for gait/ADL impairments and recommended acute rehabilitation. Patient was medically optimized for discharge to [facility] on 6/03/25. Admitted with gait instability, ADL, and functional impairments.     Rehab course significant for:  6/5: Hyponatremia. Amlodipine reduced due to hypotension.   6/6: RLE stretching and lidocaine patch  6/9: Debrox to L ear, consider neuro consult 6/10 for additional IVIG.   6/10: Await neuro recs for CIDP, miralax to BID, await ENT consult.   6/13: --   6/16: --   6/17: DC home       All other medical co-morbidities were stable. Patient tolerated course of inpatient PT/OT/SLP rehab with significant improvements and met rehab goals prior to discharge. Patient was medically cleared on 6/17/25for discharge to home.   HPI:  Terence Galvez is a 59 year old male with PMH of ethanol use disorder, chronic pancreatitis, poorly controlled СЕРГЕЙ (A1c 9.9 on 04/23/2025), chronic neuropathy of LLE, recent hospitalization April 2025 for sciatica; who presented to [facility] ED on 5/26/25 for progressively worsening BL LE pain, weakness and numbness. He was evaluated by Neurology and underwent bedside EMG which was significant for CIDP (s/p 5 days IVIG and IV steroids). He developed an episode of confusion/AMS (CT Head negative). His hospital course was significant for hyponatremia, and hyperglycemia (both now resolved). PM&R was consulted and deemed him an appropriate candidate for IRF. He was medically stabilized and cleared for discharge to [facility].  (03 Jun 2025 14:26)      Patient was evaluated by PM&R and therapy for gait/ADL impairments and recommended acute rehabilitation. Patient was medically optimized for discharge to [facility] on 6/03/25. Admitted with gait instability, ADL, and functional impairments.     Rehab course significant for:  6/5: Hyponatremia. Amlodipine reduced due to hypotension.   6/6: RLE stretching and lidocaine patch  6/9: Debrox to L ear, consider neuro consult 6/10 for additional IVIG.   6/10: Await neuro recs for CIDP, miralax to BID, await ENT consult.   6/17: DC home       You engaged well, made marked progress   Orthotist measured your for b/l AFO  Neurology rec on tapering steroids for interval acute on chronic weakness commenced, aim to complete post dc  Your family had training on safety techniques and you will be engaging in ambulatory therapy post dc       IDT 6/12  Ambulating 30ft RW mod assist wc follow through 2 stairs 2 hand rails  Slight functional decline, ambulating with likghtgait, non functional ambulation  Rt leg weak, but improving today   b/l foot drop and Rt hip weakness  Await orthotist  Est dc 6/17 to home vs [facility] due to significant LE weakness  But patient preferring home DC  Family training due tomorrow with wife (all other family members live in [place])      All other medical co-morbidities were stable. Patient tolerated course of inpatient PT/OT/SLP rehab with significant improvements and met rehab goals prior to discharge. Patient was medically stable 6/17/25for discharge to home.

## 2025-06-13 NOTE — DISCHARGE NOTE PROVIDER - NSDCMRMEDTOKEN_GEN_ALL_CORE_FT
acetaminophen 325 mg oral tablet: 2 tab(s) orally every 6 hours As needed Mild Pain (1 - 3)  insulin glargine 100 units/mL subcutaneous solution: 16 unit(s) subcutaneous once a day (at bedtime)  insulin lispro 100 units/mL injectable solution: 8 unit(s) injectable 3 times a day (with meals)  lidocaine 4% topical film: Apply topically to affected area once a day Apply from 8AM-8PM  melatonin 3 mg oral tablet: 2 tab(s) orally once a day (at bedtime)  Multiple Vitamins oral tablet: 1 tab(s) orally once a day  NovoLOG FlexPen 100 units/mL injectable solution: 12 unit(s) injectable 3 times a day Give 8 to 12 units before meals.  omeprazole 20 mg oral delayed release capsule: 1 cap(s) orally once a day before breakfast  polyethylene glycol 3350 oral powder for reconstitution: 17 gram(s) orally 2 times a day  senna leaf extract oral tablet: 2 tab(s) orally once a day (at bedtime)   acetaminophen 325 mg oral tablet: 2 tab(s) orally every 6 hours As needed Mild Pain (1 - 3)  amLODIPine 5 mg oral tablet: 1 tab(s) orally once a day  atorvastatin 10 mg oral tablet: 1 tab(s) orally once a day (at bedtime)  DULoxetine 60 mg oral delayed release capsule: 1 cap(s) orally once a day  gabapentin 400 mg oral capsule: 2 cap(s) orally every 8 hours  insulin glargine 100 units/mL subcutaneous solution: 25 unit(s) subcutaneous once a day (at bedtime)  lidocaine 4% topical film: Apply topically to affected area once a day Apply from 8AM-8PM  melatonin 3 mg oral tablet: 2 tab(s) orally once a day (at bedtime)  methocarbamol 750 mg oral tablet: 1 tab(s) orally 3 times a day as needed for Muscle Spasm  Multiple Vitamins oral tablet: 1 tab(s) orally once a day  NovoLOG FlexPen 100 units/mL injectable solution: 8 unit(s) injectable 3 times a day Give 8 unites before meals.  omeprazole 20 mg oral delayed release capsule: 1 cap(s) orally once a day before breakfast  polyethylene glycol 3350 oral powder for reconstitution: 17 gram(s) orally 2 times a day  senna leaf extract oral tablet: 2 tab(s) orally once a day (at bedtime)  telmisartan 40 mg oral tablet: 1 tab(s) orally once a day   acetaminophen 325 mg oral tablet: 2 tab(s) orally every 6 hours As needed Mild Pain (1 - 3)  amLODIPine 5 mg oral tablet: 1 tab(s) orally once a day  atorvastatin 10 mg oral tablet: 1 tab(s) orally once a day (at bedtime)  DULoxetine 60 mg oral delayed release capsule: 1 cap(s) orally once a day  gabapentin 400 mg oral capsule: 2 cap(s) orally every 8 hours  insulin lispro 100 units/mL injectable solution: injectable 3 times a day (with meals) Please check your FS before each meal, and give insulin per ISS:  1 Unit(s) if Glucose 151 - 200  2 Unit(s) if Glucose 201 - 250  3 Unit(s) if Glucose 251 - 300  4 Unit(s) if Glucose 301 - 350  5 Unit(s) if Glucose 351 - 400  6 Unit(s) if Glucose Greater Than 400    Please check your FS at bedtime, and give insulin per ISS:  0 Unit(s) if Glucose 0 - 250  1 Unit(s) if Glucose 251 - 300  2 Unit(s) if Glucose 301 - 350  3 Unit(s) if Glucose 351 - 400  4 Unit(s) if Glucose 400    Give correctional scale insulin  REGARDLESS of PO status NOTIFY Provider for blood glucose LESS THAN 70 milliGRAM(s)/deciLiter or GREATER THAN 400 milliGRAM(s)/deciLiter  Insulin Pen Needles, 4mm: 1 application subcutaneously 4 times a day. ** Use with insulin pen **  Lantus Solostar Pen 100 units/mL subcutaneous solution: 25 international unit(s) subcutaneous once a day (at bedtime)  lidocaine 4% topical film: Apply topically to affected area once a day Apply from 8AM-8PM  melatonin 3 mg oral tablet: 2 tab(s) orally once a day (at bedtime)  methocarbamol 750 mg oral tablet: 1 tab(s) orally 3 times a day as needed for Muscle Spasm  Multiple Vitamins oral tablet: 1 tab(s) orally once a day  NovoLOG FlexPen 100 units/mL injectable solution: 8 unit(s) injectable 3 times a day Give 8 unites before meals.  omeprazole 20 mg oral delayed release capsule: 1 cap(s) orally once a day before breakfast  polyethylene glycol 3350 oral powder for reconstitution: 17 gram(s) orally 2 times a day  predniSONE 10 mg oral tablet: 1 tab(s) orally Please take 5 tablets every day from 6/18-6/24  Please take 4 tablets every day from 6/25-7/1  Please take 3 tablets every day from 7/2-7/8  Please take 2 tablets everyday from 7/9 and onward  senna leaf extract oral tablet: 2 tab(s) orally once a day (at bedtime)  telmisartan 40 mg oral tablet: 1 tab(s) orally once a day

## 2025-06-13 NOTE — DISCHARGE NOTE PROVIDER - CARE PROVIDERS DIRECT ADDRESSES
[email or web address]

## 2025-06-13 NOTE — DISCHARGE NOTE PROVIDER - NSDCCPCAREPLAN_GEN_ALL_CORE_FT
PRINCIPAL DISCHARGE DIAGNOSIS  Diagnosis: CIDP (chronic inflammatory demyelinating polyneuropathy)  Assessment and Plan of Treatment: You presented to the hospital for weakness and were found to have CIDP. You were sent to  acute rehab to help improve your strength and overall function. Please follow up with the following providers listed in this packet for further management. Bring this packet to your follow up appointments.      SECONDARY DISCHARGE DIAGNOSES  Diagnosis: HTN (hypertension)  Assessment and Plan of Treatment: Please continue to take your antihypertensive medications as prescribed. Please follow up with your PCP/Cardiologist for further management.    Diagnosis: Neuropathy  Assessment and Plan of Treatment: Continue Gabapentin.    Diagnosis: HLD (hyperlipidemia)  Assessment and Plan of Treatment: Please continue to take your medication as prescribed. Please follow up with your PCP for further management.    Diagnosis: Type 2 diabetes mellitus  Assessment and Plan of Treatment: Please continue to take you diabetes medications as prescribed. Follow up with your PCP/Endocrinologist upon discharge.

## 2025-06-13 NOTE — PROGRESS NOTE ADULT - NS ATTEND AMEND GEN_ALL_CORE FT
I have made amendments to the documentation where necessary. Additional comments: I have made amendments to the documentation where necessary. Additional comments: I have personally seen and examined the patient independently Medical records reviewed. I have made amendments to the documentation where necessary and adjusted the history, physical examination, and plan as documented by the NP.     Seen and examined  Slept well, tolerating oral diet     LE motor strength is improving today  Knee ext on Rt 3/5    Still has persisting b/l foot drop and LE weakness R>L    Continue therapy   DVT ppx  Continue steroid treatment   DC planning     Spent 52 mins, patient review,  care co ordination and d/c planning I have made amendments to the documentation where necessary. Additional comments: I have made amendments to the documentation where necessary. Additional comments: I have personally seen and examined the patient independently Medical records reviewed. I have made amendments to the documentation where necessary and adjusted the history, physical examination, and plan as documented by the NP.     Seen and examined  Slept well, tolerating oral diet     LE motor strength is improving today  Knee ext on Rt 3/5    Still has persisting b/l foot drop and LE weakness R>L    Continue therapy   DVT ppx  Continue steroid treatment   DC planning     Spent 52 mins, patient review, update d/w wife,  care co ordination and d/c planning

## 2025-06-13 NOTE — PROGRESS NOTE ADULT - ASSESSMENT
59 year old male with PMH of ethanol use disorder, chronic pancreatitis, poorly controlled СЕРГЕЙ (A1c 9.9 on 04/23/2025), chronic neuropathy of LLE, recent hospitalization April 2025 for sciatica; who presented to [facility] ED on 5/26/25 for progressively worsening BL LE pain, weakness and numbness. He was evaluated by Neurology and underwent bedside EMG which was significant for CIDP (s/p 5 days IVIG and IV steroids). He developed an episode of confusion/AMS (CT Head negative). His hospital course was significant for hyponatremia, and hyperglycemia (both now resolved). Patient now admitted for a multidisciplinary rehab program. 06-03-25 @ 14:29    * BL foot drop - await BL AFOs  * Interval functional declined--decreased strength Rt hip 1/5 and knee 2/5 (last dose of IVIG 6/1) -  on steroid taper per Neuro - improved ROM  * Patient prefers home DC     #CIDP  - Gait Instability, ADL impairments and Functional impairments: start Comprehensive Rehab Program of PT/OT- 3 hours a day, 5 days a week  - P&O as needed--orthotics referral   - Last IVIG and Methylprednisolone on 6/1 - Interval functional declined--decreased strength Rt hip 1/5 and knee 2/5 (last dose of PLEX 6/1) - on steroid taper per Neuro - improved ROM     #R hip pain   - R hip XR (6/10) with mild degenerative changes, increased from prior     # BL foot drop  - Orthotic eval for BL hinged AFOs   - This patient has a diagnosis of BL foot drop. Patient is ambulatory. Patient requires a custom molded hinged AFO to preserve ankle motion while stabilizing talar and subtalar joints. Device will give a more natural gait without destabilizing the knee and hip. Device requires a low density lining to protect prominent louis areas of effected skin. Patient will need the device for a term of greater than 9 months. No reasonable prefabricated orthosis exists.     #HTN/HLD  - Amlodipine 10mg daily  - Atorvastatin 10mg at HS  - Losartan 50mg daily - (Home regimen: Telmisartan 40mg daily)    #Latent Autoimmune Diabetes Mellitus  - A1c: 9.9% (April 2025)   - FS AC & HS  - Premeal  - Lantus     #Electrolyte Imbalance  - Monitor CMP  - Appreciate ongoing hospitalist recs     #Left ear fullness - resolved   - s/p Debrox   - S/p cerumen removal with ENT on 6/11     #Mood  - Duloxetine 60mg daily     #Skin  - Skin  - Pressure injury/Skin: OOB to Chair, PT/OT     #Pain Mgmt   #Neuropathy  #Stiffness/Spasiticity  - Gabapentin 600mg TID   - Lidocaine patch daily  - PRN: Methocarbamol 750mg TID; Tylenol 650mg QID    #GI/Bowel Mgmt   - Pantoprazole   - Senna     #/Bladder Mgmt --voiding     #FEN   - Diet - Regular + Thins  [CCHO]    - Fluid Restriction: 1200mL  - MVI    #Falls history  - Osteoporosis labs - WNL    #Precautions / PROPHYLAXIS:   - Falls  - ortho: Weight bearing status: WBAT   - Lungs: Aspiration, Incentive Spirometer   - DVT: Lovenox 40 mg daily   ----------------------------------------------  IDT 6/12  Ambulating 30ft RW mod assist wc follow through 2 stairs 2 hand rails  Slight functional decline, ambulating with likghtgait, non functional ambulation  Rt leg weak, but improving today   b/l foot drop and Rt hip weakness  Await orthotist  Est dc 6/17 to home vs [facility] due to significant LE weakness  But patient preferring home DC  Family training due tomorrow with wife (all other family members live in [place])  ----------------------------------------------    Dr. RUSSELLs Liaison with Family/Providers:    -----------------------------------------------  OUTPATIENT/FOLLOW UP:    Mirza Radford   Neurology    Shelley Hays-Skip  Neurology  [address] [place] [address], [address]  [place] [address]  Phone: [phone]  Fax: [phone]  Established Patient  Follow Up Time:     [facility] Endocrinology  Endocrinology  [address] [place] [address]  Phone: [phone]  Fax:     Scheduled Appts:  Mirza Radford  [facility]  NEUROLOGY [address]  Scheduled Appointment: 06/18/2025    [facility]  ENDOCRIN [address]  Scheduled Appointment: 06/20/2025    Aniyah Julian  [facility]  RHEUM [address]  Scheduled Appointment: 07/29/2025    [facility]  ONCPAINMGT [address] [place] [address]   Scheduled Appointment: 08/11/2025    ----------------------------------------------- 59 year old male with PMH of ethanol use disorder, chronic pancreatitis, poorly controlled СЕРГЕЙ (A1c 9.9 on 04/23/2025), chronic neuropathy of LLE, recent hospitalization April 2025 for sciatica; who presented to [facility] ED on 5/26/25 for progressively worsening BL LE pain, weakness and numbness. He was evaluated by Neurology and underwent bedside EMG which was significant for CIDP (s/p 5 days IVIG and IV steroids). He developed an episode of confusion/AMS (CT Head negative). His hospital course was significant for hyponatremia, and hyperglycemia (both now resolved). Patient now admitted for a multidisciplinary rehab program. 06-03-25 @ 14:29    * BL foot drop - await BL AFOs  * Some improvement of LE motor strength noted, c/w steroid treatment and neuro f/u   * Patient prefers home DC but will also discuss alternative placement with his wife    #CIDP  - Gait Instability, ADL impairments and Functional impairments: start Comprehensive Rehab Program of PT/OT- 3 hours a day, 5 days a week  - P&O as needed--orthotics referral   - Last IVIG and Methylprednisolone on 6/1 - Interval functional declined--decreased strength Rt hip 1/5 and knee 2/5 (last dose of PLEX 6/1) - on steroid taper per Neuro - improved ROM     #R hip pain   - R hip XR (6/10) with mild degenerative changes, increased from prior     # BL foot drop  - Orthotic eval for BL hinged AFOs   - This patient has a diagnosis of BL foot drop. Patient is ambulatory. Patient requires a custom molded hinged AFO to preserve ankle motion while stabilizing talar and subtalar joints. Device will give a more natural gait without destabilizing the knee and hip. Device requires a low density lining to protect prominent louis areas of effected skin. Patient will need the device for a term of greater than 9 months. No reasonable prefabricated orthosis exists.     #HTN/HLD  - Amlodipine 10mg daily  - Atorvastatin 10mg at HS  - Losartan 50mg daily - (Home regimen: Telmisartan 40mg daily)    #Latent Autoimmune Diabetes Mellitus  - A1c: 9.9% (April 2025)   - FS AC & HS  - Premeal  - Lantus     #Electrolyte Imbalance  - Monitor CMP  - Appreciate ongoing hospitalist recs     #Left ear fullness - resolved   - s/p Debrox   - S/p cerumen removal with ENT on 6/11     #Mood  - Duloxetine 60mg daily     #Skin  - Skin  - Pressure injury/Skin: OOB to Chair, PT/OT     #Pain Mgmt   #Neuropathy  #Stiffness/Spasiticity  - Gabapentin 600mg TID   - Lidocaine patch daily  - PRN: Methocarbamol 750mg TID; Tylenol 650mg QID    #GI/Bowel Mgmt   - Pantoprazole   - Senna     #/Bladder Mgmt --voiding     #FEN   - Diet - Regular + Thins  [CCHO]    - Fluid Restriction: 1200mL  - MVI    #Falls history  - Osteoporosis labs - WNL    #Precautions / PROPHYLAXIS:   - Falls  - ortho: Weight bearing status: WBAT   - Lungs: Aspiration, Incentive Spirometer   - DVT: Lovenox 40 mg daily   ----------------------------------------------  IDT 6/12  Ambulating 30ft RW mod assist wc follow through 2 stairs 2 hand rails  Slight functional decline, ambulating with likghtgait, non functional ambulation  Rt leg weak, but improving today   b/l foot drop and Rt hip weakness  Await orthotist  Est dc 6/17 to home vs [facility] due to significant LE weakness  But patient preferring home DC  Family training due tomorrow with wife (all other family members live in [place])  ----------------------------------------------    Dr. Chavira Liaison with Family/Providers:    -----------------------------------------------  OUTPATIENT/FOLLOW UP:    Mirza Radford   Neurology    Shelley Hays-Skip  Neurology  [address] [place] [address], [address]  [place] [address]  Phone: [phone]  Fax: [phone]  Established Patient  Follow Up Time:     [facility] Endocrinology  Endocrinology  [address] [place] [address]  Phone: [phone]  Fax:     Scheduled Appts:  Mirza Radford  [facility]  NEUROLOGY [address]  Scheduled Appointment: 06/18/2025    [facility]  ENDOCRIN [address]  Scheduled Appointment: 06/20/2025    Aniyah Julian  [facility]  RHEUM [address]  Scheduled Appointment: 07/29/2025    [facility]  ONCPAINMGT [address] [place] [address]   Scheduled Appointment: 08/11/2025    ----------------------------------------------- 59 year old male with PMH of ethanol use disorder, chronic pancreatitis, poorly controlled СЕРГЕЙ (A1c 9.9 on 04/23/2025), chronic neuropathy of LLE, recent hospitalization April 2025 for sciatica; who presented to [facility] ED on 5/26/25 for progressively worsening BL LE pain, weakness and numbness. He was evaluated by Neurology and underwent bedside EMG which was significant for CIDP (s/p 5 days IVIG and IV steroids). He developed an episode of confusion/AMS (CT Head negative). His hospital course was significant for hyponatremia, and hyperglycemia (both now resolved). Patient now admitted for a multidisciplinary rehab program. 06-03-25 @ 14:29    * BL foot drop - await BL AFOs  * Some improvement of LE motor strength noted, c/w steroid treatment and neuro f/u   * Patient prefers home DC but will also discuss alternative placement with his wife    #CIDP  - Gait Instability, ADL impairments and Functional impairments: start Comprehensive Rehab Program of PT/OT- 3 hours a day, 5 days a week  - P&O as needed--orthotics referral   - Last IVIG and Methylprednisolone on 6/1 - Interval functional declined--decreased strength Rt hip 1/5 and knee 2/5 (last dose of PLEX 6/1) - on steroid taper per Neuro - improved ROM     #R hip pain   - R hip XR (6/10) with mild degenerative changes, increased from prior     # BL foot drop  - Orthotic eval for BL hinged AFOs   - This patient has a diagnosis of BL foot drop. Patient is ambulatory. Patient requires a custom molded hinged AFO to preserve ankle motion while stabilizing talar and subtalar joints. Device will give a more natural gait without destabilizing the knee and hip. Device requires a low density lining to protect prominent louis areas of effected skin. Patient will need the device for a term of greater than 9 months. No reasonable prefabricated orthosis exists.     #HTN/HLD  - Amlodipine 10mg daily  - Atorvastatin 10mg at HS  - Losartan 50mg daily - (Home regimen: Telmisartan 40mg daily)    #Latent Autoimmune Diabetes Mellitus  - A1c: 9.9% (April 2025)   - FS AC & HS  - Premeal  - Lantus     #Electrolyte Imbalance  - Monitor CMP  - Appreciate ongoing hospitalist recs     #Left ear fullness - resolved   - s/p Debrox   - S/p cerumen removal with ENT on 6/11     #Mood  - Duloxetine 60mg daily     #Skin  - Skin  - Pressure injury/Skin: OOB to Chair, PT/OT     #Pain Mgmt   #Neuropathy  #Stiffness/Spasiticity  - Gabapentin 600mg TID   - Lidocaine patch daily  - PRN: Methocarbamol 750mg TID; Tylenol 650mg QID    #GI/Bowel Mgmt   - Pantoprazole   - Senna     #/Bladder Mgmt --voiding     #FEN   - Diet - Regular + Thins  [CCHO]    - Fluid Restriction: 1200mL  - MVI    #Falls history  - Osteoporosis labs - WNL    #Precautions / PROPHYLAXIS:   - Falls  - ortho: Weight bearing status: WBAT   - Lungs: Aspiration, Incentive Spirometer   - DVT: Lovenox 40 mg daily   ----------------------------------------------  IDT 6/12  Ambulating 30ft RW mod assist wc follow through 2 stairs 2 hand rails  Slight functional decline, ambulating with likghtgait, non functional ambulation  Rt leg weak, but improving today   b/l foot drop and Rt hip weakness  Await orthotist  Est dc 6/17 to home vs [facility] due to significant LE weakness  But patient preferring home DC  Family training due tomorrow with wife (all other family members live in [place])  ----------------------------------------------    Dr. LOPEZ's Liaison with Family/Providers:  6/13--I called on ph and d/w patient's wife, clinical and functional updates: Current steroid treatment as recs by neurology, persisting LE weakness, risk of falls, if unsupervised, especially post dc  Wife reports that she will be available to supervise patient as she works from home. Secondly she has made arrangement for patient to get ambulatory OT/PT from [facility] rehab facility,very close to their home  Thirdly, she is attended family training and got teaching on assistance to patient especially during stairs navigation as they have multiple stairs at home  She was happy with the update, treatments and dc plan    -----------------------------------------------  OUTPATIENT/FOLLOW UP:    Mirza Radford   Neurology    Bárbara Hays  Neurology  [address], [address]  [place] [address]  Phone: [phone]  Fax: [phone]  Established Patient  Follow Up Time:     [facility] Endocrinology  Endocrinology  [address] [place] [id number]  Phone: [phone]  Fax:     Scheduled Appts:  Mirza Radford  [facility]  NEUROLOGY [address] [facility]  Scheduled Appointment: 06/18/2025    [facility]  ENDOCRIN [address]  Scheduled Appointment: 06/20/2025    Aniyah Julian  [facility]  RHEUM [address]  Scheduled Appointment: 07/29/2025    [facility]  ONCPAINMGT [address] [place] [address]   Scheduled Appointment: 08/11/2025    -----------------------------------------------

## 2025-06-13 NOTE — PROGRESS NOTE ADULT - SUBJECTIVE AND OBJECTIVE BOX
CC: CIDP    Today's Subjective & Objective Findings:  Patient seen and examined at bedside this AM.  Admits to sleeping well.  Hip pain and RLE weakness improved.   Await BL AFOs, to address b/l ankle DF weakness.  Plan for family training today.     Denies headache, dizziness, visual changes, chest pain, SOB/RUBIO, abdominal pain, nausea, vomiting, diarrhea, dysuria. LBM- 6/13  + numbness/tingling in BL LEs.    Function  Supine/Hookline LE strengthening:  glute sets  quad sets  SAQ with Left; AAROM with Right LE  green TB (clams, bridges)  AAROM Right LE heel slides, ITB/hamstring stretch via pt    Therapeutic Activity  supervision for bed mobility  CG A for squat pivot transfer WC to/from mat  independent with WC mobility    Vital Signs Last 24 Hrs  T(C): 36.4 (13 Jun 2025 07:24), Max: 36.4 (12 Jun 2025 21:15)  T(F): 97.5 (13 Jun 2025 07:24), Max: 97.5 (12 Jun 2025 21:15)  HR: 76 (13 Jun 2025 07:24) (73 - 81)  BP: 157/81 (13 Jun 2025 07:24) (153/77 - 157/81)  BP(mean): --  RR: 16 (13 Jun 2025 07:24) (16 - 16)  SpO2: 100% (13 Jun 2025 07:24) (98% - 100%)    PHYSICAL EXAM  Constitutional -,Comfortable  Neck - Supple  Cardiovascular - Palpable peripheral pulses   Respiratory/Chest- Symmetrical chest expansion, No dyspnea  Abdomen - Soft, Non-tender  Extremities - No cyanosis, No edema,     Neurologic Exam - Alert, Oriented, Interactive  Sensory - Light touch sensation intact  Motor Function - Moves all extremities spontaneously, except for limited strength both ankles and Rt hip and, as noted today reduced strength Rt knee extension  UE 4/5  Left leg 4/5 except DF 1/5 and PF 3/5  Rt leg  2/5, DF 1/5 PF 3/5  Skin -  diffuse numbness     LABS:                13.4   6.74  )-----------( 220      ( 12 Jun 2025 06:55 )             39.1     06-12    135  |  97  |  21  ----------------------------<  205[H]  4.3   |  32[H]  |  0.65    Ca    9.6      12 Jun 2025 06:55    TPro  7.7  /  Alb  3.5  /  TBili  0.4  /  DBili  x   /  AST  47[H]  /  ALT  81[H]  /  AlkPhos  73  06-12      Urinalysis Basic - ( 12 Jun 2025 06:55 )    Color: x / Appearance: x / SG: x / pH: x  Gluc: 205 mg/dL / Ketone: x  / Bili: x / Urobili: x   Blood: x / Protein: x / Nitrite: x   Leuk Esterase: x / RBC: x / WBC x   Sq Epi: x / Non Sq Epi: x / Bacteria: x      CAPILLARY BLOOD GLUCOSE  POCT Blood Glucose.: 197 mg/dL (13 Jun 2025 08:05)  POCT Blood Glucose.: 212 mg/dL (12 Jun 2025 21:03)  POCT Blood Glucose.: 297 mg/dL (12 Jun 2025 16:40)  POCT Blood Glucose.: 131 mg/dL (12 Jun 2025 11:58)      MEDICATIONS  (STANDING):  amLODIPine   Tablet 5 milliGRAM(s) Oral daily  atorvastatin 10 milliGRAM(s) Oral at bedtime  dextrose 5%. 1000 milliLiter(s) (50 mL/Hr) IV Continuous <Continuous>  dextrose 5%. 1000 milliLiter(s) (100 mL/Hr) IV Continuous <Continuous>  dextrose 50% Injectable 25 Gram(s) IV Push once  dextrose 50% Injectable 12.5 Gram(s) IV Push once  dextrose 50% Injectable 25 Gram(s) IV Push once  DULoxetine 60 milliGRAM(s) Oral daily  enoxaparin Injectable 40 milliGRAM(s) SubCutaneous every 24 hours  gabapentin 800 milliGRAM(s) Oral every 8 hours  glucagon  Injectable 1 milliGRAM(s) IntraMuscular once  insulin glargine Injectable (LANTUS) 22 Unit(s) SubCutaneous at bedtime  insulin lispro (ADMELOG) corrective regimen sliding scale   SubCutaneous three times a day before meals  insulin lispro (ADMELOG) corrective regimen sliding scale   SubCutaneous at bedtime  insulin lispro Injectable (ADMELOG) 8 Unit(s) SubCutaneous three times a day with meals  lidocaine   4% Patch 1 Patch Transdermal <User Schedule>  losartan 50 milliGRAM(s) Oral daily  melatonin 6 milliGRAM(s) Oral at bedtime  multivitamin 1 Tablet(s) Oral daily  pantoprazole    Tablet 40 milliGRAM(s) Oral before breakfast  polyethylene glycol 3350 17 Gram(s) Oral two times a day  predniSONE   Tablet   Oral   predniSONE   Tablet 60 milliGRAM(s) Oral daily  senna 2 Tablet(s) Oral at bedtime    MEDICATIONS  (PRN):  acetaminophen     Tablet .. 650 milliGRAM(s) Oral every 6 hours PRN Mild Pain (1 - 3)  dextrose Oral Gel 15 Gram(s) Oral once PRN Blood Glucose LESS THAN 70 milliGRAM(s)/deciliter  methocarbamol 750 milliGRAM(s) Oral three times a day PRN Muscle Spasm   CC: CIDP    Today's Subjective & Objective Findings:  Patient seen and examined at bedside this AM.  Admits to sleeping well.  Hip pain and RLE weakness significantly reduced  LE strength improving  Await BL AFOs, to address b/l ankle DF weakness.  Plan for family training today.     Denies headache, dizziness, visual changes, chest pain, SOB/RUBIO, abdominal pain, nausea, vomiting, diarrhea, dysuria. LBM- 6/13  + numbness/tingling in BL LEs.    Function  Supine/Hookline LE strengthening:  glute sets  quad sets  SAQ with Left; AAROM with Right LE  green TB (clams, bridges)  AAROM Right LE heel slides, ITB/hamstring stretch via pt    Therapeutic Activity  supervision for bed mobility  CG A for squat pivot transfer WC to/from mat  independent with WC mobility    Vital Signs Last 24 Hrs  T(C): 36.4 (13 Jun 2025 07:24), Max: 36.4 (12 Jun 2025 21:15)  T(F): 97.5 (13 Jun 2025 07:24), Max: 97.5 (12 Jun 2025 21:15)  HR: 76 (13 Jun 2025 07:24) (73 - 81)  BP: 157/81 (13 Jun 2025 07:24) (153/77 - 157/81)  RR: 16 (13 Jun 2025 07:24) (16 - 16)  SpO2: 100% (13 Jun 2025 07:24) (98% - 100%)    PHYSICAL EXAM  Constitutional -,Comfortable  Neck - Supple  Cardiovascular - Palpable peripheral pulses   Respiratory/Chest- Symmetrical chest expansion, No dyspnea  Abdomen - Soft, Non-tender  Extremities - No cyanosis, No edema,     Neurologic Exam - Alert, Oriented, Interactive  Sensory - Light touch sensation intact  Motor Function - Moves all extremities spontaneously, except for limited strength both ankles and Rt hip and, as noted today reduced strength Rt knee extension  UE 4/5  Left leg 4/5 except DF 1/5 and PF 3/5  Rt leg  2/5, Knee extension 3/5 DF 1/5 PF 3/5  Skin -  diffuse numbness     LABS:                13.4   6.74  )-----------( 220      ( 12 Jun 2025 06:55 )             39.1     06-12    135  |  97  |  21  ----------------------------<  205[H]  4.3   |  32[H]  |  0.65    Ca    9.6      12 Jun 2025 06:55    TPro  7.7  /  Alb  3.5  /  TBili  0.4  /  DBili  x   /  AST  47[H]  /  ALT  81[H]  /  AlkPhos  73  06-12      Urinalysis Basic - ( 12 Jun 2025 06:55 )    Color: x / Appearance: x / SG: x / pH: x  Gluc: 205 mg/dL / Ketone: x  / Bili: x / Urobili: x   Blood: x / Protein: x / Nitrite: x   Leuk Esterase: x / RBC: x / WBC x   Sq Epi: x / Non Sq Epi: x / Bacteria: x      CAPILLARY BLOOD GLUCOSE  POCT Blood Glucose.: 197 mg/dL (13 Jun 2025 08:05)  POCT Blood Glucose.: 212 mg/dL (12 Jun 2025 21:03)  POCT Blood Glucose.: 297 mg/dL (12 Jun 2025 16:40)  POCT Blood Glucose.: 131 mg/dL (12 Jun 2025 11:58)      MEDICATIONS  (STANDING):  amLODIPine   Tablet 5 milliGRAM(s) Oral daily  atorvastatin 10 milliGRAM(s) Oral at bedtime  dextrose 5%. 1000 milliLiter(s) (50 mL/Hr) IV Continuous <Continuous>  dextrose 5%. 1000 milliLiter(s) (100 mL/Hr) IV Continuous <Continuous>  dextrose 50% Injectable 25 Gram(s) IV Push once  dextrose 50% Injectable 12.5 Gram(s) IV Push once  dextrose 50% Injectable 25 Gram(s) IV Push once  DULoxetine 60 milliGRAM(s) Oral daily  enoxaparin Injectable 40 milliGRAM(s) SubCutaneous every 24 hours  gabapentin 800 milliGRAM(s) Oral every 8 hours  glucagon  Injectable 1 milliGRAM(s) IntraMuscular once  insulin glargine Injectable (LANTUS) 22 Unit(s) SubCutaneous at bedtime  insulin lispro (ADMELOG) corrective regimen sliding scale   SubCutaneous three times a day before meals  insulin lispro (ADMELOG) corrective regimen sliding scale   SubCutaneous at bedtime  insulin lispro Injectable (ADMELOG) 8 Unit(s) SubCutaneous three times a day with meals  lidocaine   4% Patch 1 Patch Transdermal <User Schedule>  losartan 50 milliGRAM(s) Oral daily  melatonin 6 milliGRAM(s) Oral at bedtime  multivitamin 1 Tablet(s) Oral daily  pantoprazole    Tablet 40 milliGRAM(s) Oral before breakfast  polyethylene glycol 3350 17 Gram(s) Oral two times a day  predniSONE   Tablet   Oral   predniSONE   Tablet 60 milliGRAM(s) Oral daily  senna 2 Tablet(s) Oral at bedtime    MEDICATIONS  (PRN):  acetaminophen     Tablet .. 650 milliGRAM(s) Oral every 6 hours PRN Mild Pain (1 - 3)  dextrose Oral Gel 15 Gram(s) Oral once PRN Blood Glucose LESS THAN 70 milliGRAM(s)/deciliter  methocarbamol 750 milliGRAM(s) Oral three times a day PRN Muscle Spasm   CC: CIDP    Today's Subjective & Objective Findings:  Patient seen and examined at bedside this AM.  Admits to sleeping well.  Hip pain and RLE weakness significantly reduced  LE strength improving  Await BL AFOs, to address b/l ankle DF weakness.  Plan for family training today.     Denies headache, dizziness, visual changes, chest pain, SOB/RUBIO, abdominal pain, nausea, vomiting, diarrhea, dysuria. LBM- 6/13  + numbness/tingling in BL LEs.    Function  Went over ambulation, transfers, and verbal stair negotiation. PT demonstrated  ace wrapping bilateral feet due to patient waiting for custom AFO and safety  concerns with pt standing and buckling bilaterally. PT recommends SCARLET but pt  wife refused. PT recommends WC only mobility.    Ambulated in // bars with 10 ft bilateral DF A min A with WC follow. Noted to  scissor and decreased sensation in bilateral feet and increased weight shift  anterior tilt to help lock knees.  sit to/from stand with // bars min A x 1      Vital Signs Last 24 Hrs  T(C): 36.4 (13 Jun 2025 07:24), Max: 36.4 (12 Jun 2025 21:15)  T(F): 97.5 (13 Jun 2025 07:24), Max: 97.5 (12 Jun 2025 21:15)  HR: 76 (13 Jun 2025 07:24) (73 - 81)  BP: 157/81 (13 Jun 2025 07:24) (153/77 - 157/81)  RR: 16 (13 Jun 2025 07:24) (16 - 16)  SpO2: 100% (13 Jun 2025 07:24) (98% - 100%)    PHYSICAL EXAM  Constitutional -,Comfortable  Neck - Supple  Cardiovascular - Palpable peripheral pulses   Respiratory/Chest- Symmetrical chest expansion, No dyspnea  Abdomen - Soft, Non-tender  Extremities - No cyanosis, No edema,     Neurologic Exam - Alert, Oriented, Interactive  Sensory - Light touch sensation intact  Motor Function - Moves all extremities spontaneously, except for limited strength both ankles and Rt hip and, as noted today reduced strength Rt knee extension  UE 4/5  Left leg 4/5 except DF 1/5 and PF 3/5  Rt leg  2/5, Knee extension 3/5 DF 1/5 PF 3/5  Skin -  diffuse numbness     LABS:                13.4   6.74  )-----------( 220      ( 12 Jun 2025 06:55 )             39.1     06-12    135  |  97  |  21  ----------------------------<  205[H]  4.3   |  32[H]  |  0.65    Ca    9.6      12 Jun 2025 06:55    TPro  7.7  /  Alb  3.5  /  TBili  0.4  /  DBili  x   /  AST  47[H]  /  ALT  81[H]  /  AlkPhos  73  06-12      Urinalysis Basic - ( 12 Jun 2025 06:55 )    Color: x / Appearance: x / SG: x / pH: x  Gluc: 205 mg/dL / Ketone: x  / Bili: x / Urobili: x   Blood: x / Protein: x / Nitrite: x   Leuk Esterase: x / RBC: x / WBC x   Sq Epi: x / Non Sq Epi: x / Bacteria: x      CAPILLARY BLOOD GLUCOSE  POCT Blood Glucose.: 197 mg/dL (13 Jun 2025 08:05)  POCT Blood Glucose.: 212 mg/dL (12 Jun 2025 21:03)  POCT Blood Glucose.: 297 mg/dL (12 Jun 2025 16:40)  POCT Blood Glucose.: 131 mg/dL (12 Jun 2025 11:58)      MEDICATIONS  (STANDING):  amLODIPine   Tablet 5 milliGRAM(s) Oral daily  atorvastatin 10 milliGRAM(s) Oral at bedtime  dextrose 5%. 1000 milliLiter(s) (50 mL/Hr) IV Continuous <Continuous>  dextrose 5%. 1000 milliLiter(s) (100 mL/Hr) IV Continuous <Continuous>  dextrose 50% Injectable 25 Gram(s) IV Push once  dextrose 50% Injectable 12.5 Gram(s) IV Push once  dextrose 50% Injectable 25 Gram(s) IV Push once  DULoxetine 60 milliGRAM(s) Oral daily  enoxaparin Injectable 40 milliGRAM(s) SubCutaneous every 24 hours  gabapentin 800 milliGRAM(s) Oral every 8 hours  glucagon  Injectable 1 milliGRAM(s) IntraMuscular once  insulin glargine Injectable (LANTUS) 22 Unit(s) SubCutaneous at bedtime  insulin lispro (ADMELOG) corrective regimen sliding scale   SubCutaneous three times a day before meals  insulin lispro (ADMELOG) corrective regimen sliding scale   SubCutaneous at bedtime  insulin lispro Injectable (ADMELOG) 8 Unit(s) SubCutaneous three times a day with meals  lidocaine   4% Patch 1 Patch Transdermal <User Schedule>  losartan 50 milliGRAM(s) Oral daily  melatonin 6 milliGRAM(s) Oral at bedtime  multivitamin 1 Tablet(s) Oral daily  pantoprazole    Tablet 40 milliGRAM(s) Oral before breakfast  polyethylene glycol 3350 17 Gram(s) Oral two times a day  predniSONE   Tablet   Oral   predniSONE   Tablet 60 milliGRAM(s) Oral daily  senna 2 Tablet(s) Oral at bedtime    MEDICATIONS  (PRN):  acetaminophen     Tablet .. 650 milliGRAM(s) Oral every 6 hours PRN Mild Pain (1 - 3)  dextrose Oral Gel 15 Gram(s) Oral once PRN Blood Glucose LESS THAN 70 milliGRAM(s)/deciliter  methocarbamol 750 milliGRAM(s) Oral three times a day PRN Muscle Spasm

## 2025-06-13 NOTE — DISCHARGE NOTE PROVIDER - NSDCFUSCHEDAPPT_GEN_ALL_CORE_FT
Mirza Radford  [facility]  NEUROLOGY [address] [facility]  Scheduled Appointment: 06/18/2025    [facility]  ENDOCRIN [address]  Scheduled Appointment: 06/20/2025    Aniyah Julian  [facility]  RHEUM [address] [facility]  Scheduled Appointment: 07/29/2025    [facility]  ONCPAINMGT [address]   Scheduled Appointment: 08/11/2025     [facility]  ENDOCRIN [address]  Scheduled Appointment: 06/20/2025    [facility]  NEUROLOGY [address] [facility]  Scheduled Appointment: 07/10/2025    Aniyah Julian  [facility]  RHEUM [address] [facility] [address]  Scheduled Appointment: 07/29/2025    [facility]  ONCPAINMGT [address] [place]   Scheduled Appointment: 08/11/2025

## 2025-06-13 NOTE — PROVIDER CONTACT NOTE (OTHER) - BACKGROUND
59yr Pt is hyperglycemic initiating protocol. Pt has chronic pancreatitis and poorly controlled type 2 DM
Pt Type 2 Diabetic with self monitoring chay. Pt tachycardic and jittery, chay read "HI". Denies other symptoms. Pt reported he had large bag of chips at dinner

## 2025-06-14 LAB
GLUCOSE BLDC GLUCOMTR-MCNC: 192 MG/DL — HIGH (ref 70–99)
GLUCOSE BLDC GLUCOMTR-MCNC: 252 MG/DL — HIGH (ref 70–99)
GLUCOSE BLDC GLUCOMTR-MCNC: 310 MG/DL — HIGH (ref 70–99)
GLUCOSE BLDC GLUCOMTR-MCNC: 321 MG/DL — HIGH (ref 70–99)

## 2025-06-14 PROCEDURE — 99232 SBSQ HOSP IP/OBS MODERATE 35: CPT

## 2025-06-14 RX ORDER — INSULIN LISPRO 100 U/ML
9 INJECTION, SOLUTION INTRAVENOUS; SUBCUTANEOUS
Refills: 0 | Status: DISCONTINUED | OUTPATIENT
Start: 2025-06-14 | End: 2025-06-15

## 2025-06-14 RX ORDER — INSULIN GLARGINE-YFGN 100 [IU]/ML
25 INJECTION, SOLUTION SUBCUTANEOUS AT BEDTIME
Refills: 0 | Status: DISCONTINUED | OUTPATIENT
Start: 2025-06-14 | End: 2025-06-17

## 2025-06-14 RX ADMIN — DULOXETINE 60 MILLIGRAM(S): 20 CAPSULE, DELAYED RELEASE ORAL at 12:08

## 2025-06-14 RX ADMIN — ENOXAPARIN SODIUM 40 MILLIGRAM(S): 100 INJECTION SUBCUTANEOUS at 17:21

## 2025-06-14 RX ADMIN — LIDOCAINE HYDROCHLORIDE 1 PATCH: 20 JELLY TOPICAL at 07:45

## 2025-06-14 RX ADMIN — LIDOCAINE HYDROCHLORIDE 1 PATCH: 20 JELLY TOPICAL at 19:56

## 2025-06-14 RX ADMIN — PREDNISONE 60 MILLIGRAM(S): 20 TABLET ORAL at 05:16

## 2025-06-14 RX ADMIN — INSULIN LISPRO 8 UNIT(S): 100 INJECTION, SOLUTION INTRAVENOUS; SUBCUTANEOUS at 12:09

## 2025-06-14 RX ADMIN — GABAPENTIN 800 MILLIGRAM(S): 400 CAPSULE ORAL at 14:53

## 2025-06-14 RX ADMIN — INSULIN LISPRO 1: 100 INJECTION, SOLUTION INTRAVENOUS; SUBCUTANEOUS at 21:12

## 2025-06-14 RX ADMIN — INSULIN LISPRO 4: 100 INJECTION, SOLUTION INTRAVENOUS; SUBCUTANEOUS at 08:21

## 2025-06-14 RX ADMIN — Medication 6 MILLIGRAM(S): at 21:13

## 2025-06-14 RX ADMIN — Medication 40 MILLIGRAM(S): at 05:17

## 2025-06-14 RX ADMIN — INSULIN LISPRO 8 UNIT(S): 100 INJECTION, SOLUTION INTRAVENOUS; SUBCUTANEOUS at 08:22

## 2025-06-14 RX ADMIN — Medication 1 TABLET(S): at 12:08

## 2025-06-14 RX ADMIN — INSULIN LISPRO 9 UNIT(S): 100 INJECTION, SOLUTION INTRAVENOUS; SUBCUTANEOUS at 17:17

## 2025-06-14 RX ADMIN — INSULIN GLARGINE-YFGN 25 UNIT(S): 100 INJECTION, SOLUTION SUBCUTANEOUS at 21:12

## 2025-06-14 RX ADMIN — LOSARTAN POTASSIUM 50 MILLIGRAM(S): 100 TABLET, FILM COATED ORAL at 05:16

## 2025-06-14 RX ADMIN — AMLODIPINE BESYLATE 5 MILLIGRAM(S): 10 TABLET ORAL at 05:15

## 2025-06-14 RX ADMIN — GABAPENTIN 800 MILLIGRAM(S): 400 CAPSULE ORAL at 05:15

## 2025-06-14 RX ADMIN — INSULIN LISPRO 4: 100 INJECTION, SOLUTION INTRAVENOUS; SUBCUTANEOUS at 17:17

## 2025-06-14 RX ADMIN — GABAPENTIN 800 MILLIGRAM(S): 400 CAPSULE ORAL at 21:13

## 2025-06-14 RX ADMIN — ATORVASTATIN CALCIUM 10 MILLIGRAM(S): 80 TABLET, FILM COATED ORAL at 21:13

## 2025-06-14 RX ADMIN — INSULIN LISPRO 1: 100 INJECTION, SOLUTION INTRAVENOUS; SUBCUTANEOUS at 12:09

## 2025-06-14 NOTE — PROGRESS NOTE ADULT - SUBJECTIVE AND OBJECTIVE BOX
Cc: Gait dysfunction    HPI: Patient seen and examined at bedside. No acute events overnight.   Pain controlled, no chest pain, no N/V, no Fevers/Chills. No other new ROS  Has been tolerating rehabilitation program.    acetaminophen     Tablet .. 650 milliGRAM(s) Oral every 6 hours PRN  amLODIPine   Tablet 5 milliGRAM(s) Oral daily  atorvastatin 10 milliGRAM(s) Oral at bedtime  dextrose 5%. 1000 milliLiter(s) IV Continuous <Continuous>  dextrose 5%. 1000 milliLiter(s) IV Continuous <Continuous>  dextrose 50% Injectable 25 Gram(s) IV Push once  dextrose 50% Injectable 12.5 Gram(s) IV Push once  dextrose 50% Injectable 25 Gram(s) IV Push once  dextrose Oral Gel 15 Gram(s) Oral once PRN  DULoxetine 60 milliGRAM(s) Oral daily  enoxaparin Injectable 40 milliGRAM(s) SubCutaneous every 24 hours  gabapentin 800 milliGRAM(s) Oral every 8 hours  glucagon  Injectable 1 milliGRAM(s) IntraMuscular once  insulin glargine Injectable (LANTUS) 22 Unit(s) SubCutaneous at bedtime  insulin lispro (ADMELOG) corrective regimen sliding scale   SubCutaneous at bedtime  insulin lispro (ADMELOG) corrective regimen sliding scale   SubCutaneous three times a day before meals  insulin lispro Injectable (ADMELOG) 8 Unit(s) SubCutaneous three times a day with meals  lidocaine   4% Patch 1 Patch Transdermal <User Schedule>  losartan 50 milliGRAM(s) Oral daily  melatonin 6 milliGRAM(s) Oral at bedtime  methocarbamol 750 milliGRAM(s) Oral three times a day PRN  multivitamin 1 Tablet(s) Oral daily  pantoprazole    Tablet 40 milliGRAM(s) Oral before breakfast  polyethylene glycol 3350 17 Gram(s) Oral two times a day  predniSONE   Tablet   Oral   predniSONE   Tablet 60 milliGRAM(s) Oral daily  senna 2 Tablet(s) Oral at bedtime      T(C): 36.2 (06-14-25 @ 07:48), Max: 36.3 (06-13-25 @ 19:47)  HR: 80 (06-14-25 @ 07:48) (80 - 104)  BP: 159/78 (06-14-25 @ 07:48) (117/72 - 159/78)  RR: 16 (06-14-25 @ 07:48) (16 - 16)  SpO2: 100% (06-14-25 @ 07:48) (100% - 100%)    In NAD  HEENT- EOMI  Heart- warm extremities  Lungs- no accessory muscle use  Abd- non-distended, non-tender  Ext- No calf pain  Neuro- Exam unchanged    06-12    135  |  97  |  21  ----------------------------<  205[H]  4.3   |  32[H]  |  0.65  06-09    136  |  98  |  10  ----------------------------<  279[H]  4.7   |  32[H]  |  1.04  06-08    x   |  x   |  x   ----------------------------<  435[H]  x    |  x   |  x     Ca    9.6      12 Jun 2025 06:55  Ca    10.0      09 Jun 2025 05:40    TPro  7.7  /  Alb  3.5  /  TBili  0.4  /  DBili  x   /  AST  47[H]  /  ALT  81[H]  /  AlkPhos  73  06-12  TPro  7.6  /  Alb  3.3  /  TBili  0.4  /  DBili  x   /  AST  40  /  ALT  62[H]  /  AlkPhos  97  06-09            Imp: Patient with diagnosis of CIDP admitted for comprehensive acute rehabilitation.    Plan:  - Continue PT/OT/SLP as indicated  - DVT prophylaxis - Continue Lovenox SC  - Skin- Turn q2h, check skin daily  - Continue current medications  -Active issues-   CIDP - continue Prednisone taper, Cymbalta, Robaxin PRN, Tylenol PRN  - Patient is stable to continue current rehabilitation program.

## 2025-06-14 NOTE — PROGRESS NOTE ADULT - ASSESSMENT
59 year old male with PMH of ethanol use disorder, chronic pancreatitis, poorly controlled СЕРГЕЙ (A1c 9.9 on 04/23/2025), chronic neuropathy of LLE, recent hospitalization April 2025 for sciatica; who presented to [facility] ED on 5/26/25 for progressively worsening BL LE pain, weakness and numbness. He was evaluated by Neurology and underwent bedside EMG which was significant for CIDP (s/p 5 days IVIG and IV steroids). He developed an episode of confusion/AMS (CT Head negative). His hospital course was significant for hyponatremia, and hyperglycemia (both now resolved). Patient now admitted for a multidisciplinary rehab program on 6/3    #CIDP  -MRI lumbar spine wwo no evidence of compression or enhancement  -MRI lumbosacral plexus R and L wwo no abnormalities  -s/p EMG/NCS with evidence of demyelinating process and chronic denervation   -s/p IVIG and IV steroids x 5 days   -Fall precaution  -Pain control and bowel regimen per rehab team  -Comprehensive Rehab Program w/ PT/OT  -Orthotic eval for BL AFOs   -Outpatient follow up with neurology    #HTN/HLD  -Continue Amlodipine (reduced dose to 5mg QD 6/6 due to low BP w/ therapy) and Losartan (Home regimen: Telmisartan 40mg daily) w/ holding parameters   -Monitor BP   -Continue Atorvastatin    #Latent Autoimmune Diabetes in Adults, Uncontrolled, Complicated by Peripheral Neuropathy  -HbA1c: 8.9 on 6/6  -FS elevated. increase Lantus to 25unit qHS - 6/14  -increase premeal admelog to 9units TID w/ meals if he consume >75%, 7untis if he consumes 50-75% and 5units if he consumes <50% - 6/14  -RAISS (low dose)  -Monitor FS, labile due to dietary indiscretion, he was counseled  -Continue gabapentin   -Diabetes educator following; reviewed with educater 6/9. hyperglycemia d/t diet. no changes to insulin regimen today    #Mood  -Continue Duloxetine     #Incidental Findings   -CTA neck showed Less than 50% stenosis of the proximal right ICA. At least 60% stenosis of the proximal left ICA.  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

## 2025-06-14 NOTE — PROGRESS NOTE ADULT - SUBJECTIVE AND OBJECTIVE BOX
Patient is a 59y old  Male who presents with a chief complaint of CIDP (14 Jun 2025 12:26)      Subjective and overnight events:  Patient seen and examined at bedside. no new complaints. reports tolerating therapy well. no fever, chills, dizziness, sob, cp, abd pain. + BM    ALLERGIES:  No Known Allergies    MEDICATIONS  (STANDING):  amLODIPine   Tablet 5 milliGRAM(s) Oral daily  atorvastatin 10 milliGRAM(s) Oral at bedtime  dextrose 5%. 1000 milliLiter(s) (100 mL/Hr) IV Continuous <Continuous>  dextrose 5%. 1000 milliLiter(s) (50 mL/Hr) IV Continuous <Continuous>  dextrose 50% Injectable 25 Gram(s) IV Push once  dextrose 50% Injectable 12.5 Gram(s) IV Push once  dextrose 50% Injectable 25 Gram(s) IV Push once  DULoxetine 60 milliGRAM(s) Oral daily  enoxaparin Injectable 40 milliGRAM(s) SubCutaneous every 24 hours  gabapentin 800 milliGRAM(s) Oral every 8 hours  glucagon  Injectable 1 milliGRAM(s) IntraMuscular once  insulin glargine Injectable (LANTUS) 25 Unit(s) SubCutaneous at bedtime  insulin lispro (ADMELOG) corrective regimen sliding scale   SubCutaneous at bedtime  insulin lispro (ADMELOG) corrective regimen sliding scale   SubCutaneous three times a day before meals  insulin lispro Injectable (ADMELOG) 9 Unit(s) SubCutaneous three times a day with meals  lidocaine   4% Patch 1 Patch Transdermal <User Schedule>  losartan 50 milliGRAM(s) Oral daily  melatonin 6 milliGRAM(s) Oral at bedtime  multivitamin 1 Tablet(s) Oral daily  pantoprazole    Tablet 40 milliGRAM(s) Oral before breakfast  polyethylene glycol 3350 17 Gram(s) Oral two times a day  predniSONE   Tablet   Oral   predniSONE   Tablet 60 milliGRAM(s) Oral daily  senna 2 Tablet(s) Oral at bedtime    MEDICATIONS  (PRN):  acetaminophen     Tablet .. 650 milliGRAM(s) Oral every 6 hours PRN Mild Pain (1 - 3)  dextrose Oral Gel 15 Gram(s) Oral once PRN Blood Glucose LESS THAN 70 milliGRAM(s)/deciliter  methocarbamol 750 milliGRAM(s) Oral three times a day PRN Muscle Spasm    Vital Signs Last 24 Hrs  T(F): 97.2 (14 Jun 2025 07:48), Max: 97.3 (13 Jun 2025 19:47)  HR: 80 (14 Jun 2025 07:48) (80 - 104)  BP: 159/78 (14 Jun 2025 07:48) (117/72 - 159/78)  RR: 16 (14 Jun 2025 07:48) (16 - 16)  SpO2: 100% (14 Jun 2025 07:48) (100% - 100%)  I&O's Summary    PHYSICAL EXAM:  General: NAD, A/O x 3  ENT: MMM  Neck: Supple, No JVD  Lungs: Clear to auscultation bilaterally  Cardio: RRR, S1/S2, No murmurs  Abdomen: Soft, Nontender, Nondistended; Bowel sounds present  Extremities: No calf tenderness, No pitting edema    LABS:                        13.4   6.74  )-----------( 220      ( 12 Jun 2025 06:55 )             39.1     06-12    135  |  97  |  21  ----------------------------<  205  4.3   |  32  |  0.65    Ca    9.6      12 Jun 2025 06:55    TPro  7.7  /  Alb  3.5  /  TBili  0.4  /  DBili  x   /  AST  47  /  ALT  81  /  AlkPhos  73  06-12        04-24 Chol 129 mg/dL LDL -- HDL 63 mg/dL Trig 63 mg/dL        POCT Blood Glucose.: 192 mg/dL (14 Jun 2025 11:54)  POCT Blood Glucose.: 321 mg/dL (14 Jun 2025 08:14)  POCT Blood Glucose.: 307 mg/dL (13 Jun 2025 21:05)  POCT Blood Glucose.: 358 mg/dL (13 Jun 2025 19:43)  POCT Blood Glucose.: 380 mg/dL (13 Jun 2025 17:05)      Urinalysis Basic - ( 12 Jun 2025 06:55 )    Color: x / Appearance: x / SG: x / pH: x  Gluc: 205 mg/dL / Ketone: x  / Bili: x / Urobili: x   Blood: x / Protein: x / Nitrite: x   Leuk Esterase: x / RBC: x / WBC x   Sq Epi: x / Non Sq Epi: x / Bacteria: x        COVID-19 PCR: NotDetec (06-03-25 @ 14:15)      RADIOLOGY & ADDITIONAL TESTS:    Care Discussed with Consultants/Other Providers:

## 2025-06-15 LAB
GLUCOSE BLDC GLUCOMTR-MCNC: 247 MG/DL — HIGH (ref 70–99)
GLUCOSE BLDC GLUCOMTR-MCNC: 304 MG/DL — HIGH (ref 70–99)
GLUCOSE BLDC GLUCOMTR-MCNC: 473 MG/DL — CRITICAL HIGH (ref 70–99)
GLUCOSE BLDC GLUCOMTR-MCNC: 478 MG/DL — CRITICAL HIGH (ref 70–99)

## 2025-06-15 PROCEDURE — 99232 SBSQ HOSP IP/OBS MODERATE 35: CPT

## 2025-06-15 RX ORDER — INSULIN LISPRO 100 U/ML
10 INJECTION, SOLUTION INTRAVENOUS; SUBCUTANEOUS
Refills: 0 | Status: DISCONTINUED | OUTPATIENT
Start: 2025-06-15 | End: 2025-06-17

## 2025-06-15 RX ORDER — INSULIN LISPRO 100 U/ML
16 INJECTION, SOLUTION INTRAVENOUS; SUBCUTANEOUS ONCE
Refills: 0 | Status: COMPLETED | OUTPATIENT
Start: 2025-06-15 | End: 2025-06-15

## 2025-06-15 RX ADMIN — ATORVASTATIN CALCIUM 10 MILLIGRAM(S): 80 TABLET, FILM COATED ORAL at 22:22

## 2025-06-15 RX ADMIN — Medication 650 MILLIGRAM(S): at 05:34

## 2025-06-15 RX ADMIN — Medication 6 MILLIGRAM(S): at 22:25

## 2025-06-15 RX ADMIN — ENOXAPARIN SODIUM 40 MILLIGRAM(S): 100 INJECTION SUBCUTANEOUS at 17:49

## 2025-06-15 RX ADMIN — INSULIN LISPRO 4: 100 INJECTION, SOLUTION INTRAVENOUS; SUBCUTANEOUS at 08:17

## 2025-06-15 RX ADMIN — PREDNISONE 60 MILLIGRAM(S): 20 TABLET ORAL at 05:06

## 2025-06-15 RX ADMIN — INSULIN LISPRO 10 UNIT(S): 100 INJECTION, SOLUTION INTRAVENOUS; SUBCUTANEOUS at 17:24

## 2025-06-15 RX ADMIN — GABAPENTIN 800 MILLIGRAM(S): 400 CAPSULE ORAL at 22:22

## 2025-06-15 RX ADMIN — Medication 1 TABLET(S): at 12:19

## 2025-06-15 RX ADMIN — AMLODIPINE BESYLATE 5 MILLIGRAM(S): 10 TABLET ORAL at 05:06

## 2025-06-15 RX ADMIN — DULOXETINE 60 MILLIGRAM(S): 20 CAPSULE, DELAYED RELEASE ORAL at 12:19

## 2025-06-15 RX ADMIN — INSULIN LISPRO 10 UNIT(S): 100 INJECTION, SOLUTION INTRAVENOUS; SUBCUTANEOUS at 12:19

## 2025-06-15 RX ADMIN — INSULIN LISPRO 9 UNIT(S): 100 INJECTION, SOLUTION INTRAVENOUS; SUBCUTANEOUS at 08:18

## 2025-06-15 RX ADMIN — INSULIN LISPRO 16 UNIT(S): 100 INJECTION, SOLUTION INTRAVENOUS; SUBCUTANEOUS at 22:51

## 2025-06-15 RX ADMIN — GABAPENTIN 800 MILLIGRAM(S): 400 CAPSULE ORAL at 13:18

## 2025-06-15 RX ADMIN — INSULIN LISPRO 6: 100 INJECTION, SOLUTION INTRAVENOUS; SUBCUTANEOUS at 17:24

## 2025-06-15 RX ADMIN — LOSARTAN POTASSIUM 50 MILLIGRAM(S): 100 TABLET, FILM COATED ORAL at 05:07

## 2025-06-15 RX ADMIN — INSULIN GLARGINE-YFGN 25 UNIT(S): 100 INJECTION, SOLUTION SUBCUTANEOUS at 22:25

## 2025-06-15 RX ADMIN — Medication 40 MILLIGRAM(S): at 05:06

## 2025-06-15 RX ADMIN — GABAPENTIN 800 MILLIGRAM(S): 400 CAPSULE ORAL at 05:06

## 2025-06-15 RX ADMIN — INSULIN LISPRO 2: 100 INJECTION, SOLUTION INTRAVENOUS; SUBCUTANEOUS at 12:18

## 2025-06-15 RX ADMIN — Medication 650 MILLIGRAM(S): at 05:04

## 2025-06-15 NOTE — PROGRESS NOTE ADULT - SUBJECTIVE AND OBJECTIVE BOX
Patient is a 59y old  Male who presents with a chief complaint of CIDP (15 Romario 2025 11:56)      Subjective and overnight events:  Patient seen and examined at bedside. no fever, chills, sob, cp, abd pain. admits to having chips at night     ALLERGIES:  No Known Allergies    MEDICATIONS  (STANDING):  amLODIPine   Tablet 5 milliGRAM(s) Oral daily  atorvastatin 10 milliGRAM(s) Oral at bedtime  dextrose 5%. 1000 milliLiter(s) (100 mL/Hr) IV Continuous <Continuous>  dextrose 5%. 1000 milliLiter(s) (50 mL/Hr) IV Continuous <Continuous>  dextrose 50% Injectable 25 Gram(s) IV Push once  dextrose 50% Injectable 12.5 Gram(s) IV Push once  dextrose 50% Injectable 25 Gram(s) IV Push once  DULoxetine 60 milliGRAM(s) Oral daily  enoxaparin Injectable 40 milliGRAM(s) SubCutaneous every 24 hours  gabapentin 800 milliGRAM(s) Oral every 8 hours  glucagon  Injectable 1 milliGRAM(s) IntraMuscular once  insulin glargine Injectable (LANTUS) 25 Unit(s) SubCutaneous at bedtime  insulin lispro (ADMELOG) corrective regimen sliding scale   SubCutaneous at bedtime  insulin lispro (ADMELOG) corrective regimen sliding scale   SubCutaneous three times a day before meals  insulin lispro Injectable (ADMELOG) 10 Unit(s) SubCutaneous three times a day before meals  lidocaine   4% Patch 1 Patch Transdermal <User Schedule>  losartan 50 milliGRAM(s) Oral daily  melatonin 6 milliGRAM(s) Oral at bedtime  multivitamin 1 Tablet(s) Oral daily  pantoprazole    Tablet 40 milliGRAM(s) Oral before breakfast  polyethylene glycol 3350 17 Gram(s) Oral two times a day  predniSONE   Tablet   Oral   predniSONE   Tablet 60 milliGRAM(s) Oral daily  senna 2 Tablet(s) Oral at bedtime    MEDICATIONS  (PRN):  acetaminophen     Tablet .. 650 milliGRAM(s) Oral every 6 hours PRN Mild Pain (1 - 3)  dextrose Oral Gel 15 Gram(s) Oral once PRN Blood Glucose LESS THAN 70 milliGRAM(s)/deciliter  methocarbamol 750 milliGRAM(s) Oral three times a day PRN Muscle Spasm    Vital Signs Last 24 Hrs  T(F): 97.8 (15 Romario 2025 08:11), Max: 98.4 (14 Jun 2025 21:10)  HR: 94 (15 Romario 2025 08:11) (89 - 94)  BP: 142/82 (15 Romario 2025 08:11) (142/82 - 157/81)  RR: 16 (15 Romario 2025 08:11) (16 - 16)  SpO2: 99% (15 Romario 2025 08:11) (98% - 99%)  I&O's Summary    PHYSICAL EXAM:  General: NAD, A/O x 3  ENT: MMM  Neck: Supple, No JVD  Lungs: Clear to auscultation bilaterally  Cardio: RRR, S1/S2, No murmurs  Abdomen: Soft, Nontender, Nondistended; Bowel sounds present  Extremities: No calf tenderness, No pitting edema    LABS:            04-24 Chol 129 mg/dL LDL -- HDL 63 mg/dL Trig 63 mg/dL          POCT Blood Glucose.: 247 mg/dL (15 Romario 2025 12:15)  POCT Blood Glucose.: 304 mg/dL (15 Romario 2025 08:16)  POCT Blood Glucose.: 252 mg/dL (14 Jun 2025 21:07)  POCT Blood Glucose.: 310 mg/dL (14 Jun 2025 16:57)          COVID-19 PCR: NotDetec (06-03-25 @ 14:15)      RADIOLOGY & ADDITIONAL TESTS:    Care Discussed with Consultants/Other Providers:    Patient is a 59y old  Male who presents with a chief complaint of CIDP (15 Romario 2025 11:56)      Subjective and overnight events:  Patient seen and examined at bedside. no fever, chills, sob, cp, abd pain. admits to having chips at night leading to elevated FS    ALLERGIES:  No Known Allergies    MEDICATIONS  (STANDING):  amLODIPine   Tablet 5 milliGRAM(s) Oral daily  atorvastatin 10 milliGRAM(s) Oral at bedtime  dextrose 5%. 1000 milliLiter(s) (100 mL/Hr) IV Continuous <Continuous>  dextrose 5%. 1000 milliLiter(s) (50 mL/Hr) IV Continuous <Continuous>  dextrose 50% Injectable 25 Gram(s) IV Push once  dextrose 50% Injectable 12.5 Gram(s) IV Push once  dextrose 50% Injectable 25 Gram(s) IV Push once  DULoxetine 60 milliGRAM(s) Oral daily  enoxaparin Injectable 40 milliGRAM(s) SubCutaneous every 24 hours  gabapentin 800 milliGRAM(s) Oral every 8 hours  glucagon  Injectable 1 milliGRAM(s) IntraMuscular once  insulin glargine Injectable (LANTUS) 25 Unit(s) SubCutaneous at bedtime  insulin lispro (ADMELOG) corrective regimen sliding scale   SubCutaneous at bedtime  insulin lispro (ADMELOG) corrective regimen sliding scale   SubCutaneous three times a day before meals  insulin lispro Injectable (ADMELOG) 10 Unit(s) SubCutaneous three times a day before meals  lidocaine   4% Patch 1 Patch Transdermal <User Schedule>  losartan 50 milliGRAM(s) Oral daily  melatonin 6 milliGRAM(s) Oral at bedtime  multivitamin 1 Tablet(s) Oral daily  pantoprazole    Tablet 40 milliGRAM(s) Oral before breakfast  polyethylene glycol 3350 17 Gram(s) Oral two times a day  predniSONE   Tablet   Oral   predniSONE   Tablet 60 milliGRAM(s) Oral daily  senna 2 Tablet(s) Oral at bedtime    MEDICATIONS  (PRN):  acetaminophen     Tablet .. 650 milliGRAM(s) Oral every 6 hours PRN Mild Pain (1 - 3)  dextrose Oral Gel 15 Gram(s) Oral once PRN Blood Glucose LESS THAN 70 milliGRAM(s)/deciliter  methocarbamol 750 milliGRAM(s) Oral three times a day PRN Muscle Spasm    Vital Signs Last 24 Hrs  T(F): 97.8 (15 Romario 2025 08:11), Max: 98.4 (14 Jun 2025 21:10)  HR: 94 (15 Romario 2025 08:11) (89 - 94)  BP: 142/82 (15 Romario 2025 08:11) (142/82 - 157/81)  RR: 16 (15 Romario 2025 08:11) (16 - 16)  SpO2: 99% (15 Romario 2025 08:11) (98% - 99%)  I&O's Summary    PHYSICAL EXAM:  General: NAD, A/O x 3  ENT: MMM  Neck: Supple, No JVD  Lungs: Clear to auscultation bilaterally  Cardio: RRR, S1/S2, No murmurs  Abdomen: Soft, Nontender, Nondistended; Bowel sounds present  Extremities: No calf tenderness, No pitting edema    LABS:            04-24 Chol 129 mg/dL LDL -- HDL 63 mg/dL Trig 63 mg/dL          POCT Blood Glucose.: 247 mg/dL (15 Romario 2025 12:15)  POCT Blood Glucose.: 304 mg/dL (15 Romario 2025 08:16)  POCT Blood Glucose.: 252 mg/dL (14 Jun 2025 21:07)  POCT Blood Glucose.: 310 mg/dL (14 Jun 2025 16:57)          COVID-19 PCR: NotDetec (06-03-25 @ 14:15)      RADIOLOGY & ADDITIONAL TESTS:    Care Discussed with Consultants/Other Providers:

## 2025-06-15 NOTE — PROGRESS NOTE ADULT - SUBJECTIVE AND OBJECTIVE BOX
Cc: Gait dysfunction    HPI: Patient seen and examined at bedside. No acute events overnight.   Pain controlled, no chest pain, no N/V, no Fevers/Chills. No other new ROS  Has been tolerating rehabilitation program.    acetaminophen     Tablet .. 650 milliGRAM(s) Oral every 6 hours PRN  amLODIPine   Tablet 5 milliGRAM(s) Oral daily  atorvastatin 10 milliGRAM(s) Oral at bedtime  dextrose 5%. 1000 milliLiter(s) IV Continuous <Continuous>  dextrose 5%. 1000 milliLiter(s) IV Continuous <Continuous>  dextrose 50% Injectable 25 Gram(s) IV Push once  dextrose 50% Injectable 12.5 Gram(s) IV Push once  dextrose 50% Injectable 25 Gram(s) IV Push once  dextrose Oral Gel 15 Gram(s) Oral once PRN  DULoxetine 60 milliGRAM(s) Oral daily  enoxaparin Injectable 40 milliGRAM(s) SubCutaneous every 24 hours  gabapentin 800 milliGRAM(s) Oral every 8 hours  glucagon  Injectable 1 milliGRAM(s) IntraMuscular once  insulin glargine Injectable (LANTUS) 25 Unit(s) SubCutaneous at bedtime  insulin lispro (ADMELOG) corrective regimen sliding scale   SubCutaneous three times a day before meals  insulin lispro (ADMELOG) corrective regimen sliding scale   SubCutaneous at bedtime  insulin lispro Injectable (ADMELOG) 10 Unit(s) SubCutaneous three times a day before meals  lidocaine   4% Patch 1 Patch Transdermal <User Schedule>  losartan 50 milliGRAM(s) Oral daily  melatonin 6 milliGRAM(s) Oral at bedtime  methocarbamol 750 milliGRAM(s) Oral three times a day PRN  multivitamin 1 Tablet(s) Oral daily  pantoprazole    Tablet 40 milliGRAM(s) Oral before breakfast  polyethylene glycol 3350 17 Gram(s) Oral two times a day  predniSONE   Tablet   Oral   predniSONE   Tablet 60 milliGRAM(s) Oral daily  senna 2 Tablet(s) Oral at bedtime      T(C): 36.6 (06-15-25 @ 08:11), Max: 36.9 (06-14-25 @ 21:10)  HR: 94 (06-15-25 @ 08:11) (89 - 94)  BP: 142/82 (06-15-25 @ 08:11) (142/82 - 157/81)  RR: 16 (06-15-25 @ 08:11) (16 - 16)  SpO2: 99% (06-15-25 @ 08:11) (98% - 99%)    In NAD  HEENT- EOMI  Heart- warm extremities  Lungs- no accessory muscle use  Abd- non-distended, non-tender  Ext- No calf pain  Neuro- Exam unchanged    06-12    135  |  97  |  21  ----------------------------<  205[H]  4.3   |  32[H]  |  0.65  06-09    136  |  98  |  10  ----------------------------<  279[H]  4.7   |  32[H]  |  1.04  06-08    x   |  x   |  x   ----------------------------<  435[H]  x    |  x   |  x     Ca    9.6      12 Jun 2025 06:55  Ca    10.0      09 Jun 2025 05:40    TPro  7.7  /  Alb  3.5  /  TBili  0.4  /  DBili  x   /  AST  47[H]  /  ALT  81[H]  /  AlkPhos  73  06-12  TPro  7.6  /  Alb  3.3  /  TBili  0.4  /  DBili  x   /  AST  40  /  ALT  62[H]  /  AlkPhos  97  06-09      MR L Spine 5/26/25 reviewed and interpreted by me: no significant canal stenosis seen  ACC: [id number] EXAM: MR SPINE LUMBAR WAW IC ORDERED BY: MIKAEL CLARK    PROCEDURE DATE: 05/26/2025        INTERPRETATION: CLINICAL INFORMATION: Bilateral lower extremity numbness    ADDITIONAL CLINICAL INFORMATION: Not Applicable    TECHNIQUE: Multiplanar, multisequence MRI was performed of the lumbar spine.  IV Contrast: Gadavist 6cc cc administered 1.5cc cc discarded    PRIOR STUDIES: No priors available for comparison.    FINDINGS:    LOCALIZER: No additional findings.  BONES: There is no fracture or bone marrow edema.  ALIGNMENT: The alignment is normal.  SACROILIAC JOINTS/SACRUM: There is no sacral fracture. The SI joints are partially visualized but are intact.  CONUS AND CAUDA EQUINA: The distal cord and conus are normal in signal. Conus terminates at L1.  VISUALIZED INTRAPELVIC/INTRA-ABDOMINAL SOFT TISSUES: Normal.  PARASPINAL SOFT TISSUES: Normal.      INDIVIDUAL LEVELS:    LOWER THORACIC SPINE: No spinal canal or neuroforaminal stenosis.    L1-L2: No spinal canal or neuroforaminal stenosis.  L2-L3: No spinal canal or neuroforaminal stenosis.  L3-L4: No spinal canal or neuroforaminal stenosis.  L4-L5: No spinal canal or neuroforaminal stenosis.  L5-S1: No spinal canal or neuroforaminal stenosis.      IMPRESSION:    No significant central canal stenosis. The neural foramina appear patent. No significant conus abnormality. No abnormal enhancement at the level of the nerve roots.    --- End of Report ---            COLLEEN KUMAR MD; Attending Radiologist  This document has been electronically signed. May 27 2025 10:25AM        Imp: Patient with diagnosis of CIDP admitted for comprehensive acute rehabilitation.    Plan:  - Continue PT/OT/SLP as indicated  - DVT prophylaxis - Continue Lovenox SC  - Skin- Turn q2h, check skin daily  - Continue current medications  -Active issues-   CIDP - continue Prednisone taper, Cymbalta, Robaxin PRN, Tylenol PRN  - Patient is stable to continue current rehabilitation program.

## 2025-06-15 NOTE — PROGRESS NOTE ADULT - ASSESSMENT
59 year old male with PMH of ethanol use disorder, chronic pancreatitis, poorly controlled СЕРГЕЙ (A1c 9.9 on 04/23/2025), chronic neuropathy of LLE, recent hospitalization April 2025 for sciatica; who presented to [facility] ED on 5/26/25 for progressively worsening BL LE pain, weakness and numbness. He was evaluated by Neurology and underwent bedside EMG which was significant for CIDP (s/p 5 days IVIG and IV steroids). He developed an episode of confusion/AMS (CT Head negative). His hospital course was significant for hyponatremia, and hyperglycemia (both now resolved). Patient now admitted for a multidisciplinary rehab program on 6/3    #CIDP  -MRI lumbar spine wwo no evidence of compression or enhancement  -MRI lumbosacral plexus R and L wwo no abnormalities  -s/p EMG/NCS with evidence of demyelinating process and chronic denervation   -s/p IVIG and IV steroids x 5 days   -Fall precaution  -Pain control and bowel regimen per rehab team  -Comprehensive Rehab Program w/ PT/OT  -Orthotic eval for BL AFOs   -Outpatient follow up with neurology    #HTN/HLD  -Continue Amlodipine (reduced dose to 5mg QD 6/6 due to low BP w/ therapy) and Losartan (Home regimen: Telmisartan 40mg daily) w/ holding parameters   -Monitor BP   -Continue Atorvastatin    #Latent Autoimmune Diabetes in Adults, Uncontrolled, Complicated by Peripheral Neuropathy  -HbA1c: 8.9 on 6/6  -FS elevated. increase Lantus to 25unit qHS - 6/14  -increase premeal admelog to 10unit qAC - 6/15  -RAISS (low dose)  -Monitor FS, labile due to dietary indiscretion, he was counseled  -Continue gabapentin   -Diabetes educator following; reviewed with educater 6/9. hyperglycemia d/t diet. no changes to insulin regimen today    #Mood  -Continue Duloxetine     #Incidental Findings   -CTA neck showed Less than 50% stenosis of the proximal right ICA. At least 60% stenosis of the proximal left ICA.  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

## 2025-06-16 ENCOUNTER — TRANSCRIPTION ENCOUNTER (OUTPATIENT)
Age: 60
End: 2025-06-16

## 2025-06-16 LAB
ALBUMIN SERPL ELPH-MCNC: 3.1 G/DL — LOW (ref 3.3–5)
ALP SERPL-CCNC: 88 U/L — SIGNIFICANT CHANGE UP (ref 40–120)
ALT FLD-CCNC: 80 U/L — HIGH (ref 10–45)
ANION GAP SERPL CALC-SCNC: 2 MMOL/L — LOW (ref 5–17)
AST SERPL-CCNC: 37 U/L — SIGNIFICANT CHANGE UP (ref 10–40)
BASOPHILS # BLD AUTO: 0.03 K/UL — SIGNIFICANT CHANGE UP (ref 0–0.2)
BASOPHILS NFR BLD AUTO: 0.4 % — SIGNIFICANT CHANGE UP (ref 0–2)
BILIRUB SERPL-MCNC: 0.3 MG/DL — SIGNIFICANT CHANGE UP (ref 0.2–1.2)
BUN SERPL-MCNC: 16 MG/DL — SIGNIFICANT CHANGE UP (ref 7–23)
CALCIUM SERPL-MCNC: 9.4 MG/DL — SIGNIFICANT CHANGE UP (ref 8.4–10.5)
CHLORIDE SERPL-SCNC: 103 MMOL/L — SIGNIFICANT CHANGE UP (ref 96–108)
CO2 SERPL-SCNC: 35 MMOL/L — HIGH (ref 22–31)
CREAT SERPL-MCNC: 0.72 MG/DL — SIGNIFICANT CHANGE UP (ref 0.5–1.3)
EGFR: 106 ML/MIN/1.73M2 — SIGNIFICANT CHANGE UP
EGFR: 106 ML/MIN/1.73M2 — SIGNIFICANT CHANGE UP
EOSINOPHIL # BLD AUTO: 0.14 K/UL — SIGNIFICANT CHANGE UP (ref 0–0.5)
EOSINOPHIL NFR BLD AUTO: 1.8 % — SIGNIFICANT CHANGE UP (ref 0–6)
GLUCOSE BLDC GLUCOMTR-MCNC: 112 MG/DL — HIGH (ref 70–99)
GLUCOSE BLDC GLUCOMTR-MCNC: 139 MG/DL — HIGH (ref 70–99)
GLUCOSE BLDC GLUCOMTR-MCNC: 189 MG/DL — HIGH (ref 70–99)
GLUCOSE BLDC GLUCOMTR-MCNC: 213 MG/DL — HIGH (ref 70–99)
GLUCOSE BLDC GLUCOMTR-MCNC: 258 MG/DL — HIGH (ref 70–99)
GLUCOSE BLDC GLUCOMTR-MCNC: 369 MG/DL — HIGH (ref 70–99)
GLUCOSE SERPL-MCNC: 179 MG/DL — HIGH (ref 70–99)
HCT VFR BLD CALC: 35.7 % — LOW (ref 39–50)
HGB BLD-MCNC: 12.4 G/DL — LOW (ref 13–17)
IMM GRANULOCYTES NFR BLD AUTO: 0.5 % — SIGNIFICANT CHANGE UP (ref 0–0.9)
LYMPHOCYTES # BLD AUTO: 2.95 K/UL — SIGNIFICANT CHANGE UP (ref 1–3.3)
LYMPHOCYTES # BLD AUTO: 37 % — SIGNIFICANT CHANGE UP (ref 13–44)
MCHC RBC-ENTMCNC: 31 PG — SIGNIFICANT CHANGE UP (ref 27–34)
MCHC RBC-ENTMCNC: 34.7 G/DL — SIGNIFICANT CHANGE UP (ref 32–36)
MCV RBC AUTO: 89.3 FL — SIGNIFICANT CHANGE UP (ref 80–100)
MONOCYTES # BLD AUTO: 0.51 K/UL — SIGNIFICANT CHANGE UP (ref 0–0.9)
MONOCYTES NFR BLD AUTO: 6.4 % — SIGNIFICANT CHANGE UP (ref 2–14)
NEUTROPHILS # BLD AUTO: 4.3 K/UL — SIGNIFICANT CHANGE UP (ref 1.8–7.4)
NEUTROPHILS NFR BLD AUTO: 53.9 % — SIGNIFICANT CHANGE UP (ref 43–77)
NRBC BLD AUTO-RTO: 0 /100 WBCS — SIGNIFICANT CHANGE UP (ref 0–0)
PLATELET # BLD AUTO: 206 K/UL — SIGNIFICANT CHANGE UP (ref 150–400)
POTASSIUM SERPL-MCNC: 4.8 MMOL/L — SIGNIFICANT CHANGE UP (ref 3.5–5.3)
POTASSIUM SERPL-SCNC: 4.8 MMOL/L — SIGNIFICANT CHANGE UP (ref 3.5–5.3)
PROT SERPL-MCNC: 6.9 G/DL — SIGNIFICANT CHANGE UP (ref 6–8.3)
RBC # BLD: 4 M/UL — LOW (ref 4.2–5.8)
RBC # FLD: 12.3 % — SIGNIFICANT CHANGE UP (ref 10.3–14.5)
SODIUM SERPL-SCNC: 140 MMOL/L — SIGNIFICANT CHANGE UP (ref 135–145)
WBC # BLD: 7.97 K/UL — SIGNIFICANT CHANGE UP (ref 3.8–10.5)
WBC # FLD AUTO: 7.97 K/UL — SIGNIFICANT CHANGE UP (ref 3.8–10.5)

## 2025-06-16 PROCEDURE — 99232 SBSQ HOSP IP/OBS MODERATE 35: CPT

## 2025-06-16 RX ORDER — GABAPENTIN 400 MG/1
2 CAPSULE ORAL
Qty: 180 | Refills: 0
Start: 2025-06-16 | End: 2025-07-15

## 2025-06-16 RX ORDER — INSULIN ASPART 100 [IU]/ML
8 INJECTION, SOLUTION INTRAVENOUS; SUBCUTANEOUS
Qty: 600 | Refills: 0
Start: 2025-06-16 | End: 2025-07-15

## 2025-06-16 RX ORDER — INSULIN LISPRO 100 U/ML
0 INJECTION, SOLUTION INTRAVENOUS; SUBCUTANEOUS
Qty: 0 | Refills: 0 | DISCHARGE
Start: 2025-06-16

## 2025-06-16 RX ORDER — PREDNISONE 20 MG/1
1 TABLET ORAL
Qty: 112 | Refills: 0
Start: 2025-06-16 | End: 2025-07-30

## 2025-06-16 RX ORDER — DULOXETINE 20 MG/1
1 CAPSULE, DELAYED RELEASE ORAL
Qty: 30 | Refills: 0
Start: 2025-06-16 | End: 2025-07-15

## 2025-06-16 RX ORDER — TELMISARTAN 20 MG/1
1 TABLET ORAL
Qty: 30 | Refills: 0
Start: 2025-06-16 | End: 2025-07-15

## 2025-06-16 RX ORDER — AMLODIPINE BESYLATE 10 MG/1
1 TABLET ORAL
Qty: 30 | Refills: 0
Start: 2025-06-16 | End: 2025-07-15

## 2025-06-16 RX ORDER — INSULIN GLARGINE-YFGN 100 [IU]/ML
25 INJECTION, SOLUTION SUBCUTANEOUS
Qty: 4 | Refills: 0
Start: 2025-06-16 | End: 2025-07-15

## 2025-06-16 RX ORDER — ATORVASTATIN CALCIUM 80 MG/1
1 TABLET, FILM COATED ORAL
Qty: 30 | Refills: 0
Start: 2025-06-16 | End: 2025-07-15

## 2025-06-16 RX ORDER — METHOCARBAMOL 500 MG/1
1 TABLET, FILM COATED ORAL
Qty: 90 | Refills: 0
Start: 2025-06-16 | End: 2025-07-15

## 2025-06-16 RX ADMIN — DULOXETINE 60 MILLIGRAM(S): 20 CAPSULE, DELAYED RELEASE ORAL at 12:09

## 2025-06-16 RX ADMIN — Medication 6 MILLIGRAM(S): at 21:18

## 2025-06-16 RX ADMIN — INSULIN LISPRO 10 UNIT(S): 100 INJECTION, SOLUTION INTRAVENOUS; SUBCUTANEOUS at 08:03

## 2025-06-16 RX ADMIN — GABAPENTIN 800 MILLIGRAM(S): 400 CAPSULE ORAL at 05:20

## 2025-06-16 RX ADMIN — ENOXAPARIN SODIUM 40 MILLIGRAM(S): 100 INJECTION SUBCUTANEOUS at 17:27

## 2025-06-16 RX ADMIN — PREDNISONE 60 MILLIGRAM(S): 20 TABLET ORAL at 05:21

## 2025-06-16 RX ADMIN — LOSARTAN POTASSIUM 50 MILLIGRAM(S): 100 TABLET, FILM COATED ORAL at 05:22

## 2025-06-16 RX ADMIN — INSULIN LISPRO 10 UNIT(S): 100 INJECTION, SOLUTION INTRAVENOUS; SUBCUTANEOUS at 17:26

## 2025-06-16 RX ADMIN — AMLODIPINE BESYLATE 5 MILLIGRAM(S): 10 TABLET ORAL at 05:21

## 2025-06-16 RX ADMIN — INSULIN LISPRO 1: 100 INJECTION, SOLUTION INTRAVENOUS; SUBCUTANEOUS at 08:02

## 2025-06-16 RX ADMIN — INSULIN GLARGINE-YFGN 25 UNIT(S): 100 INJECTION, SOLUTION SUBCUTANEOUS at 21:22

## 2025-06-16 RX ADMIN — ATORVASTATIN CALCIUM 10 MILLIGRAM(S): 80 TABLET, FILM COATED ORAL at 21:20

## 2025-06-16 RX ADMIN — GABAPENTIN 800 MILLIGRAM(S): 400 CAPSULE ORAL at 14:29

## 2025-06-16 RX ADMIN — Medication 1 TABLET(S): at 12:09

## 2025-06-16 RX ADMIN — INSULIN LISPRO 5: 100 INJECTION, SOLUTION INTRAVENOUS; SUBCUTANEOUS at 17:25

## 2025-06-16 RX ADMIN — INSULIN LISPRO 1: 100 INJECTION, SOLUTION INTRAVENOUS; SUBCUTANEOUS at 21:21

## 2025-06-16 RX ADMIN — INSULIN LISPRO 2: 100 INJECTION, SOLUTION INTRAVENOUS; SUBCUTANEOUS at 12:08

## 2025-06-16 RX ADMIN — Medication 40 MILLIGRAM(S): at 05:21

## 2025-06-16 RX ADMIN — GABAPENTIN 800 MILLIGRAM(S): 400 CAPSULE ORAL at 21:19

## 2025-06-16 RX ADMIN — LIDOCAINE HYDROCHLORIDE 1 PATCH: 20 JELLY TOPICAL at 08:03

## 2025-06-16 RX ADMIN — INSULIN LISPRO 10 UNIT(S): 100 INJECTION, SOLUTION INTRAVENOUS; SUBCUTANEOUS at 12:08

## 2025-06-16 NOTE — PROGRESS NOTE ADULT - SUBJECTIVE AND OBJECTIVE BOX
CC: CIDP    Today's Subjective & Objective Findings:  Patient seen and examined in therapy this AM.   Admits to sleeping well.  Hip pain and RLE weakness significantly reduced  LE strength improving.  Discussed practicing stair bumping prior to DC.   Plan for DC home tomorrow.     Denies headache, dizziness, visual changes, chest pain, SOB/RUBIO, abdominal pain, nausea, vomiting, diarrhea, dysuria. LBM- 6/16  + numbness/tingling in BL LEs.    Function  Went over ambulation, transfers, and verbal stair negotiation. PT demonstrated  ace wrapping bilateral feet due to patient waiting for custom AFO and safety  concerns with pt standing and buckling bilaterally. PT recommends SCARLET but pt  wife refused. PT recommends WC only mobility.    Ambulated in // bars with 10 ft bilateral DF A min A with WC follow. Noted to  scissor and decreased sensation in bilateral feet and increased weight shift  anterior tilt to help lock knees.  sit to/from stand with // bars min A x 1    Vital Signs Last 24 Hrs  T(C): 36.5 (16 Jun 2025 07:57), Max: 36.9 (15 Romario 2025 19:54)  T(F): 97.7 (16 Jun 2025 07:57), Max: 98.5 (15 Romario 2025 19:54)  HR: 93 (16 Jun 2025 07:57) (93 - 115)  BP: 153/89 (16 Jun 2025 07:57) (131/74 - 153/89)  BP(mean): --  RR: 16 (16 Jun 2025 07:57) (16 - 16)  SpO2: 100% (16 Jun 2025 07:57) (99% - 100%)    PHYSICAL EXAM  Constitutional -,Comfortable  Neck - Supple  Cardiovascular - Palpable peripheral pulses   Respiratory/Chest- Symmetrical chest expansion, No dyspnea  Abdomen - Soft, Non-tender  Extremities - No cyanosis, No edema,     Neurologic Exam - Alert, Oriented, Interactive  Sensory - Light touch sensation intact  Motor Function - Moves all extremities spontaneously, except for limited strength both ankles and Rt hip and, as noted today reduced strength Rt knee extension  UE 4/5  Left leg 4/5 except DF 1/5 and PF 3/5  Rt leg  2/5, Knee extension 3/5 DF 1/5 PF 3/5  Skin -  diffuse numbness     LABS:                        12.4   7.97  )-----------( 206      ( 16 Jun 2025 07:00 )             35.7     06-16    140  |  103  |  16  ----------------------------<  179[H]  4.8   |  35[H]  |  0.72    Ca    9.4      16 Jun 2025 07:00    TPro  6.9  /  Alb  3.1[L]  /  TBili  0.3  /  DBili  x   /  AST  37  /  ALT  80[H]  /  AlkPhos  88  06-16      Urinalysis Basic - ( 16 Jun 2025 07:00 )    Color: x / Appearance: x / SG: x / pH: x  Gluc: 179 mg/dL / Ketone: x  / Bili: x / Urobili: x   Blood: x / Protein: x / Nitrite: x   Leuk Esterase: x / RBC: x / WBC x   Sq Epi: x / Non Sq Epi: x / Bacteria: x      CAPILLARY BLOOD GLUCOSE      POCT Blood Glucose.: 189 mg/dL (16 Jun 2025 08:01)  POCT Blood Glucose.: 139 mg/dL (16 Jun 2025 02:40)  POCT Blood Glucose.: 112 mg/dL (16 Jun 2025 00:59)  POCT Blood Glucose.: 478 mg/dL (15 Romario 2025 22:11)  POCT Blood Glucose.: 473 mg/dL (15 Romario 2025 17:16)  POCT Blood Glucose.: 247 mg/dL (15 Romario 2025 12:15)        MEDICATIONS  (STANDING):  amLODIPine   Tablet 5 milliGRAM(s) Oral daily  atorvastatin 10 milliGRAM(s) Oral at bedtime  dextrose 5%. 1000 milliLiter(s) (100 mL/Hr) IV Continuous <Continuous>  dextrose 5%. 1000 milliLiter(s) (50 mL/Hr) IV Continuous <Continuous>  dextrose 50% Injectable 25 Gram(s) IV Push once  dextrose 50% Injectable 12.5 Gram(s) IV Push once  dextrose 50% Injectable 25 Gram(s) IV Push once  DULoxetine 60 milliGRAM(s) Oral daily  enoxaparin Injectable 40 milliGRAM(s) SubCutaneous every 24 hours  gabapentin 800 milliGRAM(s) Oral every 8 hours  glucagon  Injectable 1 milliGRAM(s) IntraMuscular once  insulin glargine Injectable (LANTUS) 25 Unit(s) SubCutaneous at bedtime  insulin lispro (ADMELOG) corrective regimen sliding scale   SubCutaneous at bedtime  insulin lispro (ADMELOG) corrective regimen sliding scale   SubCutaneous three times a day before meals  insulin lispro Injectable (ADMELOG) 10 Unit(s) SubCutaneous three times a day before meals  lidocaine   4% Patch 1 Patch Transdermal <User Schedule>  losartan 50 milliGRAM(s) Oral daily  melatonin 6 milliGRAM(s) Oral at bedtime  multivitamin 1 Tablet(s) Oral daily  pantoprazole    Tablet 40 milliGRAM(s) Oral before breakfast  polyethylene glycol 3350 17 Gram(s) Oral two times a day  predniSONE   Tablet   Oral   predniSONE   Tablet 60 milliGRAM(s) Oral daily  senna 2 Tablet(s) Oral at bedtime      MEDICATIONS  (PRN):  acetaminophen     Tablet .. 650 milliGRAM(s) Oral every 6 hours PRN Mild Pain (1 - 3)  dextrose Oral Gel 15 Gram(s) Oral once PRN Blood Glucose LESS THAN 70 milliGRAM(s)/deciliter  methocarbamol 750 milliGRAM(s) Oral three times a day PRN Muscle Spasm   CC: CIDP    Today's Subjective & Objective Findings:  Patient seen and examined. Observed in therapy this AM.   Admits to sleeping well.  Hip pain and RLE weakness significantly reduced  LE strength improving.  Discussed practicing stair bumping prior to DC.   Plan for DC home tomorrow.     Denies headache, dizziness, visual changes, chest pain, SOB/RUBIO, abdominal pain, nausea, vomiting, diarrhea, dysuria. LBM- 6/16  + numbness/tingling in BL LEs.    Function  [place] transfer bed > w/c and w/c <> mat with CS  Bed mobility with CS  Pt independent with w/c mobility, propelling 200 feet +  Therapeutic Exercise  Refer to exercise documentation above for details regarding treatment completed.    Vital Signs Last 24 Hrs  T(C): 36.5 (16 Jun 2025 07:57), Max: 36.9 (15 Romario 2025 19:54)  T(F): 97.7 (16 Jun 2025 07:57), Max: 98.5 (15 Romario 2025 19:54)  HR: 93 (16 Jun 2025 07:57) (93 - 115)  BP: 153/89 (16 Jun 2025 07:57) (131/74 - 153/89)  RR: 16 (16 Jun 2025 07:57) (16 - 16)  SpO2: 100% (16 Jun 2025 07:57) (99% - 100%)    PHYSICAL EXAM  Constitutional -,Comfortable  Neck - Supple  Cardiovascular - Palpable peripheral pulses   Respiratory/Chest- Symmetrical chest expansion, No dyspnea  Abdomen - Soft, Non-tender  Extremities - No cyanosis, No edema,     Neurologic Exam - Alert, Oriented, Interactive  Sensory - Light touch sensation intact  Motor Function - Moves all extremities spontaneously, except for limited strength both ankles and Rt hip and, as noted today reduced strength Rt knee extension  UE 4/5  Left leg 4/5 except DF 4/5 and PF 3/5  Rt leg  2/5, Knee extension 3/5 DF 1/5 PF 3/5  Skin -  diffuse numbness, significantly reduced    LABS:                        12.4   7.97  )-----------( 206      ( 16 Jun 2025 07:00 )             35.7     06-16    140  |  103  |  16  ----------------------------<  179[H]  4.8   |  35[H]  |  0.72    Ca    9.4      16 Jun 2025 07:00    TPro  6.9  /  Alb  3.1[L]  /  TBili  0.3  /  DBili  x   /  AST  37  /  ALT  80[H]  /  AlkPhos  88  06-16      Urinalysis Basic - ( 16 Jun 2025 07:00 )    Color: x / Appearance: x / SG: x / pH: x  Gluc: 179 mg/dL / Ketone: x  / Bili: x / Urobili: x   Blood: x / Protein: x / Nitrite: x   Leuk Esterase: x / RBC: x / WBC x   Sq Epi: x / Non Sq Epi: x / Bacteria: x      CAPILLARY BLOOD GLUCOSE      POCT Blood Glucose.: 189 mg/dL (16 Jun 2025 08:01)  POCT Blood Glucose.: 139 mg/dL (16 Jun 2025 02:40)  POCT Blood Glucose.: 112 mg/dL (16 Jun 2025 00:59)  POCT Blood Glucose.: 478 mg/dL (15 Romario 2025 22:11)  POCT Blood Glucose.: 473 mg/dL (15 Romario 2025 17:16)  POCT Blood Glucose.: 247 mg/dL (15 Romario 2025 12:15)        MEDICATIONS  (STANDING):  amLODIPine   Tablet 5 milliGRAM(s) Oral daily  atorvastatin 10 milliGRAM(s) Oral at bedtime  dextrose 5%. 1000 milliLiter(s) (100 mL/Hr) IV Continuous <Continuous>  dextrose 5%. 1000 milliLiter(s) (50 mL/Hr) IV Continuous <Continuous>  dextrose 50% Injectable 25 Gram(s) IV Push once  dextrose 50% Injectable 12.5 Gram(s) IV Push once  dextrose 50% Injectable 25 Gram(s) IV Push once  DULoxetine 60 milliGRAM(s) Oral daily  enoxaparin Injectable 40 milliGRAM(s) SubCutaneous every 24 hours  gabapentin 800 milliGRAM(s) Oral every 8 hours  glucagon  Injectable 1 milliGRAM(s) IntraMuscular once  insulin glargine Injectable (LANTUS) 25 Unit(s) SubCutaneous at bedtime  insulin lispro (ADMELOG) corrective regimen sliding scale   SubCutaneous at bedtime  insulin lispro (ADMELOG) corrective regimen sliding scale   SubCutaneous three times a day before meals  insulin lispro Injectable (ADMELOG) 10 Unit(s) SubCutaneous three times a day before meals  lidocaine   4% Patch 1 Patch Transdermal <User Schedule>  losartan 50 milliGRAM(s) Oral daily  melatonin 6 milliGRAM(s) Oral at bedtime  multivitamin 1 Tablet(s) Oral daily  pantoprazole    Tablet 40 milliGRAM(s) Oral before breakfast  polyethylene glycol 3350 17 Gram(s) Oral two times a day  predniSONE   Tablet   Oral   predniSONE   Tablet 60 milliGRAM(s) Oral daily  senna 2 Tablet(s) Oral at bedtime      MEDICATIONS  (PRN):  acetaminophen     Tablet .. 650 milliGRAM(s) Oral every 6 hours PRN Mild Pain (1 - 3)  dextrose Oral Gel 15 Gram(s) Oral once PRN Blood Glucose LESS THAN 70 milliGRAM(s)/deciliter  methocarbamol 750 milliGRAM(s) Oral three times a day PRN Muscle Spasm

## 2025-06-16 NOTE — PROGRESS NOTE ADULT - NS ATTEND AMEND GEN_ALL_CORE FT
I have made amendments to the documentation where necessary. Additional comments: I have personally seen and examined the patient independently Medical records reviewed. I have made amendments to the documentation where necessary and adjusted the history, physical examination, and plan as documented by the NP.     Patient reports improved pain control  Improved engagement in therapy   Slight improvement of motor strength mainly left leg, with steroid treatment  Still has significant LE weakness R>L    Labs unremarkable     Await AFO  dvt ppx  Continue tapering steroids  DC planning   Home dc tomorrow and f/u with neurology, Orthotist

## 2025-06-16 NOTE — DISCHARGE NOTE NURSING/CASE MANAGEMENT/SOCIAL WORK - FINANCIAL ASSISTANCE
[facility] provides services at a reduced cost to those who are determined to be eligible through [facility]’s financial assistance program. Information regarding [facility]’s financial assistance program can be found by going to [email or web address] or by calling [phone].

## 2025-06-16 NOTE — DISCHARGE NOTE NURSING/CASE MANAGEMENT/SOCIAL WORK - PATIENT PORTAL LINK FT
You can access the FollowMyHealth Patient Portal offered by [facility] by registering at the following website: [email or web address]. By joining [facility]’s FollowMyHealth portal, you will also be able to view your health information using other applications (apps) compatible with our system.

## 2025-06-16 NOTE — DISCHARGE NOTE NURSING/CASE MANAGEMENT/SOCIAL WORK - NSDCFUADDAPPT_GEN_ALL_CORE_FT
Scripts provided for outpatient PT/OT.    Please follow up with your PCP as soon as possible.    If you are in need of a PCP or a specialist in your area: contact the [facility] Physician Referral Service at [phone].

## 2025-06-16 NOTE — PROGRESS NOTE ADULT - ASSESSMENT
59 year old male with PMH of ethanol use disorder, chronic pancreatitis, poorly controlled СЕРГЕЙ (A1c 9.9 on 04/23/2025), chronic neuropathy of LLE, recent hospitalization April 2025 for sciatica; who presented to [facility] ED on 5/26/25 for progressively worsening BL LE pain, weakness and numbness. He was evaluated by Neurology and underwent bedside EMG which was significant for CIDP (s/p 5 days IVIG and IV steroids). He developed an episode of confusion/AMS (CT Head negative). His hospital course was significant for hyponatremia, and hyperglycemia (both now resolved). Patient now admitted for a multidisciplinary rehab program. 06-03-25 @ 14:29    * BL foot drop - await BL AFOs  * RLE ROM improved with steroid taper  * Patient and wife both prefer DC home - DC home 6/17     #CIDP  - Gait Instability, ADL impairments and Functional impairments: start Comprehensive Rehab Program of PT/OT- 3 hours a day, 5 days a week  - P&O as needed--orthotics referral   - Last IVIG and Methylprednisolone on 6/1 - Interval functional declined--decreased strength Rt hip 1/5 and knee 2/5 (last dose of PLEX 6/1) - on steroid taper per Neuro - improved ROM     #R hip pain   - R hip XR (6/10) with mild degenerative changes, increased from prior     # BL foot drop  - Orthotic eval for BL hinged AFOs   - This patient has a diagnosis of BL foot drop. Patient is ambulatory. Patient requires a custom molded hinged AFO to preserve ankle motion while stabilizing talar and subtalar joints. Device will give a more natural gait without destabilizing the knee and hip. Device requires a low density lining to protect prominent louis areas of effected skin. Patient will need the device for a term of greater than 9 months. No reasonable prefabricated orthosis exists.     #HTN/HLD  - Amlodipine 10mg daily  - Atorvastatin 10mg at HS  - Losartan 50mg daily - (Home regimen: Telmisartan 40mg daily)    #Latent Autoimmune Diabetes Mellitus  - A1c: 9.9% (April 2025)   - FS AC & HS  - Premeal  - Lantus     #Electrolyte Imbalance  - Monitor CMP  - Appreciate ongoing hospitalist recs     #Left ear fullness - resolved   - s/p Debrox   - S/p cerumen removal with ENT on 6/11     #Mood  - Duloxetine 60mg daily     #Skin  - Skin  - Pressure injury/Skin: OOB to Chair, PT/OT     #Pain Mgmt   #Neuropathy  #Stiffness/Spasiticity  - Gabapentin 600mg TID   - Lidocaine patch daily  - PRN: Methocarbamol 750mg TID; Tylenol 650mg QID    #GI/Bowel Mgmt   - Pantoprazole   - Senna     #/Bladder Mgmt --voiding     #FEN   - Diet - Regular + Thins  [CCHO]    - Fluid Restriction: 1200mL  - MVI    #Falls history  - Osteoporosis labs - WNL    #Precautions / PROPHYLAXIS:   - Falls  - ortho: Weight bearing status: WBAT   - Lungs: Aspiration, Incentive Spirometer   - DVT: Lovenox 40 mg daily   ----------------------------------------------  IDT 6/12  Ambulating 30ft RW mod assist wc follow through 2 stairs 2 hand rails  Slight functional decline, ambulating with likghtgait, non functional ambulation  Rt leg weak, but improving today   b/l foot drop and Rt hip weakness  Await orthotist  Est dc 6/17 to home vs [facility] due to significant LE weakness  But patient preferring home DC  Family training due tomorrow with wife (all other family members live in [place])  ----------------------------------------------    Dr. LOPEZ's Liaison with Family/Providers:  6/13--I called on ph and d/w patient's wife, clinical and functional updates: Current steroid treatment as recs by neurology, persisting LE weakness, risk of falls, if unsupervised, especially post dc  Wife reports that she will be available to supervise patient as she works from home. Secondly she has made arrangement for patient to get ambulatory OT/PT from [facility] rehab facility,very close to their home  Thirdly, she is attended family training and got teaching on assistance to patient especially during stairs navigation as they have multiple stairs at home  She was happy with the update, treatments and dc plan    -----------------------------------------------  OUTPATIENT/FOLLOW UP:    Mirza Radford   Neurology    Bárbara Hays  Neurology  [address] [place] [address], [address]  [place] [address]  Phone: [phone]  Fax: [phone]  Established Patient  Follow Up Time:     [facility] Endocr[facility]  Endocrinology  [address] [place] [address]  Phone: [phone]  Fax:     Scheduled Appts:  Mirza Radford  [facility]  NEUROLOGY [address] [facility]  Scheduled Appointment: 06/18/2025    [facility]  ENDOCRIN [address]  Scheduled Appointment: 06/20/2025    Aniyah Julian  [facility]  RHEUM [address] [facility] [address]  Scheduled Appointment: 07/29/2025    [facility]  ONCPAINMGT [address] [place] [address]   Scheduled Appointment: 08/11/2025    ----------------------------------------------- 59 year old male with PMH of ethanol use disorder, chronic pancreatitis, poorly controlled СЕРГЕЙ (A1c 9.9 on 04/23/2025), chronic neuropathy of LLE, recent hospitalization April 2025 for sciatica; who presented to [facility] ED on 5/26/25 for progressively worsening BL LE pain, weakness and numbness. He was evaluated by Neurology and underwent bedside EMG which was significant for CIDP (s/p 5 days IVIG and IV steroids). He developed an episode of confusion/AMS (CT Head negative). His hospital course was significant for hyponatremia, and hyperglycemia (both now resolved). Patient now admitted for a multidisciplinary rehab program. 06-03-25 @ 14:29    * Labs unremarkable  * RLE ROM improved with steroid taper, will continue and f/u with neurology post dc   * Patient and wife both prefer DC home and have made appropriate arrangements for ambulatory therapy and stairs nagivation - DC home 6/17   Therapy--practice stairs navigation today     #CIDP  - Gait Instability, ADL impairments and Functional impairments: start Comprehensive Rehab Program of PT/OT- 3 hours a day, 5 days a week  - P&O as needed--orthotics referral   - Last IVIG and Methylprednisolone on 6/1 - Interval functional declined--decreased strength Rt hip 1/5 and knee 2/5 (last dose of PLEX 6/1) - on steroid taper per Neuro - improved ROM     #R hip pain   - R hip XR (6/10) with mild degenerative changes, increased from prior     # BL foot drop  - Orthotic eval for BL hinged AFOs   - This patient has a diagnosis of BL foot drop. Patient is ambulatory. Patient requires a custom molded hinged AFO to preserve ankle motion while stabilizing talar and subtalar joints. Device will give a more natural gait without destabilizing the knee and hip. Device requires a low density lining to protect prominent louis areas of effected skin. Patient will need the device for a term of greater than 9 months. No reasonable prefabricated orthosis exists.     #HTN/HLD  - Amlodipine 10mg daily  - Atorvastatin 10mg at HS  - Losartan 50mg daily - (Home regimen: Telmisartan 40mg daily)    #Latent Autoimmune Diabetes Mellitus  - A1c: 9.9% (April 2025)   - FS AC & HS  - Premeal  - Lantus     #Electrolyte Imbalance  - Monitor CMP  - Appreciate ongoing hospitalist recs     #Left ear fullness - resolved   - s/p Debrox   - S/p cerumen removal with ENT on 6/11     #Mood  - Duloxetine 60mg daily     #Skin  - Skin  - Pressure injury/Skin: OOB to Chair, PT/OT     #Pain Mgmt   #Neuropathy  #Stiffness/Spasiticity  - Gabapentin 600mg TID   - Lidocaine patch daily  - PRN: Methocarbamol 750mg TID; Tylenol 650mg QID    #GI/Bowel Mgmt   - Pantoprazole   - Senna     #/Bladder Mgmt --voiding     #FEN   - Diet - Regular + Thins  [CCHO]    - Fluid Restriction: 1200mL  - MVI    #Falls history  - Osteoporosis labs - WNL    #Precautions / PROPHYLAXIS:   - Falls  - ortho: Weight bearing status: WBAT   - Lungs: Aspiration, Incentive Spirometer   - DVT: Lovenox 40 mg daily   ----------------------------------------------  IDT 6/12  Ambulating 30ft RW mod assist wc follow through 2 stairs 2 hand rails  Slight functional decline, ambulating with likghtgait, non functional ambulation  Rt leg weak, but improving today   b/l foot drop and Rt hip weakness  Await orthotist  Est dc 6/17 to home vs [facility] due to significant LE weakness  But patient preferring home DC  Family training due tomorrow with wife (all other family members live in [place])  ----------------------------------------------    Dr. LOPEZ's Liaison with Family/Providers:  6/13--I called on ph and d/w patient's wife, clinical and functional updates: Current steroid treatment as recs by neurology, persisting LE weakness, risk of falls, if unsupervised, especially post dc  Wife reports that she will be available to supervise patient as she works from home. Secondly she has made arrangement for patient to get ambulatory OT/PT from [facility] rehab facility,very close to their home  Thirdly, she is attended family training and got teaching on assistance to patient especially during stairs navigation as they have multiple stairs at home  She was happy with the update, treatments and dc plan    -----------------------------------------------  OUTPATIENT/FOLLOW UP:    Mirza Radford   Neurology    Bárbara Hays  Neurology  [address] [facility], [address]  [place] [address]  Phone: [phone]  Fax: [phone]  Established Patient  Follow Up Time:     [facility] Endocrinology  Endocrinology  [address] [place] [address]  Phone: [phone]  Fax:     Scheduled Appts:  Mirza Radford  [facility] Physician [facility]  NEUROLOGY [address] [facility]  Scheduled Appointment: 06/18/2025    [facility]  ENDOCRIN [address]  Scheduled Appointment: 06/20/2025    Aniyah Julian  [facility] Physician [facility]  [address]UM [address] [facility]  Scheduled Appointment: 07/29/2025    [facility]  ONCPAINMGT [address] [place] [address]   Scheduled Appointment: 08/11/2025    -----------------------------------------------

## 2025-06-17 VITALS — DIASTOLIC BLOOD PRESSURE: 78 MMHG | SYSTOLIC BLOOD PRESSURE: 160 MMHG | HEART RATE: 80 BPM

## 2025-06-17 LAB — GLUCOSE BLDC GLUCOMTR-MCNC: 180 MG/DL — HIGH (ref 70–99)

## 2025-06-17 PROCEDURE — 97542 WHEELCHAIR MNGMENT TRAINING: CPT | Mod: GP

## 2025-06-17 PROCEDURE — 73501 X-RAY EXAM HIP UNI 1 VIEW: CPT

## 2025-06-17 PROCEDURE — 87635 SARS-COV-2 COVID-19 AMP PRB: CPT

## 2025-06-17 PROCEDURE — 84443 ASSAY THYROID STIM HORMONE: CPT

## 2025-06-17 PROCEDURE — 36415 COLL VENOUS BLD VENIPUNCTURE: CPT

## 2025-06-17 PROCEDURE — 83036 HEMOGLOBIN GLYCOSYLATED A1C: CPT

## 2025-06-17 PROCEDURE — 83970 ASSAY OF PARATHORMONE: CPT

## 2025-06-17 PROCEDURE — 97110 THERAPEUTIC EXERCISES: CPT | Mod: GP

## 2025-06-17 PROCEDURE — 82652 VIT D 1 25-DIHYDROXY: CPT

## 2025-06-17 PROCEDURE — 82310 ASSAY OF CALCIUM: CPT

## 2025-06-17 PROCEDURE — 82306 VITAMIN D 25 HYDROXY: CPT

## 2025-06-17 PROCEDURE — 97535 SELF CARE MNGMENT TRAINING: CPT | Mod: GO

## 2025-06-17 PROCEDURE — 97116 GAIT TRAINING THERAPY: CPT | Mod: GP

## 2025-06-17 PROCEDURE — 99239 HOSP IP/OBS DSCHRG MGMT >30: CPT

## 2025-06-17 PROCEDURE — 80048 BASIC METABOLIC PNL TOTAL CA: CPT

## 2025-06-17 PROCEDURE — 85025 COMPLETE CBC W/AUTO DIFF WBC: CPT

## 2025-06-17 PROCEDURE — 82607 VITAMIN B-12: CPT

## 2025-06-17 PROCEDURE — 82947 ASSAY GLUCOSE BLOOD QUANT: CPT

## 2025-06-17 PROCEDURE — 97530 THERAPEUTIC ACTIVITIES: CPT | Mod: GO

## 2025-06-17 PROCEDURE — 82962 GLUCOSE BLOOD TEST: CPT

## 2025-06-17 PROCEDURE — 97112 NEUROMUSCULAR REEDUCATION: CPT | Mod: GP

## 2025-06-17 PROCEDURE — 80053 COMPREHEN METABOLIC PANEL: CPT

## 2025-06-17 PROCEDURE — 97165 OT EVAL LOW COMPLEX 30 MIN: CPT | Mod: GO

## 2025-06-17 PROCEDURE — 97161 PT EVAL LOW COMPLEX 20 MIN: CPT | Mod: GP

## 2025-06-17 RX ORDER — INSULIN ASPART 100 [IU]/ML
8 INJECTION, SOLUTION INTRAVENOUS; SUBCUTANEOUS
Qty: 600 | Refills: 0
Start: 2025-06-17 | End: 2025-07-16

## 2025-06-17 RX ADMIN — Medication 40 MILLIGRAM(S): at 05:40

## 2025-06-17 RX ADMIN — GABAPENTIN 800 MILLIGRAM(S): 400 CAPSULE ORAL at 05:38

## 2025-06-17 RX ADMIN — INSULIN LISPRO 1: 100 INJECTION, SOLUTION INTRAVENOUS; SUBCUTANEOUS at 08:07

## 2025-06-17 RX ADMIN — LOSARTAN POTASSIUM 50 MILLIGRAM(S): 100 TABLET, FILM COATED ORAL at 05:40

## 2025-06-17 RX ADMIN — PREDNISONE 60 MILLIGRAM(S): 20 TABLET ORAL at 05:51

## 2025-06-17 RX ADMIN — INSULIN LISPRO 10 UNIT(S): 100 INJECTION, SOLUTION INTRAVENOUS; SUBCUTANEOUS at 08:08

## 2025-06-17 RX ADMIN — AMLODIPINE BESYLATE 5 MILLIGRAM(S): 10 TABLET ORAL at 05:39

## 2025-06-17 NOTE — PROGRESS NOTE ADULT - NS ATTEND AMEND GEN_ALL_CORE FT
I have made amendments to the documentation where necessary. Additional comments: I have personally seen and examined the patient independently Medical records reviewed. I have made amendments to the documentation where necessary and adjusted the history, physical examination, and plan as documented by the NP.     No med complaint   Slept well  Had regular BM yesterday    Happy with post dc plan including family training as completed with his family   Safety issues, falls prevention especially during stairs negotiating as discussed    Dc home today   Ambulatory therapy post dc  F/u with neurology   Complete steroid taper

## 2025-06-17 NOTE — PROGRESS NOTE ADULT - PROVIDER SPECIALTY LIST ADULT
Hospitalist
Rehab Medicine
Hospitalist
Hospitalist
Rehab Medicine
Hospitalist
Hospitalist
Rehab Medicine
Hospitalist
Rehab Medicine
Rehab Medicine
Hospitalist

## 2025-06-17 NOTE — PROGRESS NOTE ADULT - SUBJECTIVE AND OBJECTIVE BOX
CC: CIDP    Today's Subjective & Objective Findings:  Patient seen and examined. Observed in therapy this AM.   Admits to sleeping well.  No overnight complaints.   DC home today.     Denies headache, dizziness, visual changes, chest pain, SOB/RUBIO, abdominal pain, nausea, vomiting, diarrhea, dysuria. LBM- 6/16  + numbness/tingling in BL LEs.    Function  Popover transfer bed > w/c and w/c <> mat with CS  Bed mobility with CS  Pt independent with w/c mobility, propelling 200 feet +  Therapeutic Exercise  Refer to exercise documentation above for details regarding treatment completed.    Vital Signs Last 24 Hrs  T(C): 36.7 (16 Jun 2025 21:05), Max: 36.7 (16 Jun 2025 21:05)  T(F): 98.1 (16 Jun 2025 21:05), Max: 98.1 (16 Jun 2025 21:05)  HR: 80 (17 Jun 2025 05:30) (80 - 94)  BP: 160/78 (17 Jun 2025 05:30) (160/78 - 161/68)  BP(mean): --  RR: 16 (16 Jun 2025 21:05) (16 - 16)  SpO2: 98% (16 Jun 2025 21:05) (98% - 98%)    PHYSICAL EXAM  Constitutional -,Comfortable  Neck - Supple  Cardiovascular - Palpable peripheral pulses   Respiratory/Chest- Symmetrical chest expansion, No dyspnea  Abdomen - Soft, Non-tender  Extremities - No cyanosis, No edema,     Neurologic Exam - Alert, Oriented, Interactive  Sensory - Light touch sensation intact  Motor Function - Moves all extremities spontaneously, except for limited strength both ankles and Rt hip and, as noted today reduced strength Rt knee extension  UE 4/5  Left leg 4/5 except DF 4/5 and PF 3/5  Rt leg  2/5, Knee extension 3/5 DF 1/5 PF 3/5  Skin -  diffuse numbness, significantly reduced    LABS:                        12.4   7.97  )-----------( 206      ( 16 Jun 2025 07:00 )             35.7     06-16    140  |  103  |  16  ----------------------------<  179[H]  4.8   |  35[H]  |  0.72    Ca    9.4      16 Jun 2025 07:00    TPro  6.9  /  Alb  3.1[L]  /  TBili  0.3  /  DBili  x   /  AST  37  /  ALT  80[H]  /  AlkPhos  88  06-16      Urinalysis Basic - ( 16 Jun 2025 07:00 )    Color: x / Appearance: x / SG: x / pH: x  Gluc: 179 mg/dL / Ketone: x  / Bili: x / Urobili: x   Blood: x / Protein: x / Nitrite: x   Leuk Esterase: x / RBC: x / WBC x   Sq Epi: x / Non Sq Epi: x / Bacteria: x      CAPILLARY BLOOD GLUCOSE      POCT Blood Glucose.: 180 mg/dL (17 Jun 2025 08:06)  POCT Blood Glucose.: 258 mg/dL (16 Jun 2025 21:16)  POCT Blood Glucose.: 369 mg/dL (16 Jun 2025 17:24)  POCT Blood Glucose.: 213 mg/dL (16 Jun 2025 12:07)      MEDICATIONS  (STANDING):  amLODIPine   Tablet 5 milliGRAM(s) Oral daily  atorvastatin 10 milliGRAM(s) Oral at bedtime  dextrose 5%. 1000 milliLiter(s) (100 mL/Hr) IV Continuous <Continuous>  dextrose 5%. 1000 milliLiter(s) (50 mL/Hr) IV Continuous <Continuous>  dextrose 50% Injectable 25 Gram(s) IV Push once  dextrose 50% Injectable 12.5 Gram(s) IV Push once  dextrose 50% Injectable 25 Gram(s) IV Push once  DULoxetine 60 milliGRAM(s) Oral daily  enoxaparin Injectable 40 milliGRAM(s) SubCutaneous every 24 hours  gabapentin 800 milliGRAM(s) Oral every 8 hours  glucagon  Injectable 1 milliGRAM(s) IntraMuscular once  insulin glargine Injectable (LANTUS) 25 Unit(s) SubCutaneous at bedtime  insulin lispro (ADMELOG) corrective regimen sliding scale   SubCutaneous three times a day before meals  insulin lispro (ADMELOG) corrective regimen sliding scale   SubCutaneous at bedtime  insulin lispro Injectable (ADMELOG) 10 Unit(s) SubCutaneous three times a day before meals  lidocaine   4% Patch 1 Patch Transdermal <User Schedule>  losartan 50 milliGRAM(s) Oral daily  melatonin 6 milliGRAM(s) Oral at bedtime  multivitamin 1 Tablet(s) Oral daily  pantoprazole    Tablet 40 milliGRAM(s) Oral before breakfast  polyethylene glycol 3350 17 Gram(s) Oral two times a day  predniSONE   Tablet   Oral   senna 2 Tablet(s) Oral at bedtime    MEDICATIONS  (PRN):  acetaminophen     Tablet .. 650 milliGRAM(s) Oral every 6 hours PRN Mild Pain (1 - 3)  dextrose Oral Gel 15 Gram(s) Oral once PRN Blood Glucose LESS THAN 70 milliGRAM(s)/deciliter  methocarbamol 750 milliGRAM(s) Oral three times a day PRN Muscle Spasm

## 2025-06-17 NOTE — PROGRESS NOTE ADULT - REASON FOR ADMISSION
CIDP

## 2025-06-17 NOTE — PROGRESS NOTE ADULT - ASSESSMENT
59 year old male with PMH of ethanol use disorder, chronic pancreatitis, poorly controlled СЕРГЕЙ (A1c 9.9 on 04/23/2025), chronic neuropathy of LLE, recent hospitalization April 2025 for sciatica; who presented to [facility] ED on 5/26/25 for progressively worsening BL LE pain, weakness and numbness. He was evaluated by Neurology and underwent bedside EMG which was significant for CIDP (s/p 5 days IVIG and IV steroids). He developed an episode of confusion/AMS (CT Head negative). His hospital course was significant for hyponatremia, and hyperglycemia (both now resolved). Patient now admitted for a multidisciplinary rehab program. 06-03-25 @ 14:29      * RLE ROM improved with steroid taper, will continue and f/u with neurology post dc   * Patient and wife both prefer DC home and have made appropriate arrangements for ambulatory therapy and stairs navigation - DC home today        #CIDP  - Gait Instability, ADL impairments and Functional impairments: start Comprehensive Rehab Program of PT/OT- 3 hours a day, 5 days a week  - P&O as needed--orthotics referral   - Last IVIG and Methylprednisolone on 6/1 - Interval functional declined--decreased strength Rt hip 1/5 and knee 2/5 (last dose of PLEX 6/1) - on steroid taper per Neuro - improved ROM     #R hip pain   - R hip XR (6/10) with mild degenerative changes, increased from prior     # BL foot drop  - Orthotic eval for BL hinged AFOs   - This patient has a diagnosis of BL foot drop. Patient is ambulatory. Patient requires a custom molded hinged AFO to preserve ankle motion while stabilizing talar and subtalar joints. Device will give a more natural gait without destabilizing the knee and hip. Device requires a low density lining to protect prominent louis areas of effected skin. Patient will need the device for a term of greater than 9 months. No reasonable prefabricated orthosis exists.     #HTN/HLD  - Amlodipine 10mg daily  - Atorvastatin 10mg at HS  - Losartan 50mg daily - (Home regimen: Telmisartan 40mg daily)    #Latent Autoimmune Diabetes Mellitus  - A1c: 9.9% (April 2025)   - FS AC & HS  - Premeal  - Lantus     #Electrolyte Imbalance  - Monitor CMP  - Appreciate ongoing hospitalist recs     #Left ear fullness - resolved   - s/p Debrox   - S/p cerumen removal with ENT on 6/11     #Mood  - Duloxetine 60mg daily     #Skin  - Skin  - Pressure injury/Skin: OOB to Chair, PT/OT     #Pain Mgmt   #Neuropathy  #Stiffness/Spasiticity  - Gabapentin 600mg TID   - Lidocaine patch daily  - PRN: Methocarbamol 750mg TID; Tylenol 650mg QID    #GI/Bowel Mgmt   - Pantoprazole   - Senna     #/Bladder Mgmt --voiding     #FEN   - Diet - Regular + Thins  [CCHO]    - Fluid Restriction: 1200mL  - MVI    #Falls history  - Osteoporosis labs - WNL    #Precautions / PROPHYLAXIS:   - Falls  - ortho: Weight bearing status: WBAT   - Lungs: Aspiration, Incentive Spirometer   - DVT: Lovenox 40 mg daily   ----------------------------------------------  IDT 6/12  Ambulating 30ft RW mod assist wc follow through 2 stairs 2 hand rails  Slight functional decline, ambulating with likghtgait, non functional ambulation  Rt leg weak, but improving today   b/l foot drop and Rt hip weakness  Await orthotist  Est dc 6/17 to home vs [facility] due to significant LE weakness  But patient preferring home DC  Family training due tomorrow with wife (all other family members live in [place])  ----------------------------------------------    Dr. LOPEZ's Liaison with Family/Providers:  6/13--I called on ph and d/w patient's wife, clinical and functional updates: Current steroid treatment as recs by neurology, persisting LE weakness, risk of falls, if unsupervised, especially post dc  Wife reports that she will be available to supervise patient as she works from home. Secondly she has made arrangement for patient to get ambulatory OT/PT from [facility] rehab facility,very close to their home  Thirdly, she is attended family training and got teaching on assistance to patient especially during stairs navigation as they have multiple stairs at home  She was happy with the update, treatments and dc plan    -----------------------------------------------  OUTPATIENT/FOLLOW UP:    Mirza Radford   Neurology    Bárbara Hays  Neurology  [address], [address]  [place] [address]  Phone: [phone]  Fax: [phone]  Established Patient  Follow Up Time:     [facility] Endocrinology  Endocrinology  [address] [place] [address]  Phone: [phone]  Fax:     Scheduled Appts:  Mirza Radford  [facility] Physician [facility]  NEUROLOGY [address]  Scheduled Appointment: 06/18/2025    [facility]  ENDOCRIN [address]  Scheduled Appointment: 06/20/2025    Aniyah Julian  [facility]  RHEUM [address]  Scheduled Appointment: 07/29/2025    [facility]  ONCPAINMGT [address] [place] [address]   Scheduled Appointment: 08/11/2025    ----------------------------------------------- 59 year old male with PMH of ethanol use disorder, chronic pancreatitis, poorly controlled СЕРГЕЙ (A1c 9.9 on 04/23/2025), chronic neuropathy of LLE, recent hospitalization April 2025 for sciatica; who presented to [facility] ED on 5/26/25 for progressively worsening BL LE pain, weakness and numbness. He was evaluated by Neurology and underwent bedside EMG which was significant for CIDP (s/p 5 days IVIG and IV steroids). He developed an episode of confusion/AMS (CT Head negative). His hospital course was significant for hyponatremia, and hyperglycemia (both now resolved). Patient now admitted for a multidisciplinary rehab program. 06-03-25 @ 14:29      * RLE ROM improved with steroid taper, will continue and f/u with neurology post dc   * Patient and wife both prefer DC home and have made appropriate arrangements for ambulatory therapy and stairs navigation - DC home today        #CIDP  - Gait Instability, ADL impairments and Functional impairments: start Comprehensive Rehab Program of PT/OT- 3 hours a day, 5 days a week  - P&O as needed--orthotics referral   - Last IVIG and Methylprednisolone on 6/1 - Interval functional declined--decreased strength Rt hip 1/5 and knee 2/5 (last dose of PLEX 6/1) - on steroid taper per Neuro - improved ROM     #R hip pain   - R hip XR (6/10) with mild degenerative changes, increased from prior     # BL foot drop  - Orthotic eval for BL hinged AFOs   - This patient has a diagnosis of BL foot drop. Patient is ambulatory. Patient requires a custom molded hinged AFO to preserve ankle motion while stabilizing talar and subtalar joints. Device will give a more natural gait without destabilizing the knee and hip. Device requires a low density lining to protect prominent louis areas of effected skin. Patient will need the device for a term of greater than 9 months. No reasonable prefabricated orthosis exists.     #HTN/HLD  - Amlodipine 10mg daily  - Atorvastatin 10mg at HS  - Losartan 50mg daily - (Home regimen: Telmisartan 40mg daily)    #Latent Autoimmune Diabetes Mellitus  - A1c: 9.9% (April 2025)   - FS AC & HS  - Premeal  - Lantus     #Electrolyte Imbalance  - Monitor CMP  - Appreciate ongoing hospitalist recs     #Left ear fullness - resolved   - s/p Debrox   - S/p cerumen removal with ENT on 6/11     #Mood  - Duloxetine 60mg daily     #Skin  - Skin  - Pressure injury/Skin: OOB to Chair, PT/OT     #Pain Mgmt   #Neuropathy  #Stiffness/Spasiticity  - Gabapentin 600mg TID   - Lidocaine patch daily  - PRN:; Tylenol 650mg QID prn    #GI/Bowel Mgmt   - Pantoprazole   - Senna     #/Bladder Mgmt --voiding     #FEN   - Diet - Regular + Thins  [CCHO]    - Fluid Restriction: 1200mL  - MVI    #Falls history  - Osteoporosis labs - WNL    #Precautions / PROPHYLAXIS:   - Falls  - ortho: Weight bearing status: WBAT   - Lungs: Aspiration, Incentive Spirometer   - DVT: Lovenox 40 mg daily   ----------------------------------------------  IDT 6/12  Ambulating 30ft RW mod assist wc follow through 2 stairs 2 hand rails  Slight functional decline, ambulating with likghtgait, non functional ambulation  Rt leg weak, but improving today   b/l foot drop and Rt hip weakness  Await orthotist  Est dc 6/17 to home vs [facility] due to significant LE weakness  But patient preferring home DC  Family training due tomorrow with wife (all other family members live in [place])  ----------------------------------------------    Dr. LOPEZ's Liaison with Family/Providers:  6/13--I called on ph and d/w patient's wife, clinical and functional updates: Current steroid treatment as recs by neurology, persisting LE weakness, risk of falls, if unsupervised, especially post dc  Wife reports that she will be available to supervise patient as she works from home. Secondly she has made arrangement for patient to get ambulatory OT/PT from [facility] rehab facility,very close to their home  Thirdly, she is attended family training and got teaching on assistance to patient especially during stairs navigation as they have multiple stairs at home  She was happy with the update, treatments and dc plan    -----------------------------------------------  OUTPATIENT/FOLLOW UP:    Mirza Radford   Neurology    Bárbara Hays  Neurology  [address] [place] [address], [address]  [place] [address]  Phone: [phone]  Fax: [phone]  Established Patient  Follow Up Time:     [facility] Endocrinology  Endocrinology  [address] [place] [address]  Phone: [phone]  Fax:     Scheduled Appts:  Mirza Radford  [facility] Physician [facility]  NEUROLOGY [address] [facility]  Scheduled Appointment: 06/18/2025    [facility]  ENDOCRIN [address]  Scheduled Appointment: 06/20/2025    Aniyah Julian  [facility] Physician [facility]  RHEUM [address]  Scheduled Appointment: 07/29/2025    [facility]  ONCPAINMGT [address] [place] [address]   Scheduled Appointment: 08/11/2025    -----------------------------------------------

## 2025-06-18 ENCOUNTER — APPOINTMENT (OUTPATIENT)
Dept: NEUROLOGY | Facility: CLINIC | Age: 60
End: 2025-06-18

## 2025-06-20 ENCOUNTER — APPOINTMENT (OUTPATIENT)
Dept: ENDOCRINOLOGY | Facility: CLINIC | Age: 60
End: 2025-06-20

## 2025-06-20 VITALS
HEIGHT: 70 IN | DIASTOLIC BLOOD PRESSURE: 83 MMHG | SYSTOLIC BLOOD PRESSURE: 135 MMHG | HEART RATE: 101 BPM | OXYGEN SATURATION: 100 %

## 2025-06-20 PROCEDURE — 95251 CONT GLUC MNTR ANALYSIS I&R: CPT

## 2025-06-20 PROCEDURE — 99214 OFFICE O/P EST MOD 30 MIN: CPT

## 2025-07-10 ENCOUNTER — APPOINTMENT (OUTPATIENT)
Dept: NEUROLOGY | Facility: CLINIC | Age: 60
End: 2025-07-10

## 2025-07-10 PROCEDURE — 96366 THER/PROPH/DIAG IV INF ADDON: CPT

## 2025-07-10 PROCEDURE — 96365 THER/PROPH/DIAG IV INF INIT: CPT

## 2025-07-15 ENCOUNTER — RX RENEWAL (OUTPATIENT)
Age: 60
End: 2025-07-15

## 2025-07-21 ENCOUNTER — APPOINTMENT (OUTPATIENT)
Dept: NEUROLOGY | Facility: CLINIC | Age: 60
End: 2025-07-21

## 2025-07-29 ENCOUNTER — APPOINTMENT (OUTPATIENT)
Dept: RHEUMATOLOGY | Facility: CLINIC | Age: 60
End: 2025-07-29

## 2025-07-30 ENCOUNTER — APPOINTMENT (OUTPATIENT)
Dept: NEUROLOGY | Facility: CLINIC | Age: 60
End: 2025-07-30
Payer: COMMERCIAL

## 2025-07-30 VITALS
WEIGHT: 155 LBS | BODY MASS INDEX: 22.19 KG/M2 | HEIGHT: 70 IN | HEART RATE: 99 BPM | DIASTOLIC BLOOD PRESSURE: 75 MMHG | SYSTOLIC BLOOD PRESSURE: 125 MMHG

## 2025-07-30 DIAGNOSIS — G61.81 CHRONIC INFLAMMATORY DEMYELINATING POLYNEURITIS: ICD-10-CM

## 2025-07-30 PROCEDURE — 99214 OFFICE O/P EST MOD 30 MIN: CPT

## 2025-07-30 RX ORDER — PREDNISONE 10 MG/1
10 TABLET ORAL DAILY
Qty: 60 | Refills: 1 | Status: ACTIVE | COMMUNITY
Start: 2025-07-30 | End: 1900-01-01

## 2025-08-04 ENCOUNTER — RX RENEWAL (OUTPATIENT)
Age: 60
End: 2025-08-04

## 2025-08-07 ENCOUNTER — APPOINTMENT (OUTPATIENT)
Dept: NEUROLOGY | Facility: CLINIC | Age: 60
End: 2025-08-07

## 2025-08-07 ENCOUNTER — NON-APPOINTMENT (OUTPATIENT)
Age: 60
End: 2025-08-07

## 2025-08-07 PROCEDURE — 96366 THER/PROPH/DIAG IV INF ADDON: CPT

## 2025-08-07 PROCEDURE — 96365 THER/PROPH/DIAG IV INF INIT: CPT

## 2025-08-11 ENCOUNTER — APPOINTMENT (OUTPATIENT)
Dept: PAIN MANAGEMENT | Facility: CLINIC | Age: 60
End: 2025-08-11
Payer: COMMERCIAL

## 2025-08-11 VITALS
SYSTOLIC BLOOD PRESSURE: 127 MMHG | TEMPERATURE: 97.3 F | OXYGEN SATURATION: 98 % | RESPIRATION RATE: 16 BRPM | WEIGHT: 155 LBS | HEIGHT: 70 IN | DIASTOLIC BLOOD PRESSURE: 73 MMHG | BODY MASS INDEX: 22.19 KG/M2 | HEART RATE: 105 BPM

## 2025-08-11 PROCEDURE — G2211 COMPLEX E/M VISIT ADD ON: CPT

## 2025-08-11 PROCEDURE — 99214 OFFICE O/P EST MOD 30 MIN: CPT

## 2025-08-12 RX ORDER — GABAPENTIN 600 MG/1
600 TABLET, COATED ORAL 3 TIMES DAILY
Qty: 90 | Refills: 2 | Status: ACTIVE | COMMUNITY
Start: 2025-07-30 | End: 1900-01-01

## 2025-08-22 ENCOUNTER — APPOINTMENT (OUTPATIENT)
Dept: NEUROLOGY | Facility: CLINIC | Age: 60
End: 2025-08-22
Payer: COMMERCIAL

## 2025-08-22 PROCEDURE — 96365 THER/PROPH/DIAG IV INF INIT: CPT

## 2025-08-22 PROCEDURE — 96366 THER/PROPH/DIAG IV INF ADDON: CPT

## 2025-09-02 ENCOUNTER — RX RENEWAL (OUTPATIENT)
Age: 60
End: 2025-09-02

## 2025-09-05 ENCOUNTER — RX RENEWAL (OUTPATIENT)
Age: 60
End: 2025-09-05

## 2025-09-05 ENCOUNTER — APPOINTMENT (OUTPATIENT)
Dept: NEUROLOGY | Facility: CLINIC | Age: 60
End: 2025-09-05

## 2025-09-08 ENCOUNTER — APPOINTMENT (OUTPATIENT)
Dept: PAIN MANAGEMENT | Facility: CLINIC | Age: 60
End: 2025-09-08

## 2025-09-12 ENCOUNTER — APPOINTMENT (OUTPATIENT)
Dept: INTERNAL MEDICINE | Facility: CLINIC | Age: 60
End: 2025-09-12

## 2025-09-12 ENCOUNTER — APPOINTMENT (OUTPATIENT)
Dept: NEUROLOGY | Facility: CLINIC | Age: 60
End: 2025-09-12

## 2025-09-12 PROCEDURE — 96365 THER/PROPH/DIAG IV INF INIT: CPT

## 2025-09-12 PROCEDURE — 96366 THER/PROPH/DIAG IV INF ADDON: CPT

## 2025-09-15 ENCOUNTER — APPOINTMENT (OUTPATIENT)
Dept: NEUROLOGY | Facility: CLINIC | Age: 60
End: 2025-09-15
Payer: COMMERCIAL

## 2025-09-15 PROCEDURE — 96366 THER/PROPH/DIAG IV INF ADDON: CPT

## 2025-09-15 PROCEDURE — 96365 THER/PROPH/DIAG IV INF INIT: CPT

## 2025-09-18 ENCOUNTER — RX RENEWAL (OUTPATIENT)
Age: 60
End: 2025-09-18

## 2025-09-19 ENCOUNTER — APPOINTMENT (OUTPATIENT)
Dept: ENDOCRINOLOGY | Facility: CLINIC | Age: 60
End: 2025-09-19
Payer: COMMERCIAL

## 2025-09-19 ENCOUNTER — RX RENEWAL (OUTPATIENT)
Age: 60
End: 2025-09-19

## 2025-09-19 VITALS
SYSTOLIC BLOOD PRESSURE: 155 MMHG | HEIGHT: 70 IN | WEIGHT: 171.56 LBS | DIASTOLIC BLOOD PRESSURE: 84 MMHG | HEART RATE: 87 BPM | OXYGEN SATURATION: 96 % | BODY MASS INDEX: 24.56 KG/M2

## 2025-09-19 DIAGNOSIS — E78.5 HYPERLIPIDEMIA, UNSPECIFIED: ICD-10-CM

## 2025-09-19 DIAGNOSIS — E11.9 TYPE 2 DIABETES MELLITUS W/OUT COMPLICATIONS: ICD-10-CM

## 2025-09-19 LAB — HBA1C MFR BLD HPLC: 9.1

## 2025-09-19 PROCEDURE — 95251 CONT GLUC MNTR ANALYSIS I&R: CPT

## 2025-09-19 PROCEDURE — 99214 OFFICE O/P EST MOD 30 MIN: CPT

## 2025-09-19 PROCEDURE — 36415 COLL VENOUS BLD VENIPUNCTURE: CPT

## 2025-09-19 PROCEDURE — 83036 HEMOGLOBIN GLYCOSYLATED A1C: CPT | Mod: QW

## (undated) DEVICE — SYR ALLIANCE II INFLATION 60ML

## (undated) DEVICE — BITE BLOCK ADULT 20 X 27MM (GREEN)

## (undated) DEVICE — CATH IV SAFE BC 22G X 1" (BLUE)

## (undated) DEVICE — CLAMP BX HOT RAD JAW 3

## (undated) DEVICE — SYR LUER LOK 50CC

## (undated) DEVICE — IRRIGATOR BIO SHIELD

## (undated) DEVICE — FORCEP RADIAL JAW 4 JUMBO 2.8MM 3.2MM 240CM ORANGE DISP

## (undated) DEVICE — Device

## (undated) DEVICE — BRUSH COLONOSCOPY CYTOLOGY

## (undated) DEVICE — TUBING SUCTION CONN 6FT STERILE

## (undated) DEVICE — FOLEY HOLDER STATLOCK 2 WAY ADULT

## (undated) DEVICE — BIOPSY FORCEP RADIAL JAW 4 STANDARD WITH NEEDLE

## (undated) DEVICE — PACK IV START WITH CHG

## (undated) DEVICE — SENSOR O2 FINGER ADULT

## (undated) DEVICE — POLY TRAP ETRAP

## (undated) DEVICE — SOL INJ NS 0.9% 500ML 2 PORT

## (undated) DEVICE — SNARE CAPTIVATOR RND COLD STIFF 2.4X10MM 240CM

## (undated) DEVICE — TUBING IV SET GRAVITY 3Y 100" MACRO

## (undated) DEVICE — BALLOON US ENDO

## (undated) DEVICE — SUCTION YANKAUER NO CONTROL VENT

## (undated) DEVICE — ELCTR GROUNDING PAD ADULT COVIDIEN

## (undated) DEVICE — CATH IV SAFE BC 20G X 1.16" (PINK)

## (undated) DEVICE — TUBING SUCTION 20FT